# Patient Record
Sex: MALE | Race: ASIAN | NOT HISPANIC OR LATINO | URBAN - METROPOLITAN AREA
[De-identification: names, ages, dates, MRNs, and addresses within clinical notes are randomized per-mention and may not be internally consistent; named-entity substitution may affect disease eponyms.]

---

## 2022-10-08 ENCOUNTER — INPATIENT (INPATIENT)
Facility: HOSPITAL | Age: 87
LOS: 3 days | Discharge: HOME | End: 2022-10-12
Attending: INTERNAL MEDICINE | Admitting: INTERNAL MEDICINE

## 2022-10-08 VITALS
OXYGEN SATURATION: 97 % | DIASTOLIC BLOOD PRESSURE: 89 MMHG | HEART RATE: 118 BPM | SYSTOLIC BLOOD PRESSURE: 159 MMHG | TEMPERATURE: 99 F | RESPIRATION RATE: 18 BRPM

## 2022-10-08 LAB
ALBUMIN SERPL ELPH-MCNC: 4.2 G/DL — SIGNIFICANT CHANGE UP (ref 3.5–5.2)
ALP SERPL-CCNC: 98 U/L — SIGNIFICANT CHANGE UP (ref 30–115)
ALT FLD-CCNC: 19 U/L — SIGNIFICANT CHANGE UP (ref 0–41)
ANION GAP SERPL CALC-SCNC: 17 MMOL/L — HIGH (ref 7–14)
APPEARANCE UR: ABNORMAL
APTT BLD: 36 SEC — SIGNIFICANT CHANGE UP (ref 27–39.2)
AST SERPL-CCNC: 19 U/L — SIGNIFICANT CHANGE UP (ref 0–41)
BACTERIA # UR AUTO: ABNORMAL
BASOPHILS # BLD AUTO: 0.02 K/UL — SIGNIFICANT CHANGE UP (ref 0–0.2)
BASOPHILS NFR BLD AUTO: 0.1 % — SIGNIFICANT CHANGE UP (ref 0–1)
BILIRUB SERPL-MCNC: 1 MG/DL — SIGNIFICANT CHANGE UP (ref 0.2–1.2)
BILIRUB UR-MCNC: NEGATIVE — SIGNIFICANT CHANGE UP
BUN SERPL-MCNC: 15 MG/DL — SIGNIFICANT CHANGE UP (ref 10–20)
CALCIUM SERPL-MCNC: 8.7 MG/DL — SIGNIFICANT CHANGE UP (ref 8.4–10.4)
CHLORIDE SERPL-SCNC: 101 MMOL/L — SIGNIFICANT CHANGE UP (ref 98–110)
CO2 SERPL-SCNC: 21 MMOL/L — SIGNIFICANT CHANGE UP (ref 17–32)
COLOR SPEC: YELLOW — SIGNIFICANT CHANGE UP
CREAT SERPL-MCNC: 1 MG/DL — SIGNIFICANT CHANGE UP (ref 0.7–1.5)
DIFF PNL FLD: ABNORMAL
EGFR: 73 ML/MIN/1.73M2 — SIGNIFICANT CHANGE UP
EOSINOPHIL # BLD AUTO: 0 K/UL — SIGNIFICANT CHANGE UP (ref 0–0.7)
EOSINOPHIL NFR BLD AUTO: 0 % — SIGNIFICANT CHANGE UP (ref 0–8)
EPI CELLS # UR: 2 /HPF — SIGNIFICANT CHANGE UP (ref 0–5)
FLUAV AG NPH QL: SIGNIFICANT CHANGE UP
FLUBV AG NPH QL: SIGNIFICANT CHANGE UP
GLUCOSE SERPL-MCNC: 179 MG/DL — HIGH (ref 70–99)
GLUCOSE UR QL: NEGATIVE — SIGNIFICANT CHANGE UP
HCT VFR BLD CALC: 39.8 % — LOW (ref 42–52)
HGB BLD-MCNC: 13.6 G/DL — LOW (ref 14–18)
HYALINE CASTS # UR AUTO: 1 /LPF — SIGNIFICANT CHANGE UP (ref 0–7)
IMM GRANULOCYTES NFR BLD AUTO: 0.5 % — HIGH (ref 0.1–0.3)
INR BLD: 1.18 RATIO — SIGNIFICANT CHANGE UP (ref 0.65–1.3)
KETONES UR-MCNC: ABNORMAL
LACTATE SERPL-SCNC: 2.2 MMOL/L — HIGH (ref 0.7–2)
LEUKOCYTE ESTERASE UR-ACNC: ABNORMAL
LYMPHOCYTES # BLD AUTO: 0.58 K/UL — LOW (ref 1.2–3.4)
LYMPHOCYTES # BLD AUTO: 3.6 % — LOW (ref 20.5–51.1)
MCHC RBC-ENTMCNC: 31.9 PG — HIGH (ref 27–31)
MCHC RBC-ENTMCNC: 34.2 G/DL — SIGNIFICANT CHANGE UP (ref 32–37)
MCV RBC AUTO: 93.4 FL — SIGNIFICANT CHANGE UP (ref 80–94)
MONOCYTES # BLD AUTO: 0.72 K/UL — HIGH (ref 0.1–0.6)
MONOCYTES NFR BLD AUTO: 4.5 % — SIGNIFICANT CHANGE UP (ref 1.7–9.3)
NEUTROPHILS # BLD AUTO: 14.61 K/UL — HIGH (ref 1.4–6.5)
NEUTROPHILS NFR BLD AUTO: 91.3 % — HIGH (ref 42.2–75.2)
NITRITE UR-MCNC: POSITIVE
NRBC # BLD: 0 /100 WBCS — SIGNIFICANT CHANGE UP (ref 0–0)
PH UR: 6.5 — SIGNIFICANT CHANGE UP (ref 5–8)
PLATELET # BLD AUTO: 130 K/UL — SIGNIFICANT CHANGE UP (ref 130–400)
POTASSIUM SERPL-MCNC: 3.9 MMOL/L — SIGNIFICANT CHANGE UP (ref 3.5–5)
POTASSIUM SERPL-SCNC: 3.9 MMOL/L — SIGNIFICANT CHANGE UP (ref 3.5–5)
PROT SERPL-MCNC: 7.3 G/DL — SIGNIFICANT CHANGE UP (ref 6–8)
PROT UR-MCNC: ABNORMAL
PROTHROM AB SERPL-ACNC: 13.5 SEC — HIGH (ref 9.95–12.87)
RBC # BLD: 4.26 M/UL — LOW (ref 4.7–6.1)
RBC # FLD: 13.3 % — SIGNIFICANT CHANGE UP (ref 11.5–14.5)
RBC CASTS # UR COMP ASSIST: 2 /HPF — SIGNIFICANT CHANGE UP (ref 0–4)
RSV RNA NPH QL NAA+NON-PROBE: SIGNIFICANT CHANGE UP
SARS-COV-2 RNA SPEC QL NAA+PROBE: SIGNIFICANT CHANGE UP
SODIUM SERPL-SCNC: 139 MMOL/L — SIGNIFICANT CHANGE UP (ref 135–146)
SP GR SPEC: 1.02 — SIGNIFICANT CHANGE UP (ref 1.01–1.03)
TROPONIN T SERPL-MCNC: <0.01 NG/ML — SIGNIFICANT CHANGE UP
UROBILINOGEN FLD QL: ABNORMAL
WBC # BLD: 16.01 K/UL — HIGH (ref 4.8–10.8)
WBC # FLD AUTO: 16.01 K/UL — HIGH (ref 4.8–10.8)
WBC UR QL: 14 /HPF — HIGH (ref 0–5)

## 2022-10-08 PROCEDURE — 71045 X-RAY EXAM CHEST 1 VIEW: CPT | Mod: 26

## 2022-10-08 PROCEDURE — 93010 ELECTROCARDIOGRAM REPORT: CPT

## 2022-10-08 PROCEDURE — 99285 EMERGENCY DEPT VISIT HI MDM: CPT

## 2022-10-08 RX ORDER — CEFTRIAXONE 500 MG/1
2000 INJECTION, POWDER, FOR SOLUTION INTRAMUSCULAR; INTRAVENOUS ONCE
Refills: 0 | Status: COMPLETED | OUTPATIENT
Start: 2022-10-08 | End: 2022-10-08

## 2022-10-08 RX ORDER — SODIUM CHLORIDE 9 MG/ML
1000 INJECTION, SOLUTION INTRAVENOUS ONCE
Refills: 0 | Status: COMPLETED | OUTPATIENT
Start: 2022-10-08 | End: 2022-10-08

## 2022-10-08 RX ORDER — ACETAMINOPHEN 500 MG
975 TABLET ORAL ONCE
Refills: 0 | Status: COMPLETED | OUTPATIENT
Start: 2022-10-08 | End: 2022-10-08

## 2022-10-08 RX ADMIN — SODIUM CHLORIDE 1000 MILLILITER(S): 9 INJECTION, SOLUTION INTRAVENOUS at 20:55

## 2022-10-08 RX ADMIN — CEFTRIAXONE 100 MILLIGRAM(S): 500 INJECTION, POWDER, FOR SOLUTION INTRAMUSCULAR; INTRAVENOUS at 21:16

## 2022-10-08 RX ADMIN — Medication 975 MILLIGRAM(S): at 20:54

## 2022-10-08 NOTE — ED ADULT TRIAGE NOTE - CHIEF COMPLAINT QUOTE
pt daughter states that pt had difficulty moving his whole body since 0415pm today, pt has no focal deficits

## 2022-10-08 NOTE — ED PROVIDER NOTE - OBJECTIVE STATEMENT
86 yo male, pmh of htn, parkinsons, presents to ed for weakness, family noticed tonight, pt too weak to move in bed, upon my evaluation pt febrile rectally 102F, no pain or radiation. Denies  chills, cp, sob, neck pain, visual changes, nvd, dizziness, numbness, tingling.

## 2022-10-08 NOTE — ED PROVIDER NOTE - CLINICAL SUMMARY MEDICAL DECISION MAKING FREE TEXT BOX
86 YO M w/PMHx significant for HTN, BPH, Parkinson Dz, Gout, DLD, DM, Active Smoker, brought into ED because per family pt was becoming very weak in the last day, to the point where he could not get out of bed (he normally can do this). On arrival to the ED pt noted to have rectal temp of 102F, WBC ct 16K, grossly positive UA, Lactate 2.2, otherwise VSS, received 2L LR, 2g Ceftriaxone and admitted to medicine.  Admitted for urosepsis without septic shock.

## 2022-10-09 LAB
ALBUMIN SERPL ELPH-MCNC: 3.7 G/DL — SIGNIFICANT CHANGE UP (ref 3.5–5.2)
ALP SERPL-CCNC: 85 U/L — SIGNIFICANT CHANGE UP (ref 30–115)
ALT FLD-CCNC: 15 U/L — SIGNIFICANT CHANGE UP (ref 0–41)
ANION GAP SERPL CALC-SCNC: 13 MMOL/L — SIGNIFICANT CHANGE UP (ref 7–14)
AST SERPL-CCNC: 15 U/L — SIGNIFICANT CHANGE UP (ref 0–41)
BASOPHILS # BLD AUTO: 0.03 K/UL — SIGNIFICANT CHANGE UP (ref 0–0.2)
BASOPHILS NFR BLD AUTO: 0.2 % — SIGNIFICANT CHANGE UP (ref 0–1)
BILIRUB SERPL-MCNC: 1 MG/DL — SIGNIFICANT CHANGE UP (ref 0.2–1.2)
BUN SERPL-MCNC: 15 MG/DL — SIGNIFICANT CHANGE UP (ref 10–20)
CALCIUM SERPL-MCNC: 8.5 MG/DL — SIGNIFICANT CHANGE UP (ref 8.4–10.4)
CHLORIDE SERPL-SCNC: 103 MMOL/L — SIGNIFICANT CHANGE UP (ref 98–110)
CO2 SERPL-SCNC: 24 MMOL/L — SIGNIFICANT CHANGE UP (ref 17–32)
CREAT SERPL-MCNC: 0.9 MG/DL — SIGNIFICANT CHANGE UP (ref 0.7–1.5)
EGFR: 83 ML/MIN/1.73M2 — SIGNIFICANT CHANGE UP
EOSINOPHIL # BLD AUTO: 0.06 K/UL — SIGNIFICANT CHANGE UP (ref 0–0.7)
EOSINOPHIL NFR BLD AUTO: 0.4 % — SIGNIFICANT CHANGE UP (ref 0–8)
GLUCOSE SERPL-MCNC: 91 MG/DL — SIGNIFICANT CHANGE UP (ref 70–99)
HCT VFR BLD CALC: 36.2 % — LOW (ref 42–52)
HGB BLD-MCNC: 12.2 G/DL — LOW (ref 14–18)
IMM GRANULOCYTES NFR BLD AUTO: 0.4 % — HIGH (ref 0.1–0.3)
LACTATE SERPL-SCNC: 1.4 MMOL/L — SIGNIFICANT CHANGE UP (ref 0.7–2)
LYMPHOCYTES # BLD AUTO: 1.79 K/UL — SIGNIFICANT CHANGE UP (ref 1.2–3.4)
LYMPHOCYTES # BLD AUTO: 12.5 % — LOW (ref 20.5–51.1)
MAGNESIUM SERPL-MCNC: 1.8 MG/DL — SIGNIFICANT CHANGE UP (ref 1.8–2.4)
MCHC RBC-ENTMCNC: 31.6 PG — HIGH (ref 27–31)
MCHC RBC-ENTMCNC: 33.7 G/DL — SIGNIFICANT CHANGE UP (ref 32–37)
MCV RBC AUTO: 93.8 FL — SIGNIFICANT CHANGE UP (ref 80–94)
MONOCYTES # BLD AUTO: 0.62 K/UL — HIGH (ref 0.1–0.6)
MONOCYTES NFR BLD AUTO: 4.3 % — SIGNIFICANT CHANGE UP (ref 1.7–9.3)
NEUTROPHILS # BLD AUTO: 11.78 K/UL — HIGH (ref 1.4–6.5)
NEUTROPHILS NFR BLD AUTO: 82.2 % — HIGH (ref 42.2–75.2)
NRBC # BLD: 0 /100 WBCS — SIGNIFICANT CHANGE UP (ref 0–0)
PHOSPHATE SERPL-MCNC: 2.8 MG/DL — SIGNIFICANT CHANGE UP (ref 2.1–4.9)
PLATELET # BLD AUTO: 118 K/UL — LOW (ref 130–400)
POTASSIUM SERPL-MCNC: 3.5 MMOL/L — SIGNIFICANT CHANGE UP (ref 3.5–5)
POTASSIUM SERPL-SCNC: 3.5 MMOL/L — SIGNIFICANT CHANGE UP (ref 3.5–5)
PROT SERPL-MCNC: 6.3 G/DL — SIGNIFICANT CHANGE UP (ref 6–8)
RBC # BLD: 3.86 M/UL — LOW (ref 4.7–6.1)
RBC # FLD: 13.3 % — SIGNIFICANT CHANGE UP (ref 11.5–14.5)
SODIUM SERPL-SCNC: 140 MMOL/L — SIGNIFICANT CHANGE UP (ref 135–146)
WBC # BLD: 14.34 K/UL — HIGH (ref 4.8–10.8)
WBC # FLD AUTO: 14.34 K/UL — HIGH (ref 4.8–10.8)

## 2022-10-09 PROCEDURE — 99222 1ST HOSP IP/OBS MODERATE 55: CPT

## 2022-10-09 RX ORDER — TAMSULOSIN HYDROCHLORIDE 0.4 MG/1
0.4 CAPSULE ORAL AT BEDTIME
Refills: 0 | Status: DISCONTINUED | OUTPATIENT
Start: 2022-10-09 | End: 2022-10-12

## 2022-10-09 RX ORDER — ATORVASTATIN CALCIUM 80 MG/1
10 TABLET, FILM COATED ORAL AT BEDTIME
Refills: 0 | Status: DISCONTINUED | OUTPATIENT
Start: 2022-10-09 | End: 2022-10-12

## 2022-10-09 RX ORDER — ALLOPURINOL 300 MG
300 TABLET ORAL DAILY
Refills: 0 | Status: DISCONTINUED | OUTPATIENT
Start: 2022-10-09 | End: 2022-10-12

## 2022-10-09 RX ORDER — NICOTINE POLACRILEX 2 MG
1 GUM BUCCAL DAILY
Refills: 0 | Status: DISCONTINUED | OUTPATIENT
Start: 2022-10-09 | End: 2022-10-12

## 2022-10-09 RX ORDER — ENOXAPARIN SODIUM 100 MG/ML
40 INJECTION SUBCUTANEOUS EVERY 24 HOURS
Refills: 0 | Status: DISCONTINUED | OUTPATIENT
Start: 2022-10-09 | End: 2022-10-12

## 2022-10-09 RX ORDER — LOSARTAN POTASSIUM 100 MG/1
50 TABLET, FILM COATED ORAL DAILY
Refills: 0 | Status: DISCONTINUED | OUTPATIENT
Start: 2022-10-09 | End: 2022-10-12

## 2022-10-09 RX ORDER — AMLODIPINE BESYLATE 2.5 MG/1
5 TABLET ORAL DAILY
Refills: 0 | Status: DISCONTINUED | OUTPATIENT
Start: 2022-10-09 | End: 2022-10-12

## 2022-10-09 RX ORDER — CEFTRIAXONE 500 MG/1
1000 INJECTION, POWDER, FOR SOLUTION INTRAMUSCULAR; INTRAVENOUS EVERY 24 HOURS
Refills: 0 | Status: COMPLETED | OUTPATIENT
Start: 2022-10-09 | End: 2022-10-11

## 2022-10-09 RX ORDER — SODIUM CHLORIDE 9 MG/ML
1000 INJECTION, SOLUTION INTRAVENOUS ONCE
Refills: 0 | Status: COMPLETED | OUTPATIENT
Start: 2022-10-09 | End: 2022-10-09

## 2022-10-09 RX ADMIN — ATORVASTATIN CALCIUM 10 MILLIGRAM(S): 80 TABLET, FILM COATED ORAL at 23:44

## 2022-10-09 RX ADMIN — Medication 300 MILLIGRAM(S): at 11:04

## 2022-10-09 RX ADMIN — ENOXAPARIN SODIUM 40 MILLIGRAM(S): 100 INJECTION SUBCUTANEOUS at 06:15

## 2022-10-09 RX ADMIN — LOSARTAN POTASSIUM 50 MILLIGRAM(S): 100 TABLET, FILM COATED ORAL at 05:48

## 2022-10-09 RX ADMIN — TAMSULOSIN HYDROCHLORIDE 0.4 MILLIGRAM(S): 0.4 CAPSULE ORAL at 23:44

## 2022-10-09 RX ADMIN — Medication 975 MILLIGRAM(S): at 05:33

## 2022-10-09 RX ADMIN — AMLODIPINE BESYLATE 5 MILLIGRAM(S): 2.5 TABLET ORAL at 05:48

## 2022-10-09 RX ADMIN — CEFTRIAXONE 100 MILLIGRAM(S): 500 INJECTION, POWDER, FOR SOLUTION INTRAMUSCULAR; INTRAVENOUS at 15:50

## 2022-10-09 RX ADMIN — SODIUM CHLORIDE 1000 MILLILITER(S): 9 INJECTION, SOLUTION INTRAVENOUS at 03:23

## 2022-10-09 RX ADMIN — Medication 1 PATCH: at 11:05

## 2022-10-09 NOTE — H&P ADULT - ASSESSMENT
IMPRESSION  # Weakness/Malaise  # UTI  # HO BPH  # HTN  # HO Gout  # HO NIDDM-II  # Active Smoker   # DLD    PLAN  # Weakness/Malaise  # UTI  - Ceftriaxone q24hrs x 3 days  - LR @125cc/hr  - Antipyretics PRN    # HO BPH  - c/w Tamsulosin    # HTN  - Olmesartan 20mg therapeutic interchange    # HO Gout  - c/w Allopurinol QD    # HO NIDDM-II  - Check A1C  - SS PRN    # Active Smoker   - NRT PRN    # DLD  - c/w statin    DISPO: Acute  DVT PPX: Lovenox  CODE: Full

## 2022-10-09 NOTE — H&P ADULT - NSICDXPASTMEDICALHX_GEN_ALL_CORE_FT
PAST MEDICAL HISTORY:  BPH (benign prostatic hyperplasia)     Diabetes mellitus     Gout     HLD (hyperlipidemia)     HTN (hypertension)     Parkinson disease     Smoker within last 12 months

## 2022-10-09 NOTE — H&P ADULT - HISTORY OF PRESENT ILLNESS
86 YO M w/PMHx significant for HTN + Parkinoson Dz, brought into ED because per family pt was becoming very weak in the last day, to the point where he could not get out of bed (he normally can do this). On arrival to the ED pt noted to have rectal temp of 102F, WBC ct 16K, grossly positive UA, Lactate 2.2, otherwise VSS, received 2L LR, 2g Ceftriaxone and admitted to medicine.  HPI: 86 YO M w/PMHx significant for HTN, BPH, Parkinson Dz, Gout, DLD, DM, Active Smoker, brought into ED because per family pt was becoming very weak in the last day, to the point where he could not get out of bed (he normally can do this). On arrival to the ED pt noted to have rectal temp of 102F, WBC ct 16K, grossly positive UA, Lactate 2.2, otherwise VSS, received 2L LR, 2g Ceftriaxone and admitted to medicine.

## 2022-10-09 NOTE — H&P ADULT - ATTENDING COMMENTS
EKG reviewed normal sinus rhythm, RBBB, LVH, age indeterminate septal infarct  CXR reviewed no bilateral opacity or effusion    # Urinary tract infection  # Generalized weakness 2/2 UTI  - c/w IV antibiotic  - follow up urine and blood culture    # Parkinson's disease  -     # HTN, stable  - c/w home BP meds, amlodipine and ARB    # DM II   - monitor fingerstick glucose, start insulin sliding scale if fsg>180    # DLD   - c/w lipitor    # Gout  - on allopurinol    # BPH  - on flomax, monitor urine output    # DVT prophylaxis - on lovenox subcut  # Code status - Full Code EKG reviewed normal sinus rhythm, RBBB, LVH, age indeterminate septal infarct  CXR reviewed no bilateral opacity or effusion    PHYSICAL EXAM:  General Appearance: NAD, normal for age and gender. 	  Neck: Normal JVP, no bruit.   Eyes: Conjunctiva clear, Extra Ocular muscles intact. No scleral icterus.  ENMT: Moist oral mucosa. No oral lesion.  Cardiovascular: Regular rate and rhythm S1 S2, No JVD, No murmurs.  Respiratory: Lungs clear to auscultation. No wheezes, rales or rhonchi.  Psychiatry: Alert and oriented x 3, Mood & affect appropriate.  Gastrointestinal:  Soft, Non-tender, Non-distended.  Neurologic: Non-focal deficits.  Musculoskeletal/ extremities: Move all extremities. No clubbing, cyanosis or edema.  Vascular: Peripheral pulses palpable bilaterally.  Skin/Integumen: No rashes, No ecchymoses, No cyanosis.    # Urinary tract infection  # Generalized weakness 2/2 UTI  - c/w IV antibiotic  - follow up urine and blood culture    # Parkinson's disease  - not on meds as per home list    # HTN, stable  - c/w home BP meds, amlodipine and ARB    # DM II   - monitor fingerstick glucose, start insulin sliding scale if fsg>180    # DLD   - c/w lipitor    # Gout  - on allopurinol    # BPH  - on flomax, monitor urine output    # DVT prophylaxis - on lovenox subcut  # Code status - Full Code Universal Safety Interventions

## 2022-10-09 NOTE — H&P ADULT - NSHPLABSRESULTS_GEN_ALL_CORE
LABS:                        13.6   16.01 )-----------( 130      ( 08 Oct 2022 20:29 )             39.8     10-08    139  |  101  |  15  ----------------------------<  179<H>  3.9   |  21  |  1.0    Ca    8.7      08 Oct 2022 20:29    TPro  7.3  /  Alb  4.2  /  TBili  1.0  /  DBili  x   /  AST  19  /  ALT  19  /  AlkPhos  98  10-08    PT/INR - ( 08 Oct 2022 20:29 )   PT: 13.50 sec;   INR: 1.18 ratio         PTT - ( 08 Oct 2022 20:29 )  PTT:36.0 sec      RADIOLOGY & ADDITIONAL TESTS: LABS:                        13.6   16.01 )-----------( 130      ( 08 Oct 2022 20:29 )             39.8     10-08    139  |  101  |  15  ----------------------------<  179<H>  3.9   |  21  |  1.0    Ca    8.7      08 Oct 2022 20:29    TPro  7.3  /  Alb  4.2  /  TBili  1.0  /  DBili  x   /  AST  19  /  ALT  19  /  AlkPhos  98  10-08    PT/INR - ( 08 Oct 2022 20:29 )   PT: 13.50 sec;   INR: 1.18 ratio         PTT - ( 08 Oct 2022 20:29 )  PTT:36.0 sec      RADIOLOGY & ADDITIONAL TESTS:    < from: Xray Chest 1 View-PORTABLE IMMEDIATE (10.08.22 @ 20:52) >    IMPRESSION:    No radiographic evidence of acute cardiopulmonary disease.    < end of copied text >

## 2022-10-10 LAB
ALBUMIN SERPL ELPH-MCNC: 4.1 G/DL — SIGNIFICANT CHANGE UP (ref 3.5–5.2)
ALP SERPL-CCNC: 98 U/L — SIGNIFICANT CHANGE UP (ref 30–115)
ALT FLD-CCNC: 15 U/L — SIGNIFICANT CHANGE UP (ref 0–41)
ANION GAP SERPL CALC-SCNC: 13 MMOL/L — SIGNIFICANT CHANGE UP (ref 7–14)
AST SERPL-CCNC: 17 U/L — SIGNIFICANT CHANGE UP (ref 0–41)
BASOPHILS # BLD AUTO: 0.04 K/UL — SIGNIFICANT CHANGE UP (ref 0–0.2)
BASOPHILS NFR BLD AUTO: 0.3 % — SIGNIFICANT CHANGE UP (ref 0–1)
BILIRUB SERPL-MCNC: 0.8 MG/DL — SIGNIFICANT CHANGE UP (ref 0.2–1.2)
BUN SERPL-MCNC: 13 MG/DL — SIGNIFICANT CHANGE UP (ref 10–20)
CALCIUM SERPL-MCNC: 9.1 MG/DL — SIGNIFICANT CHANGE UP (ref 8.4–10.5)
CHLORIDE SERPL-SCNC: 105 MMOL/L — SIGNIFICANT CHANGE UP (ref 98–110)
CO2 SERPL-SCNC: 23 MMOL/L — SIGNIFICANT CHANGE UP (ref 17–32)
CREAT SERPL-MCNC: 0.9 MG/DL — SIGNIFICANT CHANGE UP (ref 0.7–1.5)
EGFR: 83 ML/MIN/1.73M2 — SIGNIFICANT CHANGE UP
EOSINOPHIL # BLD AUTO: 0.02 K/UL — SIGNIFICANT CHANGE UP (ref 0–0.7)
EOSINOPHIL NFR BLD AUTO: 0.2 % — SIGNIFICANT CHANGE UP (ref 0–8)
GLUCOSE SERPL-MCNC: 129 MG/DL — HIGH (ref 70–99)
HCT VFR BLD CALC: 37.1 % — LOW (ref 42–52)
HGB BLD-MCNC: 12.9 G/DL — LOW (ref 14–18)
IMM GRANULOCYTES NFR BLD AUTO: 0.5 % — HIGH (ref 0.1–0.3)
LYMPHOCYTES # BLD AUTO: 1.63 K/UL — SIGNIFICANT CHANGE UP (ref 1.2–3.4)
LYMPHOCYTES # BLD AUTO: 12.8 % — LOW (ref 20.5–51.1)
MAGNESIUM SERPL-MCNC: 2.1 MG/DL — SIGNIFICANT CHANGE UP (ref 1.8–2.4)
MCHC RBC-ENTMCNC: 31.9 PG — HIGH (ref 27–31)
MCHC RBC-ENTMCNC: 34.8 G/DL — SIGNIFICANT CHANGE UP (ref 32–37)
MCV RBC AUTO: 91.8 FL — SIGNIFICANT CHANGE UP (ref 80–94)
MONOCYTES # BLD AUTO: 0.63 K/UL — HIGH (ref 0.1–0.6)
MONOCYTES NFR BLD AUTO: 4.9 % — SIGNIFICANT CHANGE UP (ref 1.7–9.3)
NEUTROPHILS # BLD AUTO: 10.37 K/UL — HIGH (ref 1.4–6.5)
NEUTROPHILS NFR BLD AUTO: 81.3 % — HIGH (ref 42.2–75.2)
NRBC # BLD: 0 /100 WBCS — SIGNIFICANT CHANGE UP (ref 0–0)
PLATELET # BLD AUTO: 131 K/UL — SIGNIFICANT CHANGE UP (ref 130–400)
POTASSIUM SERPL-MCNC: 3.8 MMOL/L — SIGNIFICANT CHANGE UP (ref 3.5–5)
POTASSIUM SERPL-SCNC: 3.8 MMOL/L — SIGNIFICANT CHANGE UP (ref 3.5–5)
PROT SERPL-MCNC: 7.3 G/DL — SIGNIFICANT CHANGE UP (ref 6–8)
RBC # BLD: 4.04 M/UL — LOW (ref 4.7–6.1)
RBC # FLD: 13.2 % — SIGNIFICANT CHANGE UP (ref 11.5–14.5)
SODIUM SERPL-SCNC: 141 MMOL/L — SIGNIFICANT CHANGE UP (ref 135–146)
WBC # BLD: 12.75 K/UL — HIGH (ref 4.8–10.8)
WBC # FLD AUTO: 12.75 K/UL — HIGH (ref 4.8–10.8)

## 2022-10-10 PROCEDURE — 72110 X-RAY EXAM L-2 SPINE 4/>VWS: CPT | Mod: 26

## 2022-10-10 PROCEDURE — 99232 SBSQ HOSP IP/OBS MODERATE 35: CPT

## 2022-10-10 PROCEDURE — 72070 X-RAY EXAM THORAC SPINE 2VWS: CPT | Mod: 26

## 2022-10-10 RX ORDER — POLYETHYLENE GLYCOL 3350 17 G/17G
17 POWDER, FOR SOLUTION ORAL DAILY
Refills: 0 | Status: DISCONTINUED | OUTPATIENT
Start: 2022-10-10 | End: 2022-10-12

## 2022-10-10 RX ORDER — SENNA PLUS 8.6 MG/1
2 TABLET ORAL AT BEDTIME
Refills: 0 | Status: DISCONTINUED | OUTPATIENT
Start: 2022-10-10 | End: 2022-10-12

## 2022-10-10 RX ADMIN — AMLODIPINE BESYLATE 5 MILLIGRAM(S): 2.5 TABLET ORAL at 06:02

## 2022-10-10 RX ADMIN — CEFTRIAXONE 100 MILLIGRAM(S): 500 INJECTION, POWDER, FOR SOLUTION INTRAMUSCULAR; INTRAVENOUS at 17:59

## 2022-10-10 RX ADMIN — ATORVASTATIN CALCIUM 10 MILLIGRAM(S): 80 TABLET, FILM COATED ORAL at 22:01

## 2022-10-10 RX ADMIN — POLYETHYLENE GLYCOL 3350 17 GRAM(S): 17 POWDER, FOR SOLUTION ORAL at 12:47

## 2022-10-10 RX ADMIN — SENNA PLUS 2 TABLET(S): 8.6 TABLET ORAL at 22:01

## 2022-10-10 RX ADMIN — Medication 1 PATCH: at 12:46

## 2022-10-10 RX ADMIN — Medication 1 PATCH: at 08:14

## 2022-10-10 RX ADMIN — Medication 1 PATCH: at 20:44

## 2022-10-10 RX ADMIN — ENOXAPARIN SODIUM 40 MILLIGRAM(S): 100 INJECTION SUBCUTANEOUS at 06:01

## 2022-10-10 RX ADMIN — TAMSULOSIN HYDROCHLORIDE 0.4 MILLIGRAM(S): 0.4 CAPSULE ORAL at 22:01

## 2022-10-10 RX ADMIN — LOSARTAN POTASSIUM 50 MILLIGRAM(S): 100 TABLET, FILM COATED ORAL at 06:02

## 2022-10-10 RX ADMIN — Medication 300 MILLIGRAM(S): at 12:47

## 2022-10-10 NOTE — PHYSICAL THERAPY INITIAL EVALUATION ADULT - PERTINENT HX OF CURRENT PROBLEM, REHAB EVAL
88 YO M w/PMHx significant for HTN, BPH, Parkinson Dz, Gout, DLD, DM, Active Smoker, brought into ED because per family pt was becoming very weak in the last day, to the point where he could not get out of bed (he normally can do this). On arrival to the ED pt noted to have rectal temp of 102F, WBC ct 16K, grossly positive UA, Lactate 2.2, otherwise VSS, received 2L LR, 2g Ceftriaxone and admitted to medicine.     Pt. referred to PT for eval and tx.

## 2022-10-10 NOTE — PROGRESS NOTE ADULT - SUBJECTIVE AND OBJECTIVE BOX
NAME: KJ MARES  MRN: [id number]  LOCATION: [address]    Patient is a 87y old  Male who presents with a chief complaint of UTI (10 Oct 2022 17:40)      HPI:  HPI: 88 YO M w/PMHx significant for HTN, BPH, Parkinson Dz, Gout, DLD, DM, Active Smoker, brought into ED because per family pt was becoming very weak in the last day, to the point where he could not get out of bed (he normally can do this). On arrival to the ED pt noted to have rectal temp of 102F, WBC ct 16K, grossly positive UA, Lactate 2.2, otherwise VSS, received 2L LR, 2g Ceftriaxone and admitted to medicine.      (09 Oct 2022 00:51)      OVERNIGHT EVENTS: LAZARO overnight  INTERVAL HPI: Patient seen and examined at bedside. Patients daughter at bed side notes that patient was requiring one person assist in the ED patient states that his back is hurting and feels like he is getting weak d/t deconditioning.    T(F): 97.3 (10-10-22 @ 14:05), Max: 98.6 (10-09-22 @ 22:40)  HR: 81 (10-10-22 @ 14:05) (81 - 107)  BP: 123/74 (10-10-22 @ 14:05) (123/74 - 178/95)  RR: 18 (10-10-22 @ 14:05) (18 - 18)  SpO2: --  I&O's Summary      PHYSICAL EXAM:  GENERAL: NAD, well-groomed, well-developed, up right, having breakfast.  HEAD:  Atraumatic, Normocephalic  EYES: EOMI, PERRLA, conjunctiva and sclera clear  ENMT: Moist mucous membranes;  NECK: Supple, No JVD;  NERVOUS SYSTEM:  Alert & Oriented X3, Good concentration;  CHEST/LUNG: Clear to auscultation bilaterally in ant. lung fields; decreased breath sound B/L in Lower lung fields  HEART: Regular rate and rhythm; No murmurs, rubs, or gallops  ABDOMEN: Soft, Nontender, Nondistended; Bowel sounds present  EXTREMITIES:  2+ Peripheral Pulses, No clubbing, cyanosis, or edema    LABS:                        12.9   12.75 )-----------( 131      ( 10 Oct 2022 08:17 )             37.1     10-10    141  |  105  |  13  ----------------------------<  129<H>  3.8   |  23  |  0.9    Ca    9.1      10 Oct 2022 08:17  Phos  2.8     10-09  Mg     2.1     10-10    TPro  7.3  /  Alb  4.1  /  TBili  0.8  /  DBili  x   /  AST  17  /  ALT  15  /  AlkPhos  98  10-10    PT/INR - ( 08 Oct 2022 20:29 )   PT: 13.50 sec;   INR: 1.18 ratio         PTT - ( 08 Oct 2022 20:29 )  PTT:36.0 sec  Urinalysis Basic - ( 08 Oct 2022 20:42 )    Color: Yellow / Appearance: Slightly Turbid / S.019 / pH: x  Gluc: x / Ketone: Small  / Bili: Negative / Urobili: 3 mg/dL   Blood: x / Protein: 30 mg/dL / Nitrite: Positive   Leuk Esterase: Moderate / RBC: 2 /HPF / WBC 14 /HPF   Sq Epi: x / Non Sq Epi: 2 /HPF / Bacteria: Many      CAPILLARY BLOOD GLUCOSE          10-08 @ 20:42   >100,000 CFU/ml Klebsiella variicola  --  --  10-08 @ 20:27   No growth to date.  --  --          MEDICATIONS  (STANDING):  allopurinol 300 milliGRAM(s) Oral daily  amLODIPine   Tablet 5 milliGRAM(s) Oral daily  atorvastatin 10 milliGRAM(s) Oral at bedtime  cefTRIAXone   IVPB 1000 milliGRAM(s) IV Intermittent every 24 hours  enoxaparin Injectable 40 milliGRAM(s) SubCutaneous every 24 hours  losartan 50 milliGRAM(s) Oral daily  nicotine - 21 mG/24Hr(s) Patch 1 Patch Transdermal daily  polyethylene glycol 3350 17 Gram(s) Oral daily  senna 2 Tablet(s) Oral at bedtime  tamsulosin 0.4 milliGRAM(s) Oral at bedtime    MEDICATIONS  (PRN):       NAME: KJ MARES  MRN: [id number]  LOCATION: [address]    Patient is a 87y old  Male who presents with a chief complaint of UTI (10 Oct 2022 17:40)      HPI:  HPI: 86 YO M w/PMHx significant for HTN, BPH, Parkinson Dz, Gout, DLD, DM, Active Smoker, brought into ED because per family pt was becoming very weak in the last day, to the point where he could not get out of bed (he normally can do this). On arrival to the ED pt noted to have rectal temp of 102F, WBC ct 16K, grossly positive UA, Lactate 2.2, otherwise VSS, received 2L LR, 2g Ceftriaxone and admitted to medicine.      (09 Oct 2022 00:51)      OVERNIGHT EVENTS: LAZARO overnight  INTERVAL HPI: Patient seen and examined at bedside. Patients daughter at bed side notes that patient was requiring one person assist in the ED patient states that his back is hurting and feels like he is getting weak d/t deconditioning.    T(F): 97.3 (10-10-22 @ 14:05), Max: 98.6 (10-09-22 @ 22:40)  HR: 81 (10-10-22 @ 14:05) (81 - 107)  BP: 123/74 (10-10-22 @ 14:05) (123/74 - 178/95)  RR: 18 (10-10-22 @ 14:05) (18 - 18)  SpO2: --  I&O's Summary      PHYSICAL EXAM:  GENERAL: NAD, well-groomed, well-developed, up right, having breakfast.  HEAD:  Atraumatic, Normocephalic  EYES: EOMI, PERRLA, conjunctiva and sclera clear  ENMT: Moist mucous membranes;  NECK: Supple, No JVD;  NERVOUS SYSTEM:  Alert & Oriented X3, Good concentration;  CHEST/LUNG: Clear to auscultation bilaterally in ant. lung fields; decreased breath sound B/L in Lower lung fields  HEART: Regular rate and rhythm; No murmurs, rubs, or gallops  ABDOMEN: Soft, Nontender, Nondistended; Bowel sounds present  EXTREMITIES:  2+ Peripheral Pulses, No clubbing, cyanosis, or edema    LABS:                        12.9   12.75 )-----------( 131      ( 10 Oct 2022 08:17 )             37.1     10-10    141  |  105  |  13  ----------------------------<  129<H>  3.8   |  23  |  0.9    Ca    9.1      10 Oct 2022 08:17  Phos  2.8     10-09  Mg     2.1     10-10    TPro  7.3  /  Alb  4.1  /  TBili  0.8  /  DBili  x   /  AST  17  /  ALT  15  /  AlkPhos  98  10-10    PT/INR - ( 08 Oct 2022 20:29 )   PT: 13.50 sec;   INR: 1.18 ratio         PTT - ( 08 Oct 2022 20:29 )  PTT:36.0 sec  Urinalysis Basic - ( 08 Oct 2022 20:42 )    Color: Yellow / Appearance: Slightly Turbid / S.019 / pH: x  Gluc: x / Ketone: Small  / Bili: Negative / Urobili: 3 mg/dL   Blood: x / Protein: 30 mg/dL / Nitrite: Positive   Leuk Esterase: Moderate / RBC: 2 /HPF / WBC 14 /HPF   Sq Epi: x / Non Sq Epi: 2 /HPF / Bacteria: Many      CAPILLARY BLOOD GLUCOSE          10-08 @ 20:42   >100,000 CFU/ml Klebsiella variicola  --  --  10-08 @ 20:27   No growth to date.  --  --          MEDICATIONS  (STANDING):  allopurinol 300 milliGRAM(s) Oral daily  amLODIPine   Tablet 5 milliGRAM(s) Oral daily  atorvastatin 10 milliGRAM(s) Oral at bedtime  cefTRIAXone   IVPB 1000 milliGRAM(s) IV Intermittent every 24 hours  enoxaparin Injectable 40 milliGRAM(s) SubCutaneous every 24 hours  losartan 50 milliGRAM(s) Oral daily  nicotine - 21 mG/24Hr(s) Patch 1 Patch Transdermal daily  polyethylene glycol 3350 17 Gram(s) Oral daily  senna 2 Tablet(s) Oral at bedtime  tamsulosin 0.4 milliGRAM(s) Oral at bedtime    MEDICATIONS  (PRN):

## 2022-10-10 NOTE — PROGRESS NOTE ADULT - ASSESSMENT
Assessment and Plan:    86 YO M w/ PMHx significant for HTN, BPH, Parkinson Dz, Gout, DLD, DM, Active Smoker, Admitted for management of sepsis 2/2 UTI.      # Urinary tract infection  # Generalized weakness 2/2 UTI  - Improving  - On Ceftriaxone IVPB (10.8.22 start)  - Blood cx- NGTD and Ucx:+  Klebsiella variicola  - Lactate downtrending from 2.2    # Parkinson's disease  - not on meds as per home list    # HTN, stable  - c/w home BP meds, amlodipine and ARB    # DM II   - monitor fingerstick glucose, start insulin sliding scale if fsg>180    # DLD   - c/w lipitor    # Gout  - on allopurinol    # BPH  - on flomax, monitor urine output    # DVT prophylaxis - on lovenox subcut  # GI- Senna/Miralax  # Activity: IAT- Daily OOB and ambulation with assist    # Code status - Full Code.     F/U:     X-ray of the back- Back pain, wt loss and hx of smoking [ ]     Assessment and Plan:    86 YO M w/ PMHx significant for HTN, BPH, Parkinson Dz, Gout, DLD, DM, Active Smoker, Admitted for management of sepsis 2/2 UTI.      # Urinary tract infection  # Generalized weakness 2/2 UTI    - Improving  - On Ceftriaxone IVPB (10.8.22 start) will change abx pending sensitivities  - Blood cx- NGTD and Ucx:+  Klebsiella variicola  - Lactate downtrending from 2.2    #Abdominal Aneurysm 6.0 cm-Active smoker   #back pain a/w smoking hx. and 15-20lb weightloss over a three month period  -      # Parkinson's disease  - not on meds as per home list    # HTN, stable  - c/w home BP meds, amlodipine and ARB    # DM II   - monitor fingerstick glucose, start insulin sliding scale if fsg>180    # DLD   - c/w lipitor    # Gout  - on allopurinol    # BPH  - on flomax, monitor urine output    # DVT prophylaxis - on lovenox subcut  # GI- Senna/Miralax  # Activity: IAT- Daily OOB and ambulation with assist    # Code status - Full Code.     F/U:     X-ray of the back- Back pain, wt loss and hx of smoking [ ]     Assessment and Plan:    88 YO M w/ PMHx significant for HTN, BPH, Parkinson Dz, Gout, DLD, DM, Active Smoker, Admitted for management of sepsis 2/2 UTI.      # Urinary tract infection  # Generalized weakness 2/2 UTI    - Improving  - On Ceftriaxone IVPB (10.8.22 start) will change abx pending sensitivities  - Blood cx- NGTD and Ucx:+  Klebsiella variicola  - Lactate downtrending from 2.2    #Abdominal Aneurysm 6.0 cm-Active smoker   #back pain a/w smoking hx. and 15-20lb weightloss over a three month period  -Abdominal ultrasound     # Parkinson's disease  - not on meds as per home list    # HTN, stable  - c/w home BP meds, amlodipine and ARB    # DM II   - monitor fingerstick glucose, start insulin sliding scale if fsg>180    # DLD   - c/w lipitor    # Gout  - on allopurinol    # BPH  - on flomax, monitor urine output    # DVT prophylaxis - on lovenox subcut  # GI- Senna/Miralax  # Activity: IAT- Daily OOB and ambulation with assist    # Code status - Full Code.     F/U:     Abdominal Ultrasound [ ]

## 2022-10-10 NOTE — PHYSICAL THERAPY INITIAL EVALUATION ADULT - NSACTIVITYREC_GEN_A_PT
Pt. instructed to perform long arc quads, ankle pumps, heel slides, as tolerated. daily out of bed and ambulation with assist strongly encouraged.

## 2022-10-10 NOTE — PROGRESS NOTE ADULT - SUBJECTIVE AND OBJECTIVE BOX
VISHNU, KJ  87y, Male  Allergy: No Known Allergies    Hospital Day: 1d    Patient seen and examined earlier today. Feeling well, wife endorses that patient has back pain acute onset as of friday, and also 20lb weight loss in last few months.    PMH/PSH:  PAST MEDICAL & SURGICAL HISTORY:  HTN (hypertension)      Gout      BPH (benign prostatic hyperplasia)      Parkinson disease      HLD (hyperlipidemia)      Smoker within last 12 months      Diabetes mellitus      No significant past surgical history          LAST 24-Hr EVENTS:    VITALS:  T(F): 97.3 (10-10-22 @ 14:05), Max: 98.6 (10-09-22 @ 22:40)  HR: 81 (10-10-22 @ 14:05)  BP: 123/74 (10-10-22 @ 14:05) (123/74 - 178/95)  RR: 18 (10-10-22 @ 14:05)  SpO2: --        TESTS & MEASUREMENTS:  Weight (Kg): 61.2 (10-09-22 @ 22:40)  BMI (kg/m2): 23.1 (10-09)                          12.9   12.75 )-----------( 131      ( 10 Oct 2022 08:17 )             37.1     PT/INR - ( 08 Oct 2022 20:29 )   PT: 13.50 sec;   INR: 1.18 ratio         PTT - ( 08 Oct 2022 20:29 )  PTT:36.0 sec  10-10    141  |  105  |  13  ----------------------------<  129<H>  3.8   |  23  |  0.9    Ca    9.1      10 Oct 2022 08:17  Phos  2.8     10-  Mg     2.1     1010    TPro  7.3  /  Alb  4.1  /  TBili  0.8  /  DBili  x   /  AST  17  /  ALT  15  /  AlkPhos  98  10-10    LIVER FUNCTIONS - ( 10 Oct 2022 08:17 )  Alb: 4.1 g/dL / Pro: 7.3 g/dL / ALK PHOS: 98 U/L / ALT: 15 U/L / AST: 17 U/L / GGT: x           CARDIAC MARKERS ( 08 Oct 2022 20:29 )  x     / <0.01 ng/mL / x     / x     / x            Culture - Blood (collected 10-08-22 @ 20:27)  Source: .Blood Blood-Peripheral  Preliminary Report (10-10-22 @ 01:03):    No growth to date.    Culture - Blood (collected 10-08-22 @ 20:27)  Source: .Blood Blood-Peripheral  Preliminary Report (10-10-22 @ 01:03):    No growth to date.      Urinalysis Basic - ( 08 Oct 2022 20:42 )    Color: Yellow / Appearance: Slightly Turbid / S.019 / pH: x  Gluc: x / Ketone: Small  / Bili: Negative / Urobili: 3 mg/dL   Blood: x / Protein: 30 mg/dL / Nitrite: Positive   Leuk Esterase: Moderate / RBC: 2 /HPF / WBC 14 /HPF   Sq Epi: x / Non Sq Epi: 2 /HPF / Bacteria: Many                  RADIOLOGY, ECG, & ADDITIONAL TESTS:  12 Lead ECG:   Ventricular Rate 88 BPM    Atrial Rate 88 BPM    P-R Interval 178 ms    QRS Duration 132 ms    Q-T Interval 406 ms    QTC Calculation(Bazett) 491 ms    P Axis 36 degrees    R Axis -27 degrees    T Axis 2 degrees    Diagnosis Line Normal sinus rhythm  Right bundle branch block  Voltage criteria for left ventricular hypertrophy  Cannot rule out Septal infarct , age undetermined  Abnormal ECG    Confirmed by Pramod Medina ([id number]) on 10/9/2022 6:08:40 PM (10-08-22 @ 22:22)      RECENT DIAGNOSTIC ORDERS:  Xray Thoracic Spine 2 View: 15:16  Exam Completed (10-10-22 @ 16:24)  Xray Lumbosacral Spine: Routine   Indication: back pain  Transport: Stretcher-Crib  Exam Completed (10-10-22 @ 15:16)  Magnesium, Serum: AM Sched. Collection: 11-Oct-2022 04:30 (10-10-22 @ 13:51)  Comprehensive Metabolic Panel: AM Sched. Collection: 11-Oct-2022 04:30 (10-10-22 @ 13:51)  Complete Blood Count + Automated Diff: AM Sched. Collection: 11-Oct-2022 04:30 (10-10-22 @ 13:51)      MEDICATIONS:  MEDICATIONS  (STANDING):  allopurinol 300 milliGRAM(s) Oral daily  amLODIPine   Tablet 5 milliGRAM(s) Oral daily  atorvastatin 10 milliGRAM(s) Oral at bedtime  cefTRIAXone   IVPB 1000 milliGRAM(s) IV Intermittent every 24 hours  enoxaparin Injectable 40 milliGRAM(s) SubCutaneous every 24 hours  losartan 50 milliGRAM(s) Oral daily  nicotine - 21 mG/24Hr(s) Patch 1 Patch Transdermal daily  polyethylene glycol 3350 17 Gram(s) Oral daily  senna 2 Tablet(s) Oral at bedtime  tamsulosin 0.4 milliGRAM(s) Oral at bedtime    MEDICATIONS  (PRN):      HOME MEDICATIONS:  ALLOPURINOL  300 MG TABS (10-09)  AMLODIPINE BESYLATE  5 MG TABS (10-09)  ATORVASTATIN CALCIUM  10 MG TABS (10-09)  METFORMIN HYDROCHLORIDE ER  750 MG TB24 (10-09)  OLMESARTAN MEDOXOMIL  20 MG TABS (10-09)  TAMSULOSIN HYDROCHLORIDE  0.4 MG CAPS (10-09)      PHYSICAL EXAM:  GEN: NAD, Well Appearing, Well nourished  HEENT: NCAT, PERRL, EOMI  CV/CHEST: RRR, +S1/S2, no murmurs  PULM: CTAB, Good Air Entry Bilaterally  ABD: SNTTP, ND x 4 Q's  EXT: Warm, Well Perfused x 4. No Edema  SKIN: No Rash  NEURO: AxOx3, + Normal Affect

## 2022-10-10 NOTE — PHYSICAL THERAPY INITIAL EVALUATION ADULT - GENERAL OBSERVATIONS, REHAB EVAL
930-1000 am Chart reviewed. Pt. seen semirecline in bed in No apparent distress , + IV lock, c/o lower back pain, spouse at bedside ; pt is Cantonese speaking, spouse assisted with communication.

## 2022-10-11 LAB
-  AMIKACIN: SIGNIFICANT CHANGE UP
-  AMOXICILLIN/CLAVULANIC ACID: SIGNIFICANT CHANGE UP
-  AMPICILLIN/SULBACTAM: SIGNIFICANT CHANGE UP
-  AMPICILLIN: SIGNIFICANT CHANGE UP
-  AZTREONAM: SIGNIFICANT CHANGE UP
-  CEFAZOLIN: SIGNIFICANT CHANGE UP
-  CEFEPIME: SIGNIFICANT CHANGE UP
-  CEFOXITIN: SIGNIFICANT CHANGE UP
-  CEFTRIAXONE: SIGNIFICANT CHANGE UP
-  CIPROFLOXACIN: SIGNIFICANT CHANGE UP
-  ERTAPENEM: SIGNIFICANT CHANGE UP
-  GENTAMICIN: SIGNIFICANT CHANGE UP
-  IMIPENEM: SIGNIFICANT CHANGE UP
-  LEVOFLOXACIN: SIGNIFICANT CHANGE UP
-  MEROPENEM: SIGNIFICANT CHANGE UP
-  NITROFURANTOIN: SIGNIFICANT CHANGE UP
-  PIPERACILLIN/TAZOBACTAM: SIGNIFICANT CHANGE UP
-  TIGECYCLINE: SIGNIFICANT CHANGE UP
-  TOBRAMYCIN: SIGNIFICANT CHANGE UP
-  TRIMETHOPRIM/SULFAMETHOXAZOLE: SIGNIFICANT CHANGE UP
ALBUMIN SERPL ELPH-MCNC: 3.9 G/DL — SIGNIFICANT CHANGE UP (ref 3.5–5.2)
ALP SERPL-CCNC: 95 U/L — SIGNIFICANT CHANGE UP (ref 30–115)
ALT FLD-CCNC: 20 U/L — SIGNIFICANT CHANGE UP (ref 0–41)
ANION GAP SERPL CALC-SCNC: 16 MMOL/L — HIGH (ref 7–14)
AST SERPL-CCNC: 19 U/L — SIGNIFICANT CHANGE UP (ref 0–41)
BASOPHILS # BLD AUTO: 0.03 K/UL — SIGNIFICANT CHANGE UP (ref 0–0.2)
BASOPHILS NFR BLD AUTO: 0.3 % — SIGNIFICANT CHANGE UP (ref 0–1)
BILIRUB SERPL-MCNC: 0.8 MG/DL — SIGNIFICANT CHANGE UP (ref 0.2–1.2)
BUN SERPL-MCNC: 15 MG/DL — SIGNIFICANT CHANGE UP (ref 10–20)
CALCIUM SERPL-MCNC: 8.7 MG/DL — SIGNIFICANT CHANGE UP (ref 8.4–10.4)
CHLORIDE SERPL-SCNC: 103 MMOL/L — SIGNIFICANT CHANGE UP (ref 98–110)
CO2 SERPL-SCNC: 22 MMOL/L — SIGNIFICANT CHANGE UP (ref 17–32)
CREAT SERPL-MCNC: 0.9 MG/DL — SIGNIFICANT CHANGE UP (ref 0.7–1.5)
CULTURE RESULTS: SIGNIFICANT CHANGE UP
EGFR: 83 ML/MIN/1.73M2 — SIGNIFICANT CHANGE UP
EOSINOPHIL # BLD AUTO: 0.09 K/UL — SIGNIFICANT CHANGE UP (ref 0–0.7)
EOSINOPHIL NFR BLD AUTO: 0.9 % — SIGNIFICANT CHANGE UP (ref 0–8)
GLUCOSE SERPL-MCNC: 133 MG/DL — HIGH (ref 70–99)
HCT VFR BLD CALC: 37.8 % — LOW (ref 42–52)
HGB BLD-MCNC: 12.9 G/DL — LOW (ref 14–18)
IMM GRANULOCYTES NFR BLD AUTO: 0.3 % — SIGNIFICANT CHANGE UP (ref 0.1–0.3)
LYMPHOCYTES # BLD AUTO: 1.92 K/UL — SIGNIFICANT CHANGE UP (ref 1.2–3.4)
LYMPHOCYTES # BLD AUTO: 18.5 % — LOW (ref 20.5–51.1)
MAGNESIUM SERPL-MCNC: 2.1 MG/DL — SIGNIFICANT CHANGE UP (ref 1.8–2.4)
MCHC RBC-ENTMCNC: 32 PG — HIGH (ref 27–31)
MCHC RBC-ENTMCNC: 34.1 G/DL — SIGNIFICANT CHANGE UP (ref 32–37)
MCV RBC AUTO: 93.8 FL — SIGNIFICANT CHANGE UP (ref 80–94)
METHOD TYPE: SIGNIFICANT CHANGE UP
MONOCYTES # BLD AUTO: 0.57 K/UL — SIGNIFICANT CHANGE UP (ref 0.1–0.6)
MONOCYTES NFR BLD AUTO: 5.5 % — SIGNIFICANT CHANGE UP (ref 1.7–9.3)
NEUTROPHILS # BLD AUTO: 7.74 K/UL — HIGH (ref 1.4–6.5)
NEUTROPHILS NFR BLD AUTO: 74.5 % — SIGNIFICANT CHANGE UP (ref 42.2–75.2)
NRBC # BLD: 0 /100 WBCS — SIGNIFICANT CHANGE UP (ref 0–0)
ORGANISM # SPEC MICROSCOPIC CNT: SIGNIFICANT CHANGE UP
ORGANISM # SPEC MICROSCOPIC CNT: SIGNIFICANT CHANGE UP
PLATELET # BLD AUTO: 151 K/UL — SIGNIFICANT CHANGE UP (ref 130–400)
POTASSIUM SERPL-MCNC: 3.6 MMOL/L — SIGNIFICANT CHANGE UP (ref 3.5–5)
POTASSIUM SERPL-SCNC: 3.6 MMOL/L — SIGNIFICANT CHANGE UP (ref 3.5–5)
PROT SERPL-MCNC: 7.1 G/DL — SIGNIFICANT CHANGE UP (ref 6–8)
RBC # BLD: 4.03 M/UL — LOW (ref 4.7–6.1)
RBC # FLD: 13.1 % — SIGNIFICANT CHANGE UP (ref 11.5–14.5)
SODIUM SERPL-SCNC: 141 MMOL/L — SIGNIFICANT CHANGE UP (ref 135–146)
SPECIMEN SOURCE: SIGNIFICANT CHANGE UP
WBC # BLD: 10.38 K/UL — SIGNIFICANT CHANGE UP (ref 4.8–10.8)
WBC # FLD AUTO: 10.38 K/UL — SIGNIFICANT CHANGE UP (ref 4.8–10.8)

## 2022-10-11 PROCEDURE — 99233 SBSQ HOSP IP/OBS HIGH 50: CPT

## 2022-10-11 PROCEDURE — 93978 VASCULAR STUDY: CPT | Mod: 26

## 2022-10-11 RX ADMIN — TAMSULOSIN HYDROCHLORIDE 0.4 MILLIGRAM(S): 0.4 CAPSULE ORAL at 21:30

## 2022-10-11 RX ADMIN — CEFTRIAXONE 100 MILLIGRAM(S): 500 INJECTION, POWDER, FOR SOLUTION INTRAMUSCULAR; INTRAVENOUS at 17:33

## 2022-10-11 RX ADMIN — ATORVASTATIN CALCIUM 10 MILLIGRAM(S): 80 TABLET, FILM COATED ORAL at 21:30

## 2022-10-11 RX ADMIN — SENNA PLUS 2 TABLET(S): 8.6 TABLET ORAL at 21:30

## 2022-10-11 RX ADMIN — LOSARTAN POTASSIUM 50 MILLIGRAM(S): 100 TABLET, FILM COATED ORAL at 06:40

## 2022-10-11 RX ADMIN — AMLODIPINE BESYLATE 5 MILLIGRAM(S): 2.5 TABLET ORAL at 06:40

## 2022-10-11 RX ADMIN — POLYETHYLENE GLYCOL 3350 17 GRAM(S): 17 POWDER, FOR SOLUTION ORAL at 11:30

## 2022-10-11 RX ADMIN — Medication 1 PATCH: at 11:30

## 2022-10-11 RX ADMIN — ENOXAPARIN SODIUM 40 MILLIGRAM(S): 100 INJECTION SUBCUTANEOUS at 06:41

## 2022-10-11 RX ADMIN — Medication 1 PATCH: at 08:27

## 2022-10-11 RX ADMIN — Medication 300 MILLIGRAM(S): at 11:35

## 2022-10-11 RX ADMIN — Medication 1 PATCH: at 15:13

## 2022-10-11 NOTE — PROGRESS NOTE ADULT - SUBJECTIVE AND OBJECTIVE BOX
NAME: KJ MARES  MRN: [id number]  LOCATION: [place] [address] [address]    Patient is a 87y old  Male who presents with a chief complaint of UTI (10 Oct 2022 19:34)      HPI:  HPI: 86 YO M w/PMHx significant for HTN, BPH, Parkinson Dz, Gout, DLD, DM, Active Smoker, brought into ED because per family pt was becoming very weak in the last day, to the point where he could not get out of bed (he normally can do this). On arrival to the ED pt noted to have rectal temp of 102F, WBC ct 16K, grossly positive UA, Lactate 2.2, otherwise VSS, received 2L LR, 2g Ceftriaxone and admitted to medicine.      (09 Oct 2022 00:51)      OVERNIGHT EVENTS: LAZARO overnight    INTERVAL HPI: Patient seen and examined at bedside.  Patient has no complaints at this time. Denies fevers, chills, headache, lightheadedness, chest pain, dyspnea, abdominal pain, n/v/d/c.    T(F): 98.5 (10-11-22 @ 13:43), Max: 98.5 (10-11-22 @ 13:43)  HR: 80 (10-11-22 @ 13:43) (80 - 96)  BP: 120/68 (10-11-22 @ 13:43) (116/66 - 120/68)  RR: 18 (10-11-22 @ 13:43) (17 - 18)  SpO2: --  I&O's Summary    10 Oct 2022 07:01  -  11 Oct 2022 07:00  --------------------------------------------------------  IN: 477 mL / OUT: 200 mL / NET: 277 mL        PHYSICAL EXAM:    GENERAL: NAD, well-groomed, well-developed  HEAD:  Atraumatic, Normocephalic  EYES: EOMI, PERRLA, conjunctiva and sclera clear  ENMT: Moist mucous membranes  NECK: Supple, No JVD,   NERVOUS SYSTEM:  Alert & Oriented X3, Good concentration;   CHEST/LUNG: CTA bilaterally; No rales, rhonchi, wheezing, or rubs  HEART: Regular rate and rhythm; No murmurs, rubs, or gallops  ABDOMEN: Soft, Nontender, Nondistended; Bowel sounds present  EXTREMITIES:  2+ Peripheral Pulses, No clubbing, cyanosis, or edema      LABS:                        12.9   10.38 )-----------( 151      ( 11 Oct 2022 08:02 )             37.8     10-11    141  |  103  |  15  ----------------------------<  133<H>  3.6   |  22  |  0.9    Ca    8.7      11 Oct 2022 08:02  Mg     2.1     10-11    TPro  7.1  /  Alb  3.9  /  TBili  0.8  /  DBili  x   /  AST  19  /  ALT  20  /  AlkPhos  95  10-11        CAPILLARY BLOOD GLUCOSE          10-08 @ 20:42   >100,000 CFU/ml Klebsiella variicola  --  Klebsiella variicola  10-08 @ 20:27   No growth to date.  --  --          MEDICATIONS  (STANDING):  allopurinol 300 milliGRAM(s) Oral daily  amLODIPine   Tablet 5 milliGRAM(s) Oral daily  atorvastatin 10 milliGRAM(s) Oral at bedtime  cefTRIAXone   IVPB 1000 milliGRAM(s) IV Intermittent every 24 hours  enoxaparin Injectable 40 milliGRAM(s) SubCutaneous every 24 hours  losartan 50 milliGRAM(s) Oral daily  nicotine - 21 mG/24Hr(s) Patch 1 Patch Transdermal daily  polyethylene glycol 3350 17 Gram(s) Oral daily  senna 2 Tablet(s) Oral at bedtime  tamsulosin 0.4 milliGRAM(s) Oral at bedtime    MEDICATIONS  (PRN):

## 2022-10-11 NOTE — PROGRESS NOTE ADULT - ATTENDING COMMENTS
# Urinary tract infection  # Generalized weakness 2/2 UTI  - c/w IV Ceftriaxone  - blood cx negative, urine cx  with pansensitive Klebsiella   - complete course with PO Vantin, 5d total course     #Weight Loss/ New LBP   - compression fx of T6 noted on XR with osteopenia, outpt follow up with PCP for DEXA scan   - will need routine cancer screening as an outpatient     #Incidental Abdominal Aortic Aneurysm   - noted on XR, will order US to further evaluate, and then consult Vascular for intervention if it meets criteria     # Parkinson's disease  - not on meds as per home list    # HTN, stable  - c/w home BP meds, amlodipine and ARB    # DM II   - monitor fingerstick glucose, start insulin sliding scale if fsg>180    # DLD   - c/w lipitor    # Gout  - on allopurinol    # BPH  - on flomax, monitor urine output    # DVT prophylaxis - on lovenox subcut  # Code status - Full Code.     Total time spent to complete patient's bedside assessment, review medical chart, discuss medical plan of care with covering medical team was more than 35 minutes  with >50% of time spent face to face with patient, discussion with patient/family and/or coordination of care      Gladis Fong DO

## 2022-10-11 NOTE — PROGRESS NOTE ADULT - ASSESSMENT
Assessment and Plan:    86 YO M w/ PMHx significant for HTN, BPH, Parkinson Dz, Gout, DLD, DM, Active Smoker, Admitted for management of sepsis 2/2 UTI.      # Urinary tract infection  # Generalized weakness 2/2 UTI    - Improving  - On Ceftriaxone IVPB (10.8.22 start) will change abx pending sensitivities  - Blood cx- NGTD and Ucx:+  Klebsiella variicola  - Lactate downtrending from 2.2    #Abdominal Aneurysm 6.0 cm-Active smoker   #back pain a/w smoking hx. and 15-20lb weightloss over a three month period  -Abdominal ultrasound ordered will f/u with results    # Parkinson's disease  - not on meds as per home list    # HTN, stable  - c/w home BP meds, amlodipine and ARB    # DM II   - monitor fingerstick glucose, start insulin sliding scale if fsg>180    # DLD   - c/w lipitor    # Gout  - on allopurinol    # BPH  - on flomax, monitor urine output    # DVT prophylaxis - on lovenox subcut  # GI- Senna/Miralax  # Activity: IAT- Daily OOB and ambulation with assist    # Code status - Full Code.     F/U:     Abdominal Ultrasound [ ]

## 2022-10-12 ENCOUNTER — TRANSCRIPTION ENCOUNTER (OUTPATIENT)
Age: 87
End: 2022-10-12

## 2022-10-12 VITALS
DIASTOLIC BLOOD PRESSURE: 71 MMHG | RESPIRATION RATE: 18 BRPM | HEART RATE: 83 BPM | OXYGEN SATURATION: 97 % | TEMPERATURE: 98 F | SYSTOLIC BLOOD PRESSURE: 112 MMHG

## 2022-10-12 LAB
ALBUMIN SERPL ELPH-MCNC: 3.9 G/DL — SIGNIFICANT CHANGE UP (ref 3.5–5.2)
ALP SERPL-CCNC: 96 U/L — SIGNIFICANT CHANGE UP (ref 30–115)
ALT FLD-CCNC: 24 U/L — SIGNIFICANT CHANGE UP (ref 0–41)
ANION GAP SERPL CALC-SCNC: 13 MMOL/L — SIGNIFICANT CHANGE UP (ref 7–14)
AST SERPL-CCNC: 20 U/L — SIGNIFICANT CHANGE UP (ref 0–41)
BASOPHILS # BLD AUTO: 0.03 K/UL — SIGNIFICANT CHANGE UP (ref 0–0.2)
BASOPHILS NFR BLD AUTO: 0.3 % — SIGNIFICANT CHANGE UP (ref 0–1)
BILIRUB SERPL-MCNC: 0.7 MG/DL — SIGNIFICANT CHANGE UP (ref 0.2–1.2)
BUN SERPL-MCNC: 15 MG/DL — SIGNIFICANT CHANGE UP (ref 10–20)
CALCIUM SERPL-MCNC: 8.7 MG/DL — SIGNIFICANT CHANGE UP (ref 8.4–10.4)
CHLORIDE SERPL-SCNC: 105 MMOL/L — SIGNIFICANT CHANGE UP (ref 98–110)
CO2 SERPL-SCNC: 25 MMOL/L — SIGNIFICANT CHANGE UP (ref 17–32)
CREAT SERPL-MCNC: 1.1 MG/DL — SIGNIFICANT CHANGE UP (ref 0.7–1.5)
EGFR: 65 ML/MIN/1.73M2 — SIGNIFICANT CHANGE UP
EOSINOPHIL # BLD AUTO: 0.13 K/UL — SIGNIFICANT CHANGE UP (ref 0–0.7)
EOSINOPHIL NFR BLD AUTO: 1.5 % — SIGNIFICANT CHANGE UP (ref 0–8)
GLUCOSE SERPL-MCNC: 119 MG/DL — HIGH (ref 70–99)
HCT VFR BLD CALC: 39.2 % — LOW (ref 42–52)
HGB BLD-MCNC: 12.6 G/DL — LOW (ref 14–18)
IMM GRANULOCYTES NFR BLD AUTO: 0.3 % — SIGNIFICANT CHANGE UP (ref 0.1–0.3)
LYMPHOCYTES # BLD AUTO: 2.17 K/UL — SIGNIFICANT CHANGE UP (ref 1.2–3.4)
LYMPHOCYTES # BLD AUTO: 24.9 % — SIGNIFICANT CHANGE UP (ref 20.5–51.1)
MAGNESIUM SERPL-MCNC: 2.2 MG/DL — SIGNIFICANT CHANGE UP (ref 1.8–2.4)
MCHC RBC-ENTMCNC: 30.9 PG — SIGNIFICANT CHANGE UP (ref 27–31)
MCHC RBC-ENTMCNC: 32.1 G/DL — SIGNIFICANT CHANGE UP (ref 32–37)
MCV RBC AUTO: 96.1 FL — HIGH (ref 80–94)
MONOCYTES # BLD AUTO: 0.47 K/UL — SIGNIFICANT CHANGE UP (ref 0.1–0.6)
MONOCYTES NFR BLD AUTO: 5.4 % — SIGNIFICANT CHANGE UP (ref 1.7–9.3)
NEUTROPHILS # BLD AUTO: 5.87 K/UL — SIGNIFICANT CHANGE UP (ref 1.4–6.5)
NEUTROPHILS NFR BLD AUTO: 67.6 % — SIGNIFICANT CHANGE UP (ref 42.2–75.2)
NRBC # BLD: 0 /100 WBCS — SIGNIFICANT CHANGE UP (ref 0–0)
PLATELET # BLD AUTO: 169 K/UL — SIGNIFICANT CHANGE UP (ref 130–400)
POTASSIUM SERPL-MCNC: 3.8 MMOL/L — SIGNIFICANT CHANGE UP (ref 3.5–5)
POTASSIUM SERPL-SCNC: 3.8 MMOL/L — SIGNIFICANT CHANGE UP (ref 3.5–5)
PROT SERPL-MCNC: 7.2 G/DL — SIGNIFICANT CHANGE UP (ref 6–8)
RBC # BLD: 4.08 M/UL — LOW (ref 4.7–6.1)
RBC # FLD: 13.1 % — SIGNIFICANT CHANGE UP (ref 11.5–14.5)
SODIUM SERPL-SCNC: 143 MMOL/L — SIGNIFICANT CHANGE UP (ref 135–146)
WBC # BLD: 8.7 K/UL — SIGNIFICANT CHANGE UP (ref 4.8–10.8)
WBC # FLD AUTO: 8.7 K/UL — SIGNIFICANT CHANGE UP (ref 4.8–10.8)

## 2022-10-12 PROCEDURE — 99239 HOSP IP/OBS DSCHRG MGMT >30: CPT

## 2022-10-12 RX ORDER — ALLOPURINOL 300 MG
1 TABLET ORAL
Qty: 0 | Refills: 0 | DISCHARGE

## 2022-10-12 RX ORDER — AMLODIPINE BESYLATE 2.5 MG/1
0 TABLET ORAL
Qty: 0 | Refills: 0 | DISCHARGE

## 2022-10-12 RX ORDER — ATORVASTATIN CALCIUM 80 MG/1
0 TABLET, FILM COATED ORAL
Qty: 0 | Refills: 0 | DISCHARGE

## 2022-10-12 RX ORDER — OLMESARTAN MEDOXOMIL 5 MG/1
0 TABLET, FILM COATED ORAL
Qty: 0 | Refills: 0 | DISCHARGE

## 2022-10-12 RX ORDER — TAMSULOSIN HYDROCHLORIDE 0.4 MG/1
0 CAPSULE ORAL
Qty: 0 | Refills: 0 | DISCHARGE

## 2022-10-12 RX ORDER — ALLOPURINOL 300 MG
0 TABLET ORAL
Qty: 0 | Refills: 0 | DISCHARGE

## 2022-10-12 RX ORDER — METFORMIN HYDROCHLORIDE 850 MG/1
0 TABLET ORAL
Qty: 0 | Refills: 0 | DISCHARGE

## 2022-10-12 RX ORDER — POLYETHYLENE GLYCOL 3350 17 G/17G
17 POWDER, FOR SOLUTION ORAL
Qty: 0 | Refills: 0 | DISCHARGE
Start: 2022-10-12

## 2022-10-12 RX ORDER — METFORMIN HYDROCHLORIDE 850 MG/1
1 TABLET ORAL
Qty: 0 | Refills: 0 | DISCHARGE

## 2022-10-12 RX ORDER — CEFPODOXIME PROXETIL 100 MG
1 TABLET ORAL
Qty: 10 | Refills: 0
Start: 2022-10-12 | End: 2022-10-16

## 2022-10-12 RX ORDER — ATORVASTATIN CALCIUM 80 MG/1
1 TABLET, FILM COATED ORAL
Qty: 0 | Refills: 0 | DISCHARGE

## 2022-10-12 RX ADMIN — LOSARTAN POTASSIUM 50 MILLIGRAM(S): 100 TABLET, FILM COATED ORAL at 06:39

## 2022-10-12 RX ADMIN — POLYETHYLENE GLYCOL 3350 17 GRAM(S): 17 POWDER, FOR SOLUTION ORAL at 13:21

## 2022-10-12 RX ADMIN — Medication 1 PATCH: at 13:20

## 2022-10-12 RX ADMIN — ENOXAPARIN SODIUM 40 MILLIGRAM(S): 100 INJECTION SUBCUTANEOUS at 06:40

## 2022-10-12 RX ADMIN — Medication 1 PATCH: at 11:43

## 2022-10-12 RX ADMIN — AMLODIPINE BESYLATE 5 MILLIGRAM(S): 2.5 TABLET ORAL at 06:39

## 2022-10-12 RX ADMIN — Medication 300 MILLIGRAM(S): at 13:20

## 2022-10-12 NOTE — DISCHARGE NOTE PROVIDER - NSDCFUADDAPPT_GEN_ALL_CORE_FT
- Follow up with primary care physician for age appropriate cancer screening   - Follow up with vascular surgery as outpatient for abdominal aortic aneurism  - Follow up with primary care physician for age appropriate cancer screening given your recent weight loss  - Follow up with vascular surgery as outpatient for abdominal aortic aneurism

## 2022-10-12 NOTE — DISCHARGE NOTE PROVIDER - HOSPITAL COURSE
88 YO M w/PMHx significant for HTN, BPH, Parkinson Dz, Gout, DLD, DM, Active Smoker, brought into ED because per family pt was becoming very weak in the last day, to the point where he could not get out of bed (he normally can do this). On arrival to the ED pt noted to have rectal temp of 102F, WBC ct 16K, grossly positive UA, Lactate 2.2, otherwise VSS, received 2L LR, 2g Ceftriaxone and admitted to medicine.     # Urinary tract infection  # Generalized weakness 2/2 UTI  - c/w IV Ceftriaxone  - blood cx negative, urine cx  with pansensitive Klebsiella   - complete course with PO Vantin, 5d total course     #Weight Loss/ New LBP   - compression fx of T6 noted on XR with osteopenia, outpt follow up with PCP for DEXA scan   - will need routine cancer screening as an outpatient     #Incidental Abdominal Aortic Aneurysm   - noted on XR, will order US to further evaluate, and then consult Vascular for intervention if it meets criteria   - US Duplex Distal aorta measuring 5.1 x 5.2 x 4.8.  Maximum abdominal aorta measured 5.2 cm.  - F/u vascular as outpatient       # Parkinson's disease  - not on meds as per home list    # HTN, stable  - c/w home BP meds, amlodipine and ARB    # DM II   - monitor fingerstick glucose, start insulin sliding scale if fsg>180    # DLD   - c/w lipitor    # Gout  - on allopurinol    # BPH  - on flomax, monitor urine output   86 YO M w/PMHx significant for HTN, BPH, Parkinson Dz, Gout, DLD, DM, Active Smoker, brought into ED because per family pt was becoming very weak in the last day, to the point where he could not get out of bed (he normally can do this). On arrival to the ED pt noted to have rectal temp of 102F, WBC ct 16K, grossly positive UA, Lactate 2.2, otherwise VSS, received 2L LR, 2g Ceftriaxone and admitted to medicine.     # Urinary tract infection  # Generalized weakness 2/2 UTI  - s/p IV Ceftriaxone  - blood cx negative, urine cx  with pansensitive Klebsiella   - complete course with PO Vantin, 5d total course     #Weight Loss/ New LBP   - compression fx of T6 noted on XR with osteopenia, outpt follow up with PCP for DEXA scan   - will need routine cancer screening as an outpatient     #Incidental Abdominal Aortic Aneurysm   - noted on XR  - US Duplex Distal aorta measuring 5.1 x 5.2 x 4.8.  Maximum abdominal aorta measured 5.2 cm.  - F/u vascular as outpatient     # Parkinson's disease  - not on meds as per home list    # HTN, stable  - c/w home BP med    # DM II     # DLD   - c/w lipitor    # Gout  - on allopurinol    # BPH  - on flomax, monitor urine output

## 2022-10-12 NOTE — DISCHARGE NOTE NURSING/CASE MANAGEMENT/SOCIAL WORK - NSDCFUADDAPPT_GEN_ALL_CORE_FT
- Follow up with primary care physician for age appropriate cancer screening given your recent weight loss  - Follow up with vascular surgery as outpatient for abdominal aortic aneurism

## 2022-10-12 NOTE — DISCHARGE NOTE NURSING/CASE MANAGEMENT/SOCIAL WORK - NSDCPEFALRISK_GEN_ALL_CORE
For information on Fall & Injury Prevention, visit: [email or web address] OR  [email or web address] OR  https://www.cdc.gov/steadi/patient.html

## 2022-10-12 NOTE — DISCHARGE NOTE PROVIDER - NSDCCPCAREPLAN_GEN_ALL_CORE_FT
PRINCIPAL DISCHARGE DIAGNOSIS  Diagnosis: Sepsis secondary to UTI  Assessment and Plan of Treatment: You were noted either on arrival or during this hospitalization to have a Urinary Tract Infection. You may have already been treated and completed the antibiotics, please refer to the list of medications present on your discharge paperwork. If you notice that there are antibiotics listed, these may be to treat your infection, be sure to complete taking the full course, whether you have symptoms or not, as prescribed.  While taking antibiotics, you may benefit from taking a probiotic such as florastore to help to try and prevent an infectious type of diarrhea known as C Diff. If you notice that you begin having severe watery diarrhea, more than 4-5 episodes a day, please see your Primary Care Doctor or come to the ER to have your stool tested for this infection.   It is not necessary to repeat a urine test to see if the infection is gone, it is assumed that after treatment it should have resolved. However, if you continue to have symptoms, please see your Primary Care doctor or return to the ER.

## 2022-10-12 NOTE — DISCHARGE NOTE PROVIDER - NSDCMRMEDTOKEN_GEN_ALL_CORE_FT
ALLOPURINOL  300 MG TABS: 1 tab(s) orally once a day  AMLODIPINE BESYLATE  5 MG TABS: 1 tab(s) orally once a day  ATORVASTATIN CALCIUM  10 MG TABS: 1 tab(s) orally once a day  METFORMIN HYDROCHLORIDE ER  750 MG TB24: 1 tab(s) orally once a day  OLMESARTAN MEDOXOMIL  20 MG TABS: 1 tab(s) orally once a day  polyethylene glycol 3350 oral powder for reconstitution: 17 gram(s) orally once a day  TAMSULOSIN HYDROCHLORIDE  0.4 MG CAPS: 1 cap(s) orally once a day   ALLOPURINOL  300 MG TABS: 1 tab(s) orally once a day  AMLODIPINE BESYLATE  5 MG TABS: 1 tab(s) orally once a day  ATORVASTATIN CALCIUM  10 MG TABS: 1 tab(s) orally once a day  cefpodoxime 100 mg oral tablet: 1 tab(s) orally 2 times a day   METFORMIN HYDROCHLORIDE ER  750 MG TB24: 1 tab(s) orally once a day  OLMESARTAN MEDOXOMIL  20 MG TABS: 1 tab(s) orally once a day  polyethylene glycol 3350 oral powder for reconstitution: 17 gram(s) orally once a day  TAMSULOSIN HYDROCHLORIDE  0.4 MG CAPS: 1 cap(s) orally once a day

## 2022-10-12 NOTE — DISCHARGE NOTE PROVIDER - CARE PROVIDER_API CALL
LUPE ATKINSON  Internal Medicine  [address] [place] [address]  Phone: [phone]  Fax: [phone]  Established Patient  Follow Up Time: 2 weeks    Enid Lanier)  Surgery; Vascular Surgery  [address] [place] [address]  Phone: [phone]  Fax: [phone]  Established Patient  Follow Up Time: 1 week

## 2022-10-12 NOTE — DISCHARGE NOTE NURSING/CASE MANAGEMENT/SOCIAL WORK - PATIENT PORTAL LINK FT
You can access the FollowMyHealth Patient Portal offered by [facility] by registering at the following website: [email or web address]. By joining [employer]’s FollowMyHealth portal, you will also be able to view your health information using other applications (apps) compatible with our system.

## 2022-10-12 NOTE — DISCHARGE NOTE PROVIDER - NSDCHHCONTACT_GEN_ALL_CORE_FT
As certified below, I, or a nurse practitioner or physician assistant working with me, had a face-to-face encounter that meets the physician face-to-face encounter requirements. 2

## 2022-10-12 NOTE — DISCHARGE NOTE PROVIDER - PROVIDER TOKENS
PROVIDER:[TOKEN:[[id number]],FOLLOWUP:[2 weeks],ESTABLISHEDPATIENT:[T]],PROVIDER:[TOKEN:[[id number]],FOLLOWUP:[1 week],ESTABLISHEDPATIENT:[T]]

## 2022-10-12 NOTE — DISCHARGE NOTE PROVIDER - ATTENDING DISCHARGE PHYSICAL EXAMINATION:
GENERAL: NAD, well-groomed, well-developed  HEAD:  Atraumatic, Normocephalic  EYES: EOMI, PERRLA, conjunctiva and sclera clear  ENMT: Moist mucous membranes  NECK: Supple, No JVD,   NERVOUS SYSTEM:  Alert & Oriented X3, Good concentration;   CHEST/LUNG: CTA bilaterally; No rales, rhonchi, wheezing, or rubs  HEART: Regular rate and rhythm; No murmurs, rubs, or gallops  ABDOMEN: Soft, Nontender, Nondistended; Bowel sounds present  EXTREMITIES:  2+ Peripheral Pulses, No clubbing, cyanosis, or edema

## 2022-10-13 PROBLEM — G20 PARKINSON'S DISEASE: Chronic | Status: ACTIVE | Noted: 2022-10-09

## 2022-10-13 PROBLEM — M10.9 GOUT, UNSPECIFIED: Chronic | Status: ACTIVE | Noted: 2022-10-09

## 2022-10-13 PROBLEM — E78.5 HYPERLIPIDEMIA, UNSPECIFIED: Chronic | Status: ACTIVE | Noted: 2022-10-09

## 2022-10-13 PROBLEM — E11.9 TYPE 2 DIABETES MELLITUS WITHOUT COMPLICATIONS: Chronic | Status: ACTIVE | Noted: 2022-10-09

## 2022-10-13 PROBLEM — Z87.891 PERSONAL HISTORY OF NICOTINE DEPENDENCE: Chronic | Status: ACTIVE | Noted: 2022-10-09

## 2022-10-13 PROBLEM — I10 ESSENTIAL (PRIMARY) HYPERTENSION: Chronic | Status: ACTIVE | Noted: 2022-10-09

## 2022-10-13 PROBLEM — N40.0 BENIGN PROSTATIC HYPERPLASIA WITHOUT LOWER URINARY TRACT SYMPTOMS: Chronic | Status: ACTIVE | Noted: 2022-10-09

## 2022-10-13 PROBLEM — Z00.00 ENCOUNTER FOR PREVENTIVE HEALTH EXAMINATION: Status: ACTIVE | Noted: 2022-10-13

## 2022-10-14 LAB
CULTURE RESULTS: SIGNIFICANT CHANGE UP
CULTURE RESULTS: SIGNIFICANT CHANGE UP
SPECIMEN SOURCE: SIGNIFICANT CHANGE UP
SPECIMEN SOURCE: SIGNIFICANT CHANGE UP

## 2022-10-20 DIAGNOSIS — E11.9 TYPE 2 DIABETES MELLITUS WITHOUT COMPLICATIONS: ICD-10-CM

## 2022-10-20 DIAGNOSIS — F17.200 NICOTINE DEPENDENCE, UNSPECIFIED, UNCOMPLICATED: ICD-10-CM

## 2022-10-20 DIAGNOSIS — N40.0 BENIGN PROSTATIC HYPERPLASIA WITHOUT LOWER URINARY TRACT SYMPTOMS: ICD-10-CM

## 2022-10-20 DIAGNOSIS — M85.88 OTHER SPECIFIED DISORDERS OF BONE DENSITY AND STRUCTURE, OTHER SITE: ICD-10-CM

## 2022-10-20 DIAGNOSIS — M10.9 GOUT, UNSPECIFIED: ICD-10-CM

## 2022-10-20 DIAGNOSIS — M48.54XA COLLAPSED VERTEBRA, NOT ELSEWHERE CLASSIFIED, THORACIC REGION, INITIAL ENCOUNTER FOR FRACTURE: ICD-10-CM

## 2022-10-20 DIAGNOSIS — I45.10 UNSPECIFIED RIGHT BUNDLE-BRANCH BLOCK: ICD-10-CM

## 2022-10-20 DIAGNOSIS — I71.40 ABDOMINAL AORTIC ANEURYSM, WITHOUT RUPTURE, UNSPECIFIED: ICD-10-CM

## 2022-10-20 DIAGNOSIS — G20 PARKINSON'S DISEASE: ICD-10-CM

## 2022-10-20 DIAGNOSIS — A41.9 SEPSIS, UNSPECIFIED ORGANISM: ICD-10-CM

## 2022-10-20 DIAGNOSIS — E78.5 HYPERLIPIDEMIA, UNSPECIFIED: ICD-10-CM

## 2022-10-20 DIAGNOSIS — I10 ESSENTIAL (PRIMARY) HYPERTENSION: ICD-10-CM

## 2022-10-20 DIAGNOSIS — R63.4 ABNORMAL WEIGHT LOSS: ICD-10-CM

## 2022-10-20 DIAGNOSIS — N39.0 URINARY TRACT INFECTION, SITE NOT SPECIFIED: ICD-10-CM

## 2022-10-31 ENCOUNTER — APPOINTMENT (OUTPATIENT)
Dept: VASCULAR SURGERY | Facility: CLINIC | Age: 87
End: 2022-10-31

## 2022-10-31 VITALS
WEIGHT: 140 LBS | BODY MASS INDEX: 23.9 KG/M2 | HEIGHT: 64 IN | SYSTOLIC BLOOD PRESSURE: 131 MMHG | DIASTOLIC BLOOD PRESSURE: 79 MMHG

## 2022-10-31 DIAGNOSIS — F02.80 PARKINSON'S DISEASE: ICD-10-CM

## 2022-10-31 DIAGNOSIS — G20 PARKINSON'S DISEASE: ICD-10-CM

## 2022-10-31 DIAGNOSIS — M10.9 GOUT, UNSPECIFIED: ICD-10-CM

## 2022-10-31 DIAGNOSIS — F17.200 NICOTINE DEPENDENCE, UNSPECIFIED, UNCOMPLICATED: ICD-10-CM

## 2022-10-31 PROCEDURE — 99204 OFFICE O/P NEW MOD 45 MIN: CPT

## 2022-10-31 NOTE — ASSESSMENT
[FreeTextEntry1] : 86 y/o gentleman with 5.2 cm AAA.\par \par I would like to obtain a CTA of abdomen and pelvis for further evaluation.

## 2022-10-31 NOTE — HISTORY OF PRESENT ILLNESS
[FreeTextEntry1] : 86 y/o gentleman who is a heavy smoker, recently admitted for UTI, had an incidental finding of 5.2 cm AAA on duplex, denies any abdominal pain.

## 2022-11-01 LAB
BUN SERPL-MCNC: 22 MG/DL
CREAT SERPL-MCNC: 1.1 MG/DL
EGFR: 65 ML/MIN/1.73M2

## 2022-11-17 ENCOUNTER — OUTPATIENT (OUTPATIENT)
Dept: OUTPATIENT SERVICES | Facility: HOSPITAL | Age: 87
LOS: 1 days | Discharge: HOME | End: 2022-11-17

## 2022-11-17 ENCOUNTER — RESULT REVIEW (OUTPATIENT)
Age: 87
End: 2022-11-17

## 2022-11-17 DIAGNOSIS — I71.40 ABDOMINAL AORTIC ANEURYSM, WITHOUT RUPTURE, UNSPECIFIED: ICD-10-CM

## 2022-11-17 PROCEDURE — 71275 CT ANGIOGRAPHY CHEST: CPT | Mod: 26

## 2022-11-17 PROCEDURE — 74174 CTA ABD&PLVS W/CONTRAST: CPT | Mod: 26

## 2022-11-21 ENCOUNTER — APPOINTMENT (OUTPATIENT)
Dept: VASCULAR SURGERY | Facility: CLINIC | Age: 87
End: 2022-11-21

## 2022-11-21 ENCOUNTER — EMERGENCY (EMERGENCY)
Facility: HOSPITAL | Age: 87
LOS: 0 days | Discharge: HOME | End: 2022-11-22
Attending: EMERGENCY MEDICINE | Admitting: STUDENT IN AN ORGANIZED HEALTH CARE EDUCATION/TRAINING PROGRAM
Payer: MEDICARE

## 2022-11-21 ENCOUNTER — APPOINTMENT (OUTPATIENT)
Dept: CARDIOLOGY | Facility: CLINIC | Age: 87
End: 2022-11-21

## 2022-11-21 VITALS
SYSTOLIC BLOOD PRESSURE: 122 MMHG | DIASTOLIC BLOOD PRESSURE: 76 MMHG | BODY MASS INDEX: 23.9 KG/M2 | HEART RATE: 92 BPM | WEIGHT: 140 LBS | OXYGEN SATURATION: 97 % | HEIGHT: 64 IN

## 2022-11-21 VITALS
DIASTOLIC BLOOD PRESSURE: 79 MMHG | WEIGHT: 140 LBS | SYSTOLIC BLOOD PRESSURE: 123 MMHG | HEIGHT: 64 IN | BODY MASS INDEX: 23.9 KG/M2

## 2022-11-21 VITALS
DIASTOLIC BLOOD PRESSURE: 78 MMHG | SYSTOLIC BLOOD PRESSURE: 125 MMHG | OXYGEN SATURATION: 98 % | HEIGHT: 64 IN | TEMPERATURE: 98 F | HEART RATE: 89 BPM

## 2022-11-21 DIAGNOSIS — I71.40 ABDOMINAL AORTIC ANEURYSM, WITHOUT RUPTURE, UNSPECIFIED: ICD-10-CM

## 2022-11-21 DIAGNOSIS — R47.9 UNSPECIFIED SPEECH DISTURBANCES: ICD-10-CM

## 2022-11-21 DIAGNOSIS — N40.0 BENIGN PROSTATIC HYPERPLASIA WITHOUT LOWER URINARY TRACT SYMPTOMS: ICD-10-CM

## 2022-11-21 DIAGNOSIS — E78.00 PURE HYPERCHOLESTEROLEMIA, UNSPECIFIED: ICD-10-CM

## 2022-11-21 DIAGNOSIS — I10 ESSENTIAL (PRIMARY) HYPERTENSION: ICD-10-CM

## 2022-11-21 DIAGNOSIS — G20 PARKINSON'S DISEASE: ICD-10-CM

## 2022-11-21 DIAGNOSIS — E78.5 HYPERLIPIDEMIA, UNSPECIFIED: ICD-10-CM

## 2022-11-21 DIAGNOSIS — E11.9 TYPE 2 DIABETES MELLITUS WITHOUT COMPLICATIONS: ICD-10-CM

## 2022-11-21 DIAGNOSIS — Z79.82 LONG TERM (CURRENT) USE OF ASPIRIN: ICD-10-CM

## 2022-11-21 DIAGNOSIS — I45.10 UNSPECIFIED RIGHT BUNDLE-BRANCH BLOCK: ICD-10-CM

## 2022-11-21 DIAGNOSIS — Z87.891 PERSONAL HISTORY OF NICOTINE DEPENDENCE: ICD-10-CM

## 2022-11-21 DIAGNOSIS — R47.81 SLURRED SPEECH: ICD-10-CM

## 2022-11-21 DIAGNOSIS — F17.200 NICOTINE DEPENDENCE, UNSPECIFIED, UNCOMPLICATED: ICD-10-CM

## 2022-11-21 DIAGNOSIS — R29.898 OTHER SYMPTOMS AND SIGNS INVOLVING THE MUSCULOSKELETAL SYSTEM: ICD-10-CM

## 2022-11-21 DIAGNOSIS — E11.9 TYPE 2 DIABETES MELLITUS W/OUT COMPLICATIONS: ICD-10-CM

## 2022-11-21 LAB
ALBUMIN SERPL ELPH-MCNC: 4.2 G/DL — SIGNIFICANT CHANGE UP (ref 3.5–5.2)
ALP SERPL-CCNC: 94 U/L — SIGNIFICANT CHANGE UP (ref 30–115)
ALT FLD-CCNC: 15 U/L — SIGNIFICANT CHANGE UP (ref 0–41)
ANION GAP SERPL CALC-SCNC: 13 MMOL/L — SIGNIFICANT CHANGE UP (ref 7–14)
APPEARANCE UR: CLEAR — SIGNIFICANT CHANGE UP
APTT BLD: 38.3 SEC — SIGNIFICANT CHANGE UP (ref 27–39.2)
AST SERPL-CCNC: 17 U/L — SIGNIFICANT CHANGE UP (ref 0–41)
BASE EXCESS BLDV CALC-SCNC: -1 MMOL/L — SIGNIFICANT CHANGE UP (ref -2–3)
BASE EXCESS BLDV CALC-SCNC: -3.1 MMOL/L — LOW (ref -2–3)
BASOPHILS # BLD AUTO: 0.03 K/UL — SIGNIFICANT CHANGE UP (ref 0–0.2)
BASOPHILS NFR BLD AUTO: 0.4 % — SIGNIFICANT CHANGE UP (ref 0–1)
BILIRUB SERPL-MCNC: 0.5 MG/DL — SIGNIFICANT CHANGE UP (ref 0.2–1.2)
BILIRUB UR-MCNC: NEGATIVE — SIGNIFICANT CHANGE UP
BUN SERPL-MCNC: 19 MG/DL — SIGNIFICANT CHANGE UP (ref 10–20)
CA-I SERPL-SCNC: 1.13 MMOL/L — LOW (ref 1.15–1.33)
CA-I SERPL-SCNC: 1.17 MMOL/L — SIGNIFICANT CHANGE UP (ref 1.15–1.33)
CALCIUM SERPL-MCNC: 8.7 MG/DL — SIGNIFICANT CHANGE UP (ref 8.4–10.5)
CHLORIDE SERPL-SCNC: 105 MMOL/L — SIGNIFICANT CHANGE UP (ref 98–110)
CO2 SERPL-SCNC: 23 MMOL/L — SIGNIFICANT CHANGE UP (ref 17–32)
COLOR SPEC: SIGNIFICANT CHANGE UP
CREAT SERPL-MCNC: 1.3 MG/DL — SIGNIFICANT CHANGE UP (ref 0.7–1.5)
DIFF PNL FLD: NEGATIVE — SIGNIFICANT CHANGE UP
EGFR: 54 ML/MIN/1.73M2 — LOW
EOSINOPHIL # BLD AUTO: 0.04 K/UL — SIGNIFICANT CHANGE UP (ref 0–0.7)
EOSINOPHIL NFR BLD AUTO: 0.5 % — SIGNIFICANT CHANGE UP (ref 0–8)
GAS PNL BLDV: 134 MMOL/L — LOW (ref 136–145)
GAS PNL BLDV: 136 MMOL/L — SIGNIFICANT CHANGE UP (ref 136–145)
GAS PNL BLDV: SIGNIFICANT CHANGE UP
GAS PNL BLDV: SIGNIFICANT CHANGE UP
GLUCOSE SERPL-MCNC: 211 MG/DL — HIGH (ref 70–99)
GLUCOSE UR QL: SIGNIFICANT CHANGE UP
HCO3 BLDV-SCNC: 24 MMOL/L — SIGNIFICANT CHANGE UP (ref 22–29)
HCO3 BLDV-SCNC: 24 MMOL/L — SIGNIFICANT CHANGE UP (ref 22–29)
HCT VFR BLD CALC: 39.5 % — LOW (ref 42–52)
HCT VFR BLDA CALC: 37 % — LOW (ref 39–51)
HCT VFR BLDA CALC: 59 % — CRITICAL HIGH (ref 39–51)
HGB BLD CALC-MCNC: 12.4 G/DL — LOW (ref 12.6–17.4)
HGB BLD CALC-MCNC: 19.6 G/DL — CRITICAL HIGH (ref 12.6–17.4)
HGB BLD-MCNC: 12.7 G/DL — LOW (ref 14–18)
IMM GRANULOCYTES NFR BLD AUTO: 0.4 % — HIGH (ref 0.1–0.3)
INR BLD: 1.03 RATIO — SIGNIFICANT CHANGE UP (ref 0.65–1.3)
KETONES UR-MCNC: NEGATIVE — SIGNIFICANT CHANGE UP
LACTATE BLDV-MCNC: 1.8 MMOL/L — SIGNIFICANT CHANGE UP (ref 0.5–2)
LACTATE BLDV-MCNC: 4.1 MMOL/L — CRITICAL HIGH (ref 0.5–2)
LEUKOCYTE ESTERASE UR-ACNC: NEGATIVE — SIGNIFICANT CHANGE UP
LYMPHOCYTES # BLD AUTO: 1.32 K/UL — SIGNIFICANT CHANGE UP (ref 1.2–3.4)
LYMPHOCYTES # BLD AUTO: 17.4 % — LOW (ref 20.5–51.1)
MCHC RBC-ENTMCNC: 30.8 PG — SIGNIFICANT CHANGE UP (ref 27–31)
MCHC RBC-ENTMCNC: 32.2 G/DL — SIGNIFICANT CHANGE UP (ref 32–37)
MCV RBC AUTO: 95.9 FL — HIGH (ref 80–94)
MONOCYTES # BLD AUTO: 0.31 K/UL — SIGNIFICANT CHANGE UP (ref 0.1–0.6)
MONOCYTES NFR BLD AUTO: 4.1 % — SIGNIFICANT CHANGE UP (ref 1.7–9.3)
NEUTROPHILS # BLD AUTO: 5.85 K/UL — SIGNIFICANT CHANGE UP (ref 1.4–6.5)
NEUTROPHILS NFR BLD AUTO: 77.2 % — HIGH (ref 42.2–75.2)
NITRITE UR-MCNC: NEGATIVE — SIGNIFICANT CHANGE UP
NRBC # BLD: 0 /100 WBCS — SIGNIFICANT CHANGE UP (ref 0–0)
PCO2 BLDV: 42 MMHG — SIGNIFICANT CHANGE UP (ref 42–55)
PCO2 BLDV: 51 MMHG — SIGNIFICANT CHANGE UP (ref 42–55)
PH BLDV: 7.29 — LOW (ref 7.32–7.43)
PH BLDV: 7.37 — SIGNIFICANT CHANGE UP (ref 7.32–7.43)
PH UR: 6.5 — SIGNIFICANT CHANGE UP (ref 5–8)
PLATELET # BLD AUTO: 160 K/UL — SIGNIFICANT CHANGE UP (ref 130–400)
PO2 BLDV: 34 MMHG — SIGNIFICANT CHANGE UP
PO2 BLDV: 42 MMHG — SIGNIFICANT CHANGE UP
POTASSIUM BLDV-SCNC: 3.6 MMOL/L — SIGNIFICANT CHANGE UP (ref 3.5–5.1)
POTASSIUM BLDV-SCNC: 3.8 MMOL/L — SIGNIFICANT CHANGE UP (ref 3.5–5.1)
POTASSIUM SERPL-MCNC: 4.3 MMOL/L — SIGNIFICANT CHANGE UP (ref 3.5–5)
POTASSIUM SERPL-SCNC: 4.3 MMOL/L — SIGNIFICANT CHANGE UP (ref 3.5–5)
PROT SERPL-MCNC: 7.2 G/DL — SIGNIFICANT CHANGE UP (ref 6–8)
PROT UR-MCNC: SIGNIFICANT CHANGE UP
PROTHROM AB SERPL-ACNC: 11.8 SEC — SIGNIFICANT CHANGE UP (ref 9.95–12.87)
RBC # BLD: 4.12 M/UL — LOW (ref 4.7–6.1)
RBC # FLD: 13.4 % — SIGNIFICANT CHANGE UP (ref 11.5–14.5)
SAO2 % BLDV: 53 % — SIGNIFICANT CHANGE UP
SAO2 % BLDV: 71.4 % — SIGNIFICANT CHANGE UP
SARS-COV-2 RNA SPEC QL NAA+PROBE: SIGNIFICANT CHANGE UP
SODIUM SERPL-SCNC: 141 MMOL/L — SIGNIFICANT CHANGE UP (ref 135–146)
SP GR SPEC: >1.05 (ref 1.01–1.03)
TROPONIN T SERPL-MCNC: <0.01 NG/ML — SIGNIFICANT CHANGE UP
UROBILINOGEN FLD QL: SIGNIFICANT CHANGE UP
WBC # BLD: 7.58 K/UL — SIGNIFICANT CHANGE UP (ref 4.8–10.8)
WBC # FLD AUTO: 7.58 K/UL — SIGNIFICANT CHANGE UP (ref 4.8–10.8)

## 2022-11-21 PROCEDURE — 99218: CPT

## 2022-11-21 PROCEDURE — 70496 CT ANGIOGRAPHY HEAD: CPT | Mod: 26,MA

## 2022-11-21 PROCEDURE — 0042T: CPT | Mod: MA

## 2022-11-21 PROCEDURE — 93010 ELECTROCARDIOGRAM REPORT: CPT

## 2022-11-21 PROCEDURE — 99204 OFFICE O/P NEW MOD 45 MIN: CPT | Mod: 25

## 2022-11-21 PROCEDURE — 99213 OFFICE O/P EST LOW 20 MIN: CPT

## 2022-11-21 PROCEDURE — 99203 OFFICE O/P NEW LOW 30 MIN: CPT | Mod: GC

## 2022-11-21 PROCEDURE — 70551 MRI BRAIN STEM W/O DYE: CPT | Mod: 26,MA

## 2022-11-21 PROCEDURE — 93000 ELECTROCARDIOGRAM COMPLETE: CPT

## 2022-11-21 PROCEDURE — 70498 CT ANGIOGRAPHY NECK: CPT | Mod: 26,MA

## 2022-11-21 RX ORDER — AMLODIPINE AND OLMESARTAN MEDOXOMIL 10; 40 MG/1; MG/1
TABLET ORAL
Refills: 0 | Status: DISCONTINUED | COMMUNITY
End: 2022-11-21

## 2022-11-21 RX ORDER — CEFUROXIME AXETIL 250 MG/1
250 TABLET ORAL
Refills: 0 | Status: DISCONTINUED | COMMUNITY
End: 2022-11-21

## 2022-11-21 RX ORDER — OLMESARTAN MEDOXOMIL 20 MG/1
20 TABLET, FILM COATED ORAL DAILY
Refills: 0 | Status: ACTIVE | COMMUNITY

## 2022-11-21 RX ORDER — LOSARTAN POTASSIUM 100 MG/1
50 TABLET, FILM COATED ORAL AT BEDTIME
Refills: 0 | Status: DISCONTINUED | OUTPATIENT
Start: 2022-11-21 | End: 2022-11-22

## 2022-11-21 RX ORDER — POLYETHYLENE GLYCOL
8000 OINTMENT (GRAM) TOPICAL
Refills: 0 | Status: DISCONTINUED | COMMUNITY
End: 2022-11-21

## 2022-11-21 RX ORDER — ATORVASTATIN CALCIUM 10 MG/1
10 TABLET, FILM COATED ORAL
Refills: 0 | Status: ACTIVE | COMMUNITY

## 2022-11-21 RX ORDER — ALLOPURINOL 300 MG
300 TABLET ORAL AT BEDTIME
Refills: 0 | Status: DISCONTINUED | OUTPATIENT
Start: 2022-11-21 | End: 2022-11-22

## 2022-11-21 RX ORDER — NICOTINE POLACRILEX 2 MG
1 GUM BUCCAL ONCE
Refills: 0 | Status: COMPLETED | OUTPATIENT
Start: 2022-11-21 | End: 2022-11-21

## 2022-11-21 RX ORDER — AMLODIPINE BESYLATE 5 MG/1
5 TABLET ORAL DAILY
Refills: 0 | Status: ACTIVE | COMMUNITY

## 2022-11-21 RX ORDER — AMLODIPINE BESYLATE 2.5 MG/1
5 TABLET ORAL AT BEDTIME
Refills: 0 | Status: DISCONTINUED | OUTPATIENT
Start: 2022-11-21 | End: 2022-11-22

## 2022-11-21 RX ORDER — TAMSULOSIN HYDROCHLORIDE 0.4 MG/1
0.4 CAPSULE ORAL AT BEDTIME
Refills: 0 | Status: DISCONTINUED | OUTPATIENT
Start: 2022-11-21 | End: 2022-11-22

## 2022-11-21 RX ORDER — METFORMIN HYDROCHLORIDE 850 MG/1
500 TABLET ORAL AT BEDTIME
Refills: 0 | Status: DISCONTINUED | OUTPATIENT
Start: 2022-11-21 | End: 2022-11-22

## 2022-11-21 RX ADMIN — AMLODIPINE BESYLATE 5 MILLIGRAM(S): 2.5 TABLET ORAL at 21:04

## 2022-11-21 RX ADMIN — LOSARTAN POTASSIUM 50 MILLIGRAM(S): 100 TABLET, FILM COATED ORAL at 21:04

## 2022-11-21 RX ADMIN — Medication 300 MILLIGRAM(S): at 21:04

## 2022-11-21 RX ADMIN — TAMSULOSIN HYDROCHLORIDE 0.4 MILLIGRAM(S): 0.4 CAPSULE ORAL at 21:04

## 2022-11-21 RX ADMIN — METFORMIN HYDROCHLORIDE 500 MILLIGRAM(S): 850 TABLET ORAL at 21:04

## 2022-11-21 RX ADMIN — Medication 1 PATCH: at 21:03

## 2022-11-21 NOTE — ED ADULT NURSE NOTE - OBJECTIVE STATEMENT
Pt BIBEMS for weakness with son in law. In main ED son in law reported drop of left arm an hour ago with slower then normal speech. Son in law states episode lasted about 10 minutes then pt returned to baseline behavior. Code stroke called.

## 2022-11-21 NOTE — ED ADULT TRIAGE NOTE - CHIEF COMPLAINT QUOTE
pt was at lunch when he "didn't feel like himself" pt had generalized weakness and "eyes were glazed" denies chest pain. pt a/ox3 at baseline per daughter pt has neurosx appointment for aortic aneurysm stent placement. pt [id number] in triage. hx of dm pt was at lunch when he "didn't feel like himself" pt had generalized weakness and "eyes were glazed" denies chest pain. pt a/ox3 at baseline per daughter pt has neurosx appointment for aortic aneurysm stent placement. pt [id number] in triage. hx of dm. pt following commands in triage.

## 2022-11-21 NOTE — HISTORY OF PRESENT ILLNESS
[FreeTextEntry1] : 88 y/o gentleman who is a heavy smoker, recently admitted for UTI, had an incidental finding of 5.2 cm AAA on duplex, denies any abdominal pain. 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

## 2022-11-21 NOTE — ED ADULT NURSE NOTE - INTERVENTIONS DEFINITIONS
[place] to call system/Call bell, personal items and telephone within reach/Instruct patient to call for assistance/Physically safe environment: no spills, clutter or unnecessary equipment/Stretcher in lowest position, wheels locked, appropriate side rails in place/Monitor for mental status changes and reorient to person, place, and time/Reinforce activity limits and safety measures with patient and family

## 2022-11-21 NOTE — ED PROVIDER NOTE - NS ED ROS FT
Constitutional: (-) fever  Eyes/ENT: (-) visual changes   Cardiovascular: (-) chest pain, (-) syncope  Respiratory: (-) cough, (-) shortness of breath  Gastrointestinal: (-) vomiting, (-) diarrhea  Genitourinary: (-) dysuria, (-) hesitancy, (-) frequency   Musculoskeletal: (-) neck pain, (-) back pain, (-) joint pain  Integumentary: (-) rash, (-) edema  Neurological: (-) headache, (-) altered mental status, weakness   Allergic/Immunologic: (-) pruritus

## 2022-11-21 NOTE — CONSULT NOTE ADULT - ASSESSMENT
This is a 88 YO M w/PMHx significant for HTN, BPH, Parkinson Dz, Gout, DLD, DM, Active Smoker, brought into the ED by son in law after patient was noted to have L arm weakness causing him to drop his plate of food. On exam, no drift or dysmetria noted. Exam significant for B/L tremors. NIHSS 1 for Month    Recommendations  - CTH: No acute infarcts, Moderate chronic microvascular type changes as well as a chronic appearing left cerebellar infarct.  - CTP: Small region of hypoperfusion noted in the anterior right frontal parasagittal region without core infarct  - CTA: Moderate stenosis at the origin the right vertebral artery, Mixed calcified and noncalcified atherosclerotic plaque involving the bilateral carotid bulbs and proximal internal carotid arteries resulting in mild stenosis.  - Obtain MRI head w/o contrast  - Will follow       This is a 86 YO M w/PMHx significant for HTN, BPH, Parkinson Dz, Gout, DLD, DM, Active Smoker, brought into the ED by son in law after patient was noted to have L arm weakness causing him to drop his plate of food. On exam, no drift or dysmetria noted. Exam significant for B/L tremors. NIHSS 1 for Month.  Doubt acute infarct, presentation most likely due to parkinsonism.     Recommendations  - CTH: No acute infarcts, Moderate chronic microvascular type changes as well as a chronic appearing left cerebellar infarct.  - CTP: Small region of hypoperfusion noted in the anterior right frontal parasagittal region without core infarct  - CTA: Moderate stenosis at the origin the right vertebral artery, Mixed calcified and noncalcified atherosclerotic plaque involving the bilateral carotid bulbs and proximal internal carotid arteries resulting in mild stenosis.  - Obtain MRI head w/o contrast  - Will follow

## 2022-11-21 NOTE — ED PROVIDER NOTE - ATTENDING APP SHARED VISIT CONTRIBUTION OF CARE
87 year old male presenting today with slurred speech and weakness. Patient's symptoms had resolved on my evaluation. Patient's symptoms occurred 1 hour PTA per patient's son at bedside. Stroke alert was called and patient was taken to CT scan. Patient signed out pending further care with neuro recommendations. see mdm for attending note

## 2022-11-21 NOTE — ED PROVIDER NOTE - BIRTH SEX
Male Spoke with wound care RN, who stated they will talk to Wound care NP to place home care orders for [facility].

## 2022-11-21 NOTE — CONSULT NOTE ADULT - ATTENDING COMMENTS
Pt is a 86 yo M, primarily Cantonese-speaking, with PMhx of HTN, PD (family decided to forgoe treatment), HLD, tobacco use, DM, who presented with worsening BUE tremors with having dropped a plate today at lunch. No lateralizing weakness on exam. BUE tremors at rest. CT head without acute process. No ELVO on CTA. R/o toxic metabolic etiology, reasonable to obtain MRI to r/o stroke. ASA/statin for now.

## 2022-11-21 NOTE — HISTORY OF PRESENT ILLNESS
[FreeTextEntry1] : 87M with PMH of HTN, HLD, T2DM, tobacco use, AAA (5.2 x 4.9 cm), right renal artery aneurysm (1.5 cm) scheduled for endovascular repair 12/13/22 with Dr. Lozano. \par \par Speaks Cantonese, declines  and prefers to have his daughters and son-in-law translate. \par \par Ambulates with a cane. Able to complete activities of daily living. Cannot walk >4 blocks or climb 2 flights of stairs. \par \par No chest pain, shortness of breath, palpitations, lightheadedness/dizziness, pre-syncope/syncope, or lower extremity edema. \par \par Chronic back pain \par \par Parkinson's - not on medications\par \par Smoking 16 since, stopped 6 weeks ago\par \par Lives with daughter Antonia. \par \par

## 2022-11-21 NOTE — ED PROVIDER NOTE - CLINICAL SUMMARY MEDICAL DECISION MAKING FREE TEXT BOX
87 year old male presenting today with slurred speech and weakness. Patient's symptoms had resolved on my evaluation. Patient's symptoms occurred 1 hour PTA per patient's son at bedside. Stroke alert was called and patient was taken to CT scan. Patient placed into observation period for further neurological care. Patient did not receive TPA per neurology recommendations.

## 2022-11-21 NOTE — CONSULT NOTE ADULT - SUBJECTIVE AND OBJECTIVE BOX
Neurology Consult    Patient is a 87y old  Male who presents with a chief complaint of left arm weakness    HPI:  This is a 86 YO M w/PMHx significant for HTN, BPH, Parkinson Dz, Gout, DLD, DM, Active Smoker, brought into the ED by son in law after patient was noted to have L arm weakness causing him to drop his plate of food. Patient and Son in law were at lunch when all of a sudden patient's left arm gradually lowered causing him to drop the plate of food he was holding. This was unusual to patient's usual tremors. No other focal weakness were noted by son in law. In ED stroke code was called with last known well about 1 hr prior to presentation.   Neurology evaluated patient in ED, refer to stroke code note for details.   NIHSS 1 for Month    PAST MEDICAL & SURGICAL HISTORY:  HTN (hypertension)      Gout      BPH (benign prostatic hyperplasia)      Parkinson disease      HLD (hyperlipidemia)      Smoker within last 12 months      Diabetes mellitus      No significant past surgical history          FAMILY HISTORY:  No pertinent family history in first degree relatives        Social History: (-) x 3    Allergies    No Known Allergies    Intolerances        MEDICATIONS  (STANDING):    MEDICATIONS  (PRN):      Review of systems:    As noted in HPI    Vital Signs Last 24 Hrs  T(C): 36.9 (21 Nov 2022 14:48), Max: 36.9 (21 Nov 2022 14:48)  T(F): 98.5 (21 Nov 2022 14:48), Max: 98.5 (21 Nov 2022 14:48)  HR: 87 (21 Nov 2022 15:44) (87 - 89)  BP: 110/62 (21 Nov 2022 15:44) (110/62 - 150/62)  BP(mean): --  RR: 18 (21 Nov 2022 15:44) (18 - 18)  SpO2: 99% (21 Nov 2022 15:44) (98% - 99%)    Parameters below as of 21 Nov 2022 15:44  Patient On (Oxygen Delivery Method): room air        Examination:  General:  Appearance is consistent with chronologic age.  No abnormal facies.  Gross skin survey within normal limits.    Cognitive/Language:  The patient is oriented to person, place, not time  Recent and remote memory intact.  Fund of knowledge is intact and normal.  Language with normal repetition, comprehension and naming.  Nondysarthric.    Eyes: intact VA, VFF.  EOMI w/o nystagmus, skew or reported double vision.   No ptosis/weakness of eyelid closure.    Face:  Facial sensation normal V1 - 3, no facial asymmetry.    Ears/Nose/Throat:  Hearing grossly intact b/l.  Palate elevates midline.  Tongue and uvula midline.   Motor examination:   Normal tone, bulk and range of motion.  B/L tremors noted  Formal Muscle Strength Testing: (MRC grade R/L) 5/5 UE; 5/5 LE.  No observable drift.  Reflexes: Plantar response downgoing b/l. clonus absent.  Sensory examination:   Intact to light touch throughout  Cerebellum:   FTN wnl.  No dysmetria or mild dysdiadokinesia.  Gait deferred    Respiratory:  no audible wheezing or inspiratory stridor.  no use of accessory muscles.   Cardiac: pulse palpable, no audible bruits  Abdomen: supple, no guarding, no TTP    Labs:   CBC Full  -  ( 21 Nov 2022 14:54 )  WBC Count : 7.58 K/uL  RBC Count : 4.12 M/uL  Hemoglobin : 12.7 g/dL  Hematocrit : 39.5 %  Platelet Count - Automated : 160 K/uL  Mean Cell Volume : 95.9 fL  Mean Cell Hemoglobin : 30.8 pg  Mean Cell Hemoglobin Concentration : 32.2 g/dL  Auto Neutrophil # : 5.85 K/uL  Auto Lymphocyte # : 1.32 K/uL  Auto Monocyte # : 0.31 K/uL  Auto Eosinophil # : 0.04 K/uL  Auto Basophil # : 0.03 K/uL  Auto Neutrophil % : 77.2 %  Auto Lymphocyte % : 17.4 %  Auto Monocyte % : 4.1 %  Auto Eosinophil % : 0.5 %  Auto Basophil % : 0.4 %    11-21    141  |  105  |  19  ----------------------------<  211<H>  4.3   |  23  |  1.3    Ca    8.7      21 Nov 2022 14:54    TPro  7.2  /  Alb  4.2  /  TBili  0.5  /  DBili  x   /  AST  17  /  ALT  15  /  AlkPhos  94  11-21    LIVER FUNCTIONS - ( 21 Nov 2022 14:54 )  Alb: 4.2 g/dL / Pro: 7.2 g/dL / ALK PHOS: 94 U/L / ALT: 15 U/L / AST: 17 U/L / GGT: x           PT/INR - ( 21 Nov 2022 14:54 )   PT: 11.80 sec;   INR: 1.03 ratio         PTT - ( 21 Nov 2022 14:54 )  PTT:38.3 sec        Neuroimaging:  NCT:     11-21-22 @ 15:51

## 2022-11-21 NOTE — PHYSICAL EXAM
[Well Developed] : well developed [Well Nourished] : well nourished [No Acute Distress] : no acute distress [Normal Conjunctiva] : normal conjunctiva [No Carotid Bruit] : no carotid bruit [Normal S1, S2] : normal S1, S2 [No Murmur] : no murmur [No Rub] : no rub [No Gallop] : no gallop [Clear Lung Fields] : clear lung fields [Good Air Entry] : good air entry [No Respiratory Distress] : no respiratory distress  [Soft] : abdomen soft [Non Tender] : non-tender [No Masses/organomegaly] : no masses/organomegaly [Normal Bowel Sounds] : normal bowel sounds [Moves all extremities] : moves all extremities [No Focal Deficits] : no focal deficits [Normal Speech] : normal speech [No edema] : no edema [No rashes] : no rashes

## 2022-11-21 NOTE — ED PROVIDER NOTE - OBJECTIVE STATEMENT
88 yo male with a pmh of HTN, HLD, DM, aortic aneurysm, and parkinson's presents for weakness at 1pm. pt accompanied by son who states pt was walking to the table with his lunch plate and experienced LUE weakness that caused him to drop the plate. pt then had speech changes and drooling while eating for the next 10min following. pt symptoms resolved prior to arrival. pt denies any other symptoms including fevers, chill, headache, recent illness/travel, cough, abdominal pain, chest pain, or SOB.

## 2022-11-21 NOTE — ASSESSMENT
[FreeTextEntry1] : Assessment:\par #HTN - controlled\par #T2DM\par #DLD\par #AAA (5.2 x 4.9 cm)\par #R renal artery aneurysm (1.5 cm) \par #Former tobacco user\par #Pre-operative risk stratification prior to endovascular aortic repair 12/13/22\par - EKG Normal sinus rhythm, RBBB\par - Cannot achieve >4 METS\par - Recommend echo and stress test\par \par Plan:\par - Pharmacologic nuclear stress test \par - Check echo to assess LV function, rule out valvulopathy \par - Will discuss results over phone \par - Return to clinic in 3 months, discuss lipid panel and LE arterial US to rule out pop aneurysm\par

## 2022-11-21 NOTE — ED ADULT NURSE NOTE - CHIEF COMPLAINT QUOTE
pt was at lunch when he "didn't feel like himself" pt had generalized weakness and "eyes were glazed" denies chest pain. pt a/ox3 at baseline per daughter pt has neurosx appointment for aortic aneurysm stent placement. pt yv=[id number] in triage. hx of dm. pt following commands in triage.

## 2022-11-22 VITALS
TEMPERATURE: 97 F | DIASTOLIC BLOOD PRESSURE: 84 MMHG | RESPIRATION RATE: 18 BRPM | HEART RATE: 82 BPM | SYSTOLIC BLOOD PRESSURE: 163 MMHG | OXYGEN SATURATION: 96 %

## 2022-11-22 LAB
CULTURE RESULTS: NO GROWTH — SIGNIFICANT CHANGE UP
SPECIMEN SOURCE: SIGNIFICANT CHANGE UP

## 2022-11-22 PROCEDURE — 99217: CPT

## 2022-11-22 RX ORDER — ATORVASTATIN CALCIUM 80 MG/1
40 TABLET, FILM COATED ORAL ONCE
Refills: 0 | Status: COMPLETED | OUTPATIENT
Start: 2022-11-22 | End: 2022-11-22

## 2022-11-22 RX ORDER — ASPIRIN/CALCIUM CARB/MAGNESIUM 324 MG
81 TABLET ORAL ONCE
Refills: 0 | Status: COMPLETED | OUTPATIENT
Start: 2022-11-22 | End: 2022-11-22

## 2022-11-22 RX ADMIN — Medication 81 MILLIGRAM(S): at 10:30

## 2022-11-22 NOTE — ED CDU PROVIDER DISPOSITION NOTE - NSFOLLOWUPCLINICS_GEN_ALL_ED_FT
[facility]  Neurology  [address] [place] [address], [facility] [address]  [place] [address]  Phone: [phone]  Fax:   Follow Up Time: 1-3 Days

## 2022-11-22 NOTE — ED CDU PROVIDER DISPOSITION NOTE - CLINICAL COURSE
87-year-old male presented for evaluation of left upper extremity weakness that caused him to drop his plate.  Patient was evaluated for stroke and placed in observation unit under the TIA protocol.  The patient had screening labs that were grossly unremarkable, EKG normal sinus rhythm, CT head no acute findings CT perfusion showed a small region of hypoperfusion in the anterior right frontal parasagittal region.  CTA of the neck demonstrated mixed calcified and noncalcified atherosclerotic plaque involving the bilateral carotid bulbs and proximal internal carotid arteries resulting in mild stenosis.  The MRI demonstrated no acute findings.  The patient was symptom-free at time of discharge and was seen and cleared by neurology.  Symptoms were felt likely due to worsening Parkinson's the patient follow-up as an outpatient

## 2022-11-22 NOTE — ED CDU PROVIDER DISPOSITION NOTE - PATIENT PORTAL LINK FT
You can access the FollowMyHealth Patient Portal offered by [facility] by registering at the following website: [email or web address]. By joining [employer]’s FollowMyHealth portal, you will also be able to view your health information using other applications (apps) compatible with our system.

## 2022-11-22 NOTE — ED CDU PROVIDER SUBSEQUENT DAY NOTE - PROGRESS NOTE DETAILS
Pt resting comfortably, denies complaints at this time. Patient comfortable, no complaints at this time. Awaiting MRI result. MRI results noted, per neuro can dc home with outpatient f/u

## 2022-11-22 NOTE — CHART NOTE - NSCHARTNOTEFT_GEN_A_CORE
MRI brain negative for acute ischemia, note moderate .  Etiology likely worsening of parkinsons. F/u with general neurology outpatient to consider starting treatment pending discussion with family.

## 2022-11-30 ENCOUNTER — APPOINTMENT (OUTPATIENT)
Dept: CARDIOLOGY | Facility: CLINIC | Age: 87
End: 2022-11-30

## 2023-07-03 NOTE — DISCHARGE NOTE PROVIDER - NSDCQMPCI_CARD_ALL_CORE
Medication:   Requested Prescriptions     Pending Prescriptions Disp Refills    omeprazole (PRILOSEC) 40 MG delayed release capsule [Pharmacy Med Name: OMEPRAZOLE DR 40 MG CAPSULE] 90 capsule 2     Sig: TAKE ONE CAPSULE BY MOUTH DAILY        Last Filled:  09/26/2022 #90 2rf    Patient Phone Number: [phone] (home)     Last appt: 2/23/2023   Next appt: Visit date not found    Last OARRS: No flowsheet data found.
No

## 2023-08-04 ENCOUNTER — INPATIENT (INPATIENT)
Facility: HOSPITAL | Age: 88
LOS: 44 days | Discharge: NURSING, REHAB OR LONG-TERM CARE | DRG: 853 | End: 2023-09-18
Attending: STUDENT IN AN ORGANIZED HEALTH CARE EDUCATION/TRAINING PROGRAM | Admitting: STUDENT IN AN ORGANIZED HEALTH CARE EDUCATION/TRAINING PROGRAM
Payer: MEDICARE

## 2023-08-04 VITALS
DIASTOLIC BLOOD PRESSURE: 41 MMHG | RESPIRATION RATE: 18 BRPM | HEART RATE: 77 BPM | TEMPERATURE: 98 F | OXYGEN SATURATION: 95 % | SYSTOLIC BLOOD PRESSURE: 59 MMHG | WEIGHT: 149.91 LBS

## 2023-08-04 DIAGNOSIS — E87.5 HYPERKALEMIA: ICD-10-CM

## 2023-08-04 LAB
ALBUMIN SERPL ELPH-MCNC: 3.1 G/DL — LOW (ref 3.5–5.2)
ALP SERPL-CCNC: 107 U/L — SIGNIFICANT CHANGE UP (ref 30–115)
ALT FLD-CCNC: 13 U/L — SIGNIFICANT CHANGE UP (ref 0–41)
ANION GAP SERPL CALC-SCNC: 23 MMOL/L — HIGH (ref 7–14)
APPEARANCE UR: CLEAR — SIGNIFICANT CHANGE UP
APTT BLD: 24.9 SEC — LOW (ref 27–39.2)
AST SERPL-CCNC: 29 U/L — SIGNIFICANT CHANGE UP (ref 0–41)
B-OH-BUTYR SERPL-SCNC: 5.1 MMOL/L — HIGH
BASE EXCESS BLDV CALC-SCNC: -11.1 MMOL/L — LOW (ref -2–3)
BASOPHILS # BLD AUTO: 0.03 K/UL — SIGNIFICANT CHANGE UP (ref 0–0.2)
BASOPHILS NFR BLD AUTO: 0.2 % — SIGNIFICANT CHANGE UP (ref 0–1)
BILIRUB SERPL-MCNC: 0.6 MG/DL — SIGNIFICANT CHANGE UP (ref 0.2–1.2)
BILIRUB UR-MCNC: NEGATIVE — SIGNIFICANT CHANGE UP
BUN SERPL-MCNC: 59 MG/DL — HIGH (ref 10–20)
CA-I SERPL-SCNC: 1.16 MMOL/L — SIGNIFICANT CHANGE UP (ref 1.15–1.33)
CALCIUM SERPL-MCNC: 8.7 MG/DL — SIGNIFICANT CHANGE UP (ref 8.4–10.5)
CHLORIDE SERPL-SCNC: 86 MMOL/L — LOW (ref 98–110)
CO2 SERPL-SCNC: 14 MMOL/L — LOW (ref 17–32)
COLOR SPEC: YELLOW — SIGNIFICANT CHANGE UP
CREAT SERPL-MCNC: 2.4 MG/DL — HIGH (ref 0.7–1.5)
D DIMER BLD IA.RAPID-MCNC: 460 NG/ML DDU — HIGH
DIFF PNL FLD: NEGATIVE — SIGNIFICANT CHANGE UP
EGFR: 25 ML/MIN/1.73M2 — LOW
EOSINOPHIL # BLD AUTO: 0 K/UL — SIGNIFICANT CHANGE UP (ref 0–0.7)
EOSINOPHIL NFR BLD AUTO: 0 % — SIGNIFICANT CHANGE UP (ref 0–8)
GAS PNL BLDA: SIGNIFICANT CHANGE UP
GAS PNL BLDV: 132 MMOL/L — LOW (ref 136–145)
GAS PNL BLDV: SIGNIFICANT CHANGE UP
GLUCOSE BLDC GLUCOMTR-MCNC: 103 MG/DL — HIGH (ref 70–99)
GLUCOSE BLDC GLUCOMTR-MCNC: 157 MG/DL — HIGH (ref 70–99)
GLUCOSE BLDC GLUCOMTR-MCNC: 194 MG/DL — HIGH (ref 70–99)
GLUCOSE BLDC GLUCOMTR-MCNC: 244 MG/DL — HIGH (ref 70–99)
GLUCOSE BLDC GLUCOMTR-MCNC: 254 MG/DL — HIGH (ref 70–99)
GLUCOSE BLDC GLUCOMTR-MCNC: 299 MG/DL — HIGH (ref 70–99)
GLUCOSE BLDC GLUCOMTR-MCNC: 373 MG/DL — HIGH (ref 70–99)
GLUCOSE BLDC GLUCOMTR-MCNC: 481 MG/DL — CRITICAL HIGH (ref 70–99)
GLUCOSE SERPL-MCNC: 1251 MG/DL — CRITICAL HIGH (ref 70–99)
GLUCOSE UR QL: >=1000 MG/DL
HCO3 BLDV-SCNC: 15 MMOL/L — LOW (ref 22–29)
HCT VFR BLD CALC: 33.4 % — LOW (ref 42–52)
HCT VFR BLDA CALC: 39 % — SIGNIFICANT CHANGE UP (ref 39–51)
HGB BLD CALC-MCNC: 13 G/DL — SIGNIFICANT CHANGE UP (ref 12.6–17.4)
HGB BLD-MCNC: 11.3 G/DL — LOW (ref 14–18)
IMM GRANULOCYTES NFR BLD AUTO: 0.9 % — HIGH (ref 0.1–0.3)
INR BLD: 0.98 RATIO — SIGNIFICANT CHANGE UP (ref 0.65–1.3)
KETONES UR-MCNC: ABNORMAL MG/DL
LACTATE BLDV-MCNC: 7.4 MMOL/L — CRITICAL HIGH (ref 0.5–2)
LACTATE SERPL-SCNC: 2.9 MMOL/L — HIGH (ref 0.7–2)
LACTATE SERPL-SCNC: 3.7 MMOL/L — HIGH (ref 0.7–2)
LEUKOCYTE ESTERASE UR-ACNC: NEGATIVE — SIGNIFICANT CHANGE UP
LYMPHOCYTES # BLD AUTO: 0.91 K/UL — LOW (ref 1.2–3.4)
LYMPHOCYTES # BLD AUTO: 6.5 % — LOW (ref 20.5–51.1)
MCHC RBC-ENTMCNC: 31.6 PG — HIGH (ref 27–31)
MCHC RBC-ENTMCNC: 33.8 G/DL — SIGNIFICANT CHANGE UP (ref 32–37)
MCV RBC AUTO: 93.3 FL — SIGNIFICANT CHANGE UP (ref 80–94)
MONOCYTES # BLD AUTO: 0.31 K/UL — SIGNIFICANT CHANGE UP (ref 0.1–0.6)
MONOCYTES NFR BLD AUTO: 2.2 % — SIGNIFICANT CHANGE UP (ref 1.7–9.3)
NEUTROPHILS # BLD AUTO: 12.73 K/UL — HIGH (ref 1.4–6.5)
NEUTROPHILS NFR BLD AUTO: 90.2 % — HIGH (ref 42.2–75.2)
NITRITE UR-MCNC: NEGATIVE — SIGNIFICANT CHANGE UP
NRBC # BLD: 0 /100 WBCS — SIGNIFICANT CHANGE UP (ref 0–0)
PCO2 BLDV: 32 MMHG — LOW (ref 42–55)
PH BLDV: 7.27 — LOW (ref 7.32–7.43)
PH UR: 5 — SIGNIFICANT CHANGE UP (ref 5–8)
PLATELET # BLD AUTO: 149 K/UL — SIGNIFICANT CHANGE UP (ref 130–400)
PMV BLD: 13.6 FL — HIGH (ref 7.4–10.4)
PO2 BLDV: 43 MMHG — SIGNIFICANT CHANGE UP
POTASSIUM BLDV-SCNC: 4.3 MMOL/L — SIGNIFICANT CHANGE UP (ref 3.5–5.1)
POTASSIUM SERPL-MCNC: SIGNIFICANT CHANGE UP MMOL/L (ref 3.5–5)
POTASSIUM SERPL-SCNC: SIGNIFICANT CHANGE UP MMOL/L (ref 3.5–5)
PROT SERPL-MCNC: 6.7 G/DL — SIGNIFICANT CHANGE UP (ref 6–8)
PROT UR-MCNC: NEGATIVE MG/DL — SIGNIFICANT CHANGE UP
PROTHROM AB SERPL-ACNC: 11.2 SEC — SIGNIFICANT CHANGE UP (ref 9.95–12.87)
RBC # BLD: 3.58 M/UL — LOW (ref 4.7–6.1)
RBC # FLD: 13 % — SIGNIFICANT CHANGE UP (ref 11.5–14.5)
SAO2 % BLDV: 56.6 % — SIGNIFICANT CHANGE UP
SODIUM SERPL-SCNC: 123 MMOL/L — LOW (ref 135–146)
SP GR SPEC: 1.01 — SIGNIFICANT CHANGE UP (ref 1–1.03)
UROBILINOGEN FLD QL: 0.2 MG/DL — SIGNIFICANT CHANGE UP (ref 0.2–1)
WBC # BLD: 14.1 K/UL — HIGH (ref 4.8–10.8)
WBC # FLD AUTO: 14.1 K/UL — HIGH (ref 4.8–10.8)

## 2023-08-04 PROCEDURE — 86850 RBC ANTIBODY SCREEN: CPT

## 2023-08-04 PROCEDURE — 87449 NOS EACH ORGANISM AG IA: CPT

## 2023-08-04 PROCEDURE — 84484 ASSAY OF TROPONIN QUANT: CPT

## 2023-08-04 PROCEDURE — P9016: CPT

## 2023-08-04 PROCEDURE — 87640 STAPH A DNA AMP PROBE: CPT

## 2023-08-04 PROCEDURE — 94760 N-INVAS EAR/PLS OXIMETRY 1: CPT

## 2023-08-04 PROCEDURE — 36430 TRANSFUSION BLD/BLD COMPNT: CPT

## 2023-08-04 PROCEDURE — G1004: CPT

## 2023-08-04 PROCEDURE — 86900 BLOOD TYPING SEROLOGIC ABO: CPT

## 2023-08-04 PROCEDURE — 87086 URINE CULTURE/COLONY COUNT: CPT

## 2023-08-04 PROCEDURE — 71045 X-RAY EXAM CHEST 1 VIEW: CPT

## 2023-08-04 PROCEDURE — 84443 ASSAY THYROID STIM HORMONE: CPT

## 2023-08-04 PROCEDURE — 80048 BASIC METABOLIC PNL TOTAL CA: CPT

## 2023-08-04 PROCEDURE — 74176 CT ABD & PELVIS W/O CONTRAST: CPT | Mod: 26,MG

## 2023-08-04 PROCEDURE — 97167 OT EVAL HIGH COMPLEX 60 MIN: CPT | Mod: GO

## 2023-08-04 PROCEDURE — 81001 URINALYSIS AUTO W/SCOPE: CPT

## 2023-08-04 PROCEDURE — 87186 SC STD MICRODIL/AGAR DIL: CPT

## 2023-08-04 PROCEDURE — 84295 ASSAY OF SERUM SODIUM: CPT

## 2023-08-04 PROCEDURE — 85730 THROMBOPLASTIN TIME PARTIAL: CPT

## 2023-08-04 PROCEDURE — 88312 SPECIAL STAINS GROUP 1: CPT

## 2023-08-04 PROCEDURE — 85014 HEMATOCRIT: CPT

## 2023-08-04 PROCEDURE — 99291 CRITICAL CARE FIRST HOUR: CPT | Mod: 25

## 2023-08-04 PROCEDURE — 83690 ASSAY OF LIPASE: CPT

## 2023-08-04 PROCEDURE — 82728 ASSAY OF FERRITIN: CPT

## 2023-08-04 PROCEDURE — 36415 COLL VENOUS BLD VENIPUNCTURE: CPT

## 2023-08-04 PROCEDURE — 84100 ASSAY OF PHOSPHORUS: CPT

## 2023-08-04 PROCEDURE — 82962 GLUCOSE BLOOD TEST: CPT

## 2023-08-04 PROCEDURE — 74177 CT ABD & PELVIS W/CONTRAST: CPT

## 2023-08-04 PROCEDURE — 83615 LACTATE (LD) (LDH) ENZYME: CPT

## 2023-08-04 PROCEDURE — 83010 ASSAY OF HAPTOGLOBIN QUANT: CPT

## 2023-08-04 PROCEDURE — 71045 X-RAY EXAM CHEST 1 VIEW: CPT | Mod: 26

## 2023-08-04 PROCEDURE — 82803 BLOOD GASES ANY COMBINATION: CPT

## 2023-08-04 PROCEDURE — 82330 ASSAY OF CALCIUM: CPT

## 2023-08-04 PROCEDURE — 80202 ASSAY OF VANCOMYCIN: CPT

## 2023-08-04 PROCEDURE — 85379 FIBRIN DEGRADATION QUANT: CPT

## 2023-08-04 PROCEDURE — 95819 EEG AWAKE AND ASLEEP: CPT

## 2023-08-04 PROCEDURE — 88305 TISSUE EXAM BY PATHOLOGIST: CPT

## 2023-08-04 PROCEDURE — 84466 ASSAY OF TRANSFERRIN: CPT

## 2023-08-04 PROCEDURE — 97110 THERAPEUTIC EXERCISES: CPT | Mod: GP

## 2023-08-04 PROCEDURE — 85652 RBC SED RATE AUTOMATED: CPT

## 2023-08-04 PROCEDURE — 97163 PT EVAL HIGH COMPLEX 45 MIN: CPT | Mod: GP

## 2023-08-04 PROCEDURE — 71275 CT ANGIOGRAPHY CHEST: CPT

## 2023-08-04 PROCEDURE — 80053 COMPREHEN METABOLIC PANEL: CPT

## 2023-08-04 PROCEDURE — 93306 TTE W/DOPPLER COMPLETE: CPT

## 2023-08-04 PROCEDURE — 87070 CULTURE OTHR SPECIMN AEROBIC: CPT

## 2023-08-04 PROCEDURE — 92611 MOTION FLUOROSCOPY/SWALLOW: CPT | Mod: GN

## 2023-08-04 PROCEDURE — 93005 ELECTROCARDIOGRAM TRACING: CPT

## 2023-08-04 PROCEDURE — 93975 VASCULAR STUDY: CPT

## 2023-08-04 PROCEDURE — 93970 EXTREMITY STUDY: CPT

## 2023-08-04 PROCEDURE — 74230 X-RAY XM SWLNG FUNCJ C+: CPT

## 2023-08-04 PROCEDURE — 87641 MR-STAPH DNA AMP PROBE: CPT

## 2023-08-04 PROCEDURE — 36556 INSERT NON-TUNNEL CV CATH: CPT

## 2023-08-04 PROCEDURE — 83735 ASSAY OF MAGNESIUM: CPT

## 2023-08-04 PROCEDURE — 80076 HEPATIC FUNCTION PANEL: CPT

## 2023-08-04 PROCEDURE — 83550 IRON BINDING TEST: CPT

## 2023-08-04 PROCEDURE — 83036 HEMOGLOBIN GLYCOSYLATED A1C: CPT

## 2023-08-04 PROCEDURE — 87635 SARS-COV-2 COVID-19 AMP PRB: CPT

## 2023-08-04 PROCEDURE — 83605 ASSAY OF LACTIC ACID: CPT

## 2023-08-04 PROCEDURE — 74018 RADEX ABDOMEN 1 VIEW: CPT

## 2023-08-04 PROCEDURE — 85610 PROTHROMBIN TIME: CPT

## 2023-08-04 PROCEDURE — 86140 C-REACTIVE PROTEIN: CPT

## 2023-08-04 PROCEDURE — C9113: CPT

## 2023-08-04 PROCEDURE — 74176 CT ABD & PELVIS W/O CONTRAST: CPT

## 2023-08-04 PROCEDURE — 92610 EVALUATE SWALLOWING FUNCTION: CPT | Mod: GN

## 2023-08-04 PROCEDURE — 93970 EXTREMITY STUDY: CPT | Mod: 26

## 2023-08-04 PROCEDURE — 74174 CTA ABD&PLVS W/CONTRAST: CPT

## 2023-08-04 PROCEDURE — 85384 FIBRINOGEN ACTIVITY: CPT

## 2023-08-04 PROCEDURE — 92526 ORAL FUNCTION THERAPY: CPT | Mod: GN

## 2023-08-04 PROCEDURE — 87075 CULTR BACTERIA EXCEPT BLOOD: CPT

## 2023-08-04 PROCEDURE — 86901 BLOOD TYPING SEROLOGIC RH(D): CPT

## 2023-08-04 PROCEDURE — 87184 SC STD DISK METHOD PER PLATE: CPT

## 2023-08-04 PROCEDURE — 94640 AIRWAY INHALATION TREATMENT: CPT

## 2023-08-04 PROCEDURE — 70450 CT HEAD/BRAIN W/O DYE: CPT

## 2023-08-04 PROCEDURE — 85018 HEMOGLOBIN: CPT

## 2023-08-04 PROCEDURE — 85027 COMPLETE CBC AUTOMATED: CPT

## 2023-08-04 PROCEDURE — 84439 ASSAY OF FREE THYROXINE: CPT

## 2023-08-04 PROCEDURE — 84145 PROCALCITONIN (PCT): CPT

## 2023-08-04 PROCEDURE — 85025 COMPLETE CBC W/AUTO DIFF WBC: CPT

## 2023-08-04 PROCEDURE — 97530 THERAPEUTIC ACTIVITIES: CPT | Mod: GP

## 2023-08-04 PROCEDURE — 87040 BLOOD CULTURE FOR BACTERIA: CPT

## 2023-08-04 PROCEDURE — 85045 AUTOMATED RETICULOCYTE COUNT: CPT

## 2023-08-04 PROCEDURE — 82565 ASSAY OF CREATININE: CPT

## 2023-08-04 PROCEDURE — 87077 CULTURE AEROBIC IDENTIFY: CPT

## 2023-08-04 PROCEDURE — 92612 ENDOSCOPY SWALLOW (FEES) VID: CPT | Mod: GN

## 2023-08-04 PROCEDURE — 97116 GAIT TRAINING THERAPY: CPT | Mod: GP

## 2023-08-04 PROCEDURE — 97535 SELF CARE MNGMENT TRAINING: CPT | Mod: GO

## 2023-08-04 PROCEDURE — 84132 ASSAY OF SERUM POTASSIUM: CPT

## 2023-08-04 PROCEDURE — 86923 COMPATIBILITY TEST ELECTRIC: CPT

## 2023-08-04 PROCEDURE — 83540 ASSAY OF IRON: CPT

## 2023-08-04 PROCEDURE — 86022 PLATELET ANTIBODIES: CPT

## 2023-08-04 RX ORDER — HEPARIN SODIUM 5000 [USP'U]/ML
5000 INJECTION INTRAVENOUS; SUBCUTANEOUS EVERY 12 HOURS
Refills: 0 | Status: DISCONTINUED | OUTPATIENT
Start: 2023-08-04 | End: 2023-08-07

## 2023-08-04 RX ORDER — CALCIUM GLUCONATE 100 MG/ML
1 VIAL (ML) INTRAVENOUS ONCE
Refills: 0 | Status: COMPLETED | OUTPATIENT
Start: 2023-08-04 | End: 2023-08-04

## 2023-08-04 RX ORDER — INSULIN HUMAN 100 [IU]/ML
5 INJECTION, SOLUTION SUBCUTANEOUS
Qty: 100 | Refills: 0 | Status: DISCONTINUED | OUTPATIENT
Start: 2023-08-04 | End: 2023-08-05

## 2023-08-04 RX ORDER — SODIUM CHLORIDE 9 MG/ML
1000 INJECTION INTRAMUSCULAR; INTRAVENOUS; SUBCUTANEOUS ONCE
Refills: 0 | Status: COMPLETED | OUTPATIENT
Start: 2023-08-04 | End: 2023-08-04

## 2023-08-04 RX ORDER — SODIUM BICARBONATE 1 MEQ/ML
50 SYRINGE (ML) INTRAVENOUS ONCE
Refills: 0 | Status: COMPLETED | OUTPATIENT
Start: 2023-08-04 | End: 2023-08-04

## 2023-08-04 RX ORDER — SODIUM CHLORIDE 9 MG/ML
1000 INJECTION, SOLUTION INTRAVENOUS
Refills: 0 | Status: DISCONTINUED | OUTPATIENT
Start: 2023-08-04 | End: 2023-08-05

## 2023-08-04 RX ORDER — ALLOPURINOL 300 MG
300 TABLET ORAL DAILY
Refills: 0 | Status: DISCONTINUED | OUTPATIENT
Start: 2023-08-04 | End: 2023-08-13

## 2023-08-04 RX ORDER — DEXTROSE 50 % IN WATER 50 %
25 SYRINGE (ML) INTRAVENOUS ONCE
Refills: 0 | Status: COMPLETED | OUTPATIENT
Start: 2023-08-04 | End: 2023-08-04

## 2023-08-04 RX ORDER — ATORVASTATIN CALCIUM 80 MG/1
10 TABLET, FILM COATED ORAL AT BEDTIME
Refills: 0 | Status: DISCONTINUED | OUTPATIENT
Start: 2023-08-04 | End: 2023-08-13

## 2023-08-04 RX ORDER — PANTOPRAZOLE SODIUM 20 MG/1
40 TABLET, DELAYED RELEASE ORAL
Refills: 0 | Status: DISCONTINUED | OUTPATIENT
Start: 2023-08-04 | End: 2023-08-10

## 2023-08-04 RX ORDER — SODIUM CHLORIDE 9 MG/ML
259 INJECTION INTRAMUSCULAR; INTRAVENOUS; SUBCUTANEOUS ONCE
Refills: 0 | Status: COMPLETED | OUTPATIENT
Start: 2023-08-04 | End: 2023-08-05

## 2023-08-04 RX ORDER — SODIUM CHLORIDE 9 MG/ML
1000 INJECTION, SOLUTION INTRAVENOUS ONCE
Refills: 0 | Status: COMPLETED | OUTPATIENT
Start: 2023-08-04 | End: 2023-08-04

## 2023-08-04 RX ORDER — SODIUM CHLORIDE 9 MG/ML
2100 INJECTION, SOLUTION INTRAVENOUS ONCE
Refills: 0 | Status: COMPLETED | OUTPATIENT
Start: 2023-08-04 | End: 2023-08-04

## 2023-08-04 RX ORDER — FUROSEMIDE 40 MG
40 TABLET ORAL ONCE
Refills: 0 | Status: COMPLETED | OUTPATIENT
Start: 2023-08-04 | End: 2023-08-04

## 2023-08-04 RX ADMIN — SODIUM CHLORIDE 2100 MILLILITER(S): 9 INJECTION, SOLUTION INTRAVENOUS at 11:45

## 2023-08-04 RX ADMIN — INSULIN HUMAN 10 UNIT(S)/HR: 100 INJECTION, SOLUTION SUBCUTANEOUS at 20:07

## 2023-08-04 RX ADMIN — HEPARIN SODIUM 5000 UNIT(S): 5000 INJECTION INTRAVENOUS; SUBCUTANEOUS at 17:35

## 2023-08-04 RX ADMIN — SODIUM CHLORIDE 1000 MILLILITER(S): 9 INJECTION, SOLUTION INTRAVENOUS at 22:30

## 2023-08-04 RX ADMIN — Medication 25 MILLILITER(S): at 22:30

## 2023-08-04 RX ADMIN — SODIUM CHLORIDE 1000 MILLILITER(S): 9 INJECTION INTRAMUSCULAR; INTRAVENOUS; SUBCUTANEOUS at 11:42

## 2023-08-04 RX ADMIN — Medication 100 GRAM(S): at 11:15

## 2023-08-04 RX ADMIN — SODIUM CHLORIDE 1000 MILLILITER(S): 9 INJECTION INTRAMUSCULAR; INTRAVENOUS; SUBCUTANEOUS at 13:00

## 2023-08-04 RX ADMIN — SODIUM CHLORIDE 1000 MILLILITER(S): 9 INJECTION, SOLUTION INTRAVENOUS at 16:15

## 2023-08-04 RX ADMIN — SODIUM CHLORIDE 1000 MILLILITER(S): 9 INJECTION, SOLUTION INTRAVENOUS at 15:15

## 2023-08-04 RX ADMIN — Medication 40 MILLIGRAM(S): at 11:15

## 2023-08-04 RX ADMIN — Medication 50 MILLIEQUIVALENT(S): at 11:00

## 2023-08-04 RX ADMIN — Medication 300 MILLIGRAM(S): at 17:35

## 2023-08-04 RX ADMIN — SODIUM CHLORIDE 2100 MILLILITER(S): 9 INJECTION, SOLUTION INTRAVENOUS at 10:45

## 2023-08-04 RX ADMIN — INSULIN HUMAN 7 UNIT(S)/HR: 100 INJECTION, SOLUTION SUBCUTANEOUS at 11:35

## 2023-08-04 RX ADMIN — SODIUM CHLORIDE 150 MILLILITER(S): 9 INJECTION, SOLUTION INTRAVENOUS at 16:44

## 2023-08-04 RX ADMIN — Medication 1 GRAM(S): at 11:45

## 2023-08-04 NOTE — ED PROVIDER NOTE - OBJECTIVE STATEMENT
88 yoM w. PMH of DM, hypertension coming from home d/t 2 weeks of decreased appetite, increased urinary output and craving sweets x 2 weeks. Daughter is caretaker, states pt. behaviour has changed  within the last 2 weeks, he is less active, requesting more coca cola and sugar. Pt. noted to be hypotensive upon arrival and was brought to critical care area.

## 2023-08-04 NOTE — ED ADULT NURSE NOTE - NSFALLFACTORS_ED_ALL_ED
Frequent toileting needed/Altered elimination/Impaired gait/IV and/or equipment tethered to patient/Weakness

## 2023-08-04 NOTE — ED PROVIDER NOTE - PROGRESS NOTE DETAILS
KA.- BP improving after fluids. 143/70 Dr. Marti - Initial VBG noted with glucose over 750, pH 7.3, concerning for hyperosmolar state due to diabetic noncompliance, also found to be hyperkalemic.  Treatment for hyperkalemia instituted, started on insulin infusion, IV fluids boluses administered.  We will get screening abdominal CT for right lower quadrant tenderness elicited on initial exam, patient improving with medical management in the emergency department.  Plan discussed with family in detail with questions answered bedside. KA.- Pt. noted uncomfortable and unable to urinate. Bladder scan shows over 300 ml urine. Jones catheter placed, pt. noted comfortable.

## 2023-08-04 NOTE — ED PROVIDER NOTE - ATTENDING CONTRIBUTION TO CARE
I personally evaluated the patient. I reviewed the Resident´s or Physician Assistant´s note (as assigned above), and agree with the findings and plan except as documented in my note.    88-year-old male presents to the emergency department from home with family for assistance in failure to thrive, not eating, noncompliant with medications for several weeks.  Patient is known diabetic hypertensive, family member demonstrates bag of medicines to me including metformin, ARB's, allopurinol, BPH Rx and statins.  Patient is a limited historian, family member used to assist HPI and ROS.    GENERAL: male in no distress.   HEENT: EOMI orbit sunken.  Lips dry, tongue furrowed   NECK: No meningeal signs  CHEST: normal work of breathing noted. CTA bilateral.   CV: pulses intact S1S2 regular  ABD: soft, non rigid, non distended, right lower quadrant tenderness to palpation  EXTR: FROM   NEURO: Cognitively impaired, deconditioned, no gross focal deficits  SKIN: Poor turgor    Impression: Dehydration, deconditioning, right lower quadrant pain    Plan: IV, labs, imaging, supportive care & reevaluation

## 2023-08-04 NOTE — ED PROVIDER NOTE - FAMILY DETAILS FREE TEXT FOR MDM ADDL HISTORY OBTAINED FROM QUESTION
At the time of disposition, results of work-up discussed with patient [and family] with questions answered, expressed understanding of the medical decision making.

## 2023-08-04 NOTE — ED PROVIDER NOTE - CONSIDERATION OF ADMISSION OBSERVATION
Consideration of Admission/Observation Patient be admitted for hyperkalemic state, hyperosmolar state.  Needs ICU admission

## 2023-08-04 NOTE — ED ADULT NURSE NOTE - PAIN: ALLEVIATING FACTORS
repositioning/environment adjustment/immobilization/relaxation/rest/not eating/elevation/deep breathing

## 2023-08-04 NOTE — CONSULT NOTE ADULT - ASSESSMENT
IMPRESSION:  DKA/HHS  HO DM  HO HTN  Abdominal aortic aneurysm  Parkinson disease  ?dementia      PLAN:      CNS: avoid sedation    HEENT: Oral care    PULMONARY:  HOB @ 45 degrees.  Aspiration precautions,     CARDIOVASCULAR: MAP adequate,  cc/hr, CE, ECHO    GI: GI prophylaxis. NPO for now, lipase    RENAL:  Follow up lytes.  Correct as needed    INFECTIOUS DISEASE: Follow up cultures, procalcitonin, ua,     HEMATOLOGICAL:  DVT prophylaxis. DIMER    ENDOCRINE:  Follow up FS.  Insulin protocol, endocrine    MUSCULOSKELETAL: bedrest    MICU

## 2023-08-04 NOTE — H&P ADULT - NSHPPHYSICALEXAM_GEN_ALL_CORE
GENERAL: NAD, lying in bed comfortably  HEAD:  Atraumatic, normocephalic  EYES: EOMI, PERRLA, conjunctiva and sclera clear  ENT: Moist mucous membranes  NECK: Supple, no JVD  HEART: Regular rate and rhythm, no murmurs, rubs, or gallops  LUNGS: Unlabored respirations.  Clear to auscultation bilaterally, no crackles, wheezing, or rhonchi  ABDOMEN: Soft, nontender, nondistended, +BS  EXTREMITIES: 2+ peripheral pulses bilaterally. No clubbing, cyanosis, or edema  NERVOUS SYSTEM:  A&Ox2, no focal deficits   SKIN: No rashes or lesions

## 2023-08-04 NOTE — ED PROVIDER NOTE - DIFFERENTIAL DIAGNOSIS
Differential Diagnosis Hyperkalemia, uncontrolled diabetes, hyperosmolar state, dehydration, intra-abdominal catastrophe

## 2023-08-04 NOTE — ED PROVIDER NOTE - OTHER FINDINGS
Sinus tach at 106 with right bundle patrick block and left anterior hemiblock, LVH, nonspecific ST changes noted

## 2023-08-04 NOTE — ED ADULT NURSE NOTE - NSFALLHARMRISKINTERV_ED_ALL_ED
Assistance OOB with selected safe patient handling equipment if applicable/Assistance with ambulation/Communicate risk of Fall with Harm to all staff, patient, and family/Monitor gait and stability/Provide visual cue: red socks, yellow wristband, yellow gown, etc/Reinforce activity limits and safety measures with patient and family/Toileting schedule using arm’s reach rule for commode and bathroom/Bed in lowest position, wheels locked, appropriate side rails in place/Call bell, personal items and telephone in reach/Instruct patient to call for assistance before getting out of bed/chair/stretcher/Non-slip footwear applied when patient is off stretcher/[place] to call system/Physically safe environment - no spills, clutter or unnecessary equipment/Purposeful Proactive Rounding/Room/bathroom lighting operational, light cord in reach

## 2023-08-04 NOTE — ED ADULT TRIAGE NOTE - CHIEF COMPLAINT QUOTE
As per daughter, patient has not been eating for several weeks, +weight loss, +constipation, +abdominal pain x today

## 2023-08-04 NOTE — PATIENT PROFILE ADULT - LANGUAGE ASSISTANCE NEEDED
Patient lethargic and confused, history obtained from family./No-Patient/Caregiver offered and refused free interpretation services.

## 2023-08-04 NOTE — PATIENT PROFILE ADULT - FALL HARM RISK - HARM RISK INTERVENTIONS

## 2023-08-04 NOTE — CONSULT NOTE ADULT - SUBJECTIVE AND OBJECTIVE BOX
Patient is a 88y old  Male who presents with a chief complaint of     HPI:      PAST MEDICAL & SURGICAL HISTORY:  HTN (hypertension)      Gout      BPH (benign prostatic hyperplasia)      Parkinson disease      HLD (hyperlipidemia)      Smoker within last 12 months      Diabetes mellitus      No significant past surgical history          SOCIAL HX:   Smoking                             FAMILY HISTORY:  No pertinent family history in first degree relatives    :  No known cardiovacular family hisotry     Review Of Systems:     All ROS are negative except per HPI       Allergies    No Known Allergies    Intolerances          PHYSICAL EXAM    ICU Vital Signs Last 24 Hrs  T(C): 36.1 (04 Aug 2023 11:30), Max: 36.5 (04 Aug 2023 10:15)  T(F): 97 (04 Aug 2023 11:30), Max: 97.7 (04 Aug 2023 10:15)  HR: 114 (04 Aug 2023 12:30) (77 - 120)  BP: 108/67 (04 Aug 2023 12:30) (59/41 - 145/76)  BP(mean): 81 (04 Aug 2023 12:30) (58 - 99)  ABP: --  ABP(mean): --  RR: 22 (04 Aug 2023 12:30) (18 - 36)  SpO2: 99% (04 Aug 2023 12:30) (95% - 99%)    O2 Parameters below as of 04 Aug 2023 12:30  Patient On (Oxygen Delivery Method): room air            CONSTITUTIONAL:  Well nourished.   NAD    ENT:   Airway patent,   Mouth with normal mucosa.     CARDIAC:   Normal rate,   Regular rhythm.    No edema      RESPIRATORY:   No wheezing  Bilateral BS   Not tachypneic,  No use of accessory muscles    GASTROINTESTINAL:  Abdomen soft,   Non-tender,   No guarding,   + BS      NEUROLOGICAL:   Alert and oriented   No motor deficits.    SKIN:   Skin normal color for race,   No evidence of rash.                LABS:                                                08-04    123<L>  |  86<L>  |  59<H>  ----------------------------<  1251<HH>  tnp   |  14<L>  |  2.4<H>    Ca    8.7      04 Aug 2023 10:50    TPro  6.7  /  Alb  3.1<L>  /  TBili  0.6  /  DBili  x   /  AST  29  /  ALT  13  /  AlkPhos  107  08-04      PT/INR - ( 04 Aug 2023 10:50 )   PT: 11.20 sec;   INR: 0.98 ratio         PTT - ( 04 Aug 2023 10:50 )  PTT:24.9 sec                                       Urinalysis Basic - ( 04 Aug 2023 10:50 )    Color: x / Appearance: x / SG: x / pH: x  Gluc: 1251 mg/dL / Ketone: x  / Bili: x / Urobili: x   Blood: x / Protein: x / Nitrite: x   Leuk Esterase: x / RBC: x / WBC x   Sq Epi: x / Non Sq Epi: x / Bacteria: x                                                  LIVER FUNCTIONS - ( 04 Aug 2023 10:50 )  Alb: 3.1 g/dL / Pro: 6.7 g/dL / ALK PHOS: 107 U/L / ALT: 13 U/L / AST: 29 U/L / GGT: x                                                                                                                                   ABG - ( 04 Aug 2023 11:23 )  pH, Arterial: 7.30  pH, Blood: x     /  pCO2: 24    /  pO2: 71    / HCO3: 12    / Base Excess: -12.9 /  SaO2: 93.9                X-Rays                                                                            ECHO      MEDICATIONS  (STANDING):  insulin regular Infusion 7 Unit(s)/Hr (7 mL/Hr) IV Continuous <Continuous>    MEDICATIONS  (PRN):         Patient is a 88y old  Male who presents with a chief complaint of lethargy    HPI:  88 yoM w. PMH of DM, hypertension coming from home d/t 2 weeks of decreased appetite, increased urinary output and craving sweets x 2 weeks. Daughter is caretaker, states pt. behaviour has changed  within the last 2 weeks, he is less active, requesting more coca cola and sugar. Pt noted to be hypotensive upon arrival and was brought to critical care area.    PAST MEDICAL & SURGICAL HISTORY:    HTN (hypertension)    Gout    BPH (benign prostatic hyperplasia)    Parkinson disease    HLD (hyperlipidemia)    Smoker within last 12 months    Diabetes mellitus    No significant past surgical history          SOCIAL HX:   ex Smoking                             FAMILY HISTORY:  No pertinent family history in first degree relatives    :  No known cardiovacular family hisotry     Review Of Systems:     All ROS are negative except per HPI       Allergies    No Known Allergies    Intolerances          PHYSICAL EXAM    ICU Vital Signs Last 24 Hrs  T(C): 36.1 (04 Aug 2023 11:30), Max: 36.5 (04 Aug 2023 10:15)  T(F): 97 (04 Aug 2023 11:30), Max: 97.7 (04 Aug 2023 10:15)  HR: 114 (04 Aug 2023 12:30) (77 - 120)  BP: 108/67 (04 Aug 2023 12:30) (59/41 - 145/76)  BP(mean): 81 (04 Aug 2023 12:30) (58 - 99)  ABP: --  ABP(mean): --  RR: 22 (04 Aug 2023 12:30) (18 - 36)  SpO2: 99% (04 Aug 2023 12:30) (95% - 99%)    O2 Parameters below as of 04 Aug 2023 12:30  Patient On (Oxygen Delivery Method): room air            CONSTITUTIONAL:  confused    ENT:   Airway patent,   Mouth with normal mucosa.     CARDIAC:   Normal rate,   Regular rhythm.       RESPIRATORY:   No wheezing  Bilateral BS   Not tachypneic,  No use of accessory muscles    GASTROINTESTINAL:  Abdomen soft,   Non-tender,   No guarding,   + BS      NEUROLOGICAL:   withdraws to pain  confused    SKIN:   Skin normal color for race,   No evidence of rash.                LABS:                                                08-04    123<L>  |  86<L>  |  59<H>  ----------------------------<  1251<HH>  tnp   |  14<L>  |  2.4<H>    Ca    8.7      04 Aug 2023 10:50    TPro  6.7  /  Alb  3.1<L>  /  TBili  0.6  /  DBili  x   /  AST  29  /  ALT  13  /  AlkPhos  107  08-04      PT/INR - ( 04 Aug 2023 10:50 )   PT: 11.20 sec;   INR: 0.98 ratio         PTT - ( 04 Aug 2023 10:50 )  PTT:24.9 sec                                       Urinalysis Basic - ( 04 Aug 2023 10:50 )    Color: x / Appearance: x / SG: x / pH: x  Gluc: 1251 mg/dL / Ketone: x  / Bili: x / Urobili: x   Blood: x / Protein: x / Nitrite: x   Leuk Esterase: x / RBC: x / WBC x   Sq Epi: x / Non Sq Epi: x / Bacteria: x                                                  LIVER FUNCTIONS - ( 04 Aug 2023 10:50 )  Alb: 3.1 g/dL / Pro: 6.7 g/dL / ALK PHOS: 107 U/L / ALT: 13 U/L / AST: 29 U/L / GGT: x                                                                                                                                   ABG - ( 04 Aug 2023 11:23 )  pH, Arterial: 7.30  pH, Blood: x     /  pCO2: 24    /  pO2: 71    / HCO3: 12    / Base Excess: -12.9 /  SaO2: 93.9                X-Rays                                                                            ECHO      MEDICATIONS  (STANDING):  insulin regular Infusion 7 Unit(s)/Hr (7 mL/Hr) IV Continuous <Continuous>    MEDICATIONS  (PRN):

## 2023-08-04 NOTE — ED ADULT NURSE NOTE - PLAN OF CARE
Explanation of exam/test/Fall precautions/Bedside visitors/I and O/NPO/Position of comfort/Side rails

## 2023-08-04 NOTE — H&P ADULT - CLICK TO LAUNCH ORM
. 55 yo F with pmhx of HTN presents with 4 days of LT lower back/flank pain, worsened yesterday evening. Last took ibuprofen 8A with mild relief. Took flexeril yesterday evening without relief. Describes pain as sharp, unable to determine a comfortable position. Denies chest pain, shortness of breath, fever, chills, nausea, vomiting, diarrhea, dysuria.   pshx: ?intestinal resection

## 2023-08-04 NOTE — ED PROVIDER NOTE - CARE PLAN
1 Principal Discharge DX:	Hyperosmolar syndrome  Secondary Diagnosis:	Uncontrolled diabetes mellitus with hyperglycemia  Secondary Diagnosis:	Hyperkalemia

## 2023-08-04 NOTE — ED PROVIDER NOTE - CLINICAL SUMMARY MEDICAL DECISION MAKING FREE TEXT BOX
88-year-old male presents to the emergency department complaining of weakness and decreased oral intake, family states he has been requesting sugary foods mostly.  Patient is noncompliant with medicines.  Emergency department screening exam, labs and imaging, shock panel reveals hyperkalemia and concern for hyperosmolar state, had aggressive management with fluid resuscitation, treatment for hyperkalemia, insulin infusion parenterally, frequent reassessments and frequent repeat lab draws for critical acute care management, falls and frequent reevaluations and discussions with family.  Plan is for ICU admission.  No current evidence of infection as trigger.

## 2023-08-04 NOTE — ED ADULT NURSE NOTE - AS SC BRADEN FRICTION
(2) potential problem Azathioprine Pregnancy And Lactation Text: This medication is Pregnancy Category D and isn't considered safe during pregnancy. It is unknown if this medication is excreted in breast milk.

## 2023-08-04 NOTE — ED ADULT NURSE NOTE - ED COMFORT CARE
Patient informed/Family informed/Explanation of wait/Warm blanket/Mouth care/Incontinence care/Pillow/Darkened room

## 2023-08-04 NOTE — H&P ADULT - ASSESSMENT
IMPRESSION:  DKA/HHS  HO DM  HO HTN  Abdominal aortic aneurysm  Parkinson disease  ?dementia      PLAN:      CNS: avoid sedation    HEENT: Oral care    PULMONARY:  HOB @ 45 degrees.  Aspiration precautions,     CARDIOVASCULAR: MAP adequate,  cc/hr, CE, ECHO    GI: GI prophylaxis. NPO for now, lipase    RENAL:  Follow up lytes.  Correct as needed    INFECTIOUS DISEASE: Follow up cultures, procalcitonin, ua,     HEMATOLOGICAL:  DVT prophylaxis. DIMER    ENDOCRINE:  Follow up FS.  Insulin protocol, endocrine    MUSCULOSKELETAL: bedrest    MICU   IMPRESSION:  DKA/HHS  Hyperkalemia s/p calcium gluconate  HO DM  HO HTN  Abdominal aortic aneurysm  Parkinson disease  ?dementia      PLAN:    CNS: avoid sedation    HEENT: Oral care    PULMONARY:  HOB @ 45 degrees.  Aspiration precautions,     CARDIOVASCULAR: MAP adequate,  cc/hr, CE, ECHO    GI: GI prophylaxis. NPO for now, lipase    RENAL:  Follow up lytes.  Correct as needed    INFECTIOUS DISEASE: Follow up cultures, procalcitonin, ua    HEMATOLOGICAL:  DVT prophylaxis. DIMER    ENDOCRINE:  Follow up FS.  Insulin protocol, endocrine    MUSCULOSKELETAL: bedrest    MICU   IMPRESSION:  DKA/HHS  Hyperkalemia s/p calcium gluconate  HO DM  HO HTN  Abdominal aortic aneurysm  Parkinson disease  ?dementia      PLAN:    CNS: avoid sedation    HEENT: Oral care    PULMONARY:  HOB @ 45 degrees.  Aspiration precautions,     CARDIOVASCULAR: MAP adequate,  cc/hr, CE, ECHO    GI: GI prophylaxis. NPO for now, lipase    RENAL:  Follow up lytes.  Correct as needed    INFECTIOUS DISEASE: Follow up cultures, procalcitonin, check UA    HEMATOLOGICAL:  DVT prophylaxis. DIMER. LE duplex    ENDOCRINE:  Follow up FS.  Insulin protocol, endocrine    MUSCULOSKELETAL: bedrest    MICU

## 2023-08-04 NOTE — H&P ADULT - HISTORY OF PRESENT ILLNESS
88 yoM w. PMH of DM, hypertension coming from home d/t 2 weeks of decreased appetite, increased urinary output and craving sweets x 2 weeks. Daughter is caretaker, states pt. behaviour has changed  within the last 2 weeks, he is less active, requesting more coca cola and sugar. Pt noted to be hypotensive upon arrival and was brought to critical care area.    ED vitals:  BP 74/ 50, HR 87, Temp 97.2F, satting 95% on room air  Labs significant for WBC 14K, Na 123 (corrected 141), Cr 2.4, AG 23, BHB 5.1. VBG pH 7.19, Lactate 5.8, K 8.6, pCO2 39  EKG tachyardia, bifascicular block   88 yoM w. PMH of DM, hypertension coming from home d/t 2 weeks of decreased appetite, increased urinary output and craving sweets x 2 weeks. Daughter is caretaker, states pt. behaviour has changed  within the last 2 weeks, he is less active, requesting more coca cola and sugar. Pt noted to be hypotensive upon arrival and was brought to critical care area.    ED vitals:  BP 74/ 50, HR 87, Temp 97.2F, satting 95% on room air  Labs significant for WBC 14K, Na 123 (corrected 141), Cr 2.4, AG 23, BHB 5.1. VBG pH 7.19, Lactate 5.8, K 8.6, pCO2 39  EKG tachycardia, bifascicular block, RBBB  CXR unremarkable  CT A/P: Extensive coronary atherosclerosis. Dependent atelectasis at the right base. 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 Hepatic cysts. Questionable sludge within the gallbladder. Unchanged 1 cm cystic lesion in the pancreatic body      s/p calcium gluconate 1g, Lasix 40mg push x1, barcarb 50meq, started on insulin drip at 7 units/hr.   Admitted to MICU for DKA/HHS.     89 y/o M w/ PMH of DM, HTN coming from home with complaint of decreased appetite, increased urinary output and craving sweets x 2 weeks. Daughter is caretaker, states patient behavior has changed. Within the last 2 weeks, He is less active, requesting more coca cola and sugar. Pt noted to be hypotensive upon arrival.     ED vitals:  BP 74/ 50, HR 87, Temp 97.2F, satting 95% on room air  Labs significant for WBC 14K, Na 123 (corrected 141), Cr 2.4, AG 23, BHB 5.1. VBG pH 7.19, Lactate 5.8, K 8.6, pCO2 39  EKG tachycardia, bifascicular block, RBBB  CXR unremarkable  CT A/P: Extensive coronary atherosclerosis. Dependent atelectasis at the right base. 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 Hepatic cysts. Questionable sludge within the gallbladder. Unchanged 1 cm cystic lesion in the pancreatic body      s/p calcium gluconate 1g, Lasix 40mg push x1, barcarb 50meq, started on insulin drip at 7 units/hr.   Admitted to MICU for DKA/HHS.

## 2023-08-05 LAB
ALBUMIN SERPL ELPH-MCNC: 2.7 G/DL — LOW (ref 3.5–5.2)
ALBUMIN SERPL ELPH-MCNC: 2.9 G/DL — LOW (ref 3.5–5.2)
ALP SERPL-CCNC: 70 U/L — SIGNIFICANT CHANGE UP (ref 30–115)
ALP SERPL-CCNC: 85 U/L — SIGNIFICANT CHANGE UP (ref 30–115)
ALT FLD-CCNC: 11 U/L — SIGNIFICANT CHANGE UP (ref 0–41)
ALT FLD-CCNC: 11 U/L — SIGNIFICANT CHANGE UP (ref 0–41)
ANION GAP SERPL CALC-SCNC: 13 MMOL/L — SIGNIFICANT CHANGE UP (ref 7–14)
ANION GAP SERPL CALC-SCNC: 14 MMOL/L — SIGNIFICANT CHANGE UP (ref 7–14)
ANION GAP SERPL CALC-SCNC: 18 MMOL/L — HIGH (ref 7–14)
AST SERPL-CCNC: 19 U/L — SIGNIFICANT CHANGE UP (ref 0–41)
AST SERPL-CCNC: 25 U/L — SIGNIFICANT CHANGE UP (ref 0–41)
BASOPHILS # BLD AUTO: 0.01 K/UL — SIGNIFICANT CHANGE UP (ref 0–0.2)
BASOPHILS NFR BLD AUTO: 0.1 % — SIGNIFICANT CHANGE UP (ref 0–1)
BILIRUB SERPL-MCNC: 0.4 MG/DL — SIGNIFICANT CHANGE UP (ref 0.2–1.2)
BILIRUB SERPL-MCNC: 0.5 MG/DL — SIGNIFICANT CHANGE UP (ref 0.2–1.2)
BUN SERPL-MCNC: 41 MG/DL — HIGH (ref 10–20)
BUN SERPL-MCNC: 42 MG/DL — HIGH (ref 10–20)
BUN SERPL-MCNC: 48 MG/DL — HIGH (ref 10–20)
CALCIUM SERPL-MCNC: 8 MG/DL — LOW (ref 8.4–10.4)
CALCIUM SERPL-MCNC: 8.2 MG/DL — LOW (ref 8.4–10.5)
CALCIUM SERPL-MCNC: 8.4 MG/DL — SIGNIFICANT CHANGE UP (ref 8.4–10.5)
CHLORIDE SERPL-SCNC: 107 MMOL/L — SIGNIFICANT CHANGE UP (ref 98–110)
CHLORIDE SERPL-SCNC: 111 MMOL/L — HIGH (ref 98–110)
CHLORIDE SERPL-SCNC: 111 MMOL/L — HIGH (ref 98–110)
CO2 SERPL-SCNC: 17 MMOL/L — SIGNIFICANT CHANGE UP (ref 17–32)
CO2 SERPL-SCNC: 19 MMOL/L — SIGNIFICANT CHANGE UP (ref 17–32)
CO2 SERPL-SCNC: 24 MMOL/L — SIGNIFICANT CHANGE UP (ref 17–32)
CREAT SERPL-MCNC: 1.2 MG/DL — SIGNIFICANT CHANGE UP (ref 0.7–1.5)
CREAT SERPL-MCNC: 1.3 MG/DL — SIGNIFICANT CHANGE UP (ref 0.7–1.5)
CREAT SERPL-MCNC: 1.5 MG/DL — SIGNIFICANT CHANGE UP (ref 0.7–1.5)
CULTURE RESULTS: NO GROWTH — SIGNIFICANT CHANGE UP
EGFR: 45 ML/MIN/1.73M2 — LOW
EGFR: 53 ML/MIN/1.73M2 — LOW
EGFR: 59 ML/MIN/1.73M2 — LOW
EOSINOPHIL # BLD AUTO: 0.01 K/UL — SIGNIFICANT CHANGE UP (ref 0–0.7)
EOSINOPHIL NFR BLD AUTO: 0.1 % — SIGNIFICANT CHANGE UP (ref 0–8)
GLUCOSE BLDC GLUCOMTR-MCNC: 107 MG/DL — HIGH (ref 70–99)
GLUCOSE BLDC GLUCOMTR-MCNC: 113 MG/DL — HIGH (ref 70–99)
GLUCOSE BLDC GLUCOMTR-MCNC: 123 MG/DL — HIGH (ref 70–99)
GLUCOSE BLDC GLUCOMTR-MCNC: 143 MG/DL — HIGH (ref 70–99)
GLUCOSE BLDC GLUCOMTR-MCNC: 163 MG/DL — HIGH (ref 70–99)
GLUCOSE BLDC GLUCOMTR-MCNC: 166 MG/DL — HIGH (ref 70–99)
GLUCOSE BLDC GLUCOMTR-MCNC: 181 MG/DL — HIGH (ref 70–99)
GLUCOSE BLDC GLUCOMTR-MCNC: 236 MG/DL — HIGH (ref 70–99)
GLUCOSE BLDC GLUCOMTR-MCNC: 239 MG/DL — HIGH (ref 70–99)
GLUCOSE BLDC GLUCOMTR-MCNC: 411 MG/DL — HIGH (ref 70–99)
GLUCOSE BLDC GLUCOMTR-MCNC: 469 MG/DL — CRITICAL HIGH (ref 70–99)
GLUCOSE BLDC GLUCOMTR-MCNC: 489 MG/DL — CRITICAL HIGH (ref 70–99)
GLUCOSE BLDC GLUCOMTR-MCNC: 527 MG/DL — CRITICAL HIGH (ref 70–99)
GLUCOSE BLDC GLUCOMTR-MCNC: 77 MG/DL — SIGNIFICANT CHANGE UP (ref 70–99)
GLUCOSE BLDC GLUCOMTR-MCNC: 78 MG/DL — SIGNIFICANT CHANGE UP (ref 70–99)
GLUCOSE BLDC GLUCOMTR-MCNC: 78 MG/DL — SIGNIFICANT CHANGE UP (ref 70–99)
GLUCOSE BLDC GLUCOMTR-MCNC: 94 MG/DL — SIGNIFICANT CHANGE UP (ref 70–99)
GLUCOSE SERPL-MCNC: 210 MG/DL — HIGH (ref 70–99)
GLUCOSE SERPL-MCNC: 315 MG/DL — HIGH (ref 70–99)
GLUCOSE SERPL-MCNC: 89 MG/DL — SIGNIFICANT CHANGE UP (ref 70–99)
HCT VFR BLD CALC: 28 % — LOW (ref 42–52)
HGB BLD-MCNC: 9.5 G/DL — LOW (ref 14–18)
IMM GRANULOCYTES NFR BLD AUTO: 0.5 % — HIGH (ref 0.1–0.3)
LIDOCAIN IGE QN: 21 U/L — SIGNIFICANT CHANGE UP (ref 7–60)
LYMPHOCYTES # BLD AUTO: 0.86 K/UL — LOW (ref 1.2–3.4)
LYMPHOCYTES # BLD AUTO: 6.7 % — LOW (ref 20.5–51.1)
MAGNESIUM SERPL-MCNC: 1.9 MG/DL — SIGNIFICANT CHANGE UP (ref 1.8–2.4)
MCHC RBC-ENTMCNC: 30.8 PG — SIGNIFICANT CHANGE UP (ref 27–31)
MCHC RBC-ENTMCNC: 33.9 G/DL — SIGNIFICANT CHANGE UP (ref 32–37)
MCV RBC AUTO: 90.9 FL — SIGNIFICANT CHANGE UP (ref 80–94)
MONOCYTES # BLD AUTO: 0.66 K/UL — HIGH (ref 0.1–0.6)
MONOCYTES NFR BLD AUTO: 5.1 % — SIGNIFICANT CHANGE UP (ref 1.7–9.3)
NEUTROPHILS # BLD AUTO: 11.33 K/UL — HIGH (ref 1.4–6.5)
NEUTROPHILS NFR BLD AUTO: 87.5 % — HIGH (ref 42.2–75.2)
NRBC # BLD: 0 /100 WBCS — SIGNIFICANT CHANGE UP (ref 0–0)
PLATELET # BLD AUTO: 137 K/UL — SIGNIFICANT CHANGE UP (ref 130–400)
PMV BLD: 14.1 FL — HIGH (ref 7.4–10.4)
POTASSIUM SERPL-MCNC: 3.4 MMOL/L — LOW (ref 3.5–5)
POTASSIUM SERPL-MCNC: 4.9 MMOL/L — SIGNIFICANT CHANGE UP (ref 3.5–5)
POTASSIUM SERPL-MCNC: 4.9 MMOL/L — SIGNIFICANT CHANGE UP (ref 3.5–5)
POTASSIUM SERPL-SCNC: 3.4 MMOL/L — LOW (ref 3.5–5)
POTASSIUM SERPL-SCNC: 4.9 MMOL/L — SIGNIFICANT CHANGE UP (ref 3.5–5)
POTASSIUM SERPL-SCNC: 4.9 MMOL/L — SIGNIFICANT CHANGE UP (ref 3.5–5)
PROCALCITONIN SERPL-MCNC: 0.48 NG/ML — HIGH (ref 0.02–0.1)
PROT SERPL-MCNC: 5.1 G/DL — LOW (ref 6–8)
PROT SERPL-MCNC: 5.7 G/DL — LOW (ref 6–8)
RBC # BLD: 3.08 M/UL — LOW (ref 4.7–6.1)
RBC # FLD: 12.8 % — SIGNIFICANT CHANGE UP (ref 11.5–14.5)
SODIUM SERPL-SCNC: 143 MMOL/L — SIGNIFICANT CHANGE UP (ref 135–146)
SODIUM SERPL-SCNC: 145 MMOL/L — SIGNIFICANT CHANGE UP (ref 135–146)
SODIUM SERPL-SCNC: 146 MMOL/L — SIGNIFICANT CHANGE UP (ref 135–146)
SPECIMEN SOURCE: SIGNIFICANT CHANGE UP
TROPONIN T SERPL-MCNC: 0.02 NG/ML — HIGH
TROPONIN T SERPL-MCNC: <0.01 NG/ML — SIGNIFICANT CHANGE UP
WBC # BLD: 12.93 K/UL — HIGH (ref 4.8–10.8)
WBC # FLD AUTO: 12.93 K/UL — HIGH (ref 4.8–10.8)

## 2023-08-05 PROCEDURE — 71045 X-RAY EXAM CHEST 1 VIEW: CPT | Mod: 26

## 2023-08-05 PROCEDURE — 71045 X-RAY EXAM CHEST 1 VIEW: CPT | Mod: 26,77

## 2023-08-05 PROCEDURE — 99233 SBSQ HOSP IP/OBS HIGH 50: CPT

## 2023-08-05 RX ORDER — INSULIN HUMAN 100 [IU]/ML
10 INJECTION, SOLUTION SUBCUTANEOUS ONCE
Refills: 0 | Status: COMPLETED | OUTPATIENT
Start: 2023-08-05 | End: 2023-08-05

## 2023-08-05 RX ORDER — INSULIN HUMAN 100 [IU]/ML
5 INJECTION, SOLUTION SUBCUTANEOUS
Qty: 100 | Refills: 0 | Status: DISCONTINUED | OUTPATIENT
Start: 2023-08-05 | End: 2023-08-06

## 2023-08-05 RX ORDER — INSULIN HUMAN 100 [IU]/ML
6 INJECTION, SOLUTION SUBCUTANEOUS ONCE
Refills: 0 | Status: COMPLETED | OUTPATIENT
Start: 2023-08-05 | End: 2023-08-05

## 2023-08-05 RX ORDER — METRONIDAZOLE 500 MG
500 TABLET ORAL EVERY 8 HOURS
Refills: 0 | Status: DISCONTINUED | OUTPATIENT
Start: 2023-08-05 | End: 2023-08-07

## 2023-08-05 RX ORDER — CEFEPIME 1 G/1
2000 INJECTION, POWDER, FOR SOLUTION INTRAMUSCULAR; INTRAVENOUS EVERY 12 HOURS
Refills: 0 | Status: DISCONTINUED | OUTPATIENT
Start: 2023-08-05 | End: 2023-08-07

## 2023-08-05 RX ORDER — SODIUM CHLORIDE 9 MG/ML
1000 INJECTION, SOLUTION INTRAVENOUS
Refills: 0 | Status: DISCONTINUED | OUTPATIENT
Start: 2023-08-05 | End: 2023-08-06

## 2023-08-05 RX ORDER — DEXTROSE 50 % IN WATER 50 %
25 SYRINGE (ML) INTRAVENOUS ONCE
Refills: 0 | Status: COMPLETED | OUTPATIENT
Start: 2023-08-05 | End: 2023-08-05

## 2023-08-05 RX ORDER — ACETAMINOPHEN 500 MG
1000 TABLET ORAL ONCE
Refills: 0 | Status: COMPLETED | OUTPATIENT
Start: 2023-08-05 | End: 2023-08-05

## 2023-08-05 RX ORDER — DEXTROSE 50 % IN WATER 50 %
12.5 SYRINGE (ML) INTRAVENOUS ONCE
Refills: 0 | Status: COMPLETED | OUTPATIENT
Start: 2023-08-05 | End: 2023-08-05

## 2023-08-05 RX ORDER — METRONIDAZOLE 500 MG
500 TABLET ORAL ONCE
Refills: 0 | Status: COMPLETED | OUTPATIENT
Start: 2023-08-05 | End: 2023-08-05

## 2023-08-05 RX ORDER — POTASSIUM CHLORIDE 20 MEQ
20 PACKET (EA) ORAL
Refills: 0 | Status: COMPLETED | OUTPATIENT
Start: 2023-08-05 | End: 2023-08-05

## 2023-08-05 RX ORDER — SODIUM CHLORIDE 9 MG/ML
500 INJECTION, SOLUTION INTRAVENOUS ONCE
Refills: 0 | Status: DISCONTINUED | OUTPATIENT
Start: 2023-08-05 | End: 2023-08-07

## 2023-08-05 RX ORDER — CEFEPIME 1 G/1
INJECTION, POWDER, FOR SOLUTION INTRAMUSCULAR; INTRAVENOUS
Refills: 0 | Status: DISCONTINUED | OUTPATIENT
Start: 2023-08-05 | End: 2023-08-07

## 2023-08-05 RX ORDER — VANCOMYCIN HCL 1 G
1000 VIAL (EA) INTRAVENOUS EVERY 12 HOURS
Refills: 0 | Status: DISCONTINUED | OUTPATIENT
Start: 2023-08-05 | End: 2023-08-07

## 2023-08-05 RX ORDER — INSULIN HUMAN 100 [IU]/ML
10 INJECTION, SOLUTION SUBCUTANEOUS ONCE
Refills: 0 | Status: DISCONTINUED | OUTPATIENT
Start: 2023-08-05 | End: 2023-08-05

## 2023-08-05 RX ORDER — CEFEPIME 1 G/1
2000 INJECTION, POWDER, FOR SOLUTION INTRAMUSCULAR; INTRAVENOUS ONCE
Refills: 0 | Status: COMPLETED | OUTPATIENT
Start: 2023-08-05 | End: 2023-08-05

## 2023-08-05 RX ORDER — NOREPINEPHRINE BITARTRATE/D5W 8 MG/250ML
0.05 PLASTIC BAG, INJECTION (ML) INTRAVENOUS
Qty: 8 | Refills: 0 | Status: DISCONTINUED | OUTPATIENT
Start: 2023-08-05 | End: 2023-08-09

## 2023-08-05 RX ORDER — SODIUM CHLORIDE 9 MG/ML
1000 INJECTION, SOLUTION INTRAVENOUS
Refills: 0 | Status: COMPLETED | OUTPATIENT
Start: 2023-08-05 | End: 2023-08-05

## 2023-08-05 RX ORDER — VANCOMYCIN HCL 1 G
1250 VIAL (EA) INTRAVENOUS ONCE
Refills: 0 | Status: COMPLETED | OUTPATIENT
Start: 2023-08-05 | End: 2023-08-05

## 2023-08-05 RX ORDER — METRONIDAZOLE 500 MG
TABLET ORAL
Refills: 0 | Status: DISCONTINUED | OUTPATIENT
Start: 2023-08-05 | End: 2023-08-07

## 2023-08-05 RX ORDER — SODIUM CHLORIDE 9 MG/ML
1000 INJECTION, SOLUTION INTRAVENOUS ONCE
Refills: 0 | Status: COMPLETED | OUTPATIENT
Start: 2023-08-05 | End: 2023-08-05

## 2023-08-05 RX ADMIN — HEPARIN SODIUM 5000 UNIT(S): 5000 INJECTION INTRAVENOUS; SUBCUTANEOUS at 06:38

## 2023-08-05 RX ADMIN — SODIUM CHLORIDE 1.04 MILLILITER(S): 9 INJECTION INTRAMUSCULAR; INTRAVENOUS; SUBCUTANEOUS at 00:10

## 2023-08-05 RX ADMIN — Medication 0.5 MILLIGRAM(S): at 18:58

## 2023-08-05 RX ADMIN — INSULIN HUMAN 6 UNIT(S): 100 INJECTION, SOLUTION SUBCUTANEOUS at 14:15

## 2023-08-05 RX ADMIN — INSULIN HUMAN 10 UNIT(S): 100 INJECTION, SOLUTION SUBCUTANEOUS at 12:58

## 2023-08-05 RX ADMIN — Medication 50 MILLIEQUIVALENT(S): at 03:08

## 2023-08-05 RX ADMIN — INSULIN HUMAN 10 UNIT(S): 100 INJECTION, SOLUTION SUBCUTANEOUS at 16:54

## 2023-08-05 RX ADMIN — HEPARIN SODIUM 5000 UNIT(S): 5000 INJECTION INTRAVENOUS; SUBCUTANEOUS at 17:37

## 2023-08-05 RX ADMIN — CEFEPIME 100 MILLIGRAM(S): 1 INJECTION, POWDER, FOR SOLUTION INTRAMUSCULAR; INTRAVENOUS at 08:30

## 2023-08-05 RX ADMIN — Medication 166.67 MILLIGRAM(S): at 08:24

## 2023-08-05 RX ADMIN — ATORVASTATIN CALCIUM 10 MILLIGRAM(S): 80 TABLET, FILM COATED ORAL at 21:41

## 2023-08-05 RX ADMIN — Medication 400 MILLIGRAM(S): at 06:38

## 2023-08-05 RX ADMIN — SODIUM CHLORIDE 1000 MILLILITER(S): 9 INJECTION, SOLUTION INTRAVENOUS at 09:13

## 2023-08-05 RX ADMIN — CEFEPIME 100 MILLIGRAM(S): 1 INJECTION, POWDER, FOR SOLUTION INTRAMUSCULAR; INTRAVENOUS at 17:37

## 2023-08-05 RX ADMIN — INSULIN HUMAN 6 UNIT(S): 100 INJECTION, SOLUTION SUBCUTANEOUS at 15:57

## 2023-08-05 RX ADMIN — SODIUM CHLORIDE 1000 MILLILITER(S): 9 INJECTION, SOLUTION INTRAVENOUS at 01:30

## 2023-08-05 RX ADMIN — Medication 12.5 MILLILITER(S): at 03:28

## 2023-08-05 RX ADMIN — Medication 100 MILLIGRAM(S): at 21:41

## 2023-08-05 RX ADMIN — Medication 1000 MILLIGRAM(S): at 06:52

## 2023-08-05 RX ADMIN — Medication 50 MILLIEQUIVALENT(S): at 01:30

## 2023-08-05 RX ADMIN — Medication 50 MILLIEQUIVALENT(S): at 06:38

## 2023-08-05 NOTE — PROGRESS NOTE ADULT - SUBJECTIVE AND OBJECTIVE BOX
Patient is a 88y old  Male who presents with a chief complaint of DKA/HHS (04 Aug 2023 15:16)        Over Night Events:        ROS:     All ROS are negative except HPI         PHYSICAL EXAM    ICU Vital Signs Last 24 Hrs  T(C): 38.1 (05 Aug 2023 06:00), Max: 38.1 (05 Aug 2023 06:00)  T(F): 100.5 (05 Aug 2023 06:00), Max: 100.5 (05 Aug 2023 06:00)  HR: 107 (05 Aug 2023 07:00) (77 - 124)  BP: 70/46 (05 Aug 2023 07:00) (59/41 - 145/76)  BP(mean): 54 (05 Aug 2023 07:00) (43 - 100)  ABP: --  ABP(mean): --  RR: 20 (05 Aug 2023 07:00) (18 - 36)  SpO2: 96% (05 Aug 2023 07:00) (56% - 100%)    O2 Parameters below as of 05 Aug 2023 04:00  Patient On (Oxygen Delivery Method): room air            CONSTITUTIONAL:  Well nourished.  NAD    ENT:   Airway patent,   Mouth with normal mucosa.   No thrush    EYES:   Pupils equal,   Round and reactive to light.    CARDIAC:   Normal rate,   Regular rhythm.    No edema      Vascular:  Normal systolic impulse  No Carotid bruits    RESPIRATORY:   No wheezing  Bilateral BS  Normal chest expansion  Not tachypneic,  No use of accessory muscles    GASTROINTESTINAL:  Abdomen soft,   Non-tender,   No guarding,   + BS    MUSCULOSKELETAL:   Range of motion is not limited,  No clubbing, cyanosis    NEUROLOGICAL:   Alert and oriented   No motor  deficits.    SKIN:   Skin normal color for race,   Warm and dry and intact.   No evidence of rash.    PSYCHIATRIC:   Normal mood and affect.   No apparent risk to self or others.    HEMATOLOGICAL:  No cervical  lymphadenopathy.  no inguinal lymphadenopathy      08-04-23 @ 07:01  -  08-05-23 @ 07:00  --------------------------------------------------------  IN:    Insulin: 49 mL    Insulin: 10 mL    Insulin: 10 mL    Insulin: 10 mL    IV PiggyBack: 50 mL    Lactated Ringers Bolus: 3100 mL    Sodium Chloride 0.9% Bolus: 1000 mL  Total IN: 4229 mL    OUT:    Indwelling Catheter - Urethral (mL): 1145 mL  Total OUT: 1145 mL    Total NET: 3084 mL          LABS:                            11.3   14.10 )-----------( 149      ( 04 Aug 2023 14:23 )             33.4                                               08-05    143  |  111<H>  |  41<H>  ----------------------------<  210<H>  4.9   |  19  |  1.2    Ca    8.2<L>      05 Aug 2023 06:00  Mg     1.9     08-05    TPro  5.1<L>  /  Alb  2.7<L>  /  TBili  0.5  /  DBili  x   /  AST  25  /  ALT  11  /  AlkPhos  70  08-05      PT/INR - ( 04 Aug 2023 10:50 )   PT: 11.20 sec;   INR: 0.98 ratio         PTT - ( 04 Aug 2023 10:50 )  PTT:24.9 sec                                       Urinalysis Basic - ( 05 Aug 2023 06:00 )    Color: x / Appearance: x / SG: x / pH: x  Gluc: 210 mg/dL / Ketone: x  / Bili: x / Urobili: x   Blood: x / Protein: x / Nitrite: x   Leuk Esterase: x / RBC: x / WBC x   Sq Epi: x / Non Sq Epi: x / Bacteria: x        CARDIAC MARKERS ( 04 Aug 2023 23:05 )  x     / 0.02 ng/mL / x     / x     / x                                                LIVER FUNCTIONS - ( 05 Aug 2023 06:00 )  Alb: 2.7 g/dL / Pro: 5.1 g/dL / ALK PHOS: 70 U/L / ALT: 11 U/L / AST: 25 U/L / GGT: x                                                                                                                                   ABG - ( 04 Aug 2023 11:23 )  pH, Arterial: 7.30  pH, Blood: x     /  pCO2: 24    /  pO2: 71    / HCO3: 12    / Base Excess: -12.9 /  SaO2: 93.9                MEDICATIONS  (STANDING):  allopurinol 300 milliGRAM(s) Oral daily  atorvastatin 10 milliGRAM(s) Oral at bedtime  dextrose 5% + lactated ringers 1000 milliLiter(s) (150 mL/Hr) IV Continuous <Continuous>  heparin   Injectable 5000 Unit(s) SubCutaneous every 12 hours  insulin regular Infusion 5 Unit(s)/Hr (5 mL/Hr) IV Continuous <Continuous>  norepinephrine Infusion 0.05 MICROgram(s)/kG/Min (5.87 mL/Hr) IV Continuous <Continuous>  pantoprazole    Tablet 40 milliGRAM(s) Oral before breakfast    MEDICATIONS  (PRN):      New X-rays reviewed:                                                                                  ECHO    CXR interpreted by me:       Patient is a 88y old  Male who presents with a chief complaint of DKA/HHS (04 Aug 2023 15:16)        Over Night Events:  Febrile overnight.  On insulin gtt and Levo 0.06.        ROS:     All ROS are negative except HPI         PHYSICAL EXAM    ICU Vital Signs Last 24 Hrs  T(C): 38.1 (05 Aug 2023 06:00), Max: 38.1 (05 Aug 2023 06:00)  T(F): 100.5 (05 Aug 2023 06:00), Max: 100.5 (05 Aug 2023 06:00)  HR: 107 (05 Aug 2023 07:00) (77 - 124)  BP: 70/46 (05 Aug 2023 07:00) (59/41 - 145/76)  BP(mean): 54 (05 Aug 2023 07:00) (43 - 100)  ABP: --  ABP(mean): --  RR: 20 (05 Aug 2023 07:00) (18 - 36)  SpO2: 96% (05 Aug 2023 07:00) (56% - 100%)    O2 Parameters below as of 05 Aug 2023 04:00  Patient On (Oxygen Delivery Method): room air            CONSTITUTIONAL:  NAD    ENT:   Airway patent,       EYES:   Pupils equal,     CARDIAC:   Normal rate,   Regular rhythm.    No edema    RESPIRATORY:   No wheezing  Bilateral BS  Normal chest expansion  Not tachypneic,  No use of accessory muscles    GASTROINTESTINAL:  Abdomen soft,   Non-tender,   No guarding,   + BS    MUSCULOSKELETAL:   Range of motion is not limited,      NEUROLOGICAL:   No motor  deficits.    SKIN:   Skin normal color for race,   Warm and dry and intact.   No evidence of rash.        08-04-23 @ 07:01  -  08-05-23 @ 07:00  --------------------------------------------------------  IN:    Insulin: 49 mL    Insulin: 10 mL    Insulin: 10 mL    Insulin: 10 mL    IV PiggyBack: 50 mL    Lactated Ringers Bolus: 3100 mL    Sodium Chloride 0.9% Bolus: 1000 mL  Total IN: 4229 mL    OUT:    Indwelling Catheter - Urethral (mL): 1145 mL  Total OUT: 1145 mL    Total NET: 3084 mL          LABS:                            11.3   14.10 )-----------( 149      ( 04 Aug 2023 14:23 )             33.4                                               08-05    143  |  111<H>  |  41<H>  ----------------------------<  210<H>  4.9   |  19  |  1.2    Ca    8.2<L>      05 Aug 2023 06:00  Mg     1.9     08-05    TPro  5.1<L>  /  Alb  2.7<L>  /  TBili  0.5  /  DBili  x   /  AST  25  /  ALT  11  /  AlkPhos  70  08-05      PT/INR - ( 04 Aug 2023 10:50 )   PT: 11.20 sec;   INR: 0.98 ratio         PTT - ( 04 Aug 2023 10:50 )  PTT:24.9 sec                                       Urinalysis Basic - ( 05 Aug 2023 06:00 )    Color: x / Appearance: x / SG: x / pH: x  Gluc: 210 mg/dL / Ketone: x  / Bili: x / Urobili: x   Blood: x / Protein: x / Nitrite: x   Leuk Esterase: x / RBC: x / WBC x   Sq Epi: x / Non Sq Epi: x / Bacteria: x        CARDIAC MARKERS ( 04 Aug 2023 23:05 )  x     / 0.02 ng/mL / x     / x     / x                                                LIVER FUNCTIONS - ( 05 Aug 2023 06:00 )  Alb: 2.7 g/dL / Pro: 5.1 g/dL / ALK PHOS: 70 U/L / ALT: 11 U/L / AST: 25 U/L / GGT: x                                                                                                                                   ABG - ( 04 Aug 2023 11:23 )  pH, Arterial: 7.30  pH, Blood: x     /  pCO2: 24    /  pO2: 71    / HCO3: 12    / Base Excess: -12.9 /  SaO2: 93.9                MEDICATIONS  (STANDING):  allopurinol 300 milliGRAM(s) Oral daily  atorvastatin 10 milliGRAM(s) Oral at bedtime  dextrose 5% + lactated ringers 1000 milliLiter(s) (150 mL/Hr) IV Continuous <Continuous>  heparin   Injectable 5000 Unit(s) SubCutaneous every 12 hours  insulin regular Infusion 5 Unit(s)/Hr (5 mL/Hr) IV Continuous <Continuous>  norepinephrine Infusion 0.05 MICROgram(s)/kG/Min (5.87 mL/Hr) IV Continuous <Continuous>  pantoprazole    Tablet 40 milliGRAM(s) Oral before breakfast    MEDICATIONS  (PRN):      New X-rays reviewed:                                                                                  ECHO    CXR interpreted by me:

## 2023-08-05 NOTE — PROGRESS NOTE ADULT - SUBJECTIVE AND OBJECTIVE BOX
Patient is a 88y old  Male who presents with a chief complaint of DKA/HHS (05 Aug 2023 07:25)      INTERVAL HPI/OVERNIGHT EVENTS:   Febrile overnight w/ temp 100.5. Insulin was stopped due to hypoglycemia (resume insulin once glucose levels rises).    ROS:  All negative aside what is stated in HPI.    ICU Vital Signs Last 24 Hrs  T(C): 37.2 (05 Aug 2023 08:00), Max: 38.1 (05 Aug 2023 06:00)  T(F): 99 (05 Aug 2023 08:00), Max: 100.5 (05 Aug 2023 06:00)  HR: 106 (05 Aug 2023 09:00) (87 - 129)  BP: 107/59 (05 Aug 2023 09:00) (60/35 - 145/76)  BP(mean): 79 (05 Aug 2023 09:00) (43 - 100)  ABP: --  ABP(mean): --  RR: 24 (05 Aug 2023 09:00) (19 - 36)  SpO2: 98% (05 Aug 2023 09:00) (56% - 100%)    O2 Parameters below as of 05 Aug 2023 08:00  Patient On (Oxygen Delivery Method): nasal cannula  O2 Flow (L/min): 2        I&O's Summary    04 Aug 2023 07:01  -  05 Aug 2023 07:00  --------------------------------------------------------  IN: 4229 mL / OUT: 1145 mL / NET: 3084 mL    05 Aug 2023 07:01  -  05 Aug 2023 10:20  --------------------------------------------------------  IN: 1564.2 mL / OUT: 115 mL / NET: 1449.2 mL          LABS:                        9.5    12.93 )-----------( 137      ( 05 Aug 2023 07:20 )             28.0     08-05    143  |  111<H>  |  41<H>  ----------------------------<  210<H>  4.9   |  19  |  1.2    Ca    8.2<L>      05 Aug 2023 06:00  Mg     1.9     08-05    TPro  5.1<L>  /  Alb  2.7<L>  /  TBili  0.5  /  DBili  x   /  AST  25  /  ALT  11  /  AlkPhos  70  08-05    PT/INR - ( 04 Aug 2023 10:50 )   PT: 11.20 sec;   INR: 0.98 ratio         PTT - ( 04 Aug 2023 10:50 )  PTT:24.9 sec  Urinalysis Basic - ( 05 Aug 2023 06:00 )    Color: x / Appearance: x / SG: x / pH: x  Gluc: 210 mg/dL / Ketone: x  / Bili: x / Urobili: x   Blood: x / Protein: x / Nitrite: x   Leuk Esterase: x / RBC: x / WBC x   Sq Epi: x / Non Sq Epi: x / Bacteria: x      CAPILLARY BLOOD GLUCOSE      POCT Blood Glucose.: 123 mg/dL (05 Aug 2023 05:04)  POCT Blood Glucose.: 113 mg/dL (05 Aug 2023 04:25)  POCT Blood Glucose.: 94 mg/dL (05 Aug 2023 03:57)  POCT Blood Glucose.: 77 mg/dL (05 Aug 2023 03:19)  POCT Blood Glucose.: 166 mg/dL (05 Aug 2023 02:04)  POCT Blood Glucose.: 236 mg/dL (05 Aug 2023 01:10)  POCT Blood Glucose.: 107 mg/dL (05 Aug 2023 00:09)  POCT Blood Glucose.: 157 mg/dL (04 Aug 2023 23:19)  POCT Blood Glucose.: 103 mg/dL (04 Aug 2023 22:22)  POCT Blood Glucose.: 194 mg/dL (04 Aug 2023 21:04)  POCT Blood Glucose.: 254 mg/dL (04 Aug 2023 19:34)  POCT Blood Glucose.: 244 mg/dL (04 Aug 2023 18:30)  POCT Blood Glucose.: 299 mg/dL (04 Aug 2023 18:04)  POCT Blood Glucose.: 373 mg/dL (04 Aug 2023 16:58)  POCT Blood Glucose.: 481 mg/dL (04 Aug 2023 16:03)  POCT Blood Glucose.: 576 mg/dL (04 Aug 2023 15:01)  POCT Blood Glucose.: >600 mg/dL (04 Aug 2023 14:08)  POCT Blood Glucose.: >600 mg/dL (04 Aug 2023 13:00)  POCT Blood Glucose.: >600 mg/dL (04 Aug 2023 12:09)  POCT Blood Glucose.: >600 mg/dL (04 Aug 2023 10:26)    ABG - ( 04 Aug 2023 11:23 )  pH, Arterial: 7.30  pH, Blood: x     /  pCO2: 24    /  pO2: 71    / HCO3: 12    / Base Excess: -12.9 /  SaO2: 93.9      RADIOLOGY & ADDITIONAL TESTS:    Consultant(s) Notes Reviewed:  [x ] YES  [ ] NO    MEDICATIONS  (STANDING):  allopurinol 300 milliGRAM(s) Oral daily  atorvastatin 10 milliGRAM(s) Oral at bedtime  cefepime   IVPB 2000 milliGRAM(s) IV Intermittent once  cefepime   IVPB 2000 milliGRAM(s) IV Intermittent every 12 hours  cefepime   IVPB      dextrose 5% + lactated ringers 1000 milliLiter(s) (150 mL/Hr) IV Continuous <Continuous>  heparin   Injectable 5000 Unit(s) SubCutaneous every 12 hours  insulin regular Infusion 5 Unit(s)/Hr (5 mL/Hr) IV Continuous <Continuous>  norepinephrine Infusion 0.05 MICROgram(s)/kG/Min (5.87 mL/Hr) IV Continuous <Continuous>  pantoprazole    Tablet 40 milliGRAM(s) Oral before breakfast    MEDICATIONS  (PRN):    PHYSICAL EXAM:  GENERAL:   HEAD:  Atraumatic, Normocephalic  EYES: EOMI, PERRLA, conjunctiva and sclera clear  NECK: Supple, No JVD, Normal thyroid, no enlarged nodes  NERVOUS SYSTEM:  Alert & Awake.   CHEST/LUNG: Few episodes of increase work when breathing w/ use abdominal muscles B/L good air entry; No rales, rhonchi, or wheezing  HEART: S1S2 normal, no S3, Regular rate and rhythm; No murmurs  ABDOMEN: Soft, Nontender, Nondistended; Bowel sounds present  EXTREMITIES:  2+ Peripheral Pulses, No clubbing, cyanosis, or edema  LYMPH: No lymphadenopathy noted  SKIN: No rashes or lesions    Care Discussed with Consultants/Other Providers [ x] YES  [ ] NO

## 2023-08-05 NOTE — PROGRESS NOTE ADULT - ASSESSMENT
Assessment	  IMPRESSION:  DKA/HHS, resolved  Febrile, unknown source  HO DM  HO HTN  Abdominal aortic aneurysm  Parkinson disease  ?dementia      CARDIOVASCULAR  # FU ECHO.  1LR bolus.  [place].    PULMONARY  #  HOB @ 45 degrees.  Aspiration precautions.       GASTROINTESTINAL  #  RENAL  #  Follow up lytes.  Correct as needed    Neurology  # avoid sedation    INFECTIOUS DISEASE  #Follow up cultures, Procalcitonin, UA.  Monitor off Abx.  Empiric coverage if spikes another fever.    ENDOCRINE  # Follow up FS.  Insulin protocol, transition to basal/bolus once feeding.  Endocrine eval.     HEME  # DVT prophylaxis. FU Duplex.    MUSCULOSKELETAL  # OOBC.  PT eval.    FLUIDS/ELECTROLYTES/NUTRITION  -IVF  -Monitor, Replete to K>4 and Mg>2    DISPO -  DGTF once off pressors and feeding.

## 2023-08-05 NOTE — GOALS OF CARE CONVERSATION - ADVANCED CARE PLANNING - CONVERSATION DETAILS
The patient's family (his wife and 3 daughters) were at the bedside.  I explained to them the situation and the plan.   We discussed goals of care and after they understood the advantages and disadvantages of resuscitation and intubation; they all agreed that "they don't want him to suffer, and they prefer to set the code as DNR/DNI".   They want full medical management (including NG The patient's family (his wife and 3 daughters) were at the bedside.  I explained to them the situation and the plan.   We discussed goals of care and after they understood the advantages and disadvantages of resuscitation and intubation; they all agreed that "they don't want him to suffer, and they prefer to set the code as DNR/DNI".   They want full medical management (including NG and central line)

## 2023-08-05 NOTE — PROGRESS NOTE ADULT - ASSESSMENT
IMPRESSION:  DKA/HHS  HO DM  HO HTN  Abdominal aortic aneurysm  Parkinson disease  ?dementia      PLAN:      CNS: avoid sedation    HEENT: Oral care    PULMONARY:  HOB @ 45 degrees.  Aspiration precautions,     CARDIOVASCULAR: MAP adequate,  cc/hr, CE, ECHO    GI: GI prophylaxis. NPO for now, lipase    RENAL:  Follow up lytes.  Correct as needed    INFECTIOUS DISEASE: Follow up cultures, procalcitonin, ua,     HEMATOLOGICAL:  DVT prophylaxis. DIMER    ENDOCRINE:  Follow up FS.  Insulin protocol, endocrine    MUSCULOSKELETAL: bedrest    MICU             IMPRESSION:  DKA/HHS, resolved  Febrile, unknown source  HO DM  HO HTN  Abdominal aortic aneurysm  Parkinson disease  ?dementia      PLAN:      CNS: avoid sedation    HEENT: Oral care    PULMONARY:  HOB @ 45 degrees.  Aspiration precautions.       CARDIOVASCULAR: Wean pressors.  FU ECHO.  1LR bolus.  [place]et[place].    GI: GI prophylaxis. NPO for sp and swallow.  NGT placement. Ct abd and Lipase noted neg.    RENAL:  Follow up lytes.  Correct as needed    INFECTIOUS DISEASE: Follow up cultures, Procalcitonin, UA.  Monitor off Abx.  Empiric coverage if spikes another fever.    HEMATOLOGICAL:  DVT prophylaxis. FU Duplex.    ENDOCRINE:  Follow up FS.  Insulin protocol, transition to basal/bolus once feeding.  Endocrine eval.     MUSCULOSKELETAL: OOBC.  PT eval.    DGTF once off pressors and feeding.

## 2023-08-06 LAB
-  BLOOD PCR PANEL: SIGNIFICANT CHANGE UP
ALBUMIN SERPL ELPH-MCNC: 2.1 G/DL — LOW (ref 3.5–5.2)
ALP SERPL-CCNC: 54 U/L — SIGNIFICANT CHANGE UP (ref 30–115)
ALT FLD-CCNC: 13 U/L — SIGNIFICANT CHANGE UP (ref 0–41)
ANION GAP SERPL CALC-SCNC: 10 MMOL/L — SIGNIFICANT CHANGE UP (ref 7–14)
ANION GAP SERPL CALC-SCNC: 8 MMOL/L — SIGNIFICANT CHANGE UP (ref 7–14)
AST SERPL-CCNC: 28 U/L — SIGNIFICANT CHANGE UP (ref 0–41)
BASOPHILS # BLD AUTO: 0.01 K/UL — SIGNIFICANT CHANGE UP (ref 0–0.2)
BASOPHILS # BLD AUTO: 0.01 K/UL — SIGNIFICANT CHANGE UP (ref 0–0.2)
BASOPHILS NFR BLD AUTO: 0.1 % — SIGNIFICANT CHANGE UP (ref 0–1)
BASOPHILS NFR BLD AUTO: 0.1 % — SIGNIFICANT CHANGE UP (ref 0–1)
BILIRUB SERPL-MCNC: 0.3 MG/DL — SIGNIFICANT CHANGE UP (ref 0.2–1.2)
BUN SERPL-MCNC: 35 MG/DL — HIGH (ref 10–20)
BUN SERPL-MCNC: 37 MG/DL — HIGH (ref 10–20)
CALCIUM SERPL-MCNC: 7.7 MG/DL — LOW (ref 8.4–10.5)
CALCIUM SERPL-MCNC: 8 MG/DL — LOW (ref 8.4–10.4)
CHLORIDE SERPL-SCNC: 115 MMOL/L — HIGH (ref 98–110)
CHLORIDE SERPL-SCNC: 117 MMOL/L — HIGH (ref 98–110)
CO2 SERPL-SCNC: 24 MMOL/L — SIGNIFICANT CHANGE UP (ref 17–32)
CO2 SERPL-SCNC: 25 MMOL/L — SIGNIFICANT CHANGE UP (ref 17–32)
CREAT SERPL-MCNC: 1.3 MG/DL — SIGNIFICANT CHANGE UP (ref 0.7–1.5)
CREAT SERPL-MCNC: 1.3 MG/DL — SIGNIFICANT CHANGE UP (ref 0.7–1.5)
EGFR: 53 ML/MIN/1.73M2 — LOW
EGFR: 53 ML/MIN/1.73M2 — LOW
EOSINOPHIL # BLD AUTO: 0 K/UL — SIGNIFICANT CHANGE UP (ref 0–0.7)
EOSINOPHIL # BLD AUTO: 0.01 K/UL — SIGNIFICANT CHANGE UP (ref 0–0.7)
EOSINOPHIL NFR BLD AUTO: 0 % — SIGNIFICANT CHANGE UP (ref 0–8)
EOSINOPHIL NFR BLD AUTO: 0.1 % — SIGNIFICANT CHANGE UP (ref 0–8)
GLUCOSE BLDC GLUCOMTR-MCNC: 106 MG/DL — HIGH (ref 70–99)
GLUCOSE BLDC GLUCOMTR-MCNC: 108 MG/DL — HIGH (ref 70–99)
GLUCOSE BLDC GLUCOMTR-MCNC: 112 MG/DL — HIGH (ref 70–99)
GLUCOSE BLDC GLUCOMTR-MCNC: 125 MG/DL — HIGH (ref 70–99)
GLUCOSE BLDC GLUCOMTR-MCNC: 138 MG/DL — HIGH (ref 70–99)
GLUCOSE BLDC GLUCOMTR-MCNC: 185 MG/DL — HIGH (ref 70–99)
GLUCOSE BLDC GLUCOMTR-MCNC: 196 MG/DL — HIGH (ref 70–99)
GLUCOSE BLDC GLUCOMTR-MCNC: 216 MG/DL — HIGH (ref 70–99)
GLUCOSE BLDC GLUCOMTR-MCNC: 291 MG/DL — HIGH (ref 70–99)
GLUCOSE BLDC GLUCOMTR-MCNC: 55 MG/DL — LOW (ref 70–99)
GLUCOSE BLDC GLUCOMTR-MCNC: 66 MG/DL — LOW (ref 70–99)
GLUCOSE BLDC GLUCOMTR-MCNC: 68 MG/DL — LOW (ref 70–99)
GLUCOSE BLDC GLUCOMTR-MCNC: 79 MG/DL — SIGNIFICANT CHANGE UP (ref 70–99)
GLUCOSE BLDC GLUCOMTR-MCNC: 82 MG/DL — SIGNIFICANT CHANGE UP (ref 70–99)
GLUCOSE BLDC GLUCOMTR-MCNC: 87 MG/DL — SIGNIFICANT CHANGE UP (ref 70–99)
GLUCOSE BLDC GLUCOMTR-MCNC: 90 MG/DL — SIGNIFICANT CHANGE UP (ref 70–99)
GLUCOSE BLDC GLUCOMTR-MCNC: 92 MG/DL — SIGNIFICANT CHANGE UP (ref 70–99)
GLUCOSE SERPL-MCNC: 84 MG/DL — SIGNIFICANT CHANGE UP (ref 70–99)
GLUCOSE SERPL-MCNC: 84 MG/DL — SIGNIFICANT CHANGE UP (ref 70–99)
GRAM STN FLD: SIGNIFICANT CHANGE UP
HCT VFR BLD CALC: 21.3 % — LOW (ref 42–52)
HCT VFR BLD CALC: 23.1 % — LOW (ref 42–52)
HGB BLD-MCNC: 7 G/DL — LOW (ref 14–18)
HGB BLD-MCNC: 7.9 G/DL — LOW (ref 14–18)
IMM GRANULOCYTES NFR BLD AUTO: 0.6 % — HIGH (ref 0.1–0.3)
IMM GRANULOCYTES NFR BLD AUTO: 0.7 % — HIGH (ref 0.1–0.3)
IRON SATN MFR SERPL: 15 UG/DL — LOW (ref 35–150)
IRON SATN MFR SERPL: 16 % — SIGNIFICANT CHANGE UP (ref 15–50)
LACTATE SERPL-SCNC: 3.4 MMOL/L — HIGH (ref 0.7–2)
LYMPHOCYTES # BLD AUTO: 1.61 K/UL — SIGNIFICANT CHANGE UP (ref 1.2–3.4)
LYMPHOCYTES # BLD AUTO: 1.82 K/UL — SIGNIFICANT CHANGE UP (ref 1.2–3.4)
LYMPHOCYTES # BLD AUTO: 15 % — LOW (ref 20.5–51.1)
LYMPHOCYTES # BLD AUTO: 16.1 % — LOW (ref 20.5–51.1)
MAGNESIUM SERPL-MCNC: 1.8 MG/DL — SIGNIFICANT CHANGE UP (ref 1.8–2.4)
MCHC RBC-ENTMCNC: 30.6 PG — SIGNIFICANT CHANGE UP (ref 27–31)
MCHC RBC-ENTMCNC: 31.7 PG — HIGH (ref 27–31)
MCHC RBC-ENTMCNC: 32.9 G/DL — SIGNIFICANT CHANGE UP (ref 32–37)
MCHC RBC-ENTMCNC: 34.2 G/DL — SIGNIFICANT CHANGE UP (ref 32–37)
MCV RBC AUTO: 92.8 FL — SIGNIFICANT CHANGE UP (ref 80–94)
MCV RBC AUTO: 93 FL — SIGNIFICANT CHANGE UP (ref 80–94)
METHOD TYPE: SIGNIFICANT CHANGE UP
MONOCYTES # BLD AUTO: 0.37 K/UL — SIGNIFICANT CHANGE UP (ref 0.1–0.6)
MONOCYTES # BLD AUTO: 0.59 K/UL — SIGNIFICANT CHANGE UP (ref 0.1–0.6)
MONOCYTES NFR BLD AUTO: 3.5 % — SIGNIFICANT CHANGE UP (ref 1.7–9.3)
MONOCYTES NFR BLD AUTO: 5.2 % — SIGNIFICANT CHANGE UP (ref 1.7–9.3)
NEUTROPHILS # BLD AUTO: 8.62 K/UL — HIGH (ref 1.4–6.5)
NEUTROPHILS # BLD AUTO: 8.8 K/UL — HIGH (ref 1.4–6.5)
NEUTROPHILS NFR BLD AUTO: 78 % — HIGH (ref 42.2–75.2)
NEUTROPHILS NFR BLD AUTO: 80.6 % — HIGH (ref 42.2–75.2)
NRBC # BLD: 1 /100 WBCS — HIGH (ref 0–0)
NRBC # BLD: 2 /100 WBCS — HIGH (ref 0–0)
PHOSPHATE SERPL-MCNC: 0.8 MG/DL — LOW (ref 2.1–4.9)
PLATELET # BLD AUTO: 124 K/UL — LOW (ref 130–400)
PLATELET # BLD AUTO: 130 K/UL — SIGNIFICANT CHANGE UP (ref 130–400)
PMV BLD: 13.1 FL — HIGH (ref 7.4–10.4)
PMV BLD: 13.8 FL — HIGH (ref 7.4–10.4)
POTASSIUM SERPL-MCNC: 4.1 MMOL/L — SIGNIFICANT CHANGE UP (ref 3.5–5)
POTASSIUM SERPL-MCNC: 4.3 MMOL/L — SIGNIFICANT CHANGE UP (ref 3.5–5)
POTASSIUM SERPL-SCNC: 4.1 MMOL/L — SIGNIFICANT CHANGE UP (ref 3.5–5)
POTASSIUM SERPL-SCNC: 4.3 MMOL/L — SIGNIFICANT CHANGE UP (ref 3.5–5)
PROT SERPL-MCNC: 4.4 G/DL — LOW (ref 6–8)
RBC # BLD: 2.25 M/UL — LOW (ref 4.7–6.1)
RBC # BLD: 2.29 M/UL — LOW (ref 4.7–6.1)
RBC # BLD: 2.49 M/UL — LOW (ref 4.7–6.1)
RBC # FLD: 13.2 % — SIGNIFICANT CHANGE UP (ref 11.5–14.5)
RBC # FLD: 13.2 % — SIGNIFICANT CHANGE UP (ref 11.5–14.5)
RETICS #: 60.8 K/UL — SIGNIFICANT CHANGE UP (ref 25–125)
RETICS/RBC NFR: 2.7 % — HIGH (ref 0.5–1.5)
SODIUM SERPL-SCNC: 149 MMOL/L — HIGH (ref 135–146)
SODIUM SERPL-SCNC: 150 MMOL/L — HIGH (ref 135–146)
TIBC SERPL-MCNC: 96 UG/DL — LOW (ref 220–430)
TRANSFERRIN SERPL-MCNC: 75 MG/DL — LOW (ref 200–360)
UIBC SERPL-MCNC: 81 UG/DL — LOW (ref 110–370)
WBC # BLD: 10.7 K/UL — SIGNIFICANT CHANGE UP (ref 4.8–10.8)
WBC # BLD: 11.29 K/UL — HIGH (ref 4.8–10.8)
WBC # FLD AUTO: 10.7 K/UL — SIGNIFICANT CHANGE UP (ref 4.8–10.8)
WBC # FLD AUTO: 11.29 K/UL — HIGH (ref 4.8–10.8)

## 2023-08-06 PROCEDURE — 93306 TTE W/DOPPLER COMPLETE: CPT | Mod: 26

## 2023-08-06 PROCEDURE — 71045 X-RAY EXAM CHEST 1 VIEW: CPT | Mod: 26

## 2023-08-06 RX ORDER — DEXTROSE 50 % IN WATER 50 %
25 SYRINGE (ML) INTRAVENOUS ONCE
Refills: 0 | Status: COMPLETED | OUTPATIENT
Start: 2023-08-06 | End: 2023-08-06

## 2023-08-06 RX ORDER — DEXTROSE 50 % IN WATER 50 %
25 SYRINGE (ML) INTRAVENOUS ONCE
Refills: 0 | Status: DISCONTINUED | OUTPATIENT
Start: 2023-08-06 | End: 2023-08-10

## 2023-08-06 RX ORDER — SODIUM CHLORIDE 9 MG/ML
1000 INJECTION, SOLUTION INTRAVENOUS
Refills: 0 | Status: DISCONTINUED | OUTPATIENT
Start: 2023-08-06 | End: 2023-08-10

## 2023-08-06 RX ORDER — SODIUM CHLORIDE 9 MG/ML
250 INJECTION, SOLUTION INTRAVENOUS ONCE
Refills: 0 | Status: COMPLETED | OUTPATIENT
Start: 2023-08-06 | End: 2023-08-06

## 2023-08-06 RX ORDER — DEXTROSE 50 % IN WATER 50 %
15 SYRINGE (ML) INTRAVENOUS ONCE
Refills: 0 | Status: DISCONTINUED | OUTPATIENT
Start: 2023-08-06 | End: 2023-08-10

## 2023-08-06 RX ORDER — GLUCAGON INJECTION, SOLUTION 0.5 MG/.1ML
1 INJECTION, SOLUTION SUBCUTANEOUS ONCE
Refills: 0 | Status: DISCONTINUED | OUTPATIENT
Start: 2023-08-06 | End: 2023-08-10

## 2023-08-06 RX ORDER — INSULIN GLARGINE 100 [IU]/ML
15 INJECTION, SOLUTION SUBCUTANEOUS AT BEDTIME
Refills: 0 | Status: DISCONTINUED | OUTPATIENT
Start: 2023-08-06 | End: 2023-08-08

## 2023-08-06 RX ORDER — ACETAMINOPHEN 500 MG
650 TABLET ORAL EVERY 6 HOURS
Refills: 0 | Status: DISCONTINUED | OUTPATIENT
Start: 2023-08-06 | End: 2023-08-10

## 2023-08-06 RX ORDER — INSULIN LISPRO 100/ML
5 VIAL (ML) SUBCUTANEOUS
Refills: 0 | Status: DISCONTINUED | OUTPATIENT
Start: 2023-08-06 | End: 2023-08-08

## 2023-08-06 RX ORDER — INSULIN LISPRO 100/ML
VIAL (ML) SUBCUTANEOUS
Refills: 0 | Status: DISCONTINUED | OUTPATIENT
Start: 2023-08-06 | End: 2023-08-08

## 2023-08-06 RX ORDER — SODIUM CHLORIDE 9 MG/ML
1000 INJECTION, SOLUTION INTRAVENOUS
Refills: 0 | Status: DISCONTINUED | OUTPATIENT
Start: 2023-08-06 | End: 2023-08-06

## 2023-08-06 RX ORDER — POTASSIUM PHOSPHATE, MONOBASIC POTASSIUM PHOSPHATE, DIBASIC 236; 224 MG/ML; MG/ML
15 INJECTION, SOLUTION INTRAVENOUS ONCE
Refills: 0 | Status: COMPLETED | OUTPATIENT
Start: 2023-08-06 | End: 2023-08-06

## 2023-08-06 RX ADMIN — POTASSIUM PHOSPHATE, MONOBASIC POTASSIUM PHOSPHATE, DIBASIC 63.75 MILLIMOLE(S): 236; 224 INJECTION, SOLUTION INTRAVENOUS at 12:21

## 2023-08-06 RX ADMIN — PANTOPRAZOLE SODIUM 40 MILLIGRAM(S): 20 TABLET, DELAYED RELEASE ORAL at 05:09

## 2023-08-06 RX ADMIN — Medication 650 MILLIGRAM(S): at 21:34

## 2023-08-06 RX ADMIN — CEFEPIME 100 MILLIGRAM(S): 1 INJECTION, POWDER, FOR SOLUTION INTRAMUSCULAR; INTRAVENOUS at 05:10

## 2023-08-06 RX ADMIN — Medication 4: at 21:25

## 2023-08-06 RX ADMIN — Medication 300 MILLIGRAM(S): at 12:21

## 2023-08-06 RX ADMIN — Medication 5.87 MICROGRAM(S)/KG/MIN: at 17:52

## 2023-08-06 RX ADMIN — SODIUM CHLORIDE 150 MILLILITER(S): 9 INJECTION, SOLUTION INTRAVENOUS at 17:52

## 2023-08-06 RX ADMIN — CEFEPIME 100 MILLIGRAM(S): 1 INJECTION, POWDER, FOR SOLUTION INTRAMUSCULAR; INTRAVENOUS at 17:52

## 2023-08-06 RX ADMIN — Medication 650 MILLIGRAM(S): at 22:07

## 2023-08-06 RX ADMIN — Medication 25 GRAM(S): at 18:12

## 2023-08-06 RX ADMIN — INSULIN GLARGINE 15 UNIT(S): 100 INJECTION, SOLUTION SUBCUTANEOUS at 21:34

## 2023-08-06 RX ADMIN — Medication 100 MILLIGRAM(S): at 05:09

## 2023-08-06 RX ADMIN — Medication 100 MILLIGRAM(S): at 21:34

## 2023-08-06 RX ADMIN — Medication 650 MILLIGRAM(S): at 12:32

## 2023-08-06 RX ADMIN — SODIUM CHLORIDE 1500 MILLILITER(S): 9 INJECTION, SOLUTION INTRAVENOUS at 13:00

## 2023-08-06 RX ADMIN — HEPARIN SODIUM 5000 UNIT(S): 5000 INJECTION INTRAVENOUS; SUBCUTANEOUS at 17:58

## 2023-08-06 RX ADMIN — Medication 250 MILLIGRAM(S): at 05:10

## 2023-08-06 RX ADMIN — Medication 650 MILLIGRAM(S): at 12:00

## 2023-08-06 RX ADMIN — Medication 250 MILLIGRAM(S): at 17:52

## 2023-08-06 RX ADMIN — ATORVASTATIN CALCIUM 10 MILLIGRAM(S): 80 TABLET, FILM COATED ORAL at 21:56

## 2023-08-06 RX ADMIN — Medication 100 MILLIGRAM(S): at 15:07

## 2023-08-06 RX ADMIN — HEPARIN SODIUM 5000 UNIT(S): 5000 INJECTION INTRAVENOUS; SUBCUTANEOUS at 05:09

## 2023-08-06 RX ADMIN — Medication 5 UNIT(S): at 21:26

## 2023-08-06 NOTE — DIETITIAN INITIAL EVALUATION ADULT - NSFNSGIIOFT_GEN_A_CORE
I&O's Detail    05 Aug 2023 07:01  -  06 Aug 2023 07:00  --------------------------------------------------------  IN:    dextrose 5% + lactated ringers w/ Additives: 3600 mL    Insulin: 7 mL    Insulin: 10 mL    Insulin: 10 mL    Insulin: 63 mL    Insulin: 77 mL    IV PiggyBack: 750 mL    Lactated Ringers Bolus: 1500 mL    Norepinephrine: 946.6 mL  Total IN: 6963.6 mL    OUT:    Indwelling Catheter - Urethral (mL): 1990 mL    Insulin: 0 mL  Total OUT: 1990 mL    Total NET: 4973.6 mL

## 2023-08-06 NOTE — PROGRESS NOTE ADULT - ASSESSMENT
IMPRESSION:  DKA/HHS, resolved  Gram (-) farhat bacteremia   Septic shock on levo   LLL PNA?  Acute normocytic anemia   HO DM  HO HTN  Abdominal aortic aneurysm  Parkinson disease  ?dementia      PLAN:      CNS: avoid sedation    HEENT: Oral care    PULMONARY:  HOB @ 45 degrees.  Aspiration precautions.     CARDIOVASCULAR: Wean pressors.  FU ECHO.  [place]et[place]. c/w fluid, monitor na    GI: GI prophylaxis. sp and swallow.  NGT placement. Ct abd and Lipase noted neg. tube feedings     RENAL:  Follow up lytes.  Correct as needed, repelte phos, fluid for hypernatremia, repeat lactate     INFECTIOUS DISEASE: Bcx cultures, Procalcitonin, UA.  c/w empiric coverrage, consult ID,     HEMATOLOGICAL:  DVT prophylaxis. FU Duplex. T&S active, repeat CBC in afternoon, iron studies w/ retic     ENDOCRINE:  Follow up FS.  Insulin protocol, transition to basal/bolus once feeding.  Endocrine eval.     MUSCULOSKELETAL: OOBC.  PT eval.    MICU  DNR/DNI  Poor prognosis    IMPRESSION:  DKA/HHS, resolved  Gram (-) farhat bacteremia   Septic shock on levo   LLL PNA?  Acute normocytic anemia   HO DM  HO HTN  Abdominal aortic aneurysm  Parkinson disease  ?dementia      PLAN:      CNS: avoid sedation    HEENT: Oral care    PULMONARY:  HOB @ 45 degrees.  Aspiration precautions.     CARDIOVASCULAR: Wean pressors.  FU ECHO.  [place]. c/w fluid, monitor na    GI: GI prophylaxis. sp and swallow.  NGT placement. Ct abd and Lipase noted neg. tube feedings     RENAL:  Follow up lytes.  Correct as needed, replete phos,   free  water for hypernatremia,   repeat lactate     INFECTIOUS DISEASE: Bcx cultures, Procalcitonin, UA.  c/w empiric coverrage, consult ID,     HEMATOLOGICAL:  DVT prophylaxis. FU Duplex. T&S active, repeat CBC in afternoon, iron studies w/ retic     ENDOCRINE:  Follow up FS.  Insulin protocol, transition to basal/bolus once feeding.  Endocrine eval.     MUSCULOSKELETAL: OOBC.  PT eval.    MICU  DNR/DNI  Poor prognosis

## 2023-08-06 NOTE — DIETITIAN INITIAL EVALUATION ADULT - PERTINENT MEDS FT
MEDICATIONS  (STANDING):  allopurinol 300 milliGRAM(s) Oral daily  atorvastatin 10 milliGRAM(s) Oral at bedtime  cefepime   IVPB 2000 milliGRAM(s) IV Intermittent every 12 hours  dextrose 5% + lactated ringers 1000 milliLiter(s) (150 mL/Hr) IV Continuous <Continuous>  dextrose 50% Injectable 25 Gram(s) IV Push once  heparin   Injectable 5000 Unit(s) SubCutaneous every 12 hours  insulin regular Infusion 5 Unit(s)/Hr (5 mL/Hr) IV Continuous <Continuous>  lactated ringers Bolus 500 milliLiter(s) IV Bolus once  lactated ringers Bolus 250 milliLiter(s) IV Bolus once  metroNIDAZOLE  IVPB 500 milliGRAM(s) IV Intermittent every 8 hours  norepinephrine Infusion 0.05 MICROgram(s)/kG/Min (5.87 mL/Hr) IV Continuous <Continuous>  pantoprazole    Tablet 40 milliGRAM(s) Oral before breakfast  vancomycin  IVPB 1000 milliGRAM(s) IV Intermittent every 12 hours    MEDICATIONS  (PRN):  acetaminophen     Tablet .. 650 milliGRAM(s) Oral every 6 hours PRN Temp greater or equal to 38C (100.4F), Moderate Pain (4 - 6)

## 2023-08-06 NOTE — DIETITIAN INITIAL EVALUATION ADULT - OTHER INFO
Pt presented decreased appetite, increased urinary output and craving sweets x 2 weeks, found to have DKA/HHS (now resolved), Gram (-) farhat bacteremia, Septic shock on levo, LLL PNA? and Acute normocytic anemia.  S+S 8/5 with recs for NPO w/ non-oral means of nutrition/hydration.

## 2023-08-06 NOTE — DIETITIAN INITIAL EVALUATION ADULT - NSFNSGIASSESSMENTFT_GEN_A_CORE
abdomen rounded; no bowel movement documented since admission - no GI issues noted, will cont to monitor

## 2023-08-06 NOTE — DIETITIAN INITIAL EVALUATION ADULT - ENTERAL
Cont with Glucerna 1.2 formula. Switch to bolus feeds 480ml @7AM, 11AM, 5PM (or appropriate times to match insulin administration). Free water flushes 50ml before and after each bolus. -- will provide 1728kcal, 85g protein, 1166+300ml free water.

## 2023-08-06 NOTE — PROGRESS NOTE ADULT - SUBJECTIVE AND OBJECTIVE BOX
24H events:    Patient is a 88y old Male who presents with a chief complaint of DKA/HHS (06 Aug 2023 10:33)  Primary diagnosis of Hyperosmolar syndrome  Today is hospital day 2d. This morning patient was seen and examined at bedside, resting comfortably in bed.    No acute or major events overnight.    Code Status:    Family communication:  Contact date:  Name of person contacted:  Relationship to patient:  Communication details:  What matters most:    PAST MEDICAL & SURGICAL HISTORY  HTN (hypertension)    Gout    BPH (benign prostatic hyperplasia)    Parkinson disease    HLD (hyperlipidemia)    Smoker within last 12 months    Diabetes mellitus    No significant past surgical history      SOCIAL HISTORY:  Social History:      ALLERGIES:  No Known Allergies    MEDICATIONS:  STANDING MEDICATIONS  allopurinol 300 milliGRAM(s) Oral daily  atorvastatin 10 milliGRAM(s) Oral at bedtime  cefepime   IVPB 2000 milliGRAM(s) IV Intermittent every 12 hours  cefepime   IVPB      dextrose 5% + lactated ringers 1000 milliLiter(s) IV Continuous <Continuous>  dextrose 50% Injectable 25 Gram(s) IV Push once  heparin   Injectable 5000 Unit(s) SubCutaneous every 12 hours  insulin regular Infusion 5 Unit(s)/Hr IV Continuous <Continuous>  lactated ringers Bolus 500 milliLiter(s) IV Bolus once  lactated ringers Bolus 250 milliLiter(s) IV Bolus once  metroNIDAZOLE  IVPB      metroNIDAZOLE  IVPB 500 milliGRAM(s) IV Intermittent every 8 hours  norepinephrine Infusion 0.05 MICROgram(s)/kG/Min IV Continuous <Continuous>  pantoprazole    Tablet 40 milliGRAM(s) Oral before breakfast  vancomycin  IVPB 1000 milliGRAM(s) IV Intermittent every 12 hours    PRN MEDICATIONS  acetaminophen     Tablet .. 650 milliGRAM(s) Oral every 6 hours PRN    VITALS:   T(F): 101.9  HR: 111  BP: 101/60  RR: 22  SpO2: 99%    PHYSICAL EXAM:    HEAD:  Atraumatic, Normocephalic  EYES:  conjunctiva and sclera clear  NECK: Supple, No JVD, Normal thyroid, no enlarged nodes  NERVOUS SYSTEM:  Alert and oriented x0   CHEST/LUNG: Few episodes of increase work when breathing w/ use abdominal muscles B/L good air entry; No rales, rhonchi, or wheezing  HEART: S1S2 normal, no S3, Regular rate and rhythm; No murmurs  ABDOMEN: Soft, Nontender, Nondistended; Bowel sounds present  EXTREMITIES: no edema          AMPAC score:    (  ) Indwelling Jones Catheter:   Date insterted:    Reason (  ) Critical illness     (  ) urinary retention    (  ) Accurate Ins/Outs Monitoring     (  ) CMO patient    (  ) Central Line:   Date inserted:  Location: (  ) Right IJ     (  ) Left IJ     (  ) Right Fem     (  ) Left Fem    (  ) SPC        (  ) pigtail       (  ) PEG tube       (  ) colostomy       (  ) jejunostomy  (  ) U-Dall    LABS:                        7.0    10.70 )-----------( 124      ( 06 Aug 2023 11:46 )             21.3     08-06    150<H>  |  117<H>  |  35<H>  ----------------------------<  84  4.1   |  25  |  1.3    Ca    7.7<L>      06 Aug 2023 05:05  Phos  0.8     08-06  Mg     1.8     08-06    TPro  4.4<L>  /  Alb  2.1<L>  /  TBili  0.3  /  DBili  x   /  AST  28  /  ALT  13  /  AlkPhos  54  08-06      Urinalysis Basic - ( 06 Aug 2023 05:05 )    Color: x / Appearance: x / SG: x / pH: x  Gluc: 84 mg/dL / Ketone: x  / Bili: x / Urobili: x   Blood: x / Protein: x / Nitrite: x   Leuk Esterase: x / RBC: x / WBC x   Sq Epi: x / Non Sq Epi: x / Bacteria: x        Lactate, Blood: 3.4 mmol/L *H* (08-06-23 @ 11:17)      Culture - Urine (collected 04 Aug 2023 14:42)  Source: Clean Catch Clean Catch (Midstream)  Final Report (05 Aug 2023 19:52):    No growth    Culture - Blood (collected 04 Aug 2023 11:20)  Source: .Blood Blood-Peripheral  Gram Stain (06 Aug 2023 02:58):    Growth in anaerobic bottle: Gram Negative Rods  Preliminary Report (06 Aug 2023 02:58):    Growth in anaerobic bottle: Gram Negative Rods    Direct identification is available within approximately 3-5    hours either by Blood Panel Multiplexed PCR or Direct    MALDI-TOF. Details: [email or web address]  Organism: Blood Culture PCR (06 Aug 2023 04:32)  Organism: Blood Culture PCR (06 Aug 2023 04:32)    Culture - Blood (collected 04 Aug 2023 11:20)  Source: .Blood Blood-Peripheral  Preliminary Report (05 Aug 2023 23:01):    No growth at 24 hours      CARDIAC MARKERS ( 05 Aug 2023 07:20 )  x     / <0.01 ng/mL / x     / x     / x      CARDIAC MARKERS ( 04 Aug 2023 23:05 )  x     / 0.02 ng/mL / x     / x     / x          RADIOLOGY:

## 2023-08-06 NOTE — PROGRESS NOTE ADULT - SUBJECTIVE AND OBJECTIVE BOX
Patient is a 88y old  Male who presents with a chief complaint of DKA/HHS (05 Aug 2023 10:20)        Over Night Events: restarted on insulin       ROS:     All ROS are negative except HPI       PHYSICAL EXAM    ICU Vital Signs Last 24 Hrs  T(C): 37.2 (06 Aug 2023 08:00), Max: 37.5 (05 Aug 2023 12:00)  T(F): 99 (06 Aug 2023 08:00), Max: 99.5 (05 Aug 2023 12:00)  HR: 117 (06 Aug 2023 09:15) (100 - 139)  BP: 120/56 (06 Aug 2023 09:15) (65/44 - 162/102)  BP(mean): 81 (06 Aug 2023 09:15) (50 - 123)  ABP: --  ABP(mean): --  RR: 27 (06 Aug 2023 09:15) (16 - 37)  SpO2: 94% (06 Aug 2023 09:15) (79% - 100%)    O2 Parameters below as of 06 Aug 2023 09:00  Patient On (Oxygen Delivery Method): room air    CONSTITUTIONAL:  decreased mentation     CARDIAC:   Normal rate,   Regular rhythm.    No edema    RESPIRATORY:   No wheezing  decreased BS BL     GASTROINTESTINAL:  Abdomen soft,   Non-tender,   No guarding,     MUSCULOSKELETAL:   Range of motion is not limited,  No clubbing, cyanosis    NEUROLOGICAL:   Alert and oriented x0  No motor  deficits.    SKIN:   Skin normal color for race,   Warm and dry and intact.   No evidence of rash.          08-05-23 @ 07:01  -  08-06-23 @ 07:00  --------------------------------------------------------  IN:    dextrose 5% + lactated ringers w/ Additives: 3600 mL    Insulin: 7 mL    Insulin: 10 mL    Insulin: 10 mL    Insulin: 63 mL    Insulin: 77 mL    IV PiggyBack: 750 mL    Lactated Ringers Bolus: 1500 mL    Norepinephrine: 946.6 mL  Total IN: 6963.6 mL    OUT:    Indwelling Catheter - Urethral (mL): 1990 mL    Insulin: 0 mL  Total OUT: 1990 mL    Total NET: 4973.6 mL      08-06-23 @ 07:01  -  08-06-23 @ 10:34  --------------------------------------------------------  IN:    dextrose 5% + lactated ringers w/ Additives: 300 mL    Insulin: 3 mL    Norepinephrine: 93.8 mL  Total IN: 396.8 mL    OUT:    Indwelling Catheter - Urethral (mL): 325 mL  Total OUT: 325 mL    Total NET: 71.8 mL          LABS:                            7.9    11.29 )-----------( 130      ( 06 Aug 2023 05:05 )             23.1                                               08-06    150<H>  |  117<H>  |  35<H>  ----------------------------<  84  4.1   |  25  |  1.3    Ca    7.7<L>      06 Aug 2023 05:05  Phos  0.8     08-06  Mg     1.8     08-06    TPro  4.4<L>  /  Alb  2.1<L>  /  TBili  0.3  /  DBili  x   /  AST  28  /  ALT  13  /  AlkPhos  54  08-06      PT/INR - ( 04 Aug 2023 10:50 )   PT: 11.20 sec;   INR: 0.98 ratio         PTT - ( 04 Aug 2023 10:50 )  PTT:24.9 sec                                       Urinalysis Basic - ( 06 Aug 2023 05:05 )    Color: x / Appearance: x / SG: x / pH: x  Gluc: 84 mg/dL / Ketone: x  / Bili: x / Urobili: x   Blood: x / Protein: x / Nitrite: x   Leuk Esterase: x / RBC: x / WBC x   Sq Epi: x / Non Sq Epi: x / Bacteria: x        CARDIAC MARKERS ( 05 Aug 2023 07:20 )  x     / <0.01 ng/mL / x     / x     / x      CARDIAC MARKERS ( 04 Aug 2023 23:05 )  x     / 0.02 ng/mL / x     / x     / x                                                LIVER FUNCTIONS - ( 06 Aug 2023 05:05 )  Alb: 2.1 g/dL / Pro: 4.4 g/dL / ALK PHOS: 54 U/L / ALT: 13 U/L / AST: 28 U/L / GGT: x                                                  Culture - Urine (collected 04 Aug 2023 14:42)  Source: Clean Catch Clean Catch (Midstream)  Final Report (05 Aug 2023 19:52):    No growth    Culture - Blood (collected 04 Aug 2023 11:20)  Source: .Blood Blood-Peripheral  Gram Stain (06 Aug 2023 02:58):    Growth in anaerobic bottle: Gram Negative Rods  Preliminary Report (06 Aug 2023 02:58):    Growth in anaerobic bottle: Gram Negative Rods    Direct identification is available within approximately 3-5    hours either by Blood Panel Multiplexed PCR or Direct    MALDI-TOF. Details: [email or web address]  Organism: Blood Culture PCR (06 Aug 2023 04:32)  Organism: Blood Culture PCR (06 Aug 2023 04:32)    Culture - Blood (collected 04 Aug 2023 11:20)  Source: .Blood Blood-Peripheral  Preliminary Report (05 Aug 2023 23:01):    No growth at 24 hours                                                                                       ABG - ( 04 Aug 2023 11:23 )  pH, Arterial: 7.30  pH, Blood: x     /  pCO2: 24    /  pO2: 71    / HCO3: 12    / Base Excess: -12.9 /  SaO2: 93.9                MEDICATIONS  (STANDING):  allopurinol 300 milliGRAM(s) Oral daily  atorvastatin 10 milliGRAM(s) Oral at bedtime  cefepime   IVPB 2000 milliGRAM(s) IV Intermittent every 12 hours  cefepime   IVPB      dextrose 5% + lactated ringers 1000 milliLiter(s) (150 mL/Hr) IV Continuous <Continuous>  heparin   Injectable 5000 Unit(s) SubCutaneous every 12 hours  insulin regular Infusion 5 Unit(s)/Hr (5 mL/Hr) IV Continuous <Continuous>  lactated ringers Bolus 500 milliLiter(s) IV Bolus once  metroNIDAZOLE  IVPB      metroNIDAZOLE  IVPB 500 milliGRAM(s) IV Intermittent every 8 hours  norepinephrine Infusion 0.05 MICROgram(s)/kG/Min (5.87 mL/Hr) IV Continuous <Continuous>  pantoprazole    Tablet 40 milliGRAM(s) Oral before breakfast  vancomycin  IVPB 1000 milliGRAM(s) IV Intermittent every 12 hours    MEDICATIONS  (PRN):      New X-rays reviewed:                                                                                  ECHO    CXR interpreted by me:

## 2023-08-06 NOTE — DIETITIAN INITIAL EVALUATION ADULT - NS FNS DIET ORDER
Diet, NPO with Tube Feed:   Tube Feeding Modality: Nasogastric  Glucerna 1.2 Víctor  Total Volume for 24 Hours (mL): 1320  Continuous  Starting Tube Feed Rate {mL per Hour}: 20  Increase Tube Feed Rate by (mL): 20     Every 4 hours  Until Goal Tube Feed Rate (mL per Hour): 55  Tube Feed Duration (in Hours): 24  Tube Feed Start Time: 12:48  Free Water Flush  Free Water Flush Instructions:  200 mL free water flushes every 4 hours (50 mL/hr) (08-06-23 @ 13:02)

## 2023-08-06 NOTE — PROGRESS NOTE ADULT - ASSESSMENT
Assessment	  IMPRESSION:  DKA/HHS, resolved  Febrile, blood culture growing gram negative rods   HO DM  HO HTN  Abdominal aortic aneurysm  Parkinson disease  ?dementia      #CARDIOVASCULAR  # FU ECHO.  1LR bolus.  Cheetah.    #PULMONARY  #  HOB @ 45 degrees.  Aspiration precautions.       #RENAL  #  Follow up lytes.  Correct as needed    #Neurology  # avoid sedation    INFECTIOUS DISEASE  #FU blood culture sensitivity reports, Procalcitonin, UA.  on cefepime, vanco and flagyl . ID consult, DIC panel, repeat lactate.     #ENDOCRINE  # Follow up FS.  Insulin protocol, transition to basal/bolus once feeding.  Endocrine eval.     #HEME  # DVT prophylaxis. FU Duplex. send DIC panel, iron panel, retic count     #MUSCULOSKELETAL  # OOBC.  PT eval.    #FLUIDS/ELECTROLYTES/NUTRITION  -IVF  -Monitor, Replete to K>4 and Mg>2    DISPO -  DGTF once off pressors and feeding.   Assessment	  IMPRESSION:    DKA/HHS, resolved  Gram (-) farhat bacteremia   Septic shock on levo   LLL PNA?  Acute normocytic anemia   HO DM  HO HTN  Abdominal aortic aneurysm  Parkinson disease  ?dementia      #CARDIOVASCULAR  # FU ECHO.  1LR bolus.  Cheetah.    #PULMONARY  #  HOB @ 45 degrees.  Aspiration precautions.       #RENAL  #  Follow up lytes.  Correct as needed    #Neurology  # avoid sedation    INFECTIOUS DISEASE  #FU blood culture sensitivity reports, Procalcitonin, UA.  on cefepime, vanco and flagyl . ID consult, DIC panel, repeat lactate.     #ENDOCRINE  # Follow up FS.  Insulin protocol, transition to basal/bolus once feeding.  Endocrine eval.     #HEME  # DVT prophylaxis. FU Duplex. send DIC panel, iron panel, retic count     #MUSCULOSKELETAL  # OOBC.  PT eval.    #FLUIDS/ELECTROLYTES/NUTRITION  -IVF  -Monitor, Replete to K>4 and Mg>2    DISPO -  DGTF once off pressors and feeding.   2018

## 2023-08-06 NOTE — DIETITIAN INITIAL EVALUATION ADULT - PERTINENT LABORATORY DATA
08-06    150<H>  |  117<H>  |  35<H>  ----------------------------<  84  4.1   |  25  |  1.3    Ca    7.7<L>      06 Aug 2023 05:05  Phos  0.8     08-06  Mg     1.8     08-06    TPro  4.4<L>  /  Alb  2.1<L>  /  TBili  0.3  /  DBili  x   /  AST  28  /  ALT  13  /  AlkPhos  54  08-06    CAPILLARY BLOOD GLUCOSE  POCT Blood Glucose.: 112 mg/dL (06 Aug 2023 13:03)  POCT Blood Glucose.: 138 mg/dL (06 Aug 2023 11:36)  POCT Blood Glucose.: 92 mg/dL (06 Aug 2023 10:40)  POCT Blood Glucose.: 196 mg/dL (06 Aug 2023 09:31)  POCT Blood Glucose.: 55 mg/dL (06 Aug 2023 09:10)  POCT Blood Glucose.: 106 mg/dL (06 Aug 2023 07:46)  POCT Blood Glucose.: 291 mg/dL (06 Aug 2023 06:36)  POCT Blood Glucose.: 185 mg/dL (06 Aug 2023 05:06)  POCT Blood Glucose.: 87 mg/dL (06 Aug 2023 02:46)  POCT Blood Glucose.: 90 mg/dL (06 Aug 2023 01:30)  POCT Blood Glucose.: 78 mg/dL (05 Aug 2023 23:41)  POCT Blood Glucose.: 78 mg/dL (05 Aug 2023 22:53)  POCT Blood Glucose.: 143 mg/dL (05 Aug 2023 21:11)  POCT Blood Glucose.: 163 mg/dL (05 Aug 2023 20:09)  POCT Blood Glucose.: 181 mg/dL (05 Aug 2023 18:45)  POCT Blood Glucose.: 239 mg/dL (05 Aug 2023 17:41)  POCT Blood Glucose.: 411 mg/dL (05 Aug 2023 16:19)  POCT Blood Glucose.: 489 mg/dL (05 Aug 2023 15:10)  POCT Blood Glucose.: 469 mg/dL (05 Aug 2023 14:01)

## 2023-08-07 DIAGNOSIS — E11.10 TYPE 2 DIABETES MELLITUS WITH KETOACIDOSIS WITHOUT COMA: ICD-10-CM

## 2023-08-07 DIAGNOSIS — D64.9 ANEMIA, UNSPECIFIED: ICD-10-CM

## 2023-08-07 DIAGNOSIS — A41.9 SEPSIS, UNSPECIFIED ORGANISM: ICD-10-CM

## 2023-08-07 DIAGNOSIS — G93.41 METABOLIC ENCEPHALOPATHY: ICD-10-CM

## 2023-08-07 DIAGNOSIS — N17.9 ACUTE KIDNEY FAILURE, UNSPECIFIED: ICD-10-CM

## 2023-08-07 DIAGNOSIS — Z51.5 ENCOUNTER FOR PALLIATIVE CARE: ICD-10-CM

## 2023-08-07 LAB
ALBUMIN SERPL ELPH-MCNC: 2.2 G/DL — LOW (ref 3.5–5.2)
ALP SERPL-CCNC: 66 U/L — SIGNIFICANT CHANGE UP (ref 30–115)
ALT FLD-CCNC: 16 U/L — SIGNIFICANT CHANGE UP (ref 0–41)
ANION GAP SERPL CALC-SCNC: 7 MMOL/L — SIGNIFICANT CHANGE UP (ref 7–14)
ANION GAP SERPL CALC-SCNC: 8 MMOL/L — SIGNIFICANT CHANGE UP (ref 7–14)
APTT BLD: 39.8 SEC — HIGH (ref 27–39.2)
AST SERPL-CCNC: 41 U/L — SIGNIFICANT CHANGE UP (ref 0–41)
BASOPHILS # BLD AUTO: 0.03 K/UL — SIGNIFICANT CHANGE UP (ref 0–0.2)
BASOPHILS NFR BLD AUTO: 0.2 % — SIGNIFICANT CHANGE UP (ref 0–1)
BILIRUB SERPL-MCNC: 0.3 MG/DL — SIGNIFICANT CHANGE UP (ref 0.2–1.2)
BUN SERPL-MCNC: 29 MG/DL — HIGH (ref 10–20)
BUN SERPL-MCNC: 30 MG/DL — HIGH (ref 10–20)
CALCIUM SERPL-MCNC: 7.1 MG/DL — LOW (ref 8.4–10.5)
CALCIUM SERPL-MCNC: 7.3 MG/DL — LOW (ref 8.4–10.5)
CHLORIDE SERPL-SCNC: 116 MMOL/L — HIGH (ref 98–110)
CHLORIDE SERPL-SCNC: 117 MMOL/L — HIGH (ref 98–110)
CO2 SERPL-SCNC: 22 MMOL/L — SIGNIFICANT CHANGE UP (ref 17–32)
CO2 SERPL-SCNC: 23 MMOL/L — SIGNIFICANT CHANGE UP (ref 17–32)
CREAT SERPL-MCNC: 1.2 MG/DL — SIGNIFICANT CHANGE UP (ref 0.7–1.5)
CREAT SERPL-MCNC: 1.2 MG/DL — SIGNIFICANT CHANGE UP (ref 0.7–1.5)
CULTURE RESULTS: SIGNIFICANT CHANGE UP
EGFR: 59 ML/MIN/1.73M2 — LOW
EGFR: 59 ML/MIN/1.73M2 — LOW
EOSINOPHIL # BLD AUTO: 0.1 K/UL — SIGNIFICANT CHANGE UP (ref 0–0.7)
EOSINOPHIL NFR BLD AUTO: 0.8 % — SIGNIFICANT CHANGE UP (ref 0–8)
FERRITIN SERPL-MCNC: 779 NG/ML — HIGH (ref 30–400)
GLUCOSE BLDC GLUCOMTR-MCNC: 109 MG/DL — HIGH (ref 70–99)
GLUCOSE BLDC GLUCOMTR-MCNC: 118 MG/DL — HIGH (ref 70–99)
GLUCOSE BLDC GLUCOMTR-MCNC: 184 MG/DL — HIGH (ref 70–99)
GLUCOSE BLDC GLUCOMTR-MCNC: 249 MG/DL — HIGH (ref 70–99)
GLUCOSE BLDC GLUCOMTR-MCNC: 51 MG/DL — CRITICAL LOW (ref 70–99)
GLUCOSE BLDC GLUCOMTR-MCNC: 57 MG/DL — LOW (ref 70–99)
GLUCOSE BLDC GLUCOMTR-MCNC: 57 MG/DL — LOW (ref 70–99)
GLUCOSE BLDC GLUCOMTR-MCNC: >600 MG/DL — CRITICAL HIGH (ref 70–99)
GLUCOSE SERPL-MCNC: 101 MG/DL — HIGH (ref 70–99)
GLUCOSE SERPL-MCNC: 46 MG/DL — CRITICAL LOW (ref 70–99)
HCT VFR BLD CALC: 19.8 % — LOW (ref 42–52)
HCT VFR BLD CALC: 19.9 % — LOW (ref 42–52)
HGB BLD-MCNC: 6.6 G/DL — CRITICAL LOW (ref 14–18)
HGB BLD-MCNC: 6.6 G/DL — CRITICAL LOW (ref 14–18)
IMM GRANULOCYTES NFR BLD AUTO: 0.6 % — HIGH (ref 0.1–0.3)
INR BLD: 1.49 RATIO — HIGH (ref 0.65–1.3)
LYMPHOCYTES # BLD AUTO: 1.52 K/UL — SIGNIFICANT CHANGE UP (ref 1.2–3.4)
LYMPHOCYTES # BLD AUTO: 12.3 % — LOW (ref 20.5–51.1)
MAGNESIUM SERPL-MCNC: 1.5 MG/DL — LOW (ref 1.8–2.4)
MCHC RBC-ENTMCNC: 31.4 PG — HIGH (ref 27–31)
MCHC RBC-ENTMCNC: 31.4 PG — HIGH (ref 27–31)
MCHC RBC-ENTMCNC: 33.2 G/DL — SIGNIFICANT CHANGE UP (ref 32–37)
MCHC RBC-ENTMCNC: 33.3 G/DL — SIGNIFICANT CHANGE UP (ref 32–37)
MCV RBC AUTO: 94.3 FL — HIGH (ref 80–94)
MCV RBC AUTO: 94.8 FL — HIGH (ref 80–94)
MONOCYTES # BLD AUTO: 0.39 K/UL — SIGNIFICANT CHANGE UP (ref 0.1–0.6)
MONOCYTES NFR BLD AUTO: 3.2 % — SIGNIFICANT CHANGE UP (ref 1.7–9.3)
MRSA PCR RESULT.: NEGATIVE — SIGNIFICANT CHANGE UP
NEUTROPHILS # BLD AUTO: 10.23 K/UL — HIGH (ref 1.4–6.5)
NEUTROPHILS NFR BLD AUTO: 82.9 % — HIGH (ref 42.2–75.2)
NRBC # BLD: 4 /100 WBCS — HIGH (ref 0–0)
NRBC # BLD: 4 /100 WBCS — HIGH (ref 0–0)
ORGANISM # SPEC MICROSCOPIC CNT: SIGNIFICANT CHANGE UP
ORGANISM # SPEC MICROSCOPIC CNT: SIGNIFICANT CHANGE UP
PHOSPHATE SERPL-MCNC: 2.2 MG/DL — SIGNIFICANT CHANGE UP (ref 2.1–4.9)
PLATELET # BLD AUTO: 104 K/UL — LOW (ref 130–400)
PLATELET # BLD AUTO: 122 K/UL — LOW (ref 130–400)
PMV BLD: 13.7 FL — HIGH (ref 7.4–10.4)
PMV BLD: 13.9 FL — HIGH (ref 7.4–10.4)
POTASSIUM SERPL-MCNC: 3.6 MMOL/L — SIGNIFICANT CHANGE UP (ref 3.5–5)
POTASSIUM SERPL-MCNC: 3.9 MMOL/L — SIGNIFICANT CHANGE UP (ref 3.5–5)
POTASSIUM SERPL-SCNC: 3.6 MMOL/L — SIGNIFICANT CHANGE UP (ref 3.5–5)
POTASSIUM SERPL-SCNC: 3.9 MMOL/L — SIGNIFICANT CHANGE UP (ref 3.5–5)
PROCALCITONIN SERPL-MCNC: 0.67 NG/ML — HIGH (ref 0.02–0.1)
PROT SERPL-MCNC: 4.2 G/DL — LOW (ref 6–8)
PROTHROM AB SERPL-ACNC: 17.2 SEC — HIGH (ref 9.95–12.87)
RBC # BLD: 2.1 M/UL — LOW (ref 4.7–6.1)
RBC # BLD: 2.1 M/UL — LOW (ref 4.7–6.1)
RBC # FLD: 13.2 % — SIGNIFICANT CHANGE UP (ref 11.5–14.5)
RBC # FLD: 13.8 % — SIGNIFICANT CHANGE UP (ref 11.5–14.5)
SODIUM SERPL-SCNC: 146 MMOL/L — SIGNIFICANT CHANGE UP (ref 135–146)
SODIUM SERPL-SCNC: 147 MMOL/L — HIGH (ref 135–146)
SPECIMEN SOURCE: SIGNIFICANT CHANGE UP
WBC # BLD: 11.12 K/UL — HIGH (ref 4.8–10.8)
WBC # BLD: 12.34 K/UL — HIGH (ref 4.8–10.8)
WBC # FLD AUTO: 11.12 K/UL — HIGH (ref 4.8–10.8)
WBC # FLD AUTO: 12.34 K/UL — HIGH (ref 4.8–10.8)

## 2023-08-07 PROCEDURE — 70450 CT HEAD/BRAIN W/O DYE: CPT | Mod: 26

## 2023-08-07 PROCEDURE — 99291 CRITICAL CARE FIRST HOUR: CPT

## 2023-08-07 PROCEDURE — 95816 EEG AWAKE AND DROWSY: CPT | Mod: 26

## 2023-08-07 PROCEDURE — 99223 1ST HOSP IP/OBS HIGH 75: CPT

## 2023-08-07 RX ORDER — SODIUM CHLORIDE 9 MG/ML
500 INJECTION, SOLUTION INTRAVENOUS ONCE
Refills: 0 | Status: COMPLETED | OUTPATIENT
Start: 2023-08-07 | End: 2023-08-07

## 2023-08-07 RX ORDER — CHLORHEXIDINE GLUCONATE 213 G/1000ML
1 SOLUTION TOPICAL DAILY
Refills: 0 | Status: DISCONTINUED | OUTPATIENT
Start: 2023-08-07 | End: 2023-09-18

## 2023-08-07 RX ORDER — MEROPENEM 1 G/30ML
1000 INJECTION INTRAVENOUS EVERY 12 HOURS
Refills: 0 | Status: COMPLETED | OUTPATIENT
Start: 2023-08-07 | End: 2023-08-15

## 2023-08-07 RX ORDER — VANCOMYCIN HCL 1 G
1000 VIAL (EA) INTRAVENOUS EVERY 12 HOURS
Refills: 0 | Status: DISCONTINUED | OUTPATIENT
Start: 2023-08-07 | End: 2023-08-07

## 2023-08-07 RX ORDER — FONDAPARINUX SODIUM 2.5 MG/.5ML
7.5 INJECTION, SOLUTION SUBCUTANEOUS DAILY
Refills: 0 | Status: DISCONTINUED | OUTPATIENT
Start: 2023-08-07 | End: 2023-08-08

## 2023-08-07 RX ORDER — MAGNESIUM SULFATE 500 MG/ML
2 VIAL (ML) INJECTION ONCE
Refills: 0 | Status: COMPLETED | OUTPATIENT
Start: 2023-08-07 | End: 2023-08-07

## 2023-08-07 RX ORDER — MEROPENEM 1 G/30ML
1000 INJECTION INTRAVENOUS EVERY 8 HOURS
Refills: 0 | Status: DISCONTINUED | OUTPATIENT
Start: 2023-08-07 | End: 2023-08-07

## 2023-08-07 RX ORDER — VANCOMYCIN HCL 1 G
1000 VIAL (EA) INTRAVENOUS EVERY 24 HOURS
Refills: 0 | Status: DISCONTINUED | OUTPATIENT
Start: 2023-08-07 | End: 2023-08-07

## 2023-08-07 RX ORDER — POLYETHYLENE GLYCOL 3350 17 G/17G
17 POWDER, FOR SOLUTION ORAL DAILY
Refills: 0 | Status: DISCONTINUED | OUTPATIENT
Start: 2023-08-07 | End: 2023-08-11

## 2023-08-07 RX ADMIN — Medication 250 MILLIGRAM(S): at 11:47

## 2023-08-07 RX ADMIN — Medication 300 MILLIGRAM(S): at 11:48

## 2023-08-07 RX ADMIN — Medication 25 GRAM(S): at 11:47

## 2023-08-07 RX ADMIN — Medication 5 UNIT(S): at 17:49

## 2023-08-07 RX ADMIN — SODIUM CHLORIDE 1500 MILLILITER(S): 9 INJECTION, SOLUTION INTRAVENOUS at 11:23

## 2023-08-07 RX ADMIN — FONDAPARINUX SODIUM 7.5 MILLIGRAM(S): 2.5 INJECTION, SOLUTION SUBCUTANEOUS at 11:48

## 2023-08-07 RX ADMIN — INSULIN GLARGINE 15 UNIT(S): 100 INJECTION, SOLUTION SUBCUTANEOUS at 21:21

## 2023-08-07 RX ADMIN — POLYETHYLENE GLYCOL 3350 17 GRAM(S): 17 POWDER, FOR SOLUTION ORAL at 11:48

## 2023-08-07 RX ADMIN — Medication 4: at 11:48

## 2023-08-07 RX ADMIN — HEPARIN SODIUM 5000 UNIT(S): 5000 INJECTION INTRAVENOUS; SUBCUTANEOUS at 05:14

## 2023-08-07 RX ADMIN — CHLORHEXIDINE GLUCONATE 1 APPLICATION(S): 213 SOLUTION TOPICAL at 11:54

## 2023-08-07 RX ADMIN — CEFEPIME 100 MILLIGRAM(S): 1 INJECTION, POWDER, FOR SOLUTION INTRAMUSCULAR; INTRAVENOUS at 05:15

## 2023-08-07 RX ADMIN — SODIUM CHLORIDE 500 MILLILITER(S): 9 INJECTION, SOLUTION INTRAVENOUS at 13:13

## 2023-08-07 RX ADMIN — Medication 5.87 MICROGRAM(S)/KG/MIN: at 11:23

## 2023-08-07 RX ADMIN — PANTOPRAZOLE SODIUM 40 MILLIGRAM(S): 20 TABLET, DELAYED RELEASE ORAL at 05:14

## 2023-08-07 RX ADMIN — MEROPENEM 100 MILLIGRAM(S): 1 INJECTION INTRAVENOUS at 17:47

## 2023-08-07 RX ADMIN — Medication 2: at 17:49

## 2023-08-07 RX ADMIN — Medication 5 UNIT(S): at 11:49

## 2023-08-07 RX ADMIN — Medication 100 MILLIGRAM(S): at 05:14

## 2023-08-07 RX ADMIN — ATORVASTATIN CALCIUM 10 MILLIGRAM(S): 80 TABLET, FILM COATED ORAL at 21:21

## 2023-08-07 RX ADMIN — Medication 250 MILLIGRAM(S): at 05:15

## 2023-08-07 NOTE — CONSULT NOTE ADULT - ASSESSMENT
ASSESSMENT  87 y/o M w/ PMH of DM, HTN coming from home with complaint of decreased appetite, increased urinary output and craving sweets x 2 weeks. Admitted for management of DKA.     IMPRESSION  #Sepsis on Admission  #Gram Negative Bacteremia    RECOMMENDATIONS  - f/u pending cultures  - UA/UC negative  - BCx + for Hungatella x 1, f/u negative x 2  - lactate trending up to 3.4  - no documented fevers  - obtain MRSA nares  - ***vanc  - ***kenyatta    This is a pended note. All final recommendations to follow pending discussion with ID Attending  ASSESSMENT  89 y/o M w/ PMH of DM, HTN coming from home with complaint of decreased appetite, increased urinary output and craving sweets x 2 weeks. Admitted for management of DKA.     IMPRESSION  #Sepsis on Admission  #Gram Negative Bacteremia  #Septic Shock    RECOMMENDATIONS  - f/u pending cultures  - UA/UC negative  - BCx + for Hungatella x 1, f/u negative x 2  - requiring larger doses of levo  - lactate trending up to 3.4  - no documented fevers  - obtain MRSA nares  - ***vanc  - ***kenyatta    This is a pended note. All final recommendations to follow pending discussion with ID Attending  ASSESSMENT  87 y/o M w/ PMH of DM, HTN coming from home with complaint of decreased appetite, increased urinary output and craving sweets x 2 weeks. Admitted for management of DKA.     IMPRESSION  #Sepsis on Admission  #Gram Negative Bacteremia  #Septic Shock    RECOMMENDATIONS  - f/u pending cultures  - UA/UC negative  - BCx + for Hungatella x 1, f/u negative x 2  - requiring larger doses of levo  - lactate trending up to 3.4  - no documented fevers  - obtain MRSA nares  - ***vanc  - ***kenyatta    This is a pended note. All final recommendations to follow pending discussion with ID Attending  ASSESSMENT  87 y/o M w/ PMH of DM, HTN coming from home with complaint of decreased appetite, increased urinary output and craving sweets x 2 weeks. Admitted for management of DKA.     IMPRESSION  #Sepsis on Admission  #Gram Negative Bacteremia  #Septic Shock    RECOMMENDATIONS  - f/u pending cultures  - UA/UC negative  - BCx + for Hungatella x 1, f/u negative x 2  - requiring larger doses of levo  - lactate trending up to 3.4  - no documented fevers  - dc vancomycin  - c/w meropenem 1g Q12    This is a pended note. All final recommendations to follow pending discussion with ID Attending  ASSESSMENT  87 y/o M w/ PMH of DM, HTN coming from home with complaint of decreased appetite, increased urinary output and craving sweets x 2 weeks. Admitted for management of DKA.     IMPRESSION  #Sepsis on Admission  #Gram Negative Bacteremia  #Septic Shock    RECOMMENDATIONS  - f/u pending cultures  - UA/UC negative  - BCx + for Hungatella x 1, f/u negative x 2  - requiring larger doses of levo  - lactate trending up to 3.4  - no documented fevers  - dc vancomycin  - c/w meropenem 1g Q12

## 2023-08-07 NOTE — PROGRESS NOTE ADULT - ASSESSMENT
89 y/o M w/ PMH of DM, HTN coming from home with complaint of decreased appetite, increased urinary output and craving sweets x 2 weeks. Admitted for management of DKA.     IMPRESSION  #Septic shock   - Gram Negative Bacteremia -> BCx + for Hungatella x 1 -> f up suceptibility  - Blood cx repeated -. f up   - f/u pending cultures  - UA/UC negative  - Stables requirements of levo (but high - 0.4).   - IVF based on cheetah (1L LR given today)  - lactate trending up to 3.4  - no documented fevers  - on vancomycin (f up trough in the morning) - started on meropenem 1g Q12 today  - ID on board     #DKA/HHS,   - resolved    #AMS toxic metabolic   - CT head -> f up   - EEG -> f up     # drop in Hgb  - F up DIC and HIT panel -> started on fondaparinux  - 1 unit pRBC    # Feeding   - NG tube feeding

## 2023-08-07 NOTE — CONSULT NOTE ADULT - SUBJECTIVE AND OBJECTIVE BOX
MILEY MARES  88y, Male  Allergy: No Known Allergies      CHIEF COMPLAINT: DKA/HHS (07 Aug 2023 10:08)      HPI:  87 y/o M w/ PMH of DM, HTN coming from home with complaint of decreased appetite, increased urinary output and craving sweets x 2 weeks. Daughter is caretaker, states patient behavior has changed. Within the last 2 weeks, He is less active, requesting more coca cola and sugar. Pt noted to be hypotensive upon arrival.     ED vitals:  BP 74/ 50, HR 87, Temp 97.2F, satting 95% on room air  Labs significant for WBC 14K, Na 123 (corrected 141), Cr 2.4, AG 23, BHB 5.1. VBG pH 7.19, Lactate 5.8, K 8.6, pCO2 39  EKG tachycardia, bifascicular block, RBBB  CXR unremarkable  CT A/P: Extensive coronary atherosclerosis. Dependent atelectasis at the right base. 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 Hepatic cysts. Questionable sludge within the gallbladder. Unchanged 1 cm cystic lesion in the pancreatic body      s/p calcium gluconate 1g, Lasix 40mg push x1, barcarb 50meq, started on insulin drip at 7 units/hr.   Admitted to MICU for DKA/HHS.     (04 Aug 2023 15:16)    ICU:    - pt initially treated with insulin gtt c/b hypoglycemia, got pushes of normal insulin and D5; started lanatus 8/6pm;   - pt stay c/b fevers, hypotension, +amparo BCx 8/4; currently on levo; followed by ID for IV ABx rec's  - GOC 8/6 by primary team w/ pt's wife and daughters - DNR/DNI w/ full medical management; TLC and NGT  - pt stay c/b acute hematuria 8/7AM requiring 1uPRBC;    PAST MEDICAL & SURGICAL HISTORY:  HTN (hypertension); Gout; BPH (benign prostatic hyperplasia); Parkinson disease; HLD (hyperlipidemia); Smoker within last 12 months; Diabetes mellitus; No significant past surgical history    FAMILY HISTORY  No pertinent family history in first degree relatives    SOCIAL HISTORY    Dementia at [facility], per family pt managed ADLs prior to this admission;    ROS  * cantonese speaking pt; unable to participate in ROS 2/2 dementia+ acute illness     TESTS & MEASUREMENTS:                        6.6    12.34 )-----------( 122      ( 07 Aug 2023 05:10 )             19.8     08-07    147<H>  |  117<H>  |  29<H>  ----------------------------<  46<LL>  3.6   |  23  |  1.2    Ca    7.3<L>      07 Aug 2023 05:10  Phos  2.2     08-07  Mg     1.5     08-07    TPro  4.2<L>  /  Alb  2.2<L>  /  TBili  0.3  /  DBili  x   /  AST  41  /  ALT  16  /  AlkPhos  66  08-07      LIVER FUNCTIONS - ( 07 Aug 2023 05:10 )  Alb: 2.2 g/dL / Pro: 4.2 g/dL / ALK PHOS: 66 U/L / ALT: 16 U/L / AST: 41 U/L / GGT: x           Urinalysis Basic - ( 07 Aug 2023 05:10 )    Color: x / Appearance: x / SG: x / pH: x  Gluc: 46 mg/dL / Ketone: x  / Bili: x / Urobili: x   Blood: x / Protein: x / Nitrite: x   Leuk Esterase: x / RBC: x / WBC x   Sq Epi: x / Non Sq Epi: x / Bacteria: x        Culture - Blood (collected 08-05-23 @ 07:10)  Source: .Blood Blood-Peripheral  Preliminary Report (08-06-23 @ 19:02):    No growth at 24 hours    Culture - Urine (collected 08-04-23 @ 14:42)  Source: Clean Catch Clean Catch (Midstream)  Final Report (08-05-23 @ 19:52):    No growth    Culture - Blood (collected 08-04-23 @ 11:20)  Source: .Blood Blood-Peripheral  Gram Stain (08-06-23 @ 02:58):    Growth in anaerobic bottle: Gram Negative Rods  Preliminary Report (08-06-23 @ 15:49):    Growth in anaerobic bottle: Gram Negative Rods Most closely resembling    Hungatella species    Direct identification is available within approximately 3-5    hours either by Blood Panel Multiplexed PCR or Direct    MALDI-TOF. Details: [email or web address]  Organism: Blood Culture PCR (08-06-23 @ 04:32)  Organism: Blood Culture PCR (08-06-23 @ 04:32)      Method Type: PCR      -  Blood PCR Panel: NEG    Culture - Blood (collected 08-04-23 @ 11:20)  Source: .Blood Blood-Peripheral  Preliminary Report (08-06-23 @ 23:02):    No growth at 48 Hours        Lactate, Blood: 3.4 mmol/L (08-06-23 @ 11:17)  Lactate, Blood: 3.7 mmol/L (08-04-23 @ 23:05)  Lactate, Blood: 2.9 mmol/L (08-04-23 @ 14:44)  Blood Gas Venous - Lactate: 7.40 mmol/L (08-04-23 @ 13:34)  Blood Gas Venous - Lactate: 5.80 mmol/L (08-04-23 @ 10:47)      RADIOLOGY & ADDITIONAL TESTS:  CXR  Xray Chest 1 View AP/PA:   ACC: [id number] EXAM:  XR CHEST 1 VIEW   ORDERED BY: LAZ REYNA     PROCEDURE DATE:  08/05/2023          INTERPRETATION:  CLINICAL HISTORY / REASON FOR EXAM: Line placement.    COMPARISON: Chest radiograph from August 4, 2023.    TECHNIQUE/POSITIONING: Satisfactory. Single image, AP portable chest   radiograph.    FINDINGS:    SUPPORT DEVICES: Interval placement of enteric tube, with distal tip   overlying the stomach.    CARDIAC/MEDIASTINUM/HILUM: Enlarged aortic knob, stable.    LUNG PARENCHYMA/PLEURA: No focal consolidation or pleural effusion. No   pneumothorax.    SKELETON/SOFT TISSUES: Unchanged.      IMPRESSION:    Interval placement of enteric catheter in satisfactory position.    --- End of Report ---            LUCIO HOOVER MD;Attending Radiologist  This document has been electronically signed. Aug  5 2023  8:25PM (08-05-23 @ 15:50)        CARDIOLOGY TESTING  12 Lead ECG:   Ventricular Rate 106 BPM    Atrial Rate 106 BPM    P-R Interval 152 ms    QRS Duration 126 ms    Q-T Interval 400 ms    QTC Calculation(Bazett) 531 ms    P Axis 34 degrees    R Axis -50 degrees    T Axis -18 degrees    Diagnosis Line Sinus tachycardia  Right bundle branch block  Left anterior fascicular block  *** Bifascicular block ***  Moderate voltage criteria for LVH, may be normal variant      Abnormal ECG    Confirmed by RUDY FLOR MD ([id number]) on 8/4/2023 11:24:55 AM (08-04-23 @ 10:57)      MEDICATIONS  allopurinol 300  atorvastatin 10  chlorhexidine 2% Cloths 1  dextrose 5%. 1000  dextrose 50% Injectable 25  fondaparinux Injectable 7.5  glucagon  Injectable 1  insulin glargine Injectable (LANTUS) 15  insulin lispro (ADMELOG) corrective regimen sliding scale   insulin lispro Injectable (ADMELOG) 5  norepinephrine Infusion 0.05  pantoprazole    Tablet 40  polyethylene glycol 3350 17  vancomycin  IVPB 1000      ANTIBIOTICS:  vancomycin  IVPB 1000 milliGRAM(s) IV Intermittent every 24 hours      ALLERGIES:  No Known Allergies      VITALS:  T(F): 98.2, Max: 99.3 (08-06-23 @ 16:00)  HR: 108  BP: 98/55  RR: 24Vital Signs Last 24 Hrs  T(C): 36.8 (07 Aug 2023 12:00), Max: 37.4 (06 Aug 2023 16:00)  T(F): 98.2 (07 Aug 2023 12:00), Max: 99.3 (06 Aug 2023 16:00)  HR: 108 (07 Aug 2023 13:00) (91 - 126)  BP: 98/55 (07 Aug 2023 13:00) (87/51 - 145/73)  BP(mean): 72 (07 Aug 2023 13:00) (64 - 101)  RR: 24 (07 Aug 2023 13:00) (17 - 35)  SpO2: 95% (07 Aug 2023 13:00) (94% - 100%)    Parameters below as of 07 Aug 2023 13:00  Patient On (Oxygen Delivery Method): room air        PHYSICAL EXAM:  Gen: uncomfortable, ill appearing   HEENT: significant for poor dentition  Neck: supple, no lymphadenopathy  CV: tachy, regular, no gross murmur appreciated  Lungs: decreased BS at bases, no fremitus, no wheezing  Abdomen: mildly distended w/ diffuse tenderness  Ext: no peripheral edema appreciated  Neuro: awake, moves all extremities in plane of bed  Skin: no rash, no erythema     HPI:  89 y/o M w/ PMH of DM, HTN coming from home with complaint of decreased appetite, increased urinary output and craving sweets x 2 weeks. Daughter is caretaker, states patient behavior has changed. Within the last 2 weeks, He is less active, requesting more coca cola and sugar. Pt noted to be hypotensive upon arrival.     ED vitals:  BP 74/ 50, HR 87, Temp 97.2F, satting 95% on room air  Labs significant for WBC 14K, Na 123 (corrected 141), Cr 2.4, AG 23, BHB 5.1. VBG pH 7.19, Lactate 5.8, K 8.6, pCO2 39  EKG tachycardia, bifascicular block, RBBB  CXR unremarkable  CT A/P: Extensive coronary atherosclerosis. Dependent atelectasis at the right base. 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 Hepatic cysts. Questionable sludge within the gallbladder. Unchanged 1 cm cystic lesion in the pancreatic body      s/p calcium gluconate 1g, Lasix 40mg push x1, barcarb 50meq, started on insulin drip at 7 units/hr.   Admitted to MICU for DKA/HHS.     (04 Aug 2023 15:16)    PERTINENT PM/SXH:   HTN (hypertension)    Gout    BPH (benign prostatic hyperplasia)    Parkinson disease    HLD (hyperlipidemia)    Smoker within last 12 months    Diabetes mellitus      No significant past surgical history      FAMILY HISTORY:  cannot obtain from patients    SOCIAL HISTORY    Dementia at BL, per family pt managed ADLs prior to this admission;      ITEMS NOT CHECKED ARE NOT PRESENT    SOCIAL HISTORY:   Significant other/partner[ ]  Children[X ]  Restoration/Spirituality:  Substance hx:  [ ]   Tobacco hx:  [ ]   Alcohol hx: [ ]   Living Situation: [X ]Home  [ ]Long term care  [ ]Rehab [ ]Other  Home Services: [ ] HHA [ ] Visting RN [ ] Hospice  Occupation:  Home Opioid hx:  [ ] Y [ ] N [ ] I-Stop Reference No:    ADVANCE DIRECTIVES:    [ ] Full Code [X ] DNR  MOLST  [ ]  Living Will  [ ]   DECISION MAKER(s):  [ ] Health Care Proxy(s)  [ ] Surrogate(s)  [ ] Guardian           Name(s): Phone Number(s):  daughter Miracle [phone]  daughter Angie [phone]    BASELINE (I)ADL(s) (prior to admission):  [place]: [ ]Total  [ ] Moderate [ ]Dependent  Palliative Performance Status Version 2:         %    [email or web address]/[email or web address]_performance_scale_[email or web address].pdf    Allergies    No Known Allergies    Intolerances    MEDICATIONS  (STANDING):  allopurinol 300 milliGRAM(s) Oral daily  atorvastatin 10 milliGRAM(s) Oral at bedtime  chlorhexidine 2% Cloths 1 Application(s) Topical daily  dextrose 5%. 1000 milliLiter(s) (50 mL/Hr) IV Continuous <Continuous>  dextrose 50% Injectable 25 Gram(s) IV Push once  fondaparinux Injectable 7.5 milliGRAM(s) SubCutaneous daily  glucagon  Injectable 1 milliGRAM(s) IntraMuscular once  insulin glargine Injectable (LANTUS) 15 Unit(s) SubCutaneous at bedtime  insulin lispro (ADMELOG) corrective regimen sliding scale   SubCutaneous three times a day before meals  insulin lispro Injectable (ADMELOG) 5 Unit(s) SubCutaneous three times a day before meals  meropenem  IVPB 1000 milliGRAM(s) IV Intermittent every 12 hours  norepinephrine Infusion 0.05 MICROgram(s)/kG/Min (5.87 mL/Hr) IV Continuous <Continuous>  pantoprazole    Tablet 40 milliGRAM(s) Oral before breakfast  polyethylene glycol 3350 17 Gram(s) Oral daily  vancomycin  IVPB 1000 milliGRAM(s) IV Intermittent every 24 hours    MEDICATIONS  (PRN):  acetaminophen     Tablet .. 650 milliGRAM(s) Oral every 6 hours PRN Temp greater or equal to 38C (100.4F), Moderate Pain (4 - 6)  dextrose Oral Gel 15 Gram(s) Oral once PRN Blood Glucose LESS THAN 70 milliGRAM(s)/deciliter      PRESENT SYMPTOMS: [X ]Unable to obtain due to poor mentation   Source if other than patient:  [ ]Family   [ ]Team     Pain: [ ]yes [ ]no  QOL impact -   Location -                    Aggravating factors -  Quality -  Radiation -  Timing-  Severity (0-10 scale):  Minimal acceptable level (0-10 scale):     CPOT:    https://www.[facility].org/getattachment/gqi80s09-9h0p-2f4h-7b8t-7735j3136q8m/Critical-Care-Pain-Observation-Tool-(CPOT)    PAIN AD Score: 0  [email or web address]/painad.pdf (press ctrl +  left click to view)    Dyspnea:                           [ ]None[ ]Mild [ ]Moderate [ ]Severe     Respiratory Distress Observation Scale (RDOS): 2  A score of 0 to 2 signifies little or no respiratory distress, 3 signifies mild distress, scores 4 to 6 indicate moderate distress, and scores greater than 7 signify severe distress  [email or web address]-vgymlarawxz-lxjreixg-zvhruqonbpy-bwpku.pdf    Anxiety:                             [ ]None[ ]Mild [ ]Moderate [ ]Severe   Fatigue:                             [ ]None[ ]Mild [ ]Moderate [ ]Severe   Nausea:                             [ ]None[ ]Mild [ ]Moderate [ ]Severe   Loss of appetite:              [ ]None[ ]Mild [ ]Moderate [ ]Severe   Constipation:                    [ ]None[ ]Mild [ ]Moderate [ ]Severe    Other Symptoms:  [ ]All other review of systems negative     Palliative Performance Status Version 2:         %    http://npcrc.org/files/news/palliative_performance_scale_ppsv2.pdf  PHYSICAL EXAM:  Vital Signs Last 24 Hrs  T(C): 36.8 (07 Aug 2023 12:00), Max: 36.9 (06 Aug 2023 20:00)  T(F): 98.2 (07 Aug 2023 12:00), Max: 98.5 (06 Aug 2023 20:00)  HR: 102 (07 Aug 2023 15:30) (91 - 126)  BP: 116/68 (07 Aug 2023 15:30) (80/47 - 145/73)  BP(mean): 86 (07 Aug 2023 15:30) (59 - 101)  RR: 24 (07 Aug 2023 15:30) (17 - 35)  SpO2: 97% (07 Aug 2023 15:30) (94% - 100%)    Parameters below as of 07 Aug 2023 14:45  Patient On (Oxygen Delivery Method): room air     I&O's Summary    06 Aug 2023 07:01  -  07 Aug 2023 07:00  --------------------------------------------------------  IN: 5199 mL / OUT: 2365 mL / NET: 2834 mL    07 Aug 2023 07:01  -  07 Aug 2023 16:18  --------------------------------------------------------  IN: 2156 mL / OUT: 900 mL / NET: 1256 mL        GENERAL:  [X ] No acute distress [ ]Lethargic  [ ]Unarousable  [ ]Verbal  [ ]Non-Verbal [ ]Cachexia    BEHAVIORAL/PSYCH:  [ ]Alert and Oriented x  [ ] Anxiety [ ] Delirium [ ] Agitation [X ] Calm   EYES: [ ] No scleral icterus [ ] Scleral icterus [X ] Closed  ENMT:  [ ]Dry mouth  [ ]No external oral lesions [X ] No external ear or nose lesions  CARDIOVASCULAR:  [ ]Regular [ ]Irregular [ ]Tachy [ ]Not Tachy  [ ]Meet [ ] Edema [ ] No edema  PULMONARY:  [ ]Tachypnea  [ ]Audible excessive secretions [X ] No labored breathing [ ] labored breathing  GASTROINTESTINAL: [ ]Soft  [ ]Distended  [ X]Not distended [ ]Non tender [ ]Tender  MUSCULOSKELETAL: [ ]No clubbing [ ] clubbing  [X ] No cyanosis [ ] cyanosis  NEUROLOGIC: [ ]No focal deficits  [ ]Follows commands  [ ]Does not follow commands  [X ]Cognitive impairment  [ ]Dysphagia  [ ]Dysarthria  [ ]Paresis   SKIN: [ ] Jaundiced [X ] Non-jaundiced [ ]Rash [ ]No Rash [ ] Warm [ ] Dry  MISC/LINES: [ ] ET tube [ ] Trach [ ]NGT/OGT [ ]PEG [ ]Jones    CRITICAL CARE:  [ ] Shock Present  [ ]Septic [ ]Cardiogenic [ ]Neurologic [ ]Hypovolemic  [ ]  Vasopressors [ ]  Inotropes   [ ]Respiratory failure present [ ]Mechanical ventilation [ ]Non-invasive ventilatory support [ ]High flow  [ ]Acute  [ ]Chronic [ ]Hypoxic  [ ]Hypercarbic [ ]Other  [ ]Other organ failure     LABS: reviewed by me                        6.6    12.34 )-----------( 122      ( 07 Aug 2023 05:10 )             19.8   08-07    147<H>  |  117<H>  |  29<H>  ----------------------------<  46<LL>  3.6   |  23  |  1.2    Ca    7.3<L>      07 Aug 2023 05:10  Phos  2.2     08-07  Mg     1.5     08-07    TPro  4.2<L>  /  Alb  2.2<L>  /  TBili  0.3  /  DBili  x   /  AST  41  /  ALT  16  /  AlkPhos  66  08-07      Urinalysis Basic - ( 07 Aug 2023 05:10 )    Color: x / Appearance: x / SG: x / pH: x  Gluc: 46 mg/dL / Ketone: x  / Bili: x / Urobili: x   Blood: x / Protein: x / Nitrite: x   Leuk Esterase: x / RBC: x / WBC x   Sq Epi: x / Non Sq Epi: x / Bacteria: x      RADIOLOGY & ADDITIONAL STUDIES: reviewed by me  CXR  Xray Chest 1 View AP/PA:   ACC: [id number] EXAM:  XR CHEST 1 VIEW   ORDERED BY: LAZ REYNA     PROCEDURE DATE:  08/05/2023          INTERPRETATION:  CLINICAL HISTORY / REASON FOR EXAM: Line placement.    COMPARISON: Chest radiograph from August 4, 2023.    TECHNIQUE/POSITIONING: Satisfactory. Single image, AP portable chest   radiograph.    FINDINGS:    SUPPORT DEVICES: Interval placement of enteric tube, with distal tip   overlying the stomach.    CARDIAC/MEDIASTINUM/HILUM: Enlarged aortic knob, stable.    LUNG PARENCHYMA/PLEURA: No focal consolidation or pleural effusion. No   pneumothorax.    SKELETON/SOFT TISSUES: Unchanged.      IMPRESSION:    Interval placement of enteric catheter in satisfactory position.    --- End of Report ---      PROTEIN CALORIE MALNUTRITION PRESENT: [ ]mild [ ]moderate [ ]severe [ ]underweight [ ]morbid obesity  https://www.andeal.org/vault/2440/web/files/ONC/Table_Clinical%20Characteristics%20to%20Document%20Malnutrition-White%20JV%20et%20al%202012.pdf    Height (cm): 167.6 (08-06-23 @ 13:32), 162.6 (11-21-22 @ 13:32), 162.6 (10-09-22 @ 22:40)  Weight (kg): 62.6 (08-04-23 @ 20:37), 61.2 (10-09-22 @ 22:40)  BMI (kg/m2): 22.3 (08-06-23 @ 13:32), 23.7 (08-04-23 @ 20:37), 23.1 (11-21-22 @ 13:32)    [ ]PPSV2 < or = to 30% [ ]significant weight loss  [ ]poor nutritional intake  [ ]anasarca      [ ]Artificial Nutrition      Palliative Care Spiritual/Emotional Screening Tool Question  Severity (0-4):                    OR                    [X ] Unable to determine/NA  Score of 2 or greater indicates recommendation of Chaplaincy referral  Chaplaincy Referral: [ ] Yes [ ] Refused [ ] Following     Caregiver [place]:  [ ] Yes [ ] No [ X] Deferred  Social Work Referral [ ]  Patient and Family Centered Care Referral [ ]    Anticipatory Grief Present: [ ] Yes [ ] No [ X] Deferred  Social Work Referral [ ]  Patient and Family Centered Care Referral [ ]    REFERRALS:   [ ]Chaplaincy  [ ]Hospice  [ ]Child Life  [ ]Social Work  [ ]Case management [ ]Holistic Therapy     Palliative care education provided to patient and/or family    Goals of Care Document:     ______________  Juan David Lara MD  Palliative Medicine  [facility]   of Geriatric and Palliative Medicine  [phone]

## 2023-08-07 NOTE — CONSULT NOTE ADULT - PROBLEM SELECTOR RECOMMENDATION 2
- s/p 1u pRBCs  - primary team working up anemia - s/p 1u pRBCs  - primary team working up anemia  - monitor H/H

## 2023-08-07 NOTE — PROGRESS NOTE ADULT - SUBJECTIVE AND OBJECTIVE BOX
Patient is a 88y old  Male who presents with a chief complaint of DKA/HHS (07 Aug 2023 13:18)      INTERVAL HPI/OVERNIGHT EVENTS:   No overnight events   Afebrile, hemodynamically stable     ICU Vital Signs Last 24 Hrs  T(C): 36.8 (07 Aug 2023 12:00), Max: 36.9 (06 Aug 2023 20:00)  T(F): 98.2 (07 Aug 2023 12:00), Max: 98.5 (06 Aug 2023 20:00)  HR: 102 (07 Aug 2023 15:30) (91 - 126)  BP: 116/68 (07 Aug 2023 15:30) (80/47 - 145/73)  BP(mean): 86 (07 Aug 2023 15:30) (59 - 101)  ABP: --  ABP(mean): --  RR: 24 (07 Aug 2023 15:30) (17 - 35)  SpO2: 97% (07 Aug 2023 15:30) (94% - 100%)    O2 Parameters below as of 07 Aug 2023 14:45  Patient On (Oxygen Delivery Method): room air          I&O's Summary    06 Aug 2023 07:01  -  07 Aug 2023 07:00  --------------------------------------------------------  IN: 5199 mL / OUT: 2365 mL / NET: 2834 mL    07 Aug 2023 07:01  -  07 Aug 2023 16:46  --------------------------------------------------------  IN: 2156 mL / OUT: 900 mL / NET: 1256 mL          LABS:                        6.6    12.34 )-----------( 122      ( 07 Aug 2023 05:10 )             19.8     08-07    147<H>  |  117<H>  |  29<H>  ----------------------------<  46<LL>  3.6   |  23  |  1.2    Ca    7.3<L>      07 Aug 2023 05:10  Phos  2.2     08-07  Mg     1.5     08-07    TPro  4.2<L>  /  Alb  2.2<L>  /  TBili  0.3  /  DBili  x   /  AST  41  /  ALT  16  /  AlkPhos  66  08-07      Urinalysis Basic - ( 07 Aug 2023 05:10 )    Color: x / Appearance: x / SG: x / pH: x  Gluc: 46 mg/dL / Ketone: x  / Bili: x / Urobili: x   Blood: x / Protein: x / Nitrite: x   Leuk Esterase: x / RBC: x / WBC x   Sq Epi: x / Non Sq Epi: x / Bacteria: x      CAPILLARY BLOOD GLUCOSE      POCT Blood Glucose.: 249 mg/dL (07 Aug 2023 11:41)  POCT Blood Glucose.: 118 mg/dL (07 Aug 2023 06:25)  POCT Blood Glucose.: 57 mg/dL (07 Aug 2023 05:40)  POCT Blood Glucose.: 57 mg/dL (07 Aug 2023 05:32)  POCT Blood Glucose.: 51 mg/dL (07 Aug 2023 05:07)  POCT Blood Glucose.: >600 mg/dL (07 Aug 2023 05:05)  POCT Blood Glucose.: 125 mg/dL (06 Aug 2023 23:26)  POCT Blood Glucose.: 216 mg/dL (06 Aug 2023 21:10)  POCT Blood Glucose.: 66 mg/dL (06 Aug 2023 20:26)  POCT Blood Glucose.: 108 mg/dL (06 Aug 2023 17:54)        RADIOLOGY & ADDITIONAL TESTS:    Consultant(s) Notes Reviewed:  [x ] YES  [ ] NO    MEDICATIONS  (STANDING):  allopurinol 300 milliGRAM(s) Oral daily  atorvastatin 10 milliGRAM(s) Oral at bedtime  chlorhexidine 2% Cloths 1 Application(s) Topical daily  dextrose 5%. 1000 milliLiter(s) (50 mL/Hr) IV Continuous <Continuous>  dextrose 50% Injectable 25 Gram(s) IV Push once  fondaparinux Injectable 7.5 milliGRAM(s) SubCutaneous daily  glucagon  Injectable 1 milliGRAM(s) IntraMuscular once  insulin glargine Injectable (LANTUS) 15 Unit(s) SubCutaneous at bedtime  insulin lispro (ADMELOG) corrective regimen sliding scale   SubCutaneous three times a day before meals  insulin lispro Injectable (ADMELOG) 5 Unit(s) SubCutaneous three times a day before meals  meropenem  IVPB 1000 milliGRAM(s) IV Intermittent every 12 hours  norepinephrine Infusion 0.05 MICROgram(s)/kG/Min (5.87 mL/Hr) IV Continuous <Continuous>  pantoprazole    Tablet 40 milliGRAM(s) Oral before breakfast  polyethylene glycol 3350 17 Gram(s) Oral daily  vancomycin  IVPB 1000 milliGRAM(s) IV Intermittent every 24 hours    MEDICATIONS  (PRN):  acetaminophen     Tablet .. 650 milliGRAM(s) Oral every 6 hours PRN Temp greater or equal to 38C (100.4F), Moderate Pain (4 - 6)  dextrose Oral Gel 15 Gram(s) Oral once PRN Blood Glucose LESS THAN 70 milliGRAM(s)/deciliter      PHYSICAL EXAM:  CONSTITUTIONAL:  Ill appearing in NAD    ENT:   Airway patent,     EYES:   Pupils equal,   Round and reactive to light.    CARDIAC:   Normal rate,   Regular rhythm.      RESPIRATORY:   No wheezing  Bilateral BS  Normal chest expansion  Not tachypneic,  No use of accessory muscles    GASTROINTESTINAL:  Abdomen soft,   Non-tender,   No guarding,   + BS    MUSCULOSKELETAL:   Range of motion is not limited,  No clubbing, cyanosis    NEUROLOGICAL:   Alert       SKIN:   Skin normal color for race,   No evidence of rash.      Care Discussed with Consultants/Other Providers [ x] YES  [ ] NO

## 2023-08-07 NOTE — CONSULT NOTE ADULT - PROBLEM SELECTOR RECOMMENDATION 9
- pt w/ TLC requiring levo  - +amparo blood cx's on IV ABx  - ID following  - GOC w/ family, pt is DNR/DNI at this time  - will follow w/ family as condition progresses - pt w/ TLC requiring levo  - +amparo blood cx's on IV ABx, high risk, monitor hemodynamics and counts  - ID following  - GOC w/ family, pt is DNR/DNI at this time  - will follow w/ family as condition progresses

## 2023-08-07 NOTE — CONSULT NOTE ADULT - ASSESSMENT
87 y/o Cantonese speaking man w/ PMHx of DM, HTN coming from home with complaint of decreased appetite, increased urinary output and craving sweets x 2 weeks w/ family reporting acute AMS; pt admitted for DKA, found to be in septic shock requiring pressors in the ICU.

## 2023-08-07 NOTE — PROGRESS NOTE ADULT - ASSESSMENT
IMPRESSION:  DKA/HHS, resolved  Gram (-) farhat bacteremia   Septic shock on levo   LLL PNA?  Acute normocytic anemia   HO DM  HO HTN  Abdominal aortic aneurysm  Parkinson disease  ?dementia      PLAN:      CNS: avoid sedation    HEENT: Oral care    PULMONARY:  HOB @ 45 degrees.  Aspiration precautions.     CARDIOVASCULAR: Wean pressors.  FU ECHO.  Cheetah. c/w fluid, monitor na    GI: GI prophylaxis. sp and swallow.  NGT placement. Ct abd and Lipase noted neg. tube feedings     RENAL:  Follow up lytes.  Correct as needed, replete phos,   free  water for hypernatremia,   repeat lactate     INFECTIOUS DISEASE: Bcx cultures, Procalcitonin, UA.  c/w empiric coverrage, consult ID,     HEMATOLOGICAL:  DVT prophylaxis. FU Duplex. T&S active, repeat CBC in afternoon, iron studies w/ retic     ENDOCRINE:  Follow up FS.  Insulin protocol, transition to basal/bolus once feeding.  Endocrine eval.     MUSCULOSKELETAL: OOBC.  PT eval.    MICU  DNR/DNI  Poor prognosis    IMPRESSION:    DKA/HHS, resolved  Hungatella species bacteremia   Septic shock on levophed   AMS toxic metabolic   HO DM  HO HTN  Abdominal aortic aneurysm  Parkinson disease    PLAN:    CNS: avoid sedation.  CTH and EEG.      HEENT: Oral care.      PULMONARY:  HOB @ 45 degrees.  Aspiration precautions. on RA     CARDIOVASCULAR: Wean pressors.  FU ECHO.  LR bolus.  Cheetah HD monitoring.      GI: GI prophylaxis. NG feedign and Bowel regimen     RENAL:  Follow up lytes.  Correct as needed.  Free h2o.      INFECTIOUS DISEASE: Meropenem and Vanc.  Nasal MRSA.  D Eval. repeat BC     HEMATOLOGICAL:  DVT prophylaxis.  Duplex negative. One Unit PRBC.  HIT and DIC panel.  Hold Hep     ENDOCRINE:  Follow up FS.  Insulin protocol if needed     MUSCULOSKELETAL: Bed chair position.  PT eval.    MICU  DNR/DNI  Poor prognosis   palliative care

## 2023-08-07 NOTE — CONSULT NOTE ADULT - CRITICAL CARE SERVICES
NOTIFICATION RETURN TO WORK / SCHOOL 
 
1/26/2018 11:32 AM 
 
Mr. Baltazar Arthur [address] [facility] [address] To Whom It May Concern: 
 
Baltazar Arthur is currently under the care of Jameel Garcia 9 RD. Please excuse his absence on 1/26/18. He will return to work/school on: 1/29/18. If there are questions or concerns please have the patient contact our office. Sincerely, Aneta Byers, DO 
 
                                
 

31
70

## 2023-08-07 NOTE — PROGRESS NOTE ADULT - SUBJECTIVE AND OBJECTIVE BOX
Patient is a 88y old  Male who presents with a chief complaint of DKA/HHS (07 Aug 2023 09:33)        Over Night Events:  Remains critically ill on Levophed.  On RA.          ROS:     All ROS are negative except HPI         PHYSICAL EXAM    ICU Vital Signs Last 24 Hrs  T(C): 36.6 (07 Aug 2023 08:00), Max: 39.1 (06 Aug 2023 11:00)  T(F): 97.9 (07 Aug 2023 08:00), Max: 102.4 (06 Aug 2023 11:00)  HR: 114 (07 Aug 2023 09:15) (91 - 130)  BP: 93/52 (07 Aug 2023 09:15) (81/52 - 145/73)  BP(mean): 66 (07 Aug 2023 09:15) (61 - 101)  ABP: --  ABP(mean): --  RR: 30 (07 Aug 2023 09:15) (17 - 35)  SpO2: 95% (07 Aug 2023 09:15) (85% - 100%)    O2 Parameters below as of 07 Aug 2023 09:15  Patient On (Oxygen Delivery Method): room air            CONSTITUTIONAL:  Ill appearing in NAD    ENT:   Airway patent,     EYES:   Pupils equal,   Round and reactive to light.    CARDIAC:   Normal rate,   Regular rhythm.      RESPIRATORY:   No wheezing  Bilateral BS  Normal chest expansion  Not tachypneic,  No use of accessory muscles    GASTROINTESTINAL:  Abdomen soft,   Non-tender,   No guarding,   + BS    MUSCULOSKELETAL:   Range of motion is not limited,  No clubbing, cyanosis    NEUROLOGICAL:   Alert       SKIN:   Skin normal color for race,   No evidence of rash.        08-06-23 @ 07:01  -  08-07-23 @ 07:00  --------------------------------------------------------  IN:    dextrose 5% + lactated ringers w/ Additives: 2100 mL    Enteral Tube Flush: 150 mL    Glucerna: 760 mL    Insulin: 11 mL    IV PiggyBack: 1050 mL    Norepinephrine: 1128 mL  Total IN: 5199 mL    OUT:    Indwelling Catheter - Urethral (mL): 2365 mL  Total OUT: 2365 mL    Total NET: 2834 mL      08-07-23 @ 07:01  -  08-07-23 @ 09:41  --------------------------------------------------------  IN:    Norepinephrine: 94 mL  Total IN: 94 mL    OUT:    Indwelling Catheter - Urethral (mL): 45 mL  Total OUT: 45 mL    Total NET: 49 mL          LABS:                            6.6    12.34 )-----------( 122      ( 07 Aug 2023 05:10 )             19.8                                               08-07    147<H>  |  117<H>  |  29<H>  ----------------------------<  46<LL>  3.6   |  23  |  1.2    Ca    7.3<L>      07 Aug 2023 05:10  Phos  2.2     08-07  Mg     1.5     08-07    TPro  4.2<L>  /  Alb  2.2<L>  /  TBili  0.3  /  DBili  x   /  AST  41  /  ALT  16  /  AlkPhos  66  08-07                                             Urinalysis Basic - ( 07 Aug 2023 05:10 )    Color: x / Appearance: x / SG: x / pH: x  Gluc: 46 mg/dL / Ketone: x  / Bili: x / Urobili: x   Blood: x / Protein: x / Nitrite: x   Leuk Esterase: x / RBC: x / WBC x   Sq Epi: x / Non Sq Epi: x / Bacteria: x                                                  LIVER FUNCTIONS - ( 07 Aug 2023 05:10 )  Alb: 2.2 g/dL / Pro: 4.2 g/dL / ALK PHOS: 66 U/L / ALT: 16 U/L / AST: 41 U/L / GGT: x                                                  Culture - Blood (collected 05 Aug 2023 07:10)  Source: .Blood Blood-Peripheral  Preliminary Report (06 Aug 2023 19:02):    No growth at 24 hours    Culture - Urine (collected 04 Aug 2023 14:42)  Source: Clean Catch Clean Catch (Midstream)  Final Report (05 Aug 2023 19:52):    No growth    Culture - Blood (collected 04 Aug 2023 11:20)  Source: .Blood Blood-Peripheral  Gram Stain (06 Aug 2023 02:58):    Growth in anaerobic bottle: Gram Negative Rods  Preliminary Report (06 Aug 2023 15:49):    Growth in anaerobic bottle: Gram Negative Rods Most closely resembling    Hungatella species    Direct identification is available within approximately 3-5    hours either by Blood Panel Multiplexed PCR or Direct    MALDI-TOF. Details: [email or web address]  Organism: Blood Culture PCR (06 Aug 2023 04:32)  Organism: Blood Culture PCR (06 Aug 2023 04:32)    Culture - Blood (collected 04 Aug 2023 11:20)  Source: .Blood Blood-Peripheral  Preliminary Report (06 Aug 2023 23:02):    No growth at 48 Hours                                                                                           MEDICATIONS  (STANDING):  allopurinol 300 milliGRAM(s) Oral daily  atorvastatin 10 milliGRAM(s) Oral at bedtime  cefepime   IVPB 2000 milliGRAM(s) IV Intermittent every 12 hours  cefepime   IVPB      chlorhexidine 2% Cloths 1 Application(s) Topical daily  dextrose 5%. 1000 milliLiter(s) (50 mL/Hr) IV Continuous <Continuous>  dextrose 50% Injectable 25 Gram(s) IV Push once  glucagon  Injectable 1 milliGRAM(s) IntraMuscular once  heparin   Injectable 5000 Unit(s) SubCutaneous every 12 hours  insulin glargine Injectable (LANTUS) 15 Unit(s) SubCutaneous at bedtime  insulin lispro (ADMELOG) corrective regimen sliding scale   SubCutaneous three times a day before meals  insulin lispro Injectable (ADMELOG) 5 Unit(s) SubCutaneous three times a day before meals  lactated ringers Bolus 500 milliLiter(s) IV Bolus once  magnesium sulfate  IVPB 2 Gram(s) IV Intermittent once  metroNIDAZOLE  IVPB 500 milliGRAM(s) IV Intermittent every 8 hours  metroNIDAZOLE  IVPB      norepinephrine Infusion 0.05 MICROgram(s)/kG/Min (5.87 mL/Hr) IV Continuous <Continuous>  pantoprazole    Tablet 40 milliGRAM(s) Oral before breakfast  vancomycin  IVPB 1000 milliGRAM(s) IV Intermittent every 12 hours    MEDICATIONS  (PRN):  acetaminophen     Tablet .. 650 milliGRAM(s) Oral every 6 hours PRN Temp greater or equal to 38C (100.4F), Moderate Pain (4 - 6)  dextrose Oral Gel 15 Gram(s) Oral once PRN Blood Glucose LESS THAN 70 milliGRAM(s)/deciliter

## 2023-08-08 DIAGNOSIS — Z51.5 ENCOUNTER FOR PALLIATIVE CARE: ICD-10-CM

## 2023-08-08 LAB
A1C WITH ESTIMATED AVERAGE GLUCOSE RESULT: 15.5 % — HIGH (ref 4–5.6)
ALBUMIN SERPL ELPH-MCNC: 1.8 G/DL — LOW (ref 3.5–5.2)
ALP SERPL-CCNC: 71 U/L — SIGNIFICANT CHANGE UP (ref 30–115)
ALT FLD-CCNC: 15 U/L — SIGNIFICANT CHANGE UP (ref 0–41)
ANION GAP SERPL CALC-SCNC: 8 MMOL/L — SIGNIFICANT CHANGE UP (ref 7–14)
APTT BLD: 37 SEC — SIGNIFICANT CHANGE UP (ref 27–39.2)
AST SERPL-CCNC: 35 U/L — SIGNIFICANT CHANGE UP (ref 0–41)
BASOPHILS # BLD AUTO: 0.01 K/UL — SIGNIFICANT CHANGE UP (ref 0–0.2)
BASOPHILS NFR BLD AUTO: 0.1 % — SIGNIFICANT CHANGE UP (ref 0–1)
BILIRUB SERPL-MCNC: 0.6 MG/DL — SIGNIFICANT CHANGE UP (ref 0.2–1.2)
BUN SERPL-MCNC: 23 MG/DL — HIGH (ref 10–20)
CALCIUM SERPL-MCNC: 7.1 MG/DL — LOW (ref 8.4–10.5)
CHLORIDE SERPL-SCNC: 114 MMOL/L — HIGH (ref 98–110)
CO2 SERPL-SCNC: 24 MMOL/L — SIGNIFICANT CHANGE UP (ref 17–32)
CREAT SERPL-MCNC: 1.1 MG/DL — SIGNIFICANT CHANGE UP (ref 0.7–1.5)
D DIMER BLD IA.RAPID-MCNC: 381 NG/ML DDU — HIGH
EGFR: 65 ML/MIN/1.73M2 — SIGNIFICANT CHANGE UP
EOSINOPHIL # BLD AUTO: 0.21 K/UL — SIGNIFICANT CHANGE UP (ref 0–0.7)
EOSINOPHIL NFR BLD AUTO: 1.8 % — SIGNIFICANT CHANGE UP (ref 0–8)
ESTIMATED AVERAGE GLUCOSE: 398 MG/DL — HIGH (ref 68–114)
FIBRINOGEN PPP-MCNC: >700 MG/DL — HIGH (ref 204.4–570.6)
FIBRINOGEN PPP-MCNC: >700 MG/DL — HIGH (ref 204.4–570.6)
GLUCOSE BLDC GLUCOMTR-MCNC: 102 MG/DL — HIGH (ref 70–99)
GLUCOSE BLDC GLUCOMTR-MCNC: 114 MG/DL — HIGH (ref 70–99)
GLUCOSE BLDC GLUCOMTR-MCNC: 124 MG/DL — HIGH (ref 70–99)
GLUCOSE BLDC GLUCOMTR-MCNC: 133 MG/DL — HIGH (ref 70–99)
GLUCOSE BLDC GLUCOMTR-MCNC: 154 MG/DL — HIGH (ref 70–99)
GLUCOSE BLDC GLUCOMTR-MCNC: 181 MG/DL — HIGH (ref 70–99)
GLUCOSE BLDC GLUCOMTR-MCNC: 243 MG/DL — HIGH (ref 70–99)
GLUCOSE BLDC GLUCOMTR-MCNC: 74 MG/DL — SIGNIFICANT CHANGE UP (ref 70–99)
GLUCOSE SERPL-MCNC: 60 MG/DL — LOW (ref 70–99)
HCT VFR BLD CALC: 20.7 % — LOW (ref 42–52)
HCT VFR BLD CALC: 22.3 % — LOW (ref 42–52)
HEPARIN-PF4 AB RESULT: <0.6 U/ML — SIGNIFICANT CHANGE UP (ref 0–0.9)
HGB BLD-MCNC: 7 G/DL — LOW (ref 14–18)
HGB BLD-MCNC: 7.7 G/DL — LOW (ref 14–18)
IMM GRANULOCYTES NFR BLD AUTO: 0.8 % — HIGH (ref 0.1–0.3)
INR BLD: 1.35 RATIO — HIGH (ref 0.65–1.3)
LACTATE SERPL-SCNC: 1.5 MMOL/L — SIGNIFICANT CHANGE UP (ref 0.7–2)
LDH SERPL L TO P-CCNC: 284 U/L — HIGH (ref 50–242)
LYMPHOCYTES # BLD AUTO: 1.51 K/UL — SIGNIFICANT CHANGE UP (ref 1.2–3.4)
LYMPHOCYTES # BLD AUTO: 12.7 % — LOW (ref 20.5–51.1)
MAGNESIUM SERPL-MCNC: 1.8 MG/DL — SIGNIFICANT CHANGE UP (ref 1.8–2.4)
MCHC RBC-ENTMCNC: 31.8 PG — HIGH (ref 27–31)
MCHC RBC-ENTMCNC: 31.8 PG — HIGH (ref 27–31)
MCHC RBC-ENTMCNC: 33.8 G/DL — SIGNIFICANT CHANGE UP (ref 32–37)
MCHC RBC-ENTMCNC: 34.5 G/DL — SIGNIFICANT CHANGE UP (ref 32–37)
MCV RBC AUTO: 92.1 FL — SIGNIFICANT CHANGE UP (ref 80–94)
MCV RBC AUTO: 94.1 FL — HIGH (ref 80–94)
MONOCYTES # BLD AUTO: 0.37 K/UL — SIGNIFICANT CHANGE UP (ref 0.1–0.6)
MONOCYTES NFR BLD AUTO: 3.1 % — SIGNIFICANT CHANGE UP (ref 1.7–9.3)
NEUTROPHILS # BLD AUTO: 9.65 K/UL — HIGH (ref 1.4–6.5)
NEUTROPHILS NFR BLD AUTO: 81.5 % — HIGH (ref 42.2–75.2)
NRBC # BLD: 4 /100 WBCS — HIGH (ref 0–0)
NRBC # BLD: 4 /100 WBCS — HIGH (ref 0–0)
PF4 HEPARIN CMPLX AB SER-ACNC: NEGATIVE — SIGNIFICANT CHANGE UP
PHOSPHATE SERPL-MCNC: 2 MG/DL — LOW (ref 2.1–4.9)
PLATELET # BLD AUTO: 119 K/UL — LOW (ref 130–400)
PLATELET # BLD AUTO: 147 K/UL — SIGNIFICANT CHANGE UP (ref 130–400)
PMV BLD: 13.2 FL — HIGH (ref 7.4–10.4)
PMV BLD: 13.7 FL — HIGH (ref 7.4–10.4)
POTASSIUM SERPL-MCNC: 3.6 MMOL/L — SIGNIFICANT CHANGE UP (ref 3.5–5)
POTASSIUM SERPL-SCNC: 3.6 MMOL/L — SIGNIFICANT CHANGE UP (ref 3.5–5)
PROT SERPL-MCNC: 4.1 G/DL — LOW (ref 6–8)
PROTHROM AB SERPL-ACNC: 15.5 SEC — HIGH (ref 9.95–12.87)
RBC # BLD: 2.2 M/UL — LOW (ref 4.7–6.1)
RBC # BLD: 2.2 M/UL — LOW (ref 4.7–6.1)
RBC # BLD: 2.42 M/UL — LOW (ref 4.7–6.1)
RBC # FLD: 13.2 % — SIGNIFICANT CHANGE UP (ref 11.5–14.5)
RBC # FLD: 13.5 % — SIGNIFICANT CHANGE UP (ref 11.5–14.5)
RETICS #: 64.7 K/UL — SIGNIFICANT CHANGE UP (ref 25–125)
RETICS/RBC NFR: 2.9 % — HIGH (ref 0.5–1.5)
SODIUM SERPL-SCNC: 146 MMOL/L — SIGNIFICANT CHANGE UP (ref 135–146)
WBC # BLD: 10.83 K/UL — HIGH (ref 4.8–10.8)
WBC # BLD: 11.85 K/UL — HIGH (ref 4.8–10.8)
WBC # FLD AUTO: 10.83 K/UL — HIGH (ref 4.8–10.8)
WBC # FLD AUTO: 11.85 K/UL — HIGH (ref 4.8–10.8)

## 2023-08-08 PROCEDURE — 71045 X-RAY EXAM CHEST 1 VIEW: CPT | Mod: 26

## 2023-08-08 PROCEDURE — 99233 SBSQ HOSP IP/OBS HIGH 50: CPT

## 2023-08-08 PROCEDURE — 74176 CT ABD & PELVIS W/O CONTRAST: CPT | Mod: 26

## 2023-08-08 PROCEDURE — 74018 RADEX ABDOMEN 1 VIEW: CPT | Mod: 26

## 2023-08-08 RX ORDER — FONDAPARINUX SODIUM 2.5 MG/.5ML
2.5 INJECTION, SOLUTION SUBCUTANEOUS DAILY
Refills: 0 | Status: DISCONTINUED | OUTPATIENT
Start: 2023-08-08 | End: 2023-08-09

## 2023-08-08 RX ORDER — BACITRACIN ZINC 500 UNIT/G
1 OINTMENT IN PACKET (EA) TOPICAL
Refills: 0 | Status: DISCONTINUED | OUTPATIENT
Start: 2023-08-08 | End: 2023-09-18

## 2023-08-08 RX ORDER — SODIUM CHLORIDE 9 MG/ML
1000 INJECTION, SOLUTION INTRAVENOUS
Refills: 0 | Status: DISCONTINUED | OUTPATIENT
Start: 2023-08-08 | End: 2023-08-10

## 2023-08-08 RX ORDER — MAGNESIUM HYDROXIDE 400 MG/1
30 TABLET, CHEWABLE ORAL EVERY 12 HOURS
Refills: 0 | Status: DISCONTINUED | OUTPATIENT
Start: 2023-08-08 | End: 2023-08-11

## 2023-08-08 RX ORDER — INSULIN HUMAN 100 [IU]/ML
2 INJECTION, SOLUTION SUBCUTANEOUS
Qty: 100 | Refills: 0 | Status: DISCONTINUED | OUTPATIENT
Start: 2023-08-08 | End: 2023-08-13

## 2023-08-08 RX ORDER — DIATRIZOATE MEGLUMINE 180 MG/ML
30 INJECTION, SOLUTION INTRAVESICAL ONCE
Refills: 0 | Status: COMPLETED | OUTPATIENT
Start: 2023-08-08 | End: 2023-08-08

## 2023-08-08 RX ORDER — SENNA PLUS 8.6 MG/1
2 TABLET ORAL AT BEDTIME
Refills: 0 | Status: DISCONTINUED | OUTPATIENT
Start: 2023-08-08 | End: 2023-08-11

## 2023-08-08 RX ADMIN — MEROPENEM 100 MILLIGRAM(S): 1 INJECTION INTRAVENOUS at 07:39

## 2023-08-08 RX ADMIN — Medication 650 MILLIGRAM(S): at 09:10

## 2023-08-08 RX ADMIN — Medication 300 MILLIGRAM(S): at 12:27

## 2023-08-08 RX ADMIN — MEROPENEM 100 MILLIGRAM(S): 1 INJECTION INTRAVENOUS at 18:37

## 2023-08-08 RX ADMIN — ATORVASTATIN CALCIUM 10 MILLIGRAM(S): 80 TABLET, FILM COATED ORAL at 21:49

## 2023-08-08 RX ADMIN — POLYETHYLENE GLYCOL 3350 17 GRAM(S): 17 POWDER, FOR SOLUTION ORAL at 12:09

## 2023-08-08 RX ADMIN — Medication 1 APPLICATION(S): at 17:35

## 2023-08-08 RX ADMIN — PANTOPRAZOLE SODIUM 40 MILLIGRAM(S): 20 TABLET, DELAYED RELEASE ORAL at 06:42

## 2023-08-08 RX ADMIN — Medication 10 MILLIGRAM(S): at 12:27

## 2023-08-08 RX ADMIN — INSULIN HUMAN 2 UNIT(S)/HR: 100 INJECTION, SOLUTION SUBCUTANEOUS at 17:52

## 2023-08-08 RX ADMIN — FONDAPARINUX SODIUM 2.5 MILLIGRAM(S): 2.5 INJECTION, SOLUTION SUBCUTANEOUS at 12:09

## 2023-08-08 RX ADMIN — DIATRIZOATE MEGLUMINE 30 MILLILITER(S): 180 INJECTION, SOLUTION INTRAVESICAL at 12:28

## 2023-08-08 RX ADMIN — Medication 650 MILLIGRAM(S): at 08:38

## 2023-08-08 RX ADMIN — Medication 4: at 12:08

## 2023-08-08 RX ADMIN — MAGNESIUM HYDROXIDE 30 MILLILITER(S): 400 TABLET, CHEWABLE ORAL at 12:27

## 2023-08-08 RX ADMIN — Medication 5.87 MICROGRAM(S)/KG/MIN: at 12:13

## 2023-08-08 RX ADMIN — SENNA PLUS 2 TABLET(S): 8.6 TABLET ORAL at 21:49

## 2023-08-08 RX ADMIN — SODIUM CHLORIDE 50 MILLILITER(S): 9 INJECTION, SOLUTION INTRAVENOUS at 12:12

## 2023-08-08 NOTE — PROGRESS NOTE ADULT - ASSESSMENT
87 y/o man w/ PMH of DM, HTN coming from home with complaint of decreased appetite, increased urinary output and craving sweets x 2 weeks. Admitted MICU for septic shock

## 2023-08-08 NOTE — PROGRESS NOTE ADULT - SUBJECTIVE AND OBJECTIVE BOX
MILEY MARES  88y, Male  Allergy: No Known Allergies      LOS  4d    CHIEF COMPLAINT: DKA/HHS (07 Aug 2023 16:46)      INTERVAL EVENTS/HPI  - No acute events overnight, on low dose levophed  - T(F): , Max: 98.5 (08-08-23 @ 08:00)  - Tolerating medication  - WBC Count: 11.85 (08-08-23 @ 05:55)  WBC Count: 11.12 (08-07-23 @ 18:00)     - Creatinine: 1.1 (08-08-23 @ 05:55)  Creatinine: 1.2 (08-07-23 @ 05:10)       ROS  ***    VITALS:  T(F): 98.5, Max: 98.5 (08-08-23 @ 08:00)  HR: 101  BP: 100/55  RR: 23Vital Signs Last 24 Hrs  T(C): 36.9 (08 Aug 2023 08:00), Max: 36.9 (08 Aug 2023 08:00)  T(F): 98.5 (08 Aug 2023 08:00), Max: 98.5 (08 Aug 2023 08:00)  HR: 101 (08 Aug 2023 08:15) (80 - 126)  BP: 100/55 (08 Aug 2023 08:15) (80/47 - 140/71)  BP(mean): 75 (08 Aug 2023 08:15) (59 - 99)  RR: 23 (08 Aug 2023 08:15) (13 - 32)  SpO2: 93% (08 Aug 2023 08:15) (90% - 99%)    Parameters below as of 08 Aug 2023 08:00  Patient On (Oxygen Delivery Method): nasal cannula  O2 Flow (L/min): 2      PHYSICAL EXAM:  ***    FH: Non-contributory  Social Hx: Non-contributory    TESTS & MEASUREMENTS:                        7.7    11.85 )-----------( 119      ( 08 Aug 2023 05:55 )             22.3     08-08    146  |  114<H>  |  23<H>  ----------------------------<  60<L>  3.6   |  24  |  1.1    Ca    7.1<L>      08 Aug 2023 05:55  Phos  2.0     08-08  Mg     1.8     08-08    TPro  4.1<L>  /  Alb  1.8<L>  /  TBili  0.6  /  DBili  x   /  AST  35  /  ALT  15  /  AlkPhos  71  08-08      LIVER FUNCTIONS - ( 08 Aug 2023 05:55 )  Alb: 1.8 g/dL / Pro: 4.1 g/dL / ALK PHOS: 71 U/L / ALT: 15 U/L / AST: 35 U/L / GGT: x           Urinalysis Basic - ( 08 Aug 2023 05:55 )    Color: x / Appearance: x / SG: x / pH: x  Gluc: 60 mg/dL / Ketone: x  / Bili: x / Urobili: x   Blood: x / Protein: x / Nitrite: x   Leuk Esterase: x / RBC: x / WBC x   Sq Epi: x / Non Sq Epi: x / Bacteria: x        Culture - Blood (collected 08-05-23 @ 07:10)  Source: .Blood Blood-Peripheral  Preliminary Report (08-07-23 @ 19:02):    No growth at 48 Hours    Culture - Urine (collected 08-04-23 @ 14:42)  Source: Clean Catch Clean Catch (Midstream)  Final Report (08-05-23 @ 19:52):    No growth    Culture - Blood (collected 08-04-23 @ 11:20)  Source: .Blood Blood-Peripheral  Gram Stain (08-06-23 @ 02:58):    Growth in anaerobic bottle: Gram Negative Rods  Final Report (08-07-23 @ 19:28):    Growth in anaerobic bottle: Most closely resembling Hungatella species    "Susceptibilities not performed"    Please Note:************************************************    Hungatella species    may appear as gram negative rods in direct smears of clinical specimens.    Direct identification is available within approximately 3-5    hours either by Blood Panel Multiplexed PCR or Direct    MALDI-TOF. Details: [email or web address]  Organism: Blood Culture PCR (08-07-23 @ 19:28)  Organism: Blood Culture PCR (08-07-23 @ 19:28)      Method Type: PCR      -  Blood PCR Panel: NEG    Culture - Blood (collected 08-04-23 @ 11:20)  Source: .Blood Blood-Peripheral  Preliminary Report (08-07-23 @ 23:01):    No growth at 72 Hours        Lactate, Blood: 3.4 mmol/L (08-06-23 @ 11:17)  Lactate, Blood: 3.7 mmol/L (08-04-23 @ 23:05)  Lactate, Blood: 2.9 mmol/L (08-04-23 @ 14:44)  Blood Gas Venous - Lactate: 7.40 mmol/L (08-04-23 @ 13:34)  Blood Gas Venous - Lactate: 5.80 mmol/L (08-04-23 @ 10:47)      INFECTIOUS DISEASES TESTING  MRSA PCR Result.: Negative (08-07-23 @ 11:30)  Procalcitonin, Serum: 0.67 (08-06-23 @ 05:05)  Procalcitonin, Serum: 0.48 (08-04-23 @ 23:05)  COVID-19 PCR: NotDetec (11-21-22 @ 18:43)  strept    INFLAMMATORY MARKERS      RADIOLOGY & ADDITIONAL TESTS:  I have personally reviewed the last available Chest xray  CXR  Xray Chest 1 View AP/PA:   ACC: [id number] EXAM:  XR CHEST 1 VIEW   ORDERED BY: LAZ REYNA     PROCEDURE DATE:  08/05/2023          INTERPRETATION:  CLINICAL HISTORY / REASON FOR EXAM: Line placement.    COMPARISON: Chest radiograph from August 4, 2023.    TECHNIQUE/POSITIONING: Satisfactory. Single image, AP portable chest   radiograph.    FINDINGS:    SUPPORT DEVICES: Interval placement of enteric tube, with distal tip   overlying the stomach.    CARDIAC/MEDIASTINUM/HILUM: Enlarged aortic knob, stable.    LUNG PARENCHYMA/PLEURA: No focal consolidation or pleural effusion. No   pneumothorax.    SKELETON/SOFT TISSUES: Unchanged.      IMPRESSION:    Interval placement of enteric catheter in satisfactory position.    --- End of Report ---            LUCIO HOOVER MD;Attending Radiologist  This document has been electronically signed. Aug  5 2023  8:25PM (08-05-23 @ 15:50)      CT      CARDIOLOGY TESTING  12 Lead ECG:   Ventricular Rate 106 BPM    Atrial Rate 106 BPM    P-R Interval 152 ms    QRS Duration 126 ms    Q-T Interval 400 ms    QTC Calculation(Bazett) 531 ms    P Axis 34 degrees    R Axis -50 degrees    T Axis -18 degrees    Diagnosis Line Sinus tachycardia  Right bundle branch block  Left anterior fascicular block  *** Bifascicular block ***  Moderate voltage criteria for LVH, may be normal variant      Abnormal ECG    Confirmed by RUDY FLOR MD ([id number]) on 8/4/2023 11:24:55 AM (08-04-23 @ 10:57)      MEDICATIONS  allopurinol 300 Oral daily  atorvastatin 10 Oral at bedtime  chlorhexidine 2% Cloths 1 Topical daily  dextrose 5%. 1000 IV Continuous <Continuous>  dextrose 50% Injectable 25 IV Push once  fondaparinux Injectable 2.5 SubCutaneous daily  glucagon  Injectable 1 IntraMuscular once  insulin glargine Injectable (LANTUS) 15 SubCutaneous at bedtime  insulin lispro (ADMELOG) corrective regimen sliding scale  SubCutaneous three times a day before meals  insulin lispro Injectable (ADMELOG) 5 SubCutaneous three times a day before meals  meropenem  IVPB 1000 IV Intermittent every 12 hours  norepinephrine Infusion 0.05 IV Continuous <Continuous>  pantoprazole    Tablet 40 Oral before breakfast  polyethylene glycol 3350 17 Oral daily      WEIGHT  Weight (kg): 62.6 (08-04-23 @ 20:37)  Creatinine: 1.1 mg/dL (08-08-23 @ 05:55)      ANTIBIOTICS:  meropenem  IVPB 1000 milliGRAM(s) IV Intermittent every 12 hours      All available historical records have been reviewed       MILEY MARES  88y, Male  Allergy: No Known Allergies      LOS  4d    CHIEF COMPLAINT: DKA/HHS (07 Aug 2023 16:46)      INTERVAL EVENTS/HPI  - No acute events overnight, on low dose levophed  - T(F): , Max: 98.5 (08-08-23 @ 08:00)  - Tolerating medication  - WBC Count: 11.85 (08-08-23 @ 05:55)  WBC Count: 11.12 (08-07-23 @ 18:00)     - Creatinine: 1.1 (08-08-23 @ 05:55)  Creatinine: 1.2 (08-07-23 @ 05:10)       ROS  unable to obtain history secondary to patient's mental status and/or sedation     VITALS:  T(F): 98.5, Max: 98.5 (08-08-23 @ 08:00)  HR: 101  BP: 100/55  RR: 23Vital Signs Last 24 Hrs  T(C): 36.9 (08 Aug 2023 08:00), Max: 36.9 (08 Aug 2023 08:00)  T(F): 98.5 (08 Aug 2023 08:00), Max: 98.5 (08 Aug 2023 08:00)  HR: 101 (08 Aug 2023 08:15) (80 - 126)  BP: 100/55 (08 Aug 2023 08:15) (80/47 - 140/71)  BP(mean): 75 (08 Aug 2023 08:15) (59 - 99)  RR: 23 (08 Aug 2023 08:15) (13 - 32)  SpO2: 93% (08 Aug 2023 08:15) (90% - 99%)    Parameters below as of 08 Aug 2023 08:00  Patient On (Oxygen Delivery Method): nasal cannula  O2 Flow (L/min): 2      PHYSICAL EXAM:  Gen: chronically ill appearing   HEENT: Normocephalic, atraumatic  Neck: supple, no lymphadenopathy  CV: Regular rate & regular rhythm  Lungs: decreased BS at bases, no fremitus  Abdomen: Soft, BS present, tender, grimaces  Ext: Warm, well perfused  Neuro: non focal  Skin: no rash, no erythema  Lines: no phlebitis     FH: Non-contributory  Social Hx: Non-contributory    TESTS & MEASUREMENTS:                        7.7    11.85 )-----------( 119      ( 08 Aug 2023 05:55 )             22.3     08-08    146  |  114<H>  |  23<H>  ----------------------------<  60<L>  3.6   |  24  |  1.1    Ca    7.1<L>      08 Aug 2023 05:55  Phos  2.0     08-08  Mg     1.8     08-08    TPro  4.1<L>  /  Alb  1.8<L>  /  TBili  0.6  /  DBili  x   /  AST  35  /  ALT  15  /  AlkPhos  71  08-08      LIVER FUNCTIONS - ( 08 Aug 2023 05:55 )  Alb: 1.8 g/dL / Pro: 4.1 g/dL / ALK PHOS: 71 U/L / ALT: 15 U/L / AST: 35 U/L / GGT: x           Urinalysis Basic - ( 08 Aug 2023 05:55 )    Color: x / Appearance: x / SG: x / pH: x  Gluc: 60 mg/dL / Ketone: x  / Bili: x / Urobili: x   Blood: x / Protein: x / Nitrite: x   Leuk Esterase: x / RBC: x / WBC x   Sq Epi: x / Non Sq Epi: x / Bacteria: x        Culture - Blood (collected 08-05-23 @ 07:10)  Source: .Blood Blood-Peripheral  Preliminary Report (08-07-23 @ 19:02):    No growth at 48 Hours    Culture - Urine (collected 08-04-23 @ 14:42)  Source: Clean Catch Clean Catch (Midstream)  Final Report (08-05-23 @ 19:52):    No growth    Culture - Blood (collected 08-04-23 @ 11:20)  Source: .Blood Blood-Peripheral  Gram Stain (08-06-23 @ 02:58):    Growth in anaerobic bottle: Gram Negative Rods  Final Report (08-07-23 @ 19:28):    Growth in anaerobic bottle: Most closely resembling Hungatella species    "Susceptibilities not performed"    Please Note:************************************************    Hungatella species    may appear as gram negative rods in direct smears of clinical specimens.    Direct identification is available within approximately 3-5    hours either by Blood Panel Multiplexed PCR or Direct    MALDI-TOF. Details: [email or web address]  Organism: Blood Culture PCR (08-07-23 @ 19:28)  Organism: Blood Culture PCR (08-07-23 @ 19:28)      Method Type: PCR      -  Blood PCR Panel: NEG    Culture - Blood (collected 08-04-23 @ 11:20)  Source: .Blood Blood-Peripheral  Preliminary Report (08-07-23 @ 23:01):    No growth at 72 Hours        Lactate, Blood: 3.4 mmol/L (08-06-23 @ 11:17)  Lactate, Blood: 3.7 mmol/L (08-04-23 @ 23:05)  Lactate, Blood: 2.9 mmol/L (08-04-23 @ 14:44)  Blood Gas Venous - Lactate: 7.40 mmol/L (08-04-23 @ 13:34)  Blood Gas Venous - Lactate: 5.80 mmol/L (08-04-23 @ 10:47)      INFECTIOUS DISEASES TESTING  MRSA PCR Result.: Negative (08-07-23 @ 11:30)  Procalcitonin, Serum: 0.67 (08-06-23 @ 05:05)  Procalcitonin, Serum: 0.48 (08-04-23 @ 23:05)  COVID-19 PCR: NotDetec (11-21-22 @ 18:43)  strept    INFLAMMATORY MARKERS      RADIOLOGY & ADDITIONAL TESTS:  I have personally reviewed the last available Chest xray  CXR  Xray Chest 1 View AP/PA:   ACC: [id number] EXAM:  XR CHEST 1 VIEW   ORDERED BY: LAZ REYNA     PROCEDURE DATE:  08/05/2023          INTERPRETATION:  CLINICAL HISTORY / REASON FOR EXAM: Line placement.    COMPARISON: Chest radiograph from August 4, 2023.    TECHNIQUE/POSITIONING: Satisfactory. Single image, AP portable chest   radiograph.    FINDINGS:    SUPPORT DEVICES: Interval placement of enteric tube, with distal tip   overlying the stomach.    CARDIAC/MEDIASTINUM/HILUM: Enlarged aortic knob, stable.    LUNG PARENCHYMA/PLEURA: No focal consolidation or pleural effusion. No   pneumothorax.    SKELETON/SOFT TISSUES: Unchanged.      IMPRESSION:    Interval placement of enteric catheter in satisfactory position.    --- End of Report ---            LUCIO HOOVER MD;Attending Radiologist  This document has been electronically signed. Aug  5 2023  8:25PM (08-05-23 @ 15:50)      CT      CARDIOLOGY TESTING  12 Lead ECG:   Ventricular Rate 106 BPM    Atrial Rate 106 BPM    P-R Interval 152 ms    QRS Duration 126 ms    Q-T Interval 400 ms    QTC Calculation(Bazett) 531 ms    P Axis 34 degrees    R Axis -50 degrees    T Axis -18 degrees    Diagnosis Line Sinus tachycardia  Right bundle branch block  Left anterior fascicular block  *** Bifascicular block ***  Moderate voltage criteria for LVH, may be normal variant      Abnormal ECG    Confirmed by RUDY FLOR MD ([id number]) on 8/4/2023 11:24:55 AM (08-04-23 @ 10:57)      MEDICATIONS  allopurinol 300 Oral daily  atorvastatin 10 Oral at bedtime  chlorhexidine 2% Cloths 1 Topical daily  dextrose 5%. 1000 IV Continuous <Continuous>  dextrose 50% Injectable 25 IV Push once  fondaparinux Injectable 2.5 SubCutaneous daily  glucagon  Injectable 1 IntraMuscular once  insulin glargine Injectable (LANTUS) 15 SubCutaneous at bedtime  insulin lispro (ADMELOG) corrective regimen sliding scale  SubCutaneous three times a day before meals  insulin lispro Injectable (ADMELOG) 5 SubCutaneous three times a day before meals  meropenem  IVPB 1000 IV Intermittent every 12 hours  norepinephrine Infusion 0.05 IV Continuous <Continuous>  pantoprazole    Tablet 40 Oral before breakfast  polyethylene glycol 3350 17 Oral daily      WEIGHT  Weight (kg): 62.6 (08-04-23 @ 20:37)  Creatinine: 1.1 mg/dL (08-08-23 @ 05:55)      ANTIBIOTICS:  meropenem  IVPB 1000 milliGRAM(s) IV Intermittent every 12 hours      All available historical records have been reviewed

## 2023-08-08 NOTE — PROGRESS NOTE ADULT - ASSESSMENT
IMPRESSION:  DKA/HHS  HO DM  HO HTN  Abdominal aortic aneurysm  Parkinson disease  ?dementia      PLAN:      CNS: avoid sedation    HEENT: Oral care    PULMONARY:  HOB @ 45 degrees.  Aspiration precautions, wean oxygen    CARDIOVASCULAR: CE negative , ECHO 55, G1DD, avoid volume overload, wean levophed, IV fluid LR while NPO    GI: GI prophylaxis, lipase 21, Keep NPO, CT AP oral contrast    RENAL:  Follow up lytes.  Correct as needed    INFECTIOUS DISEASE: repeat Bcx pending, procalcitonin 0.67, ua negative, meropenem, MRSA negative    HEMATOLOGICAL:  DVT prophylaxis. DIMER noted, Duplex negative, DIC panel, continue fondaparinaux    ENDOCRINE:  Follow up FS.  Insulin protocol, endocrine    MUSCULOSKELETAL: bedrest    MICU             IMPRESSION:    DKA/HHS, resolved  Hungatella species bacteremia   Septic shock on levophed   AMS toxic metabolic   HO DM  HO HTN  Abdominal aortic aneurysm  Parkinson disease    PLAN:    CNS: avoid sedation    HEENT: Oral care    PULMONARY:  HOB @ 45 degrees.  Aspiration precautions, wean oxygen as tolerated.     CARDIOVASCULAR: CE negative.  ECHO noted.  Avoid volume overload.  Wean levophed, IV fluid LR while NPO    GI: GI prophylaxis, lipase 21, Keep NPO, CT AP oral contrast    RENAL:  Follow up lytes.  Correct as needed    INFECTIOUS DISEASE: repeat Bcx pending, procalcitonin 0.67, ua negative, meropenem, MRSA negative    HEMATOLOGICAL:  DVT prophylaxis. DIMER noted, Duplex negative, DIC panel, continue Fondaparinux    ENDOCRINE:  Follow up FS.  Insulin protocol, endocrine    MUSCULOSKELETAL: bedrest    MICU

## 2023-08-08 NOTE — PROGRESS NOTE ADULT - PROBLEM SELECTOR PLAN 1
- on IV kenyatta;  - f/u cx's; ID recs  - pressure improving and levo requirements going down - on IV kenyatta; high risk, monitor counts (d#2)  - f/u cx's; ID recs  - pressure improving and levo requirements going down

## 2023-08-08 NOTE — PROGRESS NOTE ADULT - ASSESSMENT
89 y/o M w/ PMH of DM, HTN coming from home with complaint of decreased appetite, increased urinary output and craving sweets x 2 weeks. Admitted for management of DKA.     IMPRESSION  #Septic shock   - Gram Negative Bacteremia -> BCx on 8/4: + for Hungatella x 1 -> f up suceptibility  - Blood cx repeated 8/5 negative so far   - f/u pending cultures  - UA/UC negative  - going down on the levo (0.15 today).   - IVF based on cheetah (not fluid responsive today)  - lactate trending down from 3.4 to 1.5  - no documented fevers  - off vancomycin - MRSA screen negative  - on meropenem 1g Q12 day 2  - ID on board       # ABdominal distention:  - ABdomen is more distended today  - Still constipated- dulcolax and milk of magnesia given.   - CT abdomen with oral contrast ordered -> f up  - NG tube feeding withheld until CT is done      #DKA/HHS,   - resolved  - endo consulted for f up    #AMS toxic metabolic   - CT head -> negative  - EEG -> There were no findings of active epilepsy.    # drop in Hgb on 8/7:  - DIC and HIT panels negative  - will keep him on fondaparinux  - 1 unit pRBC given on 8/7    # Feeding   - NG tube feeding

## 2023-08-08 NOTE — EEG REPORT - NS EEG TEXT BOX
[facility] Department of Neurology  Inpatient Routine-Electroencephalography Report    Patient Name:	MILEY MARES    :	1935  MRN:	-    Study Date/Time:  2023, 12:57:41 PM  Referred by:  select    Brief Clinical History:  MILEY MARES is a 88 year old Male; study performed to investigate for seizures or markers of epilepsy.  Diagnosis Code: R56.9 convulsions/seizure  CPT:  54187 (awake/drowsy)    Pertinent Medications    Acquisition Details:  Electroencephalography was acquired using a minimum of 21 channels on an 5o9 Neurology system v 9.3.1 with electrode placement according to the standard International 10-20 system following ACNS (American Clinical Neurophysiology Society) guidelines.  Anterior temporal T1 and T2 electrodes were utilized whenever possible.   The XLTEK automated spike & seizure detections were all reviewed in detail, in addition to the entire raw EEG.    Findings:  Background:  continuous, with predominantly alpha, mixed with diffuse low amplitude beta.  Voltage:  Normal (20+ uV)  Organization: Appropriate anterior-posterior gradient.  Posterior Dominant Rhythm:  select symmetric, well-organized, and well-modulated.  Variability: Yes. 		Reactivity: Yes.  N2 sleep: Absent.  Focal abnormalities:    1)	No persistent asymmetries of voltage or frequency.  Spontaneous Activity:    No epileptiform discharges.  Periodic/rhythmic activity:  None  Events:  No electrographic seizures or significant clinical events.  Provocations:  1)	Hyperventilation:  was not performed.  2)	Photic stimulation: was not performed.    Summary:  Normal  inpatient routine EEG study, awake and drowsy.  1)	The EEG remained normal throughout the study    Clinical Correlation:  There were no findings of active epilepsy, however this alone does not rule out the diagnosis.     Hans Valdez MD  Attending Neurologist, Division of Epilepsy

## 2023-08-08 NOTE — PROGRESS NOTE ADULT - ASSESSMENT
ASSESSMENT  87 y/o M w/ PMH of DM, HTN coming from home with complaint of decreased appetite, increased urinary output and craving sweets x 2 weeks. Admitted for management of DKA.     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

## 2023-08-08 NOTE — PROGRESS NOTE ADULT - SUBJECTIVE AND OBJECTIVE BOX
MILEY MARES  88y, Male  Allergy: No Known Allergies      CHIEF COMPLAINT: DKA/HHS (08 Aug 2023 15:11)      HPI:  87 y/o man w/ PMH of DM, HTN coming from home with complaint of decreased appetite, increased urinary output and craving sweets x 2 weeks. Daughter is caretaker, states patient behavior has changed. Within the last 2 weeks, He is less active, requesting more coca cola and sugar. Pt noted to be hypotensive upon arrival.     ED vitals:  BP 74/ 50, HR 87, Temp 97.2F, satting 95% on room air  Labs significant for WBC 14K, Na 123 (corrected 141), Cr 2.4, AG 23, BHB 5.1. VBG pH 7.19, Lactate 5.8, K 8.6, pCO2 39  EKG tachycardia, bifascicular block, RBBB  CXR unremarkable  CT A/P: Extensive coronary atherosclerosis. Dependent atelectasis at the right base. 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 Hepatic cysts. Questionable sludge within the gallbladder. Unchanged 1 cm cystic lesion in the pancreatic body      s/p calcium gluconate 1g, Lasix 40mg push x1, barcarb 50meq, started on insulin drip at 7 units/hr.   Admitted to MICU for DKA/HHS.     (04 Aug 2023 15:16)    Hospital Course:    In the MICU pt was on levo 0.4 --> 0.2 today; now pressure is >65; received 1uPRBC for Hg 6.6 8/7; being treat w/ IV kenyatta for +amparo BCx; EEG negative; imaging not revealing at this point; hyperglycemia now under control, on b/b/iss.    Overnight events:    - pt daughter at bedside last night; spoke w/ daughter Antonia on phone today  - Pt examined at bedside; incrementally better than yesterday; still diffusely tender/mildly distended ABD  - Hg 7.7 s/p 1uPRBC    PAST MEDICAL & SURGICAL HISTORY:  HTN (hypertension); Gout; BPH (benign prostatic hyperplasia); Parkinson disease; HLD (hyperlipidemia); Smoker within last 12 months; Diabetes mellitus; No significant past surgical history    FAMILY HISTORY  No pertinent family history in first degree relatives    SOCIAL HISTORY    Per family pt is fully functional at BL; lives with wife  Daughters share decision making w/ wife; daughters should be updated first and  they will update wife as per family wishes      ROS  * unable to obtain 2/2 severe illness/delerium     TESTS & MEASUREMENTS:                        7.7    11.85 )-----------( 119      ( 08 Aug 2023 05:55 )             22.3     08-08    146  |  114<H>  |  23<H>  ----------------------------<  60<L>  3.6   |  24  |  1.1    Ca    7.1<L>      08 Aug 2023 05:55  Phos  2.0     08-08  Mg     1.8     08-08    TPro  4.1<L>  /  Alb  1.8<L>  /  TBili  0.6  /  DBili  x   /  AST  35  /  ALT  15  /  AlkPhos  71  08-08      LIVER FUNCTIONS - ( 08 Aug 2023 05:55 )  Alb: 1.8 g/dL / Pro: 4.1 g/dL / ALK PHOS: 71 U/L / ALT: 15 U/L / AST: 35 U/L / GGT: x           Urinalysis Basic - ( 08 Aug 2023 05:55 )    Color: x / Appearance: x / SG: x / pH: x  Gluc: 60 mg/dL / Ketone: x  / Bili: x / Urobili: x   Blood: x / Protein: x / Nitrite: x   Leuk Esterase: x / RBC: x / WBC x   Sq Epi: x / Non Sq Epi: x / Bacteria: x        Culture - Blood (collected 08-05-23 @ 07:10)  Source: .Blood Blood-Peripheral  Preliminary Report (08-07-23 @ 19:02):    No growth at 48 Hours    Culture - Urine (collected 08-04-23 @ 14:42)  Source: Clean Catch Clean Catch (Midstream)  Final Report (08-05-23 @ 19:52):    No growth    Culture - Blood (collected 08-04-23 @ 11:20)  Source: .Blood Blood-Peripheral  Gram Stain (08-06-23 @ 02:58):    Growth in anaerobic bottle: Gram Negative Rods  Final Report (08-07-23 @ 19:28):    Growth in anaerobic bottle: Most closely resembling Hungatella species    "Susceptibilities not performed"    Please Note:************************************************    Hungatella species    may appear as gram negative rods in direct smears of clinical specimens.    Direct identification is available within approximately 3-5    hours either by Blood Panel Multiplexed PCR or Direct    MALDI-TOF. Details: [email or web address]  Organism: Blood Culture PCR (08-07-23 @ 19:28)  Organism: Blood Culture PCR (08-07-23 @ 19:28)      Method Type: PCR      -  Blood PCR Panel: NEG    Culture - Blood (collected 08-04-23 @ 11:20)  Source: .Blood Blood-Peripheral  Preliminary Report (08-07-23 @ 23:01):    No growth at 72 Hours        Lactate, Blood: 1.5 mmol/L (08-08-23 @ 12:40)  Lactate, Blood: 3.4 mmol/L (08-06-23 @ 11:17)  Lactate, Blood: 3.7 mmol/L (08-04-23 @ 23:05)  Lactate, Blood: 2.9 mmol/L (08-04-23 @ 14:44)  Blood Gas Venous - Lactate: 7.40 mmol/L (08-04-23 @ 13:34)  Blood Gas Venous - Lactate: 5.80 mmol/L (08-04-23 @ 10:47)      INFECTIOUS DISEASES TESTING  MRSA PCR Result.: Negative (08-07-23 @ 11:30)      RADIOLOGY & ADDITIONAL TESTS:  I have personally reviewed the last Chest xray  CXR    < from: Xray Chest 1 View- PORTABLE-Routine (Xray Chest 1 View- PORTABLE-Routine in AM.) (08.08.23 @ 05:40) >    Findings:    Right-sided central and the tip in the region superior vena cava.    Nasogastric tube whose tip is not visualized but extends below the left   hemidiaphragm. EKG leads overlie the thorax.    The heart size is within normal limits.    Dilatation of the thoracic aorta.    Left basilar opacity.    Impression:    Left basilar opacity.    --- End of Report ---        < end of copied text >  < from: Xray Kidney Ureter Bladder (08.08.23 @ 10:38) >    Findings/  impression:    Nonobstructive bowel gas pattern    Degenerative change.    Possible tip of the nasogastric tube is seen overlying the left upper   quadrant. Telemetry leads are present.    --- End of Report ---        < end of copied text >      CARDIOLOGY TESTING  12 Lead ECG:   Ventricular Rate 106 BPM    Atrial Rate 106 BPM    P-R Interval 152 ms    QRS Duration 126 ms    Q-T Interval 400 ms    QTC Calculation(Bazett) 531 ms    P Axis 34 degrees    R Axis -50 degrees    T Axis -18 degrees    Diagnosis Line Sinus tachycardia  Right bundle branch block  Left anterior fascicular block  *** Bifascicular block ***  Moderate voltage criteria for LVH, may be normal variant      Abnormal ECG    Confirmed by RUDY FLOR MD ([id number]) on 8/4/2023 11:24:55 AM (08-04-23 @ 10:57)      MEDICATIONS  allopurinol 300  atorvastatin 10  bacitracin   Ointment 1  chlorhexidine 2% Cloths 1  dextrose 5%. 1000  dextrose 50% Injectable 25  fondaparinux Injectable 2.5  glucagon  Injectable 1  insulin regular Infusion 2  lactated ringers. 1000  meropenem  IVPB 1000  norepinephrine Infusion 0.05  pantoprazole    Tablet 40  polyethylene glycol 3350 17  senna 2      ANTIBIOTICS:  meropenem  IVPB 1000 milliGRAM(s) IV Intermittent every 12 hours      ALLERGIES:  No Known Allergies      VITALS:  T(F): 98.9, Max: 98.9 (08-08-23 @ 12:00)  HR: 85  BP: 126/67  RR: 22Vital Signs Last 24 Hrs  T(C): 37.2 (08 Aug 2023 12:00), Max: 37.2 (08 Aug 2023 12:00)  T(F): 98.9 (08 Aug 2023 12:00), Max: 98.9 (08 Aug 2023 12:00)  HR: 85 (08 Aug 2023 14:30) (78 - 105)  BP: 126/67 (08 Aug 2023 14:30) (86/50 - 147/76)  BP(mean): 90 (08 Aug 2023 14:30) (63 - 105)  RR: 22 (08 Aug 2023 14:30) (13 - 32)  SpO2: 99% (08 Aug 2023 14:30) (90% - 100%)    Parameters below as of 08 Aug 2023 12:00  Patient On (Oxygen Delivery Method): nasal cannula  O2 Flow (L/min): 2      PHYSICAL EXAM:  Gen: NAD, resting in bed, uncomfortable  HEENT: Normocephalic, atraumatic  Neck: supple, no lymphadenopathy; tlc in place  CV: Regular rate & regular rhythm  Lungs: decreased BS at bases, no fremitus, no crackles appreciated  Abdomen: mildly distended, diffusely tender  Ext: Warm, well perfused  Neuro: non focal, awake  Skin: no rash, no erythema       HPI:  87 y/o M w/ PMH of DM, HTN coming from home with complaint of decreased appetite, increased urinary output and craving sweets x 2 weeks. Daughter is caretaker, states patient behavior has changed. Within the last 2 weeks, He is less active, requesting more coca cola and sugar. Pt noted to be hypotensive upon arrival.     ED vitals:  BP 74/ 50, HR 87, Temp 97.2F, satting 95% on room air  Labs significant for WBC 14K, Na 123 (corrected 141), Cr 2.4, AG 23, BHB 5.1. VBG pH 7.19, Lactate 5.8, K 8.6, pCO2 39  EKG tachycardia, bifascicular block, RBBB  CXR unremarkable  CT A/P: Extensive coronary atherosclerosis. Dependent atelectasis at the right base. 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 Hepatic cysts. Questionable sludge within the gallbladder. Unchanged 1 cm cystic lesion in the pancreatic body      s/p calcium gluconate 1g, Lasix 40mg push x1, barcarb 50meq, started on insulin drip at 7 units/hr.   Admitted to MICU for DKA/HHS.     (04 Aug 2023 15:16)    Hospital Course:    In the MICU pt was on levo 0.4 --> 0.2 today; now pressure is >65; received 1uPRBC for Hg 6.6 8/7; being treat w/ IV kenyatta for +amparo BCx; EEG negative; imaging not revealing at this point; hyperglycemia now under control, on b/b/iss.    Overnight events:    - pt daughter at bedside last night; spoke w/ daughter Antonia on phone today  - Pt examined at bedside; incrementally better than yesterday; still diffusely tender/mildly distended ABD  - Hg 7.7 s/p 1uPRBC      ADVANCE DIRECTIVES:    [ ] Full Code [X ] DNR  MOLST  [ ]  Living Will  [ ]   DECISION MAKER(s):  [ ] Health Care Proxy(s)  [ ] Surrogate(s)  [ ] Guardian           Name(s): Phone Number(s):  mrak Rausch [phone]  daughter Angie [phone]    BASELINE (I)ADL(s) (prior to admission):  [place]: [ ]Total  [ ] Moderate [ ]Dependent  Palliative Performance Status Version 2:         %    [email or web address]/[email or web address]_performance_scale_[email or web address].pdf    Allergies    No Known Allergies    Intolerances    MEDICATIONS  (STANDING):  allopurinol 300 milliGRAM(s) Oral daily  atorvastatin 10 milliGRAM(s) Oral at bedtime  bacitracin   Ointment 1 Application(s) Topical two times a day  chlorhexidine 2% Cloths 1 Application(s) Topical daily  dextrose 5%. 1000 milliLiter(s) (50 mL/Hr) IV Continuous <Continuous>  dextrose 50% Injectable 25 Gram(s) IV Push once  fondaparinux Injectable 2.5 milliGRAM(s) SubCutaneous daily  glucagon  Injectable 1 milliGRAM(s) IntraMuscular once  insulin regular Infusion 2 Unit(s)/Hr (2 mL/Hr) IV Continuous <Continuous>  lactated ringers. 1000 milliLiter(s) (50 mL/Hr) IV Continuous <Continuous>  meropenem  IVPB 1000 milliGRAM(s) IV Intermittent every 12 hours  norepinephrine Infusion 0.05 MICROgram(s)/kG/Min (5.87 mL/Hr) IV Continuous <Continuous>  pantoprazole    Tablet 40 milliGRAM(s) Oral before breakfast  polyethylene glycol 3350 17 Gram(s) Oral daily  senna 2 Tablet(s) Oral at bedtime    MEDICATIONS  (PRN):  acetaminophen     Tablet .. 650 milliGRAM(s) Oral every 6 hours PRN Temp greater or equal to 38C (100.4F), Moderate Pain (4 - 6)  dextrose Oral Gel 15 Gram(s) Oral once PRN Blood Glucose LESS THAN 70 milliGRAM(s)/deciliter  magnesium hydroxide Suspension 30 milliLiter(s) Oral every 12 hours PRN Constipation      PRESENT SYMPTOMS: [X ]Unable to obtain due to poor mentation   Source if other than patient:  [ ]Family   [ ]Team     Pain: [ ]yes [ ]no  QOL impact -   Location -                    Aggravating factors -  Quality -  Radiation -  Timing-  Severity (0-10 scale):  Minimal acceptable level (0-10 scale):     CPOT:    https://www.[facility].org/getattachment/flx57b22-7o8z-3p9d-7e6g-4484p9939d6o/Critical-Care-Pain-Observation-Tool-(CPOT)    PAIN AD Score: 0  http://geriatrictoolkit.[email or web address]/cog/painad.pdf (press ctrl +  left click to view)    Dyspnea:                           [ ]None[ ]Mild [ ]Moderate [ ]Severe     Respiratory Distress Observation Scale (RDOS): 2  A score of 0 to 2 signifies little or no respiratory distress, 3 signifies mild distress, scores 4 to 6 indicate moderate distress, and scores greater than 7 signify severe distress  [email or web address]://[email or web address]/sites/default/files/PDFS/085715-gxfhpwscdot-tuoqlwqi-zwblxiyeunf-fpluy.pdf    Anxiety:                             [ ]None[ ]Mild [ ]Moderate [ ]Severe   Fatigue:                             [ ]None[ ]Mild [ ]Moderate [ ]Severe   Nausea:                             [ ]None[ ]Mild [ ]Moderate [ ]Severe   Loss of appetite:              [ ]None[ ]Mild [ ]Moderate [ ]Severe   Constipation:                    [ ]None[ ]Mild [ ]Moderate [ ]Severe    Other Symptoms:  [ ]All other review of systems negative     Palliative Performance Status Version 2:         %    http://[email or web address]/files/news/palliative_performance_scale_ppsv2.pdf  PHYSICAL EXAM:  Vital Signs Last 24 Hrs  T(C): 36.6 (08 Aug 2023 16:15), Max: 37.2 (08 Aug 2023 12:00)  T(F): 97.8 (08 Aug 2023 16:15), Max: 98.9 (08 Aug 2023 12:00)  HR: 92 (08 Aug 2023 17:00) (78 - 105)  BP: 99/58 (08 Aug 2023 17:00) (86/50 - 147/76)  BP(mean): 78 (08 Aug 2023 17:00) (63 - 105)  RR: 23 (08 Aug 2023 17:00) (13 - 38)  SpO2: 97% (08 Aug 2023 17:00) (90% - 100%)    Parameters below as of 08 Aug 2023 16:15  Patient On (Oxygen Delivery Method): nasal cannula  O2 Flow (L/min): 2          GENERAL:  [X ] No acute distress [ ]Lethargic  [ ]Unarousable  [ ]Verbal  [ ]Non-Verbal [ ]Cachexia    BEHAVIORAL/PSYCH:  [ ]Alert and Oriented x  [ ] Anxiety [ ] Delirium [ ] Agitation [X ] Calm   EYES: [ ] No scleral icterus [ ] Scleral icterus [X ] Closed  ENMT:  [ ]Dry mouth  [ ]No external oral lesions [X ] No external ear or nose lesions  CARDIOVASCULAR:  [ ]Regular [ ]Irregular [ ]Tachy [ ]Not Tachy  [ ]Meet [ ] Edema [ ] No edema  PULMONARY:  [ ]Tachypnea  [ ]Audible excessive secretions [X ] No labored breathing [ ] labored breathing  GASTROINTESTINAL: [ ]Soft  [ ]Distended  [ X]Not distended [ ]Non tender [ ]Tender  MUSCULOSKELETAL: [ ]No clubbing [ ] clubbing  [X ] No cyanosis [ ] cyanosis  NEUROLOGIC: [ ]No focal deficits  [ ]Follows commands  [ ]Does not follow commands  [X ]Cognitive impairment  [ ]Dysphagia  [ ]Dysarthria  [ ]Paresis   SKIN: [ ] Jaundiced [X ] Non-jaundiced [ ]Rash [ ]No Rash [ ] Warm [ ] Dry  MISC/LINES: [ ] ET tube [ ] Trach [ ]NGT/OGT [ ]PEG [ ]Jnoes    CRITICAL CARE:  [ ] Shock Present  [ ]Septic [ ]Cardiogenic [ ]Neurologic [ ]Hypovolemic  [ ]  Vasopressors [ ]  Inotropes   [ ]Respiratory failure present [ ]Mechanical ventilation [ ]Non-invasive ventilatory support [ ]High flow  [ ]Acute  [ ]Chronic [ ]Hypoxic  [ ]Hypercarbic [ ]Other  [ ]Other organ failure     LABS: reviewed by me                                   7.7    11.85 )-----------( 119      ( 08 Aug 2023 05:55 )             22.3     08-08    146  |  114<H>  |  23<H>  ----------------------------<  60<L>  3.6   |  24  |  1.1    Ca    7.1<L>      08 Aug 2023 05:55  Phos  2.0     08-08  Mg     1.8     08-08          RADIOLOGY & ADDITIONAL STUDIES: reviewed by me  CXR  Xray Chest 1 View AP/PA:   ACC: [id number] EXAM:  XR CHEST 1 VIEW   ORDERED BY: LAZ REYNA     PROCEDURE DATE:  08/05/2023          INTERPRETATION:  CLINICAL HISTORY / REASON FOR EXAM: Line placement.    COMPARISON: Chest radiograph from August 4, 2023.    TECHNIQUE/POSITIONING: Satisfactory. Single image, AP portable chest   radiograph.    FINDINGS:    SUPPORT DEVICES: Interval placement of enteric tube, with distal tip   overlying the stomach.    CARDIAC/MEDIASTINUM/HILUM: Enlarged aortic knob, stable.    LUNG PARENCHYMA/PLEURA: No focal consolidation or pleural effusion. No   pneumothorax.    SKELETON/SOFT TISSUES: Unchanged.      IMPRESSION:    Interval placement of enteric catheter in satisfactory position.    --- End of Report ---      PROTEIN CALORIE MALNUTRITION PRESENT: [ ]mild [ ]moderate [ ]severe [ ]underweight [ ]morbid obesity  https://www.andeal.org/vault/2440/web/files/ONC/Table_Clinical%20Characteristics%20to%20Document%20Malnutrition-White%20JV%20et%20al%782777.pdf    Height (cm): 167.6 (08-06-23 @ 13:32), 162.6 (11-21-22 @ 13:32), 162.6 (10-09-22 @ 22:40)  Weight (kg): 62.6 (08-04-23 @ 20:37), 61.2 (10-09-22 @ 22:40)  BMI (kg/m2): 22.3 (08-06-23 @ 13:32), 23.7 (08-04-23 @ 20:37), 23.1 (11-21-22 @ 13:32)    [ ]PPSV2 < or = to 30% [ ]significant weight loss  [ ]poor nutritional intake  [ ]anasarca      [ ]Artificial Nutrition      Palliative Care Spiritual/Emotional Screening Tool Question  Severity (0-4):                    OR                    [X ] Unable to determine/NA  Score of 2 or greater indicates recommendation of Chaplaincy referral  Chaplaincy Referral: [ ] Yes [ ] Refused [ ] Following     Caregiver [place]:  [ ] Yes [ ] No [ X] Deferred  Social Work Referral [ ]  Patient and Family Centered Care Referral [ ]    Anticipatory Grief Present: [ ] Yes [ ] No [ X] Deferred  Social Work Referral [ ]  Patient and Family Centered Care Referral [ ]    REFERRALS:   [ ]Chaplaincy  [ ]Hospice  [ ]Child Life  [ ]Social Work  [ ]Case management [ ]Holistic Therapy     Palliative care education provided to patient and/or family    Goals of Care Document:     ______________  Juan David Lara MD  Palliative Medicine  [facility]   of Geriatric and Palliative Medicine  [phone]

## 2023-08-08 NOTE — PROGRESS NOTE ADULT - SUBJECTIVE AND OBJECTIVE BOX
Patient is a 88y old  Male who presents with a chief complaint of DKA/HHS (08 Aug 2023 09:29)      overnight events: remains critically ill, no overnight events    Drips: levophed 0.2            ROS: as in HPI; All other systems reviewed are negative        PHYSICAL EXAM  Vital Signs Last 24 Hrs  T(C): 36.9 (08 Aug 2023 08:00), Max: 36.9 (08 Aug 2023 08:00)  T(F): 98.5 (08 Aug 2023 08:00), Max: 98.5 (08 Aug 2023 08:00)  HR: 101 (08 Aug 2023 08:15) (80 - 126)  BP: 100/55 (08 Aug 2023 08:15) (80/47 - 140/71)  BP(mean): 75 (08 Aug 2023 08:15) (59 - 99)  RR: 23 (08 Aug 2023 08:15) (13 - 32)  SpO2: 93% (08 Aug 2023 08:15) (90% - 99%)    Parameters below as of 08 Aug 2023 08:00  Patient On (Oxygen Delivery Method): nasal cannula  O2 Flow (L/min): 2        CONSTITUTIONAL:  in NAD, conffused    ENT:   Airway patent,   NG tube    EYES:   Clear bilaterally,   pupils equal,   round and reactive to light.    CARDIAC:   Normal rate,   regular rhythm.      RESPIRATORY:   No wheezing   Normal chest expansion  Not tachypneic,  bilateral rhonchi    GASTROINTESTINAL:  diffuse abdominal tenderness  No guarding,     MUSCULOSKELETAL:   Range of motion is not limited,    NEUROLOGICAL:   withdraws to pain,   awake,  confused    SKIN:   no decub                I&O's Detail    07 Aug 2023 07:01  -  08 Aug 2023 07:00  --------------------------------------------------------  IN:    Enteral Tube Flush: 550 mL    Glucerna: 1540 mL    IV PiggyBack: 50 mL    Lactated Ringers Bolus: 1000 mL    Norepinephrine: 779 mL    PRBCs (Packed Red Blood Cells): 1 mL  Total IN: 3920 mL    OUT:    Indwelling Catheter - Urethral (mL): 2935 mL  Total OUT: 2935 mL    Total NET: 985 mL      08 Aug 2023 07:01  -  08 Aug 2023 10:24  --------------------------------------------------------  IN:    Enteral Tube Flush: 200 mL    Norepinephrine: 47.2 mL  Total IN: 247.2 mL    OUT:    Indwelling Catheter - Urethral (mL): 40 mL  Total OUT: 40 mL    Total NET: 207.2 mL            LABS:                        7.7    11.85 )-----------( 119      ( 08 Aug 2023 05:55 )             22.3     08 Aug 2023 05:55    146    |  114    |  23     ----------------------------<  60     3.6     |  24     |  1.1      Ca    7.1        08 Aug 2023 05:55  Phos  2.0       08 Aug 2023 05:55  Mg     1.8       08 Aug 2023 05:55    TPro  4.1    /  Alb  1.8    /  TBili  0.6    /  DBili  x      /  AST  35     /  ALT  15     /  AlkPhos  71     08 Aug 2023 05:55  Amylase x     lipase x              CAPILLARY BLOOD GLUCOSE      POCT Blood Glucose.: 74 mg/dL (08 Aug 2023 06:31)    PT/INR - ( 07 Aug 2023 18:00 )   PT: 17.20 sec;   INR: 1.49 ratio         PTT - ( 07 Aug 2023 18:00 )  PTT:39.8 sec  Urinalysis Basic - ( 08 Aug 2023 05:55 )    Color: x / Appearance: x / SG: x / pH: x  Gluc: 60 mg/dL / Ketone: x  / Bili: x / Urobili: x   Blood: x / Protein: x / Nitrite: x   Leuk Esterase: x / RBC: x / WBC x   Sq Epi: x / Non Sq Epi: x / Bacteria: x      Culture    Lactate, Blood: 3.4 mmol/L (08-06-23 @ 11:17)      MEDICATIONS  (STANDING):  allopurinol 300 milliGRAM(s) Oral daily  atorvastatin 10 milliGRAM(s) Oral at bedtime  chlorhexidine 2% Cloths 1 Application(s) Topical daily  dextrose 5%. 1000 milliLiter(s) (50 mL/Hr) IV Continuous <Continuous>  dextrose 50% Injectable 25 Gram(s) IV Push once  fondaparinux Injectable 2.5 milliGRAM(s) SubCutaneous daily  glucagon  Injectable 1 milliGRAM(s) IntraMuscular once  insulin glargine Injectable (LANTUS) 15 Unit(s) SubCutaneous at bedtime  insulin lispro (ADMELOG) corrective regimen sliding scale   SubCutaneous three times a day before meals  insulin lispro Injectable (ADMELOG) 5 Unit(s) SubCutaneous three times a day before meals  meropenem  IVPB 1000 milliGRAM(s) IV Intermittent every 12 hours  norepinephrine Infusion 0.05 MICROgram(s)/kG/Min (5.87 mL/Hr) IV Continuous <Continuous>  pantoprazole    Tablet 40 milliGRAM(s) Oral before breakfast  polyethylene glycol 3350 17 Gram(s) Oral daily  senna 2 Tablet(s) Oral at bedtime    MEDICATIONS  (PRN):  acetaminophen     Tablet .. 650 milliGRAM(s) Oral every 6 hours PRN Temp greater or equal to 38C (100.4F), Moderate Pain (4 - 6)  dextrose Oral Gel 15 Gram(s) Oral once PRN Blood Glucose LESS THAN 70 milliGRAM(s)/deciliter

## 2023-08-08 NOTE — CHART NOTE - NSCHARTNOTEFT_GEN_A_CORE
Provider:                                              Met with: [ x  ] Patient  [   ] Family  [   ] Other:         Primary Language: [   ] English [   ] Other*:                      *Interpretation provided by:         SUPPORT DIAGNOSES            (Check all that apply)         [   ] EOL issues    [ x  ] Advanced Illness    [   ] Cultural / spiritual concerns    [   ] Pain / suffering    [   ] Dementia / AMS    [   ] Other:    [   ] AD issues    [   ] Grief / loss / sadness    [   ] Discharge issues    [   ] Distress / coping         PSYCHOSOCIAL ASSESSMENT OF PATIENT         (Check all that apply)         [ X  ] Initial Assessment            [   ] Reassessment          [   ] Not Applicable this visit         Pain/suffering acuity:    [  x ] None to mild (0-3)           [   ] Moderate (4-6)        [   ] High (7-10)         Mental Status:    [   ] Alert/oriented (x3)          [  x ] Confused/Altered(x2/x1)         [   ] Non-resp         Functional status:    [   ] Independent w ADLs      [ x  ] Needs Assistance             [   ] Bedbound/Full Care         Coping:    [   ] Coping well                     [  ] Coping w/difficulty            [   ] Poor coping         Support system:    [ x  ] Strong                              [   ] Adequate                        [   ] Inadequate              Past history and medications for:         [ ] Anxiety       [ ] Depression    [ ] Sleep disorders              SERVICE PROVIDED    [   ]Discharge support / facilitation    [   ]AD / goals of care counseling                                  [   ]EOL / death / bereavement counseling    [ X  ]Counseling / support                                                [   ] Family meeting    [   ]Prayer / sacrament / ritual                                      [   ] Referral    [   ]Other                                                                           NOTE and Plan of Care (PoC):  88M with PMH of DM and HTN here with decreased appetite, increased urinary output, found here to be in DKA/HHS, with course c/b Hungatella species bacteremia and septic shock on levophed. Also with toxic metabolic encephalopathy. Is on broad-spectrum abx with IV meropenem and IV vanco. Overall poor prognosis. Is DNR/DNI. Palliative Care consulted for GOC.   LMSW met with Pt at bedside. Pt presented A&Ox2; with periods of confusion. No family presented. LMSW requested primary team to provide medical update. Will follow up with Pt's daughter for on-going support and GOC discussions. [phone]

## 2023-08-08 NOTE — PROGRESS NOTE ADULT - SUBJECTIVE AND OBJECTIVE BOX
Patient is a 88y old  Male who presents with a chief complaint of DKA/HHS (08 Aug 2023 10:24)      INTERVAL HPI/OVERNIGHT EVENTS:   No overnight events   Afebrile, hemodynamically stable     ICU Vital Signs Last 24 Hrs  T(C): 37.2 (08 Aug 2023 12:00), Max: 37.2 (08 Aug 2023 12:00)  T(F): 98.9 (08 Aug 2023 12:00), Max: 98.9 (08 Aug 2023 12:00)  HR: 85 (08 Aug 2023 14:30) (78 - 105)  BP: 126/67 (08 Aug 2023 14:30) (86/50 - 147/76)  BP(mean): 90 (08 Aug 2023 14:30) (63 - 105)  ABP: --  ABP(mean): --  RR: 22 (08 Aug 2023 14:30) (13 - 32)  SpO2: 99% (08 Aug 2023 14:30) (90% - 100%)    O2 Parameters below as of 08 Aug 2023 12:00  Patient On (Oxygen Delivery Method): nasal cannula  O2 Flow (L/min): 2        I&O's Summary    07 Aug 2023 07:01  -  08 Aug 2023 07:00  --------------------------------------------------------  IN: 3920 mL / OUT: 2935 mL / NET: 985 mL    08 Aug 2023 07:01  -  08 Aug 2023 15:11  --------------------------------------------------------  IN: 777.9 mL / OUT: 520 mL / NET: 257.9 mL          LABS:                        7.7    11.85 )-----------( 119      ( 08 Aug 2023 05:55 )             22.3     08-08    146  |  114<H>  |  23<H>  ----------------------------<  60<L>  3.6   |  24  |  1.1    Ca    7.1<L>      08 Aug 2023 05:55  Phos  2.0     08-08  Mg     1.8     08-08    TPro  4.1<L>  /  Alb  1.8<L>  /  TBili  0.6  /  DBili  x   /  AST  35  /  ALT  15  /  AlkPhos  71  08-08    PT/INR - ( 07 Aug 2023 18:00 )   PT: 17.20 sec;   INR: 1.49 ratio         PTT - ( 07 Aug 2023 18:00 )  PTT:39.8 sec  Urinalysis Basic - ( 08 Aug 2023 05:55 )    Color: x / Appearance: x / SG: x / pH: x  Gluc: 60 mg/dL / Ketone: x  / Bili: x / Urobili: x   Blood: x / Protein: x / Nitrite: x   Leuk Esterase: x / RBC: x / WBC x   Sq Epi: x / Non Sq Epi: x / Bacteria: x      CAPILLARY BLOOD GLUCOSE      POCT Blood Glucose.: 243 mg/dL (08 Aug 2023 11:38)  POCT Blood Glucose.: 74 mg/dL (08 Aug 2023 06:31)  POCT Blood Glucose.: 109 mg/dL (07 Aug 2023 21:20)  POCT Blood Glucose.: 184 mg/dL (07 Aug 2023 17:38)        RADIOLOGY & ADDITIONAL TESTS:    Consultant(s) Notes Reviewed:  [x ] YES  [ ] NO    MEDICATIONS  (STANDING):  allopurinol 300 milliGRAM(s) Oral daily  atorvastatin 10 milliGRAM(s) Oral at bedtime  chlorhexidine 2% Cloths 1 Application(s) Topical daily  dextrose 5%. 1000 milliLiter(s) (50 mL/Hr) IV Continuous <Continuous>  dextrose 50% Injectable 25 Gram(s) IV Push once  fondaparinux Injectable 2.5 milliGRAM(s) SubCutaneous daily  glucagon  Injectable 1 milliGRAM(s) IntraMuscular once  insulin glargine Injectable (LANTUS) 15 Unit(s) SubCutaneous at bedtime  lactated ringers. 1000 milliLiter(s) (50 mL/Hr) IV Continuous <Continuous>  meropenem  IVPB 1000 milliGRAM(s) IV Intermittent every 12 hours  norepinephrine Infusion 0.05 MICROgram(s)/kG/Min (5.87 mL/Hr) IV Continuous <Continuous>  pantoprazole    Tablet 40 milliGRAM(s) Oral before breakfast  polyethylene glycol 3350 17 Gram(s) Oral daily  senna 2 Tablet(s) Oral at bedtime    MEDICATIONS  (PRN):  acetaminophen     Tablet .. 650 milliGRAM(s) Oral every 6 hours PRN Temp greater or equal to 38C (100.4F), Moderate Pain (4 - 6)  dextrose Oral Gel 15 Gram(s) Oral once PRN Blood Glucose LESS THAN 70 milliGRAM(s)/deciliter  magnesium hydroxide Suspension 30 milliLiter(s) Oral every 12 hours PRN Constipation      PHYSICAL EXAM:  PHYSICAL EXAM:  CONSTITUTIONAL:  Ill appearing in NAD- more alert today    ENT:   Airway patent,     EYES:   Pupils equal,   Round and reactive to light.    CARDIAC:   Normal rate,   Regular rhythm.      RESPIRATORY:   No wheezing  Bilateral BS  Normal chest expansion  Not tachypneic,  No use of accessory muscles    GASTROINTESTINAL:  Abdomen soft,   Non-tender,   No guarding,   + BS    MUSCULOSKELETAL:   Range of motion is not limited,  No clubbing, cyanosis      SKIN:   Skin normal color for race,   No evidence of rash.  Right arm blisters   Penile excoriation    Care Discussed with Consultants/Other Providers [ x] YES  [ ] NO

## 2023-08-09 LAB
ALBUMIN SERPL ELPH-MCNC: 1.8 G/DL — LOW (ref 3.5–5.2)
ALBUMIN SERPL ELPH-MCNC: 1.8 G/DL — LOW (ref 3.5–5.2)
ALP SERPL-CCNC: 68 U/L — SIGNIFICANT CHANGE UP (ref 30–115)
ALP SERPL-CCNC: 76 U/L — SIGNIFICANT CHANGE UP (ref 30–115)
ALT FLD-CCNC: 15 U/L — SIGNIFICANT CHANGE UP (ref 0–41)
ALT FLD-CCNC: 18 U/L — SIGNIFICANT CHANGE UP (ref 0–41)
ANION GAP SERPL CALC-SCNC: 13 MMOL/L — SIGNIFICANT CHANGE UP (ref 7–14)
ANION GAP SERPL CALC-SCNC: 7 MMOL/L — SIGNIFICANT CHANGE UP (ref 7–14)
AST SERPL-CCNC: 37 U/L — SIGNIFICANT CHANGE UP (ref 0–41)
AST SERPL-CCNC: 56 U/L — HIGH (ref 0–41)
BILIRUB SERPL-MCNC: 0.5 MG/DL — SIGNIFICANT CHANGE UP (ref 0.2–1.2)
BILIRUB SERPL-MCNC: 0.6 MG/DL — SIGNIFICANT CHANGE UP (ref 0.2–1.2)
BUN SERPL-MCNC: 25 MG/DL — HIGH (ref 10–20)
BUN SERPL-MCNC: 34 MG/DL — HIGH (ref 10–20)
CALCIUM SERPL-MCNC: 7 MG/DL — LOW (ref 8.4–10.4)
CALCIUM SERPL-MCNC: 7 MG/DL — LOW (ref 8.4–10.5)
CHLORIDE SERPL-SCNC: 112 MMOL/L — HIGH (ref 98–110)
CHLORIDE SERPL-SCNC: 114 MMOL/L — HIGH (ref 98–110)
CO2 SERPL-SCNC: 17 MMOL/L — SIGNIFICANT CHANGE UP (ref 17–32)
CO2 SERPL-SCNC: 24 MMOL/L — SIGNIFICANT CHANGE UP (ref 17–32)
CREAT SERPL-MCNC: 1.1 MG/DL — SIGNIFICANT CHANGE UP (ref 0.7–1.5)
CREAT SERPL-MCNC: 1.3 MG/DL — SIGNIFICANT CHANGE UP (ref 0.7–1.5)
CULTURE RESULTS: SIGNIFICANT CHANGE UP
EGFR: 53 ML/MIN/1.73M2 — LOW
EGFR: 65 ML/MIN/1.73M2 — SIGNIFICANT CHANGE UP
GLUCOSE BLDC GLUCOMTR-MCNC: 114 MG/DL — HIGH (ref 70–99)
GLUCOSE BLDC GLUCOMTR-MCNC: 118 MG/DL — HIGH (ref 70–99)
GLUCOSE BLDC GLUCOMTR-MCNC: 121 MG/DL — HIGH (ref 70–99)
GLUCOSE BLDC GLUCOMTR-MCNC: 126 MG/DL — HIGH (ref 70–99)
GLUCOSE BLDC GLUCOMTR-MCNC: 134 MG/DL — HIGH (ref 70–99)
GLUCOSE BLDC GLUCOMTR-MCNC: 137 MG/DL — HIGH (ref 70–99)
GLUCOSE BLDC GLUCOMTR-MCNC: 138 MG/DL — HIGH (ref 70–99)
GLUCOSE BLDC GLUCOMTR-MCNC: 141 MG/DL — HIGH (ref 70–99)
GLUCOSE BLDC GLUCOMTR-MCNC: 168 MG/DL — HIGH (ref 70–99)
GLUCOSE BLDC GLUCOMTR-MCNC: 170 MG/DL — HIGH (ref 70–99)
GLUCOSE BLDC GLUCOMTR-MCNC: 203 MG/DL — HIGH (ref 70–99)
GLUCOSE BLDC GLUCOMTR-MCNC: 213 MG/DL — HIGH (ref 70–99)
GLUCOSE BLDC GLUCOMTR-MCNC: 230 MG/DL — HIGH (ref 70–99)
GLUCOSE SERPL-MCNC: 150 MG/DL — HIGH (ref 70–99)
GLUCOSE SERPL-MCNC: 242 MG/DL — HIGH (ref 70–99)
HAPTOGLOB SERPL-MCNC: 204 MG/DL — HIGH (ref 34–200)
HCT VFR BLD CALC: 18.5 % — LOW (ref 42–52)
HCT VFR BLD CALC: 21.8 % — LOW (ref 42–52)
HCT VFR BLD CALC: 22.6 % — LOW (ref 42–52)
HGB BLD-MCNC: 6.2 G/DL — CRITICAL LOW (ref 14–18)
HGB BLD-MCNC: 7.4 G/DL — LOW (ref 14–18)
HGB BLD-MCNC: 7.6 G/DL — LOW (ref 14–18)
LACTATE SERPL-SCNC: 7.1 MMOL/L — CRITICAL HIGH (ref 0.7–2)
MCHC RBC-ENTMCNC: 31 PG — SIGNIFICANT CHANGE UP (ref 27–31)
MCHC RBC-ENTMCNC: 31.6 PG — HIGH (ref 27–31)
MCHC RBC-ENTMCNC: 31.7 PG — HIGH (ref 27–31)
MCHC RBC-ENTMCNC: 33.5 G/DL — SIGNIFICANT CHANGE UP (ref 32–37)
MCHC RBC-ENTMCNC: 33.6 G/DL — SIGNIFICANT CHANGE UP (ref 32–37)
MCHC RBC-ENTMCNC: 33.9 G/DL — SIGNIFICANT CHANGE UP (ref 32–37)
MCV RBC AUTO: 91.2 FL — SIGNIFICANT CHANGE UP (ref 80–94)
MCV RBC AUTO: 94.2 FL — HIGH (ref 80–94)
MCV RBC AUTO: 94.4 FL — HIGH (ref 80–94)
NRBC # BLD: 4 /100 WBCS — HIGH (ref 0–0)
NRBC # BLD: 5 /100 WBCS — HIGH (ref 0–0)
NRBC # BLD: 6 /100 WBCS — HIGH (ref 0–0)
PLATELET # BLD AUTO: 134 K/UL — SIGNIFICANT CHANGE UP (ref 130–400)
PLATELET # BLD AUTO: 136 K/UL — SIGNIFICANT CHANGE UP (ref 130–400)
PLATELET # BLD AUTO: 162 K/UL — SIGNIFICANT CHANGE UP (ref 130–400)
PMV BLD: 12.8 FL — HIGH (ref 7.4–10.4)
PMV BLD: 13.5 FL — HIGH (ref 7.4–10.4)
PMV BLD: 13.6 FL — HIGH (ref 7.4–10.4)
POTASSIUM SERPL-MCNC: 4 MMOL/L — SIGNIFICANT CHANGE UP (ref 3.5–5)
POTASSIUM SERPL-MCNC: 4.7 MMOL/L — SIGNIFICANT CHANGE UP (ref 3.5–5)
POTASSIUM SERPL-SCNC: 4 MMOL/L — SIGNIFICANT CHANGE UP (ref 3.5–5)
POTASSIUM SERPL-SCNC: 4.7 MMOL/L — SIGNIFICANT CHANGE UP (ref 3.5–5)
PROT SERPL-MCNC: 4.1 G/DL — LOW (ref 6–8)
PROT SERPL-MCNC: 4.3 G/DL — LOW (ref 6–8)
RBC # BLD: 1.96 M/UL — LOW (ref 4.7–6.1)
RBC # BLD: 2.39 M/UL — LOW (ref 4.7–6.1)
RBC # BLD: 2.4 M/UL — LOW (ref 4.7–6.1)
RBC # FLD: 13.4 % — SIGNIFICANT CHANGE UP (ref 11.5–14.5)
RBC # FLD: 14.9 % — HIGH (ref 11.5–14.5)
RBC # FLD: 15.5 % — HIGH (ref 11.5–14.5)
SODIUM SERPL-SCNC: 142 MMOL/L — SIGNIFICANT CHANGE UP (ref 135–146)
SODIUM SERPL-SCNC: 145 MMOL/L — SIGNIFICANT CHANGE UP (ref 135–146)
SPECIMEN SOURCE: SIGNIFICANT CHANGE UP
WBC # BLD: 10.51 K/UL — SIGNIFICANT CHANGE UP (ref 4.8–10.8)
WBC # BLD: 10.8 K/UL — SIGNIFICANT CHANGE UP (ref 4.8–10.8)
WBC # BLD: 14.16 K/UL — HIGH (ref 4.8–10.8)
WBC # FLD AUTO: 10.51 K/UL — SIGNIFICANT CHANGE UP (ref 4.8–10.8)
WBC # FLD AUTO: 10.8 K/UL — SIGNIFICANT CHANGE UP (ref 4.8–10.8)
WBC # FLD AUTO: 14.16 K/UL — HIGH (ref 4.8–10.8)

## 2023-08-09 PROCEDURE — 99233 SBSQ HOSP IP/OBS HIGH 50: CPT

## 2023-08-09 PROCEDURE — 99222 1ST HOSP IP/OBS MODERATE 55: CPT

## 2023-08-09 PROCEDURE — 71045 X-RAY EXAM CHEST 1 VIEW: CPT | Mod: 26

## 2023-08-09 PROCEDURE — 74018 RADEX ABDOMEN 1 VIEW: CPT | Mod: 26

## 2023-08-09 PROCEDURE — 99291 CRITICAL CARE FIRST HOUR: CPT

## 2023-08-09 RX ORDER — MIDODRINE HYDROCHLORIDE 2.5 MG/1
10 TABLET ORAL EVERY 8 HOURS
Refills: 0 | Status: DISCONTINUED | OUTPATIENT
Start: 2023-08-09 | End: 2023-08-12

## 2023-08-09 RX ORDER — HYDROMORPHONE HYDROCHLORIDE 2 MG/ML
0.2 INJECTION INTRAMUSCULAR; INTRAVENOUS; SUBCUTANEOUS ONCE
Refills: 0 | Status: DISCONTINUED | OUTPATIENT
Start: 2023-08-09 | End: 2023-08-09

## 2023-08-09 RX ORDER — FONDAPARINUX SODIUM 2.5 MG/.5ML
2.5 INJECTION, SOLUTION SUBCUTANEOUS DAILY
Refills: 0 | Status: DISCONTINUED | OUTPATIENT
Start: 2023-08-09 | End: 2023-08-10

## 2023-08-09 RX ORDER — VANCOMYCIN HCL 1 G
1000 VIAL (EA) INTRAVENOUS ONCE
Refills: 0 | Status: COMPLETED | OUTPATIENT
Start: 2023-08-09 | End: 2023-08-09

## 2023-08-09 RX ORDER — SODIUM CHLORIDE 9 MG/ML
500 INJECTION, SOLUTION INTRAVENOUS ONCE
Refills: 0 | Status: COMPLETED | OUTPATIENT
Start: 2023-08-09 | End: 2023-08-09

## 2023-08-09 RX ORDER — NOREPINEPHRINE BITARTRATE/D5W 8 MG/250ML
0.05 PLASTIC BAG, INJECTION (ML) INTRAVENOUS
Qty: 8 | Refills: 0 | Status: DISCONTINUED | OUTPATIENT
Start: 2023-08-09 | End: 2023-08-13

## 2023-08-09 RX ORDER — SODIUM CHLORIDE 9 MG/ML
250 INJECTION, SOLUTION INTRAVENOUS ONCE
Refills: 0 | Status: COMPLETED | OUTPATIENT
Start: 2023-08-09 | End: 2023-08-09

## 2023-08-09 RX ORDER — PHENYLEPHRINE HYDROCHLORIDE 10 MG/ML
0.5 INJECTION INTRAVENOUS
Qty: 160 | Refills: 0 | Status: DISCONTINUED | OUTPATIENT
Start: 2023-08-09 | End: 2023-08-11

## 2023-08-09 RX ADMIN — MAGNESIUM HYDROXIDE 30 MILLILITER(S): 400 TABLET, CHEWABLE ORAL at 03:08

## 2023-08-09 RX ADMIN — PANTOPRAZOLE SODIUM 40 MILLIGRAM(S): 20 TABLET, DELAYED RELEASE ORAL at 06:15

## 2023-08-09 RX ADMIN — MEROPENEM 100 MILLIGRAM(S): 1 INJECTION INTRAVENOUS at 17:03

## 2023-08-09 RX ADMIN — HYDROMORPHONE HYDROCHLORIDE 0.2 MILLIGRAM(S): 2 INJECTION INTRAMUSCULAR; INTRAVENOUS; SUBCUTANEOUS at 19:34

## 2023-08-09 RX ADMIN — ATORVASTATIN CALCIUM 10 MILLIGRAM(S): 80 TABLET, FILM COATED ORAL at 21:13

## 2023-08-09 RX ADMIN — Medication 5.87 MICROGRAM(S)/KG/MIN: at 23:19

## 2023-08-09 RX ADMIN — Medication 1 APPLICATION(S): at 05:37

## 2023-08-09 RX ADMIN — Medication 300 MILLIGRAM(S): at 12:24

## 2023-08-09 RX ADMIN — Medication 250 MILLIGRAM(S): at 21:08

## 2023-08-09 RX ADMIN — SODIUM CHLORIDE 75 MILLILITER(S): 9 INJECTION, SOLUTION INTRAVENOUS at 21:08

## 2023-08-09 RX ADMIN — SODIUM CHLORIDE 75 MILLILITER(S): 9 INJECTION, SOLUTION INTRAVENOUS at 16:32

## 2023-08-09 RX ADMIN — SENNA PLUS 2 TABLET(S): 8.6 TABLET ORAL at 21:13

## 2023-08-09 RX ADMIN — CHLORHEXIDINE GLUCONATE 1 APPLICATION(S): 213 SOLUTION TOPICAL at 05:38

## 2023-08-09 RX ADMIN — Medication 5.87 MICROGRAM(S)/KG/MIN: at 16:33

## 2023-08-09 RX ADMIN — MEROPENEM 100 MILLIGRAM(S): 1 INJECTION INTRAVENOUS at 05:36

## 2023-08-09 RX ADMIN — FONDAPARINUX SODIUM 2.5 MILLIGRAM(S): 2.5 INJECTION, SOLUTION SUBCUTANEOUS at 12:24

## 2023-08-09 RX ADMIN — POLYETHYLENE GLYCOL 3350 17 GRAM(S): 17 POWDER, FOR SOLUTION ORAL at 12:24

## 2023-08-09 RX ADMIN — SODIUM CHLORIDE 500 MILLILITER(S): 9 INJECTION, SOLUTION INTRAVENOUS at 21:13

## 2023-08-09 RX ADMIN — SODIUM CHLORIDE 75 MILLILITER(S): 9 INJECTION, SOLUTION INTRAVENOUS at 21:13

## 2023-08-09 RX ADMIN — INSULIN HUMAN 2 UNIT(S)/HR: 100 INJECTION, SOLUTION SUBCUTANEOUS at 16:33

## 2023-08-09 RX ADMIN — HYDROMORPHONE HYDROCHLORIDE 0.2 MILLIGRAM(S): 2 INJECTION INTRAMUSCULAR; INTRAVENOUS; SUBCUTANEOUS at 18:50

## 2023-08-09 RX ADMIN — MIDODRINE HYDROCHLORIDE 10 MILLIGRAM(S): 2.5 TABLET ORAL at 14:56

## 2023-08-09 RX ADMIN — Medication 1 APPLICATION(S): at 17:13

## 2023-08-09 RX ADMIN — MIDODRINE HYDROCHLORIDE 10 MILLIGRAM(S): 2.5 TABLET ORAL at 21:14

## 2023-08-09 RX ADMIN — PHENYLEPHRINE HYDROCHLORIDE 5.87 MICROGRAM(S)/KG/MIN: 10 INJECTION INTRAVENOUS at 23:19

## 2023-08-09 RX ADMIN — SODIUM CHLORIDE 500 MILLILITER(S): 9 INJECTION, SOLUTION INTRAVENOUS at 11:50

## 2023-08-09 NOTE — CONSULT NOTE ADULT - SUBJECTIVE AND OBJECTIVE BOX
GENERAL SURGERY CONSULT NOTE    Patient: MILEY MARES , 88y ([birth year])Male   MRN: [id number]  Location: [address]  Visit: 08-04-23 Inpatient  Date: 08-09-23 @ 16:41    HPI:  88M with PMH of DM, HTN coming from home with complaint of decreased appetite, increased urinary output and craving sweets x 2 weeks. Daughter is caretaker, states patient behavior has changed. Within the last 2 weeks, He is less active, requesting more coca cola and sugar. Pt noted to be hypotensive upon arrival.     Patient admitted to MICU found to be in DKA with AMS. Patient was septic, with blood cultures growing hungatella. Patient on IV abx with resolving hypotension and decreasing pressor requirements. General surgery consulted for abnormal CT finding.     PAST MEDICAL & SURGICAL HISTORY:  HTN (hypertension)  Gout  BPH (benign prostatic hyperplasia)  Parkinson disease  HLD (hyperlipidemia)  Smoker within last 12 months  Diabetes mellitus  No significant past surgical history    Home Medications:  ALLOPURINOL  300 MG TABS: 1 tab(s) orally once a day (12 Oct 2022 10:31)  AMLODIPINE BESYLATE  5 MG TABS: 1 tab(s) orally once a day (12 Oct 2022 10:31)  ATORVASTATIN CALCIUM  10 MG TABS: 1 tab(s) orally once a day (12 Oct 2022 10:31)  METFORMIN HYDROCHLORIDE ER  750 MG TB24: 1 tab(s) orally once a day (12 Oct 2022 10:31)  OLMESARTAN MEDOXOMIL  20 MG TABS: 1 tab(s) orally once a day (12 Oct 2022 10:31)  TAMSULOSIN HYDROCHLORIDE  0.4 MG CAPS: 1 cap(s) orally once a day (12 Oct 2022 10:31)    VITALS:  T(F): 98.4 (08-09-23 @ 16:00), Max: 99.6 (08-09-23 @ 08:00)  HR: 93 (08-09-23 @ 16:15) (85 - 116)  BP: 96/52 (08-09-23 @ 16:15) (70/45 - 155/77)  RR: 29 (08-09-23 @ 16:15) (16 - 43)  SpO2: 97% (08-09-23 @ 16:15) (92% - 100%)    PHYSICAL EXAM:  General: NAD, AAOx3, calm and cooperative  HEENT: NCAT, DELFINO, EOMI, Trachea ML, Neck supple  Cardiac: RRR S1, S2, no Murmurs, rubs or gallops  Respiratory: CTAB, normal respiratory effort, breath sounds equal BL, no wheeze, rhonchi or crackles  Abdomen: Soft, mildly distended, mild mid lower abdominal pain, no rebound, no guarding.  Neuro: Sensation grossly intact and equal throughout, no focal deficits  Vascular: Pulses 2+ throughout, extremities well perfused  Skin: Warm/dry, normal color, no jaundice    MEDICATIONS  (STANDING):  allopurinol 300 milliGRAM(s) Oral daily  atorvastatin 10 milliGRAM(s) Oral at bedtime  bacitracin   Ointment 1 Application(s) Topical two times a day  chlorhexidine 2% Cloths 1 Application(s) Topical daily  dextrose 5%. 1000 milliLiter(s) (50 mL/Hr) IV Continuous <Continuous>  dextrose 50% Injectable 25 Gram(s) IV Push once  fondaparinux Injectable 2.5 milliGRAM(s) SubCutaneous daily  glucagon  Injectable 1 milliGRAM(s) IntraMuscular once  insulin regular Infusion 2 Unit(s)/Hr (2 mL/Hr) IV Continuous <Continuous>  lactated ringers. 1000 milliLiter(s) (75 mL/Hr) IV Continuous <Continuous>  meropenem  IVPB 1000 milliGRAM(s) IV Intermittent every 12 hours  midodrine 10 milliGRAM(s) Oral every 8 hours  norepinephrine Infusion 0.05 MICROgram(s)/kG/Min (5.87 mL/Hr) IV Continuous <Continuous>  pantoprazole    Tablet 40 milliGRAM(s) Oral before breakfast  polyethylene glycol 3350 17 Gram(s) Oral daily  senna 2 Tablet(s) Oral at bedtime    MEDICATIONS  (PRN):  acetaminophen     Tablet .. 650 milliGRAM(s) Oral every 6 hours PRN Temp greater or equal to 38C (100.4F), Moderate Pain (4 - 6)  dextrose Oral Gel 15 Gram(s) Oral once PRN Blood Glucose LESS THAN 70 milliGRAM(s)/deciliter  magnesium hydroxide Suspension 30 milliLiter(s) Oral every 12 hours PRN Constipation    LAB/STUDIES:                        6.2    10.51 )-----------( 136      ( 09 Aug 2023 04:55 )             18.5     08-09    145  |  114<H>  |  25<H>  ----------------------------<  150<H>  4.0   |  24  |  1.1    Ca    7.0<L>      09 Aug 2023 04:55  Phos  2.0     08-08  Mg     1.8     08-08    TPro  4.1<L>  /  Alb  1.8<L>  /  TBili  0.5  /  DBili  x   /  AST  37  /  ALT  15  /  AlkPhos  68  08-09    PT/INR - ( 08 Aug 2023 20:33 )   PT: 15.50 sec;   INR: 1.35 ratio         PTT - ( 08 Aug 2023 20:33 )  PTT:37.0 sec  LIVER FUNCTIONS - ( 09 Aug 2023 04:55 )  Alb: 1.8 g/dL / Pro: 4.1 g/dL / ALK PHOS: 68 U/L / ALT: 15 U/L / AST: 37 U/L / GGT: x           Urinalysis Basic - ( 09 Aug 2023 04:55 )    Color: x / Appearance: x / SG: x / pH: x  Gluc: 150 mg/dL / Ketone: x  / Bili: x / Urobili: x   Blood: x / Protein: x / Nitrite: x   Leuk Esterase: x / RBC: x / WBC x   Sq Epi: x / Non Sq Epi: x / Bacteria: x    IMAGING:  < from: CT Abdomen and Pelvis w/ Oral Cont (08.08.23 @ 16:19) >  IMPRESSION:    New mild dilation of the appendix with small volume free fluid in the   bilateral lower quadrants. Findings are concerning for appendicitis.    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

## 2023-08-09 NOTE — PROGRESS NOTE ADULT - PROBLEM SELECTOR PLAN 1
- on IV kenyatta; high risk, monitor counts (d#3)  - f/u cx's; ID recs  - pressure improving, off levo, on midodrine  - CT abd concerning for appendicitis, pending surgery cs

## 2023-08-09 NOTE — PROGRESS NOTE ADULT - SUBJECTIVE AND OBJECTIVE BOX
MILEY MARES  88y, Male  Allergy: No Known Allergies      LOS  5d    CHIEF COMPLAINT: DKA/HHS (09 Aug 2023 11:20)      INTERVAL EVENTS/HPI  - No acute events overnight  - T(F): , Max: 99.6 (08-09-23 @ 08:00)  - Tolerating medication  - WBC Count: 10.51 (08-09-23 @ 04:55)  WBC Count: 10.83 (08-08-23 @ 20:33)     - Creatinine: 1.1 (08-09-23 @ 04:55)  Creatinine: 1.1 (08-08-23 @ 05:55)       ROS  unable to obtain history secondary to patient's mental status and/or sedation     VITALS:  T(F): 98.7, Max: 99.6 (08-09-23 @ 08:00)  HR: 91  BP: 111/66  RR: 30Vital Signs Last 24 Hrs  T(C): 37.1 (09 Aug 2023 12:00), Max: 37.6 (09 Aug 2023 08:00)  T(F): 98.7 (09 Aug 2023 12:00), Max: 99.6 (09 Aug 2023 08:00)  HR: 91 (09 Aug 2023 13:45) (81 - 116)  BP: 111/66 (09 Aug 2023 13:30) (70/45 - 155/77)  BP(mean): 84 (09 Aug 2023 13:30) (54 - 109)  RR: 30 (09 Aug 2023 13:45) (16 - 42)  SpO2: 94% (09 Aug 2023 13:45) (92% - 100%)    Parameters below as of 09 Aug 2023 12:00  Patient On (Oxygen Delivery Method): room air        PHYSICAL EXAM:  Gen: chronically ill appearing   HEENT: Normocephalic, atraumatic  Neck: supple, no lymphadenopathy  CV: Regular rate & regular rhythm  Lungs: decreased BS at bases, no fremitus  Abdomen: Soft, BS present tender  Ext: Warm, well perfused  Neuro: non focal, not following commands  Skin: no rash, no erythema  Lines: no phlebitis     FH: Non-contributory  Social Hx: Non-contributory    TESTS & MEASUREMENTS:                        6.2    10.51 )-----------( 136      ( 09 Aug 2023 04:55 )             18.5     08-09    145  |  114<H>  |  25<H>  ----------------------------<  150<H>  4.0   |  24  |  1.1    Ca    7.0<L>      09 Aug 2023 04:55  Phos  2.0     08-08  Mg     1.8     08-08    TPro  4.1<L>  /  Alb  1.8<L>  /  TBili  0.5  /  DBili  x   /  AST  37  /  ALT  15  /  AlkPhos  68  08-09      LIVER FUNCTIONS - ( 09 Aug 2023 04:55 )  Alb: 1.8 g/dL / Pro: 4.1 g/dL / ALK PHOS: 68 U/L / ALT: 15 U/L / AST: 37 U/L / GGT: x           Urinalysis Basic - ( 09 Aug 2023 04:55 )    Color: x / Appearance: x / SG: x / pH: x  Gluc: 150 mg/dL / Ketone: x  / Bili: x / Urobili: x   Blood: x / Protein: x / Nitrite: x   Leuk Esterase: x / RBC: x / WBC x   Sq Epi: x / Non Sq Epi: x / Bacteria: x        Culture - Blood (collected 08-05-23 @ 07:10)  Source: .Blood Blood-Peripheral  Preliminary Report (08-08-23 @ 19:01):    No growth at 72 Hours    Culture - Urine (collected 08-04-23 @ 14:42)  Source: Clean Catch Clean Catch (Midstream)  Final Report (08-05-23 @ 19:52):    No growth    Culture - Blood (collected 08-04-23 @ 11:20)  Source: .Blood Blood-Peripheral  Gram Stain (08-06-23 @ 02:58):    Growth in anaerobic bottle: Gram Negative Rods  Final Report (08-07-23 @ 19:28):    Growth in anaerobic bottle: Most closely resembling Hungatella species    "Susceptibilities not performed"    Please Note:************************************************    Hungatella species    may appear as gram negative rods in direct smears of clinical specimens.    Direct identification is available within approximately 3-5    hours either by Blood Panel Multiplexed PCR or Direct    MALDI-TOF. Details: [email or web address]  Organism: Blood Culture PCR (08-07-23 @ 19:28)  Organism: Blood Culture PCR (08-07-23 @ 19:28)      Method Type: PCR      -  Blood PCR Panel: NEG    Culture - Blood (collected 08-04-23 @ 11:20)  Source: .Blood Blood-Peripheral  Preliminary Report (08-08-23 @ 23:01):    No growth at 4 days        Lactate, Blood: 1.5 mmol/L (08-08-23 @ 12:40)  Lactate, Blood: 3.4 mmol/L (08-06-23 @ 11:17)  Lactate, Blood: 3.7 mmol/L (08-04-23 @ 23:05)  Lactate, Blood: 2.9 mmol/L (08-04-23 @ 14:44)      INFECTIOUS DISEASES TESTING  MRSA PCR Result.: Negative (08-07-23 @ 11:30)  Procalcitonin, Serum: 0.67 (08-06-23 @ 05:05)  Procalcitonin, Serum: 0.48 (08-04-23 @ 23:05)  COVID-19 PCR: NotDetec (11-21-22 @ 18:43)  strept    INFLAMMATORY MARKERS      RADIOLOGY & ADDITIONAL TESTS:  I have personally reviewed the last available Chest xray  CXR      CT      CARDIOLOGY TESTING  12 Lead ECG:   Ventricular Rate 106 BPM    Atrial Rate 106 BPM    P-R Interval 152 ms    QRS Duration 126 ms    Q-T Interval 400 ms    QTC Calculation(Bazett) 531 ms    P Axis 34 degrees    R Axis -50 degrees    T Axis -18 degrees    Diagnosis Line Sinus tachycardia  Right bundle branch block  Left anterior fascicular block  *** Bifascicular block ***  Moderate voltage criteria for LVH, may be normal variant      Abnormal ECG    Confirmed by RUDY FLOR MD ([id number]) on 8/4/2023 11:24:55 AM (08-04-23 @ 10:57)      MEDICATIONS  allopurinol 300 Oral daily  atorvastatin 10 Oral at bedtime  bacitracin   Ointment 1 Topical two times a day  chlorhexidine 2% Cloths 1 Topical daily  dextrose 5%. 1000 IV Continuous <Continuous>  dextrose 50% Injectable 25 IV Push once  fondaparinux Injectable 2.5 SubCutaneous daily  glucagon  Injectable 1 IntraMuscular once  insulin regular Infusion 2 IV Continuous <Continuous>  lactated ringers. 1000 IV Continuous <Continuous>  meropenem  IVPB 1000 IV Intermittent every 12 hours  midodrine 10 Oral every 8 hours  norepinephrine Infusion 0.05 IV Continuous <Continuous>  pantoprazole    Tablet 40 Oral before breakfast  polyethylene glycol 3350 17 Oral daily  senna 2 Oral at bedtime      WEIGHT  Weight (kg): 62.6 (08-04-23 @ 20:37)  Creatinine: 1.1 mg/dL (08-09-23 @ 04:55)      ANTIBIOTICS:  meropenem  IVPB 1000 milliGRAM(s) IV Intermittent every 12 hours      All available historical records have been reviewed

## 2023-08-09 NOTE — CONSULT NOTE ADULT - ASSESSMENT
88M with PMH of DM, HTN coming from home with complaint of decreased appetite, increased urinary output and craving sweets x 2 weeks. Daughter is caretaker, states patient behavior has changed. Within the last 2 weeks, He is less active, requesting more coca cola and sugar. Pt noted to be hypotensive upon arrival.     Patient admitted to MICU found to be in DKA with AMS. Patient was septic, with blood cultures growing hungatella. Patient on IV abx with resolving hypotension and decreasing pressor requirements. General surgery consulted for abnormal CT finding.     PLAN:   - No acute surgical intervention   - Low likelihood of appendicitis, there is mild dilatation but there is minimal evidence of inflammation in the area   - Continue IV abx as per ID   - Please recall as needed    Discussed plan with attending surgeon on call, Dr. Cantu  SPECTRA [id number] no distress/well-groomed

## 2023-08-09 NOTE — PROGRESS NOTE ADULT - ASSESSMENT
89 y/o man w/ PMH of DM, HTN coming from home with complaint of decreased appetite, increased urinary output and craving sweets x 2 weeks. Admitted MICU for septic shock

## 2023-08-09 NOTE — PROGRESS NOTE ADULT - SUBJECTIVE AND OBJECTIVE BOX
Patient is a 88y old  Male who presents with a chief complaint of DKA/HHS (09 Aug 2023 08:29)      INTERVAL HPI/OVERNIGHT EVENTS:   No overnight events.  Afebrile, hemodynamically stable     ICU Vital Signs Last 24 Hrs  T(C): 37.6 (09 Aug 2023 08:00), Max: 37.6 (09 Aug 2023 08:00)  T(F): 99.6 (09 Aug 2023 08:00), Max: 99.6 (09 Aug 2023 08:00)  HR: 92 (09 Aug 2023 10:30) (78 - 116)  BP: 134/73 (09 Aug 2023 10:30) (70/45 - 152/86)  BP(mean): 97 (09 Aug 2023 10:30) (54 - 109)  ABP: --  ABP(mean): --  RR: 44 (09 Aug 2023 10:30) (16 - 44)  SpO2: 98% (09 Aug 2023 10:30) (95% - 100%)    O2 Parameters below as of 09 Aug 2023 10:00  Patient On (Oxygen Delivery Method): nasal cannula  O2 Flow (L/min): 2        I&O's Summary    08 Aug 2023 07:01  -  09 Aug 2023 07:00  --------------------------------------------------------  IN: 1810.9 mL / OUT: 1580 mL / NET: 230.9 mL    09 Aug 2023 07:01  -  09 Aug 2023 10:56  --------------------------------------------------------  IN: 462.1 mL / OUT: 75 mL / NET: 387.1 mL          LABS:                        6.2    10.51 )-----------( 136      ( 09 Aug 2023 04:55 )             18.5     08-09    145  |  114<H>  |  25<H>  ----------------------------<  150<H>  4.0   |  24  |  1.1    Ca    7.0<L>      09 Aug 2023 04:55  Phos  2.0     08-08  Mg     1.8     08-08    TPro  4.1<L>  /  Alb  1.8<L>  /  TBili  0.5  /  DBili  x   /  AST  37  /  ALT  15  /  AlkPhos  68  08-09    PT/INR - ( 08 Aug 2023 20:33 )   PT: 15.50 sec;   INR: 1.35 ratio         PTT - ( 08 Aug 2023 20:33 )  PTT:37.0 sec  Urinalysis Basic - ( 09 Aug 2023 04:55 )    Color: x / Appearance: x / SG: x / pH: x  Gluc: 150 mg/dL / Ketone: x  / Bili: x / Urobili: x   Blood: x / Protein: x / Nitrite: x   Leuk Esterase: x / RBC: x / WBC x   Sq Epi: x / Non Sq Epi: x / Bacteria: x      CAPILLARY BLOOD GLUCOSE      POCT Blood Glucose.: 121 mg/dL (09 Aug 2023 07:57)  POCT Blood Glucose.: 118 mg/dL (09 Aug 2023 07:02)  POCT Blood Glucose.: 141 mg/dL (09 Aug 2023 05:43)  POCT Blood Glucose.: 170 mg/dL (09 Aug 2023 04:13)  POCT Blood Glucose.: 137 mg/dL (09 Aug 2023 02:49)  POCT Blood Glucose.: 138 mg/dL (09 Aug 2023 01:39)  POCT Blood Glucose.: 134 mg/dL (09 Aug 2023 00:39)  POCT Blood Glucose.: 154 mg/dL (08 Aug 2023 23:15)  POCT Blood Glucose.: 124 mg/dL (08 Aug 2023 22:06)  POCT Blood Glucose.: 114 mg/dL (08 Aug 2023 21:21)  POCT Blood Glucose.: 102 mg/dL (08 Aug 2023 20:06)  POCT Blood Glucose.: 133 mg/dL (08 Aug 2023 18:57)  POCT Blood Glucose.: 181 mg/dL (08 Aug 2023 17:49)  POCT Blood Glucose.: 243 mg/dL (08 Aug 2023 11:38)        RADIOLOGY & ADDITIONAL TESTS:    Consultant(s) Notes Reviewed:  [x ] YES  [ ] NO    MEDICATIONS  (STANDING):  allopurinol 300 milliGRAM(s) Oral daily  atorvastatin 10 milliGRAM(s) Oral at bedtime  bacitracin   Ointment 1 Application(s) Topical two times a day  chlorhexidine 2% Cloths 1 Application(s) Topical daily  dextrose 5%. 1000 milliLiter(s) (50 mL/Hr) IV Continuous <Continuous>  dextrose 50% Injectable 25 Gram(s) IV Push once  fondaparinux Injectable 2.5 milliGRAM(s) SubCutaneous daily  glucagon  Injectable 1 milliGRAM(s) IntraMuscular once  insulin regular Infusion 2 Unit(s)/Hr (2 mL/Hr) IV Continuous <Continuous>  lactated ringers. 1000 milliLiter(s) (50 mL/Hr) IV Continuous <Continuous>  meropenem  IVPB 1000 milliGRAM(s) IV Intermittent every 12 hours  midodrine 10 milliGRAM(s) Oral every 8 hours  norepinephrine Infusion 0.05 MICROgram(s)/kG/Min (5.87 mL/Hr) IV Continuous <Continuous>  pantoprazole    Tablet 40 milliGRAM(s) Oral before breakfast  polyethylene glycol 3350 17 Gram(s) Oral daily  senna 2 Tablet(s) Oral at bedtime    MEDICATIONS  (PRN):  acetaminophen     Tablet .. 650 milliGRAM(s) Oral every 6 hours PRN Temp greater or equal to 38C (100.4F), Moderate Pain (4 - 6)  dextrose Oral Gel 15 Gram(s) Oral once PRN Blood Glucose LESS THAN 70 milliGRAM(s)/deciliter  magnesium hydroxide Suspension 30 milliLiter(s) Oral every 12 hours PRN Constipation      PHYSICAL EXAM:  CONSTITUTIONAL:  Ill appearing in NAD    ENT:   Airway patent,   Mouth with normal mucosa.   Poor dentition    EYES:   Pupils equal,   Round and reactive to light.    CARDIAC:   Normal rate,   Regular rhythm.        RESPIRATORY:   No wheezing  Bilateral BS  Normal chest expansion  Not tachypneic,  No use of accessory muscles  on NC 2L    GASTROINTESTINAL:  Abdomen soft, Distended (less than yesterday),   Diffuse tenderness  No guarding,   + BS    MUSCULOSKELETAL:   Range of motion is not limited,  No clubbing, cyanosis    NEUROLOGICAL:   Alert     SKIN:   Skin normal color for race,   No evidence of rash.    Care Discussed with Consultants/Other Providers [ x] YES  [ ] NO

## 2023-08-09 NOTE — CHART NOTE - NSCHARTNOTEFT_GEN_A_CORE
Pt seen at bedside. Resting comfortably; no non-verbal signs of pain or discomfort. LMSW spoke with Pt's daughter Antonia P# [phone] and introduced Palliative Care LMSW role. Pt's daughter understood. Pt's daughter noted she did receive a medical update and is waiting to speak with surgery team to regarding possible appendicitis. Code status confirmed. GOC clear: DNR/DNI with on-going medical management. No Palliative Care Social Work needs identified. Palliative Care contact provided to daughter; Antonia. [phone]

## 2023-08-09 NOTE — SWALLOW BEDSIDE ASSESSMENT ADULT - SWALLOW EVAL: DIAGNOSIS
Mild oral dysphagia for all consistencies +min overt s/s of penetration/aspiration w/ thin via straw +toleration of small cup sips of thin and mildly thick liquids and puree w/o overt s/s of penetration/aspiration

## 2023-08-09 NOTE — CHART NOTE - NSCHARTNOTEFT_GEN_A_CORE
Seen and examined now with increased pressors to levo 0.35 and abdominal distension and generalized tenderness. Not peritonitic. Please obtain Ct angio abdomen/pelvis to r/o ischemia and ABG for lactate level.    Plan    ABG STAT  CT angio Abdomen/Pelvis    [id number] Seen and examined now with increased pressors to levo 0.35 and abdominal distension and generalized tenderness. Not peritonitic. Please obtain Ct angio abdomen/pelvis to r/o ischemia and ABG for lactate level.     Ileus vs ischemia, unlikely perforated appendicitis    Plan    ABG STAT/lactate blood draw  CT angio Abdomen/Pelvis  NPO  If nausea/vomiting insert NGT to low continuous suction    [id number]

## 2023-08-09 NOTE — PROGRESS NOTE ADULT - ASSESSMENT
87 y/o M w/ PMH of DM, HTN coming from home with complaint of decreased appetite, increased urinary output and craving sweets x 2 weeks. Admitted for management of DKA.     IMPRESSION  #Septic shock   - Gram Negative Bacteremia -> BCx on 8/4: + for Hungatella x 1 -> f up suceptibility  - Blood cx repeated 8/5 negative so far   - f/u pending cultures  - UA/UC negative  - going down on the levo. -- will start midodrine  - IVF based on cheetah (not fluid responsive today)  - lactate trending down from 3.4 to 1.5  - no documented fevers  - off vancomycin - MRSA screen negative  - on meropenem 1g Q12 day 3  - ID on board     # ABdominal distention:  - ABdomen is more distended today  - Had BM yesterday - on dulcolax and milk of magnesia given.   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 Findings are concerning for appendicitis.  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  - Surgery team consulted -> f up.  - Keep NPO for now- NG tube feeding withheld until surgery team's input.     #DKA/HHS,   - resolved  - endo consulted for f up    #AMS toxic metabolic   - CT head -> negative  - EEG -> There were no findings of active epilepsy.    # drop in Hgb on 8/7 and today (6.2):  - DIC and HIT panels negative  - NADEGE negative for blood  - No hematomas  - Will keep off renetta for now awaiting surgery's input --> in case a surgical intervention is warranted  - 1 unit pRBC given on 8/7 and another one today

## 2023-08-09 NOTE — PROGRESS NOTE ADULT - ASSESSMENT
IMPRESSION:    DKA/HHS, resolved  Hungatella species bacteremia   Septic shock on levophed   AMS toxic metabolic improving   HO DM  HO HTN  Abdominal aortic aneurysm  Parkinson disease    PLAN:    CNS: Avoid sedation    HEENT: Oral care.  NG care     PULMONARY:  HOB @ 45 degrees.  Aspiration precautions, wean oxygen as tolerated.     CARDIOVASCULAR:   Avoid volume overload.  Wean levophed, IV fluid LR while NPO.  Midodrine 10 mg Q8     GI: GI prophylaxis, FU CTA and Start feeding     RENAL:  Follow up lytes.  Correct as needed    INFECTIOUS DISEASE: FU repeat BC.  Continue Meropenem.  ID Eval appreciated     HEMATOLOGICAL:  DVT prophylaxis. Continue Fondaparinux.  One Unit PRBC.  trend CBC.  NG Lavage.     ENDOCRINE:  Follow up FS.  Insulin protocol, endocrine    MUSCULOSKELETAL: bedrest    MITZI DUNNEU

## 2023-08-09 NOTE — PROGRESS NOTE ADULT - SUBJECTIVE AND OBJECTIVE BOX
Patient is a 88y old  Male who presents with a chief complaint of DKA/HHS (08 Aug 2023 15:58)        Over Night Events:  Remains critically ill on Levophed. On 2 liters O2.  More awake.         ROS:     All ROS are negative except HPI         PHYSICAL EXAM    ICU Vital Signs Last 24 Hrs  T(C): 36.7 (09 Aug 2023 04:00), Max: 37.2 (08 Aug 2023 12:00)  T(F): 98 (09 Aug 2023 04:00), Max: 98.9 (08 Aug 2023 12:00)  HR: 93 (09 Aug 2023 07:00) (78 - 116)  BP: 84/53 (09 Aug 2023 07:00) (70/45 - 147/76)  BP(mean): 64 (09 Aug 2023 07:00) (54 - 105)  ABP: --  ABP(mean): --  RR: 23 (09 Aug 2023 07:00) (16 - 42)  SpO2: 99% (09 Aug 2023 07:52) (94% - 100%)    O2 Parameters below as of 09 Aug 2023 07:52  Patient On (Oxygen Delivery Method): nasal cannula  O2 Flow (L/min): 2          CONSTITUTIONAL:  Ill appearing in NAD    ENT:   Airway patent,   Mouth with normal mucosa.       EYES:   Pupils equal,   Round and reactive to light.    CARDIAC:   Normal rate,   Regular rhythm.        RESPIRATORY:   No wheezing  Bilateral BS  Normal chest expansion  Not tachypneic,  No use of accessory muscles    GASTROINTESTINAL:  Abdomen soft,   Non-tender,   No guarding,   + BS    MUSCULOSKELETAL:   Range of motion is not limited,  No clubbing, cyanosis    NEUROLOGICAL:   Alert     SKIN:   Skin normal color for race,   No evidence of rash.        08-08-23 @ 07:01  -  08-09-23 @ 07:00  --------------------------------------------------------  IN:    Enteral Tube Flush: 400 mL    Insulin: 11 mL    IV PiggyBack: 50 mL    Lactated Ringers: 1050 mL    Norepinephrine: 299.9 mL  Total IN: 1810.9 mL    OUT:    Indwelling Catheter - Urethral (mL): 1580 mL    Nasogastric/Oral tube (mL): 0 mL  Total OUT: 1580 mL    Total NET: 230.9 mL          LABS:                            6.2    10.51 )-----------( 136      ( 09 Aug 2023 04:55 )             18.5                                               08-09    145  |  114<H>  |  25<H>  ----------------------------<  150<H>  4.0   |  24  |  1.1    Ca    7.0<L>      09 Aug 2023 04:55  Phos  2.0     08-08  Mg     1.8     08-08    TPro  4.1<L>  /  Alb  1.8<L>  /  TBili  0.5  /  DBili  x   /  AST  37  /  ALT  15  /  AlkPhos  68  08-09      PT/INR - ( 08 Aug 2023 20:33 )   PT: 15.50 sec;   INR: 1.35 ratio         PTT - ( 08 Aug 2023 20:33 )  PTT:37.0 sec                                       Urinalysis Basic - ( 09 Aug 2023 04:55 )    Color: x / Appearance: x / SG: x / pH: x  Gluc: 150 mg/dL / Ketone: x  / Bili: x / Urobili: x   Blood: x / Protein: x / Nitrite: x   Leuk Esterase: x / RBC: x / WBC x   Sq Epi: x / Non Sq Epi: x / Bacteria: x                                                  LIVER FUNCTIONS - ( 09 Aug 2023 04:55 )  Alb: 1.8 g/dL / Pro: 4.1 g/dL / ALK PHOS: 68 U/L / ALT: 15 U/L / AST: 37 U/L / GGT: x                                                                                                                                       MEDICATIONS  (STANDING):  allopurinol 300 milliGRAM(s) Oral daily  atorvastatin 10 milliGRAM(s) Oral at bedtime  bacitracin   Ointment 1 Application(s) Topical two times a day  chlorhexidine 2% Cloths 1 Application(s) Topical daily  dextrose 5%. 1000 milliLiter(s) (50 mL/Hr) IV Continuous <Continuous>  dextrose 50% Injectable 25 Gram(s) IV Push once  glucagon  Injectable 1 milliGRAM(s) IntraMuscular once  insulin regular Infusion 2 Unit(s)/Hr (2 mL/Hr) IV Continuous <Continuous>  lactated ringers. 1000 milliLiter(s) (50 mL/Hr) IV Continuous <Continuous>  meropenem  IVPB 1000 milliGRAM(s) IV Intermittent every 12 hours  norepinephrine Infusion 0.05 MICROgram(s)/kG/Min (5.87 mL/Hr) IV Continuous <Continuous>  pantoprazole    Tablet 40 milliGRAM(s) Oral before breakfast  polyethylene glycol 3350 17 Gram(s) Oral daily  senna 2 Tablet(s) Oral at bedtime    MEDICATIONS  (PRN):  acetaminophen     Tablet .. 650 milliGRAM(s) Oral every 6 hours PRN Temp greater or equal to 38C (100.4F), Moderate Pain (4 - 6)  dextrose Oral Gel 15 Gram(s) Oral once PRN Blood Glucose LESS THAN 70 milliGRAM(s)/deciliter  magnesium hydroxide Suspension 30 milliLiter(s) Oral every 12 hours PRN Constipation      New X-rays reviewed:                                                                                  ECHO

## 2023-08-09 NOTE — PROGRESS NOTE ADULT - SUBJECTIVE AND OBJECTIVE BOX
HPI:  87 y/o M w/ PMH of DM, HTN coming from home with complaint of decreased appetite, increased urinary output and craving sweets x 2 weeks. Daughter is caretaker, states patient behavior has changed. Within the last 2 weeks, He is less active, requesting more coca cola and sugar. Pt noted to be hypotensive upon arrival.     ED vitals:  BP 74/ 50, HR 87, Temp 97.2F, satting 95% on room air  Labs significant for WBC 14K, Na 123 (corrected 141), Cr 2.4, AG 23, BHB 5.1. VBG pH 7.19, Lactate 5.8, K 8.6, pCO2 39  EKG tachycardia, bifascicular block, RBBB  CXR unremarkable  CT A/P: Extensive coronary atherosclerosis. Dependent atelectasis at the right base. 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 Hepatic cysts. Questionable sludge within the gallbladder. Unchanged 1 cm cystic lesion in the pancreatic body      s/p calcium gluconate 1g, Lasix 40mg push x1, barcarb 50meq, started on insulin drip at 7 units/hr.   Admitted to MICU for DKA/HHS.     (04 Aug 2023 15:16)    Hospital Course:    - In the MICU pt was on levo 0.4 --> 0.2 8/8; received 1uPRBC for Hg 6.6 8/7; being treat w/ IV kenyatta for +amparo BCx; EEG negative; imaging not revealing at this point; hyperglycemia now under control, on b/b/iss.  - 8/9 Hg 6.2 getting 1u pRBC; pt off levo on midodrine holding a good pressure, CT suggestive of appendicitis waiting surgery cs    Overnight events:    - pt daughter at bedside last night; spoke w/ daughter Antonia on phone again today  - Pt examined at bedside; more alert and comfortable today, attempted to use  but unsuccessful   - Pt's daughter endorsed pt is [place], will try headset      ADVANCE DIRECTIVES:    [ ] Full Code [X ] DNR  MOLST  [ ]  Living Will  [ ]   DECISION MAKER(s):  [ ] Health Care Proxy(s)  [ ] Surrogate(s)  [ ] Guardian           Name(s): Phone Number(s):  daughter Miracle [phone]  daughter Angie [phone]    BASELINE (I)ADL(s) (prior to admission):  [place]: [ ]Total  [ ] Moderate [ ]Dependent  Palliative Performance Status Version 2:         %    http://npcrc.org/files/news/palliative_performance_scale_ppsv2.pdf    Allergies    No Known Allergies      TESTS & MEASUREMENTS:                        6.2    10.51 )-----------( 136      ( 09 Aug 2023 04:55 )             18.5     08-09    145  |  114<H>  |  25<H>  ----------------------------<  150<H>  4.0   |  24  |  1.1    Ca    7.0<L>      09 Aug 2023 04:55  Phos  2.0     08-08  Mg     1.8     08-08    TPro  4.1<L>  /  Alb  1.8<L>  /  TBili  0.5  /  DBili  x   /  AST  37  /  ALT  15  /  AlkPhos  68  08-09      LIVER FUNCTIONS - ( 09 Aug 2023 04:55 )  Alb: 1.8 g/dL / Pro: 4.1 g/dL / ALK PHOS: 68 U/L / ALT: 15 U/L / AST: 37 U/L / GGT: x           Urinalysis Basic - ( 09 Aug 2023 04:55 )    Color: x / Appearance: x / SG: x / pH: x  Gluc: 150 mg/dL / Ketone: x  / Bili: x / Urobili: x   Blood: x / Protein: x / Nitrite: x   Leuk Esterase: x / RBC: x / WBC x   Sq Epi: x / Non Sq Epi: x / Bacteria: x        Culture - Blood (collected 08-05-23 @ 07:10)  Source: .Blood Blood-Peripheral  Preliminary Report (08-08-23 @ 19:01):    No growth at 72 Hours    Culture - Urine (collected 08-04-23 @ 14:42)  Source: Clean Catch Clean Catch (Midstream)  Final Report (08-05-23 @ 19:52):    No growth    Culture - Blood (collected 08-04-23 @ 11:20)  Source: .Blood Blood-Peripheral  Gram Stain (08-06-23 @ 02:58):    Growth in anaerobic bottle: Gram Negative Rods  Final Report (08-07-23 @ 19:28):    Growth in anaerobic bottle: Most closely resembling Hungatella species    "Susceptibilities not performed"    Please Note:************************************************    Hungatella species    may appear as gram negative rods in direct smears of clinical specimens.    Direct identification is available within approximately 3-5    hours either by Blood Panel Multiplexed PCR or Direct    MALDI-TOF. Details: [email or web address]  Organism: Blood Culture PCR (08-07-23 @ 19:28)  Organism: Blood Culture PCR (08-07-23 @ 19:28)      Method Type: PCR      -  Blood PCR Panel: NEG    Culture - Blood (collected 08-04-23 @ 11:20)  Source: .Blood Blood-Peripheral  Preliminary Report (08-08-23 @ 23:01):    No growth at 4 days        Lactate, Blood: 1.5 mmol/L (08-08-23 @ 12:40)  Lactate, Blood: 3.4 mmol/L (08-06-23 @ 11:17)  Lactate, Blood: 3.7 mmol/L (08-04-23 @ 23:05)  Lactate, Blood: 2.9 mmol/L (08-04-23 @ 14:44)  Blood Gas Venous - Lactate: 7.40 mmol/L (08-04-23 @ 13:34)      INFECTIOUS DISEASES TESTING  MRSA PCR Result.: Negative (08-07-23 @ 11:30)      RADIOLOGY & ADDITIONAL TESTS:    CT    < from: CT Abdomen and Pelvis w/ Oral Cont (08.08.23 @ 16:19) >    IMPRESSION:    New mild dilation of the appendix with small volume free fluid in the   bilateral lower quadrants. Findings are concerning for appendicitis.    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    Additional findings detailed in the body the report.    --- End of Report ---    < end of copied text >      CARDIOLOGY TESTING  12 Lead ECG:   Ventricular Rate 106 BPM    Atrial Rate 106 BPM    P-R Interval 152 ms    QRS Duration 126 ms    Q-T Interval 400 ms    QTC Calculation(Bazett) 531 ms    P Axis 34 degrees    R Axis -50 degrees    T Axis -18 degrees    Diagnosis Line Sinus tachycardia  Right bundle branch block  Left anterior fascicular block  *** Bifascicular block ***  Moderate voltage criteria for LVH, may be normal variant      Abnormal ECG    Confirmed by RUDY FLOR MD ([id number]) on 8/4/2023 11:24:55 AM (08-04-23 @ 10:57)      MEDICATIONS  allopurinol 300  atorvastatin 10  bacitracin   Ointment 1  chlorhexidine 2% Cloths 1  dextrose 5%. 1000  dextrose 50% Injectable 25  fondaparinux Injectable 2.5  glucagon  Injectable 1  insulin regular Infusion 2  lactated ringers Bolus 250  lactated ringers. 1000  meropenem  IVPB 1000  midodrine 10  norepinephrine Infusion 0.05  pantoprazole    Tablet 40  polyethylene glycol 3350 17  senna 2      meropenem  IVPB 1000 milliGRAM(s) IV Intermittent every 12 hours      ALLERGIES:  No Known Allergies      VITALS:  T(F): 99.6, Max: 99.6 (08-09-23 @ 08:00)  HR: 88  BP: 123/63  RR: 24Vital Signs Last 24 Hrs  T(C): 37.6 (09 Aug 2023 08:00), Max: 37.6 (09 Aug 2023 08:00)  T(F): 99.6 (09 Aug 2023 08:00), Max: 99.6 (09 Aug 2023 08:00)  HR: 88 (09 Aug 2023 11:00) (78 - 116)  BP: 123/63 (09 Aug 2023 11:00) (70/45 - 152/86)  BP(mean): 86 (09 Aug 2023 11:00) (54 - 109)  RR: 24 (09 Aug 2023 11:00) (16 - 42)  SpO2: 96% (09 Aug 2023 11:00) (95% - 100%)    Parameters below as of 09 Aug 2023 10:00  Patient On (Oxygen Delivery Method): nasal cannula  O2 Flow (L/min): 2      PHYSICAL EXAM:  Gen: awake and alert, comfortable  HEENT: Normocephalic, atraumatic;   Neck: supple, no lymphadenopathy; RIJ in place  CV: Regular rate & regular rhythm  Lungs: decreased BS at bases, no fremitus, no crackles appreciated   Abdomen: distended and diffusely tender, improved from yesterday  Ext: Warm, well perfused, no edema  Neuro: moving all extremities in plane of bed, no droop, awake  Skin: no rash, no erythema    PRESENT SYMPTOMS: [X ]Unable to obtain due to poor mentation   Source if other than patient:  [ ]Family   [ ]Team     Pain: [ ]yes [ ]no  QOL impact -   Location -                    Aggravating factors -  Quality -  Radiation -  Timing-  Severity (0-10 scale):  Minimal acceptable level (0-10 scale):     CPOT:    https://www.[facility].org/getattachment/[email or web address]/Critical-Care-Pain-Observation-Tool-(CPOT)    PAIN AD Score: 0  http://geriatrictoolkit.[place].[facility]/cog/painad.pdf (press ctrl +  left click to view)    Dyspnea:                           [ ]None[ ]Mild [ ]Moderate [ ]Severe     Respiratory Distress Observation Scale (RDOS): 2  A score of 0 to 2 signifies little or no respiratory distress, 3 signifies mild distress, scores 4 to 6 indicate moderate distress, and scores greater than 7 signify severe distress  https://www.[facility].ca/sites/default/files/PDFS/[email or web address]-fqrdictfqlr-jexajueu-obblajpdyze-ajxwg.pdf    Anxiety:                             [ ]None[ ]Mild [ ]Moderate [ ]Severe   Fatigue:                             [ ]None[ ]Mild [ ]Moderate [ ]Severe   Nausea:                             [ ]None[ ]Mild [ ]Moderate [ ]Severe   Loss of appetite:              [ ]None[ ]Mild [ ]Moderate [ ]Severe   Constipation:                    [ ]None[ ]Mild [ ]Moderate [ ]Severe    Other Symptoms:  [ ]All other review of systems negative     Palliative Performance Status Version 2:         %    http://npcrc.org/files/news/palliative_performance_scale_ppsv2.pdf      Palliative Care Spiritual/Emotional Screening Tool Question  Severity (0-4):                    OR                    [X ] Unable to determine/NA  Score of 2 or greater indicates recommendation of Chaplaincy referral  Chaplaincy Referral: [ ] Yes [ ] Refused [ ] Following     Caregiver [place]:  [ ] Yes [ ] No [ X] Deferred  Social Work Referral [ ]  Patient and Family Centered Care Referral [ ]    Anticipatory Grief Present: [ ] Yes [ ] No [ X] Deferred  Social Work Referral [ ]  Patient and Family Centered Care Referral [ ]    REFERRALS:   [ ]Chaplaincy  [ ]Hospice  [ ]Child Life  [ ]Social Work  [ ]Case management [ ]Holistic Therapy     Palliative care education provided to patient and/or family     HPI:  89 y/o M w/ PMH of DM, HTN coming from home with complaint of decreased appetite, increased urinary output and craving sweets x 2 weeks. Daughter is caretaker, states patient behavior has changed. Within the last 2 weeks, He is less active, requesting more coca cola and sugar. Pt noted to be hypotensive upon arrival.     ED vitals:  BP 74/ 50, HR 87, Temp 97.2F, satting 95% on room air  Labs significant for WBC 14K, Na 123 (corrected 141), Cr 2.4, AG 23, BHB 5.1. VBG pH 7.19, Lactate 5.8, K 8.6, pCO2 39  EKG tachycardia, bifascicular block, RBBB  CXR unremarkable  CT A/P: Extensive coronary atherosclerosis. Dependent atelectasis at the right base. 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 Hepatic cysts. Questionable sludge within the gallbladder. Unchanged 1 cm cystic lesion in the pancreatic body      s/p calcium gluconate 1g, Lasix 40mg push x1, barcarb 50meq, started on insulin drip at 7 units/hr.   Admitted to MICU for DKA/HHS.     (04 Aug 2023 15:16)    Hospital Course:    - In the MICU pt was on levo 0.4 --> 0.2 8/8; received 1uPRBC for Hg 6.6 8/7; being treat w/ IV kenyatta for +amparo BCx; EEG negative; imaging not revealing at this point; hyperglycemia now under control, on b/b/iss.  - 8/9 Hg 6.2 getting 1u pRBC; pt off levo on midodrine holding a good pressure, CT suggestive of appendicitis waiting surgery cs    Overnight events:    - pt daughter at bedside last night; spoke w/ daughter Antonia on phone again today  - Pt examined at bedside; more alert and comfortable today, attempted to use  but unsuccessful   - Pt's daughter endorsed pt is [place], will try headset      ADVANCE DIRECTIVES:    [ ] Full Code [X ] DNR  MOLST  [ ]  Living Will  [ ]   DECISION MAKER(s):  [ ] Health Care Proxy(s)  [ ] Surrogate(s)  [ ] Guardian           Name(s): Phone Number(s):  daughter Miracle [phone]  daughter Angie [phone]    BASELINE (I)ADL(s) (prior to admission):  [place]: [ ]Total  [ ] Moderate [ ]Dependent  Palliative Performance Status Version 2:         %    http://npcrc.org/files/news/palliative_performance_scale_ppsv2.pdf    Allergies    No Known Allergies      TESTS & MEASUREMENTS:                        6.2    10.51 )-----------( 136      ( 09 Aug 2023 04:55 )             18.5     08-09    145  |  114<H>  |  25<H>  ----------------------------<  150<H>  4.0   |  24  |  1.1    Ca    7.0<L>      09 Aug 2023 04:55  Phos  2.0     08-08  Mg     1.8     08-08    TPro  4.1<L>  /  Alb  1.8<L>  /  TBili  0.5  /  DBili  x   /  AST  37  /  ALT  15  /  AlkPhos  68  08-09      LIVER FUNCTIONS - ( 09 Aug 2023 04:55 )  Alb: 1.8 g/dL / Pro: 4.1 g/dL / ALK PHOS: 68 U/L / ALT: 15 U/L / AST: 37 U/L / GGT: x           Urinalysis Basic - ( 09 Aug 2023 04:55 )    Color: x / Appearance: x / SG: x / pH: x  Gluc: 150 mg/dL / Ketone: x  / Bili: x / Urobili: x   Blood: x / Protein: x / Nitrite: x   Leuk Esterase: x / RBC: x / WBC x   Sq Epi: x / Non Sq Epi: x / Bacteria: x        Culture - Blood (collected 08-05-23 @ 07:10)  Source: .Blood Blood-Peripheral  Preliminary Report (08-08-23 @ 19:01):    No growth at 72 Hours    Culture - Urine (collected 08-04-23 @ 14:42)  Source: Clean Catch Clean Catch (Midstream)  Final Report (08-05-23 @ 19:52):    No growth    Culture - Blood (collected 08-04-23 @ 11:20)  Source: .Blood Blood-Peripheral  Gram Stain (08-06-23 @ 02:58):    Growth in anaerobic bottle: Gram Negative Rods  Final Report (08-07-23 @ 19:28):    Growth in anaerobic bottle: Most closely resembling Hungatella species    "Susceptibilities not performed"    Please Note:************************************************    Hungatella species    may appear as gram negative rods in direct smears of clinical specimens.    Direct identification is available within approximately 3-5    hours either by Blood Panel Multiplexed PCR or Direct    MALDI-TOF. Details: [email or web address]  Organism: Blood Culture PCR (08-07-23 @ 19:28)  Organism: Blood Culture PCR (08-07-23 @ 19:28)      Method Type: PCR      -  Blood PCR Panel: NEG    Culture - Blood (collected 08-04-23 @ 11:20)  Source: .Blood Blood-Peripheral  Preliminary Report (08-08-23 @ 23:01):    No growth at 4 days        Lactate, Blood: 1.5 mmol/L (08-08-23 @ 12:40)  Lactate, Blood: 3.4 mmol/L (08-06-23 @ 11:17)  Lactate, Blood: 3.7 mmol/L (08-04-23 @ 23:05)  Lactate, Blood: 2.9 mmol/L (08-04-23 @ 14:44)  Blood Gas Venous - Lactate: 7.40 mmol/L (08-04-23 @ 13:34)      INFECTIOUS DISEASES TESTING  MRSA PCR Result.: Negative (08-07-23 @ 11:30)      RADIOLOGY & ADDITIONAL TESTS:    CT    < from: CT Abdomen and Pelvis w/ Oral Cont (08.08.23 @ 16:19) >    IMPRESSION:    New mild dilation of the appendix with small volume free fluid in the   bilateral lower quadrants. Findings are concerning for appendicitis.    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    Additional findings detailed in the body the report.    --- End of Report ---    < end of copied text >      CARDIOLOGY TESTING  12 Lead ECG:   Ventricular Rate 106 BPM    Atrial Rate 106 BPM    P-R Interval 152 ms    QRS Duration 126 ms    Q-T Interval 400 ms    QTC Calculation(Bazett) 531 ms    P Axis 34 degrees    R Axis -50 degrees    T Axis -18 degrees    Diagnosis Line Sinus tachycardia  Right bundle branch block  Left anterior fascicular block  *** Bifascicular block ***  Moderate voltage criteria for LVH, may be normal variant      Abnormal ECG    Confirmed by RUDY FLOR MD ([id number]) on 8/4/2023 11:24:55 AM (08-04-23 @ 10:57)      MEDICATIONS  allopurinol 300  atorvastatin 10  bacitracin   Ointment 1  chlorhexidine 2% Cloths 1  dextrose 5%. 1000  dextrose 50% Injectable 25  fondaparinux Injectable 2.5  glucagon  Injectable 1  insulin regular Infusion 2  lactated ringers Bolus 250  lactated ringers. 1000  meropenem  IVPB 1000  midodrine 10  norepinephrine Infusion 0.05  pantoprazole    Tablet 40  polyethylene glycol 3350 17  senna 2      meropenem  IVPB 1000 milliGRAM(s) IV Intermittent every 12 hours      ALLERGIES:  No Known Allergies      VITALS:  T(F): 99.6, Max: 99.6 (08-09-23 @ 08:00)  HR: 88  BP: 123/63  RR: 24Vital Signs Last 24 Hrs  T(C): 37.6 (09 Aug 2023 08:00), Max: 37.6 (09 Aug 2023 08:00)  T(F): 99.6 (09 Aug 2023 08:00), Max: 99.6 (09 Aug 2023 08:00)  HR: 88 (09 Aug 2023 11:00) (78 - 116)  BP: 123/63 (09 Aug 2023 11:00) (70/45 - 152/86)  BP(mean): 86 (09 Aug 2023 11:00) (54 - 109)  RR: 24 (09 Aug 2023 11:00) (16 - 42)  SpO2: 96% (09 Aug 2023 11:00) (95% - 100%)    Parameters below as of 09 Aug 2023 10:00  Patient On (Oxygen Delivery Method): nasal cannula  O2 Flow (L/min): 2      PHYSICAL EXAM:  Gen: awake and alert, comfortable  HEENT: Normocephalic, atraumatic;   Neck: supple, no lymphadenopathy; RIJ in place  CV: Regular rate & regular rhythm  Lungs: decreased BS at bases, no fremitus, no crackles appreciated   Abdomen: distended and diffusely tender, improved from yesterday  Ext: Warm, well perfused, no edema  Neuro: moving all extremities in plane of bed, no droop, awake  Skin: no rash, no erythema    PRESENT SYMPTOMS: [X ]Unable to obtain due to poor mentation   Source if other than patient:  [ ]Family   [ ]Team     Pain: [ ]yes [ ]no  QOL impact -   Location -                    Aggravating factors -  Quality -  Radiation -  Timing-  Severity (0-10 scale):  Minimal acceptable level (0-10 scale):     CPOT:    https://www.[facility].org/getattachment/[email or web address]/Critical-Care-Pain-Observation-Tool-(CPOT)    PAIN AD Score: 0  http://geriatrictoolkit.[place].[facility]/cog/painad.pdf (press ctrl +  left click to view)    Dyspnea:                           [ ]None[ ]Mild [ ]Moderate [ ]Severe     Respiratory Distress Observation Scale (RDOS): 1  A score of 0 to 2 signifies little or no respiratory distress, 3 signifies mild distress, scores 4 to 6 indicate moderate distress, and scores greater than 7 signify severe distress  https://www.[facility].ca/sites/default/files/PDFS/179627-bffwgtycvhx-aregazqs-epdkdvrlqwa-pphvi.pdf    Anxiety:                             [ ]None[ ]Mild [ ]Moderate [ ]Severe   Fatigue:                             [ ]None[ ]Mild [ ]Moderate [ ]Severe   Nausea:                             [ ]None[ ]Mild [ ]Moderate [ ]Severe   Loss of appetite:              [ ]None[ ]Mild [ ]Moderate [ ]Severe   Constipation:                    [ ]None[ ]Mild [ ]Moderate [ ]Severe    Other Symptoms:  [ ]All other review of systems negative     Palliative Performance Status Version 2:         %    http://npcrc.org/files/news/palliative_performance_scale_ppsv2.pdf      Palliative Care Spiritual/Emotional Screening Tool Question  Severity (0-4):                    OR                    [X ] Unable to determine/NA  Score of 2 or greater indicates recommendation of Chaplaincy referral  Chaplaincy Referral: [ ] Yes [ ] Refused [ ] Following     Caregiver [place]:  [ ] Yes [ ] No [ X] Deferred  Social Work Referral [ ]  Patient and Family Centered Care Referral [ ]    Anticipatory Grief Present: [ ] Yes [ ] No [ X] Deferred  Social Work Referral [ ]  Patient and Family Centered Care Referral [ ]    REFERRALS:   [ ]Chaplaincy  [ ]Hospice  [ ]Child Life  [ ]Social Work  [ ]Case management [ ]Holistic Therapy     Palliative care education provided to patient and/or family

## 2023-08-09 NOTE — PROGRESS NOTE ADULT - ASSESSMENT
ASSESSMENT  89 y/o M w/ PMH of DM, HTN coming from home with complaint of decreased appetite, increased urinary output and craving sweets x 2 weeks. Admitted for management of DKA.     IMPRESSION  #Septic shock requiring pressors   #Hungatella bacteremia likely GI translocation in the setting of appendicitis  8/5 BCX NGTD   8/4 UCX NGTD  8/4 BCX + 1/2 bottles  CTAP aneurysm (no contrast)  < from: CT Abdomen and Pelvis w/ Oral Cont (08.08.23 @ 16:19) >  New mild dilation of the appendix with small volume free fluid in the   bilateral lower quadrants. Findings are concerning for appendicitis.  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  Additional findings detailed in the body the report  #DKA/HHS  #Atelectasis  Creatinine: 1.1 mg/dL (08.08.23 @ 05:55)  Weight (kg): 62.6 (08-04-23 @ 20:37)  crcl 41      RECOMMENDATIONS  - Rashi 1g q12h IV 8/7-  - Will ask micro if they can perform sensitivities   - Surgery  - Poor prognosis, GOC    If any questions, please call or send a message on [employer]  Please continue to update ID with any pertinent new laboratory or radiographic findings

## 2023-08-10 LAB
ANION GAP SERPL CALC-SCNC: 9 MMOL/L — SIGNIFICANT CHANGE UP (ref 7–14)
ANISOCYTOSIS BLD QL: SLIGHT — SIGNIFICANT CHANGE UP
APPEARANCE UR: CLEAR — SIGNIFICANT CHANGE UP
BACTERIA # UR AUTO: NEGATIVE /HPF — SIGNIFICANT CHANGE UP
BASE EXCESS BLDV CALC-SCNC: -2.8 MMOL/L — LOW (ref -2–3)
BASOPHILS # BLD AUTO: 0 K/UL — SIGNIFICANT CHANGE UP (ref 0–0.2)
BASOPHILS # BLD AUTO: 0.06 K/UL — SIGNIFICANT CHANGE UP (ref 0–0.2)
BASOPHILS NFR BLD AUTO: 0 % — SIGNIFICANT CHANGE UP (ref 0–1)
BASOPHILS NFR BLD AUTO: 0.3 % — SIGNIFICANT CHANGE UP (ref 0–1)
BILIRUB UR-MCNC: NEGATIVE — SIGNIFICANT CHANGE UP
BUN SERPL-MCNC: 32 MG/DL — HIGH (ref 10–20)
BURR CELLS BLD QL SMEAR: PRESENT — SIGNIFICANT CHANGE UP
CA-I SERPL-SCNC: 1.06 MMOL/L — LOW (ref 1.15–1.33)
CALCIUM SERPL-MCNC: 6.7 MG/DL — LOW (ref 8.4–10.4)
CAST: 3 /LPF — SIGNIFICANT CHANGE UP (ref 0–4)
CHLORIDE SERPL-SCNC: 112 MMOL/L — HIGH (ref 98–110)
CO2 SERPL-SCNC: 20 MMOL/L — SIGNIFICANT CHANGE UP (ref 17–32)
COLOR SPEC: YELLOW — SIGNIFICANT CHANGE UP
CREAT SERPL-MCNC: 1.3 MG/DL — SIGNIFICANT CHANGE UP (ref 0.7–1.5)
CULTURE RESULTS: SIGNIFICANT CHANGE UP
DIFF PNL FLD: NEGATIVE — SIGNIFICANT CHANGE UP
EGFR: 53 ML/MIN/1.73M2 — LOW
EOSINOPHIL # BLD AUTO: 0 K/UL — SIGNIFICANT CHANGE UP (ref 0–0.7)
EOSINOPHIL # BLD AUTO: 0.02 K/UL — SIGNIFICANT CHANGE UP (ref 0–0.7)
EOSINOPHIL NFR BLD AUTO: 0 % — SIGNIFICANT CHANGE UP (ref 0–8)
EOSINOPHIL NFR BLD AUTO: 0.1 % — SIGNIFICANT CHANGE UP (ref 0–8)
GAS PNL BLDV: 141 MMOL/L — SIGNIFICANT CHANGE UP (ref 136–145)
GAS PNL BLDV: SIGNIFICANT CHANGE UP
GIANT PLATELETS BLD QL SMEAR: PRESENT — SIGNIFICANT CHANGE UP
GLUCOSE BLDC GLUCOMTR-MCNC: 101 MG/DL — HIGH (ref 70–99)
GLUCOSE BLDC GLUCOMTR-MCNC: 105 MG/DL — HIGH (ref 70–99)
GLUCOSE BLDC GLUCOMTR-MCNC: 111 MG/DL — HIGH (ref 70–99)
GLUCOSE BLDC GLUCOMTR-MCNC: 117 MG/DL — HIGH (ref 70–99)
GLUCOSE BLDC GLUCOMTR-MCNC: 121 MG/DL — HIGH (ref 70–99)
GLUCOSE BLDC GLUCOMTR-MCNC: 122 MG/DL — HIGH (ref 70–99)
GLUCOSE BLDC GLUCOMTR-MCNC: 133 MG/DL — HIGH (ref 70–99)
GLUCOSE BLDC GLUCOMTR-MCNC: 140 MG/DL — HIGH (ref 70–99)
GLUCOSE BLDC GLUCOMTR-MCNC: 143 MG/DL — HIGH (ref 70–99)
GLUCOSE BLDC GLUCOMTR-MCNC: 145 MG/DL — HIGH (ref 70–99)
GLUCOSE BLDC GLUCOMTR-MCNC: 180 MG/DL — HIGH (ref 70–99)
GLUCOSE BLDC GLUCOMTR-MCNC: 82 MG/DL — SIGNIFICANT CHANGE UP (ref 70–99)
GLUCOSE BLDC GLUCOMTR-MCNC: 90 MG/DL — SIGNIFICANT CHANGE UP (ref 70–99)
GLUCOSE BLDC GLUCOMTR-MCNC: 96 MG/DL — SIGNIFICANT CHANGE UP (ref 70–99)
GLUCOSE SERPL-MCNC: 89 MG/DL — SIGNIFICANT CHANGE UP (ref 70–99)
GLUCOSE UR QL: NEGATIVE MG/DL — SIGNIFICANT CHANGE UP
HCO3 BLDV-SCNC: 22 MMOL/L — SIGNIFICANT CHANGE UP (ref 22–29)
HCT VFR BLD CALC: 17.3 % — LOW (ref 42–52)
HCT VFR BLD CALC: 20.7 % — LOW (ref 42–52)
HCT VFR BLD CALC: 24.9 % — LOW (ref 42–52)
HCT VFR BLDA CALC: 23 % — LOW (ref 39–51)
HGB BLD CALC-MCNC: 7.5 G/DL — LOW (ref 12.6–17.4)
HGB BLD-MCNC: 6 G/DL — CRITICAL LOW (ref 14–18)
HGB BLD-MCNC: 7.2 G/DL — LOW (ref 14–18)
HGB BLD-MCNC: 8.6 G/DL — LOW (ref 14–18)
HYALINE CASTS # UR AUTO: 3 /LPF — SIGNIFICANT CHANGE UP (ref 0–4)
IMM GRANULOCYTES NFR BLD AUTO: 1.1 % — HIGH (ref 0.1–0.3)
KETONES UR-MCNC: 15 MG/DL
LACTATE BLDV-MCNC: 2 MMOL/L — SIGNIFICANT CHANGE UP (ref 0.5–2)
LACTATE SERPL-SCNC: 2.5 MMOL/L — HIGH (ref 0.7–2)
LEUKOCYTE ESTERASE UR-ACNC: NEGATIVE — SIGNIFICANT CHANGE UP
LYMPHOCYTES # BLD AUTO: 1.13 K/UL — LOW (ref 1.2–3.4)
LYMPHOCYTES # BLD AUTO: 1.32 K/UL — SIGNIFICANT CHANGE UP (ref 1.2–3.4)
LYMPHOCYTES # BLD AUTO: 6.1 % — LOW (ref 20.5–51.1)
LYMPHOCYTES # BLD AUTO: 7.2 % — LOW (ref 20.5–51.1)
MAGNESIUM SERPL-MCNC: 2.6 MG/DL — HIGH (ref 1.8–2.4)
MANUAL SMEAR VERIFICATION: SIGNIFICANT CHANGE UP
MCHC RBC-ENTMCNC: 30.6 PG — SIGNIFICANT CHANGE UP (ref 27–31)
MCHC RBC-ENTMCNC: 32 PG — HIGH (ref 27–31)
MCHC RBC-ENTMCNC: 32.3 PG — HIGH (ref 27–31)
MCHC RBC-ENTMCNC: 34.5 G/DL — SIGNIFICANT CHANGE UP (ref 32–37)
MCHC RBC-ENTMCNC: 34.7 G/DL — SIGNIFICANT CHANGE UP (ref 32–37)
MCHC RBC-ENTMCNC: 34.8 G/DL — SIGNIFICANT CHANGE UP (ref 32–37)
MCV RBC AUTO: 88.6 FL — SIGNIFICANT CHANGE UP (ref 80–94)
MCV RBC AUTO: 92 FL — SIGNIFICANT CHANGE UP (ref 80–94)
MCV RBC AUTO: 93 FL — SIGNIFICANT CHANGE UP (ref 80–94)
MONOCYTES # BLD AUTO: 0.48 K/UL — SIGNIFICANT CHANGE UP (ref 0.1–0.6)
MONOCYTES # BLD AUTO: 0.61 K/UL — HIGH (ref 0.1–0.6)
MONOCYTES NFR BLD AUTO: 2.6 % — SIGNIFICANT CHANGE UP (ref 1.7–9.3)
MONOCYTES NFR BLD AUTO: 3.3 % — SIGNIFICANT CHANGE UP (ref 1.7–9.3)
MRSA PCR RESULT.: NEGATIVE — SIGNIFICANT CHANGE UP
NEUTROPHILS # BLD AUTO: 16.14 K/UL — HIGH (ref 1.4–6.5)
NEUTROPHILS # BLD AUTO: 16.81 K/UL — HIGH (ref 1.4–6.5)
NEUTROPHILS NFR BLD AUTO: 88 % — HIGH (ref 42.2–75.2)
NEUTROPHILS NFR BLD AUTO: 89.5 % — HIGH (ref 42.2–75.2)
NEUTS BAND # BLD: 0.9 % — SIGNIFICANT CHANGE UP (ref 0–6)
NITRITE UR-MCNC: NEGATIVE — SIGNIFICANT CHANGE UP
NRBC # BLD: 10 /100 WBCS — HIGH (ref 0–0)
NRBC # BLD: 14 /100 WBCS — HIGH (ref 0–0)
NRBC # BLD: 5 /100 — HIGH (ref 0–0)
NRBC # BLD: SIGNIFICANT CHANGE UP /100 WBCS (ref 0–0)
OVALOCYTES BLD QL SMEAR: SLIGHT — SIGNIFICANT CHANGE UP
PCO2 BLDV: 35 MMHG — LOW (ref 42–55)
PH BLDV: 7.4 — SIGNIFICANT CHANGE UP (ref 7.32–7.43)
PH UR: 6 — SIGNIFICANT CHANGE UP (ref 5–8)
PLAT MORPH BLD: NORMAL — SIGNIFICANT CHANGE UP
PLATELET # BLD AUTO: 165 K/UL — SIGNIFICANT CHANGE UP (ref 130–400)
PLATELET # BLD AUTO: 171 K/UL — SIGNIFICANT CHANGE UP (ref 130–400)
PLATELET # BLD AUTO: 186 K/UL — SIGNIFICANT CHANGE UP (ref 130–400)
PMV BLD: 12.8 FL — HIGH (ref 7.4–10.4)
PO2 BLDV: 31 MMHG — SIGNIFICANT CHANGE UP
POIKILOCYTOSIS BLD QL AUTO: SLIGHT — SIGNIFICANT CHANGE UP
POLYCHROMASIA BLD QL SMEAR: SLIGHT — SIGNIFICANT CHANGE UP
POTASSIUM BLDV-SCNC: 4.1 MMOL/L — SIGNIFICANT CHANGE UP (ref 3.5–5.1)
POTASSIUM SERPL-MCNC: 4.5 MMOL/L — SIGNIFICANT CHANGE UP (ref 3.5–5)
POTASSIUM SERPL-SCNC: 4.5 MMOL/L — SIGNIFICANT CHANGE UP (ref 3.5–5)
PROT UR-MCNC: 30 MG/DL
RBC # BLD: 1.86 M/UL — LOW (ref 4.7–6.1)
RBC # BLD: 2.25 M/UL — LOW (ref 4.7–6.1)
RBC # BLD: 2.81 M/UL — LOW (ref 4.7–6.1)
RBC # FLD: 15.1 % — HIGH (ref 11.5–14.5)
RBC # FLD: 15.2 % — HIGH (ref 11.5–14.5)
RBC # FLD: 15.6 % — HIGH (ref 11.5–14.5)
RBC BLD AUTO: ABNORMAL
RBC CASTS # UR COMP ASSIST: 2 /HPF — SIGNIFICANT CHANGE UP (ref 0–4)
SAO2 % BLDV: 65.5 % — SIGNIFICANT CHANGE UP
SODIUM SERPL-SCNC: 141 MMOL/L — SIGNIFICANT CHANGE UP (ref 135–146)
SP GR SPEC: >1.03 — HIGH (ref 1–1.03)
SPECIMEN SOURCE: SIGNIFICANT CHANGE UP
SQUAMOUS # UR AUTO: 1 /HPF — SIGNIFICANT CHANGE UP (ref 0–5)
UROBILINOGEN FLD QL: 1 MG/DL — SIGNIFICANT CHANGE UP (ref 0.2–1)
VARIANT LYMPHS # BLD: 0.9 % — SIGNIFICANT CHANGE UP (ref 0–5)
WBC # BLD: 18.35 K/UL — HIGH (ref 4.8–10.8)
WBC # BLD: 18.6 K/UL — HIGH (ref 4.8–10.8)
WBC # BLD: 19.5 K/UL — HIGH (ref 4.8–10.8)
WBC # FLD AUTO: 18.35 K/UL — HIGH (ref 4.8–10.8)
WBC # FLD AUTO: 18.6 K/UL — HIGH (ref 4.8–10.8)
WBC # FLD AUTO: 19.5 K/UL — HIGH (ref 4.8–10.8)
WBC UR QL: 1 /HPF — SIGNIFICANT CHANGE UP (ref 0–5)

## 2023-08-10 PROCEDURE — 99232 SBSQ HOSP IP/OBS MODERATE 35: CPT | Mod: GC

## 2023-08-10 PROCEDURE — 99233 SBSQ HOSP IP/OBS HIGH 50: CPT

## 2023-08-10 PROCEDURE — 74174 CTA ABD&PLVS W/CONTRAST: CPT | Mod: 26

## 2023-08-10 PROCEDURE — 99291 CRITICAL CARE FIRST HOUR: CPT

## 2023-08-10 PROCEDURE — 71045 X-RAY EXAM CHEST 1 VIEW: CPT | Mod: 26

## 2023-08-10 RX ORDER — CASPOFUNGIN ACETATE 7 MG/ML
50 INJECTION, POWDER, LYOPHILIZED, FOR SOLUTION INTRAVENOUS EVERY 24 HOURS
Refills: 0 | Status: DISCONTINUED | OUTPATIENT
Start: 2023-08-10 | End: 2023-08-10

## 2023-08-10 RX ORDER — SODIUM CHLORIDE 9 MG/ML
500 INJECTION, SOLUTION INTRAVENOUS ONCE
Refills: 0 | Status: COMPLETED | OUTPATIENT
Start: 2023-08-10 | End: 2023-08-10

## 2023-08-10 RX ORDER — CASPOFUNGIN ACETATE 7 MG/ML
50 INJECTION, POWDER, LYOPHILIZED, FOR SOLUTION INTRAVENOUS EVERY 24 HOURS
Refills: 0 | Status: DISCONTINUED | OUTPATIENT
Start: 2023-08-11 | End: 2023-08-16

## 2023-08-10 RX ORDER — HYDROCORTISONE 20 MG
50 TABLET ORAL EVERY 8 HOURS
Refills: 0 | Status: DISCONTINUED | OUTPATIENT
Start: 2023-08-10 | End: 2023-08-10

## 2023-08-10 RX ORDER — SODIUM CHLORIDE 9 MG/ML
250 INJECTION, SOLUTION INTRAVENOUS ONCE
Refills: 0 | Status: COMPLETED | OUTPATIENT
Start: 2023-08-10 | End: 2023-08-10

## 2023-08-10 RX ORDER — SODIUM CHLORIDE 9 MG/ML
250 INJECTION, SOLUTION INTRAVENOUS ONCE
Refills: 0 | Status: DISCONTINUED | OUTPATIENT
Start: 2023-08-10 | End: 2023-08-10

## 2023-08-10 RX ORDER — CASPOFUNGIN ACETATE 7 MG/ML
70 INJECTION, POWDER, LYOPHILIZED, FOR SOLUTION INTRAVENOUS ONCE
Refills: 0 | Status: COMPLETED | OUTPATIENT
Start: 2023-08-10 | End: 2023-08-10

## 2023-08-10 RX ORDER — PANTOPRAZOLE SODIUM 20 MG/1
40 TABLET, DELAYED RELEASE ORAL
Refills: 0 | Status: DISCONTINUED | OUTPATIENT
Start: 2023-08-10 | End: 2023-08-13

## 2023-08-10 RX ORDER — VANCOMYCIN HCL 1 G
1000 VIAL (EA) INTRAVENOUS EVERY 12 HOURS
Refills: 0 | Status: DISCONTINUED | OUTPATIENT
Start: 2023-08-10 | End: 2023-08-10

## 2023-08-10 RX ORDER — ACETAMINOPHEN 500 MG
1000 TABLET ORAL ONCE
Refills: 0 | Status: COMPLETED | OUTPATIENT
Start: 2023-08-10 | End: 2023-08-10

## 2023-08-10 RX ORDER — CASPOFUNGIN ACETATE 7 MG/ML
INJECTION, POWDER, LYOPHILIZED, FOR SOLUTION INTRAVENOUS
Refills: 0 | Status: DISCONTINUED | OUTPATIENT
Start: 2023-08-10 | End: 2023-08-16

## 2023-08-10 RX ORDER — HYDROCORTISONE 20 MG
50 TABLET ORAL EVERY 6 HOURS
Refills: 0 | Status: DISCONTINUED | OUTPATIENT
Start: 2023-08-10 | End: 2023-08-11

## 2023-08-10 RX ADMIN — Medication 400 MILLIGRAM(S): at 06:00

## 2023-08-10 RX ADMIN — MIDODRINE HYDROCHLORIDE 10 MILLIGRAM(S): 2.5 TABLET ORAL at 13:20

## 2023-08-10 RX ADMIN — SODIUM CHLORIDE 500 MILLILITER(S): 9 INJECTION, SOLUTION INTRAVENOUS at 13:00

## 2023-08-10 RX ADMIN — MEROPENEM 100 MILLIGRAM(S): 1 INJECTION INTRAVENOUS at 17:30

## 2023-08-10 RX ADMIN — SODIUM CHLORIDE 1000 MILLILITER(S): 9 INJECTION, SOLUTION INTRAVENOUS at 10:00

## 2023-08-10 RX ADMIN — Medication 50 MILLIGRAM(S): at 17:30

## 2023-08-10 RX ADMIN — CASPOFUNGIN ACETATE 70 MILLIGRAM(S): 7 INJECTION, POWDER, LYOPHILIZED, FOR SOLUTION INTRAVENOUS at 13:20

## 2023-08-10 RX ADMIN — SODIUM CHLORIDE 500 MILLILITER(S): 9 INJECTION, SOLUTION INTRAVENOUS at 09:30

## 2023-08-10 RX ADMIN — MIDODRINE HYDROCHLORIDE 10 MILLIGRAM(S): 2.5 TABLET ORAL at 05:12

## 2023-08-10 RX ADMIN — Medication 50 MILLIGRAM(S): at 11:52

## 2023-08-10 RX ADMIN — SODIUM CHLORIDE 75 MILLILITER(S): 9 INJECTION, SOLUTION INTRAVENOUS at 11:53

## 2023-08-10 RX ADMIN — MIDODRINE HYDROCHLORIDE 10 MILLIGRAM(S): 2.5 TABLET ORAL at 21:33

## 2023-08-10 RX ADMIN — SODIUM CHLORIDE 500 MILLILITER(S): 9 INJECTION, SOLUTION INTRAVENOUS at 10:00

## 2023-08-10 RX ADMIN — PANTOPRAZOLE SODIUM 40 MILLIGRAM(S): 20 TABLET, DELAYED RELEASE ORAL at 17:30

## 2023-08-10 RX ADMIN — Medication 1 APPLICATION(S): at 17:31

## 2023-08-10 RX ADMIN — Medication 5.87 MICROGRAM(S)/KG/MIN: at 11:53

## 2023-08-10 RX ADMIN — CHLORHEXIDINE GLUCONATE 1 APPLICATION(S): 213 SOLUTION TOPICAL at 05:12

## 2023-08-10 RX ADMIN — INSULIN HUMAN 2 UNIT(S)/HR: 100 INJECTION, SOLUTION SUBCUTANEOUS at 11:53

## 2023-08-10 RX ADMIN — ATORVASTATIN CALCIUM 10 MILLIGRAM(S): 80 TABLET, FILM COATED ORAL at 21:33

## 2023-08-10 RX ADMIN — Medication 1000 MILLIGRAM(S): at 06:15

## 2023-08-10 RX ADMIN — PANTOPRAZOLE SODIUM 40 MILLIGRAM(S): 20 TABLET, DELAYED RELEASE ORAL at 11:52

## 2023-08-10 RX ADMIN — Medication 300 MILLIGRAM(S): at 11:52

## 2023-08-10 RX ADMIN — PANTOPRAZOLE SODIUM 40 MILLIGRAM(S): 20 TABLET, DELAYED RELEASE ORAL at 05:12

## 2023-08-10 RX ADMIN — Medication 1 APPLICATION(S): at 05:12

## 2023-08-10 RX ADMIN — MEROPENEM 100 MILLIGRAM(S): 1 INJECTION INTRAVENOUS at 05:16

## 2023-08-10 RX ADMIN — PHENYLEPHRINE HYDROCHLORIDE 5.87 MICROGRAM(S)/KG/MIN: 10 INJECTION INTRAVENOUS at 11:53

## 2023-08-10 NOTE — PROGRESS NOTE ADULT - ASSESSMENT
89 y/o M w/ PMH of DM, HTN coming from home with complaint of decreased appetite, increased urinary output and craving sweets x 2 weeks. Admitted for management of DKA.     IMPRESSION  #Septic shock   - Gram Negative Bacteremia -> BCx on 8/4: + for Hungatella x 1 -> f up suceptibility  - Blood cx repeated 8/5 negative.  - Patient improved initially but on 8/9 -> At 6.30 pm patient started having diffuse severe abdominal pain with increased pressors (levo and jasen). -- Abdomen Not peritonitic.   -- Ct angio abdomen/pelvis to r/o ischemia ->No evidence of bowel ischemia. Unchanged findings suggesting acute appendicitis.  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 Collateral flow presumed to be present again there is no evidence of bowel ischemia.   - lactate level was 7 on 8/9 -> after hydration and pressors it went down to 2.5   - Surgery team on board -> acute appendicitis but patient unstable and not cleared for any surgical intervention.   - on norepinephrine and phenylephrine   - hydro stress dose started - day 1  - Patient is NPO  - Will repeat blood cultures today.    - Repeat UA negative  - IVF based on [place] -> today 1.5 lilters given  - standing IV fluids stopped to avoid overload -> patient is receiving 2 u of pRBC in addition to the 1.5 Liters LR.   - lactate trending down from 3.4 to 1.5  - no documented fevers  - on meropenem 1g Q12 day 4  - Caspofungin started   - Vancomycin started yesterday night -> f up level at 4 pm and re-dose accordingly.   - F up fungitell   - F up MRSA   - ID on board     # Melena  - Patient started having melena today (NADEGE on 8/9 was negative) -> patient is having drop in Hgb  - Patient has been dropping hgb -> CT abdomen with PO contrast and repeated with IV contrast -> negative for bleed  - Today drop in hgb from 5 am till 11 am (from 7.2 to 6) after having 2 episodes of melena -> 2 units of pRBC are given  - CBC and DIC repeat at 11.30 pm  - NPO  - PPI BID IV  - GI consult if patient still dropping Hgb   - EGD is not possible now (patient on dual pressors).   - DIC and HIT panels negative    #DKA/HHS,   - resolved  - endo on board   - insulin IV drip to target glucose level of 140 to 180     #AMS toxic metabolic   - CT head -> negative  - EEG -> There were no findings of active epilepsy.

## 2023-08-10 NOTE — PROGRESS NOTE ADULT - ASSESSMENT
ASSESSMENT  89 y/o M w/ PMH of DM, HTN coming from home with complaint of decreased appetite, increased urinary output and craving sweets x 2 weeks. Admitted for management of DKA.     IMPRESSION  #Septic shock requiring pressors   #Hungatella bacteremia likely GI translocation in the setting of appendicitis  8/5 BCX NGTD   8/4 UCX NGTD  8/4 BCX + 1/2 bottles  < from: CT Angio Abdomen and Pelvis w/ IV Cont (08.10.23 @ 01:18) >  No evidence of bowel ischemia. 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 Collateral flow presumed   to be present again there is no evidence of bowel ischemia.  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 Findings are concerning for appendicitis.  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  Additional findings detailed in the body the report  #DKA/HHS  #Atelectasis  Creatinine: 1.1 mg/dL (08.08.23 @ 05:55)  Weight (kg): 62.6 (08-04-23 @ 20:37)  crcl 41      RECOMMENDATIONS  - Rashi 1g q12h IV 8/7-  - ICU team requesting Caspo- however no evidence of perforation/ischemia on CT. OK for now, D/C if BCX fungitell NEG   - SEND repeat BCX, none pending   - ICU team started Vanc, dosing per ID pharmacy   - Surgery  - Poor prognosis without surgical intervention, GOC    If any questions, please call or send a message on [employer]  Please continue to update ID with any pertinent new laboratory or radiographic findings

## 2023-08-10 NOTE — PROGRESS NOTE ADULT - SUBJECTIVE AND OBJECTIVE BOX
MILEY MARES  88y, Male  Allergy: No Known Allergies      LOS  6d    CHIEF COMPLAINT: DKA/HHS (10 Aug 2023 11:21)      INTERVAL EVENTS/HPI  - T(F): , Max: 98.9 (08-10-23 @ 08:00), worsening pressors, lactate  - WBC Count: 18.60 (08-10-23 @ 10:55)  WBC Count: 19.50 (08-10-23 @ 04:20)     - Creatinine: 1.3 (08-10-23 @ 04:20)  Creatinine: 1.3 (08-09-23 @ 20:53)     -   -   -     ROS  cannot obtain secondary to patient's sedation and/or mental status    VITALS:  T(F): 98.9, Max: 98.9 (08-10-23 @ 08:00)  HR: 86  BP: 117/67  RR: 23Vital Signs Last 24 Hrs  T(C): 37.2 (10 Aug 2023 08:00), Max: 37.2 (10 Aug 2023 08:00)  T(F): 98.9 (10 Aug 2023 08:00), Max: 98.9 (10 Aug 2023 08:00)  HR: 86 (10 Aug 2023 11:15) (85 - 125)  BP: 117/67 (10 Aug 2023 11:15) (61/33 - 147/66)  BP(mean): 88 (10 Aug 2023 11:15) (43 - 95)  RR: 23 (10 Aug 2023 11:15) (16 - 43)  SpO2: 98% (10 Aug 2023 11:15) (75% - 100%)    Parameters below as of 10 Aug 2023 10:00  Patient On (Oxygen Delivery Method): nasal cannula  O2 Flow (L/min): 2      PHYSICAL EXAM:  Gen: trach/ vent  CV: RRR  Lungs: Decreased BS at bases  Abd: tender   Neuro: does not follow commands  Skin: no rash   Lines clean, no phlebitis   FH: Non-contributory  Social Hx: Non-contributory    TESTS & MEASUREMENTS:                        6.0    18.60 )-----------( 171      ( 10 Aug 2023 10:55 )             17.3     08-10    141  |  112<H>  |  32<H>  ----------------------------<  89  4.5   |  20  |  1.3    Ca    6.7<L>      10 Aug 2023 04:20  Mg     2.6     08-10    TPro  4.3<L>  /  Alb  1.8<L>  /  TBili  0.6  /  DBili  x   /  AST  56<H>  /  ALT  18  /  AlkPhos  76  08-09      LIVER FUNCTIONS - ( 09 Aug 2023 20:53 )  Alb: 1.8 g/dL / Pro: 4.3 g/dL / ALK PHOS: 76 U/L / ALT: 18 U/L / AST: 56 U/L / GGT: x           Urinalysis Basic - ( 10 Aug 2023 10:55 )    Color: Yellow / Appearance: Clear / SG: >1.030 / pH: x  Gluc: x / Ketone: 15 mg/dL  / Bili: Negative / Urobili: 1.0 mg/dL   Blood: x / Protein: 30 mg/dL / Nitrite: Negative   Leuk Esterase: Negative / RBC: 2 /HPF / WBC 1 /HPF   Sq Epi: x / Non Sq Epi: 1 /HPF / Bacteria: Negative /HPF        Culture - Blood (collected 08-08-23 @ 11:58)  Source: .Blood None  Preliminary Report (08-09-23 @ 23:01):    No growth at 24 hours    Culture - Blood (collected 08-05-23 @ 07:10)  Source: .Blood Blood-Peripheral  Preliminary Report (08-09-23 @ 19:00):    No growth at 4 days    Culture - Urine (collected 08-04-23 @ 14:42)  Source: Clean Catch Clean Catch (Midstream)  Final Report (08-05-23 @ 19:52):    No growth    Culture - Blood (collected 08-04-23 @ 11:20)  Source: .Blood Blood-Peripheral  Gram Stain (08-06-23 @ 02:58):    Growth in anaerobic bottle: Gram Negative Rods  Final Report (08-07-23 @ 19:28):    Growth in anaerobic bottle: Most closely resembling Hungatella species    "Susceptibilities not performed"    Please Note:************************************************    Hungatella species    may appear as gram negative rods in direct smears of clinical specimens.    Direct identification is available within approximately 3-5    hours either by Blood Panel Multiplexed PCR or Direct    MALDI-TOF. Details: [email or web address]  Organism: Blood Culture PCR (08-07-23 @ 19:28)  Organism: Blood Culture PCR (08-07-23 @ 19:28)      Method Type: PCR      -  Blood PCR Panel: NEG    Culture - Blood (collected 08-04-23 @ 11:20)  Source: .Blood Blood-Peripheral  Final Report (08-09-23 @ 23:00):    No growth at 5 days        Blood Gas Venous - Lactate: 2.00 mmol/L (08-10-23 @ 10:10)  Lactate, Blood: 2.5 mmol/L (08-10-23 @ 04:20)  Lactate, Blood: 7.1 mmol/L (08-09-23 @ 20:53)  Lactate, Blood: 1.5 mmol/L (08-08-23 @ 12:40)  Lactate, Blood: 3.4 mmol/L (08-06-23 @ 11:17)      INFECTIOUS DISEASES TESTING  MRSA PCR Result.: Negative (08-10-23 @ 10:55)  MRSA PCR Result.: Negative (08-07-23 @ 11:30)  Procalcitonin, Serum: 0.67 (08-06-23 @ 05:05)  Procalcitonin, Serum: 0.48 (08-04-23 @ 23:05)  COVID-19 PCR: NotDetec (11-21-22 @ 18:43)      INFLAMMATORY MARKERS      RADIOLOGY & ADDITIONAL TESTS:  I have personally reviewed the last available Chest xray  CXR      CT      CARDIOLOGY TESTING  12 Lead ECG:   Ventricular Rate 106 BPM    Atrial Rate 106 BPM    P-R Interval 152 ms    QRS Duration 126 ms    Q-T Interval 400 ms    QTC Calculation(Bazett) 531 ms    P Axis 34 degrees    R Axis -50 degrees    T Axis -18 degrees    Diagnosis Line Sinus tachycardia  Right bundle branch block  Left anterior fascicular block  *** Bifascicular block ***  Moderate voltage criteria for LVH, may be normal variant      Abnormal ECG    Confirmed by RUDY FLOR MD ([id number]) on 8/4/2023 11:24:55 AM (08-04-23 @ 10:57)      MEDICATIONS  allopurinol 300  atorvastatin 10  bacitracin   Ointment 1  caspofungin IVPB   caspofungin IVPB 70  chlorhexidine 2% Cloths 1  hydrocortisone sodium succinate Injectable 50  insulin regular Infusion 2  lactated ringers Bolus 250  lactated ringers Bolus 250  lactated ringers Bolus 500  lactated ringers Bolus 500  lactated ringers. 1000  meropenem  IVPB 1000  midodrine 10  norepinephrine Infusion 0.05  pantoprazole  Injectable 40  phenylephrine    Infusion 0.5  polyethylene glycol 3350 17  senna 2  vancomycin  IVPB 1000      WEIGHT  Weight (kg): 62.6 (08-04-23 @ 20:37)  Creatinine: 1.3 mg/dL (08-10-23 @ 04:20)  Creatinine: 1.3 mg/dL (08-09-23 @ 20:53)      ANTIBIOTICS:  caspofungin IVPB      caspofungin IVPB 70 milliGRAM(s) IV Intermittent once  meropenem  IVPB 1000 milliGRAM(s) IV Intermittent every 12 hours  vancomycin  IVPB 1000 milliGRAM(s) IV Intermittent every 12 hours      All available historical records have been reviewed

## 2023-08-10 NOTE — CONSULT NOTE ADULT - ATTENDING COMMENTS
Insulin drip therapy should be maintained in this critically ill patient.  Aim for BS ~ 110-170.
Mr. Pagan was seen and examined at his stretcher in the ED hallway with his family member present.  The patient has been having worsening mental status and declining appetite over the past several days, and per report may also not have been taking his medication.  The patient does have a pre-existing diagnosis of diabetes, however the patient's family member denies that he has never been on insulin before.  Recommend aggressive fluid resuscitation for HHNK with initiation of insulin drip.  Patient will require admission to the ICU for close monitoring of his blood glucose.  I agree with the fellow note, with the exceptions listed in my attestation above.  The remainder of impression and plan per fellow note.      MICU
as above. 87 yo male admitted for HHS/DKA with severe sepsis and gram negative bacteremia with hungatella; was on pressors, currently decreasing. Abdomen is soft, L>R sided tenderness without guarding of rigidity. DNR/DNI but still planning for medical management. drop in H/H.    87 yo male with possible appendicitis though there's no inflammation around the appendix on the CT scan, no evidence of appendicolith    Would treat this possible appendicitis with antibiotics, high risk surgical candidate
Patient with melena. Rec BT , resuscitate and consider EGD tomorrow.
I have personally seen and examined this patient.  I have fully participated in the care of this patient.  I have reviewed all pertinent clinical information, including history, physical exam, plan and note.  I have reviewed all pertinent clinical information and reviewed all relevant imaging and diagnostic studies personally.  My addendum includes the final recommendations and supersedes the resident's note.     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
88M with PMH of DM and HTN here with decreased appetite, increased urinary output, found here to be in DKA/HHS, with course c/b Hungatella species bacteremia and septic shock on levophed. Also with toxic metabolic encephalopathy. Is on broad-spectrum abx with IV meropenem and IV vanco. Overall poor prognosis. Is DNR/DNI. Will discuss GOC with family.   ______________  Juan David Lara MD  Palliative Medicine  [facility]   of Geriatric and Palliative Medicine  [phone]

## 2023-08-10 NOTE — PROGRESS NOTE ADULT - SUBJECTIVE AND OBJECTIVE BOX
HPI:  89 y/o M w/ PMH of DM, HTN coming from home with complaint of decreased appetite, increased urinary output and craving sweets x 2 weeks. Daughter is caretaker, states patient behavior has changed. Within the last 2 weeks, He is less active, requesting more coca cola and sugar. Pt noted to be hypotensive upon arrival.     ED vitals:  BP 74/ 50, HR 87, Temp 97.2F, satting 95% on room air  Labs significant for WBC 14K, Na 123 (corrected 141), Cr 2.4, AG 23, BHB 5.1. VBG pH 7.19, Lactate 5.8, K 8.6, pCO2 39  EKG tachycardia, bifascicular block, RBBB  CXR unremarkable  CT A/P: Extensive coronary atherosclerosis. Dependent atelectasis at the right base. 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 Hepatic cysts. Questionable sludge within the gallbladder. Unchanged 1 cm cystic lesion in the pancreatic body      s/p calcium gluconate 1g, Lasix 40mg push x1, barcarb 50meq, started on insulin drip at 7 units/hr.   Admitted to MICU for DKA/HHS.     (04 Aug 2023 15:16)    Hospital Course:    - In the MICU pt was on levo 0.4 --> 0.2 8/8; received 1uPRBC for Hg 6.6 8/7; being treat w/ IV kenyatta for +amparo BCx; EEG negative; imaging not revealing at this point; hyperglycemia now under control, on b/b/iss.  - 8/9 Hg 6.2 getting 1u pRBC; pt off levo on midodrine holding a good pressure, CT suggestive of appendicitis, seen by surgery, doubt apendicitis, NGT to suction;     Overnight events:    - Pt deteriorated rapidly in the PM, now on 2 pressors, mentation worsening   - spoke at length with daughter, they are ok with continuing medical management for now, becoming concerned about pt not eating; from discussions if pt continues deteriorating will likely move towards CMO.     ADVANCE DIRECTIVES:    [ ] Full Code [X ] DNR  MOLST  [ ]  Living Will  [ ]   DECISION MAKER(s):  [ ] Health Care Proxy(s)  [ ] Surrogate(s)  [ ] Guardian           Name(s): Phone Number(s):  mark Rausch [phone]  daughter Angie [phone]    BASELINE (I)ADL(s) (prior to admission):  [place]: [ ]Total  [ ] Moderate [ ]Dependent  Palliative Performance Status Version 2:         %    [email or web address]    Allergies    No Known Allergies    TESTS & MEASUREMENTS:                        6.0    18.60 )-----------( 171      ( 10 Aug 2023 10:55 )             17.3     08-10    141  |  112<H>  |  32<H>  ----------------------------<  89  4.5   |  20  |  1.3    Ca    6.7<L>      10 Aug 2023 04:20  Mg     2.6     08-10    TPro  4.3<L>  /  Alb  1.8<L>  /  TBili  0.6  /  DBili  x   /  AST  56<H>  /  ALT  18  /  AlkPhos  76  08-09      LIVER FUNCTIONS - ( 09 Aug 2023 20:53 )  Alb: 1.8 g/dL / Pro: 4.3 g/dL / ALK PHOS: 76 U/L / ALT: 18 U/L / AST: 56 U/L / GGT: x           Urinalysis Basic - ( 10 Aug 2023 10:55 )    Color: Yellow / Appearance: Clear / SG: >1.030 / pH: x  Gluc: x / Ketone: 15 mg/dL  / Bili: Negative / Urobili: 1.0 mg/dL   Blood: x / Protein: 30 mg/dL / Nitrite: Negative   Leuk Esterase: Negative / RBC: 2 /HPF / WBC 1 /HPF   Sq Epi: x / Non Sq Epi: 1 /HPF / Bacteria: Negative /HPF        Culture - Blood (collected 08-08-23 @ 11:58)  Source: .Blood None  Preliminary Report (08-09-23 @ 23:01):    No growth at 24 hours    Culture - Blood (collected 08-05-23 @ 07:10)  Source: .Blood Blood-Peripheral  Preliminary Report (08-09-23 @ 19:00):    No growth at 4 days    Culture - Urine (collected 08-04-23 @ 14:42)  Source: Clean Catch Clean Catch (Midstream)  Final Report (08-05-23 @ 19:52):    No growth    Culture - Blood (collected 08-04-23 @ 11:20)  Source: .Blood Blood-Peripheral  Gram Stain (08-06-23 @ 02:58):    Growth in anaerobic bottle: Gram Negative Rods  Final Report (08-07-23 @ 19:28):    Growth in anaerobic bottle: Most closely resembling Hungatella species    "Susceptibilities not performed"    Please Note:************************************************    Hungatella species    may appear as gram negative rods in direct smears of clinical specimens.    Direct identification is available within approximately 3-5    hours either by Blood Panel Multiplexed PCR or Direct    MALDI-TOF. Details: [email or web address]  Organism: Blood Culture PCR (08-07-23 @ 19:28)  Organism: Blood Culture PCR (08-07-23 @ 19:28)      Method Type: PCR      -  Blood PCR Panel: NEG    Culture - Blood (collected 08-04-23 @ 11:20)  Source: .Blood Blood-Peripheral  Final Report (08-09-23 @ 23:00):    No growth at 5 days        Blood Gas Venous - Lactate: 2.00 mmol/L (08-10-23 @ 10:10)  Lactate, Blood: 2.5 mmol/L (08-10-23 @ 04:20)  Lactate, Blood: 7.1 mmol/L (08-09-23 @ 20:53)  Lactate, Blood: 1.5 mmol/L (08-08-23 @ 12:40)  Lactate, Blood: 3.4 mmol/L (08-06-23 @ 11:17)    MRSA PCR Result.: Negative (08-07-23 @ 11:30)      RADIOLOGY & ADDITIONAL TESTS:  I have personally reviewed the last Chest xray  CXR    < from: Xray Chest 1 View-PORTABLE IMMEDIATE (Xray Chest 1 View-PORTABLE IMMEDIATE .) (08.10.23 @ 10:39) >    Impression:    Interstitial edema, unchanged. Lines and tubes as described.        --- End of Report ---      < end of copied text >    KUB    < from: Xray Kidney Ureter Bladder (08.09.23 @ 19:42) >    FINDINGS/  IMPRESSION:    No definite free air under the diaphragm. Tip of nasogastric tube   overlying the left upper quadrant. Nonspecific bowel gas pattern. Mild to   moderate stool burden. No acute osseous abnormalities.    --- End of Report ---        < end of copied text >      CT    < from: CT Angio Abdomen and Pelvis w/ IV Cont (08.10.23 @ 01:18) >    IMPRESSION:    No evidence of bowel ischemia. Unchanged findings suggesting acute   appendicitis.    Unopacified inferior mesenteric artery is new compared to 11/17/2022 as   there is increased thrombus within the unchanged size of abdominal aortic   aneurysm with decreased opacified aortic lumen. Collateral flow presumed   to be present again there is no evidence of bowel ischemia.    Otherwise unchanged findings on the short interval follow-up.    --- End of Report ---      < end of copied text >      CARDIOLOGY TESTING  12 Lead ECG:   Ventricular Rate 106 BPM    Atrial Rate 106 BPM    P-R Interval 152 ms    QRS Duration 126 ms    Q-T Interval 400 ms    QTC Calculation(Bazett) 531 ms    P Axis 34 degrees    R Axis -50 degrees    T Axis -18 degrees    Diagnosis Line Sinus tachycardia  Right bundle branch block  Left anterior fascicular block  *** Bifascicular block ***  Moderate voltage criteria for LVH, may be normal variant      Abnormal ECG    Confirmed by RUDY FLOR MD ([id number]) on 8/4/2023 11:24:55 AM (08-04-23 @ 10:57)      MEDICATIONS  allopurinol 300  atorvastatin 10  bacitracin   Ointment 1  caspofungin IVPB   chlorhexidine 2% Cloths 1  hydrocortisone sodium succinate Injectable 50  insulin regular Infusion 2  lactated ringers Bolus 250  lactated ringers Bolus 250  lactated ringers Bolus 500  lactated ringers Bolus 500  lactated ringers. 1000  meropenem  IVPB 1000  midodrine 10  norepinephrine Infusion 0.05  pantoprazole  Injectable 40  phenylephrine    Infusion 0.5  polyethylene glycol 3350 17  senna 2  vancomycin  IVPB 1000      ANTIBIOTICS:  caspofungin IVPB      meropenem  IVPB 1000 milliGRAM(s) IV Intermittent every 12 hours  vancomycin  IVPB 1000 milliGRAM(s) IV Intermittent every 12 hours      ALLERGIES:  No Known Allergies      VITALS:  T(F): 98.9, Max: 98.9 (08-10-23 @ 08:00)  HR: 86  BP: 117/67  RR: 23Vital Signs Last 24 Hrs  T(C): 37.2 (10 Aug 2023 08:00), Max: 37.2 (10 Aug 2023 08:00)  T(F): 98.9 (10 Aug 2023 08:00), Max: 98.9 (10 Aug 2023 08:00)  HR: 86 (10 Aug 2023 11:15) (85 - 125)  BP: 117/67 (10 Aug 2023 11:15) (61/33 - 155/77)  BP(mean): 88 (10 Aug 2023 11:15) (43 - 106)  RR: 23 (10 Aug 2023 11:15) (16 - 43)  SpO2: 98% (10 Aug 2023 11:15) (75% - 100%)    Parameters below as of 10 Aug 2023 10:00  Patient On (Oxygen Delivery Method): nasal cannula  O2 Flow (L/min): 2    PHYSICAL EXAM:  Gen: lethargic, comfortable, ill apearing  HEENT: Normocephalic, atraumatic; poor dentition  Neck: supple, no lymphadenopathy; RIJ in place  CV: Regular rate & regular rhythm  Lungs: decreased BS at bases, no fremitus, no crackles appreciated   Abdomen: soft, not distended, diffusely tender  Ext: Warm, well perfused, no edema  Neuro: moving all extremities in plane of bed, no droop, awake  Skin: no rash, no erythema    PRESENT SYMPTOMS: [X ]Unable to obtain due to poor mentation   Source if other than patient:  [ ]Family   [ ]Team     Pain: [ ]yes [ ]no  QOL impact -   Location -                    Aggravating factors -  Quality -  Radiation -  Timing-  Severity (0-10 scale):  Minimal acceptable level (0-10 scale):     CPOT:    https://www.[facility].org/getattachment/usb82m46-9l3q-7s7g-7x6g-8883e4088y7a/Critical-Care-Pain-Observation-Tool-(CPOT)    PAIN AD Score: 0  [email or web address] (press ctrl +  left click to view)    Dyspnea:                           [ ]None[ ]Mild [ ]Moderate [ ]Severe     Respiratory Distress Observation Scale (RDOS): 1  A score of 0 to 2 signifies little or no respiratory distress, 3 signifies mild distress, scores 4 to 6 indicate moderate distress, and scores greater than 7 signify severe distress  [email or web address]    Anxiety:                             [ ]None[ ]Mild [ ]Moderate [ ]Severe   Fatigue:                             [ ]None[ ]Mild [ ]Moderate [ ]Severe   Nausea:                             [ ]None[ ]Mild [ ]Moderate [ ]Severe   Loss of appetite:              [ ]None[ ]Mild [ ]Moderate [ ]Severe   Constipation:                    [ ]None[ ]Mild [ ]Moderate [ ]Severe    Other Symptoms:  [ ]All other review of systems negative     Palliative Performance Status Version 2:         %    [email or web address]://[email or web address]/news/palliative_performance_scale_[email or web address].pdf      Palliative Care Spiritual/Emotional Screening Tool Question  Severity (0-4):                    OR                    [X ] Unable to determine/NA  Score of 2 or greater indicates recommendation of Chaplaincy referral  Chaplaincy Referral: [ ] Yes [ ] Refused [ ] Following     Caregiver [place]:  [ ] Yes [ ] No [ X] Deferred  Social Work Referral [ ]  Patient and Family Centered Care Referral [ ]    Anticipatory Grief Present: [ ] Yes [ ] No [ X] Deferred  Social Work Referral [ ]  Patient and Family Centered Care Referral [ ]    REFERRALS:   [ ]Chaplaincy  [ ]Hospice  [ ]Child Life  [ ]Social Work  [ ]Case management [ ]Holistic Therapy     Palliative care education provided to patient and/or family     HPI: 87 y/o M w/ PMH of DM, HTN coming from home with complaint of decreased appetite, increased urinary output and craving sweets x 2 weeks. Daughter is caretaker, states patient behavior has changed. Within the last 2 weeks, He is less active, requesting more coca cola and sugar. Pt noted to be hypotensive upon arrival.     ED vitals:  BP 74/ 50, HR 87, Temp 97.2F, satting 95% on room air  Labs significant for WBC 14K, Na 123 (corrected 141), Cr 2.4, AG 23, BHB 5.1. VBG pH 7.19, Lactate 5.8, K 8.6, pCO2 39  EKG tachycardia, bifascicular block, RBBB  CXR unremarkable  CT A/P: Extensive coronary atherosclerosis. Dependent atelectasis at the right base. 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 Hepatic cysts. Questionable sludge within the gallbladder. Unchanged 1 cm cystic lesion in the pancreatic body      s/p calcium gluconate 1g, Lasix 40mg push x1, barcarb 50meq, started on insulin drip at 7 units/hr.   Admitted to MICU for DKA/HHS.     (04 Aug 2023 15:16)    Hospital Course:    - In the MICU pt was on levo 0.4 --> 0.2 8/8; received 1uPRBC for Hg 6.6 8/7; being treat w/ IV kenyatta for +amparo BCx; EEG negative; imaging not revealing at this point; hyperglycemia now under control, on b/b/iss.  - 8/9 Hg 6.2 getting 1u pRBC; pt off levo on midodrine holding a good pressure, CT suggestive of appendicitis, seen by surgery, doubt appendicitis; NGT to suction;     Overnight events:    - Pt deteriorated rapidly in the PM, now on 2 pressors, mentation worsening   - spoke at length with daughter, they are ok with continuing medical management for now, becoming concerned about pt not eating; from discussions if pt continues deteriorating will likely move towards CMO.     ADVANCE DIRECTIVES:    [ ] Full Code [X ] DNR  MOLST  [ ]  Living Will  [ ]   DECISION MAKER(s):  [ ] Health Care Proxy(s)  [ ] Surrogate(s)  [ ] Guardian           Name(s): Phone Number(s):  mark Rausch [phone]  daughter Angie [phone]    BASELINE (I)ADL(s) (prior to admission):  [place]: [ ]Total  [ ] Moderate [ ]Dependent  Palliative Performance Status Version 2:         %    [email or web address]    Allergies    No Known Allergies    TESTS & MEASUREMENTS:                        6.0    18.60 )-----------( 171      ( 10 Aug 2023 10:55 )             17.3     08-10    141  |  112<H>  |  32<H>  ----------------------------<  89  4.5   |  20  |  1.3    Ca    6.7<L>      10 Aug 2023 04:20  Mg     2.6     08-10    TPro  4.3<L>  /  Alb  1.8<L>  /  TBili  0.6  /  DBili  x   /  AST  56<H>  /  ALT  18  /  AlkPhos  76  08-09      LIVER FUNCTIONS - ( 09 Aug 2023 20:53 )  Alb: 1.8 g/dL / Pro: 4.3 g/dL / ALK PHOS: 76 U/L / ALT: 18 U/L / AST: 56 U/L / GGT: x           Urinalysis Basic - ( 10 Aug 2023 10:55 )    Color: Yellow / Appearance: Clear / SG: >1.030 / pH: x  Gluc: x / Ketone: 15 mg/dL  / Bili: Negative / Urobili: 1.0 mg/dL   Blood: x / Protein: 30 mg/dL / Nitrite: Negative   Leuk Esterase: Negative / RBC: 2 /HPF / WBC 1 /HPF   Sq Epi: x / Non Sq Epi: 1 /HPF / Bacteria: Negative /HPF        Culture - Blood (collected 08-08-23 @ 11:58)  Source: .Blood None  Preliminary Report (08-09-23 @ 23:01):    No growth at 24 hours    Culture - Blood (collected 08-05-23 @ 07:10)  Source: .Blood Blood-Peripheral  Preliminary Report (08-09-23 @ 19:00):    No growth at 4 days    Culture - Urine (collected 08-04-23 @ 14:42)  Source: Clean Catch Clean Catch (Midstream)  Final Report (08-05-23 @ 19:52):    No growth    Culture - Blood (collected 08-04-23 @ 11:20)  Source: .Blood Blood-Peripheral  Gram Stain (08-06-23 @ 02:58):    Growth in anaerobic bottle: Gram Negative Rods  Final Report (08-07-23 @ 19:28):    Growth in anaerobic bottle: Most closely resembling Hungatella species    "Susceptibilities not performed"    Please Note:************************************************    Hungatella species    may appear as gram negative rods in direct smears of clinical specimens.    Direct identification is available within approximately 3-5    hours either by Blood Panel Multiplexed PCR or Direct    MALDI-TOF. Details: [email or web address]  Organism: Blood Culture PCR (08-07-23 @ 19:28)  Organism: Blood Culture PCR (08-07-23 @ 19:28)      Method Type: PCR      -  Blood PCR Panel: NEG    Culture - Blood (collected 08-04-23 @ 11:20)  Source: .Blood Blood-Peripheral  Final Report (08-09-23 @ 23:00):    No growth at 5 days        Blood Gas Venous - Lactate: 2.00 mmol/L (08-10-23 @ 10:10)  Lactate, Blood: 2.5 mmol/L (08-10-23 @ 04:20)  Lactate, Blood: 7.1 mmol/L (08-09-23 @ 20:53)  Lactate, Blood: 1.5 mmol/L (08-08-23 @ 12:40)  Lactate, Blood: 3.4 mmol/L (08-06-23 @ 11:17)    MRSA PCR Result.: Negative (08-07-23 @ 11:30)      RADIOLOGY & ADDITIONAL TESTS:  I have personally reviewed the last Chest xray  CXR    < from: Xray Chest 1 View-PORTABLE IMMEDIATE (Xray Chest 1 View-PORTABLE IMMEDIATE .) (08.10.23 @ 10:39) >    Impression:    Interstitial edema, unchanged. Lines and tubes as described.        --- End of Report ---      < end of copied text >    KUB    < from: Xray Kidney Ureter Bladder (08.09.23 @ 19:42) >    FINDINGS/  IMPRESSION:    No definite free air under the diaphragm. Tip of nasogastric tube   overlying the left upper quadrant. Nonspecific bowel gas pattern. Mild to   moderate stool burden. No acute osseous abnormalities.    --- End of Report ---        < end of copied text >      CT    < from: CT Angio Abdomen and Pelvis w/ IV Cont (08.10.23 @ 01:18) >    IMPRESSION:    No evidence of bowel ischemia. Unchanged findings suggesting acute   appendicitis.    Unopacified inferior mesenteric artery is new compared to 11/17/2022 as   there is increased thrombus within the unchanged size of abdominal aortic   aneurysm with decreased opacified aortic lumen. Collateral flow presumed   to be present again there is no evidence of bowel ischemia.    Otherwise unchanged findings on the short interval follow-up.    --- End of Report ---      < end of copied text >      CARDIOLOGY TESTING  12 Lead ECG:   Ventricular Rate 106 BPM    Atrial Rate 106 BPM    P-R Interval 152 ms    QRS Duration 126 ms    Q-T Interval 400 ms    QTC Calculation(Bazett) 531 ms    P Axis 34 degrees    R Axis -50 degrees    T Axis -18 degrees    Diagnosis Line Sinus tachycardia  Right bundle branch block  Left anterior fascicular block  *** Bifascicular block ***  Moderate voltage criteria for LVH, may be normal variant      Abnormal ECG    Confirmed by RUDY FLOR MD ([id number]) on 8/4/2023 11:24:55 AM (08-04-23 @ 10:57)      MEDICATIONS  allopurinol 300  atorvastatin 10  bacitracin   Ointment 1  caspofungin IVPB   chlorhexidine 2% Cloths 1  hydrocortisone sodium succinate Injectable 50  insulin regular Infusion 2  lactated ringers Bolus 250  lactated ringers Bolus 250  lactated ringers Bolus 500  lactated ringers Bolus 500  lactated ringers. 1000  meropenem  IVPB 1000  midodrine 10  norepinephrine Infusion 0.05  pantoprazole  Injectable 40  phenylephrine    Infusion 0.5  polyethylene glycol 3350 17  senna 2  vancomycin  IVPB 1000      ANTIBIOTICS:  caspofungin IVPB      meropenem  IVPB 1000 milliGRAM(s) IV Intermittent every 12 hours  vancomycin  IVPB 1000 milliGRAM(s) IV Intermittent every 12 hours      ALLERGIES:  No Known Allergies      VITALS:  Vital Signs Last 24 Hrs  T(C): 36.8 (10 Aug 2023 12:00), Max: 37.2 (10 Aug 2023 08:00)  T(F): 98.2 (10 Aug 2023 12:00), Max: 98.9 (10 Aug 2023 08:00)  HR: 104 (10 Aug 2023 15:30) (81 - 125)  BP: 135/57 (10 Aug 2023 15:30) (61/33 - 149/72)  BP(mean): 82 (10 Aug 2023 15:30) (43 - 95)  RR: 30 (10 Aug 2023 15:30) (14 - 43)  SpO2: 99% (10 Aug 2023 15:30) (75% - 100%)    Parameters below as of 10 Aug 2023 14:00  Patient On (Oxygen Delivery Method): nasal cannula  O2 Flow (L/min): 2    Parameters below as of 10 Aug 2023 10:00  Patient On (Oxygen Delivery Method): nasal cannula  O2 Flow (L/min): 2    PHYSICAL EXAM:  Gen: lethargic, comfortable, ill apearing  HEENT: Normocephalic, atraumatic; poor dentition  Neck: supple, no lymphadenopathy; RIJ in place  CV: Regular rate & regular rhythm  Lungs: decreased BS at bases, no fremitus, no crackles appreciated   Abdomen: soft, not distended, diffusely tender  Ext: Warm, well perfused, no edema  Neuro: moving all extremities in plane of bed, no droop, awake  Skin: no rash, no erythema    PRESENT SYMPTOMS: [X ]Unable to obtain due to poor mentation   Source if other than patient:  [ ]Family   [ ]Team     Pain: [ ]yes [ ]no  QOL impact -   Location -                    Aggravating factors -  Quality -  Radiation -  Timing-  Severity (0-10 scale):  Minimal acceptable level (0-10 scale):     CPOT:    https://www.[facility].org/getattachment/kwh33j44-9y1x-8f9q-8p9b-2339c7157k9e/Critical-Care-Pain-Observation-Tool-(CPOT)    PAIN AD Score: 0  [email or web address] (press ctrl +  left click to view)    Dyspnea:                           [ ]None[ ]Mild [ ]Moderate [ ]Severe     Respiratory Distress Observation Scale (RDOS): 2  A score of 0 to 2 signifies little or no respiratory distress, 3 signifies mild distress, scores 4 to 6 indicate moderate distress, and scores greater than 7 signify severe distress  [email or web address]    Anxiety:                             [ ]None[ ]Mild [ ]Moderate [ ]Severe   Fatigue:                             [ ]None[ ]Mild [ ]Moderate [ ]Severe   Nausea:                             [ ]None[ ]Mild [ ]Moderate [ ]Severe   Loss of appetite:              [ ]None[ ]Mild [ ]Moderate [ ]Severe   Constipation:                    [ ]None[ ]Mild [ ]Moderate [ ]Severe    Other Symptoms:  [ ]All other review of systems negative     Palliative Performance Status Version 2:         %    http://[facility]pcrc.org/files/news/palliative_performance_scale_ppsv2.pdf      Palliative Care Spiritual/Emotional Screening Tool Question  Severity (0-4):                    OR                    [X ] Unable to determine/NA  Score of 2 or greater indicates recommendation of Chaplaincy referral  Chaplaincy Referral: [ ] Yes [ ] Refused [ ] Following     Caregiver [place]:  [ ] Yes [ ] No [ X] Deferred  Social Work Referral [ ]  Patient and Family Centered Care Referral [ ]    Anticipatory Grief Present: [ ] Yes [ ] No [ X] Deferred  Social Work Referral [ ]  Patient and Family Centered Care Referral [ ]    REFERRALS:   [ ]Chaplaincy  [ ]Hospice  [ ]Child Life  [ ]Social Work  [ ]Case management [ ]Holistic Therapy     Palliative care education provided to patient and/or family

## 2023-08-10 NOTE — PROGRESS NOTE ADULT - SUBJECTIVE AND OBJECTIVE BOX
Patient is a 88y old  Male who presents with a chief complaint of DKA/HHS secondary to Hungatella bacteremia - source is most probably acute appendicitis.     INTERVAL HPI/OVERNIGHT EVENTS:   At 6.30 pm patient started having diffuse severe abdominal pain with increased pressors to levo and jasen.   Abdomen Not peritonitic.   Ct angio abdomen/pelvis to r/o ischemia ->No evidence of bowel ischemia. Unchanged findings suggesting acute appendicitis.  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 Collateral flow presumed to be present again there is no evidence of bowel ischemia.     lactate level; 7   - Surgery team on board  - NPO    ICU Vital Signs Last 24 Hrs  T(C): 36.8 (10 Aug 2023 12:00), Max: 37.2 (10 Aug 2023 08:00)  T(F): 98.2 (10 Aug 2023 12:00), Max: 98.9 (10 Aug 2023 08:00)  HR: 104 (10 Aug 2023 15:30) (81 - 125)  BP: 135/57 (10 Aug 2023 15:30) (61/33 - 149/72)  BP(mean): 82 (10 Aug 2023 15:30) (43 - 95)  ABP: --  ABP(mean): --  RR: 30 (10 Aug 2023 15:30) (14 - 43)  SpO2: 99% (10 Aug 2023 15:30) (75% - 100%)    O2 Parameters below as of 10 Aug 2023 14:00  Patient On (Oxygen Delivery Method): nasal cannula  O2 Flow (L/min): 2        I&O's Summary    09 Aug 2023 07:01  -  10 Aug 2023 07:00  --------------------------------------------------------  IN: 2974 mL / OUT: 1300 mL / NET: 1674 mL    10 Aug 2023 07:01  -  10 Aug 2023 15:48  --------------------------------------------------------  IN: 3027.6 mL / OUT: 475 mL / NET: 2552.6 mL      LABS:                        6.0    18.60 )-----------( 171      ( 10 Aug 2023 10:55 )             17.3     08-10    141  |  112<H>  |  32<H>  ----------------------------<  89  4.5   |  20  |  1.3    Ca    6.7<L>      10 Aug 2023 04:20  Mg     2.6     08-10    TPro  4.3<L>  /  Alb  1.8<L>  /  TBili  0.6  /  DBili  x   /  AST  56<H>  /  ALT  18  /  AlkPhos  76  08-09    PT/INR - ( 08 Aug 2023 20:33 )   PT: 15.50 sec;   INR: 1.35 ratio         PTT - ( 08 Aug 2023 20:33 )  PTT:37.0 sec  Urinalysis Basic - ( 10 Aug 2023 10:55 )    Color: Yellow / Appearance: Clear / SG: >1.030 / pH: x  Gluc: x / Ketone: 15 mg/dL  / Bili: Negative / Urobili: 1.0 mg/dL   Blood: x / Protein: 30 mg/dL / Nitrite: Negative   Leuk Esterase: Negative / RBC: 2 /HPF / WBC 1 /HPF   Sq Epi: x / Non Sq Epi: 1 /HPF / Bacteria: Negative /HPF      CAPILLARY BLOOD GLUCOSE      POCT Blood Glucose.: 111 mg/dL (10 Aug 2023 14:20)  POCT Blood Glucose.: 101 mg/dL (10 Aug 2023 13:07)  POCT Blood Glucose.: 117 mg/dL (10 Aug 2023 11:12)  POCT Blood Glucose.: 140 mg/dL (10 Aug 2023 09:04)  POCT Blood Glucose.: 133 mg/dL (10 Aug 2023 06:15)  POCT Blood Glucose.: 96 mg/dL (10 Aug 2023 04:07)  POCT Blood Glucose.: 82 mg/dL (10 Aug 2023 03:02)  POCT Blood Glucose.: 90 mg/dL (10 Aug 2023 01:35)  POCT Blood Glucose.: 180 mg/dL (10 Aug 2023 00:10)  POCT Blood Glucose.: 203 mg/dL (09 Aug 2023 23:02)  POCT Blood Glucose.: 213 mg/dL (09 Aug 2023 20:54)  POCT Blood Glucose.: 230 mg/dL (09 Aug 2023 19:24)  POCT Blood Glucose.: 114 mg/dL (09 Aug 2023 17:01)        RADIOLOGY & ADDITIONAL TESTS:    Consultant(s) Notes Reviewed:  [x ] YES  [ ] NO    MEDICATIONS  (STANDING):  allopurinol 300 milliGRAM(s) Oral daily  atorvastatin 10 milliGRAM(s) Oral at bedtime  bacitracin   Ointment 1 Application(s) Topical two times a day  caspofungin IVPB      chlorhexidine 2% Cloths 1 Application(s) Topical daily  hydrocortisone sodium succinate Injectable 50 milliGRAM(s) IV Push every 6 hours  insulin regular Infusion 2 Unit(s)/Hr (2 mL/Hr) IV Continuous <Continuous>  meropenem  IVPB 1000 milliGRAM(s) IV Intermittent every 12 hours  midodrine 10 milliGRAM(s) Oral every 8 hours  norepinephrine Infusion 0.05 MICROgram(s)/kG/Min (5.87 mL/Hr) IV Continuous <Continuous>  pantoprazole  Injectable 40 milliGRAM(s) IV Push two times a day  phenylephrine    Infusion 0.5 MICROgram(s)/kG/Min (5.87 mL/Hr) IV Continuous <Continuous>  polyethylene glycol 3350 17 Gram(s) Oral daily  senna 2 Tablet(s) Oral at bedtime    MEDICATIONS  (PRN):  magnesium hydroxide Suspension 30 milliLiter(s) Oral every 12 hours PRN Constipation      PHYSICAL EXAM:  GENERAL:   HEAD:  Atraumatic, Normocephalic  EYES: EOMI, PERRLA, conjunctiva and sclera clear  NECK: Supple, No JVD, Normal thyroid, no enlarged nodes  NERVOUS SYSTEM:  Alert & Awake.   CHEST/LUNG: B/L good air entry; No rales, rhonchi, or wheezing  HEART: S1S2 normal, no S3, Regular rate and rhythm; No murmurs  ABDOMEN: Soft, Nontender, Nondistended; Bowel sounds present  EXTREMITIES:  2+ Peripheral Pulses, No clubbing, cyanosis, or edema  LYMPH: No lymphadenopathy noted  SKIN: No rashes or lesions    Care Discussed with Consultants/Other Providers [ x] YES  [ ] NO Patient is a 88y old  Male who presents with a chief complaint of DKA/HHS secondary to Hungatella bacteremia - source is most probably acute appendicitis.     INTERVAL HPI/OVERNIGHT EVENTS:   At 6.30 pm patient started having diffuse severe abdominal pain with increased pressors to levo and jasen.   Abdomen Not peritonitic.   Ct angio abdomen/pelvis to r/o ischemia ->No evidence of bowel ischemia. Unchanged findings suggesting acute appendicitis.  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 Collateral flow presumed to be present again there is no evidence of bowel ischemia.     lactate level; 7   - Surgery team on board  - NPO    ICU Vital Signs Last 24 Hrs  T(C): 36.8 (10 Aug 2023 12:00), Max: 37.2 (10 Aug 2023 08:00)  T(F): 98.2 (10 Aug 2023 12:00), Max: 98.9 (10 Aug 2023 08:00)  HR: 104 (10 Aug 2023 15:30) (81 - 125)  BP: 135/57 (10 Aug 2023 15:30) (61/33 - 149/72)  BP(mean): 82 (10 Aug 2023 15:30) (43 - 95)  ABP: --  ABP(mean): --  RR: 30 (10 Aug 2023 15:30) (14 - 43)  SpO2: 99% (10 Aug 2023 15:30) (75% - 100%)    O2 Parameters below as of 10 Aug 2023 14:00  Patient On (Oxygen Delivery Method): nasal cannula  O2 Flow (L/min): 2        I&O's Summary    09 Aug 2023 07:01  -  10 Aug 2023 07:00  --------------------------------------------------------  IN: 2974 mL / OUT: 1300 mL / NET: 1674 mL    10 Aug 2023 07:01  -  10 Aug 2023 15:48  --------------------------------------------------------  IN: 3027.6 mL / OUT: 475 mL / NET: 2552.6 mL      LABS:                        6.0    18.60 )-----------( 171      ( 10 Aug 2023 10:55 )             17.3     08-10    141  |  112<H>  |  32<H>  ----------------------------<  89  4.5   |  20  |  1.3    Ca    6.7<L>      10 Aug 2023 04:20  Mg     2.6     08-10    TPro  4.3<L>  /  Alb  1.8<L>  /  TBili  0.6  /  DBili  x   /  AST  56<H>  /  ALT  18  /  AlkPhos  76  08-09    PT/INR - ( 08 Aug 2023 20:33 )   PT: 15.50 sec;   INR: 1.35 ratio         PTT - ( 08 Aug 2023 20:33 )  PTT:37.0 sec  Urinalysis Basic - ( 10 Aug 2023 10:55 )    Color: Yellow / Appearance: Clear / SG: >1.030 / pH: x  Gluc: x / Ketone: 15 mg/dL  / Bili: Negative / Urobili: 1.0 mg/dL   Blood: x / Protein: 30 mg/dL / Nitrite: Negative   Leuk Esterase: Negative / RBC: 2 /HPF / WBC 1 /HPF   Sq Epi: x / Non Sq Epi: 1 /HPF / Bacteria: Negative /HPF      CAPILLARY BLOOD GLUCOSE      POCT Blood Glucose.: 111 mg/dL (10 Aug 2023 14:20)  POCT Blood Glucose.: 101 mg/dL (10 Aug 2023 13:07)  POCT Blood Glucose.: 117 mg/dL (10 Aug 2023 11:12)  POCT Blood Glucose.: 140 mg/dL (10 Aug 2023 09:04)  POCT Blood Glucose.: 133 mg/dL (10 Aug 2023 06:15)  POCT Blood Glucose.: 96 mg/dL (10 Aug 2023 04:07)  POCT Blood Glucose.: 82 mg/dL (10 Aug 2023 03:02)  POCT Blood Glucose.: 90 mg/dL (10 Aug 2023 01:35)  POCT Blood Glucose.: 180 mg/dL (10 Aug 2023 00:10)  POCT Blood Glucose.: 203 mg/dL (09 Aug 2023 23:02)  POCT Blood Glucose.: 213 mg/dL (09 Aug 2023 20:54)  POCT Blood Glucose.: 230 mg/dL (09 Aug 2023 19:24)  POCT Blood Glucose.: 114 mg/dL (09 Aug 2023 17:01)        RADIOLOGY & ADDITIONAL TESTS:    Consultant(s) Notes Reviewed:  [x ] YES  [ ] NO    MEDICATIONS  (STANDING):  allopurinol 300 milliGRAM(s) Oral daily  atorvastatin 10 milliGRAM(s) Oral at bedtime  bacitracin   Ointment 1 Application(s) Topical two times a day  caspofungin IVPB      chlorhexidine 2% Cloths 1 Application(s) Topical daily  hydrocortisone sodium succinate Injectable 50 milliGRAM(s) IV Push every 6 hours  insulin regular Infusion 2 Unit(s)/Hr (2 mL/Hr) IV Continuous <Continuous>  meropenem  IVPB 1000 milliGRAM(s) IV Intermittent every 12 hours  midodrine 10 milliGRAM(s) Oral every 8 hours  norepinephrine Infusion 0.05 MICROgram(s)/kG/Min (5.87 mL/Hr) IV Continuous <Continuous>  pantoprazole  Injectable 40 milliGRAM(s) IV Push two times a day  phenylephrine    Infusion 0.5 MICROgram(s)/kG/Min (5.87 mL/Hr) IV Continuous <Continuous>  polyethylene glycol 3350 17 Gram(s) Oral daily  senna 2 Tablet(s) Oral at bedtime    MEDICATIONS  (PRN):  magnesium hydroxide Suspension 30 milliLiter(s) Oral every 12 hours PRN Constipation      PHYSICAL EXAM:  CONSTITUTIONAL:  Ill appearing in NAD    ENT:   Airway patent,   Mouth with normal mucosa.   Poor dentition    EYES:   Pupils equal,   Round and reactive to light.    CARDIAC:   Normal rate,   Tachycardic      RESPIRATORY:   No wheezing  Bilateral BS  Normal chest expansion  Not tachypneic,  No use of accessory muscles  on NC 2L    GASTROINTESTINAL:  Abdomen Distended, Diffuse tenderness less than yesterday night but worse than yesterday morning.   No guarding,   + BS    MUSCULOSKELETAL:   Range of motion is not limited,  No clubbing, cyanosis    NEUROLOGICAL:   Alert     SKIN:   Skin normal color for race,   Right forearm blistering lesion      Care Discussed with Consultants/Other Providers [ x] YES  [ ] NO

## 2023-08-10 NOTE — PROGRESS NOTE ADULT - SUBJECTIVE AND OBJECTIVE BOX
GENERAL SURGERY PROGRESS NOTE     MILEY MARES  88y  Male  Hospital day :6d    OVERNIGHT EVENTS: This evening, patient began having severe abdominal pain associated w/ increased pressor usage --> 0.35 jasen and 34 levo. Abdomen had no sign of peritonitis. Patient received a CT A/P to rule out ischemia and revealed no bowel ischemia - findings suggestive of acute appendicitis. Patient is not a surgical candidate at this time. Non operative management of acute appendicitis to be managed w/ antibiotics.           T(F): 98.2 (08-10-23 @ 12:00), Max: 98.9 (08-10-23 @ 08:00)  HR: 104 (08-10-23 @ 15:30) (81 - 125)  BP: 135/57 (08-10-23 @ 15:30) (61/33 - 149/72)  ABP: --  ABP(mean): --  RR: 30 (08-10-23 @ 15:30) (14 - 43)  SpO2: 99% (08-10-23 @ 15:30) (75% - 100%)    DIET/FLUIDS:   N23 @ 07:01  -  08-10-23 @ 07:00  --------------------------------------------------------  OUT: 100 mL    URINE:   23 @ 07:01  -  08-10-23 @ 07:00  --------------------------------------------------------  OUT: 1200 mL       GI proph:  pantoprazole  Injectable 40 milliGRAM(s) IV Push two times a day    AC/ proph:   ABx: caspofungin IVPB      meropenem  IVPB 1000 milliGRAM(s) IV Intermittent every 12 hours      PHYSICAL EXAM:  GENERAL: Appears ill  ABDOMEN: Distended and diffusely more tender than interval x 1 day, no guarding or rebound tenderness. Positive for bowel sounds  EXTREMITIES:  No clubbing, cyanosis, or edema      LABS  Labs:  CAPILLARY BLOOD GLUCOSE      POCT Blood Glucose.: 143 mg/dL (10 Aug 2023 16:31)  POCT Blood Glucose.: 111 mg/dL (10 Aug 2023 14:20)  POCT Blood Glucose.: 101 mg/dL (10 Aug 2023 13:07)  POCT Blood Glucose.: 117 mg/dL (10 Aug 2023 11:12)  POCT Blood Glucose.: 140 mg/dL (10 Aug 2023 09:04)  POCT Blood Glucose.: 133 mg/dL (10 Aug 2023 06:15)  POCT Blood Glucose.: 96 mg/dL (10 Aug 2023 04:07)  POCT Blood Glucose.: 82 mg/dL (10 Aug 2023 03:02)  POCT Blood Glucose.: 90 mg/dL (10 Aug 2023 01:35)  POCT Blood Glucose.: 180 mg/dL (10 Aug 2023 00:10)  POCT Blood Glucose.: 203 mg/dL (09 Aug 2023 23:02)  POCT Blood Glucose.: 213 mg/dL (09 Aug 2023 20:54)  POCT Blood Glucose.: 230 mg/dL (09 Aug 2023 19:24)                          6.0    18.60 )-----------( 171      ( 10 Aug 2023 10:55 )             17.3       Auto Neutrophil %: 89.5 % (08-10-23 @ 10:55)  Band Neutrophils %: 0.9 % (08-10-23 @ 10:55)    08-10    141  |  112<H>  |  32<H>  ----------------------------<  89  4.5   |  20  |  1.3      Calcium: 6.7 mg/dL (08-10-23 @ 04:20)      LFTs:             4.3  | 0.6  | 56       ------------------[76      ( 09 Aug 2023 20:53 )  1.8  | x    | 18          Lipase:x      Amylase:x         Blood Gas Venous - Lactate: 2.00 mmol/L (08-10-23 @ 10:10)  Lactate, Blood: 2.5 mmol/L (08-10-23 @ 04:20)  Lactate, Blood: 7.1 mmol/L (08-09-23 @ 20:53)  Lactate, Blood: 1.5 mmol/L (23 @ 12:40)    ABG - ( 04 Aug 2023 11:23 )  pH: 7.30  /  pCO2: 24    /  pO2: 71    / HCO3: 12    / Base Excess: -12.9 /  SaO2: 93.9              Coags:     15.50  ----< 1.35    ( 08 Aug 2023 20:33 )     37.0                Urinalysis Basic - ( 10 Aug 2023 10:55 )    Color: Yellow / Appearance: Clear / SG: >1.030 / pH: x  Gluc: x / Ketone: 15 mg/dL  / Bili: Negative / Urobili: 1.0 mg/dL   Blood: x / Protein: 30 mg/dL / Nitrite: Negative   Leuk Esterase: Negative / RBC: 2 /HPF / WBC 1 /HPF   Sq Epi: x / Non Sq Epi: 1 /HPF / Bacteria: Negative /HPF        Culture - Blood (collected 08 Aug 2023 11:58)  Source: .Blood None  Preliminary Report (09 Aug 2023 23:01):    No growth at 24 hours          RADIOLOGY & ADDITIONAL TESTS:      A/P    88M with PMH of DM, HTN coming from home with complaint of decreased appetite, increased urinary output and craving sweets x 2 weeks. Daughter is caretaker, states patient behavior has changed. Within the last 2 weeks, He is less active, requesting more coca cola and sugar. Pt noted to be hypotensive upon arrival.     Patient admitted to MICU found to be in DKA with AMS. Patient was septic, with blood cultures growing hungatella. Patient on IV abx with resolving hypotension and decreasing pressor requirements. General surgery consulted for abnormal CT finding.     PLAN:   - No surgery indicated --> not a surgical candidate at this time   - C/w antibiotics --> non operative management for acute appendicitis   - Continue IV abx as per ID

## 2023-08-10 NOTE — PROGRESS NOTE ADULT - ASSESSMENT
IMPRESSION:    DKA/HHS, resolved  Hungatella species bacteremia   Septic shock  BART   AMS toxic metabolic  HO DM  HO HTN  Abdominal aortic aneurysm  Parkinson disease    PLAN:    CNS: Avoid sedation    HEENT: Oral care.  NG care     PULMONARY:  HOB @ 45 degrees.  Aspiration precautions, wean oxygen as tolerated. CXR     CARDIOVASCULAR:   Avoid volume overload.  Wean levophed, IV fluid LR while NPO.  Midodrine 10 mg Q8,  [place] HD monitoring.  GDE.  Wean Pressors.      GI: GI prophylaxis, CTA noted.  Hold feeding today.       RENAL:  Follow up lytes.  Correct as needed    INFECTIOUS DISEASE: FU repeat BC.  Continue Meropenem.  ID Eval appreciated.  [place].  Start Caspo.  Repeat BC.      HEMATOLOGICAL:  DVT prophylaxis. Hold Fondaparinux.  Trend CBC.  NG Lavage neg.     ENDOCRINE:  Follow up FS.  Insulin protocol, endocrine.   mg Q8 or equivalent     MUSCULOSKELETAL: bedrest    MICU    Prognosis extremely poor

## 2023-08-10 NOTE — CONSULT NOTE ADULT - SUBJECTIVE AND OBJECTIVE BOX
Gastroenterology Consultation:    Patient is a 88y old  Male who presents with a chief complaint of DKA/HHS       Admitted on: 08-04-23      HPI:  89 y/o M w/ PMH of DM, HTN, Parkinson disease, Gout, HLD, former smoker coming from home with complaint of decreased appetite, increased urinary output and craving sweets x 2 weeks. Patient admitted on 8/4/23 for DKA and hypotension , was started on levophed lose dose. HIs course complicated with appendicitis , Hungatella bacteremia, acute anemia requiring 3u. GI consulted today for 3 episodes of melena and drop in Hb to 6.      Prior EGD: no documentation    Prior Colonoscopy: no documentation      PAST MEDICAL & SURGICAL HISTORY:  HTN (hypertension)      Gout      BPH (benign prostatic hyperplasia)      Parkinson disease      HLD (hyperlipidemia)      Smoker within last 12 months      Diabetes mellitus      No significant past surgical history      FAMILY HISTORY:  unknown        Social History: former smoker      Home Medications:  ALLOPURINOL  300 MG TABS: 1 tab(s) orally once a day (12 Oct 2022 10:31)  AMLODIPINE BESYLATE  5 MG TABS: 1 tab(s) orally once a day (12 Oct 2022 10:31)  ATORVASTATIN CALCIUM  10 MG TABS: 1 tab(s) orally once a day (12 Oct 2022 10:31)  METFORMIN HYDROCHLORIDE ER  750 MG TB24: 1 tab(s) orally once a day (12 Oct 2022 10:31)  OLMESARTAN MEDOXOMIL  20 MG TABS: 1 tab(s) orally once a day (12 Oct 2022 10:31)  TAMSULOSIN HYDROCHLORIDE  0.4 MG CAPS: 1 cap(s) orally once a day (12 Oct 2022 10:31)        MEDICATIONS  (STANDING):  allopurinol 300 milliGRAM(s) Oral daily  atorvastatin 10 milliGRAM(s) Oral at bedtime  bacitracin   Ointment 1 Application(s) Topical two times a day  caspofungin IVPB      chlorhexidine 2% Cloths 1 Application(s) Topical daily  hydrocortisone sodium succinate Injectable 50 milliGRAM(s) IV Push every 6 hours  insulin regular Infusion 2 Unit(s)/Hr (2 mL/Hr) IV Continuous <Continuous>  meropenem  IVPB 1000 milliGRAM(s) IV Intermittent every 12 hours  midodrine 10 milliGRAM(s) Oral every 8 hours  norepinephrine Infusion 0.05 MICROgram(s)/kG/Min (5.87 mL/Hr) IV Continuous <Continuous>  pantoprazole  Injectable 40 milliGRAM(s) IV Push two times a day  phenylephrine    Infusion 0.5 MICROgram(s)/kG/Min (5.87 mL/Hr) IV Continuous <Continuous>  polyethylene glycol 3350 17 Gram(s) Oral daily  senna 2 Tablet(s) Oral at bedtime    MEDICATIONS  (PRN):  magnesium hydroxide Suspension 30 milliLiter(s) Oral every 12 hours PRN Constipation      Allergies  No Known Allergies      Review of Systems:   Constitutional:  No Fever, No Chills  ENT/Mouth:  No Hearing Changes,  No Difficulty Swallowing  Eyes:  No Eye Pain, No Vision Changes  Cardiovascular:  No Chest Pain, No Palpitations  Respiratory:  No Cough, No Dyspnea  Gastrointestinal:  As described in HPI  Musculoskeletal:  No Joint Swelling, No Back Pain  Skin:  No Skin Lesions, No Jaundice  Neuro:  No Syncope, No Dizziness  Heme/Lymph:  No Bruising, No Bleeding.          Physical Examination:  T(C): 36.8 (08-10-23 @ 12:00), Max: 37.2 (08-10-23 @ 08:00)  HR: 104 (08-10-23 @ 15:30) (81 - 125)  BP: 135/57 (08-10-23 @ 15:30) (61/33 - 149/72)  RR: 30 (08-10-23 @ 15:30) (14 - 43)  SpO2: 99% (08-10-23 @ 15:30) (75% - 100%)      08-08-23 @ 07:01  -  08-09-23 @ 07:00  --------------------------------------------------------  IN: 1810.9 mL / OUT: 1580 mL / NET: 230.9 mL    08-09-23 @ 07:01  -  08-10-23 @ 07:00  --------------------------------------------------------  IN: 2974 mL / OUT: 1300 mL / NET: 1674 mL    08-10-23 @ 07:01  -  08-10-23 @ 16:50  --------------------------------------------------------  IN: 3027.6 mL / OUT: 475 mL / NET: 2552.6 mL          GENERAL:  Appears stated age, well-groomed, well-nourished, no distress  HEENT:  NC/AT,  conjunctivae clear and pink, no thyromegaly, nodules, adenopathy, sclera -anicteric  CHEST:  Full & symmetric excursion, no increased effort, breath sounds clear  HEART:  Regular rhythm, S1, S2, no murmur/rub/S3/S4, no abdominal bruit, no edema  ABDOMEN:  Soft, non-tender, non-distended, normoactive bowel sounds,  no masses ,no hepato-splenomegaly, no signs of chronic liver disease  EXTEREMITIES:  no cyanosis,clubbing or edema  SKIN:  No rash/erythema/ecchymoses/petechiae/wounds/abscess/warm/dry  NEURO:  Alert, oriented, no asterixis, no tremor, no encephalopathy          Data:                        6.0    18.60 )-----------( 171      ( 10 Aug 2023 10:55 )             17.3     Hgb Trend:  6.0  08-10-23 @ 10:55  7.2  08-10-23 @ 04:20  7.6  08-09-23 @ 20:53  7.4  08-09-23 @ 17:52  6.2  08-09-23 @ 04:55  7.0  08-08-23 @ 20:33  7.7  08-08-23 @ 05:55  6.6  08-07-23 @ 18:00      08-07-23 @ 07:01  -  08-08-23 @ 07:00  --------------------------------------------------------  IN: 1 mL    08-09-23 @ 07:01  -  08-10-23 @ 07:00  --------------------------------------------------------  IN: 372 mL    08-10-23 @ 07:01  -  08-10-23 @ 16:50  --------------------------------------------------------  IN: 280 mL      08-10    141  |  112<H>  |  32<H>  ----------------------------<  89  4.5   |  20  |  1.3    Ca    6.7<L>      10 Aug 2023 04:20  Mg     2.6     08-10    TPro  4.3<L>  /  Alb  1.8<L>  /  TBili  0.6  /  DBili  x   /  AST  56<H>  /  ALT  18  /  AlkPhos  76  08-09    Liver panel trend:  TBili 0.6   /   AST 56   /   ALT 18   /   AlkP 76   /   Tptn 4.3   /   Alb 1.8    /   DBili --      08-09  TBili 0.5   /   AST 37   /   ALT 15   /   AlkP 68   /   Tptn 4.1   /   Alb 1.8    /   DBili --      08-09  TBili 0.6   /   AST 35   /   ALT 15   /   AlkP 71   /   Tptn 4.1   /   Alb 1.8    /   DBili --      08-08  TBili 0.3   /   AST 41   /   ALT 16   /   AlkP 66   /   Tptn 4.2   /   Alb 2.2    /   DBili --      08-07  TBili 0.3   /   AST 28   /   ALT 13   /   AlkP 54   /   Tptn 4.4   /   Alb 2.1    /   DBili --      08-06  TBili 0.5   /   AST 25   /   ALT 11   /   AlkP 70   /   Tptn 5.1   /   Alb 2.7    /   DBili --      08-05  TBili 0.4   /   AST 19   /   ALT 11   /   AlkP 85   /   Tptn 5.7   /   Alb 2.9    /   DBili --      08-04  TBili 0.6   /   AST 29   /   ALT 13   /   AlkP 107   /   Tptn 6.7   /   Alb 3.1    /   DBili --      08-04      PT/INR - ( 08 Aug 2023 20:33 )   PT: 15.50 sec;   INR: 1.35 ratio         PTT - ( 08 Aug 2023 20:33 )  PTT:37.0 sec    Culture - Blood (collected 08 Aug 2023 11:58)  Source: .Blood None  Preliminary Report (09 Aug 2023 23:01):    No growth at 24 hours      Radiology:  CT Angio Abdomen and Pelvis w/ IV Cont:   ACC: [id number] EXAM:  CT ANGIO ABD PELV (W)AW IC   ORDERED BY: AILEEN AVILA     PROCEDURE DATE:  08/10/2023          INTERPRETATION:  CLINICAL HISTORY / REASON FOR EXAM: Sepsis. Abdominal   distention.    TECHNIQUE: CT angiography of the abdomen and pelvis performed after the   administration 100 cc of Omnipaque 350 intravenous contrast. Coronal,   sagittal and 3-D maximal intensity projection reformatted images were   obtained.    COMPARISON: CT abdomen and pelvis 8/8/2023. CTA abdomen pelvis 11/17/2022    FINDINGS:  Vasculature: No evidence of abdominal aortic dissection. Unchanged 5.2 cm   abdominal aortic aneurysm. Unchanged right renal ostial aneurysm. Ostial   calcifications at the origins of the celiac axis and SMA with narrowing,   though preserved flow. Inferior mesenteric artery does not opacify   compared to 11/17/2022 as there is increased thrombus within the   aneurysmal sac with decreased opacified aortic lumen. Collateral flow is   likely present as there is no evidence of bowel ischemia. Extensive   atherosclerosis.    Bowel/mesentery/peritoneum: Again seen is a dilated slightly hyperemic   appendix with surrounding free fluid suggestive of appendicitis. No   significant change in rectosigmoid colonic wall thickening. Persistent   enteric contrast within the bowel. No evidence of pneumatosis to suggest   bowel ischemia. Unchanged small free fluid in the right lower quadrant   and pelvis. Enteric tube tip in the stomach.    Remainder of the findings are unchanged on the short interval follow-up.    IMPRESSION:    No evidence of bowel ischemia. Unchanged findings suggesting acute   appendicitis.    Unopacified inferior mesenteric artery is new compared to 11/17/2022 as   there is increased thrombus within the unchanged size of abdominal aortic   aneurysm with decreased opacified aortic lumen. Collateral flow presumed   to be present again there is no evidence of bowel ischemia.    Otherwise unchanged findings on the short interval follow-up.    --- End of Report ---    POLINA ROSA MD; Attending Radiologist  This document has been electronically signed. Aug 10 2023  2:18AM (08-10-23 @ 01:18)       Gastroenterology Consultation:    Patient is a 88y old  Male who presents with a chief complaint of DKA/HHS       Admitted on: 08-04-23      HPI:  87 y/o M w/ PMH of DM, HTN, Parkinson disease, Gout, HLD, former smoker coming from home with complaint of decreased appetite, increased urinary output and craving sweets x 2 weeks. Patient admitted on 8/4/23 for DKA and hypotension , was started on levophed lose dose. HIs course complicated with appendicitis , Hungatella bacteremia, acute anemia requiring 3u. GI consulted today for 3 episodes of melena and drop in Hb to 6.      Prior EGD: no documentation    Prior Colonoscopy: no documentation      PAST MEDICAL & SURGICAL HISTORY:  HTN (hypertension)      Gout      BPH (benign prostatic hyperplasia)      Parkinson disease      HLD (hyperlipidemia)      Smoker within last 12 months      Diabetes mellitus      No significant past surgical history      FAMILY HISTORY:  unknown        Social History: former smoker      Home Medications:  ALLOPURINOL  300 MG TABS: 1 tab(s) orally once a day (12 Oct 2022 10:31)  AMLODIPINE BESYLATE  5 MG TABS: 1 tab(s) orally once a day (12 Oct 2022 10:31)  ATORVASTATIN CALCIUM  10 MG TABS: 1 tab(s) orally once a day (12 Oct 2022 10:31)  METFORMIN HYDROCHLORIDE ER  750 MG TB24: 1 tab(s) orally once a day (12 Oct 2022 10:31)  OLMESARTAN MEDOXOMIL  20 MG TABS: 1 tab(s) orally once a day (12 Oct 2022 10:31)  TAMSULOSIN HYDROCHLORIDE  0.4 MG CAPS: 1 cap(s) orally once a day (12 Oct 2022 10:31)        MEDICATIONS  (STANDING):  allopurinol 300 milliGRAM(s) Oral daily  atorvastatin 10 milliGRAM(s) Oral at bedtime  bacitracin   Ointment 1 Application(s) Topical two times a day  caspofungin IVPB      chlorhexidine 2% Cloths 1 Application(s) Topical daily  hydrocortisone sodium succinate Injectable 50 milliGRAM(s) IV Push every 6 hours  insulin regular Infusion 2 Unit(s)/Hr (2 mL/Hr) IV Continuous <Continuous>  meropenem  IVPB 1000 milliGRAM(s) IV Intermittent every 12 hours  midodrine 10 milliGRAM(s) Oral every 8 hours  norepinephrine Infusion 0.05 MICROgram(s)/kG/Min (5.87 mL/Hr) IV Continuous <Continuous>  pantoprazole  Injectable 40 milliGRAM(s) IV Push two times a day  phenylephrine    Infusion 0.5 MICROgram(s)/kG/Min (5.87 mL/Hr) IV Continuous <Continuous>  polyethylene glycol 3350 17 Gram(s) Oral daily  senna 2 Tablet(s) Oral at bedtime    MEDICATIONS  (PRN):  magnesium hydroxide Suspension 30 milliLiter(s) Oral every 12 hours PRN Constipation      Allergies  No Known Allergies      Review of Systems:   Constitutional:  No Fever, No Chills  ENT/Mouth:  No Hearing Changes,  No Difficulty Swallowing  Eyes:  No Eye Pain, No Vision Changes  Cardiovascular:  No Chest Pain, No Palpitations  Respiratory:  No Cough, No Dyspnea  Gastrointestinal:  As described in HPI  Musculoskeletal:  No Joint Swelling, No Back Pain  Skin:  No Skin Lesions, No Jaundice  Neuro:  No Syncope, No Dizziness  Heme/Lymph:  No Bruising, No Bleeding.          Physical Examination:  T(C): 36.8 (08-10-23 @ 12:00), Max: 37.2 (08-10-23 @ 08:00)  HR: 104 (08-10-23 @ 15:30) (81 - 125)  BP: 135/57 (08-10-23 @ 15:30) (61/33 - 149/72)  RR: 30 (08-10-23 @ 15:30) (14 - 43)  SpO2: 99% (08-10-23 @ 15:30) (75% - 100%)      08-08-23 @ 07:01  -  08-09-23 @ 07:00  --------------------------------------------------------  IN: 1810.9 mL / OUT: 1580 mL / NET: 230.9 mL    08-09-23 @ 07:01  -  08-10-23 @ 07:00  --------------------------------------------------------  IN: 2974 mL / OUT: 1300 mL / NET: 1674 mL    08-10-23 @ 07:01  -  08-10-23 @ 16:50  --------------------------------------------------------  IN: 3027.6 mL / OUT: 475 mL / NET: 2552.6 mL          GENERAL:  Appears stated age, well-groomed, well-nourished, no distress  HEENT:  NC/AT,  conjunctivae clear and pink, no thyromegaly, nodules, adenopathy, sclera -anicteric  CHEST:  Full & symmetric excursion, no increased effort, breath sounds clear  HEART:  Regular rhythm, S1, S2, no murmur/rub/S3/S4, no abdominal bruit, no edema  ABDOMEN: tender throughout but more ion lower abdomen,  Soft, non-distended, normoactive bowel sounds,  no masses ,no hepato-splenomegaly, EXTEREMITIES:  no cyanosis,clubbing or edema  SKIN:  No rash/erythema/ecchymoses/petechiae/wounds/abscess/warm/dry  NEURO:  Alert, oriented, no asterixis, no tremor, no encephalopathy          Data:                        6.0    18.60 )-----------( 171      ( 10 Aug 2023 10:55 )             17.3     Hgb Trend:  6.0  08-10-23 @ 10:55  7.2  08-10-23 @ 04:20  7.6  08-09-23 @ 20:53  7.4  08-09-23 @ 17:52  6.2  08-09-23 @ 04:55  7.0  08-08-23 @ 20:33  7.7  08-08-23 @ 05:55  6.6  08-07-23 @ 18:00      08-07-23 @ 07:01  -  08-08-23 @ 07:00  --------------------------------------------------------  IN: 1 mL    08-09-23 @ 07:01  -  08-10-23 @ 07:00  --------------------------------------------------------  IN: 372 mL    08-10-23 @ 07:01  -  08-10-23 @ 16:50  --------------------------------------------------------  IN: 280 mL      08-10    141  |  112<H>  |  32<H>  ----------------------------<  89  4.5   |  20  |  1.3    Ca    6.7<L>      10 Aug 2023 04:20  Mg     2.6     08-10    TPro  4.3<L>  /  Alb  1.8<L>  /  TBili  0.6  /  DBili  x   /  AST  56<H>  /  ALT  18  /  AlkPhos  76  08-09    Liver panel trend:  TBili 0.6   /   AST 56   /   ALT 18   /   AlkP 76   /   Tptn 4.3   /   Alb 1.8    /   DBili --      08-09  TBili 0.5   /   AST 37   /   ALT 15   /   AlkP 68   /   Tptn 4.1   /   Alb 1.8    /   DBili --      08-09  TBili 0.6   /   AST 35   /   ALT 15   /   AlkP 71   /   Tptn 4.1   /   Alb 1.8    /   DBili --      08-08  TBili 0.3   /   AST 41   /   ALT 16   /   AlkP 66   /   Tptn 4.2   /   Alb 2.2    /   DBili --      08-07  TBili 0.3   /   AST 28   /   ALT 13   /   AlkP 54   /   Tptn 4.4   /   Alb 2.1    /   DBili --      08-06  TBili 0.5   /   AST 25   /   ALT 11   /   AlkP 70   /   Tptn 5.1   /   Alb 2.7    /   DBili --      08-05  TBili 0.4   /   AST 19   /   ALT 11   /   AlkP 85   /   Tptn 5.7   /   Alb 2.9    /   DBili --      08-04  TBili 0.6   /   AST 29   /   ALT 13   /   AlkP 107   /   Tptn 6.7   /   Alb 3.1    /   DBili --      08-04      PT/INR - ( 08 Aug 2023 20:33 )   PT: 15.50 sec;   INR: 1.35 ratio         PTT - ( 08 Aug 2023 20:33 )  PTT:37.0 sec    Culture - Blood (collected 08 Aug 2023 11:58)  Source: .Blood None  Preliminary Report (09 Aug 2023 23:01):    No growth at 24 hours      Radiology:  CT Angio Abdomen and Pelvis w/ IV Cont:   ACC: [id number] EXAM:  CT ANGIO ABD PELV (W)AW IC   ORDERED BY: AILEEN AVILA     PROCEDURE DATE:  08/10/2023          INTERPRETATION:  CLINICAL HISTORY / REASON FOR EXAM: Sepsis. Abdominal   distention.    TECHNIQUE: CT angiography of the abdomen and pelvis performed after the   administration 100 cc of Omnipaque 350 intravenous contrast. Coronal,   sagittal and 3-D maximal intensity projection reformatted images were   obtained.    COMPARISON: CT abdomen and pelvis 8/8/2023. CTA abdomen pelvis 11/17/2022    FINDINGS:  Vasculature: No evidence of abdominal aortic dissection. Unchanged 5.2 cm   abdominal aortic aneurysm. Unchanged right renal ostial aneurysm. Ostial   calcifications at the origins of the celiac axis and SMA with narrowing,   though preserved flow. Inferior mesenteric artery does not opacify   compared to 11/17/2022 as there is increased thrombus within the   aneurysmal sac with decreased opacified aortic lumen. Collateral flow is   likely present as there is no evidence of bowel ischemia. Extensive   atherosclerosis.    Bowel/mesentery/peritoneum: Again seen is a dilated slightly hyperemic   appendix with surrounding free fluid suggestive of appendicitis. No   significant change in rectosigmoid colonic wall thickening. Persistent   enteric contrast within the bowel. No evidence of pneumatosis to suggest   bowel ischemia. Unchanged small free fluid in the right lower quadrant   and pelvis. Enteric tube tip in the stomach.    Remainder of the findings are unchanged on the short interval follow-up.    IMPRESSION:    No evidence of bowel ischemia. Unchanged findings suggesting acute   appendicitis.    Unopacified inferior mesenteric artery is new compared to 11/17/2022 as   there is increased thrombus within the unchanged size of abdominal aortic   aneurysm with decreased opacified aortic lumen. Collateral flow presumed   to be present again there is no evidence of bowel ischemia.    Otherwise unchanged findings on the short interval follow-up.    --- End of Report ---    POLINA ROSA MD; Attending Radiologist  This document has been electronically signed. Aug 10 2023  2:18AM (08-10-23 @ 01:18)

## 2023-08-10 NOTE — PROGRESS NOTE ADULT - PROBLEM SELECTOR PLAN 1
- on IV kenyatta; high risk, monitor counts (d#4)  - f/u cx's; ID recs  - on 2 pressors - on IV kenyatta; high risk, monitor counts (d#4), IV caspo  - f/u cx's; ID recs  - on 2 pressors

## 2023-08-10 NOTE — CONSULT NOTE ADULT - ASSESSMENT
89 y/o M w/ PMH of DM, HTN, Parkinson disease, Gout, HLD, former smoker coming from home with complaint of decreased appetite, increased urinary output and craving sweets x 2 weeks. Patient admitted on 8/4/23 for DKA and hypotension , was started on levophed lose dose. HIs course complicated with appendicitis , Hungatella bacteremia, acute anemia requiring 3u. GI consulted today for 3 episodes of melena and drop in Hb to 6. Last night patient was complaining of severe lower abdominal pain and distesion s/p CTAP IC , lactate went upto 7 from 1.5    #Acute Normocytic anemia   #Melena likely d/t PUD vs AVM vs Esophageal ulcer vs Erosive Hemorrhagic Gastritis vs Dieulafoy lesion Vs Malignancy   - patient on two pressors, was on levophed, started on neosynephrine overnight  - Hemodynamically stable & no active bleeding  - Baseline hemoglobin - 12  - Hemoglobin on admission - 11.2 (8/4)>>6.6>>3u>   - Coags - INR:  0.98 (7/4)>>1.35 (8/8)  - not on any Antithrombotic -     #Rec  - Keep NPO   - start protonix drip    - Maintain active Type and screen  - Trend H&H BID  - Please place x2 18G IVs  - Please start IV fluids (SBP >90)   - Please start pantoprazole 40 IV BID  - Please give IV erythromycin 250mg 30 minutes before procedure  - will plan for EGD on  - Please correct electrolytes (Target Na 135-145, Mg 1.7-2.2, K 3.5-5)  - Please correct INR to <1.5  - Please target Hb  >7 or >9   - Please avoid any NSAIDs  - NPO after midnight for procedure  - If any unstable bleed, please call GI stat       87 y/o M w/ PMH of DM, HTN, Parkinson disease, Gout, HLD, former smoker coming from home with complaint of decreased appetite, increased urinary output and craving sweets x 2 weeks. Patient admitted on 8/4/23 for DKA and hypotension , was started on levophed lose dose. HIs course complicated with appendicitis , Hungatella bacteremia, acute anemia requiring 3u. GI consulted today for 3 episodes of melena and drop in Hb to 6. Last night patient was complaining of severe lower abdominal pain and distesion s/p CTAP IC with thrombus in decending AA, SMA and celiac axis narrowing but no bowel ischemia , lactate went upto 7 from 1.5    #Acute Normocytic anemia   #Melena likely d/t PUD vs AVM vs Esophageal ulcer vs Erosive Hemorrhagic Gastritis vs Dieulafoy lesion Vs Malignancy   - patient on two pressors, was on levophed, started on neosynephrine overnight  - Hemodynamically stable & no active bleeding  - Baseline hemoglobin - 12  - Hemoglobin on admission - 11.2 (8/4)>>6.6>>3u>   - Coags - INR:  0.98 (7/4)>>1.35 (8/8)  - not on any Antithrombotic   - Lactate: 7.5< 1.5  - CTAP IV COn: 8/10: No evidence of bowel ischemia. Unchanged findings suggesting acute   appendicitis. Unopacified inferior mesenteric artery is new compared to 11/17/2022 as   there is increased thrombus within the unchanged size of abdominal aortic   aneurysm with decreased opacified aortic lumen. Collateral flow presumed   to be present again there is no evidence of bowel ischemia.  - CTAP oral and     #Rec  - Keep NPO   - start protonix drip  - repeat CBC post transfusion  - Trend H&H BID  - Please place x2 18G IVs  - Please start IV fluids (SBP >90)   - Maintain active Type and screen  - Please correct electrolytes (Target Na 135-145, Mg 1.7-2.2, K 3.5-5)  - Please correct INR to <1.5, repeat COAGs  - Please target Hb  >8  - Please avoid any NSAIDs  - will follow    #Pancreatic cyst         89 y/o M w/ PMH of DM, HTN, Parkinson disease, Gout, HLD, former smoker coming from home with complaint of decreased appetite, increased urinary output and craving sweets x 2 weeks. Patient admitted on 8/4/23 for DKA and hypotension , was started on levophed lose dose. HIs course complicated with appendicitis , Hungatella bacteremia, acute anemia requiring 3u. GI consulted today for 3 episodes of melena and drop in Hb to 6. Last night patient was complaining of severe lower abdominal pain and distesion s/p CTAP IC with thrombus in decending AA, SMA and celiac axis narrowing but no bowel ischemia , lactate went upto 7 from 1.5    #Acute Normocytic anemia   #Melena likely d/t PUD vs AVM vs Esophageal ulcer vs Erosive Hemorrhagic Gastritis vs Dieulafoy lesion Vs Malignancy   - patient on two pressors, was on levophed, started on neosynephrine overnight  - Hemodynamically stable & no active bleeding  - Baseline hemoglobin - 12  - Hemoglobin on admission - 11.2 (8/4)>>6.6>>3u>   - Coags - INR:  0.98 (7/4)>>1.35 (8/8)  - not on any Antithrombotic   - Lactate: 7.5< 1.5  - CTAP IV COn: 8/10: No evidence of bowel ischemia. Unchanged findings suggesting acute   appendicitis. Unopacified inferior mesenteric artery is new compared to 11/17/2022 as   there is increased thrombus within the unchanged size of abdominal aortic   aneurysm with decreased opacified aortic lumen. Collateral flow presumed   to be present again there is no evidence of bowel ischemia.  - CTAP oral and IV con: 8/8: New mild dilation of the appendix with small volume free fluid in the   bilateral lower quadrants. Findings are concerning for appendicitis.  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   cm, as described    #Rec  - Keep NPO   - start protonix drip  - repeat CBC post transfusion, patient needs to be resuscitated before endoscopic intervention. Will consider EGD tentatively on 8/11/23 if adequately resuscitated.  - Trend H&H BID  - Maintain active Type and screen  - Please correct electrolytes (Target Na 135-145, Mg 1.7-2.2, K 3.5-5)  - Please correct INR to <1.5, repeat COAGs  - Please target Hb  >8  - Please avoid any NSAIDs  - will follow    #Pancreatic body cyst  - 0.7x0.4cm cyst in pancreatic body, No PD dilation    Rec  MRI pancreas protocol as outpatient    Communicated with primary team member

## 2023-08-10 NOTE — CONSULT NOTE ADULT - SUBJECTIVE AND OBJECTIVE BOX
Request for consultation:  Requested by:    Patient is a 88y old  Male who presents with a chief complaint of DKA/HHS (10 Aug 2023 13:08)    HPI:  89 y/o M w/ PMH of DM, HTN coming from home with complaint of decreased appetite, increased urinary output and craving sweets x 2 weeks. Daughter is caretaker, states patient behavior has changed. Within the last 2 weeks, He is less active, requesting more coca cola and sugar. Pt noted to be hypotensive upon arrival.     ED vitals:  BP 74/ 50, HR 87, Temp 97.2F, satting 95% on room air  Labs significant for WBC 14K, Na 123 (corrected 141), Cr 2.4, AG 23, BHB 5.1. VBG pH 7.19, Lactate 5.8, K 8.6, pCO2 39  EKG tachycardia, bifascicular block, RBBB  CXR unremarkable  CT A/P: Extensive coronary atherosclerosis. Dependent atelectasis at the right base. 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 Hepatic cysts. Questionable sludge within the gallbladder. Unchanged 1 cm cystic lesion in the pancreatic body      s/p calcium gluconate 1g, Lasix 40mg push x1, barcarb 50meq, started on insulin drip at 7 units/hr.   Admitted to MICU for DKA/HHS.     (04 Aug 2023 15:16)    Pt admitted to MICU for DKA with AMS. Also found to have     FAMILY HISTORY:  No pertinent family history in first degree relatives      PAST MEDICAL & SURGICAL HISTORY:  HTN (hypertension)      Gout      BPH (benign prostatic hyperplasia)      Parkinson disease      HLD (hyperlipidemia)      Smoker within last 12 months      Diabetes mellitus      No significant past surgical history        Birth History:  Developmental History:    Review of Systems:  All review of systems negative, except for those marked:  General:		[] Abnormal:  Pulmonary:		[] Abnormal:  Cardiac:		[] Abnormal:  Gastrointestinal:	[] Abnormal:  ENT:			[] Abnormal:  Renal/Urologic:		[] Abnormal:  Musculoskeletal:	[] Abnormal:  Endocrine:		[] Abnormal:  Hematologic:		[] Abnormal:  Neurologic:		[] Abnormal:  Skin:			[] Abnormal:  Allergy/Immune:	[] Abnormal:  Psychiatric:		[] Abnormal:    Allergies    No Known Allergies    Intolerances      MEDICATIONS  (STANDING):  allopurinol 300 milliGRAM(s) Oral daily  atorvastatin 10 milliGRAM(s) Oral at bedtime  bacitracin   Ointment 1 Application(s) Topical two times a day  caspofungin IVPB      chlorhexidine 2% Cloths 1 Application(s) Topical daily  hydrocortisone sodium succinate Injectable 50 milliGRAM(s) IV Push every 6 hours  insulin regular Infusion 2 Unit(s)/Hr (2 mL/Hr) IV Continuous <Continuous>  lactated ringers Bolus 500 milliLiter(s) IV Bolus once  lactated ringers Bolus 250 milliLiter(s) IV Bolus once  lactated ringers Bolus 500 milliLiter(s) IV Bolus once  lactated ringers. 1000 milliLiter(s) (75 mL/Hr) IV Continuous <Continuous>  meropenem  IVPB 1000 milliGRAM(s) IV Intermittent every 12 hours  midodrine 10 milliGRAM(s) Oral every 8 hours  norepinephrine Infusion 0.05 MICROgram(s)/kG/Min (5.87 mL/Hr) IV Continuous <Continuous>  pantoprazole  Injectable 40 milliGRAM(s) IV Push two times a day  phenylephrine    Infusion 0.5 MICROgram(s)/kG/Min (5.87 mL/Hr) IV Continuous <Continuous>  polyethylene glycol 3350 17 Gram(s) Oral daily  senna 2 Tablet(s) Oral at bedtime  vancomycin  IVPB 1000 milliGRAM(s) IV Intermittent every 12 hours    MEDICATIONS  (PRN):  magnesium hydroxide Suspension 30 milliLiter(s) Oral every 12 hours PRN Constipation      Vital Signs Last 24 Hrs  T(C): 36.8 (10 Aug 2023 12:00), Max: 37.2 (10 Aug 2023 08:00)  T(F): 98.2 (10 Aug 2023 12:00), Max: 98.9 (10 Aug 2023 08:00)  HR: 83 (10 Aug 2023 13:30) (82 - 125)  BP: 112/58 (10 Aug 2023 13:30) (61/33 - 149/72)  BP(mean): 82 (10 Aug 2023 13:30) (43 - 95)  RR: 14 (10 Aug 2023 13:30) (14 - 43)  SpO2: 100% (10 Aug 2023 13:30) (75% - 100%)    Parameters below as of 10 Aug 2023 10:00  Patient On (Oxygen Delivery Method): nasal cannula  O2 Flow (L/min): 2        PHYSICAL EXAM  All physical exam findings normal, except those marked:  General:	Alert, active, cooperative, NAD, well hydrated  Neck		Normal: supple, no cervical adenopathy, no palpable thyroid  Cardiovascular	Normal: regular rate, normal S1, S2, no murmurs  Respiratory	Normal: no chest wall deformity, normal respiratory pattern, CTA B/L  Abdominal	Normal: soft, ND, NT, bowel sounds present, no masses, no organomegaly  		Normal normal genitalia, testes descended, circumcised/uncircumcised  .		Jorge stage:			Breast jorge:  .		Menstrual history:  Extremities	Normal: FROM x4  Skin		Normal: intact and not indurated, no rash, no acanthosis nigricans  Neurologic	Normal: grossly intact    LABS  VBG - ( 10 Aug 2023 10:10 )  pH: 7.40  /  pCO2: 35    /  pO2: 31    / HCO3: 22    / Base Excess: -2.8  /  SvO2: 65.5  / Lactate: 2.00                           6.0    18.60 )-----------( 171      ( 10 Aug 2023 10:55 )             17.3     08-10    141  |  112<H>  |  32<H>  ----------------------------<  89  4.5   |  20  |  1.3    Ca    6.7<L>      10 Aug 2023 04:20  Mg     2.6     08-10    TPro  4.3<L>  /  Alb  1.8<L>  /  TBili  0.6  /  DBili  x   /  AST  56<H>  /  ALT  18  /  AlkPhos  76  08-09      Ketone - Urine: 15 mg/dL (08-10 @ 10:55)    CAPILLARY BLOOD GLUCOSE      POCT Blood Glucose.: 111 mg/dL (10 Aug 2023 14:20)  POCT Blood Glucose.: 101 mg/dL (10 Aug 2023 13:07)  POCT Blood Glucose.: 117 mg/dL (10 Aug 2023 11:12)  POCT Blood Glucose.: 140 mg/dL (10 Aug 2023 09:04)  POCT Blood Glucose.: 133 mg/dL (10 Aug 2023 06:15)  POCT Blood Glucose.: 96 mg/dL (10 Aug 2023 04:07)  POCT Blood Glucose.: 82 mg/dL (10 Aug 2023 03:02)  POCT Blood Glucose.: 90 mg/dL (10 Aug 2023 01:35)  POCT Blood Glucose.: 180 mg/dL (10 Aug 2023 00:10)  POCT Blood Glucose.: 203 mg/dL (09 Aug 2023 23:02)  POCT Blood Glucose.: 213 mg/dL (09 Aug 2023 20:54)  POCT Blood Glucose.: 230 mg/dL (09 Aug 2023 19:24)  POCT Blood Glucose.: 114 mg/dL (09 Aug 2023 17:01)  POCT Blood Glucose.: 126 mg/dL (09 Aug 2023 15:12)   Request for consultation:  Requested by:    Patient is a 88y old  Male who presents with a chief complaint of DKA/HHS (10 Aug 2023 13:08)    HPI:  89 y/o M w/ PMH of DM, HTN coming from home with complaint of decreased appetite, increased urinary output and craving sweets x 2 weeks. Daughter is caretaker, states patient behavior has changed. Within the last 2 weeks, He is less active, requesting more coca cola and sugar. Pt noted to be hypotensive upon arrival.     ED vitals:  BP 74/ 50, HR 87, Temp 97.2F, satting 95% on room air  Labs significant for WBC 14K, Na 123 (corrected 141), Cr 2.4, AG 23, BHB 5.1. VBG pH 7.19, Lactate 5.8, K 8.6, pCO2 39  EKG tachycardia, bifascicular block, RBBB  CXR unremarkable  CT A/P: Extensive coronary atherosclerosis. Dependent atelectasis at the right base. 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 Hepatic cysts. Questionable sludge within the gallbladder. Unchanged 1 cm cystic lesion in the pancreatic body      s/p calcium gluconate 1g, Lasix 40mg push x1, barcarb 50meq, started on insulin drip at 7 units/hr.   Admitted to MICU for DKA/HHS.     (04 Aug 2023 15:16)    Pt admitted to MICU for DKA with AMS. Also found to have Hungatella bacteremia in the setting of appendicitis and GI bleed. Endocrine was consulted for insulin management for DKA with A1c of 15.5%. Patient had completed his insulin ggt with improvement in AG from 23 -> 9. Has had good glycemic control since then, but insulin drip intermittently off leading to hyperglycemia. Pt being evaluated by surgery and GI for GI bleed. Will continue with insulin drip in the setting of infection until pt able to tolerate PO and can titrate insulin to subq then.    FAMILY HISTORY:  No pertinent family history in first degree relatives      PAST MEDICAL & SURGICAL HISTORY:  HTN (hypertension)      Gout      BPH (benign prostatic hyperplasia)      Parkinson disease      HLD (hyperlipidemia)      Smoker within last 12 months      Diabetes mellitus      No significant past surgical history        Birth History:  Developmental History:    Review of Systems:  All review of systems negative, except for those marked:  General:		[] Abnormal:  Pulmonary:		[] Abnormal:  Cardiac:		[] Abnormal:  Gastrointestinal:	[] Abnormal:  ENT:			[] Abnormal:  Renal/Urologic:		[] Abnormal:  Musculoskeletal:	[] Abnormal:  Endocrine:		[] Abnormal:  Hematologic:		[] Abnormal:  Neurologic:		[] Abnormal:  Skin:			[] Abnormal:  Allergy/Immune:	[] Abnormal:  Psychiatric:		[] Abnormal:    Allergies    No Known Allergies    Intolerances      MEDICATIONS  (STANDING):  allopurinol 300 milliGRAM(s) Oral daily  atorvastatin 10 milliGRAM(s) Oral at bedtime  bacitracin   Ointment 1 Application(s) Topical two times a day  caspofungin IVPB      chlorhexidine 2% Cloths 1 Application(s) Topical daily  hydrocortisone sodium succinate Injectable 50 milliGRAM(s) IV Push every 6 hours  insulin regular Infusion 2 Unit(s)/Hr (2 mL/Hr) IV Continuous <Continuous>  lactated ringers Bolus 500 milliLiter(s) IV Bolus once  lactated ringers Bolus 250 milliLiter(s) IV Bolus once  lactated ringers Bolus 500 milliLiter(s) IV Bolus once  lactated ringers. 1000 milliLiter(s) (75 mL/Hr) IV Continuous <Continuous>  meropenem  IVPB 1000 milliGRAM(s) IV Intermittent every 12 hours  midodrine 10 milliGRAM(s) Oral every 8 hours  norepinephrine Infusion 0.05 MICROgram(s)/kG/Min (5.87 mL/Hr) IV Continuous <Continuous>  pantoprazole  Injectable 40 milliGRAM(s) IV Push two times a day  phenylephrine    Infusion 0.5 MICROgram(s)/kG/Min (5.87 mL/Hr) IV Continuous <Continuous>  polyethylene glycol 3350 17 Gram(s) Oral daily  senna 2 Tablet(s) Oral at bedtime  vancomycin  IVPB 1000 milliGRAM(s) IV Intermittent every 12 hours    MEDICATIONS  (PRN):  magnesium hydroxide Suspension 30 milliLiter(s) Oral every 12 hours PRN Constipation      Vital Signs Last 24 Hrs  T(C): 36.8 (10 Aug 2023 12:00), Max: 37.2 (10 Aug 2023 08:00)  T(F): 98.2 (10 Aug 2023 12:00), Max: 98.9 (10 Aug 2023 08:00)  HR: 83 (10 Aug 2023 13:30) (82 - 125)  BP: 112/58 (10 Aug 2023 13:30) (61/33 - 149/72)  BP(mean): 82 (10 Aug 2023 13:30) (43 - 95)  RR: 14 (10 Aug 2023 13:30) (14 - 43)  SpO2: 100% (10 Aug 2023 13:30) (75% - 100%)    Parameters below as of 10 Aug 2023 10:00  Patient On (Oxygen Delivery Method): nasal cannula  O2 Flow (L/min): 2        PHYSICAL EXAM  All physical exam findings normal, except those marked:  General:	Lethargic. NG tube  Neck		Normal: supple, no cervical adenopathy, no palpable thyroid  Cardiovascular	Normal: regular rate, normal S1, S2, no murmurs  Respiratory	Normal: no chest wall deformity, normal respiratory pattern, CTA B/L  Abdominal	Normal: soft, ND, NT, bowel sounds present, no masses, no organomegaly  		Normal normal genitalia, testes descended, circumcised/uncircumcised  .		Jorge stage:			Breast jorge:  .		Menstrual history:  Extremities	Normal: FROM x4  Skin		Normal: intact and not indurated, no rash, no acanthosis nigricans  Neurologic	Lethargic    LABS  VBG - ( 10 Aug 2023 10:10 )  pH: 7.40  /  pCO2: 35    /  pO2: 31    / HCO3: 22    / Base Excess: -2.8  /  SvO2: 65.5  / Lactate: 2.00                           6.0    18.60 )-----------( 171      ( 10 Aug 2023 10:55 )             17.3     08-10    141  |  112<H>  |  32<H>  ----------------------------<  89  4.5   |  20  |  1.3    Ca    6.7<L>      10 Aug 2023 04:20  Mg     2.6     08-10    TPro  4.3<L>  /  Alb  1.8<L>  /  TBili  0.6  /  DBili  x   /  AST  56<H>  /  ALT  18  /  AlkPhos  76  08-09      Ketone - Urine: 15 mg/dL (08-10 @ 10:55)    CAPILLARY BLOOD GLUCOSE      POCT Blood Glucose.: 111 mg/dL (10 Aug 2023 14:20)  POCT Blood Glucose.: 101 mg/dL (10 Aug 2023 13:07)  POCT Blood Glucose.: 117 mg/dL (10 Aug 2023 11:12)  POCT Blood Glucose.: 140 mg/dL (10 Aug 2023 09:04)  POCT Blood Glucose.: 133 mg/dL (10 Aug 2023 06:15)  POCT Blood Glucose.: 96 mg/dL (10 Aug 2023 04:07)  POCT Blood Glucose.: 82 mg/dL (10 Aug 2023 03:02)  POCT Blood Glucose.: 90 mg/dL (10 Aug 2023 01:35)  POCT Blood Glucose.: 180 mg/dL (10 Aug 2023 00:10)  POCT Blood Glucose.: 203 mg/dL (09 Aug 2023 23:02)  POCT Blood Glucose.: 213 mg/dL (09 Aug 2023 20:54)  POCT Blood Glucose.: 230 mg/dL (09 Aug 2023 19:24)  POCT Blood Glucose.: 114 mg/dL (09 Aug 2023 17:01)  POCT Blood Glucose.: 126 mg/dL (09 Aug 2023 15:12)

## 2023-08-10 NOTE — PROGRESS NOTE ADULT - SUBJECTIVE AND OBJECTIVE BOX
Patient is a 88y old  Male who presents with a chief complaint of DKA/HHS (09 Aug 2023 16:40)        Over Night Events:        ROS:     All ROS are negative except HPI         PHYSICAL EXAM    ICU Vital Signs Last 24 Hrs  T(C): 37.2 (10 Aug 2023 08:00), Max: 37.2 (10 Aug 2023 08:00)  T(F): 98.9 (10 Aug 2023 08:00), Max: 98.9 (10 Aug 2023 08:00)  HR: 92 (10 Aug 2023 09:15) (85 - 125)  BP: 102/57 (10 Aug 2023 09:15) (61/33 - 155/77)  BP(mean): 75 (10 Aug 2023 09:15) (43 - 106)  ABP: --  ABP(mean): --  RR: 22 (10 Aug 2023 09:15) (18 - 43)  SpO2: 96% (10 Aug 2023 09:15) (75% - 100%)    O2 Parameters below as of 10 Aug 2023 08:00  Patient On (Oxygen Delivery Method): nasal cannula  O2 Flow (L/min): 2          CONSTITUTIONAL:  Well nourished.  NAD    ENT:   Airway patent,   Mouth with normal mucosa.   No thrush    EYES:   Pupils equal,   Round and reactive to light.    CARDIAC:   Normal rate,   Regular rhythm.    No edema      Vascular:  Normal systolic impulse  No Carotid bruits    RESPIRATORY:   No wheezing  Bilateral BS  Normal chest expansion  Not tachypneic,  No use of accessory muscles    GASTROINTESTINAL:  Abdomen soft,   Non-tender,   No guarding,   + BS    MUSCULOSKELETAL:   Range of motion is not limited,  No clubbing, cyanosis    NEUROLOGICAL:   Alert and oriented   No motor  deficits.    SKIN:   Skin normal color for race,   Warm and dry and intact.   No evidence of rash.    PSYCHIATRIC:   Normal mood and affect.   No apparent risk to self or others.    HEMATOLOGICAL:  No cervical  lymphadenopathy.  no inguinal lymphadenopathy      08-09-23 @ 07:01  -  08-10-23 @ 07:00  --------------------------------------------------------  IN:    Insulin: 16 mL    IV PiggyBack: 50 mL    Lactated Ringers: 1475 mL    Lactated Ringers Bolus: 250 mL    Norepinephrine: 40.1 mL    Norepinephrine: 509 mL    Phenylephrine: 261.9 mL    PRBCs (Packed Red Blood Cells): 372 mL  Total IN: 2974 mL    OUT:    Incontinent per Collection Bag (mL): 1200 mL    Nasogastric/Oral tube (mL): 100 mL  Total OUT: 1300 mL    Total NET: 1674 mL      08-10-23 @ 07:01  -  08-10-23 @ 10:32  --------------------------------------------------------  IN:    Insulin: 4 mL    Lactated Ringers: 150 mL    Norepinephrine: 73.2 mL    Phenylephrine: 59.1 mL  Total IN: 286.3 mL    OUT:  Total OUT: 0 mL    Total NET: 286.3 mL          LABS:                            7.2    19.50 )-----------( 186      ( 10 Aug 2023 04:20 )             20.7                                               08-10    141  |  112<H>  |  32<H>  ----------------------------<  89  4.5   |  20  |  1.3    Ca    6.7<L>      10 Aug 2023 04:20  Mg     2.6     08-10    TPro  4.3<L>  /  Alb  1.8<L>  /  TBili  0.6  /  DBili  x   /  AST  56<H>  /  ALT  18  /  AlkPhos  76  08-09      PT/INR - ( 08 Aug 2023 20:33 )   PT: 15.50 sec;   INR: 1.35 ratio         PTT - ( 08 Aug 2023 20:33 )  PTT:37.0 sec                                       Urinalysis Basic - ( 10 Aug 2023 04:20 )    Color: x / Appearance: x / SG: x / pH: x  Gluc: 89 mg/dL / Ketone: x  / Bili: x / Urobili: x   Blood: x / Protein: x / Nitrite: x   Leuk Esterase: x / RBC: x / WBC x   Sq Epi: x / Non Sq Epi: x / Bacteria: x                                                  LIVER FUNCTIONS - ( 09 Aug 2023 20:53 )  Alb: 1.8 g/dL / Pro: 4.3 g/dL / ALK PHOS: 76 U/L / ALT: 18 U/L / AST: 56 U/L / GGT: x                                                  Culture - Blood (collected 08 Aug 2023 11:58)  Source: .Blood None  Preliminary Report (09 Aug 2023 23:01):    No growth at 24 hours                                                                                           MEDICATIONS  (STANDING):  allopurinol 300 milliGRAM(s) Oral daily  atorvastatin 10 milliGRAM(s) Oral at bedtime  bacitracin   Ointment 1 Application(s) Topical two times a day  caspofungin IVPB 50 milliGRAM(s) IV Intermittent every 24 hours  chlorhexidine 2% Cloths 1 Application(s) Topical daily  hydrocortisone sodium succinate Injectable 50 milliGRAM(s) IV Push every 6 hours  insulin regular Infusion 2 Unit(s)/Hr (2 mL/Hr) IV Continuous <Continuous>  lactated ringers Bolus 250 milliLiter(s) IV Bolus once  lactated ringers Bolus 500 milliLiter(s) IV Bolus once  lactated ringers Bolus 250 milliLiter(s) IV Bolus once  lactated ringers. 1000 milliLiter(s) (75 mL/Hr) IV Continuous <Continuous>  meropenem  IVPB 1000 milliGRAM(s) IV Intermittent every 12 hours  midodrine 10 milliGRAM(s) Oral every 8 hours  norepinephrine Infusion 0.05 MICROgram(s)/kG/Min (5.87 mL/Hr) IV Continuous <Continuous>  pantoprazole  Injectable 40 milliGRAM(s) IV Push two times a day  phenylephrine    Infusion 0.5 MICROgram(s)/kG/Min (5.87 mL/Hr) IV Continuous <Continuous>  polyethylene glycol 3350 17 Gram(s) Oral daily  senna 2 Tablet(s) Oral at bedtime    MEDICATIONS  (PRN):  magnesium hydroxide Suspension 30 milliLiter(s) Oral every 12 hours PRN Constipation      New X-rays reviewed:                                                                                  ECHO    CXR interpreted by me:

## 2023-08-11 LAB
ALBUMIN SERPL ELPH-MCNC: 1.5 G/DL — LOW (ref 3.5–5.2)
ALP SERPL-CCNC: 69 U/L — SIGNIFICANT CHANGE UP (ref 30–115)
ALT FLD-CCNC: 17 U/L — SIGNIFICANT CHANGE UP (ref 0–41)
ANION GAP SERPL CALC-SCNC: 5 MMOL/L — LOW (ref 7–14)
APTT BLD: 34.4 SEC — SIGNIFICANT CHANGE UP (ref 27–39.2)
APTT BLD: 36.1 SEC — SIGNIFICANT CHANGE UP (ref 27–39.2)
AST SERPL-CCNC: 47 U/L — HIGH (ref 0–41)
BASOPHILS # BLD AUTO: 0.03 K/UL — SIGNIFICANT CHANGE UP (ref 0–0.2)
BASOPHILS # BLD AUTO: 0.06 K/UL — SIGNIFICANT CHANGE UP (ref 0–0.2)
BASOPHILS # BLD AUTO: 0.06 K/UL — SIGNIFICANT CHANGE UP (ref 0–0.2)
BASOPHILS NFR BLD AUTO: 0.2 % — SIGNIFICANT CHANGE UP (ref 0–1)
BASOPHILS NFR BLD AUTO: 0.3 % — SIGNIFICANT CHANGE UP (ref 0–1)
BASOPHILS NFR BLD AUTO: 0.3 % — SIGNIFICANT CHANGE UP (ref 0–1)
BILIRUB SERPL-MCNC: 0.4 MG/DL — SIGNIFICANT CHANGE UP (ref 0.2–1.2)
BUN SERPL-MCNC: 32 MG/DL — HIGH (ref 10–20)
CALCIUM SERPL-MCNC: 6.5 MG/DL — LOW (ref 8.4–10.4)
CHLORIDE SERPL-SCNC: 113 MMOL/L — HIGH (ref 98–110)
CO2 SERPL-SCNC: 23 MMOL/L — SIGNIFICANT CHANGE UP (ref 17–32)
CREAT SERPL-MCNC: 1.1 MG/DL — SIGNIFICANT CHANGE UP (ref 0.7–1.5)
EGFR: 65 ML/MIN/1.73M2 — SIGNIFICANT CHANGE UP
EOSINOPHIL # BLD AUTO: 0 K/UL — SIGNIFICANT CHANGE UP (ref 0–0.7)
EOSINOPHIL # BLD AUTO: 0.01 K/UL — SIGNIFICANT CHANGE UP (ref 0–0.7)
EOSINOPHIL # BLD AUTO: 0.03 K/UL — SIGNIFICANT CHANGE UP (ref 0–0.7)
EOSINOPHIL NFR BLD AUTO: 0 % — SIGNIFICANT CHANGE UP (ref 0–8)
EOSINOPHIL NFR BLD AUTO: 0.1 % — SIGNIFICANT CHANGE UP (ref 0–8)
EOSINOPHIL NFR BLD AUTO: 0.1 % — SIGNIFICANT CHANGE UP (ref 0–8)
FIBRINOGEN PPP-MCNC: 590 MG/DL — HIGH (ref 204.4–570.6)
GLUCOSE BLDC GLUCOMTR-MCNC: 129 MG/DL — HIGH (ref 70–99)
GLUCOSE BLDC GLUCOMTR-MCNC: 133 MG/DL — HIGH (ref 70–99)
GLUCOSE BLDC GLUCOMTR-MCNC: 138 MG/DL — HIGH (ref 70–99)
GLUCOSE BLDC GLUCOMTR-MCNC: 150 MG/DL — HIGH (ref 70–99)
GLUCOSE BLDC GLUCOMTR-MCNC: 171 MG/DL — HIGH (ref 70–99)
GLUCOSE BLDC GLUCOMTR-MCNC: 187 MG/DL — HIGH (ref 70–99)
GLUCOSE BLDC GLUCOMTR-MCNC: 229 MG/DL — HIGH (ref 70–99)
GLUCOSE BLDC GLUCOMTR-MCNC: 240 MG/DL — HIGH (ref 70–99)
GLUCOSE BLDC GLUCOMTR-MCNC: 250 MG/DL — HIGH (ref 70–99)
GLUCOSE BLDC GLUCOMTR-MCNC: 256 MG/DL — HIGH (ref 70–99)
GLUCOSE BLDC GLUCOMTR-MCNC: 55 MG/DL — LOW (ref 70–99)
GLUCOSE BLDC GLUCOMTR-MCNC: 64 MG/DL — LOW (ref 70–99)
GLUCOSE BLDC GLUCOMTR-MCNC: 87 MG/DL — SIGNIFICANT CHANGE UP (ref 70–99)
GLUCOSE SERPL-MCNC: 146 MG/DL — HIGH (ref 70–99)
HCT VFR BLD CALC: 22.2 % — LOW (ref 42–52)
HCT VFR BLD CALC: 23 % — LOW (ref 42–52)
HCT VFR BLD CALC: 23.1 % — LOW (ref 42–52)
HCT VFR BLD CALC: 23.3 % — LOW (ref 42–52)
HGB BLD-MCNC: 7.6 G/DL — LOW (ref 14–18)
HGB BLD-MCNC: 7.8 G/DL — LOW (ref 14–18)
HGB BLD-MCNC: 7.9 G/DL — LOW (ref 14–18)
HGB BLD-MCNC: 7.9 G/DL — LOW (ref 14–18)
IMM GRANULOCYTES NFR BLD AUTO: 1.1 % — HIGH (ref 0.1–0.3)
IMM GRANULOCYTES NFR BLD AUTO: 1.3 % — HIGH (ref 0.1–0.3)
IMM GRANULOCYTES NFR BLD AUTO: 1.3 % — HIGH (ref 0.1–0.3)
INR BLD: 1.14 RATIO — SIGNIFICANT CHANGE UP (ref 0.65–1.3)
INR BLD: 1.15 RATIO — SIGNIFICANT CHANGE UP (ref 0.65–1.3)
LACTATE SERPL-SCNC: 1.4 MMOL/L — SIGNIFICANT CHANGE UP (ref 0.7–2)
LYMPHOCYTES # BLD AUTO: 1.25 K/UL — SIGNIFICANT CHANGE UP (ref 1.2–3.4)
LYMPHOCYTES # BLD AUTO: 1.28 K/UL — SIGNIFICANT CHANGE UP (ref 1.2–3.4)
LYMPHOCYTES # BLD AUTO: 1.56 K/UL — SIGNIFICANT CHANGE UP (ref 1.2–3.4)
LYMPHOCYTES # BLD AUTO: 5.6 % — LOW (ref 20.5–51.1)
LYMPHOCYTES # BLD AUTO: 6.6 % — LOW (ref 20.5–51.1)
LYMPHOCYTES # BLD AUTO: 6.8 % — LOW (ref 20.5–51.1)
MAGNESIUM SERPL-MCNC: 2.6 MG/DL — HIGH (ref 1.8–2.4)
MCHC RBC-ENTMCNC: 30.5 PG — SIGNIFICANT CHANGE UP (ref 27–31)
MCHC RBC-ENTMCNC: 30.9 PG — SIGNIFICANT CHANGE UP (ref 27–31)
MCHC RBC-ENTMCNC: 33.8 G/DL — SIGNIFICANT CHANGE UP (ref 32–37)
MCHC RBC-ENTMCNC: 33.9 G/DL — SIGNIFICANT CHANGE UP (ref 32–37)
MCHC RBC-ENTMCNC: 34.2 G/DL — SIGNIFICANT CHANGE UP (ref 32–37)
MCHC RBC-ENTMCNC: 34.3 G/DL — SIGNIFICANT CHANGE UP (ref 32–37)
MCV RBC AUTO: 88.8 FL — SIGNIFICANT CHANGE UP (ref 80–94)
MCV RBC AUTO: 90 FL — SIGNIFICANT CHANGE UP (ref 80–94)
MCV RBC AUTO: 90.2 FL — SIGNIFICANT CHANGE UP (ref 80–94)
MCV RBC AUTO: 90.2 FL — SIGNIFICANT CHANGE UP (ref 80–94)
MONOCYTES # BLD AUTO: 0.7 K/UL — HIGH (ref 0.1–0.6)
MONOCYTES # BLD AUTO: 0.8 K/UL — HIGH (ref 0.1–0.6)
MONOCYTES # BLD AUTO: 0.84 K/UL — HIGH (ref 0.1–0.6)
MONOCYTES NFR BLD AUTO: 3.1 % — SIGNIFICANT CHANGE UP (ref 1.7–9.3)
MONOCYTES NFR BLD AUTO: 3.7 % — SIGNIFICANT CHANGE UP (ref 1.7–9.3)
MONOCYTES NFR BLD AUTO: 4.2 % — SIGNIFICANT CHANGE UP (ref 1.7–9.3)
NEUTROPHILS # BLD AUTO: 16.71 K/UL — HIGH (ref 1.4–6.5)
NEUTROPHILS # BLD AUTO: 20.13 K/UL — HIGH (ref 1.4–6.5)
NEUTROPHILS # BLD AUTO: 20.33 K/UL — HIGH (ref 1.4–6.5)
NEUTROPHILS NFR BLD AUTO: 87.6 % — HIGH (ref 42.2–75.2)
NEUTROPHILS NFR BLD AUTO: 88.1 % — HIGH (ref 42.2–75.2)
NEUTROPHILS NFR BLD AUTO: 89.6 % — HIGH (ref 42.2–75.2)
NRBC # BLD: 10 /100 WBCS — HIGH (ref 0–0)
NRBC # BLD: 6 /100 WBCS — HIGH (ref 0–0)
NRBC # BLD: 7 /100 WBCS — HIGH (ref 0–0)
NRBC # BLD: 8 /100 WBCS — HIGH (ref 0–0)
PHOSPHATE SERPL-MCNC: 2.8 MG/DL — SIGNIFICANT CHANGE UP (ref 2.1–4.9)
PLATELET # BLD AUTO: 158 K/UL — SIGNIFICANT CHANGE UP (ref 130–400)
PLATELET # BLD AUTO: 164 K/UL — SIGNIFICANT CHANGE UP (ref 130–400)
PLATELET # BLD AUTO: 165 K/UL — SIGNIFICANT CHANGE UP (ref 130–400)
PLATELET # BLD AUTO: 181 K/UL — SIGNIFICANT CHANGE UP (ref 130–400)
PMV BLD: 12.5 FL — HIGH (ref 7.4–10.4)
PMV BLD: 12.7 FL — HIGH (ref 7.4–10.4)
PMV BLD: 12.7 FL — HIGH (ref 7.4–10.4)
PMV BLD: 12.8 FL — HIGH (ref 7.4–10.4)
POTASSIUM SERPL-MCNC: 4.1 MMOL/L — SIGNIFICANT CHANGE UP (ref 3.5–5)
POTASSIUM SERPL-SCNC: 4.1 MMOL/L — SIGNIFICANT CHANGE UP (ref 3.5–5)
PROT SERPL-MCNC: 3.8 G/DL — LOW (ref 6–8)
PROTHROM AB SERPL-ACNC: 13 SEC — HIGH (ref 9.95–12.87)
PROTHROM AB SERPL-ACNC: 13.2 SEC — HIGH (ref 9.95–12.87)
RBC # BLD: 2.46 M/UL — LOW (ref 4.7–6.1)
RBC # BLD: 2.56 M/UL — LOW (ref 4.7–6.1)
RBC # BLD: 2.59 M/UL — LOW (ref 4.7–6.1)
RBC # BLD: 2.59 M/UL — LOW (ref 4.7–6.1)
RBC # FLD: 15.8 % — HIGH (ref 11.5–14.5)
RBC # FLD: 16.2 % — HIGH (ref 11.5–14.5)
RBC # FLD: 16.3 % — HIGH (ref 11.5–14.5)
RBC # FLD: 16.4 % — HIGH (ref 11.5–14.5)
SODIUM SERPL-SCNC: 141 MMOL/L — SIGNIFICANT CHANGE UP (ref 135–146)
VANCOMYCIN FLD-MCNC: 8.4 UG/ML — SIGNIFICANT CHANGE UP (ref 5–10)
WBC # BLD: 19.05 K/UL — HIGH (ref 4.8–10.8)
WBC # BLD: 22.69 K/UL — HIGH (ref 4.8–10.8)
WBC # BLD: 22.84 K/UL — HIGH (ref 4.8–10.8)
WBC # BLD: 23.75 K/UL — HIGH (ref 4.8–10.8)
WBC # FLD AUTO: 19.05 K/UL — HIGH (ref 4.8–10.8)
WBC # FLD AUTO: 22.69 K/UL — HIGH (ref 4.8–10.8)
WBC # FLD AUTO: 22.84 K/UL — HIGH (ref 4.8–10.8)
WBC # FLD AUTO: 23.75 K/UL — HIGH (ref 4.8–10.8)

## 2023-08-11 PROCEDURE — 93306 TTE W/DOPPLER COMPLETE: CPT | Mod: 26

## 2023-08-11 PROCEDURE — 71045 X-RAY EXAM CHEST 1 VIEW: CPT | Mod: 26

## 2023-08-11 PROCEDURE — 99223 1ST HOSP IP/OBS HIGH 75: CPT

## 2023-08-11 PROCEDURE — 99233 SBSQ HOSP IP/OBS HIGH 50: CPT

## 2023-08-11 PROCEDURE — 99291 CRITICAL CARE FIRST HOUR: CPT

## 2023-08-11 PROCEDURE — 74018 RADEX ABDOMEN 1 VIEW: CPT | Mod: 26

## 2023-08-11 RX ORDER — ACETAMINOPHEN 500 MG
1000 TABLET ORAL ONCE
Refills: 0 | Status: DISCONTINUED | OUTPATIENT
Start: 2023-08-11 | End: 2023-08-13

## 2023-08-11 RX ORDER — HYDROCORTISONE 20 MG
100 TABLET ORAL EVERY 8 HOURS
Refills: 0 | Status: DISCONTINUED | OUTPATIENT
Start: 2023-08-11 | End: 2023-08-12

## 2023-08-11 RX ORDER — VANCOMYCIN HCL 1 G
1000 VIAL (EA) INTRAVENOUS EVERY 12 HOURS
Refills: 0 | Status: DISCONTINUED | OUTPATIENT
Start: 2023-08-11 | End: 2023-08-11

## 2023-08-11 RX ORDER — SODIUM CHLORIDE 9 MG/ML
1000 INJECTION, SOLUTION INTRAVENOUS
Refills: 0 | Status: DISCONTINUED | OUTPATIENT
Start: 2023-08-11 | End: 2023-08-13

## 2023-08-11 RX ORDER — HYDROCORTISONE 20 MG
100 TABLET ORAL EVERY 8 HOURS
Refills: 0 | Status: DISCONTINUED | OUTPATIENT
Start: 2023-08-11 | End: 2023-08-11

## 2023-08-11 RX ORDER — SODIUM CHLORIDE 9 MG/ML
250 INJECTION, SOLUTION INTRAVENOUS ONCE
Refills: 0 | Status: COMPLETED | OUTPATIENT
Start: 2023-08-11 | End: 2023-08-11

## 2023-08-11 RX ORDER — HYDROCORTISONE 20 MG
200 TABLET ORAL EVERY 8 HOURS
Refills: 0 | Status: DISCONTINUED | OUTPATIENT
Start: 2023-08-11 | End: 2023-08-11

## 2023-08-11 RX ADMIN — Medication 100 MILLIGRAM(S): at 21:32

## 2023-08-11 RX ADMIN — MEROPENEM 100 MILLIGRAM(S): 1 INJECTION INTRAVENOUS at 05:26

## 2023-08-11 RX ADMIN — Medication 100 MILLIGRAM(S): at 13:04

## 2023-08-11 RX ADMIN — SODIUM CHLORIDE 500 MILLILITER(S): 9 INJECTION, SOLUTION INTRAVENOUS at 11:11

## 2023-08-11 RX ADMIN — PANTOPRAZOLE SODIUM 40 MILLIGRAM(S): 20 TABLET, DELAYED RELEASE ORAL at 17:01

## 2023-08-11 RX ADMIN — Medication 50 MILLIGRAM(S): at 05:26

## 2023-08-11 RX ADMIN — MEROPENEM 100 MILLIGRAM(S): 1 INJECTION INTRAVENOUS at 17:03

## 2023-08-11 RX ADMIN — MIDODRINE HYDROCHLORIDE 10 MILLIGRAM(S): 2.5 TABLET ORAL at 21:32

## 2023-08-11 RX ADMIN — ATORVASTATIN CALCIUM 10 MILLIGRAM(S): 80 TABLET, FILM COATED ORAL at 21:32

## 2023-08-11 RX ADMIN — MIDODRINE HYDROCHLORIDE 10 MILLIGRAM(S): 2.5 TABLET ORAL at 13:04

## 2023-08-11 RX ADMIN — SODIUM CHLORIDE 50 MILLILITER(S): 9 INJECTION, SOLUTION INTRAVENOUS at 11:10

## 2023-08-11 RX ADMIN — Medication 1 APPLICATION(S): at 17:01

## 2023-08-11 RX ADMIN — INSULIN HUMAN 2 UNIT(S)/HR: 100 INJECTION, SOLUTION SUBCUTANEOUS at 11:12

## 2023-08-11 RX ADMIN — Medication 50 MILLIGRAM(S): at 00:27

## 2023-08-11 RX ADMIN — Medication 1 APPLICATION(S): at 05:27

## 2023-08-11 RX ADMIN — MIDODRINE HYDROCHLORIDE 10 MILLIGRAM(S): 2.5 TABLET ORAL at 05:25

## 2023-08-11 RX ADMIN — Medication 250 MILLIGRAM(S): at 05:26

## 2023-08-11 RX ADMIN — CASPOFUNGIN ACETATE 260 MILLIGRAM(S): 7 INJECTION, POWDER, LYOPHILIZED, FOR SOLUTION INTRAVENOUS at 11:10

## 2023-08-11 RX ADMIN — Medication 300 MILLIGRAM(S): at 11:10

## 2023-08-11 RX ADMIN — PANTOPRAZOLE SODIUM 40 MILLIGRAM(S): 20 TABLET, DELAYED RELEASE ORAL at 05:26

## 2023-08-11 RX ADMIN — CHLORHEXIDINE GLUCONATE 1 APPLICATION(S): 213 SOLUTION TOPICAL at 05:27

## 2023-08-11 NOTE — PROGRESS NOTE ADULT - ASSESSMENT
ASSESSMENT:  88M with PMH of DM, HTN coming from home with complaint of decreased appetite, increased urinary output and craving sweets x 2 weeks. Daughter is caretaker, states patient behavior has changed. Within the last 2 weeks, He is less active, requesting more coca cola and sugar. Pt noted to be hypotensive upon arrival.     Patient admitted to MICU found to be in DKA with AMS. Patient was septic, with blood cultures growing hungatella. Patient on IV abx with resolving hypotension and decreasing pressor requirements. Patient still complaining of lower abdominal pain with tenderness in LLQ. Patient is not a surgical candidate at this time.      PLAN:  - No acute surgical intervention at this time  - Continue abx per ID reccomendations  - Appreciate GI recs for possible EGFD in the setting of UGI bleed  - recall general surgery prn

## 2023-08-11 NOTE — PROGRESS NOTE ADULT - ASSESSMENT
IMPRESSION:    DKA / HHS, resolved  Hungatella species bacteremia   Septic shock   BART   AMS toxic metabolic  HO DM  HO HTN  Abdominal aortic aneurysm  Parkinson disease    PLAN:    CNS: Avoid sedation    HEENT: Oral care.  NG care     PULMONARY:  HOB @ 45 degrees.  Aspiration precautions, wean oxygen as tolerated. CXR Noted.      CARDIOVASCULAR:   Avoid volume overload.  Wean levophed, IV fluid LR while NPO.  Midodrine 10 mg Q8,  [place] HD monitoring.  GDE.  Wean Pressors.      GI: GI prophylaxis, Hold feeding today.   PPI BID>  Not stable for elective endoscopy.      RENAL:  Follow up lytes.  Correct as needed.  Bladder scans     INFECTIOUS DISEASE: FU repeat BC.  Continue Meropenem.   FU Fungitell.  Start Caspo.  Repeat BC.  DC Vanc     HEMATOLOGICAL:  DVT prophylaxis. Hold Fondaparinux.  Trend CBC.  NG Lavage neg.     ENDOCRINE:  Follow up FS.  Insulin protocol, endocrine.   mg Q8 or equivalent     MUSCULOSKELETAL: bedrest    MICU    Prognosis extremely poor

## 2023-08-11 NOTE — PROGRESS NOTE ADULT - SUBJECTIVE AND OBJECTIVE BOX
GENERAL SURGERY PROGRESS NOTE    Patient: MILEY MARES , 88y ([birth year])Male   MRN: [id number]  Location: [address]  Visit: 08-04-23 Inpatient  Date: 08-11-23 @ 08:27    Hospital Day # 7      Events of past 24 hours: Patient seen and examined at bedside on rounds. Patient resting comfortably in hospital bed. Patient continues to endorse abdominal pain at this time and is still requiring the use of pressors for BP support. Patient continues to be non-surgical candidate at this time.     PAST MEDICAL & SURGICAL HISTORY:  HTN (hypertension)  Gout  BPH (benign prostatic hyperplasia)  Parkinson disease  HLD (hyperlipidemia)  Smoker within last 12 months  Diabetes mellitus  No significant past surgical history          Vitals:   T(F): 97.1 (08-11-23 @ 04:00), Max: 98.2 (08-10-23 @ 12:00)  HR: 69 (08-11-23 @ 07:00)  BP: 90/54 (08-11-23 @ 07:00)  RR: 17 (08-11-23 @ 07:00)  SpO2: 98% (08-11-23 @ 07:00)      Diet, NPO:   Except Medications      Fluids:     I & O's:    08-10-23 @ 07:01  -  08-11-23 @ 07:00  --------------------------------------------------------  IN:    Enteral Tube Flush: 20 mL    Insulin: 20 mL    IV PiggyBack: 600 mL    Lactated Ringers: 525 mL    Lactated Ringers Bolus: 1500 mL    Norepinephrine: 621.2 mL    Phenylephrine: 144.1 mL    PRBCs (Packed Red Blood Cells): 597 mL  Total IN: 4027.3 mL    OUT:    Incontinent per Collection Bag (mL): 1225 mL    Nasogastric/Oral tube (mL): 0 mL    Stool (mL): 4 mL  Total OUT: 1229 mL    Total NET: 2798.3 mL    PHYSICAL EXAM:  General: NAD, calm and cooperative  HEENT: NCAT  Cardiac: Extremities well perfused  Respiratory: Normal respiratory effort   Abdomen: Soft, mildly distended, tender to palpation in LLQ  Musculoskeletal: Compartments soft  Neuro: Sensation grossly intact and equal throughout  Skin: Warm/dry, normal color, no jaundice      MEDICATIONS  (STANDING):  acetaminophen   IVPB .. 1000 milliGRAM(s) IV Intermittent once  allopurinol 300 milliGRAM(s) Oral daily  atorvastatin 10 milliGRAM(s) Oral at bedtime  bacitracin   Ointment 1 Application(s) Topical two times a day  caspofungin IVPB      caspofungin IVPB 50 milliGRAM(s) IV Intermittent every 24 hours  chlorhexidine 2% Cloths 1 Application(s) Topical daily  hydrocortisone sodium succinate Injectable 50 milliGRAM(s) IV Push every 6 hours  insulin regular Infusion 2 Unit(s)/Hr (2 mL/Hr) IV Continuous <Continuous>  meropenem  IVPB 1000 milliGRAM(s) IV Intermittent every 12 hours  midodrine 10 milliGRAM(s) Oral every 8 hours  norepinephrine Infusion 0.05 MICROgram(s)/kG/Min (5.87 mL/Hr) IV Continuous <Continuous>  pantoprazole  Injectable 40 milliGRAM(s) IV Push two times a day  phenylephrine    Infusion 0.5 MICROgram(s)/kG/Min (5.87 mL/Hr) IV Continuous <Continuous>  polyethylene glycol 3350 17 Gram(s) Oral daily  senna 2 Tablet(s) Oral at bedtime  vancomycin  IVPB 1000 milliGRAM(s) IV Intermittent every 12 hours    MEDICATIONS  (PRN):  magnesium hydroxide Suspension 30 milliLiter(s) Oral every 12 hours PRN Constipation      DVT PROPHYLAXIS:   GI PROPHYLAXIS: pantoprazole  Injectable 40 milliGRAM(s) IV Push two times a day    ANTICOAGULATION:   ANTIBIOTICS:  caspofungin IVPB    caspofungin IVPB 50 milliGRAM(s)  meropenem  IVPB 1000 milliGRAM(s)  vancomycin  IVPB 1000 milliGRAM(s)        Isolation Precautions:     Isolation Type: CONTACT;  Indication for Isolation: Clostridium difficile    Additional Instructions:  Contact Isolation (08-11-23 @ 07:33)      LAB/STUDIES:  Labs:  CAPILLARY BLOOD GLUCOSE      POCT Blood Glucose.: 229 mg/dL (11 Aug 2023 08:14)  POCT Blood Glucose.: 256 mg/dL (11 Aug 2023 08:10)  POCT Blood Glucose.: 129 mg/dL (11 Aug 2023 03:29)  POCT Blood Glucose.: 150 mg/dL (11 Aug 2023 00:08)  POCT Blood Glucose.: 121 mg/dL (10 Aug 2023 22:18)  POCT Blood Glucose.: 105 mg/dL (10 Aug 2023 21:04)  POCT Blood Glucose.: 122 mg/dL (10 Aug 2023 20:03)  POCT Blood Glucose.: 145 mg/dL (10 Aug 2023 17:58)  POCT Blood Glucose.: 143 mg/dL (10 Aug 2023 16:31)  POCT Blood Glucose.: 111 mg/dL (10 Aug 2023 14:20)  POCT Blood Glucose.: 101 mg/dL (10 Aug 2023 13:07)  POCT Blood Glucose.: 117 mg/dL (10 Aug 2023 11:12)  POCT Blood Glucose.: 140 mg/dL (10 Aug 2023 09:04)                          7.9    19.05 )-----------( 158      ( 11 Aug 2023 05:00 )             23.0       Auto Neutrophil %: 87.6 % (08-11-23 @ 05:00)  Auto Immature Granulocyte %: 1.3 % (08-11-23 @ 05:00)  Auto Neutrophil %: 88.0 % (08-10-23 @ 23:10)  Auto Immature Granulocyte %: 1.1 % (08-10-23 @ 23:10)  Auto Neutrophil %: 89.5 % (08-10-23 @ 10:55)  Band Neutrophils %: 0.9 % (08-10-23 @ 10:55)    08-11    141  |  113<H>  |  32<H>  ----------------------------<  146<H>  4.1   |  23  |  1.1      Calcium: 6.5 mg/dL (08-11-23 @ 05:00)      LFTs:             3.8  | 0.4  | 47       ------------------[69      ( 11 Aug 2023 05:00 )  1.5  | x    | 17          Lipase:x      Amylase:x         Lactate, Blood: 1.4 mmol/L (08-11-23 @ 05:00)  Blood Gas Venous - Lactate: 2.00 mmol/L (08-10-23 @ 10:10)  Lactate, Blood: 2.5 mmol/L (08-10-23 @ 04:20)  Lactate, Blood: 7.1 mmol/L (08-09-23 @ 20:53)  Lactate, Blood: 1.5 mmol/L (08-08-23 @ 12:40)    ABG - ( 04 Aug 2023 11:23 )  pH: 7.30  /  pCO2: 24    /  pO2: 71    / HCO3: 12    / Base Excess: -12.9 /  SaO2: 93.9              Coags:     13.00  ----< 1.14    ( 11 Aug 2023 05:00 )     34.4                Urinalysis Basic - ( 11 Aug 2023 05:00 )    Color: x / Appearance: x / SG: x / pH: x  Gluc: 146 mg/dL / Ketone: x  / Bili: x / Urobili: x   Blood: x / Protein: x / Nitrite: x   Leuk Esterase: x / RBC: x / WBC x   Sq Epi: x / Non Sq Epi: x / Bacteria: x        Culture - Blood (collected 08 Aug 2023 11:58)  Source: .Blood None  Preliminary Report (10 Aug 2023 23:01):    No growth at 48 Hours      IMAGING:  Xray Chest 1 View-PORTABLE IMMEDIATE (Xray Chest 1 View-PORTABLE IMMEDIATE .) (08.10.23 @ 15:56)  Interstitial edema, unchanged. Lines and tubes as described.

## 2023-08-11 NOTE — PROGRESS NOTE ADULT - ASSESSMENT
87 y/o M w/ PMH of DM, HTN coming from home with complaint of decreased appetite, increased urinary output and craving sweets x 2 weeks. Admitted for management of DKA.     IMPRESSION  #Septic shock requiring pressors   #Hungatella bacteremia likely GI translocation in the setting of appendicitis  8/8 BCX NGTD   8/5 BCX NGTD   8/4 UCX NGTD  8/4 BCX + 1/2 bottles    - F up BCX of 8/11    - Patient improved initially but on 8/9 -> At 6.30 pm patient started having diffuse severe abdominal pain with increased pressors (levo and jasen).   - Abdomen Not peritonitic.   -- Ct angio abdomen/pelvis to r/o ischemia ->No evidence of bowel ischemia. Unchanged findings suggesting acute appendicitis.  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 Collateral flow presumed to be present again there is no evidence of bowel ischemia.   - lactate level was 7 on 8/9 -> 2.5 on 8/10 -> 1.4 on 8/11.   - Surgery team on board ->                        - No surgery indicated --> not a surgical candidate at this time                       - C/w antibiotics --> non operative management for acute appendicitis    - yesterday was on norepinephrine and phenylephrine -> today on norepi low dose.   - hydro stress dose started - day 2  - Patient is NPO  - Repeat UA, and MRSA negative on 8/10  - IVF based on cheetah  - Repeat TTE  - standing IV fluids stopped to avoid overload -> patient is receiving 2 u of pRBC in addition to the 1.5 Liters LR.   - lactate trending down from 3.4 to 1.5  - no documented fevers  - on meropenem 1g Q12 day 5  - Caspofungin day 2 - F up fungitell   - Vancomycin stopped today -> repeated MRSA negative.   - ID on board     #Melena likely d/t PUD vs AVM vs Esophageal ulcer vs Erosive Hemorrhagic Gastritis vs Dieulafoy lesion Vs Malignancy    - Patient started having melena on Wednesday night 8/9 (NADEGE on 8/9 -in the morning- was negative)   -> patient is having drop in Hgb  - CT abdomen with PO contrast and repeated with IV contrast -> negative for bleed  - on 8/10 drop in hgb from 5 am till 11 am (from 7.2 to 6) after having 2 episodes of melena -> 2 units of pRBC were given  - NPO  - PPI BID IV  - GI on board  - EGD not possible now- patient is unstable  - HIT panel negative  - DIC panel repeated twice negative.   - 6 episodes of diarrhea in the past 24 hours- most probably from blood and laxatives.    #DKA/HHS,   - resolved  - endo on board   - insulin IV drip to target glucose level of 140 to 180     #AMS toxic metabolic   - CT head -> negative  - EEG -> There were no findings of active epilepsy.

## 2023-08-11 NOTE — PROGRESS NOTE ADULT - SUBJECTIVE AND OBJECTIVE BOX
Patient is a 88y old  Male who presents with a chief complaint of DKA/HHS (11 Aug 2023 08:26)        Over Night Events:  remains critically ill on MV.  On Levophed.  On NC O2.  SP 2 units PRBCs         ROS:     All ROS are negative except HPI         PHYSICAL EXAM    ICU Vital Signs Last 24 Hrs  T(C): 36.2 (11 Aug 2023 04:00), Max: 36.8 (10 Aug 2023 12:00)  T(F): 97.1 (11 Aug 2023 04:00), Max: 98.2 (10 Aug 2023 12:00)  HR: 69 (11 Aug 2023 07:00) (63 - 112)  BP: 90/54 (11 Aug 2023 07:00) (71/38 - 150/88)  BP(mean): 67 (11 Aug 2023 07:00) (49 - 113)  ABP: --  ABP(mean): --  RR: 17 (11 Aug 2023 07:00) (14 - 30)  SpO2: 98% (11 Aug 2023 07:00) (84% - 100%)    O2 Parameters below as of 11 Aug 2023 04:00  Patient On (Oxygen Delivery Method): nasal cannula  O2 Flow (L/min): 2          CONSTITUTIONAL:  Ill appearing in  NAD    ENT:   Airway patent,   Mouth with normal mucosa.       EYES:   Pupils equal,   Round and reactive to light.    CARDIAC:   Normal rate,   Regular rhythm.    edema      RESPIRATORY:   No wheezing  Bilateral BS  Normal chest expansion  Not tachypneic,  No use of accessory muscles    GASTROINTESTINAL:  Abdomen soft,   Non-tender,   No guarding,       MUSCULOSKELETAL:   Range of motion is not limited,  No clubbing, cyanosis    NEUROLOGICAL:   Lethargic     SKIN:   Skin normal color for race,   No evidence of rash.    PSYCHIATRIC:   No apparent risk to self or others.        08-10-23 @ 07:01  -  08-11-23 @ 07:00  --------------------------------------------------------  IN:    Enteral Tube Flush: 20 mL    Insulin: 20 mL    IV PiggyBack: 600 mL    Lactated Ringers: 525 mL    Lactated Ringers Bolus: 1500 mL    Norepinephrine: 621.2 mL    Phenylephrine: 144.1 mL    PRBCs (Packed Red Blood Cells): 597 mL  Total IN: 4027.3 mL    OUT:    Incontinent per Collection Bag (mL): 1225 mL    Nasogastric/Oral tube (mL): 0 mL    Stool (mL): 4 mL  Total OUT: 1229 mL    Total NET: 2798.3 mL          LABS:                            7.9    19.05 )-----------( 158      ( 11 Aug 2023 05:00 )             23.0                    7.9    19.05 )-----------( 158      ( 08-11 @ 05:00 )             23.0                8.6    18.35 )-----------( 165      ( 08-10 @ 23:10 )             24.9                6.0    18.60 )-----------( 171      ( 08-10 @ 10:55 )             17.3                7.2    19.50 )-----------( 186      ( 08-10 @ 04:20 )             20.7                7.6    14.16 )-----------( 162      ( 08-09 @ 20:53 )             22.6                7.4    10.80 )-----------( 134      ( 08-09 @ 17:52 )             21.8                6.2    10.51 )-----------( 136      ( 08-09 @ 04:55 )             18.5                7.0    10.83 )-----------( 147      ( 08-08 @ 20:33 )             20.7                                               08-11    141  |  113<H>  |  32<H>  ----------------------------<  146<H>  4.1   |  23  |  1.1    Creatinine Trend  BUN 32, Cr 1.1, (08-11-23 @ 05:00)  Creatinine Trend  BUN 32, Cr 1.3, (08-10-23 @ 04:20)  Creatinine Trend  BUN 34, Cr 1.3, (08-09-23 @ 20:53)  Creatinine Trend  BUN 25, Cr 1.1, (08-09-23 @ 04:55)  Creatinine Trend  BUN 23, Cr 1.1, (08-08-23 @ 05:55)  Creatinine Trend  BUN 29, Cr 1.2, (08-07-23 @ 05:10)  Creatinine Trend  BUN 30, Cr 1.2, (08-07-23 @ 00:50)      Ca    6.5<L>      11 Aug 2023 05:00  Phos  2.8     08-11  Mg     2.6     08-11    TPro  3.8<L>  /  Alb  1.5<L>  /  TBili  0.4  /  DBili  x   /  AST  47<H>  /  ALT  17  /  AlkPhos  69  08-11      PT/INR - ( 11 Aug 2023 05:00 )   PT: 13.00 sec;   INR: 1.14 ratio         PTT - ( 11 Aug 2023 05:00 )  PTT:34.4 sec                                       Urinalysis Basic - ( 11 Aug 2023 05:00 )    Color: x / Appearance: x / SG: x / pH: x  Gluc: 146 mg/dL / Ketone: x  / Bili: x / Urobili: x   Blood: x / Protein: x / Nitrite: x   Leuk Esterase: x / RBC: x / WBC x   Sq Epi: x / Non Sq Epi: x / Bacteria: x                                                  LIVER FUNCTIONS - ( 11 Aug 2023 05:00 )  Alb: 1.5 g/dL / Pro: 3.8 g/dL / ALK PHOS: 69 U/L / ALT: 17 U/L / AST: 47 U/L / GGT: x                                                  Culture - Blood (collected 08 Aug 2023 11:58)  Source: .Blood None  Preliminary Report (10 Aug 2023 23:01):    No growth at 48 Hours                                                                                           MEDICATIONS  (STANDING):  acetaminophen   IVPB .. 1000 milliGRAM(s) IV Intermittent once  allopurinol 300 milliGRAM(s) Oral daily  atorvastatin 10 milliGRAM(s) Oral at bedtime  bacitracin   Ointment 1 Application(s) Topical two times a day  caspofungin IVPB 50 milliGRAM(s) IV Intermittent every 24 hours  caspofungin IVPB      chlorhexidine 2% Cloths 1 Application(s) Topical daily  hydrocortisone sodium succinate Injectable 50 milliGRAM(s) IV Push every 6 hours  insulin regular Infusion 2 Unit(s)/Hr (2 mL/Hr) IV Continuous <Continuous>  meropenem  IVPB 1000 milliGRAM(s) IV Intermittent every 12 hours  midodrine 10 milliGRAM(s) Oral every 8 hours  norepinephrine Infusion 0.05 MICROgram(s)/kG/Min (5.87 mL/Hr) IV Continuous <Continuous>  pantoprazole  Injectable 40 milliGRAM(s) IV Push two times a day  phenylephrine    Infusion 0.5 MICROgram(s)/kG/Min (5.87 mL/Hr) IV Continuous <Continuous>  polyethylene glycol 3350 17 Gram(s) Oral daily  senna 2 Tablet(s) Oral at bedtime  vancomycin  IVPB 1000 milliGRAM(s) IV Intermittent every 12 hours    MEDICATIONS  (PRN):  magnesium hydroxide Suspension 30 milliLiter(s) Oral every 12 hours PRN Constipation

## 2023-08-11 NOTE — PROGRESS NOTE ADULT - ASSESSMENT
ASSESSMENT  89 y/o M w/ PMH of DM, HTN coming from home with complaint of decreased appetite, increased urinary output and craving sweets x 2 weeks. Admitted for management of DKA.     IMPRESSION  #GIB  #Septic shock requiring pressors   #Hungatella bacteremia likely GI translocation in the setting of appendicitis  8/8 BCX NGTD   8/5 BCX NGTD   8/4 UCX NGTD  8/4 BCX + 1/2 bottles  < from: CT Angio Abdomen and Pelvis w/ IV Cont (08.10.23 @ 01:18) >  No evidence of bowel ischemia. 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 Collateral flow presumed   to be present again there is no evidence of bowel ischemia.  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 Findings are concerning for appendicitis.  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  Additional findings detailed in the body the report  #DKA/HHS  #Atelectasis  Creatinine: 1.1 mg/dL (08.08.23 @ 05:55)  Weight (kg): 62.6 (08-04-23 @ 20:37)  crcl 41      RECOMMENDATIONS  - Send repeat BCX , none pending  - Rashi 1g q12h IV 8/7-  - ICU team requesting Caspo- however no evidence of perforation/ischemia on CT. OK for now, D/C if BCX fungitell NEG   - Surgery following  - Grave prognosis without surgical intervention, GOC    If any questions, please call or send a message on [employer] Teams  Please continue to update ID with any pertinent new laboratory or radiographic findings

## 2023-08-11 NOTE — PROGRESS NOTE ADULT - SUBJECTIVE AND OBJECTIVE BOX
Gastroenterology progress note:     Patient is a 88y old  Male who presents with a chief complaint of DKA/HHS (11 Aug 2023 10:40)       Admitted on: 08-04-23    We are following the patient for:       Interval History:    No acute events overnight.   - Diet -   - last BM -   - Abdominal pain -       PAST MEDICAL & SURGICAL HISTORY:  HTN (hypertension)      Gout      BPH (benign prostatic hyperplasia)      Parkinson disease      HLD (hyperlipidemia)      Smoker within last 12 months      Diabetes mellitus      No significant past surgical history          MEDICATIONS  (STANDING):  acetaminophen   IVPB .. 1000 milliGRAM(s) IV Intermittent once  allopurinol 300 milliGRAM(s) Oral daily  atorvastatin 10 milliGRAM(s) Oral at bedtime  bacitracin   Ointment 1 Application(s) Topical two times a day  caspofungin IVPB      caspofungin IVPB 50 milliGRAM(s) IV Intermittent every 24 hours  chlorhexidine 2% Cloths 1 Application(s) Topical daily  hydrocortisone sodium succinate Injectable 100 milliGRAM(s) IV Push every 8 hours  insulin regular Infusion 2 Unit(s)/Hr (2 mL/Hr) IV Continuous <Continuous>  lactated ringers. 1000 milliLiter(s) (50 mL/Hr) IV Continuous <Continuous>  meropenem  IVPB 1000 milliGRAM(s) IV Intermittent every 12 hours  midodrine 10 milliGRAM(s) Oral every 8 hours  norepinephrine Infusion 0.05 MICROgram(s)/kG/Min (5.87 mL/Hr) IV Continuous <Continuous>  pantoprazole  Injectable 40 milliGRAM(s) IV Push two times a day    MEDICATIONS  (PRN):      Allergies  No Known Allergies      Review of Systems:   Cardiovascular:  No Chest Pain, No Palpitations  Respiratory:  No Cough, No Dyspnea  Gastrointestinal:  As described in HPI  Skin:  No Skin Lesions, No Jaundice  Neuro:  No Syncope, No Dizziness    Physical Examination:  T(C): 36.2 (08-11-23 @ 04:00), Max: 36.8 (08-10-23 @ 12:00)  HR: 69 (08-11-23 @ 07:00) (63 - 112)  BP: 90/54 (08-11-23 @ 07:00) (71/38 - 150/88)  RR: 17 (08-11-23 @ 07:00) (14 - 30)  SpO2: 98% (08-11-23 @ 07:00) (84% - 100%)      08-10-23 @ 07:01  -  08-11-23 @ 07:00  --------------------------------------------------------  IN: 4027.3 mL / OUT: 1229 mL / NET: 2798.3 mL        GENERAL: AAOx3, no acute distress.  HEAD:  Atraumatic, Normocephalic  EYES: conjunctiva and sclera clear  NECK: Supple, no JVD or thyromegaly  CHEST/LUNG: Clear to auscultation bilaterally; No wheeze, rhonchi, or rales  HEART: Regular rate and rhythm; normal S1, S2, No murmurs.  ABDOMEN: Soft, nontender, nondistended; Bowel sounds present  NEUROLOGY: No asterixis or tremor.   SKIN: Intact, no jaundice     Data:                        7.9    19.05 )-----------( 158      ( 11 Aug 2023 05:00 )             23.0     Hgb trend:  7.9  08-11-23 @ 05:00  8.6  08-10-23 @ 23:10  6.0  08-10-23 @ 10:55  7.2  08-10-23 @ 04:20  7.6  08-09-23 @ 20:53  7.4  08-09-23 @ 17:52  6.2  08-09-23 @ 04:55  7.0  08-08-23 @ 20:33      08-09-23 @ 07:01  -  08-10-23 @ 07:00  --------------------------------------------------------  IN: 372 mL    08-10-23 @ 07:01  -  08-11-23 @ 07:00  --------------------------------------------------------  IN: 597 mL      08-11    141  |  113<H>  |  32<H>  ----------------------------<  146<H>  4.1   |  23  |  1.1    Ca    6.5<L>      11 Aug 2023 05:00  Phos  2.8     08-11  Mg     2.6     08-11    TPro  3.8<L>  /  Alb  1.5<L>  /  TBili  0.4  /  DBili  x   /  AST  47<H>  /  ALT  17  /  AlkPhos  69  08-11    Liver panel trend:  TBili 0.4   /   AST 47   /   ALT 17   /   AlkP 69   /   Tptn 3.8   /   Alb 1.5    /   DBili --      08-11  TBili 0.6   /   AST 56   /   ALT 18   /   AlkP 76   /   Tptn 4.3   /   Alb 1.8    /   DBili --      08-09  TBili 0.5   /   AST 37   /   ALT 15   /   AlkP 68   /   Tptn 4.1   /   Alb 1.8    /   DBili --      08-09  TBili 0.6   /   AST 35   /   ALT 15   /   AlkP 71   /   Tptn 4.1   /   Alb 1.8    /   DBili --      08-08  TBili 0.3   /   AST 41   /   ALT 16   /   AlkP 66   /   Tptn 4.2   /   Alb 2.2    /   DBili --      08-07  TBili 0.3   /   AST 28   /   ALT 13   /   AlkP 54   /   Tptn 4.4   /   Alb 2.1    /   DBili --      08-06  TBili 0.5   /   AST 25   /   ALT 11   /   AlkP 70   /   Tptn 5.1   /   Alb 2.7    /   DBili --      08-05  TBili 0.4   /   AST 19   /   ALT 11   /   AlkP 85   /   Tptn 5.7   /   Alb 2.9    /   DBili --      08-04  TBili 0.6   /   AST 29   /   ALT 13   /   AlkP 107   /   Tptn 6.7   /   Alb 3.1    /   DBili --      08-04      PT/INR - ( 11 Aug 2023 05:00 )   PT: 13.00 sec;   INR: 1.14 ratio         PTT - ( 11 Aug 2023 05:00 )  PTT:34.4 sec    Culture - Blood (collected 08 Aug 2023 11:58)  Source: .Blood None  Preliminary Report (10 Aug 2023 23:01):    No growth at 48 Hours         Radiology:       Gastroenterology progress note:     Patient is a 88y old  Male who presents with a chief complaint of DKA/HHS (11 Aug 2023 10:40)       Admitted on: 08-04-23    We are following the patient for: melena and anemia       Interval History: patient 3 melena episodes since last night    No acute events overnight.   - Diet - NPO  - Abdominal pain - yes       PAST MEDICAL & SURGICAL HISTORY:  HTN (hypertension)      Gout      BPH (benign prostatic hyperplasia)      Parkinson disease      HLD (hyperlipidemia)      Smoker within last 12 months      Diabetes mellitus      No significant past surgical history          MEDICATIONS  (STANDING):  acetaminophen   IVPB .. 1000 milliGRAM(s) IV Intermittent once  allopurinol 300 milliGRAM(s) Oral daily  atorvastatin 10 milliGRAM(s) Oral at bedtime  bacitracin   Ointment 1 Application(s) Topical two times a day  caspofungin IVPB      caspofungin IVPB 50 milliGRAM(s) IV Intermittent every 24 hours  chlorhexidine 2% Cloths 1 Application(s) Topical daily  hydrocortisone sodium succinate Injectable 100 milliGRAM(s) IV Push every 8 hours  insulin regular Infusion 2 Unit(s)/Hr (2 mL/Hr) IV Continuous <Continuous>  lactated ringers. 1000 milliLiter(s) (50 mL/Hr) IV Continuous <Continuous>  meropenem  IVPB 1000 milliGRAM(s) IV Intermittent every 12 hours  midodrine 10 milliGRAM(s) Oral every 8 hours  norepinephrine Infusion 0.05 MICROgram(s)/kG/Min (5.87 mL/Hr) IV Continuous <Continuous>  pantoprazole  Injectable 40 milliGRAM(s) IV Push two times a day    MEDICATIONS  (PRN):      Allergies  No Known Allergies      Review of Systems:   Cardiovascular:  No Chest Pain, No Palpitations  Respiratory:  No Cough, No Dyspnea  Gastrointestinal:  As described in HPI  Skin:  No Skin Lesions, No Jaundice  Neuro:  No Syncope, No Dizziness    Physical Examination:  T(C): 36.2 (08-11-23 @ 04:00), Max: 36.8 (08-10-23 @ 12:00)  HR: 69 (08-11-23 @ 07:00) (63 - 112)  BP: 90/54 (08-11-23 @ 07:00) (71/38 - 150/88)  RR: 17 (08-11-23 @ 07:00) (14 - 30)  SpO2: 98% (08-11-23 @ 07:00) (84% - 100%)      08-10-23 @ 07:01  -  08-11-23 @ 07:00  --------------------------------------------------------  IN: 4027.3 mL / OUT: 1229 mL / NET: 2798.3 mL        GENERAL: AAOx0, no acute distress.  HEAD:  Atraumatic, Normocephalic  EYES: conjunctiva and sclera clear  NECK: Supple, no JVD or thyromegaly  CHEST/LUNG: Clear to auscultation bilaterally; No wheeze, rhonchi, or rales  HEART: Regular rate and rhythm; normal S1, S2, No murmurs.  ABDOMEN:mildly distended, tender in lower abdominal area,  Soft,  Bowel sounds present  NEUROLOGY: No asterixis or tremor.   SKIN: Intact, no jaundice     Data:                        7.9    19.05 )-----------( 158      ( 11 Aug 2023 05:00 )             23.0     Hgb trend:  7.9  08-11-23 @ 05:00  8.6  08-10-23 @ 23:10  6.0  08-10-23 @ 10:55  7.2  08-10-23 @ 04:20  7.6  08-09-23 @ 20:53  7.4  08-09-23 @ 17:52  6.2  08-09-23 @ 04:55  7.0  08-08-23 @ 20:33      08-09-23 @ 07:01  -  08-10-23 @ 07:00  --------------------------------------------------------  IN: 372 mL    08-10-23 @ 07:01  -  08-11-23 @ 07:00  --------------------------------------------------------  IN: 597 mL      08-11    141  |  113<H>  |  32<H>  ----------------------------<  146<H>  4.1   |  23  |  1.1    Ca    6.5<L>      11 Aug 2023 05:00  Phos  2.8     08-11  Mg     2.6     08-11    TPro  3.8<L>  /  Alb  1.5<L>  /  TBili  0.4  /  DBili  x   /  AST  47<H>  /  ALT  17  /  AlkPhos  69  08-11    Liver panel trend:  TBili 0.4   /   AST 47   /   ALT 17   /   AlkP 69   /   Tptn 3.8   /   Alb 1.5    /   DBili --      08-11  TBili 0.6   /   AST 56   /   ALT 18   /   AlkP 76   /   Tptn 4.3   /   Alb 1.8    /   DBili --      08-09  TBili 0.5   /   AST 37   /   ALT 15   /   AlkP 68   /   Tptn 4.1   /   Alb 1.8    /   DBili --      08-09  TBili 0.6   /   AST 35   /   ALT 15   /   AlkP 71   /   Tptn 4.1   /   Alb 1.8    /   DBili --      08-08  TBili 0.3   /   AST 41   /   ALT 16   /   AlkP 66   /   Tptn 4.2   /   Alb 2.2    /   DBili --      08-07  TBili 0.3   /   AST 28   /   ALT 13   /   AlkP 54   /   Tptn 4.4   /   Alb 2.1    /   DBili --      08-06  TBili 0.5   /   AST 25   /   ALT 11   /   AlkP 70   /   Tptn 5.1   /   Alb 2.7    /   DBili --      08-05  TBili 0.4   /   AST 19   /   ALT 11   /   AlkP 85   /   Tptn 5.7   /   Alb 2.9    /   DBili --      08-04  TBili 0.6   /   AST 29   /   ALT 13   /   AlkP 107   /   Tptn 6.7   /   Alb 3.1    /   DBili --      08-04      PT/INR - ( 11 Aug 2023 05:00 )   PT: 13.00 sec;   INR: 1.14 ratio         PTT - ( 11 Aug 2023 05:00 )  PTT:34.4 sec    Culture - Blood (collected 08 Aug 2023 11:58)  Source: .Blood None  Preliminary Report (10 Aug 2023 23:01):    No growth at 48 Hours         Radiology:

## 2023-08-11 NOTE — PROGRESS NOTE ADULT - SUBJECTIVE AND OBJECTIVE BOX
MILEY MARES  88y, Male  Allergy: No Known Allergies      LOS  7d    CHIEF COMPLAINT: DKA/HHS (11 Aug 2023 08:51)      INTERVAL EVENTS/HPI  - on pressors   - T(F): , Max: 98.2 (08-10-23 @ 12:00)  - Tolerating medication  - WBC Count: 19.05 (08-11-23 @ 05:00)  WBC Count: 18.35 (08-10-23 @ 23:10)     - Creatinine: 1.1 (08-11-23 @ 05:00)  Creatinine: 1.3 (08-10-23 @ 04:20)       ROS  unable to obtain history secondary to patient's mental status and/or sedation     VITALS:  T(F): 97.1, Max: 98.2 (08-10-23 @ 12:00)  HR: 69  BP: 90/54  RR: 17Vital Signs Last 24 Hrs  T(C): 36.2 (11 Aug 2023 04:00), Max: 36.8 (10 Aug 2023 12:00)  T(F): 97.1 (11 Aug 2023 04:00), Max: 98.2 (10 Aug 2023 12:00)  HR: 69 (11 Aug 2023 07:00) (63 - 112)  BP: 90/54 (11 Aug 2023 07:00) (71/38 - 150/88)  BP(mean): 67 (11 Aug 2023 07:00) (49 - 113)  RR: 17 (11 Aug 2023 07:00) (14 - 30)  SpO2: 98% (11 Aug 2023 07:00) (84% - 100%)    Parameters below as of 11 Aug 2023 04:00  Patient On (Oxygen Delivery Method): nasal cannula  O2 Flow (L/min): 2      PHYSICAL EXAM:  ***    FH: Non-contributory  Social Hx: Non-contributory    TESTS & MEASUREMENTS:                        7.9    19.05 )-----------( 158      ( 11 Aug 2023 05:00 )             23.0     08-11    141  |  113<H>  |  32<H>  ----------------------------<  146<H>  4.1   |  23  |  1.1    Ca    6.5<L>      11 Aug 2023 05:00  Phos  2.8     08-11  Mg     2.6     08-11    TPro  3.8<L>  /  Alb  1.5<L>  /  TBili  0.4  /  DBili  x   /  AST  47<H>  /  ALT  17  /  AlkPhos  69  08-11      LIVER FUNCTIONS - ( 11 Aug 2023 05:00 )  Alb: 1.5 g/dL / Pro: 3.8 g/dL / ALK PHOS: 69 U/L / ALT: 17 U/L / AST: 47 U/L / GGT: x           Urinalysis Basic - ( 11 Aug 2023 05:00 )    Color: x / Appearance: x / SG: x / pH: x  Gluc: 146 mg/dL / Ketone: x  / Bili: x / Urobili: x   Blood: x / Protein: x / Nitrite: x   Leuk Esterase: x / RBC: x / WBC x   Sq Epi: x / Non Sq Epi: x / Bacteria: x        Culture - Blood (collected 08-08-23 @ 11:58)  Source: .Blood None  Preliminary Report (08-10-23 @ 23:01):    No growth at 48 Hours    Culture - Blood (collected 08-05-23 @ 07:10)  Source: .Blood Blood-Peripheral  Final Report (08-10-23 @ 19:01):    No growth at 5 days    Culture - Urine (collected 08-04-23 @ 14:42)  Source: Clean Catch Clean Catch (Midstream)  Final Report (08-05-23 @ 19:52):    No growth    Culture - Blood (collected 08-04-23 @ 11:20)  Source: .Blood Blood-Peripheral  Final Report (08-09-23 @ 23:00):    No growth at 5 days    Culture - Blood (collected 08-04-23 @ 11:20)  Source: .Blood Blood-Peripheral  Gram Stain (08-06-23 @ 02:58):    Growth in anaerobic bottle: Gram Negative Rods  Final Report (08-07-23 @ 19:28):    Growth in anaerobic bottle: Most closely resembling Hungatella species    "Susceptibilities not performed"    Please Note:************************************************    Hungatella species    may appear as gram negative rods in direct smears of clinical specimens.    Direct identification is available within approximately 3-5    hours either by Blood Panel Multiplexed PCR or Direct    MALDI-TOF. Details: [email or web address]  Organism: Blood Culture PCR (08-07-23 @ 19:28)  Organism: Blood Culture PCR (08-07-23 @ 19:28)      Method Type: PCR      -  Blood PCR Panel: NEG        Lactate, Blood: 1.4 mmol/L (08-11-23 @ 05:00)  Blood Gas Venous - Lactate: 2.00 mmol/L (08-10-23 @ 10:10)  Lactate, Blood: 2.5 mmol/L (08-10-23 @ 04:20)  Lactate, Blood: 7.1 mmol/L (08-09-23 @ 20:53)  Lactate, Blood: 1.5 mmol/L (08-08-23 @ 12:40)  Lactate, Blood: 3.4 mmol/L (08-06-23 @ 11:17)      INFECTIOUS DISEASES TESTING  Vancomycin Level, Random: 8.4 (08-10-23 @ 23:10)  MRSA PCR Result.: Negative (08-10-23 @ 10:55)  MRSA PCR Result.: Negative (08-07-23 @ 11:30)  Procalcitonin, Serum: 0.67 (08-06-23 @ 05:05)  Procalcitonin, Serum: 0.48 (08-04-23 @ 23:05)  COVID-19 PCR: NotDetec (11-21-22 @ 18:43)  strept    INFLAMMATORY MARKERS      RADIOLOGY & ADDITIONAL TESTS:  I have personally reviewed the last available Chest xray  CXR      CT      CARDIOLOGY TESTING  12 Lead ECG:   Ventricular Rate 106 BPM    Atrial Rate 106 BPM    P-R Interval 152 ms    QRS Duration 126 ms    Q-T Interval 400 ms    QTC Calculation(Bazett) 531 ms    P Axis 34 degrees    R Axis -50 degrees    T Axis -18 degrees    Diagnosis Line Sinus tachycardia  Right bundle branch block  Left anterior fascicular block  *** Bifascicular block ***  Moderate voltage criteria for LVH, may be normal variant      Abnormal ECG    Confirmed by RUDY FLOR MD ([id number]) on 8/4/2023 11:24:55 AM (08-04-23 @ 10:57)      MEDICATIONS  acetaminophen   IVPB .. 1000 IV Intermittent once  allopurinol 300 Oral daily  atorvastatin 10 Oral at bedtime  bacitracin   Ointment 1 Topical two times a day  caspofungin IVPB     caspofungin IVPB 50 IV Intermittent every 24 hours  chlorhexidine 2% Cloths 1 Topical daily  hydrocortisone sodium succinate Injectable 100 IV Push every 8 hours  insulin regular Infusion 2 IV Continuous <Continuous>  lactated ringers. 1000 IV Continuous <Continuous>  meropenem  IVPB 1000 IV Intermittent every 12 hours  midodrine 10 Oral every 8 hours  norepinephrine Infusion 0.05 IV Continuous <Continuous>  pantoprazole  Injectable 40 IV Push two times a day  phenylephrine    Infusion 0.5 IV Continuous <Continuous>      WEIGHT  Weight (kg): 62.6 (08-04-23 @ 20:37)  Creatinine: 1.1 mg/dL (08-11-23 @ 05:00)      ANTIBIOTICS:  caspofungin IVPB 50 milliGRAM(s) IV Intermittent every 24 hours  caspofungin IVPB      meropenem  IVPB 1000 milliGRAM(s) IV Intermittent every 12 hours      All available historical records have been reviewed       MILEY MARES  88y, Male  Allergy: No Known Allergies      LOS  7d    CHIEF COMPLAINT: DKA/HHS (11 Aug 2023 08:51)      INTERVAL EVENTS/HPI  - on pressors   - T(F): , Max: 98.2 (08-10-23 @ 12:00)  - Tolerating medication  - WBC Count: 19.05 (08-11-23 @ 05:00)  WBC Count: 18.35 (08-10-23 @ 23:10)     - Creatinine: 1.1 (08-11-23 @ 05:00)  Creatinine: 1.3 (08-10-23 @ 04:20)       ROS  unable to obtain history secondary to patient's mental status and/or sedation     VITALS:  T(F): 97.1, Max: 98.2 (08-10-23 @ 12:00)  HR: 69  BP: 90/54  RR: 17Vital Signs Last 24 Hrs  T(C): 36.2 (11 Aug 2023 04:00), Max: 36.8 (10 Aug 2023 12:00)  T(F): 97.1 (11 Aug 2023 04:00), Max: 98.2 (10 Aug 2023 12:00)  HR: 69 (11 Aug 2023 07:00) (63 - 112)  BP: 90/54 (11 Aug 2023 07:00) (71/38 - 150/88)  BP(mean): 67 (11 Aug 2023 07:00) (49 - 113)  RR: 17 (11 Aug 2023 07:00) (14 - 30)  SpO2: 98% (11 Aug 2023 07:00) (84% - 100%)    Parameters below as of 11 Aug 2023 04:00  Patient On (Oxygen Delivery Method): nasal cannula  O2 Flow (L/min): 2      PHYSICAL EXAM:  Gen: chronically ill appearing   HEENT: Normocephalic, atraumatic  Neck: supple, no lymphadenopathy  CV: Regular rate & regular rhythm  Lungs: decreased BS at bases, no fremitus  Abdomen: Soft, BS present  Ext: Warm, well perfused  Neuro: non focal, not following commands  Skin: no rash, no erythema  Lines: no phlebitis     FH: Non-contributory  Social Hx: Non-contributory    TESTS & MEASUREMENTS:                        7.9    19.05 )-----------( 158      ( 11 Aug 2023 05:00 )             23.0     08-11    141  |  113<H>  |  32<H>  ----------------------------<  146<H>  4.1   |  23  |  1.1    Ca    6.5<L>      11 Aug 2023 05:00  Phos  2.8     08-11  Mg     2.6     08-11    TPro  3.8<L>  /  Alb  1.5<L>  /  TBili  0.4  /  DBili  x   /  AST  47<H>  /  ALT  17  /  AlkPhos  69  08-11      LIVER FUNCTIONS - ( 11 Aug 2023 05:00 )  Alb: 1.5 g/dL / Pro: 3.8 g/dL / ALK PHOS: 69 U/L / ALT: 17 U/L / AST: 47 U/L / GGT: x           Urinalysis Basic - ( 11 Aug 2023 05:00 )    Color: x / Appearance: x / SG: x / pH: x  Gluc: 146 mg/dL / Ketone: x  / Bili: x / Urobili: x   Blood: x / Protein: x / Nitrite: x   Leuk Esterase: x / RBC: x / WBC x   Sq Epi: x / Non Sq Epi: x / Bacteria: x        Culture - Blood (collected 08-08-23 @ 11:58)  Source: .Blood None  Preliminary Report (08-10-23 @ 23:01):    No growth at 48 Hours    Culture - Blood (collected 08-05-23 @ 07:10)  Source: .Blood Blood-Peripheral  Final Report (08-10-23 @ 19:01):    No growth at 5 days    Culture - Urine (collected 08-04-23 @ 14:42)  Source: Clean Catch Clean Catch (Midstream)  Final Report (08-05-23 @ 19:52):    No growth    Culture - Blood (collected 08-04-23 @ 11:20)  Source: .Blood Blood-Peripheral  Final Report (08-09-23 @ 23:00):    No growth at 5 days    Culture - Blood (collected 08-04-23 @ 11:20)  Source: .Blood Blood-Peripheral  Gram Stain (08-06-23 @ 02:58):    Growth in anaerobic bottle: Gram Negative Rods  Final Report (08-07-23 @ 19:28):    Growth in anaerobic bottle: Most closely resembling Hungatella species    "Susceptibilities not performed"    Please Note:************************************************    Hungatella species    may appear as gram negative rods in direct smears of clinical specimens.    Direct identification is available within approximately 3-5    hours either by Blood Panel Multiplexed PCR or Direct    MALDI-TOF. Details: [email or web address]  Organism: Blood Culture PCR (08-07-23 @ 19:28)  Organism: Blood Culture PCR (08-07-23 @ 19:28)      Method Type: PCR      -  Blood PCR Panel: NEG        Lactate, Blood: 1.4 mmol/L (08-11-23 @ 05:00)  Blood Gas Venous - Lactate: 2.00 mmol/L (08-10-23 @ 10:10)  Lactate, Blood: 2.5 mmol/L (08-10-23 @ 04:20)  Lactate, Blood: 7.1 mmol/L (08-09-23 @ 20:53)  Lactate, Blood: 1.5 mmol/L (08-08-23 @ 12:40)  Lactate, Blood: 3.4 mmol/L (08-06-23 @ 11:17)      INFECTIOUS DISEASES TESTING  Vancomycin Level, Random: 8.4 (08-10-23 @ 23:10)  MRSA PCR Result.: Negative (08-10-23 @ 10:55)  MRSA PCR Result.: Negative (08-07-23 @ 11:30)  Procalcitonin, Serum: 0.67 (08-06-23 @ 05:05)  Procalcitonin, Serum: 0.48 (08-04-23 @ 23:05)  COVID-19 PCR: NotDetec (11-21-22 @ 18:43)  strept    INFLAMMATORY MARKERS      RADIOLOGY & ADDITIONAL TESTS:  I have personally reviewed the last available Chest xray  CXR      CT      CARDIOLOGY TESTING  12 Lead ECG:   Ventricular Rate 106 BPM    Atrial Rate 106 BPM    P-R Interval 152 ms    QRS Duration 126 ms    Q-T Interval 400 ms    QTC Calculation(Bazett) 531 ms    P Axis 34 degrees    R Axis -50 degrees    T Axis -18 degrees    Diagnosis Line Sinus tachycardia  Right bundle branch block  Left anterior fascicular block  *** Bifascicular block ***  Moderate voltage criteria for LVH, may be normal variant      Abnormal ECG    Confirmed by RUDY FLOR MD ([id number]) on 8/4/2023 11:24:55 AM (08-04-23 @ 10:57)      MEDICATIONS  acetaminophen   IVPB .. 1000 IV Intermittent once  allopurinol 300 Oral daily  atorvastatin 10 Oral at bedtime  bacitracin   Ointment 1 Topical two times a day  caspofungin IVPB     caspofungin IVPB 50 IV Intermittent every 24 hours  chlorhexidine 2% Cloths 1 Topical daily  hydrocortisone sodium succinate Injectable 100 IV Push every 8 hours  insulin regular Infusion 2 IV Continuous <Continuous>  lactated ringers. 1000 IV Continuous <Continuous>  meropenem  IVPB 1000 IV Intermittent every 12 hours  midodrine 10 Oral every 8 hours  norepinephrine Infusion 0.05 IV Continuous <Continuous>  pantoprazole  Injectable 40 IV Push two times a day  phenylephrine    Infusion 0.5 IV Continuous <Continuous>      WEIGHT  Weight (kg): 62.6 (08-04-23 @ 20:37)  Creatinine: 1.1 mg/dL (08-11-23 @ 05:00)      ANTIBIOTICS:  caspofungin IVPB 50 milliGRAM(s) IV Intermittent every 24 hours  caspofungin IVPB      meropenem  IVPB 1000 milliGRAM(s) IV Intermittent every 12 hours      All available historical records have been reviewed

## 2023-08-11 NOTE — PROGRESS NOTE ADULT - ASSESSMENT
87 y/o M w/ PMH of DM, HTN, Parkinson disease, Gout, HLD, former smoker coming from home with complaint of decreased appetite, increased urinary output and craving sweets x 2 weeks. Patient admitted on 8/4/23 for DKA and hypotension , was started on levophed lose dose. HIs course complicated with appendicitis , Hungatella bacteremia, acute anemia requiring 3u. GI consulted today for 3 episodes of melena and drop in Hb to 6. Last night patient was complaining of severe lower abdominal pain and distesion s/p CTAP IC with thrombus in decending AA, SMA and celiac axis narrowing but no bowel ischemia , lactate went upto 7 from 1.5    #Acute Normocytic anemia   #Melena likely d/t PUD vs AVM vs Esophageal ulcer vs Erosive Hemorrhagic Gastritis vs Dieulafoy lesion Vs Malignancy   - on 2 pressors, being titrated down  - Baseline hemoglobin - 12  - Hemoglobin on admission - 11.2 (8/4)>>6.6>>3u> 8.6>7.9   - Coags - INR:  0.98 (7/4)>>1.35 (8/8)  - Lactate: 1.4<2.5< 7.5< 1.5  - CTAP IV Con: 8/10: No evidence of bowel ischemia. Unchanged findings suggesting acute   appendicitis. Unopacified inferior mesenteric artery is new compared to 11/17/2022 as   there is increased thrombus within the unchanged size of abdominal aortic   aneurysm with decreased opacified aortic lumen. Collateral flow presumed   to be present again there is no evidence of bowel ischemia.  - CTAP oral and IV con: 8/8: New mild dilation of the appendix with small volume free fluid in the   bilateral lower quadrants. Findings are concerning for appendicitis.  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   cm, as described  - spoke to ICU attending regarding endoscopic intervention , given high risk the team would like to manage conservatively  - INR: 1.37>1.14  - was on fondaparinux    #Rec  - conservative management for now (spoke to ICU attending ) given high risk   - spoke to daughter [phone] over phone and explained the high risk given his complicated hosiptal course  - Trend H&H BID  - Please target Hb  >8  - Please avoid any NSAIDs  - recall us PRN    #Pancreatic body cyst  - 0.7x0.4cm cyst in pancreatic body, No PD dilation    Rec  MRI pancreas protocol as outpatient  - Follow up with our [facility] located at [address] [place] [address]. Phone Number: [phone]      Communicated with primary team member

## 2023-08-11 NOTE — PROGRESS NOTE ADULT - PROBLEM SELECTOR PLAN 1
- on IV kenyatta; high risk, monitor counts (d#4), IV caspo  - f/u cx's; ID recs  - on 2 pressors - on IV kenyatta; high risk, monitor counts (d#5), IV caspo  - f/u cx's; ID recs  - on pressors

## 2023-08-11 NOTE — PROGRESS NOTE ADULT - SUBJECTIVE AND OBJECTIVE BOX
HPI: 89 y/o M w/ PMH of DM, HTN coming from home with complaint of decreased appetite, increased urinary output and craving sweets x 2 weeks. Daughter is caretaker, states patient behavior has changed. Within the last 2 weeks, He is less active, requesting more coca cola and sugar. Pt noted to be hypotensive upon arrival.     ED vitals:  BP 74/ 50, HR 87, Temp 97.2F, satting 95% on room air  Labs significant for WBC 14K, Na 123 (corrected 141), Cr 2.4, AG 23, BHB 5.1. VBG pH 7.19, Lactate 5.8, K 8.6, pCO2 39  EKG tachycardia, bifascicular block, RBBB  CXR unremarkable  CT A/P: Extensive coronary atherosclerosis. Dependent atelectasis at the right base. 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 Hepatic cysts. Questionable sludge within the gallbladder. Unchanged 1 cm cystic lesion in the pancreatic body      s/p calcium gluconate 1g, Lasix 40mg push x1, barcarb 50meq, started on insulin drip at 7 units/hr.   Admitted to MICU for DKA/HHS.     (04 Aug 2023 15:16)    Hospital Course:    - In the MICU pt was on levo 0.4 --> 0.2 8/8; received 1uPRBC for Hg 6.6 8/7; being treat w/ IV kenyatta for +amparo BCx; EEG negative; imaging not revealing at this point; hyperglycemia now under control, on b/b/iss.  - 8/9 Hg 6.2 getting 1u pRBC; pt off levo on midodrine holding a good pressure, CT suggestive of appendicitis, seen by surgery, not a surgical candidate; NGT to suction; medical management; in PM pressure dropped, pressor requirements increased  - 8/10 eval'd by GI, ?uGIB, possible EGD once stable   - 8/11 pt on 0.04 levo    Overnight events:    - spoke at length with daughter, they are ok with continuing medical management for now, becoming concerned about pt not eating; from discussions if pt continues deteriorating will likely move towards CMO.     ADVANCE DIRECTIVES:    [ ] Full Code [X ] DNR  MOLST  [ ]  Living Will  [ ]   DECISION MAKER(s):  [ ] Health Care Proxy(s)  [ ] Surrogate(s)  [ ] Guardian           Name(s): Phone Number(s):  daughter Miracle [phone]  daughter Angie [phone]    BASELINE (I)ADL(s) (prior to admission):  [place]: [ ]Total  [ ] Moderate [ ]Dependent  Palliative Performance Status Version 2:         %    [email or web address]_[email or web address]    Allergies    No Known Allergies    MEDICATIONS:  STANDING MEDICATIONS  acetaminophen   IVPB .. 1000 milliGRAM(s) IV Intermittent once  allopurinol 300 milliGRAM(s) Oral daily  atorvastatin 10 milliGRAM(s) Oral at bedtime  bacitracin   Ointment 1 Application(s) Topical two times a day  caspofungin IVPB 50 milliGRAM(s) IV Intermittent every 24 hours  caspofungin IVPB      chlorhexidine 2% Cloths 1 Application(s) Topical daily  hydrocortisone sodium succinate Injectable 100 milliGRAM(s) IV Push every 8 hours  insulin regular Infusion 2 Unit(s)/Hr IV Continuous <Continuous>  lactated ringers Bolus 250 milliLiter(s) IV Bolus once  lactated ringers. 1000 milliLiter(s) IV Continuous <Continuous>  meropenem  IVPB 1000 milliGRAM(s) IV Intermittent every 12 hours  midodrine 10 milliGRAM(s) Oral every 8 hours  norepinephrine Infusion 0.05 MICROgram(s)/kG/Min IV Continuous <Continuous>  pantoprazole  Injectable 40 milliGRAM(s) IV Push two times a day    PRN MEDICATIONS      LABS:                        7.9    19.05 )-----------( 158      ( 11 Aug 2023 05:00 )             23.0     08-11    141  |  113<H>  |  32<H>  ----------------------------<  146<H>  4.1   |  23  |  1.1    Ca    6.5<L>      11 Aug 2023 05:00  Phos  2.8     08-11  Mg     2.6     08-11    TPro  3.8<L>  /  Alb  1.5<L>  /  TBili  0.4  /  DBili  x   /  AST  47<H>  /  ALT  17  /  AlkPhos  69  08-11    PT/INR - ( 11 Aug 2023 05:00 )   PT: 13.00 sec;   INR: 1.14 ratio         PTT - ( 11 Aug 2023 05:00 )  PTT:34.4 sec  Urinalysis Basic - ( 11 Aug 2023 05:00 )    Color: x / Appearance: x / SG: x / pH: x  Gluc: 146 mg/dL / Ketone: x  / Bili: x / Urobili: x   Blood: x / Protein: x / Nitrite: x   Leuk Esterase: x / RBC: x / WBC x   Sq Epi: x / Non Sq Epi: x / Bacteria: x        Lactate, Blood: 1.4 mmol/L (08-11-23 @ 05:00)      Culture - Blood (collected 08 Aug 2023 11:58)  Source: .Blood None  Preliminary Report (10 Aug 2023 23:01):    No growth at 48 Hours          RADIOLOGY:    < from: Xray Chest 1 View- PORTABLE-Routine (Xray Chest 1 View- PORTABLE-Routine in AM.) (08.11.23 @ 05:59) >    Impression:    New bilateral opacities and effusions.        --- End of Report ---      < end of copied text >      VITALS:   T(F): 97.1  HR: 69  BP: 90/54  RR: 17  SpO2: 98%    PHYSICAL EXAM:  Gen: lethargic, uncomfortable, ill apearing  HEENT: Normocephalic, atraumatic; poor dentition  Neck: supple, no lymphadenopathy; RIJ in place  CV: Regular rate & regular rhythm  Lungs: decreased BS at bases, no fremitus, no crackles appreciated   Abdomen: slightly more distended than yesterday, diffusely tender  Ext: Warm, well perfused, trace edema to ankles  Neuro: moving all extremities in plane of bed, no droop, awake  Skin: no rash, no erythema    PRESENT SYMPTOMS: [X ]Unable to obtain due to poor mentation   Source if other than patient:  [ ]Family   [ ]Team     Pain: [ ]yes [ ]no  QOL impact -   Location -                    Aggravating factors -  Quality -  Radiation -  Timing-  Severity (0-10 scale):  Minimal acceptable level (0-10 scale):     CPOT:    https://www.[facility].org/getattachment/[email or web address]/Critical-Care-Pain-Observation-Tool-(CPOT)    PAIN AD Score: 0  [email or web address]/[email or web address].pdf (press ctrl +  left click to view)    Dyspnea:                           [ ]None[ ]Mild [ ]Moderate [ ]Severe     Respiratory Distress Observation Scale (RDOS): 2  A score of 0 to 2 signifies little or no respiratory distress, 3 signifies mild distress, scores 4 to 6 indicate moderate distress, and scores greater than 7 signify severe distress  [email or web address]    Anxiety:                             [ ]None[ ]Mild [ ]Moderate [ ]Severe   Fatigue:                             [ ]None[ ]Mild [ ]Moderate [ ]Severe   Nausea:                             [ ]None[ ]Mild [ ]Moderate [ ]Severe   Loss of appetite:              [ ]None[ ]Mild [ ]Moderate [ ]Severe   Constipation:                    [ ]None[ ]Mild [ ]Moderate [ ]Severe    Other Symptoms:  [ ]All other review of systems negative     Palliative Performance Status Version 2:         %    http://npcrc.org/files/news/palliative_performance_scale_[email or web address].pdf      Palliative Care Spiritual/Emotional Screening Tool Question  Severity (0-4):                    OR                    [X ] Unable to determine/NA  Score of 2 or greater indicates recommendation of Chaplaincy referral  Chaplaincy Referral: [ ] Yes [ ] Refused [ ] Following     Caregiver [place]:  [ ] Yes [ ] No [ X] Deferred  Social Work Referral [ ]  Patient and Family Centered Care Referral [ ]    Anticipatory Grief Present: [ ] Yes [ ] No [ X] Deferred  Social Work Referral [ ]  Patient and Family Centered Care Referral [ ]    REFERRALS:   [ ]Chaplaincy  [ ]Hospice  [ ]Child Life  [ ]Social Work  [ ]Case management [ ]Holistic Therapy     Palliative care education provided to patient and/or family     HPI: 87 y/o M w/ PMH of DM, HTN coming from home with complaint of decreased appetite, increased urinary output and craving sweets x 2 weeks. Daughter is caretaker, states patient behavior has changed. Within the last 2 weeks, He is less active, requesting more coca cola and sugar. Pt noted to be hypotensive upon arrival.     ED vitals:  BP 74/ 50, HR 87, Temp 97.2F, satting 95% on room air  Labs significant for WBC 14K, Na 123 (corrected 141), Cr 2.4, AG 23, BHB 5.1. VBG pH 7.19, Lactate 5.8, K 8.6, pCO2 39  EKG tachycardia, bifascicular block, RBBB  CXR unremarkable  CT A/P: Extensive coronary atherosclerosis. Dependent atelectasis at the right base. 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 Hepatic cysts. Questionable sludge within the gallbladder. Unchanged 1 cm cystic lesion in the pancreatic body      s/p calcium gluconate 1g, Lasix 40mg push x1, barcarb 50meq, started on insulin drip at 7 units/hr.   Admitted to MICU for DKA/HHS.     (04 Aug 2023 15:16)    Hospital Course:    - In the MICU pt was on levo 0.4 --> 0.2 8/8; received 1uPRBC for Hg 6.6 8/7; being treat w/ IV kenyatta for +amparo BCx; EEG negative; imaging not revealing at this point; hyperglycemia now under control, on b/b/iss.  - 8/9 Hg 6.2 getting 1u pRBC; pt off levo on midodrine holding a good pressure, CT suggestive of appendicitis, seen by surgery, not a surgical candidate; NGT to suction; medical management; in PM pressure dropped, pressor requirements increased  - 8/10 eval'd by GI, ?uGIB, possible EGD once stable   - 8/11 pt on 0.04 levo    Overnight events:    - spoke at length with daughter, they are ok with continuing medical management for now, becoming concerned about pt not eating; from discussions if pt continues deteriorating will likely move towards CMO.     ADVANCE DIRECTIVES:    [ ] Full Code [X ] DNR  MOLST  [ ]  Living Will  [ ]   DECISION MAKER(s):  [ ] Health Care Proxy(s)  [ ] Surrogate(s)  [ ] Guardian           Name(s): Phone Number(s):  daughter Miracle [phone]  daughter Angie [phone]    BASELINE (I)ADL(s) (prior to admission):  [place]: [ ]Total  [ ] Moderate [ ]Dependent  Palliative Performance Status Version 2:         %    [email or web address]    Allergies    No Known Allergies    MEDICATIONS:  MEDICATIONS  (STANDING):  acetaminophen   IVPB .. 1000 milliGRAM(s) IV Intermittent once  allopurinol 300 milliGRAM(s) Oral daily  atorvastatin 10 milliGRAM(s) Oral at bedtime  bacitracin   Ointment 1 Application(s) Topical two times a day  caspofungin IVPB 50 milliGRAM(s) IV Intermittent every 24 hours  caspofungin IVPB      chlorhexidine 2% Cloths 1 Application(s) Topical daily  hydrocortisone sodium succinate Injectable 100 milliGRAM(s) IV Push every 8 hours  insulin regular Infusion 2 Unit(s)/Hr (2 mL/Hr) IV Continuous <Continuous>  lactated ringers. 1000 milliLiter(s) (50 mL/Hr) IV Continuous <Continuous>  meropenem  IVPB 1000 milliGRAM(s) IV Intermittent every 12 hours  midodrine 10 milliGRAM(s) Oral every 8 hours  norepinephrine Infusion 0.05 MICROgram(s)/kG/Min (5.87 mL/Hr) IV Continuous <Continuous>  pantoprazole  Injectable 40 milliGRAM(s) IV Push two times a day    MEDICATIONS  (PRN):        LABS:                        7.9    19.05 )-----------( 158      ( 11 Aug 2023 05:00 )             23.0     08-11    141  |  113<H>  |  32<H>  ----------------------------<  146<H>  4.1   |  23  |  1.1    Ca    6.5<L>      11 Aug 2023 05:00  Phos  2.8     08-11  Mg     2.6     08-11    TPro  3.8<L>  /  Alb  1.5<L>  /  TBili  0.4  /  DBili  x   /  AST  47<H>  /  ALT  17  /  AlkPhos  69  08-11    PT/INR - ( 11 Aug 2023 05:00 )   PT: 13.00 sec;   INR: 1.14 ratio         PTT - ( 11 Aug 2023 05:00 )  PTT:34.4 sec  Urinalysis Basic - ( 11 Aug 2023 05:00 )    Color: x / Appearance: x / SG: x / pH: x  Gluc: 146 mg/dL / Ketone: x  / Bili: x / Urobili: x   Blood: x / Protein: x / Nitrite: x   Leuk Esterase: x / RBC: x / WBC x   Sq Epi: x / Non Sq Epi: x / Bacteria: x        Lactate, Blood: 1.4 mmol/L (08-11-23 @ 05:00)      Culture - Blood (collected 08 Aug 2023 11:58)  Source: .Blood None  Preliminary Report (10 Aug 2023 23:01):    No growth at 48 Hours          RADIOLOGY:    < from: Xray Chest 1 View- PORTABLE-Routine (Xray Chest 1 View- PORTABLE-Routine in AM.) (08.11.23 @ 05:59) >    Impression:    New bilateral opacities and effusions.        --- End of Report ---      < end of copied text >      VITALS:   T(F): 97.1  HR: 69  BP: 90/54  RR: 17  SpO2: 98%    PHYSICAL EXAM:  Gen: lethargic, uncomfortable, ill apearing  HEENT: Normocephalic, atraumatic; poor dentition  Neck: supple, no lymphadenopathy; RIJ in place  CV: Regular rate & regular rhythm  Lungs: decreased BS at bases, no fremitus, no crackles appreciated   Abdomen: slightly more distended than yesterday, diffusely tender  Ext: Warm, well perfused, trace edema to ankles  Neuro: moving all extremities in plane of bed, no droop, awake  Skin: no rash, no erythema    PRESENT SYMPTOMS: [X ]Unable to obtain due to poor mentation   Source if other than patient:  [ ]Family   [ ]Team     Pain: [ ]yes [ ]no  QOL impact -   Location -                    Aggravating factors -  Quality -  Radiation -  Timing-  Severity (0-10 scale):  Minimal acceptable level (0-10 scale):     CPOT:    https://[email or web address]/getattachment/[email or web address]/Critical-Care-Pain-Observation-Tool-(CPOT)    PAIN AD Score: 0  [email or web address].[place].[email or web address]/cog/painad.pdf (press ctrl +  left click to view)    Dyspnea:                           [ ]None[ ]Mild [ ]Moderate [ ]Severe     Respiratory Distress Observation Scale (RDOS): 2  A score of 0 to 2 signifies little or no respiratory distress, 3 signifies mild distress, scores 4 to 6 indicate moderate distress, and scores greater than 7 signify severe distress  [email or web address]/[email or web address]/[email or web address]-emoptbfztxu-jpftlhws-[email or web address].pdf    Anxiety:                             [ ]None[ ]Mild [ ]Moderate [ ]Severe   Fatigue:                             [ ]None[ ]Mild [ ]Moderate [ ]Severe   Nausea:                             [ ]None[ ]Mild [ ]Moderate [ ]Severe   Loss of appetite:              [ ]None[ ]Mild [ ]Moderate [ ]Severe   Constipation:                    [ ]None[ ]Mild [ ]Moderate [ ]Severe    Other Symptoms:  [ ]All other review of systems negative     Palliative Performance Status Version 2:         %    http://npcrc.org/files/news/palliative_performance_scale_ppsv2.pdf      Palliative Care Spiritual/Emotional Screening Tool Question  Severity (0-4):                    OR                    [X ] Unable to determine/NA  Score of 2 or greater indicates recommendation of Chaplaincy referral  Chaplaincy Referral: [ ] Yes [ ] Refused [ ] Following     Caregiver [place]:  [ ] Yes [ ] No [ X] Deferred  Social Work Referral [ ]  Patient and Family Centered Care Referral [ ]    Anticipatory Grief Present: [ ] Yes [ ] No [ X] Deferred  Social Work Referral [ ]  Patient and Family Centered Care Referral [ ]    REFERRALS:   [ ]Chaplaincy  [ ]Hospice  [ ]Child Life  [ ]Social Work  [ ]Case management [ ]Holistic Therapy     Palliative care education provided to patient and/or family

## 2023-08-11 NOTE — PROGRESS NOTE ADULT - SUBJECTIVE AND OBJECTIVE BOX
Patient is a 88y old  Male who presents with a chief complaint of DKA/HHS (11 Aug 2023 09:18)      INTERVAL HPI/OVERNIGHT EVENTS:   - PAtient is having melena (around 6 episodes in the past 24 hours).   - Received total 1.5 L of fluids yesterday and 2 u of pRBC.    ICU Vital Signs Last 24 Hrs  T(C): 36.2 (11 Aug 2023 04:00), Max: 36.8 (10 Aug 2023 12:00)  T(F): 97.1 (11 Aug 2023 04:00), Max: 98.2 (10 Aug 2023 12:00)  HR: 69 (11 Aug 2023 07:00) (63 - 112)  BP: 90/54 (11 Aug 2023 07:00) (71/38 - 150/88)  BP(mean): 67 (11 Aug 2023 07:00) (49 - 113)  ABP: --  ABP(mean): --  RR: 17 (11 Aug 2023 07:00) (14 - 30)  SpO2: 98% (11 Aug 2023 07:00) (84% - 100%)    O2 Parameters below as of 11 Aug 2023 04:00  Patient On (Oxygen Delivery Method): nasal cannula  O2 Flow (L/min): 2        I&O's Summary    10 Aug 2023 07:01  -  11 Aug 2023 07:00  --------------------------------------------------------  IN: 4027.3 mL / OUT: 1229 mL / NET: 2798.3 mL          LABS:                        7.9    19.05 )-----------( 158      ( 11 Aug 2023 05:00 )             23.0     08-11    141  |  113<H>  |  32<H>  ----------------------------<  146<H>  4.1   |  23  |  1.1    Ca    6.5<L>      11 Aug 2023 05:00  Phos  2.8     08-11  Mg     2.6     08-11    TPro  3.8<L>  /  Alb  1.5<L>  /  TBili  0.4  /  DBili  x   /  AST  47<H>  /  ALT  17  /  AlkPhos  69  08-11    PT/INR - ( 11 Aug 2023 05:00 )   PT: 13.00 sec;   INR: 1.14 ratio         PTT - ( 11 Aug 2023 05:00 )  PTT:34.4 sec  Urinalysis Basic - ( 11 Aug 2023 05:00 )    Color: x / Appearance: x / SG: x / pH: x  Gluc: 146 mg/dL / Ketone: x  / Bili: x / Urobili: x   Blood: x / Protein: x / Nitrite: x   Leuk Esterase: x / RBC: x / WBC x   Sq Epi: x / Non Sq Epi: x / Bacteria: x      CAPILLARY BLOOD GLUCOSE      POCT Blood Glucose.: 229 mg/dL (11 Aug 2023 08:14)  POCT Blood Glucose.: 256 mg/dL (11 Aug 2023 08:10)  POCT Blood Glucose.: 129 mg/dL (11 Aug 2023 03:29)  POCT Blood Glucose.: 150 mg/dL (11 Aug 2023 00:08)  POCT Blood Glucose.: 121 mg/dL (10 Aug 2023 22:18)  POCT Blood Glucose.: 105 mg/dL (10 Aug 2023 21:04)  POCT Blood Glucose.: 122 mg/dL (10 Aug 2023 20:03)  POCT Blood Glucose.: 145 mg/dL (10 Aug 2023 17:58)  POCT Blood Glucose.: 143 mg/dL (10 Aug 2023 16:31)  POCT Blood Glucose.: 111 mg/dL (10 Aug 2023 14:20)  POCT Blood Glucose.: 101 mg/dL (10 Aug 2023 13:07)  POCT Blood Glucose.: 117 mg/dL (10 Aug 2023 11:12)        RADIOLOGY & ADDITIONAL TESTS:    Consultant(s) Notes Reviewed:  [x ] YES  [ ] NO    MEDICATIONS  (STANDING):  acetaminophen   IVPB .. 1000 milliGRAM(s) IV Intermittent once  allopurinol 300 milliGRAM(s) Oral daily  atorvastatin 10 milliGRAM(s) Oral at bedtime  bacitracin   Ointment 1 Application(s) Topical two times a day  caspofungin IVPB 50 milliGRAM(s) IV Intermittent every 24 hours  caspofungin IVPB      chlorhexidine 2% Cloths 1 Application(s) Topical daily  hydrocortisone sodium succinate Injectable 100 milliGRAM(s) IV Push every 8 hours  insulin regular Infusion 2 Unit(s)/Hr (2 mL/Hr) IV Continuous <Continuous>  lactated ringers. 1000 milliLiter(s) (50 mL/Hr) IV Continuous <Continuous>  meropenem  IVPB 1000 milliGRAM(s) IV Intermittent every 12 hours  midodrine 10 milliGRAM(s) Oral every 8 hours  norepinephrine Infusion 0.05 MICROgram(s)/kG/Min (5.87 mL/Hr) IV Continuous <Continuous>  pantoprazole  Injectable 40 milliGRAM(s) IV Push two times a day    MEDICATIONS  (PRN):  magnesium hydroxide Suspension 30 milliLiter(s) Oral every 12 hours PRN Constipation    PHYSICAL EXAM:  CONSTITUTIONAL:  Ill appearing in NAD    ENT:   Airway patent,   Mouth with normal mucosa.   Poor dentition    EYES:   Pupils equal,   Round and reactive to light.    CARDIAC:   Normal rate,   Tachycardic      RESPIRATORY: on 2 L NC  No wheezing// rhonchi + bilateral  Bilateral BS  Normal chest expansion  Not tachypneic,  No use of accessory muscles    GASTROINTESTINAL:  Abdomen Distended, Diffuse tenderness less than yesterday  No guarding,   + BS    MUSCULOSKELETAL:   Range of motion is not limited,  No clubbing, cyanosis    NEUROLOGICAL:   Alert     SKIN:   Skin normal color for race,   Right forearm blistering lesion      Care Discussed with Consultants/Other Providers [ x] YES  [ ] NO

## 2023-08-12 LAB
ALBUMIN SERPL ELPH-MCNC: 1.6 G/DL — LOW (ref 3.5–5.2)
ALBUMIN SERPL ELPH-MCNC: 1.7 G/DL — LOW (ref 3.5–5.2)
ALP SERPL-CCNC: 121 U/L — HIGH (ref 30–115)
ALP SERPL-CCNC: 84 U/L — SIGNIFICANT CHANGE UP (ref 30–115)
ALT FLD-CCNC: 24 U/L — SIGNIFICANT CHANGE UP (ref 0–41)
ALT FLD-CCNC: 26 U/L — SIGNIFICANT CHANGE UP (ref 0–41)
ANION GAP SERPL CALC-SCNC: 18 MMOL/L — HIGH (ref 7–14)
ANION GAP SERPL CALC-SCNC: 18 MMOL/L — HIGH (ref 7–14)
ANION GAP SERPL CALC-SCNC: 9 MMOL/L — SIGNIFICANT CHANGE UP (ref 7–14)
ANISOCYTOSIS BLD QL: SLIGHT — SIGNIFICANT CHANGE UP
AST SERPL-CCNC: 56 U/L — HIGH (ref 0–41)
AST SERPL-CCNC: 75 U/L — HIGH (ref 0–41)
BASOPHILS # BLD AUTO: 0 K/UL — SIGNIFICANT CHANGE UP (ref 0–0.2)
BASOPHILS # BLD AUTO: 0.04 K/UL — SIGNIFICANT CHANGE UP (ref 0–0.2)
BASOPHILS # BLD AUTO: 0.06 K/UL — SIGNIFICANT CHANGE UP (ref 0–0.2)
BASOPHILS NFR BLD AUTO: 0 % — SIGNIFICANT CHANGE UP (ref 0–1)
BASOPHILS NFR BLD AUTO: 0.2 % — SIGNIFICANT CHANGE UP (ref 0–1)
BASOPHILS NFR BLD AUTO: 0.2 % — SIGNIFICANT CHANGE UP (ref 0–1)
BILIRUB SERPL-MCNC: 0.3 MG/DL — SIGNIFICANT CHANGE UP (ref 0.2–1.2)
BILIRUB SERPL-MCNC: 0.3 MG/DL — SIGNIFICANT CHANGE UP (ref 0.2–1.2)
BUN SERPL-MCNC: 31 MG/DL — HIGH (ref 10–20)
BUN SERPL-MCNC: 36 MG/DL — HIGH (ref 10–20)
BUN SERPL-MCNC: 36 MG/DL — HIGH (ref 10–20)
CALCIUM SERPL-MCNC: 6.4 MG/DL — LOW (ref 8.4–10.5)
CALCIUM SERPL-MCNC: 6.5 MG/DL — LOW (ref 8.4–10.5)
CALCIUM SERPL-MCNC: 6.7 MG/DL — LOW (ref 8.4–10.5)
CHLORIDE SERPL-SCNC: 113 MMOL/L — HIGH (ref 98–110)
CHLORIDE SERPL-SCNC: 114 MMOL/L — HIGH (ref 98–110)
CHLORIDE SERPL-SCNC: 118 MMOL/L — HIGH (ref 98–110)
CO2 SERPL-SCNC: 16 MMOL/L — LOW (ref 17–32)
CO2 SERPL-SCNC: 17 MMOL/L — SIGNIFICANT CHANGE UP (ref 17–32)
CO2 SERPL-SCNC: 21 MMOL/L — SIGNIFICANT CHANGE UP (ref 17–32)
CREAT SERPL-MCNC: 1 MG/DL — SIGNIFICANT CHANGE UP (ref 0.7–1.5)
CREAT SERPL-MCNC: 1.2 MG/DL — SIGNIFICANT CHANGE UP (ref 0.7–1.5)
CREAT SERPL-MCNC: 1.3 MG/DL — SIGNIFICANT CHANGE UP (ref 0.7–1.5)
EGFR: 53 ML/MIN/1.73M2 — LOW
EGFR: 59 ML/MIN/1.73M2 — LOW
EGFR: 73 ML/MIN/1.73M2 — SIGNIFICANT CHANGE UP
EOSINOPHIL # BLD AUTO: 0 K/UL — SIGNIFICANT CHANGE UP (ref 0–0.7)
EOSINOPHIL # BLD AUTO: 0 K/UL — SIGNIFICANT CHANGE UP (ref 0–0.7)
EOSINOPHIL # BLD AUTO: 0.01 K/UL — SIGNIFICANT CHANGE UP (ref 0–0.7)
EOSINOPHIL NFR BLD AUTO: 0 % — SIGNIFICANT CHANGE UP (ref 0–8)
GLUCOSE BLDC GLUCOMTR-MCNC: 103 MG/DL — HIGH (ref 70–99)
GLUCOSE BLDC GLUCOMTR-MCNC: 116 MG/DL — HIGH (ref 70–99)
GLUCOSE BLDC GLUCOMTR-MCNC: 117 MG/DL — HIGH (ref 70–99)
GLUCOSE BLDC GLUCOMTR-MCNC: 146 MG/DL — HIGH (ref 70–99)
GLUCOSE BLDC GLUCOMTR-MCNC: 148 MG/DL — HIGH (ref 70–99)
GLUCOSE BLDC GLUCOMTR-MCNC: 150 MG/DL — HIGH (ref 70–99)
GLUCOSE BLDC GLUCOMTR-MCNC: 189 MG/DL — HIGH (ref 70–99)
GLUCOSE BLDC GLUCOMTR-MCNC: 293 MG/DL — HIGH (ref 70–99)
GLUCOSE BLDC GLUCOMTR-MCNC: 358 MG/DL — HIGH (ref 70–99)
GLUCOSE BLDC GLUCOMTR-MCNC: 99 MG/DL — SIGNIFICANT CHANGE UP (ref 70–99)
GLUCOSE SERPL-MCNC: 100 MG/DL — HIGH (ref 70–99)
GLUCOSE SERPL-MCNC: 264 MG/DL — HIGH (ref 70–99)
GLUCOSE SERPL-MCNC: 286 MG/DL — HIGH (ref 70–99)
HCT VFR BLD CALC: 22.2 % — LOW (ref 42–52)
HCT VFR BLD CALC: 23.5 % — LOW (ref 42–52)
HCT VFR BLD CALC: 23.7 % — LOW (ref 42–52)
HGB BLD-MCNC: 7.5 G/DL — LOW (ref 14–18)
HGB BLD-MCNC: 7.8 G/DL — LOW (ref 14–18)
HGB BLD-MCNC: 7.8 G/DL — LOW (ref 14–18)
IMM GRANULOCYTES NFR BLD AUTO: 1.3 % — HIGH (ref 0.1–0.3)
IMM GRANULOCYTES NFR BLD AUTO: 1.4 % — HIGH (ref 0.1–0.3)
LYMPHOCYTES # BLD AUTO: 0.69 K/UL — LOW (ref 1.2–3.4)
LYMPHOCYTES # BLD AUTO: 0.96 K/UL — LOW (ref 1.2–3.4)
LYMPHOCYTES # BLD AUTO: 1.33 K/UL — SIGNIFICANT CHANGE UP (ref 1.2–3.4)
LYMPHOCYTES # BLD AUTO: 2.6 % — LOW (ref 20.5–51.1)
LYMPHOCYTES # BLD AUTO: 3.6 % — LOW (ref 20.5–51.1)
LYMPHOCYTES # BLD AUTO: 5.9 % — LOW (ref 20.5–51.1)
MAGNESIUM SERPL-MCNC: 2.6 MG/DL — HIGH (ref 1.8–2.4)
MANUAL SMEAR VERIFICATION: SIGNIFICANT CHANGE UP
MCHC RBC-ENTMCNC: 30.5 PG — SIGNIFICANT CHANGE UP (ref 27–31)
MCHC RBC-ENTMCNC: 30.7 PG — SIGNIFICANT CHANGE UP (ref 27–31)
MCHC RBC-ENTMCNC: 31 PG — SIGNIFICANT CHANGE UP (ref 27–31)
MCHC RBC-ENTMCNC: 32.9 G/DL — SIGNIFICANT CHANGE UP (ref 32–37)
MCHC RBC-ENTMCNC: 33.2 G/DL — SIGNIFICANT CHANGE UP (ref 32–37)
MCHC RBC-ENTMCNC: 33.8 G/DL — SIGNIFICANT CHANGE UP (ref 32–37)
MCV RBC AUTO: 91 FL — SIGNIFICANT CHANGE UP (ref 80–94)
MCV RBC AUTO: 92.6 FL — SIGNIFICANT CHANGE UP (ref 80–94)
MCV RBC AUTO: 93.3 FL — SIGNIFICANT CHANGE UP (ref 80–94)
MONOCYTES # BLD AUTO: 0.24 K/UL — SIGNIFICANT CHANGE UP (ref 0.1–0.6)
MONOCYTES # BLD AUTO: 0.58 K/UL — SIGNIFICANT CHANGE UP (ref 0.1–0.6)
MONOCYTES # BLD AUTO: 0.91 K/UL — HIGH (ref 0.1–0.6)
MONOCYTES NFR BLD AUTO: 0.9 % — LOW (ref 1.7–9.3)
MONOCYTES NFR BLD AUTO: 2.2 % — SIGNIFICANT CHANGE UP (ref 1.7–9.3)
MONOCYTES NFR BLD AUTO: 4.1 % — SIGNIFICANT CHANGE UP (ref 1.7–9.3)
NEUTROPHILS # BLD AUTO: 19.82 K/UL — HIGH (ref 1.4–6.5)
NEUTROPHILS # BLD AUTO: 24.81 K/UL — HIGH (ref 1.4–6.5)
NEUTROPHILS # BLD AUTO: 25.27 K/UL — HIGH (ref 1.4–6.5)
NEUTROPHILS NFR BLD AUTO: 88.4 % — HIGH (ref 42.2–75.2)
NEUTROPHILS NFR BLD AUTO: 92.7 % — HIGH (ref 42.2–75.2)
NEUTROPHILS NFR BLD AUTO: 95.6 % — HIGH (ref 42.2–75.2)
NRBC # BLD: 3 /100 WBCS — HIGH (ref 0–0)
NRBC # BLD: 3 /100 — HIGH (ref 0–0)
NRBC # BLD: 7 /100 WBCS — HIGH (ref 0–0)
NRBC # BLD: SIGNIFICANT CHANGE UP /100 WBCS (ref 0–0)
PHOSPHATE SERPL-MCNC: 2.7 MG/DL — SIGNIFICANT CHANGE UP (ref 2.1–4.9)
PLAT MORPH BLD: NORMAL — SIGNIFICANT CHANGE UP
PLATELET # BLD AUTO: 156 K/UL — SIGNIFICANT CHANGE UP (ref 130–400)
PLATELET # BLD AUTO: 202 K/UL — SIGNIFICANT CHANGE UP (ref 130–400)
PLATELET # BLD AUTO: 204 K/UL — SIGNIFICANT CHANGE UP (ref 130–400)
PMV BLD: 12.5 FL — HIGH (ref 7.4–10.4)
PMV BLD: 12.7 FL — HIGH (ref 7.4–10.4)
PMV BLD: 13 FL — HIGH (ref 7.4–10.4)
POIKILOCYTOSIS BLD QL AUTO: SIGNIFICANT CHANGE UP
POLYCHROMASIA BLD QL SMEAR: SLIGHT — SIGNIFICANT CHANGE UP
POTASSIUM SERPL-MCNC: 3.7 MMOL/L — SIGNIFICANT CHANGE UP (ref 3.5–5)
POTASSIUM SERPL-MCNC: 3.8 MMOL/L — SIGNIFICANT CHANGE UP (ref 3.5–5)
POTASSIUM SERPL-MCNC: 3.8 MMOL/L — SIGNIFICANT CHANGE UP (ref 3.5–5)
POTASSIUM SERPL-SCNC: 3.7 MMOL/L — SIGNIFICANT CHANGE UP (ref 3.5–5)
POTASSIUM SERPL-SCNC: 3.8 MMOL/L — SIGNIFICANT CHANGE UP (ref 3.5–5)
POTASSIUM SERPL-SCNC: 3.8 MMOL/L — SIGNIFICANT CHANGE UP (ref 3.5–5)
PROT SERPL-MCNC: 3.9 G/DL — LOW (ref 6–8)
PROT SERPL-MCNC: 3.9 G/DL — LOW (ref 6–8)
RBC # BLD: 2.44 M/UL — LOW (ref 4.7–6.1)
RBC # BLD: 2.52 M/UL — LOW (ref 4.7–6.1)
RBC # BLD: 2.56 M/UL — LOW (ref 4.7–6.1)
RBC # FLD: 16.2 % — HIGH (ref 11.5–14.5)
RBC # FLD: 17.4 % — HIGH (ref 11.5–14.5)
RBC # FLD: 17.7 % — HIGH (ref 11.5–14.5)
RBC BLD AUTO: ABNORMAL
SODIUM SERPL-SCNC: 147 MMOL/L — HIGH (ref 135–146)
SODIUM SERPL-SCNC: 148 MMOL/L — HIGH (ref 135–146)
SODIUM SERPL-SCNC: 149 MMOL/L — HIGH (ref 135–146)
VARIANT LYMPHS # BLD: 0.9 % — SIGNIFICANT CHANGE UP (ref 0–5)
WBC # BLD: 22.43 K/UL — HIGH (ref 4.8–10.8)
WBC # BLD: 26.43 K/UL — HIGH (ref 4.8–10.8)
WBC # BLD: 26.77 K/UL — HIGH (ref 4.8–10.8)
WBC # FLD AUTO: 22.43 K/UL — HIGH (ref 4.8–10.8)
WBC # FLD AUTO: 26.43 K/UL — HIGH (ref 4.8–10.8)
WBC # FLD AUTO: 26.77 K/UL — HIGH (ref 4.8–10.8)

## 2023-08-12 PROCEDURE — 71045 X-RAY EXAM CHEST 1 VIEW: CPT | Mod: 26

## 2023-08-12 PROCEDURE — 99291 CRITICAL CARE FIRST HOUR: CPT

## 2023-08-12 PROCEDURE — 71045 X-RAY EXAM CHEST 1 VIEW: CPT | Mod: 26,77

## 2023-08-12 RX ORDER — MIDODRINE HYDROCHLORIDE 2.5 MG/1
15 TABLET ORAL EVERY 8 HOURS
Refills: 0 | Status: DISCONTINUED | OUTPATIENT
Start: 2023-08-12 | End: 2023-08-13

## 2023-08-12 RX ORDER — HYDROCORTISONE 20 MG
50 TABLET ORAL EVERY 8 HOURS
Refills: 0 | Status: DISCONTINUED | OUTPATIENT
Start: 2023-08-12 | End: 2023-08-13

## 2023-08-12 RX ORDER — SODIUM CHLORIDE 9 MG/ML
250 INJECTION, SOLUTION INTRAVENOUS ONCE
Refills: 0 | Status: COMPLETED | OUTPATIENT
Start: 2023-08-12 | End: 2023-08-12

## 2023-08-12 RX ORDER — INSULIN LISPRO 100/ML
VIAL (ML) SUBCUTANEOUS
Refills: 0 | Status: DISCONTINUED | OUTPATIENT
Start: 2023-08-12 | End: 2023-08-13

## 2023-08-12 RX ADMIN — CASPOFUNGIN ACETATE 260 MILLIGRAM(S): 7 INJECTION, POWDER, LYOPHILIZED, FOR SOLUTION INTRAVENOUS at 12:14

## 2023-08-12 RX ADMIN — Medication 1 APPLICATION(S): at 17:04

## 2023-08-12 RX ADMIN — Medication 10: at 16:57

## 2023-08-12 RX ADMIN — MIDODRINE HYDROCHLORIDE 15 MILLIGRAM(S): 2.5 TABLET ORAL at 21:28

## 2023-08-12 RX ADMIN — ATORVASTATIN CALCIUM 10 MILLIGRAM(S): 80 TABLET, FILM COATED ORAL at 21:28

## 2023-08-12 RX ADMIN — MEROPENEM 100 MILLIGRAM(S): 1 INJECTION INTRAVENOUS at 17:03

## 2023-08-12 RX ADMIN — Medication 50 MILLIGRAM(S): at 22:41

## 2023-08-12 RX ADMIN — Medication 300 MILLIGRAM(S): at 11:29

## 2023-08-12 RX ADMIN — MEROPENEM 100 MILLIGRAM(S): 1 INJECTION INTRAVENOUS at 06:00

## 2023-08-12 RX ADMIN — Medication 1 APPLICATION(S): at 05:39

## 2023-08-12 RX ADMIN — PANTOPRAZOLE SODIUM 40 MILLIGRAM(S): 20 TABLET, DELAYED RELEASE ORAL at 06:00

## 2023-08-12 RX ADMIN — MIDODRINE HYDROCHLORIDE 15 MILLIGRAM(S): 2.5 TABLET ORAL at 13:48

## 2023-08-12 RX ADMIN — MIDODRINE HYDROCHLORIDE 10 MILLIGRAM(S): 2.5 TABLET ORAL at 05:39

## 2023-08-12 RX ADMIN — SODIUM CHLORIDE 250 MILLILITER(S): 9 INJECTION, SOLUTION INTRAVENOUS at 12:14

## 2023-08-12 RX ADMIN — CHLORHEXIDINE GLUCONATE 1 APPLICATION(S): 213 SOLUTION TOPICAL at 06:00

## 2023-08-12 RX ADMIN — Medication 50 MILLIGRAM(S): at 13:45

## 2023-08-12 RX ADMIN — Medication 100 MILLIGRAM(S): at 05:39

## 2023-08-12 RX ADMIN — PANTOPRAZOLE SODIUM 40 MILLIGRAM(S): 20 TABLET, DELAYED RELEASE ORAL at 17:02

## 2023-08-12 NOTE — PROGRESS NOTE ADULT - SUBJECTIVE AND OBJECTIVE BOX
Patient is a 88y old  Male who presents with a chief complaint of DKA/HHS (12 Aug 2023 06:59)        Over Night Events:    Afebrile   On Levophed 0.05   NGT in place         ROS:  See HPI    PHYSICAL EXAM    ICU Vital Signs Last 24 Hrs  T(C): 36.6 (12 Aug 2023 08:00), Max: 37.7 (11 Aug 2023 12:00)  T(F): 97.8 (12 Aug 2023 08:00), Max: 99.8 (11 Aug 2023 12:00)  HR: 89 (12 Aug 2023 08:00) (58 - 92)  BP: 86/50 (12 Aug 2023 08:00) (82/53 - 128/60)  BP(mean): 61 (12 Aug 2023 08:00) (61 - 87)  ABP: --  ABP(mean): --  RR: 33 (12 Aug 2023 08:00) (13 - 33)  SpO2: 96% (12 Aug 2023 08:00) (93% - 100%)    O2 Parameters below as of 12 Aug 2023 08:00  Patient On (Oxygen Delivery Method): nasal cannula  O2 Flow (L/min): 2          General: NGT; NAD   HEENT: DELFINO             Lymphatic system: No cervical LN   Lungs: Bilateral BS; coarse   Cardiovascular: Regular   Gastrointestinal: Soft, Positive BS, NT   Extremities: No clubbing.  Moves extremities.  Full Range of motion   Skin: Warm, intact  Neurological: No motor or sensory deficit; AAO       08-11-23 @ 07:01  -  08-12-23 @ 07:00  --------------------------------------------------------  IN:    Insulin: 34 mL    Lactated Ringers: 1100 mL    Lactated Ringers Bolus: 250 mL    Norepinephrine: 81.2 mL  Total IN: 1465.2 mL    OUT:    Stool (mL): 5 mL    Voided (mL): 800 mL  Total OUT: 805 mL    Total NET: 660.2 mL          LABS:                            7.5    22.43 )-----------( 156      ( 12 Aug 2023 04:29 )             22.2                                               08-12    148<H>  |  118<H>  |  31<H>  ----------------------------<  100<H>  3.8   |  21  |  1.0    Ca    6.7<L>      12 Aug 2023 04:29  Phos  2.7     08-12  Mg     2.6     08-12    TPro  3.9<L>  /  Alb  1.7<L>  /  TBili  0.3  /  DBili  x   /  AST  75<H>  /  ALT  24  /  AlkPhos  84  08-12      PT/INR - ( 11 Aug 2023 05:00 )   PT: 13.00 sec;   INR: 1.14 ratio         PTT - ( 11 Aug 2023 05:00 )  PTT:34.4 sec                                       Urinalysis Basic - ( 12 Aug 2023 04:29 )    Color: x / Appearance: x / SG: x / pH: x  Gluc: 100 mg/dL / Ketone: x  / Bili: x / Urobili: x   Blood: x / Protein: x / Nitrite: x   Leuk Esterase: x / RBC: x / WBC x   Sq Epi: x / Non Sq Epi: x / Bacteria: x                                                  LIVER FUNCTIONS - ( 12 Aug 2023 04:29 )  Alb: 1.7 g/dL / Pro: 3.9 g/dL / ALK PHOS: 84 U/L / ALT: 24 U/L / AST: 75 U/L / GGT: x                                                                                                                                       MEDICATIONS  (STANDING):  acetaminophen   IVPB .. 1000 milliGRAM(s) IV Intermittent once  allopurinol 300 milliGRAM(s) Oral daily  atorvastatin 10 milliGRAM(s) Oral at bedtime  bacitracin   Ointment 1 Application(s) Topical two times a day  caspofungin IVPB      caspofungin IVPB 50 milliGRAM(s) IV Intermittent every 24 hours  chlorhexidine 2% Cloths 1 Application(s) Topical daily  hydrocortisone sodium succinate Injectable 100 milliGRAM(s) IV Push every 8 hours  insulin regular Infusion 2 Unit(s)/Hr (2 mL/Hr) IV Continuous <Continuous>  lactated ringers. 1000 milliLiter(s) (50 mL/Hr) IV Continuous <Continuous>  meropenem  IVPB 1000 milliGRAM(s) IV Intermittent every 12 hours  midodrine 10 milliGRAM(s) Oral every 8 hours  norepinephrine Infusion 0.05 MICROgram(s)/kG/Min (5.87 mL/Hr) IV Continuous <Continuous>  pantoprazole  Injectable 40 milliGRAM(s) IV Push two times a day    MEDICATIONS  (PRN):      Xrays: No new infiltrates.

## 2023-08-12 NOTE — PROGRESS NOTE ADULT - ASSESSMENT
ASSESSMENT  87 y/o M w/ PMH of DM, HTN coming from home with complaint of decreased appetite, increased urinary output and craving sweets x 2 weeks. Admitted for management of DKA.     IMPRESSION  #GIB  #Septic shock requiring pressors   #Hungatella bacteremia likely GI translocation in the setting of appendicitis  8/8 BCX NGTD   8/5 BCX NGTD   8/4 UCX NGTD  8/4 BCX + 1/2 bottles  < from: CT Angio Abdomen and Pelvis w/ IV Cont (08.10.23 @ 01:18) >  No evidence of bowel ischemia. 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 Collateral flow presumed   to be present again there is no evidence of bowel ischemia.  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 Findings are concerning for appendicitis.  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  Additional findings detailed in the body the report  #DKA/HHS  #Atelectasis  Creatinine: 1.1 mg/dL (08.08.23 @ 05:55)  Weight (kg): 62.6 (08-04-23 @ 20:37)  crcl 41      RECOMMENDATIONS  - f/u bcx   - Rashi 1g q12h IV 8/7-  - On Caspo-  no evidence of perforation/ischemia on CT. OK for now, D/C if BCX fungitell NEG   - Surgery following  - Grave prognosis without surgical intervention, GOC    If any questions, please call or send a message on "[employer] Teams  Please continue to update ID with any pertinent new laboratory or radiographic findings

## 2023-08-12 NOTE — PROGRESS NOTE ADULT - ASSESSMENT
87 y/o M w/ PMH of DM, HTN coming from home with complaint of decreased appetite, increased urinary output and craving sweets x 2 weeks. Admitted for management of DKA.     IMPRESSION  #Septic shock requiring pressors   #Hungatella bacteremia likely GI translocation in the setting of appendicitis  8/8 BCX NGTD   8/5 BCX NGTD   8/4 UCX NGTD  8/4 BCX + 1/2 bottles    - F up BCX of 8/11    - Patient improved initially but on 8/9 -> At 6.30 pm patient started having diffuse severe abdominal pain with increased pressors (levo and jasen).   - Abdomen Not peritonitic.   -- Ct angio abdomen/pelvis to r/o ischemia ->No evidence of bowel ischemia. Unchanged findings suggesting acute appendicitis.  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 Collateral flow presumed to be present again there is no evidence of bowel ischemia.   - lactate level was 7 on 8/9 -> 2.5 on 8/10 -> 1.4 on 8/11.   - Surgery team on board ->                        - No surgery indicated --> not a surgical candidate at this time                       - C/w antibiotics --> non operative management for acute appendicitis    - yesterday was on norepinephrine and phenylephrine -> today on norepi low dose.   - hydro stress dose > reduced to 50q8 - day 3  - Repeat UA, and MRSA negative on 8/10  - IVF based on cheetah  - Repeat TTE  - standing IV fluids stopped to avoid overload -> patient is receiving 2 u of pRBC in addition to the 1.5 Liters LR.   - lactate trending down from 3.4 to 1.5  - no documented fevers  - on meropenem 1g Q12 day 5  - Caspofungin day 2 - F up fungitell   - Vancomycin stopped today -> repeated MRSA negative.   - ID on board  - NPO > started NG feeds as per surgery there is no plan for surgical intervention at this time and per GI there is no plan for EGD as patient is too unstable for it at this time  - midodrine increased to 15mg BID    #Melena likely d/t PUD vs AVM vs Esophageal ulcer vs Erosive Hemorrhagic Gastritis vs Dieulafoy lesion Vs Malignancy    - Patient started having melena on Wednesday night 8/9 (NADEGE on 8/9 -in the morning- was negative)   -> patient is having drop in Hgb  - CT abdomen with PO contrast and repeated with IV contrast -> negative for bleed  - on 8/10 drop in hgb from 5 am till 11 am (from 7.2 to 6) after having 2 episodes of melena -> 2 units of pRBC were given  - NPO  - PPI BID IV  - GI on board  - EGD not possible now- patient is unstable  - HIT panel negative  - DIC panel repeated twice negative.   - 6 episodes of diarrhea in the past 24 hours- most probably from blood and laxatives.    #DKA/HHS,   - resolved  - endo on board   - insulin IV drip to target glucose level of 140 to 180     #AMS toxic metabolic   - CT head -> negative  - EEG -> There were no findings of active epilepsy.      #hypernatremia 148  - repeat CMP  -following cmp   89 y/o M w/ PMH of DM, HTN coming from home with complaint of decreased appetite, increased urinary output and craving sweets x 2 weeks. Admitted for management of DKA.     IMPRESSION  #Septic shock requiring pressors   #Hungatella bacteremia likely GI translocation in the setting of appendicitis  8/8 BCX NGTD   8/5 BCX NGTD   8/4 UCX NGTD  8/4 BCX + 1/2 bottles    - F up BCX of 8/11    - Patient improved initially but on 8/9 -> At 6.30 pm patient started having diffuse severe abdominal pain with increased pressors (levo and jasen).   - Abdomen Not peritonitic.   -- Ct angio abdomen/pelvis to r/o ischemia ->No evidence of bowel ischemia. Unchanged findings suggesting acute appendicitis.  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 Collateral flow presumed to be present again there is no evidence of bowel ischemia.   - lactate level was 7 on 8/9 -> 2.5 on 8/10 -> 1.4 on 8/11.   - Surgery team on board ->                        - No surgery indicated --> not a surgical candidate at this time                       - C/w antibiotics --> non operative management for acute appendicitis    - yesterday was on norepinephrine and phenylephrine -> today on norepi low dose.   - hydro stress dose > reduced to 50q8 - day 3  - Repeat UA, and MRSA negative on 8/10  - IVF based on cheetah  - Repeat TTE  - standing IV fluids stopped to avoid overload -> patient is receiving 2 u of pRBC in addition to the 1.5 Liters LR.   - lactate trending down from 3.4 to 1.5  - no documented fevers  - on meropenem 1g Q12 day 5  - Caspofungin day 2 - F up fungitell   - Vancomycin stopped today -> repeated MRSA negative.   - ID on board  - NPO >per surgery there is no plan for surgical intervention at this time and per GI there is no plan for EGD as patient is too unstable for it at this time so could feed him but holding due to multiple episodes of melena today  - midodrine increased to 15mg BID    #Melena likely d/t PUD vs AVM vs Esophageal ulcer vs Erosive Hemorrhagic Gastritis vs Dieulafoy lesion Vs Malignancy    - Patient started having melena on Wednesday night 8/9 (NADEGE on 8/9 -in the morning- was negative)   -> patient is having drop in Hgb  - CT abdomen with PO contrast and repeated with IV contrast -> negative for bleed  - on 8/10 drop in hgb from 5 am till 11 am (from 7.2 to 6) after having 2 episodes of melena -> 2 units of pRBC were given  - NPO  - PPI BID IV  - GI on board  - EGD not possible now- patient is unstable  - HIT panel negative  - DIC panel repeated twice negative.   - 6 episodes of diarrhea in the past 24 hours- most probably from blood and laxatives.    #DKA/HHS,   - resolved  - endo on board   - insulin IV drip to target glucose level of 140 to 180     #AMS toxic metabolic   - CT head -> negative  - EEG -> There were no findings of active epilepsy.      #hypernatremia 148  - repeat CMP  -following cmp

## 2023-08-12 NOTE — PROGRESS NOTE ADULT - ASSESSMENT
IMPRESSION:    DKA / HHS, resolved  Hungatella species bacteremia   Septic shock   BART - resolved   AMS toxic metabolic  HO DM  HO HTN  Abdominal aortic aneurysm  Parkinson disease  Appendicitis   Hypernatremia   Now GI bleed     PLAN:    CNS: Avoid sedation    HEENT: Oral care.  NGT care     PULMONARY:  HOB @ 45 degrees.  Aspiration precautions, wean oxygen as tolerated. CXR Noted.      CARDIOVASCULAR:   Avoid volume overload.  Wean levophed, IV fluid LR while NPO.  Midodrine 15 mg Q8.  Cheetah HD monitoring.     GI: Protonix 40mg IV BID. Keep NPO for now. Melena this morning. Gi following.     RENAL:  Follow up lytes.  Correct as needed. Voiding. No adler.     INFECTIOUS DISEASE: Repeat blood cultures.  Continue Meropenem and Caspo for now. ID follow up. Fungitell.     HEMATOLOGICAL:  DVT prophylaxis: SCDs. HIT panel negative. CBC in the afternoon. Keep hgb more than 7.     ENDOCRINE:  Follow up FS.  Insulin protocol.     MUSCULOSKELETAL: bedrest    MICU monitoring    Prognosis very poor

## 2023-08-12 NOTE — PROGRESS NOTE ADULT - SUBJECTIVE AND OBJECTIVE BOX
MILEY MARES  88y, Male  Allergy: No Known Allergies      LOS  8d    CHIEF COMPLAINT: DKA/HHS (12 Aug 2023 08:55)      INTERVAL EVENTS/HPI  - T(F): , Max: 98.8 (08-11-23 @ 16:00)  - WBC Count: 22.43 (08-12-23 @ 04:29)  WBC Count: 23.75 (08-11-23 @ 20:55)     - Creatinine: 1.0 (08-12-23 @ 04:29)  Creatinine: 1.1 (08-11-23 @ 05:00)     -   -   -     ROS  cannot obtain secondary to patient's sedation and/or mental status    VITALS:  T(F): 97.8, Max: 98.8 (08-11-23 @ 16:00)  HR: 81  BP: 105/55  RR: 22Vital Signs Last 24 Hrs  T(C): 36.6 (12 Aug 2023 08:00), Max: 37.1 (11 Aug 2023 16:00)  T(F): 97.8 (12 Aug 2023 08:00), Max: 98.8 (11 Aug 2023 16:00)  HR: 81 (12 Aug 2023 11:00) (58 - 92)  BP: 105/55 (12 Aug 2023 11:00) (86/50 - 128/60)  BP(mean): 76 (12 Aug 2023 11:00) (61 - 87)  RR: 22 (12 Aug 2023 11:00) (13 - 33)  SpO2: 98% (12 Aug 2023 11:00) (94% - 99%)    Parameters below as of 12 Aug 2023 08:56  Patient On (Oxygen Delivery Method): nasal cannula  O2 Flow (L/min): 2      PHYSICAL EXAM:  Gen: chronically ill appearing   HEENT: Normocephalic, atraumatic  Neck: supple, no lymphadenopathy  CV: Regular rate & regular rhythm  Lungs: decreased BS at bases, no fremitus  Abdomen: Soft, BS present  Ext: Warm, well perfused  Neuro: non focal,  Skin: no rash, no erythema  Lines: no phlebitis     FH: Non-contributory  Social Hx: Non-contributory    TESTS & MEASUREMENTS:                        7.5    22.43 )-----------( 156      ( 12 Aug 2023 04:29 )             22.2     08-12    148<H>  |  118<H>  |  31<H>  ----------------------------<  100<H>  3.8   |  21  |  1.0    Ca    6.7<L>      12 Aug 2023 04:29  Phos  2.7     08-12  Mg     2.6     08-12    TPro  3.9<L>  /  Alb  1.7<L>  /  TBili  0.3  /  DBili  x   /  AST  75<H>  /  ALT  24  /  AlkPhos  84  08-12      LIVER FUNCTIONS - ( 12 Aug 2023 04:29 )  Alb: 1.7 g/dL / Pro: 3.9 g/dL / ALK PHOS: 84 U/L / ALT: 24 U/L / AST: 75 U/L / GGT: x           Urinalysis Basic - ( 12 Aug 2023 04:29 )    Color: x / Appearance: x / SG: x / pH: x  Gluc: 100 mg/dL / Ketone: x  / Bili: x / Urobili: x   Blood: x / Protein: x / Nitrite: x   Leuk Esterase: x / RBC: x / WBC x   Sq Epi: x / Non Sq Epi: x / Bacteria: x        Culture - Blood (collected 08-08-23 @ 11:58)  Source: .Blood None  Preliminary Report (08-11-23 @ 23:01):    No growth at 72 Hours    Culture - Blood (collected 08-05-23 @ 07:10)  Source: .Blood Blood-Peripheral  Final Report (08-10-23 @ 19:01):    No growth at 5 days    Culture - Urine (collected 08-04-23 @ 14:42)  Source: Clean Catch Clean Catch (Midstream)  Final Report (08-05-23 @ 19:52):    No growth    Culture - Blood (collected 08-04-23 @ 11:20)  Source: .Blood Blood-Peripheral  Gram Stain (08-06-23 @ 02:58):    Growth in anaerobic bottle: Gram Negative Rods  Final Report (08-07-23 @ 19:28):    Growth in anaerobic bottle: Most closely resembling Hungatella species    "Susceptibilities not performed"    Please Note:************************************************    Hungatella species    may appear as gram negative rods in direct smears of clinical specimens.    Direct identification is available within approximately 3-5    hours either by Blood Panel Multiplexed PCR or Direct    MALDI-TOF. Details: [email or web address]  Organism: Blood Culture PCR (08-07-23 @ 19:28)  Organism: Blood Culture PCR (08-07-23 @ 19:28)      Method Type: PCR      -  Blood PCR Panel: NEG    Culture - Blood (collected 08-04-23 @ 11:20)  Source: .Blood Blood-Peripheral  Final Report (08-09-23 @ 23:00):    No growth at 5 days        Lactate, Blood: 1.4 mmol/L (08-11-23 @ 05:00)  Blood Gas Venous - Lactate: 2.00 mmol/L (08-10-23 @ 10:10)  Lactate, Blood: 2.5 mmol/L (08-10-23 @ 04:20)  Lactate, Blood: 7.1 mmol/L (08-09-23 @ 20:53)  Lactate, Blood: 1.5 mmol/L (08-08-23 @ 12:40)      INFECTIOUS DISEASES TESTING  MRSA PCR Result.: Negative (08-10-23 @ 10:55)  MRSA PCR Result.: Negative (08-07-23 @ 11:30)  Procalcitonin, Serum: 0.67 (08-06-23 @ 05:05)  Procalcitonin, Serum: 0.48 (08-04-23 @ 23:05)  COVID-19 PCR: NotDetec (11-21-22 @ 18:43)      INFLAMMATORY MARKERS      RADIOLOGY & ADDITIONAL TESTS:  I have personally reviewed the last available Chest xray  CXR  Xray Chest 1 View- PORTABLE-Urgent:   ACC: [id number] EXAM:  XR CHEST PORTABLE URGENT 1V   ORDERED BY: MÓNICA MINER     PROCEDURE DATE:  08/12/2023          INTERPRETATION:  CLINICAL HISTORY / REASON FOR EXAM: Shortness of breath.    COMPARISON: Chest radiograph from August 12, 2023.    TECHNIQUE/POSITIONING: Satisfactory. Single image, AP portable chest   radiograph.    FINDINGS:    SUPPORT DEVICES: Interval placement of right sided central venous   catheter with distal tip overlying the SVC. Enteric tube courses below   the left hemidiaphragm, with distal tip overlying the gastric body.    CARDIAC/MEDIASTINUM/HILUM: Unchanged cardiac silhouette.    LUNG PARENCHYMA/PLEURA: No focal consolidation or pleural effusion. No   pneumothorax.    SKELETON/SOFT TISSUES: Unchanged.      IMPRESSION:    Interval placement of enteric tube in satisfactory position.    --- End of Report ---            LUCIO HOOVER MD; Attending Radiologist  This document has been electronically signed. Aug 12 2023 12:27PM (08-12-23 @ 11:55)      CT      CARDIOLOGY TESTING  12 Lead ECG:   Ventricular Rate 106 BPM    Atrial Rate 106 BPM    P-R Interval 152 ms    QRS Duration 126 ms    Q-T Interval 400 ms    QTC Calculation(Bazett) 531 ms    P Axis 34 degrees    R Axis -50 degrees    T Axis -18 degrees    Diagnosis Line Sinus tachycardia  Right bundle branch block  Left anterior fascicular block  *** Bifascicular block ***  Moderate voltage criteria for LVH, may be normal variant      Abnormal ECG    Confirmed by RUDY FLOR MD ([id number]) on 8/4/2023 11:24:55 AM (08-04-23 @ 10:57)      MEDICATIONS  acetaminophen   IVPB .. 1000  allopurinol 300  atorvastatin 10  bacitracin   Ointment 1  caspofungin IVPB 50  caspofungin IVPB   chlorhexidine 2% Cloths 1  hydrocortisone sodium succinate Injectable 50  insulin lispro (ADMELOG) corrective regimen sliding scale   insulin regular Infusion 2  lactated ringers. 1000  meropenem  IVPB 1000  midodrine 15  norepinephrine Infusion 0.05  pantoprazole  Injectable 40      WEIGHT  Weight (kg): 62.6 (08-04-23 @ 20:37)  Creatinine: 1.0 mg/dL (08-12-23 @ 04:29)      ANTIBIOTICS:  caspofungin IVPB 50 milliGRAM(s) IV Intermittent every 24 hours  caspofungin IVPB      meropenem  IVPB 1000 milliGRAM(s) IV Intermittent every 12 hours      All available historical records have been reviewed

## 2023-08-12 NOTE — PROGRESS NOTE ADULT - SUBJECTIVE AND OBJECTIVE BOX
Patient is a 88y old  Male who presents with a chief complaint of DKA/HHS (12 Aug 2023 15:00)      INTERVAL HPI/OVERNIGHT EVENTS:   No overnight events   Afebrile, hemodynamically stable   1 episode melena overnight, 4 episodes over the course of the day    ICU Vital Signs Last 24 Hrs  T(C): 37 (12 Aug 2023 12:00), Max: 37.1 (12 Aug 2023 00:00)  T(F): 98.6 (12 Aug 2023 12:00), Max: 98.7 (12 Aug 2023 00:00)  HR: 81 (12 Aug 2023 16:00) (58 - 96)  BP: 101/60 (12 Aug 2023 16:00) (86/50 - 133/82)  BP(mean): 68 (12 Aug 2023 16:00) (61 - 102)  ABP: --  ABP(mean): --  RR: 22 (12 Aug 2023 16:00) (13 - 33)  SpO2: 95% (12 Aug 2023 16:00) (94% - 99%)    O2 Parameters below as of 12 Aug 2023 12:00  Patient On (Oxygen Delivery Method): nasal cannula  O2 Flow (L/min): 2        I&O's Summary    11 Aug 2023 07:01  -  12 Aug 2023 07:00  --------------------------------------------------------  IN: 1465.2 mL / OUT: 805 mL / NET: 660.2 mL    12 Aug 2023 07:01  -  12 Aug 2023 16:57  --------------------------------------------------------  IN: 120 mL / OUT: 3 mL / NET: 117 mL          LABS:                        7.5    22.43 )-----------( 156      ( 12 Aug 2023 04:29 )             22.2     08-12    148<H>  |  118<H>  |  31<H>  ----------------------------<  100<H>  3.8   |  21  |  1.0    Ca    6.7<L>      12 Aug 2023 04:29  Phos  2.7     08-12  Mg     2.6     08-12    TPro  3.9<L>  /  Alb  1.7<L>  /  TBili  0.3  /  DBili  x   /  AST  75<H>  /  ALT  24  /  AlkPhos  84  08-12    PT/INR - ( 11 Aug 2023 05:00 )   PT: 13.00 sec;   INR: 1.14 ratio         PTT - ( 11 Aug 2023 05:00 )  PTT:34.4 sec  Urinalysis Basic - ( 12 Aug 2023 04:29 )    Color: x / Appearance: x / SG: x / pH: x  Gluc: 100 mg/dL / Ketone: x  / Bili: x / Urobili: x   Blood: x / Protein: x / Nitrite: x   Leuk Esterase: x / RBC: x / WBC x   Sq Epi: x / Non Sq Epi: x / Bacteria: x      CAPILLARY BLOOD GLUCOSE      POCT Blood Glucose.: 358 mg/dL (12 Aug 2023 16:55)  POCT Blood Glucose.: 293 mg/dL (12 Aug 2023 13:56)  POCT Blood Glucose.: 148 mg/dL (12 Aug 2023 07:06)  POCT Blood Glucose.: 146 mg/dL (12 Aug 2023 06:02)  POCT Blood Glucose.: 117 mg/dL (12 Aug 2023 05:10)  POCT Blood Glucose.: 103 mg/dL (12 Aug 2023 04:11)  POCT Blood Glucose.: 99 mg/dL (12 Aug 2023 03:10)  POCT Blood Glucose.: 116 mg/dL (12 Aug 2023 02:23)  POCT Blood Glucose.: 150 mg/dL (12 Aug 2023 01:27)  POCT Blood Glucose.: 189 mg/dL (12 Aug 2023 00:03)  POCT Blood Glucose.: 187 mg/dL (11 Aug 2023 23:06)  POCT Blood Glucose.: 171 mg/dL (11 Aug 2023 21:55)  POCT Blood Glucose.: 133 mg/dL (11 Aug 2023 20:34)  POCT Blood Glucose.: 87 mg/dL (11 Aug 2023 19:34)  POCT Blood Glucose.: 64 mg/dL (11 Aug 2023 19:03)  POCT Blood Glucose.: 55 mg/dL (11 Aug 2023 18:29)        RADIOLOGY & ADDITIONAL TESTS:    Consultant(s) Notes Reviewed:  [x ] YES  [ ] NO    MEDICATIONS  (STANDING):  acetaminophen   IVPB .. 1000 milliGRAM(s) IV Intermittent once  allopurinol 300 milliGRAM(s) Oral daily  atorvastatin 10 milliGRAM(s) Oral at bedtime  bacitracin   Ointment 1 Application(s) Topical two times a day  caspofungin IVPB      caspofungin IVPB 50 milliGRAM(s) IV Intermittent every 24 hours  chlorhexidine 2% Cloths 1 Application(s) Topical daily  hydrocortisone sodium succinate Injectable 50 milliGRAM(s) IV Push every 8 hours  insulin lispro (ADMELOG) corrective regimen sliding scale   SubCutaneous three times a day before meals  insulin regular Infusion 2 Unit(s)/Hr (2 mL/Hr) IV Continuous <Continuous>  lactated ringers. 1000 milliLiter(s) (50 mL/Hr) IV Continuous <Continuous>  meropenem  IVPB 1000 milliGRAM(s) IV Intermittent every 12 hours  midodrine 15 milliGRAM(s) Oral every 8 hours  norepinephrine Infusion 0.05 MICROgram(s)/kG/Min (5.87 mL/Hr) IV Continuous <Continuous>  pantoprazole  Injectable 40 milliGRAM(s) IV Push two times a day    MEDICATIONS  (PRN):      PHYSICAL EXAM:  GENERAL:   HEAD:  Atraumatic, Normocephalic  EYES: EOMI, PERRLA, conjunctiva and sclera clear  NECK: Supple, No JVD, Normal thyroid, no enlarged nodes  NERVOUS SYSTEM:  Alert & Awake.   CHEST/LUNG: B/L good air entry; No rales, rhonchi, or wheezing  HEART: S1S2 normal, no S3, Regular rate and rhythm; No murmurs  ABDOMEN: Soft, Nontender, Nondistended; Bowel sounds present  EXTREMITIES:  2+ Peripheral Pulses, No clubbing, cyanosis, or edema  LYMPH: No lymphadenopathy noted  SKIN: No rashes or lesions    Care Discussed with Consultants/Other Providers [ x] YES  [ ] NO

## 2023-08-13 LAB
-  AMOXICILLIN/CLAVULANIC ACID: SIGNIFICANT CHANGE UP
-  CLINDAMYCIN: SIGNIFICANT CHANGE UP
-  IMIPENEM: SIGNIFICANT CHANGE UP
-  METRONIDAZOLE: SIGNIFICANT CHANGE UP
ALBUMIN SERPL ELPH-MCNC: 1.7 G/DL — LOW (ref 3.5–5.2)
ALP SERPL-CCNC: 113 U/L — SIGNIFICANT CHANGE UP (ref 30–115)
ALT FLD-CCNC: 24 U/L — SIGNIFICANT CHANGE UP (ref 0–41)
ANION GAP SERPL CALC-SCNC: 10 MMOL/L — SIGNIFICANT CHANGE UP (ref 7–14)
APTT BLD: 33.2 SEC — SIGNIFICANT CHANGE UP (ref 27–39.2)
AST SERPL-CCNC: 48 U/L — HIGH (ref 0–41)
BASOPHILS # BLD AUTO: 0.04 K/UL — SIGNIFICANT CHANGE UP (ref 0–0.2)
BASOPHILS NFR BLD AUTO: 0.2 % — SIGNIFICANT CHANGE UP (ref 0–1)
BILIRUB SERPL-MCNC: 0.3 MG/DL — SIGNIFICANT CHANGE UP (ref 0.2–1.2)
BUN SERPL-MCNC: 33 MG/DL — HIGH (ref 10–20)
CALCIUM SERPL-MCNC: 6.6 MG/DL — LOW (ref 8.4–10.4)
CHLORIDE SERPL-SCNC: 114 MMOL/L — HIGH (ref 98–110)
CO2 SERPL-SCNC: 22 MMOL/L — SIGNIFICANT CHANGE UP (ref 17–32)
CREAT SERPL-MCNC: 1.3 MG/DL — SIGNIFICANT CHANGE UP (ref 0.7–1.5)
CULTURE RESULTS: SIGNIFICANT CHANGE UP
CULTURE RESULTS: SIGNIFICANT CHANGE UP
EGFR: 53 ML/MIN/1.73M2 — LOW
EOSINOPHIL # BLD AUTO: 0 K/UL — SIGNIFICANT CHANGE UP (ref 0–0.7)
EOSINOPHIL NFR BLD AUTO: 0 % — SIGNIFICANT CHANGE UP (ref 0–8)
GLUCOSE BLDC GLUCOMTR-MCNC: 193 MG/DL — HIGH (ref 70–99)
GLUCOSE BLDC GLUCOMTR-MCNC: 235 MG/DL — HIGH (ref 70–99)
GLUCOSE BLDC GLUCOMTR-MCNC: 256 MG/DL — HIGH (ref 70–99)
GLUCOSE BLDC GLUCOMTR-MCNC: 289 MG/DL — HIGH (ref 70–99)
GLUCOSE BLDC GLUCOMTR-MCNC: 290 MG/DL — HIGH (ref 70–99)
GLUCOSE SERPL-MCNC: 254 MG/DL — HIGH (ref 70–99)
HCT VFR BLD CALC: 23.2 % — LOW (ref 42–52)
HCT VFR BLD CALC: 24.5 % — LOW (ref 42–52)
HGB BLD-MCNC: 7.8 G/DL — LOW (ref 14–18)
HGB BLD-MCNC: 8 G/DL — LOW (ref 14–18)
IMM GRANULOCYTES NFR BLD AUTO: 1.2 % — HIGH (ref 0.1–0.3)
INR BLD: 1.04 RATIO — SIGNIFICANT CHANGE UP (ref 0.65–1.3)
LACTATE SERPL-SCNC: 1.5 MMOL/L — SIGNIFICANT CHANGE UP (ref 0.7–2)
LYMPHOCYTES # BLD AUTO: 0.65 K/UL — LOW (ref 1.2–3.4)
LYMPHOCYTES # BLD AUTO: 2.7 % — LOW (ref 20.5–51.1)
MAGNESIUM SERPL-MCNC: 2.5 MG/DL — HIGH (ref 1.8–2.4)
MCHC RBC-ENTMCNC: 30.5 PG — SIGNIFICANT CHANGE UP (ref 27–31)
MCHC RBC-ENTMCNC: 31.1 PG — HIGH (ref 27–31)
MCHC RBC-ENTMCNC: 32.7 G/DL — SIGNIFICANT CHANGE UP (ref 32–37)
MCHC RBC-ENTMCNC: 33.6 G/DL — SIGNIFICANT CHANGE UP (ref 32–37)
MCV RBC AUTO: 92.4 FL — SIGNIFICANT CHANGE UP (ref 80–94)
MCV RBC AUTO: 93.5 FL — SIGNIFICANT CHANGE UP (ref 80–94)
METHOD TYPE: SIGNIFICANT CHANGE UP
MONOCYTES # BLD AUTO: 0.52 K/UL — SIGNIFICANT CHANGE UP (ref 0.1–0.6)
MONOCYTES NFR BLD AUTO: 2.2 % — SIGNIFICANT CHANGE UP (ref 1.7–9.3)
NEUTROPHILS # BLD AUTO: 22.6 K/UL — HIGH (ref 1.4–6.5)
NEUTROPHILS NFR BLD AUTO: 93.7 % — HIGH (ref 42.2–75.2)
NRBC # BLD: 2 /100 WBCS — HIGH (ref 0–0)
NRBC # BLD: 3 /100 WBCS — HIGH (ref 0–0)
ORGANISM # SPEC MICROSCOPIC CNT: SIGNIFICANT CHANGE UP
PHOSPHATE SERPL-MCNC: 3.3 MG/DL — SIGNIFICANT CHANGE UP (ref 2.1–4.9)
PLATELET # BLD AUTO: 199 K/UL — SIGNIFICANT CHANGE UP (ref 130–400)
PLATELET # BLD AUTO: 201 K/UL — SIGNIFICANT CHANGE UP (ref 130–400)
PMV BLD: 13 FL — HIGH (ref 7.4–10.4)
PMV BLD: 13.2 FL — HIGH (ref 7.4–10.4)
POTASSIUM SERPL-MCNC: 3.7 MMOL/L — SIGNIFICANT CHANGE UP (ref 3.5–5)
POTASSIUM SERPL-SCNC: 3.7 MMOL/L — SIGNIFICANT CHANGE UP (ref 3.5–5)
PROT SERPL-MCNC: 4 G/DL — LOW (ref 6–8)
PROTHROM AB SERPL-ACNC: 11.9 SEC — SIGNIFICANT CHANGE UP (ref 9.95–12.87)
RBC # BLD: 2.51 M/UL — LOW (ref 4.7–6.1)
RBC # BLD: 2.62 M/UL — LOW (ref 4.7–6.1)
RBC # FLD: 17.6 % — HIGH (ref 11.5–14.5)
RBC # FLD: 18.7 % — HIGH (ref 11.5–14.5)
SODIUM SERPL-SCNC: 146 MMOL/L — SIGNIFICANT CHANGE UP (ref 135–146)
SPECIMEN SOURCE: SIGNIFICANT CHANGE UP
WBC # BLD: 20.67 K/UL — HIGH (ref 4.8–10.8)
WBC # BLD: 24.1 K/UL — HIGH (ref 4.8–10.8)
WBC # FLD AUTO: 20.67 K/UL — HIGH (ref 4.8–10.8)
WBC # FLD AUTO: 24.1 K/UL — HIGH (ref 4.8–10.8)

## 2023-08-13 PROCEDURE — 99291 CRITICAL CARE FIRST HOUR: CPT

## 2023-08-13 PROCEDURE — 71045 X-RAY EXAM CHEST 1 VIEW: CPT | Mod: 26

## 2023-08-13 PROCEDURE — 99233 SBSQ HOSP IP/OBS HIGH 50: CPT

## 2023-08-13 RX ORDER — DEXTROSE 50 % IN WATER 50 %
25 SYRINGE (ML) INTRAVENOUS ONCE
Refills: 0 | Status: DISCONTINUED | OUTPATIENT
Start: 2023-08-13 | End: 2023-08-15

## 2023-08-13 RX ORDER — SODIUM CHLORIDE 9 MG/ML
1000 INJECTION, SOLUTION INTRAVENOUS
Refills: 0 | Status: DISCONTINUED | OUTPATIENT
Start: 2023-08-13 | End: 2023-08-15

## 2023-08-13 RX ORDER — DEXTROSE 50 % IN WATER 50 %
15 SYRINGE (ML) INTRAVENOUS ONCE
Refills: 0 | Status: DISCONTINUED | OUTPATIENT
Start: 2023-08-13 | End: 2023-08-15

## 2023-08-13 RX ORDER — SODIUM CHLORIDE 9 MG/ML
1000 INJECTION, SOLUTION INTRAVENOUS
Refills: 0 | Status: DISCONTINUED | OUTPATIENT
Start: 2023-08-13 | End: 2023-08-13

## 2023-08-13 RX ORDER — PANTOPRAZOLE SODIUM 20 MG/1
8 TABLET, DELAYED RELEASE ORAL
Qty: 80 | Refills: 0 | Status: DISCONTINUED | OUTPATIENT
Start: 2023-08-13 | End: 2023-08-14

## 2023-08-13 RX ORDER — ALLOPURINOL 300 MG
300 TABLET ORAL DAILY
Refills: 0 | Status: DISCONTINUED | OUTPATIENT
Start: 2023-08-13 | End: 2023-09-18

## 2023-08-13 RX ORDER — MIDODRINE HYDROCHLORIDE 2.5 MG/1
15 TABLET ORAL EVERY 8 HOURS
Refills: 0 | Status: DISCONTINUED | OUTPATIENT
Start: 2023-08-13 | End: 2023-08-15

## 2023-08-13 RX ORDER — INSULIN LISPRO 100/ML
VIAL (ML) SUBCUTANEOUS
Refills: 0 | Status: DISCONTINUED | OUTPATIENT
Start: 2023-08-13 | End: 2023-08-14

## 2023-08-13 RX ORDER — HYDROCORTISONE 20 MG
50 TABLET ORAL EVERY 12 HOURS
Refills: 0 | Status: DISCONTINUED | OUTPATIENT
Start: 2023-08-13 | End: 2023-08-17

## 2023-08-13 RX ADMIN — MEROPENEM 100 MILLIGRAM(S): 1 INJECTION INTRAVENOUS at 05:12

## 2023-08-13 RX ADMIN — Medication 50 MILLIGRAM(S): at 05:11

## 2023-08-13 RX ADMIN — MIDODRINE HYDROCHLORIDE 15 MILLIGRAM(S): 2.5 TABLET ORAL at 13:40

## 2023-08-13 RX ADMIN — PANTOPRAZOLE SODIUM 40 MILLIGRAM(S): 20 TABLET, DELAYED RELEASE ORAL at 05:20

## 2023-08-13 RX ADMIN — MIDODRINE HYDROCHLORIDE 15 MILLIGRAM(S): 2.5 TABLET ORAL at 21:23

## 2023-08-13 RX ADMIN — MEROPENEM 100 MILLIGRAM(S): 1 INJECTION INTRAVENOUS at 17:20

## 2023-08-13 RX ADMIN — Medication 50 MILLIGRAM(S): at 17:18

## 2023-08-13 RX ADMIN — CASPOFUNGIN ACETATE 260 MILLIGRAM(S): 7 INJECTION, POWDER, LYOPHILIZED, FOR SOLUTION INTRAVENOUS at 16:00

## 2023-08-13 RX ADMIN — PANTOPRAZOLE SODIUM 10 MG/HR: 20 TABLET, DELAYED RELEASE ORAL at 23:03

## 2023-08-13 RX ADMIN — Medication 3: at 17:42

## 2023-08-13 RX ADMIN — Medication 1 APPLICATION(S): at 17:19

## 2023-08-13 RX ADMIN — Medication 1 APPLICATION(S): at 05:12

## 2023-08-13 RX ADMIN — SODIUM CHLORIDE 100 MILLILITER(S): 9 INJECTION, SOLUTION INTRAVENOUS at 01:45

## 2023-08-13 RX ADMIN — CHLORHEXIDINE GLUCONATE 1 APPLICATION(S): 213 SOLUTION TOPICAL at 05:13

## 2023-08-13 RX ADMIN — PANTOPRAZOLE SODIUM 10 MG/HR: 20 TABLET, DELAYED RELEASE ORAL at 11:41

## 2023-08-13 RX ADMIN — Medication 300 MILLIGRAM(S): at 11:44

## 2023-08-13 RX ADMIN — MIDODRINE HYDROCHLORIDE 15 MILLIGRAM(S): 2.5 TABLET ORAL at 05:12

## 2023-08-13 RX ADMIN — SODIUM CHLORIDE 50 MILLILITER(S): 9 INJECTION, SOLUTION INTRAVENOUS at 23:03

## 2023-08-13 NOTE — PROGRESS NOTE ADULT - SUBJECTIVE AND OBJECTIVE BOX
Gastroenterology progress note:     Patient is a 88y old  Male who presents with a chief complaint of DKA/HHS (13 Aug 2023 08:39)       Admitted on: 08-04-23    We are following the patient for: melena        Interval History:    No acute events overnight.   melena over night       PAST MEDICAL & SURGICAL HISTORY:  HTN (hypertension)      Gout      BPH (benign prostatic hyperplasia)      Parkinson disease      HLD (hyperlipidemia)      Smoker within last 12 months      Diabetes mellitus      No significant past surgical history          MEDICATIONS  (STANDING):  allopurinol 300 milliGRAM(s) Oral daily  bacitracin   Ointment 1 Application(s) Topical two times a day  caspofungin IVPB      caspofungin IVPB 50 milliGRAM(s) IV Intermittent every 24 hours  chlorhexidine 2% Cloths 1 Application(s) Topical daily  hydrocortisone sodium succinate Injectable 50 milliGRAM(s) IV Push every 12 hours  lactated ringers. 1000 milliLiter(s) (50 mL/Hr) IV Continuous <Continuous>  meropenem  IVPB 1000 milliGRAM(s) IV Intermittent every 12 hours  midodrine 15 milliGRAM(s) Oral every 8 hours  pantoprazole Infusion 8 mG/Hr (10 mL/Hr) IV Continuous <Continuous>    MEDICATIONS  (PRN):      Allergies  No Known Allergies      Review of Systems:   Cardiovascular:  No Chest Pain, No Palpitations  Respiratory:  No Cough, No Dyspnea  Gastrointestinal:  As described in HPI  Skin:  No Skin Lesions, No Jaundice  Neuro:  No Syncope, No Dizziness    Physical Examination:  T(C): 35.8 (08-13-23 @ 08:00), Max: 36.6 (08-13-23 @ 04:00)  HR: 67 (08-13-23 @ 12:00) (60 - 96)  BP: 125/72 (08-13-23 @ 12:00) (95/53 - 138/63)  RR: 23 (08-13-23 @ 12:00) (14 - 28)  SpO2: 95% (08-13-23 @ 12:00) (95% - 100%)      08-12-23 @ 07:01  -  08-13-23 @ 07:00  --------------------------------------------------------  IN: 1201.6 mL / OUT: 979 mL / NET: 222.6 mL    08-13-23 @ 07:01  -  08-13-23 @ 13:22  --------------------------------------------------------  IN: 100 mL / OUT: 41 mL / NET: 59 mL        GENERAL: AAOx1, mild distress NG in place   HEAD:  Atraumatic, Normocephalic  EYES: conjunctiva and sclera clear  NECK: Supple, no JVD or thyromegaly  CHEST/LUNG: Bilateral BS; coarse   HEART: Regular rate and rhythm; normal S1, S2   ABDOMEN:mildly distended, tender in lower abdominal area,  Soft,  Bowel sounds present  NEUROLOGY: No asterixis or tremor.   SKIN: Intact, no jaundice        Data:                        7.8    24.10 )-----------( 201      ( 13 Aug 2023 05:25 )             23.2     Hgb trend:  7.8  08-13-23 @ 05:25  7.8  08-12-23 @ 22:50  7.8  08-12-23 @ 19:53  7.5  08-12-23 @ 04:29  7.9  08-11-23 @ 20:55  7.6  08-11-23 @ 15:52  7.8  08-11-23 @ 13:30  7.9  08-11-23 @ 05:00  8.6  08-10-23 @ 23:10      08-10-23 @ 07:01  -  08-11-23 @ 07:00  --------------------------------------------------------  IN: 597 mL      08-13    146  |  114<H>  |  33<H>  ----------------------------<  254<H>  3.7   |  22  |  1.3    Ca    6.6<L>      13 Aug 2023 05:25  Phos  3.3     08-13  Mg     2.5     08-13    TPro  4.0<L>  /  Alb  1.7<L>  /  TBili  0.3  /  DBili  x   /  AST  48<H>  /  ALT  24  /  AlkPhos  113  08-13    Liver panel trend:  TBili 0.3   /   AST 48   /   ALT 24   /   AlkP 113   /   Tptn 4.0   /   Alb 1.7    /   DBili --      08-13  TBili 0.3   /   AST 56   /   ALT 26   /   AlkP 121   /   Tptn 3.9   /   Alb 1.6    /   DBili --      08-12  TBili 0.3   /   AST 75   /   ALT 24   /   AlkP 84   /   Tptn 3.9   /   Alb 1.7    /   DBili --      08-12  TBili 0.4   /   AST 47   /   ALT 17   /   AlkP 69   /   Tptn 3.8   /   Alb 1.5    /   DBili --      08-11  TBili 0.6   /   AST 56   /   ALT 18   /   AlkP 76   /   Tptn 4.3   /   Alb 1.8    /   DBili --      08-09  TBili 0.5   /   AST 37   /   ALT 15   /   AlkP 68   /   Tptn 4.1   /   Alb 1.8    /   DBili --      08-09  TBili 0.6   /   AST 35   /   ALT 15   /   AlkP 71   /   Tptn 4.1   /   Alb 1.8    /   DBili --      08-08  TBili 0.3   /   AST 41   /   ALT 16   /   AlkP 66   /   Tptn 4.2   /   Alb 2.2    /   DBili --      08-07  TBili 0.3   /   AST 28   /   ALT 13   /   AlkP 54   /   Tptn 4.4   /   Alb 2.1    /   DBili --      08-06  TBili 0.5   /   AST 25   /   ALT 11   /   AlkP 70   /   Tptn 5.1   /   Alb 2.7    /   DBili --      08-05  TBili 0.4   /   AST 19   /   ALT 11   /   AlkP 85   /   Tptn 5.7   /   Alb 2.9    /   DBili --      08-04  TBili 0.6   /   AST 29   /   ALT 13   /   AlkP 107   /   Tptn 6.7   /   Alb 3.1    /   DBili --      08-04             Radiology:

## 2023-08-13 NOTE — PROGRESS NOTE ADULT - ASSESSMENT
IMPRESSION:    DKA / HHS, resolved  Hungatella species bacteremia   Septic shock   BART - resolved   AMS toxic metabolic  HO DM  HO HTN  Abdominal aortic aneurysm  Parkinson disease  Appendicitis   Hypernatremia   Now GI bleed     PLAN:    CNS: Avoid sedation. Ms unchanged.     HEENT: Oral care.  NGT care     PULMONARY:  HOB @ 45 degrees.  Aspiration precautions, wean oxygen as tolerated. CXR unchanged.      CARDIOVASCULAR:   Avoid volume overload.  Weaned off levophed, IV fluid LR at 50cc/hr while NPO.      GI: ProtonixIV infusion. Keep NPO for now because of the GI bleed. Melena again this morning. GI follow up.     RENAL:  Follow up lytes.  Correct as needed. Voiding. No adler.     INFECTIOUS DISEASE: Repeat blood cultures done and pending.  Continue Meropenem and Caspo for now. ID follow up. Fungitell pending.     HEMATOLOGICAL:  DVT prophylaxis: SCDs. HIT panel negative. CBC in the afternoon. Keep hgb more than 7.     ENDOCRINE:  Follow up FS Q6h.  Insulin protocol.     MUSCULOSKELETAL: bedrest.    If no planned interventions then transfer to SDU.     Prognosis very poor. DNI DNR.

## 2023-08-13 NOTE — PROGRESS NOTE ADULT - ASSESSMENT
ASSESSMENT  89 y/o M w/ PMH of DM, HTN coming from home with complaint of decreased appetite, increased urinary output and craving sweets x 2 weeks. Admitted for management of DKA.     IMPRESSION  #GIB  #Septic shock requiring pressors   #Hungatella bacteremia likely GI translocation in the setting of appendicitis  8/8 BCX NGTD   8/5 BCX NGTD   8/4 UCX NGTD  8/4 BCX + 1/2 bottles  < from: CT Angio Abdomen and Pelvis w/ IV Cont (08.10.23 @ 01:18) >  No evidence of bowel ischemia. 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 Collateral flow presumed   to be present again there is no evidence of bowel ischemia.  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 Findings are concerning for appendicitis.  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  Additional findings detailed in the body the report  #DKA/HHS  #Atelectasis  Creatinine: 1.1 mg/dL (08.08.23 @ 05:55)  Weight (kg): 62.6 (08-04-23 @ 20:37)  crcl 41      RECOMMENDATIONS  - f/u bcx   - Rashi 1g q12h IV 8/7-  - On Caspo-  no evidence of perforation/ischemia on CT. OK for now, D/C if BCX fungitell NEG   - Surgery following  - Grave prognosis without surgical intervention, GOC    If any questions, please call or send a message on [employer] Teams  Please continue to update ID with any pertinent new laboratory or radiographic findings

## 2023-08-13 NOTE — PROGRESS NOTE ADULT - SUBJECTIVE AND OBJECTIVE BOX
MILEY MARES  88y, Male  Allergy: No Known Allergies      LOS  9d    CHIEF COMPLAINT: DKA/HHS (13 Aug 2023 13:22)      INTERVAL EVENTS/HPI  - T(F): , Max: 97.8 (08-13-23 @ 04:00)  - WBC Count: 24.10 (08-13-23 @ 05:25)  WBC Count: 26.77 (08-12-23 @ 22:50)     - Creatinine: 1.3 (08-13-23 @ 05:25)  Creatinine: 1.2 (08-12-23 @ 22:50)     -   -   -     ROS  cannot obtain secondary to patient's sedation and/or mental status    VITALS:  T(F): 97, Max: 97.8 (08-13-23 @ 04:00)  HR: 62  BP: 98/59  RR: 13Vital Signs Last 24 Hrs  T(C): 36.1 (13 Aug 2023 12:00), Max: 36.6 (13 Aug 2023 04:00)  T(F): 97 (13 Aug 2023 12:00), Max: 97.8 (13 Aug 2023 04:00)  HR: 62 (13 Aug 2023 14:00) (60 - 96)  BP: 98/59 (13 Aug 2023 13:00) (95/53 - 138/63)  BP(mean): 74 (13 Aug 2023 13:00) (67 - 102)  RR: 13 (13 Aug 2023 14:00) (13 - 28)  SpO2: 94% (13 Aug 2023 14:00) (94% - 100%)    Parameters below as of 13 Aug 2023 12:00  Patient On (Oxygen Delivery Method): nasal cannula  O2 Flow (L/min): 2      PHYSICAL EXAM:  Gen: chronically ill appearing   HEENT: Normocephalic, atraumatic  Neck: supple, no lymphadenopathy  CV: Regular rate & regular rhythm  Lungs: decreased BS at bases, no fremitus  Abdomen: Soft, BS present  Ext: Warm, well perfused  Neuro: non focal, not following commands  Skin: no rash, no erythema  Lines: no phlebitis   FH: Non-contributory  Social Hx: Non-contributory    TESTS & MEASUREMENTS:                        7.8    24.10 )-----------( 201      ( 13 Aug 2023 05:25 )             23.2     08-13    146  |  114<H>  |  33<H>  ----------------------------<  254<H>  3.7   |  22  |  1.3    Ca    6.6<L>      13 Aug 2023 05:25  Phos  3.3     08-13  Mg     2.5     08-13    TPro  4.0<L>  /  Alb  1.7<L>  /  TBili  0.3  /  DBili  x   /  AST  48<H>  /  ALT  24  /  AlkPhos  113  08-13      LIVER FUNCTIONS - ( 13 Aug 2023 05:25 )  Alb: 1.7 g/dL / Pro: 4.0 g/dL / ALK PHOS: 113 U/L / ALT: 24 U/L / AST: 48 U/L / GGT: x           Urinalysis Basic - ( 13 Aug 2023 05:25 )    Color: x / Appearance: x / SG: x / pH: x  Gluc: 254 mg/dL / Ketone: x  / Bili: x / Urobili: x   Blood: x / Protein: x / Nitrite: x   Leuk Esterase: x / RBC: x / WBC x   Sq Epi: x / Non Sq Epi: x / Bacteria: x        Culture - Blood (collected 08-08-23 @ 11:58)  Source: .Blood None  Preliminary Report (08-12-23 @ 23:00):    No growth at 4 days    Culture - Blood (collected 08-05-23 @ 07:10)  Source: .Blood Blood-Peripheral  Final Report (08-10-23 @ 19:01):    No growth at 5 days    Culture - Urine (collected 08-04-23 @ 14:42)  Source: Clean Catch Clean Catch (Midstream)  Final Report (08-05-23 @ 19:52):    No growth    Culture - Blood (collected 08-04-23 @ 11:20)  Source: .Blood Blood-Peripheral  Gram Stain (08-06-23 @ 02:58):    Growth in anaerobic bottle: Gram Negative Rods  Final Report (08-07-23 @ 19:28):    Growth in anaerobic bottle: Most closely resembling Hungatella species    "Susceptibilities not performed"    Please Note:************************************************    Hungatella species    may appear as gram negative rods in direct smears of clinical specimens.    Direct identification is available within approximately 3-5    hours either by Blood Panel Multiplexed PCR or Direct    MALDI-TOF. Details: [email or web address]  Organism: Blood Culture PCR (08-07-23 @ 19:28)  Organism: Blood Culture PCR (08-07-23 @ 19:28)      Method Type: PCR      -  Blood PCR Panel: NEG    Culture - Blood (collected 08-04-23 @ 11:20)  Source: .Blood Blood-Peripheral  Final Report (08-09-23 @ 23:00):    No growth at 5 days        Lactate, Blood: 1.5 mmol/L (08-13-23 @ 05:40)  Lactate, Blood: 1.4 mmol/L (08-11-23 @ 05:00)  Blood Gas Venous - Lactate: 2.00 mmol/L (08-10-23 @ 10:10)  Lactate, Blood: 2.5 mmol/L (08-10-23 @ 04:20)  Lactate, Blood: 7.1 mmol/L (08-09-23 @ 20:53)      INFECTIOUS DISEASES TESTING  MRSA PCR Result.: Negative (08-10-23 @ 10:55)  MRSA PCR Result.: Negative (08-07-23 @ 11:30)  Procalcitonin, Serum: 0.67 (08-06-23 @ 05:05)  Procalcitonin, Serum: 0.48 (08-04-23 @ 23:05)  COVID-19 PCR: NotDetec (11-21-22 @ 18:43)      INFLAMMATORY MARKERS      RADIOLOGY & ADDITIONAL TESTS:  I have personally reviewed the last available Chest xray  CXR  Xray Chest 1 View- PORTABLE-Urgent:   ACC: [id number] EXAM:  XR CHEST PORTABLE URGENT 1V   ORDERED BY: MÓNICA MINER     PROCEDURE DATE:  08/12/2023          INTERPRETATION:  CLINICAL HISTORY / REASON FOR EXAM: Shortness of breath.    COMPARISON: Chest radiograph from August 12, 2023.    TECHNIQUE/POSITIONING: Satisfactory. Single image, AP portable chest   radiograph.    FINDINGS:    SUPPORT DEVICES: Interval placement of right sided central venous   catheter with distal tip overlying the SVC. Enteric tube courses below   the left hemidiaphragm, with distal tip overlying the gastric body.    CARDIAC/MEDIASTINUM/HILUM: Unchanged cardiac silhouette.    LUNG PARENCHYMA/PLEURA: No focal consolidation or pleural effusion. No   pneumothorax.    SKELETON/SOFT TISSUES: Unchanged.      IMPRESSION:    Interval placement of enteric tube in satisfactory position.    --- End of Report ---            LUCIO HOOVER MD; Attending Radiologist  This document has been electronically signed. Aug 12 2023 12:27PM (08-12-23 @ 11:55)      CT      CARDIOLOGY TESTING  12 Lead ECG:   Ventricular Rate 106 BPM    Atrial Rate 106 BPM    P-R Interval 152 ms    QRS Duration 126 ms    Q-T Interval 400 ms    QTC Calculation(Bazett) 531 ms    P Axis 34 degrees    R Axis -50 degrees    T Axis -18 degrees    Diagnosis Line Sinus tachycardia  Right bundle branch block  Left anterior fascicular block  *** Bifascicular block ***  Moderate voltage criteria for LVH, may be normal variant      Abnormal ECG    Confirmed by RUDY FLOR MD ([id number]) on 8/4/2023 11:24:55 AM (08-04-23 @ 10:57)      MEDICATIONS  allopurinol 300  bacitracin   Ointment 1  caspofungin IVPB 50  caspofungin IVPB   chlorhexidine 2% Cloths 1  hydrocortisone sodium succinate Injectable 50  lactated ringers. 1000  meropenem  IVPB 1000  midodrine 15  pantoprazole Infusion 8      WEIGHT  Weight (kg): 62.6 (08-04-23 @ 20:37)  Creatinine: 1.3 mg/dL (08-13-23 @ 05:25)  Creatinine: 1.2 mg/dL (08-12-23 @ 22:50)  Creatinine: 1.3 mg/dL (08-12-23 @ 19:53)      ANTIBIOTICS:  caspofungin IVPB 50 milliGRAM(s) IV Intermittent every 24 hours  caspofungin IVPB      meropenem  IVPB 1000 milliGRAM(s) IV Intermittent every 12 hours      All available historical records have been reviewed

## 2023-08-13 NOTE — PROGRESS NOTE ADULT - ASSESSMENT
89 y/o M w/ PMH of DM, HTN coming from home with complaint of decreased appetite, increased urinary output and craving sweets x 2 weeks. Admitted for management of DKA.     IMPRESSION  #Septic shock requiring pressors   #Hungatella bacteremia likely GI translocation in the setting of appendicitis  8/8 BCX NGTD   8/5 BCX NGTD   8/4 UCX NGTD  8/4 BCX + 1/2 bottles    - F up BCX of 8/12    - Patient improved initially but on 8/9 -> At 6.30 pm patient started having diffuse severe abdominal pain with increased pressors (levo and jasen).   - Abdomen Not peritonitic.   -- Ct angio abdomen/pelvis to r/o ischemia ->No evidence of bowel ischemia. Unchanged findings suggesting acute appendicitis.  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 Collateral flow presumed to be present again there is no evidence of bowel ischemia.   - lactate level was 7 on 8/9 -> 2.5 on 8/10 -> 1.4 on 8/11.   - Surgery team on board ->                        - No surgery indicated --> not a surgical candidate at this time                       - C/w antibiotics --> non operative management for acute appendicitis    - Off pressors  - hydro stress dose > reduced to 50q8 - day 3  - Repeat UA, and MRSA negative on 8/10  - IVF based on cheetah  - Repeat TTE on 8/11: stable EF 61%  - no documented fevers  - on meropenem 1g Q12 day 7  - Caspofungin day 4 - F up fungitell   - repeated MRSA negative.   - ID on board  - NPO >per surgery there is no plan for surgical intervention at this time and per GI there is no plan for EGD as patient is too unstable for it at this time so could feed him but holding due to multiple episodes of melena today  - midodrine increased to 15mg BID    # Hypernatremia:  - D5W started at rate of 100 cc/hr  - F up BMP at 4.30 and 11 to avoid overcorrection    #Melena likely d/t PUD vs AVM vs Esophageal ulcer vs Erosive Hemorrhagic Gastritis vs Dieulafoy lesion Vs Malignancy    - Patient started having melena on Wednesday night 8/9 (NADEGE on 8/9 -in the morning- was negative)   - CT abdomen with PO contrast and repeated with IV contrast -> negative for bleed  - on 8/10 drop in hgb from 5 am till 11 am (from 7.2 to 6) after having 2 episodes of melena -> 2 units of pRBC were given  - NPO  - PPI BID IV  - GI on board  - EGD not possible now- patient is unstable  - HIT panel negative  - DIC panel repeated twice negative.     #DKA/HHS,   - resolved  - endo on board   - insulin IV drip when glucose level > 200    #AMS toxic metabolic   - CT head -> negative  - EEG -> There were no findings of active epilepsy.     89 y/o M w/ PMH of DM, HTN coming from home with complaint of decreased appetite, increased urinary output and craving sweets x 2 weeks. Admitted for management of DKA.     IMPRESSION  #Septic shock requiring pressors   #Hungatella bacteremia likely GI translocation in the setting of appendicitis  8/8 BCX NGTD   8/5 BCX NGTD   8/4 UCX NGTD  8/4 BCX + 1/2 bottles    - F up BCX of 8/12    - Patient improved initially but on 8/9 -> At 6.30 pm patient started having diffuse severe abdominal pain with increased pressors (levo and jasen).   - Abdomen Not peritonitic.   -- Ct angio abdomen/pelvis to r/o ischemia ->No evidence of bowel ischemia. Unchanged findings suggesting acute appendicitis.  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 Collateral flow presumed to be present again there is no evidence of bowel ischemia.   - lactate level was 7 on 8/9 -> 2.5 on 8/10 -> 1.4 on 8/11.   - Surgery team on board ->                        - No surgery indicated --> not a surgical candidate at this time                       - C/w antibiotics --> non operative management for acute appendicitis    - Off pressors  - hydro stress dose > reduced to 50q8 - day 3  - Repeat UA, and MRSA negative on 8/10  - IVF based on cheetah  - Repeat TTE on 8/11: stable EF 61%  - no documented fevers  - on meropenem 1g Q12 day 7  - Caspofungin day 4 - F up fungitell   - repeated MRSA negative.   - ID on board  - NPO >per surgery there is no plan for surgical intervention at this time and per GI there is no plan for EGD as patient is too unstable for it at this time so could feed him but holding due to multiple episodes of melena today  - midodrine increased to 15mg BID    # Hypernatremia 149 -> resolved with D5W    #Melena likely d/t PUD vs AVM vs Esophageal ulcer vs Erosive Hemorrhagic Gastritis vs Dieulafoy lesion Vs Malignancy    - Patient started having melena on Wednesday night 8/9 (NADEGE on 8/9 -in the morning- was negative)   - CT abdomen with PO contrast and repeated with IV contrast -> negative for bleed  - on 8/10 drop in hgb from 5 am till 11 am (from 7.2 to 6) after having 2 episodes of melena -> 2 units of pRBC were given  - NPO  - PPI BID IV  - GI on board  - EGD not possible now- patient is unstable  - HIT panel negative  - DIC panel repeated twice negative.     #DKA/HHS,   - resolved  - endo on board   - insulin IV drip when glucose level > 200    #AMS toxic metabolic   - CT head -> negative  - EEG -> There were no findings of active epilepsy.     87 y/o M w/ PMH of DM, HTN coming from home with complaint of decreased appetite, increased urinary output and craving sweets x 2 weeks. Admitted for management of DKA.     IMPRESSION  #Septic shock requiring pressors   #Hungatella bacteremia likely GI translocation in the setting of appendicitis  8/8 BCX NGTD   8/5 BCX NGTD   8/4 UCX NGTD  8/4 BCX + 1/2 bottles  - F up BCX of 8/12  - Patient improved initially but on 8/9 -> At 6.30 pm patient started having diffuse severe abdominal pain with increased pressors (levo and jasen).   - Abdomen Not peritonitic.   -- Ct angio abdomen/pelvis to r/o ischemia ->No evidence of bowel ischemia. Unchanged findings suggesting acute appendicitis.  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 Collateral flow presumed to be present again there is no evidence of bowel ischemia.   - lactate level was 7 on 8/9 -> 2.5 on 8/10 -> 1.4 on 8/11.   - Surgery team on board ->                        - No surgery indicated --> not a surgical candidate at this time                       - C/w antibiotics --> non operative management for acute appendicitis  - hydro stress dose > reduced to 50q8 - day 3  - Repeat UA, and MRSA negative on 8/10  - IVF based on cheetah  - Repeat TTE on 8/11: stable EF 61%  - no documented fevers  - on meropenem 1g Q12 day 7  - Caspofungin day 4 - F up fungitell   - repeated MRSA negative.   - ID on board  - NPO >per surgery there is no plan for surgical intervention at this time and per GI there is no plan for EGD as patient is too unstable for it at this time so could feed him but holding due to multiple episodes of melena today  - midodrine increased to 15mg BID - off pressors for now    # Hypernatremia 149 -> resolved with D5W    #Melena likely d/t PUD vs AVM vs Esophageal ulcer vs Erosive Hemorrhagic Gastritis vs Dieulafoy lesion Vs Malignancy    - Patient started having melena on Wednesday night 8/9 (NADEGE on 8/9 -in the morning- was negative)   - CT abdomen with PO contrast and repeated with IV contrast -> negative for bleed  - on 8/10 drop in hgb from 5 am till 11 am (from 7.2 to 6) after having 2 episodes of melena -> 2 units of pRBC were given  - NPO except water as per GI  - Protonix drip  - GI on board  - possible EGD? pt off pressors now - ARISCAT score 35  - EGD possibly   - HIT panel negative  - DIC panel repeated twice negative.     #DKA/HHS, - resolved  - resolved  - endo on board   - insulin IV drip when glucose level > 200    #AMS toxic metabolic   - CT head -> negative  - EEG -> There were no findings of active epilepsy.

## 2023-08-13 NOTE — PROGRESS NOTE ADULT - SUBJECTIVE AND OBJECTIVE BOX
Patient is a 88y old  Male who presents with a chief complaint of DKA/HHS (12 Aug 2023 15:00)      INTERVAL HPI/OVERNIGHT EVENTS:   No overnight events   Afebrile, hemodynamically stable     ICU Vital Signs Last 24 Hrs  T(C): 36.4 (12 Aug 2023 20:00), Max: 37 (12 Aug 2023 12:00)  T(F): 97.6 (12 Aug 2023 20:00), Max: 98.6 (12 Aug 2023 12:00)  HR: 63 (13 Aug 2023 00:00) (58 - 96)  BP: 122/60 (13 Aug 2023 00:00) (86/50 - 138/63)  BP(mean): 87 (13 Aug 2023 00:00) (61 - 102)  ABP: --  ABP(mean): --  RR: 21 (13 Aug 2023 00:00) (14 - 33)  SpO2: 97% (13 Aug 2023 00:00) (95% - 100%)    O2 Parameters below as of 13 Aug 2023 00:00  Patient On (Oxygen Delivery Method): nasal cannula  O2 Flow (L/min): 2        I&O's Summary    11 Aug 2023 07:01  -  12 Aug 2023 07:00  --------------------------------------------------------  IN: 1465.2 mL / OUT: 805 mL / NET: 660.2 mL    12 Aug 2023 07:01  -  13 Aug 2023 01:46  --------------------------------------------------------  IN: 401.6 mL / OUT: 584 mL / NET: -182.4 mL          LABS:                        7.8    26.77 )-----------( 202      ( 12 Aug 2023 22:50 )             23.5     08-12    149<H>  |  114<H>  |  36<H>  ----------------------------<  264<H>  3.7   |  17  |  1.2    Ca    6.4<L>      12 Aug 2023 22:50  Phos  2.7     08-12  Mg     2.6     08-12    TPro  3.9<L>  /  Alb  1.6<L>  /  TBili  0.3  /  DBili  x   /  AST  56<H>  /  ALT  26  /  AlkPhos  121<H>  08-12    PT/INR - ( 11 Aug 2023 05:00 )   PT: 13.00 sec;   INR: 1.14 ratio         PTT - ( 11 Aug 2023 05:00 )  PTT:34.4 sec  Urinalysis Basic - ( 12 Aug 2023 22:50 )    Color: x / Appearance: x / SG: x / pH: x  Gluc: 264 mg/dL / Ketone: x  / Bili: x / Urobili: x   Blood: x / Protein: x / Nitrite: x   Leuk Esterase: x / RBC: x / WBC x   Sq Epi: x / Non Sq Epi: x / Bacteria: x      CAPILLARY BLOOD GLUCOSE      POCT Blood Glucose.: 193 mg/dL (13 Aug 2023 00:14)  POCT Blood Glucose.: 358 mg/dL (12 Aug 2023 16:55)  POCT Blood Glucose.: 293 mg/dL (12 Aug 2023 13:56)  POCT Blood Glucose.: 148 mg/dL (12 Aug 2023 07:06)  POCT Blood Glucose.: 146 mg/dL (12 Aug 2023 06:02)  POCT Blood Glucose.: 117 mg/dL (12 Aug 2023 05:10)  POCT Blood Glucose.: 103 mg/dL (12 Aug 2023 04:11)  POCT Blood Glucose.: 99 mg/dL (12 Aug 2023 03:10)  POCT Blood Glucose.: 116 mg/dL (12 Aug 2023 02:23)        RADIOLOGY & ADDITIONAL TESTS:    Consultant(s) Notes Reviewed:  [x ] YES  [ ] NO    MEDICATIONS  (STANDING):  acetaminophen   IVPB .. 1000 milliGRAM(s) IV Intermittent once  allopurinol 300 milliGRAM(s) Oral daily  atorvastatin 10 milliGRAM(s) Oral at bedtime  bacitracin   Ointment 1 Application(s) Topical two times a day  caspofungin IVPB 50 milliGRAM(s) IV Intermittent every 24 hours  caspofungin IVPB      chlorhexidine 2% Cloths 1 Application(s) Topical daily  dextrose 5%. 1000 milliLiter(s) (100 mL/Hr) IV Continuous <Continuous>  hydrocortisone sodium succinate Injectable 50 milliGRAM(s) IV Push every 8 hours  meropenem  IVPB 1000 milliGRAM(s) IV Intermittent every 12 hours  midodrine 15 milliGRAM(s) Oral every 8 hours  norepinephrine Infusion 0.05 MICROgram(s)/kG/Min (5.87 mL/Hr) IV Continuous <Continuous>  pantoprazole  Injectable 40 milliGRAM(s) IV Push two times a day      PHYSICAL EXAM:  CONSTITUTIONAL:  Ill appearing in NAD    ENT:   Airway patent,   Mouth with normal mucosa.   Poor dentition    EYES:   Pupils equal,   Round and reactive to light.    CARDIAC:   Normal rate,   Tachycardic      RESPIRATORY: on 2 L NC  No wheezing// rhonchi + bilateral  Bilateral BS  Normal chest expansion  Not tachypneic,  No use of accessory muscles    GASTROINTESTINAL:  Abdomen Distended, Diffuse tenderness less than yesterday  No guarding,   + BS    MUSCULOSKELETAL:   Range of motion is not limited,  No clubbing, cyanosis    NEUROLOGICAL:   More Alert today    SKIN:   Skin normal color for race,   Right forearm blistering lesion    Care Discussed with Consultants/Other Providers [ x] YES  [ ] NO

## 2023-08-13 NOTE — PROGRESS NOTE ADULT - ASSESSMENT
87 y/o M w/ PMH of DM, HTN, Parkinson disease, Gout, HLD, former smoker coming from home with complaint of decreased appetite, increased urinary output and craving sweets x 2 weeks. Patient admitted on 8/4/23 for DKA and hypotension , was started on levophed lose dose. HIs course complicated with appendicitis , Hungatella bacteremia, acute anemia requiring 3u. GI consulted today for 3 episodes of melena and drop in Hb to 6. Last night patient was complaining of severe lower abdominal pain and distesion s/p CTAP IC with thrombus in decending AA, SMA and celiac axis narrowing but no bowel ischemia , lactate went upto 7 from 1.5    #Acute Normocytic anemia   #Melena likely d/t PUD vs AVM vs Esophageal ulcer vs Erosive Hemorrhagic Gastritis vs Dieulafoy lesion Vs Malignancy   - currently off pressors   - Baseline hemoglobin - 12  - Hemoglobin on admission - 11.2 (8/4)>>6.6>>3u> 8.6>7.9   - Coags - INR:  0.98 (7/4)>>1.35 (8/8)  - Lactate: 1.4<2.5< 7.5< 1.5  - CTAP IV Con: 8/10: No evidence of bowel ischemia. Unchanged findings suggesting acute appendicitis. Unopacified inferior mesenteric artery is new compared to 11/17/2022 as there is increased thrombus within the unchanged size of abdominal aortic   aneurysm with decreased opacified aortic lumen. Collateral flow presumed to be present again there is no evidence of bowel ischemia.  - CTAP oral and IV con: 8/8: New mild dilation of the appendix with small volume free fluid in the bilateral lower quadrants. Findings are concerning for appendicitis. 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 cm, as described  - INR: 1.37>1.14  - was on fondaparinux  - deemed high risk for endoscopy on friday and a shared decision was made to pursue conservative therapy  - recall for continuos melena. currently off pressors     #Rec  - NPO except for ice ships for now  - Trend H&H BID  - Please target Hb  >8  - Please avoid any NSAIDs  - will plan for EGD in am (discussed with wife at bedside and ICU team)    #Pancreatic body cyst  - 0.7x0.4cm cyst in pancreatic body, No PD dilation    Rec  MRI pancreas protocol as outpatient  - Follow up with our [facility] located at [address] [place] [address]. Phone Number: [phone]

## 2023-08-13 NOTE — CHART NOTE - NSCHARTNOTEFT_GEN_A_CORE
Registered Dietitian Follow-Up     Patient Profile Reviewed                           Yes [x]   No []     Nutrition History Previously Obtained        Yes [x]  No []       Pertinent Subjective Information: I spoke to the patient's spouse, who is present. Per spouse, the patient states they want to drink water. I also spoke to the RN, who states a swallow evaluation is pending. In addition, per RN, the patient has GI bleed and an EGD is pending.      Pertinent Medical Interventions: 89y/o male with h/o DM and HTN, coming from home with complaint of decreased appetite, increased urinary output and craving sweets x2 weeks. Found to have DKA/HHS (now resolved), gram (-) farhat bacteremia, septic shock on levo, LLL pneumonia and acute normocytic anemia. Admitted for management of DKA. Hospital course is comlicated by GI bleed, Hungatella bacteremia likely GI translocation in the setting of appendicitis. MAP-85.     Diet order: () NPO      Anthropometrics:  - Ht: 5'6"  - Wt: 62.6kg  - %wt change  - BMI: 22 (WNL)  - IBW: 65kg     Pertinent Lab Data: () Na-146, K-3.7, CL-114, BUN-33, Cr-1.3, GFR-53, Glucose-254mg/dL, POC-256, 193, 358, 293, 148mg/dL, Corrected Ca-8.4 (low), H/H-7.8/23.2, WBC-24.1     Pertinent Meds: Cancidas, Lactated Ringers at 50mL/hr, Allopurinol, Solucortef, Midodrine, Protonix      Physical Findings:  - Appearance: Currently asleep  - GI function: Per RN, the patient had a liquid and soft dark bowel movement every two hours   - Tubes: NG Tube (possibly for suctioning)  - Oral/Mouth cavity: A swallow re-evaluation was attempted (8/10) but could not be completed since the patient is NPO secondary to abdominal distension and no surgical intervention.   - Skin: Intact (Britton Score-15)      Nutrition Requirements  Weight Used: 62.6kg -Derived from nutrition note ()     Estimated Energy Needs    Continue [x]  Adjust []  Adjusted Energy Recommendations: 1500-1750kcal/day based on MSJ 1250*SF 1.2-1.4     Estimated Protein Needs    Continue [x]  Adjust []  Adjusted Protein Recommendations: 75-88gm/day (1.2-1.4grams/kg of admit weight) -Derived from nutrition note ()     Estimated Fluid Needs        Continue [x]  Adjust []  Adjusted Fluid Recommendations: 1565mL/day (25mL/kg of admit weight) -Derived from nutrition note ()     Nutrient Intake: No nutrient intake at this time      [x] Previous Nutrition Diagnosis: Inadequate Oral Intake            [x] Ongoing          [] Resolved    [] No active nutrition diagnosis identified at this time     Nutrition Diagnostic #1  Problem:  Etiology:  Statement:     Nutrition Diagnostic #2  Problem:  Etiology:  Statement:     Nutrition Intervention:  1.Meals and Snacks vs Enteral Nutrition vs Nutrition Support Team   2.Coordination of Care      Goal/Expected Outcome:  1.Meet >85% estimated nutritional needs in 3-5 days (High Risk)    Indicator/Monitorin.Diet order, energy intake, nutrition focused physical findings, Na, CL, Ca, glucose, renal and anemia profile    -A made a few attempts to contact the patient's physician but was unsuccessful.     Recommendations:  1.Recommend follow up on swallow evaluation recs  2.If the patient fails a swallow evaluation, recommend implement TF with Glucerna 1.2 at 480mL Q8hrs with water flush of 50mL AC/PC, to provide (1728kcal, 86gm protein, 1166mL free H2O)  2.If enteral nutrition cannot be implemented, recommend consult the nutrition support team

## 2023-08-13 NOTE — PROGRESS NOTE ADULT - SUBJECTIVE AND OBJECTIVE BOX
Patient is a 88y old  Male who presents with a chief complaint of DKA/HHS (13 Aug 2023 01:45)        Over Night Events:    Afebrile   Off pressors   Melena episodes         ROS:  See HPI    PHYSICAL EXAM    ICU Vital Signs Last 24 Hrs  T(C): 36.6 (13 Aug 2023 04:00), Max: 37 (12 Aug 2023 12:00)  T(F): 97.8 (13 Aug 2023 04:00), Max: 98.6 (12 Aug 2023 12:00)  HR: 61 (13 Aug 2023 07:00) (60 - 96)  BP: 113/57 (13 Aug 2023 07:00) (95/53 - 138/63)  BP(mean): 82 (13 Aug 2023 07:00) (67 - 102)  ABP: --  ABP(mean): --  RR: 18 (13 Aug 2023 07:00) (14 - 28)  SpO2: 96% (13 Aug 2023 07:00) (95% - 100%)    O2 Parameters below as of 13 Aug 2023 07:00  Patient On (Oxygen Delivery Method): nasal cannula  O2 Flow (L/min): 2          General: unchanged overall   HEENT: DELFINO             Lymphatic system: No cervical LN   Lungs: Bilateral BS; coarse   Cardiovascular: Regular   Gastrointestinal: Soft, Positive BS  Extremities: No clubbing.  Moves extremities.   Skin: Warm, intact  Neurological: No motor or sensory deficit       08-12-23 @ 07:01  -  08-13-23 @ 07:00  --------------------------------------------------------  IN:    dextrose 5%: 700 mL    Enteral Tube Flush: 50 mL    IV PiggyBack: 50 mL    Lactated Ringers: 250 mL    Norepinephrine: 151.6 mL  Total IN: 1201.6 mL    OUT:    Stool (mL): 4 mL    Voided (mL): 975 mL  Total OUT: 979 mL    Total NET: 222.6 mL          LABS:                            7.8    24.10 )-----------( 201      ( 13 Aug 2023 05:25 )             23.2                                               08-13    146  |  114<H>  |  33<H>  ----------------------------<  254<H>  3.7   |  22  |  1.3    Ca    6.6<L>      13 Aug 2023 05:25  Phos  3.3     08-13  Mg     2.5     08-13    TPro  4.0<L>  /  Alb  1.7<L>  /  TBili  0.3  /  DBili  x   /  AST  48<H>  /  ALT  24  /  AlkPhos  113  08-13                                             Urinalysis Basic - ( 13 Aug 2023 05:25 )    Color: x / Appearance: x / SG: x / pH: x  Gluc: 254 mg/dL / Ketone: x  / Bili: x / Urobili: x   Blood: x / Protein: x / Nitrite: x   Leuk Esterase: x / RBC: x / WBC x   Sq Epi: x / Non Sq Epi: x / Bacteria: x                                                  LIVER FUNCTIONS - ( 13 Aug 2023 05:25 )  Alb: 1.7 g/dL / Pro: 4.0 g/dL / ALK PHOS: 113 U/L / ALT: 24 U/L / AST: 48 U/L / GGT: x                                                                                                                                       MEDICATIONS  (STANDING):  allopurinol 300 milliGRAM(s) Oral daily  bacitracin   Ointment 1 Application(s) Topical two times a day  caspofungin IVPB 50 milliGRAM(s) IV Intermittent every 24 hours  caspofungin IVPB      chlorhexidine 2% Cloths 1 Application(s) Topical daily  hydrocortisone sodium succinate Injectable 50 milliGRAM(s) IV Push every 8 hours  lactated ringers. 1000 milliLiter(s) (50 mL/Hr) IV Continuous <Continuous>  meropenem  IVPB 1000 milliGRAM(s) IV Intermittent every 12 hours  midodrine 15 milliGRAM(s) Oral every 8 hours  pantoprazole  Injectable 40 milliGRAM(s) IV Push two times a day    MEDICATIONS  (PRN):      Xrays:                                                                                     ECHO

## 2023-08-14 ENCOUNTER — RESULT REVIEW (OUTPATIENT)
Age: 88
End: 2023-08-14

## 2023-08-14 ENCOUNTER — TRANSCRIPTION ENCOUNTER (OUTPATIENT)
Age: 88
End: 2023-08-14

## 2023-08-14 LAB
ALBUMIN SERPL ELPH-MCNC: 2 G/DL — LOW (ref 3.5–5.2)
ALP SERPL-CCNC: 115 U/L — SIGNIFICANT CHANGE UP (ref 30–115)
ALT FLD-CCNC: 24 U/L — SIGNIFICANT CHANGE UP (ref 0–41)
ANION GAP SERPL CALC-SCNC: 12 MMOL/L — SIGNIFICANT CHANGE UP (ref 7–14)
AST SERPL-CCNC: 47 U/L — HIGH (ref 0–41)
BASOPHILS # BLD AUTO: 0.01 K/UL — SIGNIFICANT CHANGE UP (ref 0–0.2)
BASOPHILS # BLD AUTO: 0.02 K/UL — SIGNIFICANT CHANGE UP (ref 0–0.2)
BASOPHILS NFR BLD AUTO: 0.1 % — SIGNIFICANT CHANGE UP (ref 0–1)
BASOPHILS NFR BLD AUTO: 0.1 % — SIGNIFICANT CHANGE UP (ref 0–1)
BILIRUB SERPL-MCNC: 0.4 MG/DL — SIGNIFICANT CHANGE UP (ref 0.2–1.2)
BLD GP AB SCN SERPL QL: SIGNIFICANT CHANGE UP
BUN SERPL-MCNC: 28 MG/DL — HIGH (ref 10–20)
CALCIUM SERPL-MCNC: 6.6 MG/DL — LOW (ref 8.4–10.5)
CHLORIDE SERPL-SCNC: 115 MMOL/L — HIGH (ref 98–110)
CO2 SERPL-SCNC: 20 MMOL/L — SIGNIFICANT CHANGE UP (ref 17–32)
CREAT SERPL-MCNC: 1 MG/DL — SIGNIFICANT CHANGE UP (ref 0.7–1.5)
EGFR: 73 ML/MIN/1.73M2 — SIGNIFICANT CHANGE UP
EOSINOPHIL # BLD AUTO: 0 K/UL — SIGNIFICANT CHANGE UP (ref 0–0.7)
EOSINOPHIL # BLD AUTO: 0 K/UL — SIGNIFICANT CHANGE UP (ref 0–0.7)
EOSINOPHIL NFR BLD AUTO: 0 % — SIGNIFICANT CHANGE UP (ref 0–8)
EOSINOPHIL NFR BLD AUTO: 0 % — SIGNIFICANT CHANGE UP (ref 0–8)
GLUCOSE BLDC GLUCOMTR-MCNC: 126 MG/DL — HIGH (ref 70–99)
GLUCOSE BLDC GLUCOMTR-MCNC: 129 MG/DL — HIGH (ref 70–99)
GLUCOSE BLDC GLUCOMTR-MCNC: 158 MG/DL — HIGH (ref 70–99)
GLUCOSE BLDC GLUCOMTR-MCNC: 202 MG/DL — HIGH (ref 70–99)
GLUCOSE BLDC GLUCOMTR-MCNC: 254 MG/DL — HIGH (ref 70–99)
GLUCOSE BLDC GLUCOMTR-MCNC: 259 MG/DL — HIGH (ref 70–99)
GLUCOSE BLDC GLUCOMTR-MCNC: 284 MG/DL — HIGH (ref 70–99)
GLUCOSE BLDC GLUCOMTR-MCNC: 290 MG/DL — HIGH (ref 70–99)
GLUCOSE SERPL-MCNC: 302 MG/DL — HIGH (ref 70–99)
HCT VFR BLD CALC: 24.1 % — LOW (ref 42–52)
HCT VFR BLD CALC: 25.5 % — LOW (ref 42–52)
HGB BLD-MCNC: 7.8 G/DL — LOW (ref 14–18)
HGB BLD-MCNC: 8.4 G/DL — LOW (ref 14–18)
IMM GRANULOCYTES NFR BLD AUTO: 1.3 % — HIGH (ref 0.1–0.3)
IMM GRANULOCYTES NFR BLD AUTO: 1.3 % — HIGH (ref 0.1–0.3)
LYMPHOCYTES # BLD AUTO: 0.31 K/UL — LOW (ref 1.2–3.4)
LYMPHOCYTES # BLD AUTO: 0.46 K/UL — LOW (ref 1.2–3.4)
LYMPHOCYTES # BLD AUTO: 2 % — LOW (ref 20.5–51.1)
LYMPHOCYTES # BLD AUTO: 3.2 % — LOW (ref 20.5–51.1)
MAGNESIUM SERPL-MCNC: 2.3 MG/DL — SIGNIFICANT CHANGE UP (ref 1.8–2.4)
MCHC RBC-ENTMCNC: 30.6 PG — SIGNIFICANT CHANGE UP (ref 27–31)
MCHC RBC-ENTMCNC: 31.2 PG — HIGH (ref 27–31)
MCHC RBC-ENTMCNC: 32.4 G/DL — SIGNIFICANT CHANGE UP (ref 32–37)
MCHC RBC-ENTMCNC: 32.9 G/DL — SIGNIFICANT CHANGE UP (ref 32–37)
MCV RBC AUTO: 94.5 FL — HIGH (ref 80–94)
MCV RBC AUTO: 94.8 FL — HIGH (ref 80–94)
MONOCYTES # BLD AUTO: 0.25 K/UL — SIGNIFICANT CHANGE UP (ref 0.1–0.6)
MONOCYTES # BLD AUTO: 0.27 K/UL — SIGNIFICANT CHANGE UP (ref 0.1–0.6)
MONOCYTES NFR BLD AUTO: 1.6 % — LOW (ref 1.7–9.3)
MONOCYTES NFR BLD AUTO: 1.9 % — SIGNIFICANT CHANGE UP (ref 1.7–9.3)
NEUTROPHILS # BLD AUTO: 13.52 K/UL — HIGH (ref 1.4–6.5)
NEUTROPHILS # BLD AUTO: 14.65 K/UL — HIGH (ref 1.4–6.5)
NEUTROPHILS NFR BLD AUTO: 93.5 % — HIGH (ref 42.2–75.2)
NEUTROPHILS NFR BLD AUTO: 95 % — HIGH (ref 42.2–75.2)
NRBC # BLD: 0 /100 WBCS — SIGNIFICANT CHANGE UP (ref 0–0)
NRBC # BLD: 1 /100 WBCS — HIGH (ref 0–0)
PHOSPHATE SERPL-MCNC: 3.2 MG/DL — SIGNIFICANT CHANGE UP (ref 2.1–4.9)
PLATELET # BLD AUTO: 202 K/UL — SIGNIFICANT CHANGE UP (ref 130–400)
PLATELET # BLD AUTO: 234 K/UL — SIGNIFICANT CHANGE UP (ref 130–400)
PMV BLD: 12.5 FL — HIGH (ref 7.4–10.4)
PMV BLD: 12.7 FL — HIGH (ref 7.4–10.4)
POTASSIUM SERPL-MCNC: 3.7 MMOL/L — SIGNIFICANT CHANGE UP (ref 3.5–5)
POTASSIUM SERPL-SCNC: 3.7 MMOL/L — SIGNIFICANT CHANGE UP (ref 3.5–5)
PROCALCITONIN SERPL-MCNC: 0.34 NG/ML — HIGH (ref 0.02–0.1)
PROT SERPL-MCNC: 4.2 G/DL — LOW (ref 6–8)
RBC # BLD: 2.55 M/UL — LOW (ref 4.7–6.1)
RBC # BLD: 2.69 M/UL — LOW (ref 4.7–6.1)
RBC # FLD: 19.7 % — HIGH (ref 11.5–14.5)
RBC # FLD: 20.1 % — HIGH (ref 11.5–14.5)
SODIUM SERPL-SCNC: 147 MMOL/L — HIGH (ref 135–146)
WBC # BLD: 14.46 K/UL — HIGH (ref 4.8–10.8)
WBC # BLD: 15.42 K/UL — HIGH (ref 4.8–10.8)
WBC # FLD AUTO: 14.46 K/UL — HIGH (ref 4.8–10.8)
WBC # FLD AUTO: 15.42 K/UL — HIGH (ref 4.8–10.8)

## 2023-08-14 PROCEDURE — 71045 X-RAY EXAM CHEST 1 VIEW: CPT | Mod: 26

## 2023-08-14 PROCEDURE — 99291 CRITICAL CARE FIRST HOUR: CPT

## 2023-08-14 PROCEDURE — 43239 EGD BIOPSY SINGLE/MULTIPLE: CPT | Mod: XU

## 2023-08-14 PROCEDURE — 88312 SPECIAL STAINS GROUP 1: CPT | Mod: 26

## 2023-08-14 PROCEDURE — 88305 TISSUE EXAM BY PATHOLOGIST: CPT | Mod: 26

## 2023-08-14 RX ORDER — INSULIN HUMAN 100 [IU]/ML
4 INJECTION, SOLUTION SUBCUTANEOUS
Qty: 100 | Refills: 0 | Status: DISCONTINUED | OUTPATIENT
Start: 2023-08-14 | End: 2023-08-14

## 2023-08-14 RX ORDER — ACETAMINOPHEN 500 MG
1000 TABLET ORAL ONCE
Refills: 0 | Status: COMPLETED | OUTPATIENT
Start: 2023-08-14 | End: 2023-08-14

## 2023-08-14 RX ORDER — INSULIN LISPRO 100/ML
5 VIAL (ML) SUBCUTANEOUS
Refills: 0 | Status: DISCONTINUED | OUTPATIENT
Start: 2023-08-14 | End: 2023-08-15

## 2023-08-14 RX ORDER — GLUCAGON INJECTION, SOLUTION 0.5 MG/.1ML
1 INJECTION, SOLUTION SUBCUTANEOUS ONCE
Refills: 0 | Status: DISCONTINUED | OUTPATIENT
Start: 2023-08-14 | End: 2023-08-15

## 2023-08-14 RX ORDER — DEXTROSE 50 % IN WATER 50 %
25 SYRINGE (ML) INTRAVENOUS ONCE
Refills: 0 | Status: DISCONTINUED | OUTPATIENT
Start: 2023-08-14 | End: 2023-08-15

## 2023-08-14 RX ORDER — DEXTROSE 50 % IN WATER 50 %
15 SYRINGE (ML) INTRAVENOUS ONCE
Refills: 0 | Status: DISCONTINUED | OUTPATIENT
Start: 2023-08-14 | End: 2023-08-15

## 2023-08-14 RX ORDER — INSULIN GLARGINE 100 [IU]/ML
15 INJECTION, SOLUTION SUBCUTANEOUS EVERY MORNING
Refills: 0 | Status: DISCONTINUED | OUTPATIENT
Start: 2023-08-14 | End: 2023-08-15

## 2023-08-14 RX ORDER — INSULIN HUMAN 100 [IU]/ML
2.5 INJECTION, SOLUTION SUBCUTANEOUS
Qty: 100 | Refills: 0 | Status: DISCONTINUED | OUTPATIENT
Start: 2023-08-14 | End: 2023-08-15

## 2023-08-14 RX ORDER — SODIUM CHLORIDE 9 MG/ML
1000 INJECTION, SOLUTION INTRAVENOUS
Refills: 0 | Status: DISCONTINUED | OUTPATIENT
Start: 2023-08-14 | End: 2023-08-15

## 2023-08-14 RX ORDER — DEXTROSE 50 % IN WATER 50 %
12.5 SYRINGE (ML) INTRAVENOUS ONCE
Refills: 0 | Status: DISCONTINUED | OUTPATIENT
Start: 2023-08-14 | End: 2023-08-15

## 2023-08-14 RX ORDER — INSULIN LISPRO 100/ML
VIAL (ML) SUBCUTANEOUS
Refills: 0 | Status: DISCONTINUED | OUTPATIENT
Start: 2023-08-14 | End: 2023-08-15

## 2023-08-14 RX ORDER — PANTOPRAZOLE SODIUM 20 MG/1
40 TABLET, DELAYED RELEASE ORAL EVERY 12 HOURS
Refills: 0 | Status: DISCONTINUED | OUTPATIENT
Start: 2023-08-14 | End: 2023-08-22

## 2023-08-14 RX ADMIN — Medication 50 MILLIGRAM(S): at 05:14

## 2023-08-14 RX ADMIN — Medication 1 APPLICATION(S): at 17:16

## 2023-08-14 RX ADMIN — MIDODRINE HYDROCHLORIDE 15 MILLIGRAM(S): 2.5 TABLET ORAL at 05:03

## 2023-08-14 RX ADMIN — Medication 300 MILLIGRAM(S): at 17:16

## 2023-08-14 RX ADMIN — Medication 1 APPLICATION(S): at 05:03

## 2023-08-14 RX ADMIN — PANTOPRAZOLE SODIUM 40 MILLIGRAM(S): 20 TABLET, DELAYED RELEASE ORAL at 17:17

## 2023-08-14 RX ADMIN — CASPOFUNGIN ACETATE 260 MILLIGRAM(S): 7 INJECTION, POWDER, LYOPHILIZED, FOR SOLUTION INTRAVENOUS at 11:00

## 2023-08-14 RX ADMIN — CHLORHEXIDINE GLUCONATE 1 APPLICATION(S): 213 SOLUTION TOPICAL at 05:04

## 2023-08-14 RX ADMIN — MIDODRINE HYDROCHLORIDE 15 MILLIGRAM(S): 2.5 TABLET ORAL at 21:39

## 2023-08-14 RX ADMIN — Medication 3: at 21:33

## 2023-08-14 RX ADMIN — MEROPENEM 100 MILLIGRAM(S): 1 INJECTION INTRAVENOUS at 17:18

## 2023-08-14 RX ADMIN — Medication 400 MILLIGRAM(S): at 22:15

## 2023-08-14 RX ADMIN — Medication 3: at 06:01

## 2023-08-14 RX ADMIN — MEROPENEM 100 MILLIGRAM(S): 1 INJECTION INTRAVENOUS at 05:05

## 2023-08-14 RX ADMIN — Medication 50 MILLIGRAM(S): at 17:17

## 2023-08-14 NOTE — CHART NOTE - NSCHARTNOTEFT_GEN_A_CORE
PACU ANESTHESIA ADMISSION NOTE      Procedure:   Post op diagnosis:      ____  Intubated  TV:______       Rate: ______      FiO2: ______    _x___  Patent Airway    _x___  Full return of protective reflexes    _x___  Full recovery from anesthesia / back to baseline status    Vitals:    see anesthesia record    Mental Status:  _x___ Awake   _____ Alert   _____ Drowsy   _____ Sedated    Nausea/Vomiting:  _x___  NO       ______Yes,   See Post - Op Orders         Pain Scale (0-10):  _____    Treatment: ____ None    __x__ See Post - Op/PCA Orders    Post - Operative Fluids:   ____ Oral   ___x_ See Post - Op Orders    Plan: Discharge:   ____Home       _____Floor     _x____Critical Care    _____  Other:_________________    Comments:  No anesthesia issues or complications noted.  Discharge when criteria met.

## 2023-08-14 NOTE — PROGRESS NOTE ADULT - SUBJECTIVE AND OBJECTIVE BOX
Patient is a 88y old  Male who presents with a chief complaint of DKA/HHS (13 Aug 2023 14:50)        Over Night Events:  Remains critically ill.  Melena overnight.  Possible endoscopy today         ROS:     All ROS are negative except HPI         PHYSICAL EXAM    ICU Vital Signs Last 24 Hrs  T(C): 36.7 (14 Aug 2023 04:00), Max: 36.7 (14 Aug 2023 04:00)  T(F): 98 (14 Aug 2023 04:00), Max: 98 (14 Aug 2023 04:00)  HR: 70 (14 Aug 2023 08:00) (56 - 82)  BP: 123/58 (14 Aug 2023 08:00) (98/59 - 148/78)  BP(mean): 83 (14 Aug 2023 08:00) (74 - 110)  ABP: --  ABP(mean): --  RR: 19 (14 Aug 2023 08:00) (13 - 25)  SpO2: 94% (14 Aug 2023 08:00) (92% - 98%)    O2 Parameters below as of 14 Aug 2023 08:00  Patient On (Oxygen Delivery Method): room air            CONSTITUTIONAL:  Ill appearing in NAD    ENT:   Airway patent,   Mouth with normal mucosa.       EYES:   Pupils equal,   Round and reactive to light.    CARDIAC:   Normal rate,   Regular rhythm.        RESPIRATORY:   No wheezing  Bilateral BS  Normal chest expansion  Not tachypneic,  No use of accessory muscles    GASTROINTESTINAL:  Abdomen soft,   Non-tender,   No guarding,   + BS    MUSCULOSKELETAL:   Range of motion is not limited,  No clubbing, cyanosis    NEUROLOGICAL:   More awake.    Moves extremities    SKIN:   Skin normal color for race,   No evidence of rash.        08-13-23 @ 07:01  -  08-14-23 @ 07:00  --------------------------------------------------------  IN:    Enteral Tube Flush: 100 mL    IV PiggyBack: 50 mL    Lactated Ringers: 100 mL    Lactated Ringers: 1100 mL    Pantoprazole: 210 mL  Total IN: 1560 mL    OUT:    Stool (mL): 4 mL    Voided (mL): 562 mL  Total OUT: 566 mL    Total NET: 994 mL      08-14-23 @ 07:01  -  08-14-23 @ 08:39  --------------------------------------------------------  IN:    Lactated Ringers: 100 mL    Pantoprazole: 20 mL  Total IN: 120 mL    OUT:  Total OUT: 0 mL    Total NET: 120 mL          LABS:                            7.8    14.46 )-----------( 202      ( 14 Aug 2023 04:45 )             24.1                                               08-14    147<H>  |  115<H>  |  28<H>  ----------------------------<  302<H>  3.7   |  20  |  1.0    Ca    6.6<L>      14 Aug 2023 04:45  Phos  3.2     08-14  Mg     2.3     08-14    TPro  4.2<L>  /  Alb  2.0<L>  /  TBili  0.4  /  DBili  x   /  AST  47<H>  /  ALT  24  /  AlkPhos  115  08-14      PT/INR - ( 13 Aug 2023 17:18 )   PT: 11.90 sec;   INR: 1.04 ratio         PTT - ( 13 Aug 2023 17:18 )  PTT:33.2 sec                                       Urinalysis Basic - ( 14 Aug 2023 04:45 )    Color: x / Appearance: x / SG: x / pH: x  Gluc: 302 mg/dL / Ketone: x  / Bili: x / Urobili: x   Blood: x / Protein: x / Nitrite: x   Leuk Esterase: x / RBC: x / WBC x   Sq Epi: x / Non Sq Epi: x / Bacteria: x                                                  LIVER FUNCTIONS - ( 14 Aug 2023 04:45 )  Alb: 2.0 g/dL / Pro: 4.2 g/dL / ALK PHOS: 115 U/L / ALT: 24 U/L / AST: 47 U/L / GGT: x                                                  Culture - Blood (collected 12 Aug 2023 11:44)  Source: .Blood None  Preliminary Report (13 Aug 2023 20:01):    No growth at 24 hours                                                                                           MEDICATIONS  (STANDING):  allopurinol 300 milliGRAM(s) Oral daily  bacitracin   Ointment 1 Application(s) Topical two times a day  caspofungin IVPB 50 milliGRAM(s) IV Intermittent every 24 hours  caspofungin IVPB      chlorhexidine 2% Cloths 1 Application(s) Topical daily  dextrose 5%. 1000 milliLiter(s) (100 mL/Hr) IV Continuous <Continuous>  dextrose 50% Injectable 25 Gram(s) IV Push once  hydrocortisone sodium succinate Injectable 50 milliGRAM(s) IV Push every 12 hours  insulin lispro (ADMELOG) corrective regimen sliding scale   SubCutaneous three times a day before meals  lactated ringers. 1000 milliLiter(s) (50 mL/Hr) IV Continuous <Continuous>  meropenem  IVPB 1000 milliGRAM(s) IV Intermittent every 12 hours  midodrine 15 milliGRAM(s) Oral every 8 hours  pantoprazole Infusion 8 mG/Hr (10 mL/Hr) IV Continuous <Continuous>    MEDICATIONS  (PRN):  dextrose Oral Gel 15 Gram(s) Oral once PRN Blood Glucose LESS THAN 70 milliGRAM(s)/deciliter

## 2023-08-14 NOTE — CONSULT NOTE ADULT - ASSESSMENT
- DKA, improving  - acute duodenal ulcer - EGD findings noted above  - post hemorrhagic anemia  - Septic shock requiring pressors         Hungatella bacteremia likely GI translocation in the setting of appendicitis  - dysphagia/ confusion - keep NPO per speech therapist  - prolonged PO or poor intake, since admission    SUGGEST:  - feed by intermittent gravity method, not continuous drip, in pt without protected airway, + confusion, and NG tube  - if feeds intermittent, then insulin should be Lispro given before each feeding, rather than drip  - continue pt's basal insulin  - due to longer period without feeding, start 240 ml Glucerna 1.2 over about 45 min q8h to start  - check glucose, lytes, and phos levels in am - correct lytes and adjust insulin dosing  - then increase to 360 ml Glucerna 1.2 at 7am and 1pm, and 480 ml at 7pm     each feed over 45 min,  with poc glucose testing and pre-feed insulin dosing accordingly  - again, f/u BMP and phos the next AM  d/w resident, questions answered - will follow with you  thank you

## 2023-08-14 NOTE — SWALLOW BEDSIDE ASSESSMENT ADULT - SWALLOW EVAL: DIAGNOSIS
Not appropriate for PO trials at this time 2/2 to increased lethargy and confusion. Inability to maintain arousal during the evaluation. Unaware of feeding situation.

## 2023-08-14 NOTE — PROGRESS NOTE ADULT - ASSESSMENT
87 y/o M w/ PMH of DM, HTN coming from home with complaint of decreased appetite, increased urinary output and craving sweets x 2 weeks. Admitted for management of HHS , now resolved .  Patient clinically stable , hemodynamically stable , Off iv PRESSORS .      IMPRESSION  #Septic shock  THE REQUIRED iV PRESSORS   #Hungatella bacteremia likely GI translocation in the setting of appendicitis  8/8 BCX NGTD   8/5 BCX NGTD   8/4 UCX NGTD  8/4 BCX + 1/2 bottles  - F up BCX of 8/12  - Patient improved initially but on 8/9 -> At 6.30 pm patient started having diffuse severe abdominal pain with increased pressors (levo and jasen).   - Abdomen Not peritonitic.   -- Ct angio abdomen/pelvis to r/o ischemia ->No evidence of bowel ischemia. Unchanged findings suggesting acute appendicitis.  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 Collateral flow presumed to be present again there is no evidence of bowel ischemia.   - lactate level was 7 on 8/9 -> 2.5 on 8/10 -> 1.4 on 8/11.   - Surgery team on board ->                        - No surgery indicated --> not a surgical candidate at this time                       - C/w antibiotics --> non operative management for acute appendicitis  - hydro stress dose > reduced to 50q12H. day 2   - Repeat UA, and MRSA negative on 8/10  - IVF based on cheetah  - Repeat TTE on 8/11: stable EF 61%  - no documented fevers  - on meropenem 1g Q12 day 7  - Caspofungin day 4 - F up fungitell   - repeated MRSA negative.   - ID on board  - NPO >per surgery there is no plan for surgical intervention at this time and per GI there is no plan for EGD as patient is too unstable for it at this time so could feed him but holding due to multiple episodes of melena today  - midodrine increased to 15mg BID - off pressors for now    # Hypernatremia 147 -> patient on LR ,     #Melena likely d/t PUD vs AVM vs Esophageal ulcer vs Erosive Hemorrhagic Gastritis vs Dieulafoy lesion Vs Malignancy    - Patient started having melena on Wednesday night 8/9 (NADEGE on 8/9 -in the morning- was negative)   - CT abdomen with PO contrast and repeated with IV contrast -> negative for bleed  - on 8/10 drop in hgb from 5 am till 11 am (from 7.2 to 6) after having 2 episodes of melena -> 2 units of pRBC were given  - NPO except water as per GI  - Protonix drip switched to BID oral now   - GI on board  -EGD in the afternoon today   - HIT panel negative  - DIC panel repeated twice negative.     #DKA/HHS, - resolved  - resolved  - endo on board   - insulin IV drip today to control HGt numbers     #AMS toxic metabolic   - CT head -> negative  - EEG -> There were no findings of active epilepsy.

## 2023-08-14 NOTE — CHART NOTE - NSCHARTNOTEFT_GEN_A_CORE
EGD :                 Erythema in the stomach compatible with non-erosive gastritis. (Biopsy).  	Normal mucosa in the whole esophagus.  	Ulcer in the duodenal bulb. (Hemostasis, Injection, Biopsy, Thermal Therapy).  	1cm clean based superficial ulcer noted in second portion of duodenum with no active bleeding. Cold forceps biopsies were from the margins. .    PLAN:  Advance diet as tolerated    Continue PPI 40mg PO BID for 8 weeks   Await pathologies    Repeat EGD in 2 months if within goals of care given ulcers    Follow up in [facility] in 3-4 weeks    Communicated with Bao Pagan over phone  Communicated with primary team member EGD :                 Erythema in the stomach compatible with non-erosive gastritis. (Biopsy).  	Normal mucosa in the whole esophagus.  	A single superficial 1 cm non- bleeding ulcer with visible vessel (Abdon IIa) was found in the duodenal bulb. Hemostasis was performed. 3 cc of Epinephrine 1/17473 injection was successfully applied for hemostasis and the visible vessel was cauterized with hot biopsy forceps. Multiple cold forceps biopsies were performed for histology from the edges of the ulcer. (Hemostasis, Injection, Biopsy).  	1cm clean based superficial ulcer noted in second portion of duodenum with no active bleeding. Cold forceps biopsies were from the margins    PLAN:  Advance diet as tolerated   per S&S  Continue PPI 40mg PO BID for 8 weeks   Await pathologies    Repeat EGD in 2 months if within goals of care given ulcers    Follow up in [facility] in 3-4 weeks    Communicated with Bao Pagan over phone  Communicated with primary team member

## 2023-08-14 NOTE — ASU PREOP CHECKLIST - NOTHING BY MOUTH SINCE
Called pt on 7/11 in am, and left VM that she should call us back to discuss results, again called on 7/12/18-unavailable, left VM that they should call us back.  Will try again tomorrow.    Please check what time will work for me to call, so that we are able to discuss results-thanks1   14-Aug-2023 11:34

## 2023-08-14 NOTE — PROGRESS NOTE ADULT - SUBJECTIVE AND OBJECTIVE BOX
24H events:    Patient is a 88y old Male who presents with a chief complaint of DKA/HHS (14 Aug 2023 08:38)    Primary diagnosis of Hyperosmolar syndrome    Today is hospital day 10d. This morning patient was seen and examined at bedside, patient obtunded , not replying to verbal question , patient speaks cantonese       PAST MEDICAL & SURGICAL HISTORY  HTN (hypertension)    Gout    BPH (benign prostatic hyperplasia)    Parkinson disease    HLD (hyperlipidemia)    Smoker within last 12 months    Diabetes mellitus    No significant past surgical history      SOCIAL HISTORY:  Social History:      ALLERGIES:  No Known Allergies    MEDICATIONS:  STANDING MEDICATIONS  allopurinol 300 milliGRAM(s) Oral daily  bacitracin   Ointment 1 Application(s) Topical two times a day  caspofungin IVPB      caspofungin IVPB 50 milliGRAM(s) IV Intermittent every 24 hours  chlorhexidine 2% Cloths 1 Application(s) Topical daily  dextrose 5%. 1000 milliLiter(s) IV Continuous <Continuous>  dextrose 50% Injectable 25 Gram(s) IV Push once  hydrocortisone sodium succinate Injectable 50 milliGRAM(s) IV Push every 12 hours  insulin lispro (ADMELOG) corrective regimen sliding scale   SubCutaneous three times a day before meals  insulin regular Infusion 4 Unit(s)/Hr IV Continuous <Continuous>  lactated ringers. 1000 milliLiter(s) IV Continuous <Continuous>  meropenem  IVPB 1000 milliGRAM(s) IV Intermittent every 12 hours  midodrine 15 milliGRAM(s) Oral every 8 hours  pantoprazole  Injectable 40 milliGRAM(s) IV Push every 12 hours    PRN MEDICATIONS  dextrose Oral Gel 15 Gram(s) Oral once PRN    VITALS:   T(F): 98  HR: 70  BP: 123/58  RR: 19  SpO2: 94%    PHYSICAL EXAM:  GENERAL:   (  ) NAD, lying in bed comfortably     (x  ) obtunded     (  ) lethargic     (  ) somnolent    HEAD:   ( x ) Atraumatic     (  ) hematoma     (  ) laceration (specify location:       )     NECK:  ( x ) Supple     (  ) neck stiffness     (  ) nuchal rigidity     (  )  no JVD     (  ) JVD present ( -- cm)    HEART:  Rate -->     (x  ) normal rate     (  ) bradycardic     (  ) tachycardic  Rhythm -->     (x  ) regular     (  ) regularly irregular     (  ) irregularly irregular  Murmurs -->     (x  ) normal s1s2     (  ) systolic murmur     (  ) diastolic murmur     (  ) continuous murmur      (  ) S3 present     (  ) S4 present    LUNGS:   (x  )Unlabored respirations     (  ) tachypnea  ( x ) B/L air entry     (  ) decreased breath sounds in:  (location     )    (  ) no adventitious sound     (  ) crackles     (  ) wheezing      (x  ) rhonchi      (specify location:       )  (  ) chest wall tenderness (specify location:       )    ABDOMEN:   (  ) Soft     (x  ) tense   |   ( x ) nondistended     (  ) distended   |   ( x ) +BS     (  ) hypoactive bowel sounds     (  ) hyperactive bowel sounds  (  ) nontender     (  ) RUQ tenderness     ( x ) RLQ tenderness     (  ) LLQ tenderness     (  ) epigastric tenderness     (  ) diffuse tenderness  (  ) Splenomegaly      (  ) Hepatomegaly      (  ) Jaundice     (  ) ecchymosis     EXTREMITIES:  ( x ) Normal     (  ) Rash     (  ) ecchymosis     (  ) varicose veins      (  ) pitting edema     (  ) non-pitting edema   (  ) ulceration     (  ) gangrene:     (location:     )    NERVOUS SYSTEM:    (  ) A&Ox3     (x  ) confused     (  ) lethargic  CN II-XII:     (x ) Intact     (  ) deficits found     (Specify:     )   Further neuroassesment could not be done due to the state of the patient     SKIN:   ( x ) No rashes or lesions     (  ) maculopapular rash     (  ) pustules     (  ) vesicles     (  ) ulcer     (  ) ecchymosis     (specify location:     )        ( x ) Central Line:   Location: (x  ) Right IJ     (  ) Left IJ     (  ) Right Fem     (  ) Left Fem    ( x ) Ng tube : position checked by xray : adequate     LABS:                        7.8    14.46 )-----------( 202      ( 14 Aug 2023 04:45 )             24.1     08-14    147<H>  |  115<H>  |  28<H>  ----------------------------<  302<H>  3.7   |  20  |  1.0    Ca    6.6<L>      14 Aug 2023 04:45  Phos  3.2     08-14  Mg     2.3     08-14    TPro  4.2<L>  /  Alb  2.0<L>  /  TBili  0.4  /  DBili  x   /  AST  47<H>  /  ALT  24  /  AlkPhos  115  08-14    PT/INR - ( 13 Aug 2023 17:18 )   PT: 11.90 sec;   INR: 1.04 ratio         PTT - ( 13 Aug 2023 17:18 )  PTT:33.2 sec  Urinalysis Basic - ( 14 Aug 2023 04:45 )    Color: x / Appearance: x / SG: x / pH: x  Gluc: 302 mg/dL / Ketone: x  / Bili: x / Urobili: x   Blood: x / Protein: x / Nitrite: x   Leuk Esterase: x / RBC: x / WBC x   Sq Epi: x / Non Sq Epi: x / Bacteria: x            Culture - Blood (collected 12 Aug 2023 11:44)  Source: .Blood None  Preliminary Report (13 Aug 2023 20:01):    No growth at 24 hours          RADIOLOGY:    Stable bibasilar opacities. on chest xray

## 2023-08-14 NOTE — CHART NOTE - NSCHARTNOTEFT_GEN_A_CORE
Blood glucose @ 21:00 is 290, resumed insulin infusion at 4mL/hr.  F/u BMP @ 23:30. Blood glucose @ 21:00 is 290, resumed insulin infusion at 4mL/hr.  F/u BMP @ 23:30.  Lab notified me (8/14) blood cultures positive for gram negative and positive organisms f/u susceptibilities. Blood glucose @ 21:00 is 290, resumed insulin infusion at 4mL/hr.  F/u BMP @ 23:30.  Lab notified me (8/14) blood cultures positive for gram negative and positive organisms f/u susceptibilities. Currently on meropenem. Blood glucose @ 21:00 is 290, resumed insulin infusion at 4mL/hr.  F/u BMP.  Lab notified me (8/14) blood cultures positive for gram negative and positive organisms f/u susceptibilities. Currently on meropenem.  FS is 131 @ 1:00, discontinued the bolus, continue the drip.

## 2023-08-14 NOTE — CONSULT NOTE ADULT - SUBJECTIVE AND OBJECTIVE BOX
NUTRITION SUPPORT TEAM  -  CONSULT NOTE     87 y/o M w/ PMH of DM, HTN coming from home with complaint of decreased appetite, increased urinary output and craving sweets x 2 weeks. Daughter is caretaker, states patient behavior has changed. Within the last 2 weeks, He is less active, requesting more coca cola and sugar. Pt noted to be hypotensive upon arrival.     ED vitals:  BP 74/ 50, HR 87, Temp 97.2F, satting 95% on room air  Labs significant for WBC 14K, Na 123 (corrected 141), Cr 2.4, AG 23, BHB 5.1. VBG pH 7.19, Lactate 5.8, K 8.6, pCO2 39  EKG tachycardia, bifascicular block, RBBB  CXR unremarkable  CT A/P: Extensive coronary atherosclerosis. Dependent atelectasis at the right base. 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 Hepatic cysts. Questionable sludge within the gallbladder. Unchanged 1 cm cystic lesion in the pancreatic body   s/p calcium gluconate 1g, Lasix 40mg push x1, barcarb 50meq, started on insulin drip at 7 units/hr.   Admitted to MICU for DKA/HHS.   (04 Aug 2023 15:16)    Hospital course:  Pt admitted to MICU for DKA with AMS. Also found to have Hungatella bacteremia in the setting of appendicitis.   DKA on admission with A1c of 15.5%. Patient had completed his insulin ggt with improvement in AG from 23 -> 9. Has had good glycemic control since then, but insulin drip intermittently off leading to hyperglycemia.   Drop in Hb noted - EGD today + large 1 cm duodenal ulcer with visible vessel.  Speech therapist evaluation rec pt be kept NPO with alternate means of nutrition.    NUTRITION SUPPORT NOTE:  medical resident called me this evening with questions r/t enteral feeding methods and ordering. Additional Q included glucose management, mismatch of feeds and insulin drip protocol (which was off during EGD as glc wnl), and noted that pt has been NPO for much of the past 10 days since admission.  pt awake, confused, NG in nose, right IJ  abd not distended, + tenderness R>L    EGD :               Erythema in the stomach compatible with non-erosive gastritis. (Biopsy).  	Normal mucosa in the whole esophagus.  	A single superficial 1 cm non- bleeding ulcer with visible vessel (Abdon IIa) was found in the duodenal bulb. Hemostasis was performed. 3 cc of Epinephrine 1/74158 injection was successfully applied for hemostasis and the visible vessel was cauterized with hot biopsy forceps. Multiple cold forceps biopsies were performed for histology from the edges of the ulcer. (Hemostasis, Injection, Biopsy).  	1cm clean based superficial ulcer noted in second portion of duodenum with no active bleeding. Cold forceps biopsies were from the margins    REVIEW OF SYSTEMS:  Negative except as noted above.     PAST MEDICAL/SURGICAL HISTORY:   HTN (hypertension)  Gout  BPH (benign prostatic hyperplasia)  Parkinson disease  HLD (hyperlipidemia)  Smoker within last 12 months  Diabetes mellitus  No significant past surgical history    ALLERGIES:  No Known Allergies    VITALS:  T(F): 98 (08-14 @ 14:00), Max: 98 (08-14 @ 14:00)  HR: 84 (08-14 @ 19:00) (69 - 102)  BP: 111/60 (08-14 @ 19:00) (90/64 - 157/68)  RR: 18 (08-14 @ 19:00) (18 - 27)  SpO2: 96% (08-14 @ 19:00) (93% - 98%)    HEIGHT/WEIGHT/BMI:   Height (cm): 167.6 (08-14), 162.6 (11-21), 162.6 (10-09)  Weight (kg): 62.6 (08-14), 61.2 (10-09)  BMI (kg/m2): 22.3 (08-14), 23.1 (11-21), 23.1 (10-09)    I/Os:     08-13-23 @ 07:01  -  08-14-23 @ 07:00  --------------------------------------------------------  IN:    Enteral Tube Flush: 100 mL    IV PiggyBack: 50 mL    Lactated Ringers: 100 mL    Lactated Ringers: 1100 mL    Pantoprazole: 210 mL  Total IN: 1560 mL    OUT:    Stool (mL): 4 mL    Voided (mL): 562 mL  Total OUT: 566 mL    Total NET: 994 mL    STANDING MEDICATIONS:   allopurinol 300 milliGRAM(s) Oral daily  bacitracin   Ointment 1 Application(s) Topical two times a day  caspofungin IVPB 50 milliGRAM(s) IV Intermittent every 24 hours  caspofungin IVPB      chlorhexidine 2% Cloths 1 Application(s) Topical daily  hydrocortisone sodium succinate Injectable 50 milliGRAM(s) IV Push every 12 hours  insulin glargine Injectable (LANTUS) 15 Unit(s) SubCutaneous every morning  insulin lispro (ADMELOG) corrective regimen sliding scale   SubCutaneous three times a day before meals  insulin lispro Injectable (ADMELOG) 5 Unit(s) SubCutaneous before breakfast  insulin lispro Injectable (ADMELOG) 5 Unit(s) SubCutaneous before dinner  insulin lispro Injectable (ADMELOG) 5 Unit(s) SubCutaneous before lunch  lactated ringers. 1000 milliLiter(s) IV Continuous <Continuous>  meropenem  IVPB 1000 milliGRAM(s) IV Intermittent every 12 hours  midodrine 15 milliGRAM(s) Oral every 8 hours  pantoprazole  Injectable 40 milliGRAM(s) IV Push every 12 hours      LABS:                         7.8    14.46 )-----------( 202      ( 14 Aug 2023 04:45 )               24.1     147<H>  |  115<H>  |  28<H>  ----------------------------<  302<H>          (08-14-23 @ 04:45)  3.7   |  20  |  1.0    Ca    6.6<L>          (08-14-23 @ 04:45)  Phos  3.2         (08-14-23 @ 04:45)  Mg     2.3         (08-14-23 @ 04:45)    TPro  4.2<L>  /  Alb  2.0<L>  /  TBili  0.4  /  DBili  x   /  AST  47<H>  /  ALT  24  /  AlkPhos  115       08-14-23 @ 04:45    A1c: 15.5 % (08-07-23 @ 05:10)      Blood Glucose (Past 24 hours):  158 mg/dL (08-14 @ 15:28)  129 mg/dL (08-14 @ 14:12)  126 mg/dL (08-14 @ 11:33)  202 mg/dL (08-14 @ 10:26)  284 mg/dL (08-14 @ 09:36)  259 mg/dL (08-14 @ 05:56)  235 mg/dL (08-13 @ 23:10)      RADIOLOGY:   < from: Xray Chest 1 View- PORTABLE-Routine (Xray Chest 1 View- PORTABLE-Routine in AM.) (08.13.23 @ 06:48) >  Support devices: Stable positioning    Cardiac/mediastinum/hilum: No significant change    Skeleton/soft tissues: No significant change.    Lung parenchyma/Pleura: Stable bibasilar opacities. No definite   pneumothorax.    < end of copied text >  < from: CT Angio Abdomen and Pelvis w/ IV Cont (08.10.23 @ 01:18) >  No evidence of bowel ischemia. Unchanged findings suggesting acute   appendicitis.    Unopacified inferior mesenteric artery is new compared to 11/17/2022 as   there is increased thrombus within the unchanged size of abdominal aortic   aneurysm with decreased opacified aortic lumen. Collateral flow presumed   to be present again there is no evidence of bowel ischemia.    < end of copied text >

## 2023-08-14 NOTE — PROGRESS NOTE ADULT - ASSESSMENT
IMPRESSION:    DKA / HHS, resolved  Hungatella species bacteremia   Septic shock resolved   Suspected UGIB   BART - resolved   AMS toxic metabolic  HO DM  HO HTN  Abdominal aortic aneurysm  Parkinson disease  Hypernatremia       PLAN:    CNS: Avoid sedation.     HEENT: Oral care.  NGT care     PULMONARY:  HOB @ 45 degrees.  Aspiration precautions.  On RA.        CARDIOVASCULAR:   Avoid volume overload.  IV fluid LR at 50cc/hr while NPO.      GI: Protonix BID.  Keep NPO for possible EGD today.  Melena again this morning. GI follow up.     RENAL:  Follow up lytes.  Correct as needed. Voiding. No adler. Bladder scans    INFECTIOUS DISEASE: Repeat blood cultures done and pending.  Continue Meropenem and Caspo for now. ID follow up. Fungitell pending.     HEMATOLOGICAL:  DVT prophylaxis: SCDs. HIT panel negative. Monitor CBC.  Keep hgb more than 7.     ENDOCRINE:  Follow up FS Q6h.  Insulin protocol.     MUSCULOSKELETAL: bedrest.      Disposition based on EGD findings.     Prognosis very poor. DNI DNR.

## 2023-08-15 LAB
ALBUMIN SERPL ELPH-MCNC: 1.9 G/DL — LOW (ref 3.5–5.2)
ALP SERPL-CCNC: 124 U/L — HIGH (ref 30–115)
ALT FLD-CCNC: 24 U/L — SIGNIFICANT CHANGE UP (ref 0–41)
ANION GAP SERPL CALC-SCNC: 10 MMOL/L — SIGNIFICANT CHANGE UP (ref 7–14)
ANION GAP SERPL CALC-SCNC: 10 MMOL/L — SIGNIFICANT CHANGE UP (ref 7–14)
ANION GAP SERPL CALC-SCNC: 7 MMOL/L — SIGNIFICANT CHANGE UP (ref 7–14)
ANION GAP SERPL CALC-SCNC: 8 MMOL/L — SIGNIFICANT CHANGE UP (ref 7–14)
AST SERPL-CCNC: 46 U/L — HIGH (ref 0–41)
BASOPHILS # BLD AUTO: 0.01 K/UL — SIGNIFICANT CHANGE UP (ref 0–0.2)
BASOPHILS NFR BLD AUTO: 0.1 % — SIGNIFICANT CHANGE UP (ref 0–1)
BILIRUB SERPL-MCNC: 0.4 MG/DL — SIGNIFICANT CHANGE UP (ref 0.2–1.2)
BUN SERPL-MCNC: 24 MG/DL — HIGH (ref 10–20)
BUN SERPL-MCNC: 25 MG/DL — HIGH (ref 10–20)
BUN SERPL-MCNC: 25 MG/DL — HIGH (ref 10–20)
BUN SERPL-MCNC: 26 MG/DL — HIGH (ref 10–20)
CALCIUM SERPL-MCNC: 6.3 MG/DL — LOW (ref 8.4–10.4)
CALCIUM SERPL-MCNC: 6.4 MG/DL — LOW (ref 8.4–10.5)
CALCIUM SERPL-MCNC: 6.6 MG/DL — LOW (ref 8.4–10.5)
CALCIUM SERPL-MCNC: 6.7 MG/DL — LOW (ref 8.4–10.5)
CHLORIDE SERPL-SCNC: 116 MMOL/L — HIGH (ref 98–110)
CHLORIDE SERPL-SCNC: 119 MMOL/L — HIGH (ref 98–110)
CHLORIDE SERPL-SCNC: 119 MMOL/L — HIGH (ref 98–110)
CHLORIDE SERPL-SCNC: 121 MMOL/L — HIGH (ref 98–110)
CO2 SERPL-SCNC: 21 MMOL/L — SIGNIFICANT CHANGE UP (ref 17–32)
CO2 SERPL-SCNC: 22 MMOL/L — SIGNIFICANT CHANGE UP (ref 17–32)
CO2 SERPL-SCNC: 23 MMOL/L — SIGNIFICANT CHANGE UP (ref 17–32)
CO2 SERPL-SCNC: 23 MMOL/L — SIGNIFICANT CHANGE UP (ref 17–32)
CREAT SERPL-MCNC: 0.9 MG/DL — SIGNIFICANT CHANGE UP (ref 0.7–1.5)
CREAT SERPL-MCNC: 0.9 MG/DL — SIGNIFICANT CHANGE UP (ref 0.7–1.5)
CREAT SERPL-MCNC: 1 MG/DL — SIGNIFICANT CHANGE UP (ref 0.7–1.5)
CREAT SERPL-MCNC: 1.1 MG/DL — SIGNIFICANT CHANGE UP (ref 0.7–1.5)
EGFR: 65 ML/MIN/1.73M2 — SIGNIFICANT CHANGE UP
EGFR: 73 ML/MIN/1.73M2 — SIGNIFICANT CHANGE UP
EGFR: 83 ML/MIN/1.73M2 — SIGNIFICANT CHANGE UP
EGFR: 83 ML/MIN/1.73M2 — SIGNIFICANT CHANGE UP
EOSINOPHIL # BLD AUTO: 0 K/UL — SIGNIFICANT CHANGE UP (ref 0–0.7)
EOSINOPHIL NFR BLD AUTO: 0 % — SIGNIFICANT CHANGE UP (ref 0–8)
GLUCOSE BLDC GLUCOMTR-MCNC: 110 MG/DL — HIGH (ref 70–99)
GLUCOSE BLDC GLUCOMTR-MCNC: 131 MG/DL — HIGH (ref 70–99)
GLUCOSE BLDC GLUCOMTR-MCNC: 207 MG/DL — HIGH (ref 70–99)
GLUCOSE BLDC GLUCOMTR-MCNC: 208 MG/DL — HIGH (ref 70–99)
GLUCOSE BLDC GLUCOMTR-MCNC: 229 MG/DL — HIGH (ref 70–99)
GLUCOSE BLDC GLUCOMTR-MCNC: 255 MG/DL — HIGH (ref 70–99)
GLUCOSE BLDC GLUCOMTR-MCNC: 312 MG/DL — HIGH (ref 70–99)
GLUCOSE BLDC GLUCOMTR-MCNC: 82 MG/DL — SIGNIFICANT CHANGE UP (ref 70–99)
GLUCOSE SERPL-MCNC: 123 MG/DL — HIGH (ref 70–99)
GLUCOSE SERPL-MCNC: 163 MG/DL — HIGH (ref 70–99)
GLUCOSE SERPL-MCNC: 267 MG/DL — HIGH (ref 70–99)
GLUCOSE SERPL-MCNC: 274 MG/DL — HIGH (ref 70–99)
HCT VFR BLD CALC: 25.7 % — LOW (ref 42–52)
HGB BLD-MCNC: 8.6 G/DL — LOW (ref 14–18)
IMM GRANULOCYTES NFR BLD AUTO: 1.4 % — HIGH (ref 0.1–0.3)
LYMPHOCYTES # BLD AUTO: 0.53 K/UL — LOW (ref 1.2–3.4)
LYMPHOCYTES # BLD AUTO: 3.5 % — LOW (ref 20.5–51.1)
MAGNESIUM SERPL-MCNC: 2.2 MG/DL — SIGNIFICANT CHANGE UP (ref 1.8–2.4)
MCHC RBC-ENTMCNC: 31.7 PG — HIGH (ref 27–31)
MCHC RBC-ENTMCNC: 33.5 G/DL — SIGNIFICANT CHANGE UP (ref 32–37)
MCV RBC AUTO: 94.8 FL — HIGH (ref 80–94)
MONOCYTES # BLD AUTO: 0.4 K/UL — SIGNIFICANT CHANGE UP (ref 0.1–0.6)
MONOCYTES NFR BLD AUTO: 2.6 % — SIGNIFICANT CHANGE UP (ref 1.7–9.3)
NEUTROPHILS # BLD AUTO: 13.96 K/UL — HIGH (ref 1.4–6.5)
NEUTROPHILS NFR BLD AUTO: 92.4 % — HIGH (ref 42.2–75.2)
NRBC # BLD: 0 /100 WBCS — SIGNIFICANT CHANGE UP (ref 0–0)
PHOSPHATE SERPL-MCNC: 2.1 MG/DL — SIGNIFICANT CHANGE UP (ref 2.1–4.9)
PLATELET # BLD AUTO: 251 K/UL — SIGNIFICANT CHANGE UP (ref 130–400)
PMV BLD: 12.6 FL — HIGH (ref 7.4–10.4)
POTASSIUM SERPL-MCNC: 2.9 MMOL/L — LOW (ref 3.5–5)
POTASSIUM SERPL-MCNC: 3.8 MMOL/L — SIGNIFICANT CHANGE UP (ref 3.5–5)
POTASSIUM SERPL-MCNC: 4.2 MMOL/L — SIGNIFICANT CHANGE UP (ref 3.5–5)
POTASSIUM SERPL-MCNC: 4.4 MMOL/L — SIGNIFICANT CHANGE UP (ref 3.5–5)
POTASSIUM SERPL-SCNC: 2.9 MMOL/L — LOW (ref 3.5–5)
POTASSIUM SERPL-SCNC: 3.8 MMOL/L — SIGNIFICANT CHANGE UP (ref 3.5–5)
POTASSIUM SERPL-SCNC: 4.2 MMOL/L — SIGNIFICANT CHANGE UP (ref 3.5–5)
POTASSIUM SERPL-SCNC: 4.4 MMOL/L — SIGNIFICANT CHANGE UP (ref 3.5–5)
PROT SERPL-MCNC: 4.4 G/DL — LOW (ref 6–8)
RBC # BLD: 2.71 M/UL — LOW (ref 4.7–6.1)
RBC # FLD: 20.6 % — HIGH (ref 11.5–14.5)
SODIUM SERPL-SCNC: 147 MMOL/L — HIGH (ref 135–146)
SODIUM SERPL-SCNC: 148 MMOL/L — HIGH (ref 135–146)
SODIUM SERPL-SCNC: 150 MMOL/L — HIGH (ref 135–146)
SODIUM SERPL-SCNC: 154 MMOL/L — HIGH (ref 135–146)
WBC # BLD: 15.11 K/UL — HIGH (ref 4.8–10.8)
WBC # FLD AUTO: 15.11 K/UL — HIGH (ref 4.8–10.8)

## 2023-08-15 PROCEDURE — 71045 X-RAY EXAM CHEST 1 VIEW: CPT | Mod: 26

## 2023-08-15 PROCEDURE — 99233 SBSQ HOSP IP/OBS HIGH 50: CPT

## 2023-08-15 PROCEDURE — 99291 CRITICAL CARE FIRST HOUR: CPT

## 2023-08-15 RX ORDER — INSULIN LISPRO 100/ML
VIAL (ML) SUBCUTANEOUS
Refills: 0 | Status: DISCONTINUED | OUTPATIENT
Start: 2023-08-15 | End: 2023-08-15

## 2023-08-15 RX ORDER — INSULIN LISPRO 100/ML
VIAL (ML) SUBCUTANEOUS
Refills: 0 | Status: DISCONTINUED | OUTPATIENT
Start: 2023-08-15 | End: 2023-08-21

## 2023-08-15 RX ORDER — INSULIN GLARGINE 100 [IU]/ML
4 INJECTION, SOLUTION SUBCUTANEOUS
Refills: 0 | Status: DISCONTINUED | OUTPATIENT
Start: 2023-08-15 | End: 2023-08-16

## 2023-08-15 RX ORDER — MEROPENEM 1 G/30ML
1000 INJECTION INTRAVENOUS EVERY 8 HOURS
Refills: 0 | Status: DISCONTINUED | OUTPATIENT
Start: 2023-08-15 | End: 2023-08-16

## 2023-08-15 RX ORDER — POTASSIUM CHLORIDE 20 MEQ
20 PACKET (EA) ORAL
Refills: 0 | Status: COMPLETED | OUTPATIENT
Start: 2023-08-15 | End: 2023-08-15

## 2023-08-15 RX ORDER — ENOXAPARIN SODIUM 100 MG/ML
40 INJECTION SUBCUTANEOUS EVERY 24 HOURS
Refills: 0 | Status: DISCONTINUED | OUTPATIENT
Start: 2023-08-15 | End: 2023-09-04

## 2023-08-15 RX ORDER — POTASSIUM CHLORIDE 20 MEQ
40 PACKET (EA) ORAL ONCE
Refills: 0 | Status: COMPLETED | OUTPATIENT
Start: 2023-08-15 | End: 2023-08-15

## 2023-08-15 RX ORDER — SODIUM CHLORIDE 9 MG/ML
500 INJECTION, SOLUTION INTRAVENOUS
Refills: 0 | Status: COMPLETED | OUTPATIENT
Start: 2023-08-15 | End: 2023-08-15

## 2023-08-15 RX ORDER — MIDODRINE HYDROCHLORIDE 2.5 MG/1
10 TABLET ORAL EVERY 8 HOURS
Refills: 0 | Status: DISCONTINUED | OUTPATIENT
Start: 2023-08-15 | End: 2023-08-16

## 2023-08-15 RX ORDER — INSULIN GLARGINE 100 [IU]/ML
4 INJECTION, SOLUTION SUBCUTANEOUS AT BEDTIME
Refills: 0 | Status: DISCONTINUED | OUTPATIENT
Start: 2023-08-15 | End: 2023-08-15

## 2023-08-15 RX ADMIN — ENOXAPARIN SODIUM 40 MILLIGRAM(S): 100 INJECTION SUBCUTANEOUS at 11:26

## 2023-08-15 RX ADMIN — Medication 50 MILLIEQUIVALENT(S): at 06:12

## 2023-08-15 RX ADMIN — Medication 4: at 11:06

## 2023-08-15 RX ADMIN — MEROPENEM 100 MILLIGRAM(S): 1 INJECTION INTRAVENOUS at 21:42

## 2023-08-15 RX ADMIN — Medication 50 MILLIGRAM(S): at 05:02

## 2023-08-15 RX ADMIN — Medication 40 MILLIEQUIVALENT(S): at 04:01

## 2023-08-15 RX ADMIN — MIDODRINE HYDROCHLORIDE 15 MILLIGRAM(S): 2.5 TABLET ORAL at 05:02

## 2023-08-15 RX ADMIN — CASPOFUNGIN ACETATE 260 MILLIGRAM(S): 7 INJECTION, POWDER, LYOPHILIZED, FOR SOLUTION INTRAVENOUS at 10:55

## 2023-08-15 RX ADMIN — Medication 300 MILLIGRAM(S): at 11:26

## 2023-08-15 RX ADMIN — INSULIN GLARGINE 4 UNIT(S): 100 INJECTION, SOLUTION SUBCUTANEOUS at 21:38

## 2023-08-15 RX ADMIN — SODIUM CHLORIDE 500 MILLILITER(S): 9 INJECTION, SOLUTION INTRAVENOUS at 10:53

## 2023-08-15 RX ADMIN — MIDODRINE HYDROCHLORIDE 10 MILLIGRAM(S): 2.5 TABLET ORAL at 21:39

## 2023-08-15 RX ADMIN — PANTOPRAZOLE SODIUM 40 MILLIGRAM(S): 20 TABLET, DELAYED RELEASE ORAL at 18:06

## 2023-08-15 RX ADMIN — Medication 2: at 06:26

## 2023-08-15 RX ADMIN — Medication 3: at 18:05

## 2023-08-15 RX ADMIN — MIDODRINE HYDROCHLORIDE 10 MILLIGRAM(S): 2.5 TABLET ORAL at 16:06

## 2023-08-15 RX ADMIN — Medication 2: at 21:39

## 2023-08-15 RX ADMIN — PANTOPRAZOLE SODIUM 40 MILLIGRAM(S): 20 TABLET, DELAYED RELEASE ORAL at 05:02

## 2023-08-15 RX ADMIN — CHLORHEXIDINE GLUCONATE 1 APPLICATION(S): 213 SOLUTION TOPICAL at 05:04

## 2023-08-15 RX ADMIN — Medication 50 MILLIEQUIVALENT(S): at 07:50

## 2023-08-15 RX ADMIN — Medication 1 APPLICATION(S): at 05:02

## 2023-08-15 RX ADMIN — MEROPENEM 100 MILLIGRAM(S): 1 INJECTION INTRAVENOUS at 05:02

## 2023-08-15 RX ADMIN — Medication 1 APPLICATION(S): at 18:05

## 2023-08-15 RX ADMIN — Medication 50 MILLIEQUIVALENT(S): at 04:13

## 2023-08-15 RX ADMIN — MEROPENEM 100 MILLIGRAM(S): 1 INJECTION INTRAVENOUS at 13:37

## 2023-08-15 RX ADMIN — Medication 50 MILLIGRAM(S): at 18:06

## 2023-08-15 NOTE — PROGRESS NOTE ADULT - ASSESSMENT
87 y/o M w/ PMH of DM, HTN coming from home with complaint of decreased appetite, increased urinary output and craving sweets x 2 weeks. Admitted for management of HHS , now resolved .  Patient clinically stable , hemodynamically stable , Off iv PRESSORS .      IMPRESSION  #Septic shock  THE REQUIRED iV PRESSORS   #Hungatella bacteremia likely GI translocation in the setting of appendicitis  8/8 BCX NGTD   8/5 BCX NGTD   8/4 UCX NGTD  8/4 BCX + 1/2 bottles  - BCX of 8/12 no growth at 48 hours , FU for final result   - Patient improved initially but on 8/9 -> At 6.30 pm patient started having diffuse severe abdominal pain with increased pressors (levo and jasen).   - Abdomen Not peritonitic.   -- Ct angio abdomen/pelvis to r/o ischemia ->No evidence of bowel ischemia. Unchanged findings suggesting acute appendicitis.  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 Collateral flow presumed to be present again there is no evidence of bowel ischemia.   - lactate level was 7 on 8/9 -> 2.5 on 8/10 -> 1.4 on 8/11.   - Surgery team on board ->                        - No surgery indicated --> not a surgical candidate at this time                       - C/w antibiotics --> non operative management for acute appendicitis  - hydro stress dose > reduced to 50q12H. day 3  - Repeat UA, and MRSA negative on 8/10  - Repeat TTE on 8/11: stable EF 61%  - no documented fevers  - on meropenem 1g Q12 day since 8/7   - Caspofungin day 5 - F up fungitell   - repeated MRSA negative.   - ID on board , keep on meropenem and stop caspo if fungitell negative   - NPO >per surgery there is no plan for surgical intervention at this time and per GI there is no plan for EGD as patient is too unstable for it at this time so could feed him but holding due to multiple episodes of melena today  - midodrine now at 15 mg BID  - off pressors for now    # Hypernatremia 150-> patient on  free water and had a 500 cc run of D5w , p[ending noon BMP     #Melena likely d/t PUD vs AVM vs Esophageal ulcer vs Erosive Hemorrhagic Gastritis vs Dieulafoy lesion Vs Malignancy    - Patient started having melena on Wednesday night 8/9 (NADEGE on 8/9 -in the morning- was negative)   - CT abdomen with PO contrast and repeated with IV contrast -> negative for bleed  - on 8/10 drop in hgb from 5 am till 11 am (from 7.2 to 6) after having 2 episodes of melena -> 2 units of pRBC were given  - NPO except water as per GI  - Protonix drip switched to BID oral now   - GI on board  -EGD  showed one clean based ulcer and another ulcer with a visible vessel at the base , vessel chauterized , patient on ppi BID follow up in 8 w   - HIT panel negative  - DIC panel repeated twice negative.     #DKA/HHS, - resolved  - resolved  - endo on board   - HGT number controlled , consider lantus     #AMS toxic metabolic   - CT head -> negative  - EEG -> There were no findings of active epilepsy.      # feeding   increase feed as tolerated   FU speech and swallow re-eval for swallow evaluation   NG position checked bt xray-adequate

## 2023-08-15 NOTE — PROGRESS NOTE ADULT - PROBLEM SELECTOR PLAN 1
off pressor support  bacteremia 2/2 GI translocation from appendicitis  ID following for recommendations - per ID "grave prognosis without surgery"   continue with IV Meropenum - Day 9, high risk, monitor blood cultures and WBC counts   surgery saw patient on 8/11 off pressor support  bacteremia 2/2 GI translocation from appendicitis  ID following for recommendations   continue with IV Meropenum - Day 9, high risk, monitor blood cultures and WBC counts   surgery saw patient on 8/11

## 2023-08-15 NOTE — PROGRESS NOTE ADULT - ASSESSMENT
IMPRESSION:    DKA / HHS, resolved  Hungatella species bacteremia   Septic shock resolved   Suspected UGIB s/p EGD s/p cauterization of visible vessel  BART - resolved   AMS toxic metabolic  HO DM  HO HTN  Abdominal aortic aneurysm  Parkinson disease  Hypernatremia       PLAN:    CNS: Avoid sedation.     HEENT: Oral care. NGT care     PULMONARY:  HOB @ 45 degrees.  Aspiration precautions. On RA.        CARDIOVASCULAR:   Avoid volume overload. DC IV fluids.    GI: Protonix BID. Resume tube feeds. Melena again this morning. GI eval appreciated.    RENAL: Follow up lytes.  Correct as needed. Voiding. No adler. Bladder scans. Increase free water to 300mL Q8H.    INFECTIOUS DISEASE: Repeat blood cultures negative x3. Continue caspofungin for 14 days. ID follow up. FU Fungitell.    HEMATOLOGICAL:  DVT prophylaxis: SCDs. HIT panel negative. Monitor CBC.  Transfuse to keep Hb>7.    ENDOCRINE:  Follow up FS QACHS. Insulin basal-bolus.    MUSCULOSKELETAL: Bedrest     Downgrade to SDU    DC TLC    Prognosis very poor. DNR/DNI IMPRESSION:    DKA / HHS, resolved  Hungatella species bacteremia   Septic shock resolved   Suspected UGIB s/p EGD s/p cauterization of visible vessel  BART - resolved   AMS toxic metabolic  HO DM  HO HTN  Abdominal aortic aneurysm  Parkinson disease  Hypernatremia       PLAN:    CNS: Avoid sedation.     HEENT: Oral care. NGT care     PULMONARY:  HOB @ 45 degrees.  Aspiration precautions. On RA.        CARDIOVASCULAR:   Avoid volume overload. DC IV fluids.    GI: Protonix BID. Resume tube feeds. Melena again this morning. GI eval appreciated.    RENAL: Follow up lytes.  Correct as needed. Voiding. No adler. Bladder scans. Increase free water to 300mL Q8H.    INFECTIOUS DISEASE: Repeat blood cultures negative x3. Continue caspofungin for 14 days. ID follow up. FU Fungitell.    HEMATOLOGICAL:  Resume DVT prophylaxis with heparin SQ. HIT panel negative. Monitor CBC.  Transfuse to keep Hb>7.    ENDOCRINE:  Follow up FS QACHS. Insulin basal-bolus.    MUSCULOSKELETAL: Bedrest     Downgrade to SDU    DC TLC    Prognosis very poor. DNR/DNI IMPRESSION:    DKA / HHS, resolved  Hungatella species bacteremia   Septic shock resolved   Suspected UGIB s/p EGD s/p cauterization of visible vessel  BART - resolved   AMS toxic metabolic  HO DM  HO HTN  Abdominal aortic aneurysm  Parkinson disease  Hypernatremia     PLAN:    CNS: Avoid sedation.     HEENT: Oral care. NGT care     PULMONARY:  HOB @ 45 degrees.  Aspiration precautions. On RA.        CARDIOVASCULAR:   Avoid volume overload. D5W to correct free H2O deficit.      GI: Protonix BID. Resume tube feeds. GI eval appreciated.    RENAL: Follow up lytes.  Correct as needed. Voiding. No adler. Bladder scans. Increase free water to 300mL Q8H.    INFECTIOUS DISEASE: Repeat blood cultures negative x3. Continue caspofungin for 14 days. ID follow up. FU Fungitell.  Finish Meropenem course.      HEMATOLOGICAL:  Resume DVT prophylaxis with heparin SQ. HIT panel negative. Monitor CBC.  Transfuse to keep Hb>7.    ENDOCRINE:  Follow up FS QACHS. Insulin basal-bolus.    MUSCULOSKELETAL: Bedrest     DC TLC    Prognosis very poor. DNR/DNI

## 2023-08-15 NOTE — SWALLOW BEDSIDE ASSESSMENT ADULT - SWALLOW EVAL: DIAGNOSIS
Pt more awake/alert today. + toleration observed without overt symptoms of penetration/aspiration for Puree/mildly thick. Concern for pharyngeal impairment with thins. Minimal acceptance

## 2023-08-15 NOTE — PROGRESS NOTE ADULT - SUBJECTIVE AND OBJECTIVE BOX
24H events:    Patient is a 88y old Male who presents with a chief complaint of DKA/HHS (15 Aug 2023 12:15)    Primary diagnosis of Hyperosmolar syndrome   now resolved , patient in the ICU currently awaiting transfer to stepdown         Today is hospital day 11d. This morning patient was seen and examined at bedside, patient looks lethargic complaining of mouth dryness     Code Status: DNI/DNR       PAST MEDICAL & SURGICAL HISTORY  HTN (hypertension)    Gout    BPH (benign prostatic hyperplasia)    Parkinson disease    HLD (hyperlipidemia)    Smoker within last 12 months    Diabetes mellitus    No significant past surgical history      SOCIAL HISTORY:  Social History:      ALLERGIES:  No Known Allergies    MEDICATIONS:  STANDING MEDICATIONS  allopurinol 300 milliGRAM(s) Oral daily  bacitracin   Ointment 1 Application(s) Topical two times a day  caspofungin IVPB      caspofungin IVPB 50 milliGRAM(s) IV Intermittent every 24 hours  chlorhexidine 2% Cloths 1 Application(s) Topical daily  dextrose 5%. 1000 milliLiter(s) IV Continuous <Continuous>  dextrose 5%. 1000 milliLiter(s) IV Continuous <Continuous>  dextrose 5%. 1000 milliLiter(s) IV Continuous <Continuous>  dextrose 50% Injectable 25 Gram(s) IV Push once  dextrose 50% Injectable 12.5 Gram(s) IV Push once  dextrose 50% Injectable 25 Gram(s) IV Push once  dextrose 50% Injectable 25 Gram(s) IV Push once  enoxaparin Injectable 40 milliGRAM(s) SubCutaneous every 24 hours  glucagon  Injectable 1 milliGRAM(s) IntraMuscular once  hydrocortisone sodium succinate Injectable 50 milliGRAM(s) IV Push every 12 hours  insulin lispro (ADMELOG) corrective regimen sliding scale   SubCutaneous three times a day before meals  meropenem  IVPB 1000 milliGRAM(s) IV Intermittent every 8 hours  midodrine 15 milliGRAM(s) Oral every 8 hours  pantoprazole  Injectable 40 milliGRAM(s) IV Push every 12 hours    PRN MEDICATIONS  dextrose Oral Gel 15 Gram(s) Oral once PRN  dextrose Oral Gel 15 Gram(s) Oral once PRN    VITALS:   T(F): 98.2  HR: 72  BP: 122/60  RR: 20  SpO2: 94%    PHYSICAL EXAM:  GENERAL:   PAtient looking chronically ill   (  ) NAD, lying in bed comfortably     (  ) obtunded     (x  ) lethargic     ( x ) somnolent    HEAD:   ( x ) Atraumatic     (  ) hematoma     (  ) laceration (specify location:       )     NECK:  ( x ) Supple     (  ) neck stiffness     (  ) nuchal rigidity     (  )  no JVD     (  ) JVD present ( -- cm)    HEART:  Rate -->     (  ) normal rate     (  ) bradycardic     (x  ) tachycardic  Rhythm -->     (x  ) regular     (  ) regularly irregular     (  ) irregularly irregular  Murmurs -->     (x ) normal s1s2     (  ) systolic murmur     (  ) diastolic murmur     (  ) continuous murmur      (  ) S3 present     (  ) S4 present    LUNGS:   ( x )Unlabored respirations     (  ) tachypnea  ( x ) B/L air entry     (  ) decreased breath sounds in:  (location     )    (  ) no adventitious sound     (  ) crackles     (  ) wheezing      (x  ) rhonchi      (specify location:       )  (  ) chest wall tenderness (specify location:       )    ABDOMEN:   (  ) Soft     ( x ) tense   |   (  ) nondistended     ( x ) distended   |   (x  ) +BS     (  ) hypoactive bowel sounds     (  ) hyperactive bowel sounds  (  ) nontender     (  ) RUQ tenderness     ( x ) RLQ tenderness     (  ) LLQ tenderness     (  ) epigastric tenderness     ( x ) diffuse tenderness  (  ) Splenomegaly      (  ) Hepatomegaly      (  ) Jaundice     (  ) ecchymosis     EXTREMITIES:  ( x ) Normal     (  ) Rash     (  ) ecchymosis     (  ) varicose veins      (  ) pitting edema     (  ) non-pitting edema   (  ) ulceration     (  ) gangrene:     (location:     )    NERVOUS SYSTEM:    (  ) A&Ox3     ( x ) confused     (  ) lethargic  Further neuro exam could not be done due to the patient status     SKIN:   (  ) No rashes or lesions     (  ) maculopapular rash     (  ) pustules     (  ) vesicles     ( x ) ulcer     (  ) ecchymosis     (specify location: right fore arm , wrapped by gauze caused by extravasation of pressors     )      (x  ) NG adequate position by xray   LABS:                        8.6    15.11 )-----------( 251      ( 15 Aug 2023 05:00 )             25.7     08-15    150<H>  |  119<H>  |  25<H>  ----------------------------<  123<H>  3.8   |  23  |  0.9    Ca    6.7<L>      15 Aug 2023 05:00  Phos  2.1     08-15  Mg     2.2     08-15    TPro  4.4<L>  /  Alb  1.9<L>  /  TBili  0.4  /  DBili  x   /  AST  46<H>  /  ALT  24  /  AlkPhos  124<H>  08-15    PT/INR - ( 13 Aug 2023 17:18 )   PT: 11.90 sec;   INR: 1.04 ratio         PTT - ( 13 Aug 2023 17:18 )  PTT:33.2 sec  Urinalysis Basic - ( 15 Aug 2023 05:00 )    Color: x / Appearance: x / SG: x / pH: x  Gluc: 123 mg/dL / Ketone: x  / Bili: x / Urobili: x   Blood: x / Protein: x / Nitrite: x   Leuk Esterase: x / RBC: x / WBC x   Sq Epi: x / Non Sq Epi: x / Bacteria: x    RADIOLOGY:  Chest xray 8/15 in AM     Support devices: Feeding tube coursing below the field-of-view. Stable   right-sided central venous catheter.    Cardiac/mediastinum/hilum: Unchanged    Lung parenchyma/Pleura: Bilateral opacities without significant change.   No pneumothorax.    Skeleton/soft tissues: Unchanged

## 2023-08-15 NOTE — PROGRESS NOTE ADULT - ASSESSMENT
89 y/o M w/ PMH of DM, HTN coming from home with complaint of decreased appetite, increased urinary output and craving sweets x 2 weeks. Patient admitted for management of HHS, with hospital stay complicated by septic shock, GIB. Palliative consulted for GOC.     patient comfortable on exam.       Education about palliative care provided to patient/family.  See Recs below.    Please call [phone] with questions or concerns 24/7.   We will continue to follow.    89 y/o M w/ PMH of DM, HTN coming from home with complaint of decreased appetite, increased urinary output and craving sweets x 2 weeks. Patient admitted for management of HHS, with hospital stay complicated by septic shock, GIB. Palliative consulted for GOC.     Patient comfortable on exam.   Left message for daughter Antonia to check in- asked for call back.     Education about palliative care provided to patient/family.  See Recs below.    Please call [phone] with questions or concerns 24/7.   We will continue to follow.

## 2023-08-15 NOTE — PROGRESS NOTE ADULT - SUBJECTIVE AND OBJECTIVE BOX
MILEY MARES  88y, Male  Allergy: No Known Allergies      LOS  11d    CHIEF COMPLAINT: DKA/HHS (15 Aug 2023 10:46)      INTERVAL EVENTS/HPI  - T(F): , Max: 98.3 (08-15-23 @ 04:00)  - WBC Count: 15.11 (08-15-23 @ 05:00)  WBC Count: 15.42 (08-14-23 @ 21:12)     - Creatinine: 0.9 (08-15-23 @ 05:00)  Creatinine: 1.0 (08-15-23 @ 00:55)     -   -   -     ROS  cannot obtain secondary to patient's sedation and/or mental status    VITALS:  T(F): 98.2, Max: 98.3 (08-15-23 @ 04:00)  HR: 72  BP: 122/60  RR: 20Vital Signs Last 24 Hrs  T(C): 36.8 (15 Aug 2023 08:00), Max: 36.8 (15 Aug 2023 04:00)  T(F): 98.2 (15 Aug 2023 08:00), Max: 98.3 (15 Aug 2023 04:00)  HR: 72 (15 Aug 2023 11:00) (64 - 102)  BP: 122/60 (15 Aug 2023 11:00) (90/64 - 160/60)  BP(mean): 86 (15 Aug 2023 11:00) (71 - 107)  RR: 20 (15 Aug 2023 11:00) (15 - 34)  SpO2: 94% (15 Aug 2023 11:00) (92% - 99%)    Parameters below as of 15 Aug 2023 10:00  Patient On (Oxygen Delivery Method): room air        PHYSICAL EXAM:  Gen: chronically ill appearing   HEENT: Normocephalic, atraumatic  Neck: supple, no lymphadenopathy  CV: Regular rate & regular rhythm  Lungs: decreased BS at bases, no fremitus  Abdomen: Soft, BS present  Ext: Warm, well perfused  Neuro: non focal, not following commands  Skin: no rash, no erythema  Lines: no phlebitis     FH: Non-contributory  Social Hx: Non-contributory    TESTS & MEASUREMENTS:                        8.6    15.11 )-----------( 251      ( 15 Aug 2023 05:00 )             25.7     08-15    150<H>  |  119<H>  |  25<H>  ----------------------------<  123<H>  3.8   |  23  |  0.9    Ca    6.7<L>      15 Aug 2023 05:00  Phos  2.1     08-15  Mg     2.2     08-15    TPro  4.4<L>  /  Alb  1.9<L>  /  TBili  0.4  /  DBili  x   /  AST  46<H>  /  ALT  24  /  AlkPhos  124<H>  08-15      LIVER FUNCTIONS - ( 15 Aug 2023 05:00 )  Alb: 1.9 g/dL / Pro: 4.4 g/dL / ALK PHOS: 124 U/L / ALT: 24 U/L / AST: 46 U/L / GGT: x           Urinalysis Basic - ( 15 Aug 2023 05:00 )    Color: x / Appearance: x / SG: x / pH: x  Gluc: 123 mg/dL / Ketone: x  / Bili: x / Urobili: x   Blood: x / Protein: x / Nitrite: x   Leuk Esterase: x / RBC: x / WBC x   Sq Epi: x / Non Sq Epi: x / Bacteria: x        Culture - Blood (collected 08-12-23 @ 11:44)  Source: .Blood None  Preliminary Report (08-14-23 @ 20:01):    No growth at 48 Hours    Culture - Blood (collected 08-08-23 @ 11:58)  Source: .Blood None  Final Report (08-13-23 @ 23:01):    No growth at 5 days    Culture - Blood (collected 08-05-23 @ 07:10)  Source: .Blood Blood-Peripheral  Final Report (08-10-23 @ 19:01):    No growth at 5 days    Culture - Urine (collected 08-04-23 @ 14:42)  Source: Clean Catch Clean Catch (Midstream)  Final Report (08-05-23 @ 19:52):    No growth    Culture - Blood (collected 08-04-23 @ 11:20)  Source: .Blood Blood-Peripheral  Gram Stain (08-06-23 @ 02:58):    Growth in anaerobic bottle: Gram Negative Rods  Final Report (08-13-23 @ 15:02):    Growth in anaerobic bottle:    Most closely resembling Hungatella species previously known as    Clostridium species.    Please Note:************************************************    this organism    may appear as gram negative rods in direct smears of clinical specimens.    Direct identification is available within approximately 3-5    hours either by Blood Panel Multiplexed PCR or Direct    MALDI-TOF. Details: [email or web address]  Organism: Blood Culture PCR  Clostridium species (08-13-23 @ 15:02)  Organism: Clostridium species (08-13-23 @ 15:02)      Method Type: ETEST      -  Amoxicillin/Clavulanic Acid: S 0.25      -  Clindamycin: S 0.25      -  Imipenem: S 2      -  Metronidazole: S 0.064  Organism: Blood Culture PCR (08-13-23 @ 15:02)      Method Type: PCR      -  Blood PCR Panel: NEG    Culture - Blood (collected 08-04-23 @ 11:20)  Source: .Blood Blood-Peripheral  Final Report (08-09-23 @ 23:00):    No growth at 5 days        Lactate, Blood: 1.5 mmol/L (08-13-23 @ 05:40)  Lactate, Blood: 1.4 mmol/L (08-11-23 @ 05:00)      INFECTIOUS DISEASES TESTING  Procalcitonin, Serum: 0.34 (08-14-23 @ 04:45)  MRSA PCR Result.: Negative (08-10-23 @ 10:55)  MRSA PCR Result.: Negative (08-07-23 @ 11:30)  Procalcitonin, Serum: 0.67 (08-06-23 @ 05:05)  Procalcitonin, Serum: 0.48 (08-04-23 @ 23:05)  COVID-19 PCR: NotDetec (11-21-22 @ 18:43)      INFLAMMATORY MARKERS      RADIOLOGY & ADDITIONAL TESTS:  I have personally reviewed the last available Chest xray  CXR      CT      CARDIOLOGY TESTING  12 Lead ECG:   Ventricular Rate 106 BPM    Atrial Rate 106 BPM    P-R Interval 152 ms    QRS Duration 126 ms    Q-T Interval 400 ms    QTC Calculation(Bazett) 531 ms    P Axis 34 degrees    R Axis -50 degrees    T Axis -18 degrees    Diagnosis Line Sinus tachycardia  Right bundle branch block  Left anterior fascicular block  *** Bifascicular block ***  Moderate voltage criteria for LVH, may be normal variant      Abnormal ECG    Confirmed by RUDY FLOR MD ([id number]) on 8/4/2023 11:24:55 AM (08-04-23 @ 10:57)      MEDICATIONS  allopurinol 300  bacitracin   Ointment 1  caspofungin IVPB   caspofungin IVPB 50  chlorhexidine 2% Cloths 1  dextrose 5%. 1000  dextrose 5%. 1000  dextrose 5%. 1000  dextrose 50% Injectable 25  dextrose 50% Injectable 25  dextrose 50% Injectable 12.5  dextrose 50% Injectable 25  enoxaparin Injectable 40  glucagon  Injectable 1  hydrocortisone sodium succinate Injectable 50  insulin lispro (ADMELOG) corrective regimen sliding scale   meropenem  IVPB 1000  midodrine 15  pantoprazole  Injectable 40      WEIGHT  Weight (kg): 62.6 (08-14-23 @ 11:44)  Creatinine: 0.9 mg/dL (08-15-23 @ 05:00)  Creatinine: 1.0 mg/dL (08-15-23 @ 00:55)      ANTIBIOTICS:  caspofungin IVPB      caspofungin IVPB 50 milliGRAM(s) IV Intermittent every 24 hours  meropenem  IVPB 1000 milliGRAM(s) IV Intermittent every 8 hours      All available historical records have been reviewed

## 2023-08-15 NOTE — PROGRESS NOTE ADULT - ASSESSMENT
ASSESSMENT  87 y/o M w/ PMH of DM, HTN coming from home with complaint of decreased appetite, increased urinary output and craving sweets x 2 weeks. Admitted for management of DKA.     IMPRESSION  #GIB  #Septic shock requiring pressors   #Hungatella bacteremia likely GI translocation in the setting of appendicitis  8/8 BCX NGTD   8/5 BCX NGTD   8/4 UCX NGTD  8/4 BCX + 1/2 bottles  < from: CT Angio Abdomen and Pelvis w/ IV Cont (08.10.23 @ 01:18) >  No evidence of bowel ischemia. 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 Collateral flow presumed   to be present again there is no evidence of bowel ischemia.  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 Findings are concerning for appendicitis.  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  Additional findings detailed in the body the report  #DKA/HHS  #Atelectasis  Creatinine: 1.1 mg/dL (08.08.23 @ 05:55)  Weight (kg): 62.6 (08-04-23 @ 20:37)  crcl 41      RECOMMENDATIONS  - Rashi 1g q12h IV 8/7-, continue for now  - On Caspo-  no evidence of perforation/ischemia on CT. OK for now, D/C if fungitell NEG   - Surgery following  - Grave prognosis without surgical intervention, GOC    If any questions, please call or send a message on [employer]  Please continue to update ID with any pertinent new laboratory or radiographic findings     ASSESSMENT  89 y/o M w/ PMH of DM, HTN coming from home with complaint of decreased appetite, increased urinary output and craving sweets x 2 weeks. Admitted for management of DKA.     IMPRESSION  #GIB  #Septic shock requiring pressors   #Hungatella bacteremia likely GI translocation in the setting of appendicitis  8/8 BCX NGTD   8/5 BCX NGTD   8/4 UCX NGTD  8/4 BCX + 1/2 bottles  < from: CT Angio Abdomen and Pelvis w/ IV Cont (08.10.23 @ 01:18) >  No evidence of bowel ischemia. 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 Collateral flow presumed   to be present again there is no evidence of bowel ischemia.  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 Findings are concerning for appendicitis.  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  Additional findings detailed in the body the report  #DKA/HHS  #Atelectasis  Creatinine: 1.1 mg/dL (08.08.23 @ 05:55)  Weight (kg): 62.6 (08-04-23 @ 20:37)  crcl 41      RECOMMENDATIONS  - Rashi 1g q12h IV 8/7-, continue for now  - On Caspo-  no evidence of perforation/ischemia on CT. OK for now, D/C if fungitell NEG   - Surgery following  - Grave prognosis  GOC    If any questions, please call or send a message on [employer] Teams  Please continue to update ID with any pertinent new laboratory or radiographic findings

## 2023-08-15 NOTE — PROGRESS NOTE ADULT - SUBJECTIVE AND OBJECTIVE BOX
Patient is a 88y old  Male who presents with a chief complaint of DKA/HHS (14 Aug 2023 20:20)        Over Night Events: EGD yesterday s/p cauterization of visibile vessel. Off pressors. 2 episodes of melena overnight.        ROS:     All ROS are negative except HPI       PHYSICAL EXAM    ICU Vital Signs Last 24 Hrs  T(C): 36.8 (15 Aug 2023 04:00), Max: 36.8 (15 Aug 2023 04:00)  T(F): 98.3 (15 Aug 2023 04:00), Max: 98.3 (15 Aug 2023 04:00)  HR: 92 (15 Aug 2023 08:00) (64 - 102)  BP: 144/91 (15 Aug 2023 08:00) (90/64 - 157/68)  BP(mean): 99 (15 Aug 2023 08:00) (71 - 107)  RR: 34 (15 Aug 2023 08:00) (15 - 34)  SpO2: 94% (15 Aug 2023 08:00) (92% - 99%)    O2 Parameters below as of 15 Aug 2023 04:00  Patient On (Oxygen Delivery Method): room air    Constitutional: no acute distress, chronically ill appearing  Neuro: moving all 4 limbs spontaneously, no facial droop or dysarthria  HEENT: NCAT, anicteric  Neck: no visible lymphadenopathy or goiter  Pulm: no respiratory distress. clear to auscultation bilaterally  Cardiac: extremities appear pink and well-perfused. Tachycardic, no murmur detected  Abdomen: distended, tender to palpation  Extremities: no peripheral edema      08-14-23 @ 07:01  -  08-15-23 @ 07:00  --------------------------------------------------------  IN:    Enteral Tube Flush: 150 mL    Glucerna: 480 mL    Insulin: 10 mL    Insulin: 24 mL    Insulin: 6 mL    IV PiggyBack: 500 mL    Lactated Ringers: 1200 mL    Pantoprazole: 20 mL  Total IN: 2390 mL    OUT:    Incontinent per Collection Bag (mL): 760 mL  Total OUT: 760 mL    Total NET: 1630 mL          LABS:                            8.6    15.11 )-----------( 251      ( 15 Aug 2023 05:00 )             25.7                                               08-15    150<H>  |  119<H>  |  25<H>  ----------------------------<  123<H>  3.8   |  23  |  0.9    Ca    6.7<L>      15 Aug 2023 05:00  Phos  2.1     08-15  Mg     2.2     08-15    TPro  4.4<L>  /  Alb  1.9<L>  /  TBili  0.4  /  DBili  x   /  AST  46<H>  /  ALT  24  /  AlkPhos  124<H>  08-15      PT/INR - ( 13 Aug 2023 17:18 )   PT: 11.90 sec;   INR: 1.04 ratio         PTT - ( 13 Aug 2023 17:18 )  PTT:33.2 sec                                       Urinalysis Basic - ( 15 Aug 2023 05:00 )    Color: x / Appearance: x / SG: x / pH: x  Gluc: 123 mg/dL / Ketone: x  / Bili: x / Urobili: x   Blood: x / Protein: x / Nitrite: x   Leuk Esterase: x / RBC: x / WBC x   Sq Epi: x / Non Sq Epi: x / Bacteria: x                                                  LIVER FUNCTIONS - ( 15 Aug 2023 05:00 )  Alb: 1.9 g/dL / Pro: 4.4 g/dL / ALK PHOS: 124 U/L / ALT: 24 U/L / AST: 46 U/L / GGT: x                                                  Culture - Blood (collected 12 Aug 2023 11:44)  Source: .Blood None  Preliminary Report (14 Aug 2023 20:01):    No growth at 48 Hours                                                                                           MEDICATIONS  (STANDING):  allopurinol 300 milliGRAM(s) Oral daily  bacitracin   Ointment 1 Application(s) Topical two times a day  caspofungin IVPB      caspofungin IVPB 50 milliGRAM(s) IV Intermittent every 24 hours  chlorhexidine 2% Cloths 1 Application(s) Topical daily  dextrose 5%. 1000 milliLiter(s) (50 mL/Hr) IV Continuous <Continuous>  dextrose 5%. 1000 milliLiter(s) (100 mL/Hr) IV Continuous <Continuous>  dextrose 5%. 1000 milliLiter(s) (100 mL/Hr) IV Continuous <Continuous>  dextrose 50% Injectable 25 Gram(s) IV Push once  dextrose 50% Injectable 25 Gram(s) IV Push once  dextrose 50% Injectable 12.5 Gram(s) IV Push once  dextrose 50% Injectable 25 Gram(s) IV Push once  glucagon  Injectable 1 milliGRAM(s) IntraMuscular once  hydrocortisone sodium succinate Injectable 50 milliGRAM(s) IV Push every 12 hours  insulin lispro (ADMELOG) corrective regimen sliding scale   SubCutaneous three times a day before meals  lactated ringers. 1000 milliLiter(s) (50 mL/Hr) IV Continuous <Continuous>  midodrine 15 milliGRAM(s) Oral every 8 hours  pantoprazole  Injectable 40 milliGRAM(s) IV Push every 12 hours    MEDICATIONS  (PRN):  dextrose Oral Gel 15 Gram(s) Oral once PRN Blood Glucose LESS THAN 70 milliGRAM(s)/deciliter  dextrose Oral Gel 15 Gram(s) Oral once PRN Blood Glucose LESS THAN 70 milliGRAM(s)/deciliter Patient is a 88y old  Male who presents with a chief complaint of DKA/HHS (14 Aug 2023 20:20)        Over Night Events: Remains critically ill.  EGD yesterday s/p cauterization of visibile vessel. Off pressors. 2 episodes of melena overnight.        ROS:     All ROS are negative except HPI       PHYSICAL EXAM    ICU Vital Signs Last 24 Hrs  T(C): 36.8 (15 Aug 2023 04:00), Max: 36.8 (15 Aug 2023 04:00)  T(F): 98.3 (15 Aug 2023 04:00), Max: 98.3 (15 Aug 2023 04:00)  HR: 92 (15 Aug 2023 08:00) (64 - 102)  BP: 144/91 (15 Aug 2023 08:00) (90/64 - 157/68)  BP(mean): 99 (15 Aug 2023 08:00) (71 - 107)  RR: 34 (15 Aug 2023 08:00) (15 - 34)  SpO2: 94% (15 Aug 2023 08:00) (92% - 99%)    O2 Parameters below as of 15 Aug 2023 04:00  Patient On (Oxygen Delivery Method): room air    Constitutional: no acute distress, chronically ill appearing  Neuro: moving all 4 limbs spontaneously, no facial droop or dysarthria  HEENT: NCAT, anicteric  Neck: no visible lymphadenopathy or goiter  Pulm: no respiratory distress. clear to auscultation bilaterally  Cardiac: extremities appear pink and well-perfused. Tachycardic, no murmur detected  Abdomen: distended, tender to palpation  Extremities: no peripheral edema      08-14-23 @ 07:01  -  08-15-23 @ 07:00  --------------------------------------------------------  IN:    Enteral Tube Flush: 150 mL    Glucerna: 480 mL    Insulin: 10 mL    Insulin: 24 mL    Insulin: 6 mL    IV PiggyBack: 500 mL    Lactated Ringers: 1200 mL    Pantoprazole: 20 mL  Total IN: 2390 mL    OUT:    Incontinent per Collection Bag (mL): 760 mL  Total OUT: 760 mL    Total NET: 1630 mL          LABS:                            8.6    15.11 )-----------( 251      ( 15 Aug 2023 05:00 )             25.7                8.6    15.11 )-----------( 251      ( 08-15 @ 05:00 )             25.7                8.4    15.42 )-----------( 234      ( 08-14 @ 21:12 )             25.5                7.8    14.46 )-----------( 202      ( 08-14 @ 04:45 )             24.1                8.0    20.67 )-----------( 199      ( 08-13 @ 17:18 )             24.5                7.8    24.10 )-----------( 201      ( 08-13 @ 05:25 )             23.2                7.8    26.77 )-----------( 202      ( 08-12 @ 22:50 )             23.5                7.8    26.43 )-----------( 204      ( 08-12 @ 19:53 )             23.7                                                08-15    150<H>  |  119<H>  |  25<H>  ----------------------------<  123<H>  3.8   |  23  |  0.9    Ca    6.7<L>      15 Aug 2023 05:00  Phos  2.1     08-15  Mg     2.2     08-15    TPro  4.4<L>  /  Alb  1.9<L>  /  TBili  0.4  /  DBili  x   /  AST  46<H>  /  ALT  24  /  AlkPhos  124<H>  08-15      PT/INR - ( 13 Aug 2023 17:18 )   PT: 11.90 sec;   INR: 1.04 ratio         PTT - ( 13 Aug 2023 17:18 )  PTT:33.2 sec                                       Urinalysis Basic - ( 15 Aug 2023 05:00 )    Color: x / Appearance: x / SG: x / pH: x  Gluc: 123 mg/dL / Ketone: x  / Bili: x / Urobili: x   Blood: x / Protein: x / Nitrite: x   Leuk Esterase: x / RBC: x / WBC x   Sq Epi: x / Non Sq Epi: x / Bacteria: x                                                  LIVER FUNCTIONS - ( 15 Aug 2023 05:00 )  Alb: 1.9 g/dL / Pro: 4.4 g/dL / ALK PHOS: 124 U/L / ALT: 24 U/L / AST: 46 U/L / GGT: x                                                  Culture - Blood (collected 12 Aug 2023 11:44)  Source: .Blood None  Preliminary Report (14 Aug 2023 20:01):    No growth at 48 Hours                                                                                           MEDICATIONS  (STANDING):  allopurinol 300 milliGRAM(s) Oral daily  bacitracin   Ointment 1 Application(s) Topical two times a day  caspofungin IVPB      caspofungin IVPB 50 milliGRAM(s) IV Intermittent every 24 hours  chlorhexidine 2% Cloths 1 Application(s) Topical daily  dextrose 5%. 1000 milliLiter(s) (50 mL/Hr) IV Continuous <Continuous>  dextrose 5%. 1000 milliLiter(s) (100 mL/Hr) IV Continuous <Continuous>  dextrose 5%. 1000 milliLiter(s) (100 mL/Hr) IV Continuous <Continuous>  dextrose 50% Injectable 25 Gram(s) IV Push once  dextrose 50% Injectable 25 Gram(s) IV Push once  dextrose 50% Injectable 12.5 Gram(s) IV Push once  dextrose 50% Injectable 25 Gram(s) IV Push once  glucagon  Injectable 1 milliGRAM(s) IntraMuscular once  hydrocortisone sodium succinate Injectable 50 milliGRAM(s) IV Push every 12 hours  insulin lispro (ADMELOG) corrective regimen sliding scale   SubCutaneous three times a day before meals  lactated ringers. 1000 milliLiter(s) (50 mL/Hr) IV Continuous <Continuous>  midodrine 15 milliGRAM(s) Oral every 8 hours  pantoprazole  Injectable 40 milliGRAM(s) IV Push every 12 hours    MEDICATIONS  (PRN):  dextrose Oral Gel 15 Gram(s) Oral once PRN Blood Glucose LESS THAN 70 milliGRAM(s)/deciliter  dextrose Oral Gel 15 Gram(s) Oral once PRN Blood Glucose LESS THAN 70 milliGRAM(s)/deciliter

## 2023-08-15 NOTE — PROGRESS NOTE ADULT - ASSESSMENT
89 y/o M w/ PMH of DM, HTN, Parkinson disease, Gout, HLD, former smoker coming from home with complaint of decreased appetite, increased urinary output and craving sweets x 2 weeks. Patient admitted on 8/4/23 for DKA and hypotension , was started on levophed lose dose. HIs course complicated with appendicitis , Hungatella bacteremia, acute anemia requiring 3u. GI consulted today for 3 episodes of melena and drop in Hb to 6. Last night patient was complaining of severe lower abdominal pain and distesion s/p CTAP IC with thrombus in decending AA, SMA and celiac axis narrowing but no bowel ischemia , lactate went upto 7 from 1.5    #Acute Normocytic anemia - stable  #Melena likely duodenal ulcer s/p epi and hot forceps thermal therapy  - currently off pressors   - Baseline hemoglobin - 12  - Hemoglobin on admission - 11.2 (8/4)>>6.6>>3u> 8.6>7.9 > 8.6 (8/15)  - Coags - INR:  0.98 (7/4)>>1.35 (8/8)  - Lactate: 1.4<2.5< 7.5< 1.5  - CTAP IV Con: 8/10: No evidence of bowel ischemia. Unchanged findings suggesting acute appendicitis. Unopacified inferior mesenteric artery is new compared to 11/17/2022 as there is increased thrombus within the unchanged size of abdominal aortic   aneurysm with decreased opacified aortic lumen. Collateral flow presumed to be present again there is no evidence of bowel ischemia.  - CTAP oral and IV con: 8/8: New mild dilation of the appendix with small volume free fluid in the bilateral lower quadrants. Findings are concerning for appendicitis. 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 cm, as described  - INR: 1.37>1.14  - was on fondaparinux  - deemed high risk for endoscopy on 8/11 and a shared decision was made to pursue conservative therapy  - recall for continuos melena. currently off pressors   - s/p EGD on 8/14/23 revealing 2 ulcers   - On NGT feeds as he failed S&S  - had 2 dark BMs    #Rec  - Trend H&H BID  - Please target Hb  >8  - Please avoid any NSAIDs  - monitor BM, if any active GIB call GI STAT    #Pancreatic body cyst  - 0.7x0.4cm cyst in pancreatic body, No PD dilation    Rec  MRI pancreas protocol as outpatient  - Follow up with our [facility] located at [address] [place] [address]. Phone Number: [phone]    Communicated with primary team

## 2023-08-15 NOTE — CHART NOTE - NSCHARTNOTEFT_GEN_A_CORE
MICU DOWN GRADE NOTE      Patient is a 88y old  Male who presents with a chief complaint of DKA/ HHS (16 Aug 2023 06:01)      HPI/ICU course :89 y/o M w/ PMH of DM, HTN coming from home with complaint of decreased appetite, increased urinary output and craving sweets x 2 weeks.  Patient was admitted for a diagnosis of HHS and was started on IV insulin .   PAtient was admitted and started on IV insulin for his HHS . Patient on SC insulin and low dose of long acting insulin prior to transfer .  PAtient developed abdominal pain . CT abdomen showed signs of appendicitis and and unopacified inferior mesenteric artery which is new.No signs of bowel ischemia .  Surgery consulted ,non surgical management for the appendicitis , patient was started on IV antibiotics and anti-fungal awaiting the fungitell results .  PAtient was also having melena  EGD done and showed 2 duodenal ulcers , one ulcer had a visible vessel which was cauterized   started on PPI BID and GI on board   Patient was receiving free water per NG for his hyponatremia             REVIEW OF SYSTEMS:  CONSTITUTIONAL: No fever, chills  HEENT:  No blurry vision No sinus or throat pain  NECK: No pain or stiffness  RESPIRATORY: Productive cough   CARDIOVASCULAR: No chest pain, palpitations  GASTROINTESTINAL: Duffuse abdominal pain   GENITOURINARY: No dysuria  NEUROLOGICAL: Patient lethargic   SKIN: Complaining of skin itchiness       MEDICATIONS:  allopurinol 300 milliGRAM(s) Oral daily  bacitracin   Ointment 1 Application(s) Topical two times a day  caspofungin IVPB      caspofungin IVPB 50 milliGRAM(s) IV Intermittent every 24 hours  chlorhexidine 2% Cloths 1 Application(s) Topical daily  enoxaparin Injectable 40 milliGRAM(s) SubCutaneous every 24 hours  hydrocortisone sodium succinate Injectable 50 milliGRAM(s) IV Push every 12 hours  insulin glargine Injectable (LANTUS) 4 Unit(s) SubCutaneous <User Schedule>  insulin lispro (ADMELOG) corrective regimen sliding scale   SubCutaneous three times a day before meals  meropenem  IVPB 1000 milliGRAM(s) IV Intermittent every 8 hours  midodrine 5 milliGRAM(s) Oral every 8 hours  pantoprazole  Injectable 40 milliGRAM(s) IV Push every 12 hours      T(C): 36.1 (08-16-23 @ 07:00), Max: 36.8 (08-15-23 @ 16:00)  HR: 84 (08-16-23 @ 07:00) (67 - 97)  BP: 135/86 (08-16-23 @ 07:00) (110/90 - 141/68)  RR: 20 (08-16-23 @ 04:04) (19 - 38)  SpO2: 95% (08-16-23 @ 07:00) (92% - 98%)  Wt(kg): --Vital Signs Last 24 Hrs  T(C): 36.1 (16 Aug 2023 07:00), Max: 36.8 (15 Aug 2023 16:00)  T(F): 97 (16 Aug 2023 07:00), Max: 98.3 (15 Aug 2023 16:00)  HR: 84 (16 Aug 2023 07:00) (67 - 97)  BP: 135/86 (16 Aug 2023 07:00) (110/90 - 141/68)  BP(mean): 103 (16 Aug 2023 07:00) (79 - 103)  RR: 20 (16 Aug 2023 04:04) (19 - 38)  SpO2: 95% (16 Aug 2023 07:00) (92% - 98%)    Parameters below as of 16 Aug 2023 07:00  Patient On (Oxygen Delivery Method): room air        PHYSICAL EXAM:  GENERAL: Lethargic , looks chronically ill   HEAD:  Atraumatic, Normocephalic  ENMT:  dry  mucous membranes  NECK: Supple, No JVD,  CHEST/LUNG: Decreased bilateral air entry , bilateral rhonchi   HEART: Regular rate and rhythm; No murmurs, rubs, or gallops  ABDOMEN: tender , distended , mainly tender in the RLQ   NEURO: Alert , lethargic , Non oriented   EXTREMITIES: Bilateral lower limb edema . Skin necrosis in the right forearm most probably due to extravasation of norepi while he was in shock   SKIN: No rashes or lesions    Consultant(s) Notes Reviewed:  [x ] YES  [ ] NO  Care Discussed with Consultants/Other Providers [ x] YES  [ ] NO    LABS:                        8.6    15.11 )-----------( 251      ( 15 Aug 2023 05:00 )             25.7     08-15    148<H>  |  116<H>  |  25<H>  ----------------------------<  267<H>  4.2   |  22  |  0.9    Ca    6.6<L>      15 Aug 2023 18:50  Phos  2.1     08-15  Mg     2.2     08-15    TPro  4.4<L>  /  Alb  1.9<L>  /  TBili  0.4  /  DBili  x   /  AST  46<H>  /  ALT  24  /  AlkPhos  124<H>  08-15      Urinalysis Basic - ( 15 Aug 2023 18:50 )    Color: x / Appearance: x / SG: x / pH: x  Gluc: 267 mg/dL / Ketone: x  / Bili: x / Urobili: x   Blood: x / Protein: x / Nitrite: x   Leuk Esterase: x / RBC: x / WBC x   Sq Epi: x / Non Sq Epi: x / Bacteria: x      CAPILLARY BLOOD GLUCOSE      POCT Blood Glucose.: 226 mg/dL (16 Aug 2023 05:46)  POCT Blood Glucose.: 229 mg/dL (15 Aug 2023 20:46)  POCT Blood Glucose.: 255 mg/dL (15 Aug 2023 17:45)        Urinalysis Basic - ( 15 Aug 2023 18:50 )    Color: x / Appearance: x / SG: x / pH: x  Gluc: 267 mg/dL / Ketone: x  / Bili: x / Urobili: x   Blood: x / Protein: x / Nitrite: x   Leuk Esterase: x / RBC: x / WBC x   Sq Epi: x / Non Sq Epi: x / Bacteria: x        RADIOLOGY & ADDITIONAL TESTS:    chest xray 15/8  Support devices: Feeding tube coursing below the field-of-view. Stable   right-sided central venous catheter.    Cardiac/mediastinum/hilum: Unchanged    Lung parenchyma/Pleura: Bilateral opacities without significant change.   No pneumothorax.    Skeleton/soft tissues: Unchanged      Imaging Personally Reviewed:  [x ] YES  [ ] NO

## 2023-08-15 NOTE — PROGRESS NOTE ADULT - SUBJECTIVE AND OBJECTIVE BOX
Gastroenterology progress note:     Patient is a 88y old  Male who presents with a chief complaint of DKA/HHS (15 Aug 2023 08:48)       Admitted on: 08-04-23    We are following the patient for: melena, anemia       Interval History: s/p EGD still having black dark stool x2 per primary team        PAST MEDICAL & SURGICAL HISTORY:  HTN (hypertension)      Gout      BPH (benign prostatic hyperplasia)      Parkinson disease      HLD (hyperlipidemia)      Smoker within last 12 months      Diabetes mellitus      No significant past surgical history          MEDICATIONS  (STANDING):  allopurinol 300 milliGRAM(s) Oral daily  bacitracin   Ointment 1 Application(s) Topical two times a day  caspofungin IVPB      caspofungin IVPB 50 milliGRAM(s) IV Intermittent every 24 hours  chlorhexidine 2% Cloths 1 Application(s) Topical daily  dextrose 5%. 500 milliLiter(s) (500 mL/Hr) IV Continuous <Continuous>  dextrose 5%. 1000 milliLiter(s) (50 mL/Hr) IV Continuous <Continuous>  dextrose 5%. 1000 milliLiter(s) (100 mL/Hr) IV Continuous <Continuous>  dextrose 5%. 1000 milliLiter(s) (100 mL/Hr) IV Continuous <Continuous>  dextrose 50% Injectable 25 Gram(s) IV Push once  dextrose 50% Injectable 25 Gram(s) IV Push once  dextrose 50% Injectable 12.5 Gram(s) IV Push once  dextrose 50% Injectable 25 Gram(s) IV Push once  enoxaparin Injectable 40 milliGRAM(s) SubCutaneous every 24 hours  glucagon  Injectable 1 milliGRAM(s) IntraMuscular once  hydrocortisone sodium succinate Injectable 50 milliGRAM(s) IV Push every 12 hours  insulin lispro (ADMELOG) corrective regimen sliding scale   SubCutaneous three times a day before meals  meropenem  IVPB 1000 milliGRAM(s) IV Intermittent every 8 hours  midodrine 15 milliGRAM(s) Oral every 8 hours  pantoprazole  Injectable 40 milliGRAM(s) IV Push every 12 hours    MEDICATIONS  (PRN):  dextrose Oral Gel 15 Gram(s) Oral once PRN Blood Glucose LESS THAN 70 milliGRAM(s)/deciliter  dextrose Oral Gel 15 Gram(s) Oral once PRN Blood Glucose LESS THAN 70 milliGRAM(s)/deciliter      Allergies  No Known Allergies      Review of Systems:   Cardiovascular:  No Chest Pain, No Palpitations  Respiratory:  No Cough, No Dyspnea  Gastrointestinal:  As described in HPI  Skin:  No Skin Lesions, No Jaundice  Neuro:  No Syncope, No Dizziness    Physical Examination:  T(C): 36.8 (08-15-23 @ 08:00), Max: 36.8 (08-15-23 @ 04:00)  HR: 84 (08-15-23 @ 10:00) (64 - 102)  BP: 160/60 (08-15-23 @ 10:00) (90/64 - 160/60)  RR: 25 (08-15-23 @ 10:00) (15 - 34)  SpO2: 96% (08-15-23 @ 10:00) (92% - 99%)  Weight (kg): 62.6 (08-14-23 @ 11:44)    08-14-23 @ 07:01  -  08-15-23 @ 07:00  --------------------------------------------------------  IN: 2390 mL / OUT: 760 mL / NET: 1630 mL    08-15-23 @ 07:01  -  08-15-23 @ 10:46  --------------------------------------------------------  IN: 650 mL / OUT: 0 mL / NET: 650 mL        GENERAL:  no acute distress.  HEAD:  Atraumatic, Normocephalic  EYES: conjunctiva and sclera clear  NECK: Supple, no JVD or thyromegaly  CHEST/LUNG: Clear to auscultation bilaterally; No wheeze, rhonchi, or rales  HEART: Regular rate and rhythm; normal S1, S2, ABDOMEN: mildly tender in lower abdomen; Soft,Bowel sounds present  NEUROLOGY: No asterixis or tremor.   SKIN: Intact, no jaundice     Data:                        8.6    15.11 )-----------( 251      ( 15 Aug 2023 05:00 )             25.7     Hgb trend:  8.6  08-15-23 @ 05:00  8.4  08-14-23 @ 21:12  7.8  08-14-23 @ 04:45  8.0  08-13-23 @ 17:18  7.8  08-13-23 @ 05:25  7.8  08-12-23 @ 22:50  7.8  08-12-23 @ 19:53        08-15    150<H>  |  119<H>  |  25<H>  ----------------------------<  123<H>  3.8   |  23  |  0.9    Ca    6.7<L>      15 Aug 2023 05:00  Phos  2.1     08-15  Mg     2.2     08-15    TPro  4.4<L>  /  Alb  1.9<L>  /  TBili  0.4  /  DBili  x   /  AST  46<H>  /  ALT  24  /  AlkPhos  124<H>  08-15    Liver panel trend:  TBili 0.4   /   AST 46   /   ALT 24   /   AlkP 124   /   Tptn 4.4   /   Alb 1.9    /   DBili --      08-15  TBili 0.4   /   AST 47   /   ALT 24   /   AlkP 115   /   Tptn 4.2   /   Alb 2.0    /   DBili --      08-14  TBili 0.3   /   AST 48   /   ALT 24   /   AlkP 113   /   Tptn 4.0   /   Alb 1.7    /   DBili --      08-13  TBili 0.3   /   AST 56   /   ALT 26   /   AlkP 121   /   Tptn 3.9   /   Alb 1.6    /   DBili --      08-12  TBili 0.3   /   AST 75   /   ALT 24   /   AlkP 84   /   Tptn 3.9   /   Alb 1.7    /   DBili --      08-12  TBili 0.4   /   AST 47   /   ALT 17   /   AlkP 69   /   Tptn 3.8   /   Alb 1.5    /   DBili --      08-11  TBili 0.6   /   AST 56   /   ALT 18   /   AlkP 76   /   Tptn 4.3   /   Alb 1.8    /   DBili --      08-09  TBili 0.5   /   AST 37   /   ALT 15   /   AlkP 68   /   Tptn 4.1   /   Alb 1.8    /   DBili --      08-09  TBili 0.6   /   AST 35   /   ALT 15   /   AlkP 71   /   Tptn 4.1   /   Alb 1.8    /   DBili --      08-08  TBili 0.3   /   AST 41   /   ALT 16   /   AlkP 66   /   Tptn 4.2   /   Alb 2.2    /   DBili --      08-07  TBili 0.3   /   AST 28   /   ALT 13   /   AlkP 54   /   Tptn 4.4   /   Alb 2.1    /   DBili --      08-06      PT/INR - ( 13 Aug 2023 17:18 )   PT: 11.90 sec;   INR: 1.04 ratio         PTT - ( 13 Aug 2023 17:18 )  PTT:33.2 sec    Culture - Blood (collected 12 Aug 2023 11:44)  Source: .Blood None  Preliminary Report (14 Aug 2023 20:01):    No growth at 48 Hours         Radiology:    < from: Xray Kidney Ureter Bladder (08.11.23 @ 10:44) >  Supine view the abdomen. Nasogastric tube not well visualized.    Mildly distended loops of small bowel which may reflect ileus. Air and   stool within the colon. Degenerative changes.      < end of copied text >

## 2023-08-15 NOTE — PROGRESS NOTE ADULT - SUBJECTIVE AND OBJECTIVE BOX
HPI:    87 y/o M w/ PMH of DM, HTN coming from home with complaint of decreased appetite, increased urinary output and craving sweets x 2 weeks. Patient admitted for management of HHS, with hospital stay complicated by septic shock, GIB. Palliative consulted for GOC.     Interval history:     8/15: s/p EGD on 8/14, found to have ulcer, vessel cauterized. patient with NGT in place and hemodynamically stable.      ADVANCE DIRECTIVES:     [ ] Full Code [ X] DNR/DNI  MOLST  [ ]  Living Will  [ ]   DECISION MAKER(s): Daughters- Maryjane Knight   [ ] Health Care Proxy(s)  [X ] Surrogate(s)  [ ] Guardian           Name(s): Phone Number(s): Children       BASELINE (I)ADL(s) (prior to admission):    Clarion: [ ]Total  [X ] Moderate [ ]Dependent - lives with dtr Antonia   Palliative Performance Status Version 2:         %    http://[facility].org/files/news/palliative_performance_scale_ppsv2.pdf    Allergies    No Known Allergies    Intolerances    MEDICATIONS  (STANDING):  allopurinol 300 milliGRAM(s) Oral daily  bacitracin   Ointment 1 Application(s) Topical two times a day  caspofungin IVPB      caspofungin IVPB 50 milliGRAM(s) IV Intermittent every 24 hours  chlorhexidine 2% Cloths 1 Application(s) Topical daily  dextrose 5%. 1000 milliLiter(s) (100 mL/Hr) IV Continuous <Continuous>  dextrose 5%. 1000 milliLiter(s) (100 mL/Hr) IV Continuous <Continuous>  dextrose 5%. 1000 milliLiter(s) (50 mL/Hr) IV Continuous <Continuous>  dextrose 50% Injectable 25 Gram(s) IV Push once  dextrose 50% Injectable 12.5 Gram(s) IV Push once  dextrose 50% Injectable 25 Gram(s) IV Push once  dextrose 50% Injectable 25 Gram(s) IV Push once  enoxaparin Injectable 40 milliGRAM(s) SubCutaneous every 24 hours  glucagon  Injectable 1 milliGRAM(s) IntraMuscular once  hydrocortisone sodium succinate Injectable 50 milliGRAM(s) IV Push every 12 hours  insulin lispro (ADMELOG) corrective regimen sliding scale   SubCutaneous three times a day before meals  meropenem  IVPB 1000 milliGRAM(s) IV Intermittent every 8 hours  midodrine 10 milliGRAM(s) Oral every 8 hours  pantoprazole  Injectable 40 milliGRAM(s) IV Push every 12 hours    MEDICATIONS  (PRN):  dextrose Oral Gel 15 Gram(s) Oral once PRN Blood Glucose LESS THAN 70 milliGRAM(s)/deciliter  dextrose Oral Gel 15 Gram(s) Oral once PRN Blood Glucose LESS THAN 70 milliGRAM(s)/deciliter    PRESENT SYMPTOMS: [X ]Unable to obtain due to poor mentation   Source if other than patient:  [ ]Family   [ ]Team     Pain: [ ]yes [ ]no  QOL impact -   Location -                    Aggravating factors -  Quality -  Radiation -  Timing-  Severity (0-10 scale):  Minimal acceptable level (0-10 scale):     CPOT:  0  https://www.[facility].org/getattachment/[email or web address]/Critical-Care-Pain-Observation-Tool-(CPOT)    PAIN AD Score:   http://geriatrictoolkit.[facility]/cog/painad.pdf (press ctrl +  left click to view)    Dyspnea:                           [ ]None[ ]Mild [ ]Moderate [ ]Severe     Respiratory Distress Observation Scale (RDOS): 0  A score of 0 to 2 signifies little or no respiratory distress, 3 signifies mild distress, scores 4 to 6 indicate moderate distress, and scores greater than 7 signify severe distress  https://www.[facility].ca/sites/default/files/PDFS/410824-tkfzgacrffh-tyztxral-mnsboyejbub-yomxk.pdf    Anxiety:                             [ ]None[ ]Mild [ ]Moderate [ ]Severe   Fatigue:                             [ ]None[ ]Mild [ ]Moderate [ ]Severe   Nausea:                             [ ]None[ ]Mild [ ]Moderate [ ]Severe   Loss of appetite:              [ ]None[ ]Mild [ ]Moderate [ ]Severe   Constipation:                    [ ]None[ ]Mild [ ]Moderate [ ]Severe    Other Symptoms:  [ ]All other review of systems negative     Palliative Performance Status Version 2:      30   %    http://npcrc.org/files/news/palliative_performance_scale_ppsv2.pdf    PHYSICAL EXAM:  Vital Signs Last 24 Hrs  T(C): 36.8 (15 Aug 2023 08:00), Max: 36.8 (15 Aug 2023 04:00)  T(F): 98.2 (15 Aug 2023 08:00), Max: 98.3 (15 Aug 2023 04:00)  HR: 72 (15 Aug 2023 11:00) (64 - 102)  BP: 122/60 (15 Aug 2023 11:00) (98/57 - 160/60)  BP(mean): 86 (15 Aug 2023 11:00) (71 - 107)  RR: 20 (15 Aug 2023 11:00) (15 - 34)  SpO2: 94% (15 Aug 2023 11:00) (92% - 99%)    GENERAL:  [X ] No acute distress [ ]Lethargic  [ ]Unarousable  [ ]Verbal  [ ]Non-Verbal [ ]Cachexia    BEHAVIORAL/PSYCH:  [ ]Alert and Oriented x  [ ] Anxiety [ ] Delirium [ ] Agitation [ X] Calm   EYES: [X ] No scleral icterus [ ] Scleral icterus [ ] Closed  ENMT:  [ ]Dry mouth  [X]No external oral lesions [ ] No external ear or nose lesions  CARDIOVASCULAR:  [ ]Regular [ ]Irregular [ ]Tachy [ ]Not Tachy  [ ]Meet [ ] Edema [X ] No edema  PULMONARY:  [ ]Tachypnea  [ ]Audible excessive secretions [ X] No labored breathing [ ] labored breathing  GASTROINTESTINAL: [ X]Soft  [ ]Distended  [ ]Not distended [ ]Non tender [ ]Tender  MUSCULOSKELETAL: [ X]No clubbing [ ] clubbing  [ ] No cyanosis [ ] cyanosis  NEUROLOGIC: [ ]No focal deficits  [ ]Follows commands  [ ]Does not follow commands  [X ]Cognitive impairment  [ ]Dysphagia  [ ]Dysarthria  [ ]Paresis   SKIN: [ ] Jaundiced [X ] Non-jaundiced [ ]Rash [ ]No Rash [ ] Warm [ ] Dry  MISC/LINES: [ ] ET tube [ ] Trach [X ]NGT/OGT [ ]PEG [ ]Jones    LABS: reviewed by me                        8.6    15.11 )-----------( 251      ( 15 Aug 2023 05:00 )             25.7   08-15    150<H>  |  119<H>  |  25<H>  ----------------------------<  123<H>  3.8   |  23  |  0.9    Ca    6.7<L>      15 Aug 2023 05:00  Phos  2.1     08-15  Mg     2.2     08-15    TPro  4.4<L>  /  Alb  1.9<L>  /  TBili  0.4  /  DBili  x   /  AST  46<H>  /  ALT  24  /  AlkPhos  124<H>  08-15  PT/INR - ( 13 Aug 2023 17:18 )   PT: 11.90 sec;   INR: 1.04 ratio         PTT - ( 13 Aug 2023 17:18 )  PTT:33.2 sec    Urinalysis Basic - ( 15 Aug 2023 05:00 )    Color: x / Appearance: x / SG: x / pH: x  Gluc: 123 mg/dL / Ketone: x  / Bili: x / Urobili: x   Blood: x / Protein: x / Nitrite: x   Leuk Esterase: x / RBC: x / WBC x   Sq Epi: x / Non Sq Epi: x / Bacteria: x      RADIOLOGY & ADDITIONAL STUDIES: reviewed by me      < from: Xray Chest 1 View- PORTABLE-Routine (Xray Chest 1 View- PORTABLE-Routine in AM.) (08.15.23 @ 06:30) >    Findings/  impression:    Support devices: Feeding tube coursing below the field-of-view. Stable   right-sided central venous catheter.    Cardiac/mediastinum/hilum: Unchanged    Lung parenchyma/Pleura: Bilateral opacities without significant change.   No pneumothorax.    Skeleton/soft tissues: Unchanged    --- End of Report ---    < end of copied text >  < from: Xray Chest 1 View- PORTABLE-Routine (Xray Chest 1 View- PORTABLE-Routine in AM.) (08.15.23 @ 06:30) >    Findings/  impression:    Support devices: Feeding tube coursing below the field-of-view. Stable   right-sided central venous catheter.    Cardiac/mediastinum/hilum: Unchanged    Lung parenchyma/Pleura: Bilateral opacities without significant change.   No pneumothorax.    Skeleton/soft tissues: Unchanged    --- End of Report ---    < end of copied text >      EKG: reviewed by me    < from: 12 Lead ECG (08.04.23 @ 10:57) >    Ventricular Rate 106 BPM    Atrial Rate 106 BPM    P-R Interval 152 ms    QRS Duration 126 ms    Q-T Interval 400 ms    QTC Calculation(Bazett) 531 ms    P Axis 34 degrees    R Axis -50 degrees    T Axis -18 degrees    Diagnosis Line Sinus tachycardia  Right bundle branch block  Left anterior fascicular block  *** Bifascicular block ***  Moderate voltage criteria for LVH, may be normal variant      Abnormal ECG    Confirmed by RUDY FLOR MD ([id number]) on 8/4/2023 11:24:55 AM    < end of copied text >      Palliative Care Spiritual/Emotional Screening Tool Question  Severity (0-4):  Score of 2 or greater indicates recommendation of Chaplaincy referral  Chaplaincy Referral: [ ] Yes [ ] Refused [ ] Following    Caregiver [place]:  [ ] Yes [ ] No [ ] Deferred  Social Work Referral [ ]  Patient and Family Centered Care Referral [ ]    Anticipatory Grief Present: [ ] Yes [ ] No [ ] Deferred  Social Work Referral [ ]  Patient and Family Centered Care Referral [ ]    Patient discussed with primary medical team MD  Palliative care education provided to patient and/or family

## 2023-08-16 DIAGNOSIS — E11.9 TYPE 2 DIABETES MELLITUS WITHOUT COMPLICATIONS: ICD-10-CM

## 2023-08-16 DIAGNOSIS — R78.81 BACTEREMIA: ICD-10-CM

## 2023-08-16 DIAGNOSIS — K92.1 MELENA: ICD-10-CM

## 2023-08-16 DIAGNOSIS — Z79.899 OTHER LONG TERM (CURRENT) DRUG THERAPY: ICD-10-CM

## 2023-08-16 DIAGNOSIS — I10 ESSENTIAL (PRIMARY) HYPERTENSION: ICD-10-CM

## 2023-08-16 LAB
1,3 BETA GLUCAN SER QL: NEGATIVE — SIGNIFICANT CHANGE UP
ALBUMIN SERPL ELPH-MCNC: 2.3 G/DL — LOW (ref 3.5–5.2)
ALP SERPL-CCNC: 145 U/L — HIGH (ref 30–115)
ALT FLD-CCNC: 38 U/L — SIGNIFICANT CHANGE UP (ref 0–41)
ANION GAP SERPL CALC-SCNC: 11 MMOL/L — SIGNIFICANT CHANGE UP (ref 7–14)
AST SERPL-CCNC: 94 U/L — HIGH (ref 0–41)
BILIRUB SERPL-MCNC: 0.4 MG/DL — SIGNIFICANT CHANGE UP (ref 0.2–1.2)
BUN SERPL-MCNC: 21 MG/DL — HIGH (ref 10–20)
CALCIUM SERPL-MCNC: 6.5 MG/DL — LOW (ref 8.4–10.5)
CHLORIDE SERPL-SCNC: 114 MMOL/L — HIGH (ref 98–110)
CO2 SERPL-SCNC: 20 MMOL/L — SIGNIFICANT CHANGE UP (ref 17–32)
CREAT SERPL-MCNC: 1 MG/DL — SIGNIFICANT CHANGE UP (ref 0.7–1.5)
EGFR: 73 ML/MIN/1.73M2 — SIGNIFICANT CHANGE UP
FUNGITELL: <31 PG/ML — SIGNIFICANT CHANGE UP
GLUCOSE BLDC GLUCOMTR-MCNC: 226 MG/DL — HIGH (ref 70–99)
GLUCOSE BLDC GLUCOMTR-MCNC: 232 MG/DL — HIGH (ref 70–99)
GLUCOSE BLDC GLUCOMTR-MCNC: 254 MG/DL — HIGH (ref 70–99)
GLUCOSE SERPL-MCNC: 227 MG/DL — HIGH (ref 70–99)
HCT VFR BLD CALC: 29.8 % — LOW (ref 42–52)
HGB BLD-MCNC: 9.6 G/DL — LOW (ref 14–18)
MAGNESIUM SERPL-MCNC: 2.1 MG/DL — SIGNIFICANT CHANGE UP (ref 1.8–2.4)
MCHC RBC-ENTMCNC: 31.2 PG — HIGH (ref 27–31)
MCHC RBC-ENTMCNC: 32.2 G/DL — SIGNIFICANT CHANGE UP (ref 32–37)
MCV RBC AUTO: 96.8 FL — HIGH (ref 80–94)
NRBC # BLD: 0 /100 WBCS — SIGNIFICANT CHANGE UP (ref 0–0)
PLATELET # BLD AUTO: 180 K/UL — SIGNIFICANT CHANGE UP (ref 130–400)
PMV BLD: 13.2 FL — HIGH (ref 7.4–10.4)
POTASSIUM SERPL-MCNC: 4.4 MMOL/L — SIGNIFICANT CHANGE UP (ref 3.5–5)
POTASSIUM SERPL-SCNC: 4.4 MMOL/L — SIGNIFICANT CHANGE UP (ref 3.5–5)
PROT SERPL-MCNC: 4.9 G/DL — LOW (ref 6–8)
RBC # BLD: 3.08 M/UL — LOW (ref 4.7–6.1)
RBC # FLD: 21.6 % — HIGH (ref 11.5–14.5)
SODIUM SERPL-SCNC: 145 MMOL/L — SIGNIFICANT CHANGE UP (ref 135–146)
SURGICAL PATHOLOGY STUDY: SIGNIFICANT CHANGE UP
WBC # BLD: 11.99 K/UL — HIGH (ref 4.8–10.8)
WBC # FLD AUTO: 11.99 K/UL — HIGH (ref 4.8–10.8)

## 2023-08-16 PROCEDURE — 99233 SBSQ HOSP IP/OBS HIGH 50: CPT

## 2023-08-16 PROCEDURE — 99291 CRITICAL CARE FIRST HOUR: CPT

## 2023-08-16 RX ORDER — MEROPENEM 1 G/30ML
1000 INJECTION INTRAVENOUS EVERY 12 HOURS
Refills: 0 | Status: DISCONTINUED | OUTPATIENT
Start: 2023-08-16 | End: 2023-08-17

## 2023-08-16 RX ORDER — INSULIN GLARGINE 100 [IU]/ML
6 INJECTION, SOLUTION SUBCUTANEOUS
Refills: 0 | Status: DISCONTINUED | OUTPATIENT
Start: 2023-08-16 | End: 2023-08-17

## 2023-08-16 RX ORDER — MIDODRINE HYDROCHLORIDE 2.5 MG/1
5 TABLET ORAL EVERY 8 HOURS
Refills: 0 | Status: DISCONTINUED | OUTPATIENT
Start: 2023-08-16 | End: 2023-08-17

## 2023-08-16 RX ADMIN — Medication 300 MILLIGRAM(S): at 12:54

## 2023-08-16 RX ADMIN — INSULIN GLARGINE 6 UNIT(S): 100 INJECTION, SOLUTION SUBCUTANEOUS at 20:58

## 2023-08-16 RX ADMIN — MIDODRINE HYDROCHLORIDE 5 MILLIGRAM(S): 2.5 TABLET ORAL at 21:25

## 2023-08-16 RX ADMIN — Medication 1 APPLICATION(S): at 05:12

## 2023-08-16 RX ADMIN — Medication 3: at 17:02

## 2023-08-16 RX ADMIN — Medication 2: at 06:22

## 2023-08-16 RX ADMIN — Medication 2: at 12:30

## 2023-08-16 RX ADMIN — CASPOFUNGIN ACETATE 260 MILLIGRAM(S): 7 INJECTION, POWDER, LYOPHILIZED, FOR SOLUTION INTRAVENOUS at 13:19

## 2023-08-16 RX ADMIN — MEROPENEM 100 MILLIGRAM(S): 1 INJECTION INTRAVENOUS at 21:24

## 2023-08-16 RX ADMIN — MEROPENEM 100 MILLIGRAM(S): 1 INJECTION INTRAVENOUS at 13:07

## 2023-08-16 RX ADMIN — Medication 50 MILLIGRAM(S): at 05:12

## 2023-08-16 RX ADMIN — PANTOPRAZOLE SODIUM 40 MILLIGRAM(S): 20 TABLET, DELAYED RELEASE ORAL at 17:30

## 2023-08-16 RX ADMIN — Medication 50 MILLIGRAM(S): at 17:31

## 2023-08-16 RX ADMIN — CHLORHEXIDINE GLUCONATE 1 APPLICATION(S): 213 SOLUTION TOPICAL at 05:12

## 2023-08-16 RX ADMIN — PANTOPRAZOLE SODIUM 40 MILLIGRAM(S): 20 TABLET, DELAYED RELEASE ORAL at 05:12

## 2023-08-16 RX ADMIN — MIDODRINE HYDROCHLORIDE 10 MILLIGRAM(S): 2.5 TABLET ORAL at 05:11

## 2023-08-16 RX ADMIN — Medication 1 APPLICATION(S): at 17:30

## 2023-08-16 RX ADMIN — MIDODRINE HYDROCHLORIDE 5 MILLIGRAM(S): 2.5 TABLET ORAL at 13:07

## 2023-08-16 RX ADMIN — MEROPENEM 100 MILLIGRAM(S): 1 INJECTION INTRAVENOUS at 05:13

## 2023-08-16 RX ADMIN — ENOXAPARIN SODIUM 40 MILLIGRAM(S): 100 INJECTION SUBCUTANEOUS at 12:54

## 2023-08-16 NOTE — PROGRESS NOTE ADULT - ASSESSMENT
87 y/o M w/ PMH of DM, HTN coming from home with complaint of decreased appetite, increased urinary output and craving sweets x 2 weeks. Patient admitted for management of HHS, with hospital stay complicated by septic shock, GIB. Palliative consulted for GOC.     Patient comfortable on exam.   Left second voicemail for daughter Antonia today. Will try Maryjane or Miracle tomorrow if no call back from Antonia. Will try to review GOC with family this week.     Education about palliative care provided to patient/family.  See Recs below.    Please call [phone] with questions or concerns 24/7.   We will continue to follow.

## 2023-08-16 NOTE — PHYSICAL THERAPY INITIAL EVALUATION ADULT - GENERAL OBSERVATIONS, REHAB EVAL
Pt was approached for PT IE, As per nurse Franklin, + hemodynamically unstable, active GIB. PT will follow up when appropriate
pt. encountered in bed in NAD + tele, +urine collection bag +sequentials, +IV lock, RN aware. [facility]  [facility]et # [id number] provided translation. [phone]

## 2023-08-16 NOTE — PROGRESS NOTE ADULT - ASSESSMENT
IMPRESSION:    DKA / HHS, resolved  Hungatella species bacteremia   Septic shock resolved   UGIB s/p EGD s/p cauterization of visible vessel  BART - resolved   AMS toxic metabolic  HO DM  HO HTN  Abdominal aortic aneurysm  Parkinson disease  Hypernatremia     PLAN:    CNS: Avoid sedation.     HEENT: Oral care. NGT care     PULMONARY:  HOB @ 45 degrees.  Aspiration precautions. On RA.        CARDIOVASCULAR:   Avoid volume overload. D5W , Dec midodrine 5 q 8     GI: Protonix BID. Resume tube feeds po feeds per speech eval    RENAL: Follow up lytes.  Correct as needed. Voiding. No adler. free water    INFECTIOUS DISEASE: abx per ID    HEMATOLOGICAL:  Resume DVT prophylaxis with heparin SQ. HIT panel negative. Monitor CBC.  Transfuse to keep Hb>7.    ENDOCRINE:  Follow up FS QACHS. Insulin basal-bolus.    MUSCULOSKELETAL: Bedrest     Prognosis very poor. DNR/DNI    SDU

## 2023-08-16 NOTE — PROGRESS NOTE ADULT - SUBJECTIVE AND OBJECTIVE BOX
INTERVAL HPI/OVERNIGHT EVENTS:    SUBJECTIVE: Patient seen and examined at bedside.     cc: decreased po  no cp, sob, abd pain, fever; hx limited    OBJECTIVE:    VITAL SIGNS:  Vital Signs Last 24 Hrs  T(C): 36.4 (16 Aug 2023 16:00), Max: 36.6 (15 Aug 2023 20:00)  T(F): 97.5 (16 Aug 2023 16:00), Max: 97.9 (15 Aug 2023 20:00)  HR: 74 (16 Aug 2023 16:00) (67 - 86)  BP: 131/65 (16 Aug 2023 16:00) (116/57 - 141/68)  BP(mean): 110 (16 Aug 2023 12:00) (79 - 110)  RR: 22 (16 Aug 2023 16:00) (19 - 22)  SpO2: 95% (16 Aug 2023 16:00) (92% - 98%)    Parameters below as of 16 Aug 2023 12:00  Patient On (Oxygen Delivery Method): room air          PHYSICAL EXAM:    General: NAD  HEENT: NC/AT; PERRL, clear conjunctiva  Neck: supple  Respiratory: CTA b/l  Cardiovascular: +S1/S2; RRR  Abdomen: soft, NT/ND; +BS x4  Extremities: WWP, 2+ peripheral pulses b/l; no LE edema  Skin: normal color and turgor; no rash  Neurological:    MEDICATIONS:  MEDICATIONS  (STANDING):  allopurinol 300 milliGRAM(s) Oral daily  bacitracin   Ointment 1 Application(s) Topical two times a day  chlorhexidine 2% Cloths 1 Application(s) Topical daily  enoxaparin Injectable 40 milliGRAM(s) SubCutaneous every 24 hours  hydrocortisone sodium succinate Injectable 50 milliGRAM(s) IV Push every 12 hours  insulin glargine Injectable (LANTUS) 6 Unit(s) SubCutaneous <User Schedule>  insulin lispro (ADMELOG) corrective regimen sliding scale   SubCutaneous three times a day before meals  meropenem  IVPB 1000 milliGRAM(s) IV Intermittent every 12 hours  midodrine 5 milliGRAM(s) Oral every 8 hours  pantoprazole  Injectable 40 milliGRAM(s) IV Push every 12 hours    MEDICATIONS  (PRN):      ALLERGIES:  Allergies    No Known Allergies    Intolerances        LABS:                        9.6    11.99 )-----------( 180      ( 16 Aug 2023 11:23 )             29.8     Hemoglobin: 9.6 g/dL (08-16 @ 11:23)  Hemoglobin: 8.6 g/dL (08-15 @ 05:00)  Hemoglobin: 8.4 g/dL (08-14 @ 21:12)  Hemoglobin: 7.8 g/dL (08-14 @ 04:45)  Hemoglobin: 8.0 g/dL (08-13 @ 17:18)    CBC Full  -  ( 16 Aug 2023 11:23 )  WBC Count : 11.99 K/uL  RBC Count : 3.08 M/uL  Hemoglobin : 9.6 g/dL  Hematocrit : 29.8 %  Platelet Count - Automated : 180 K/uL  Mean Cell Volume : 96.8 fL  Mean Cell Hemoglobin : 31.2 pg  Mean Cell Hemoglobin Concentration : 32.2 g/dL  Auto Neutrophil # : x  Auto Lymphocyte # : x  Auto Monocyte # : x  Auto Eosinophil # : x  Auto Basophil # : x  Auto Neutrophil % : x  Auto Lymphocyte % : x  Auto Monocyte % : x  Auto Eosinophil % : x  Auto Basophil % : x    08-16    145  |  114<H>  |  21<H>  ----------------------------<  227<H>  4.4   |  20  |  1.0    Ca    6.5<L>      16 Aug 2023 11:23  Phos  2.1     08-15  Mg     2.1     08-16    TPro  4.9<L>  /  Alb  2.3<L>  /  TBili  0.4  /  DBili  x   /  AST  94<H>  /  ALT  38  /  AlkPhos  145<H>  08-16    Creatinine Trend: 1.0<--, 0.9<--, 1.1<--, 0.9<--, 1.0<--, 1.0<--  LIVER FUNCTIONS - ( 16 Aug 2023 11:23 )  Alb: 2.3 g/dL / Pro: 4.9 g/dL / ALK PHOS: 145 U/L / ALT: 38 U/L / AST: 94 U/L / GGT: x               hs Troponin:            Urinalysis Basic - ( 16 Aug 2023 11:23 )    Color: x / Appearance: x / SG: x / pH: x  Gluc: 227 mg/dL / Ketone: x  / Bili: x / Urobili: x   Blood: x / Protein: x / Nitrite: x   Leuk Esterase: x / RBC: x / WBC x   Sq Epi: x / Non Sq Epi: x / Bacteria: x      CSF:                      EKG:   MICROBIOLOGY:    IMAGING:      Labs, imaging, EKG personally reviewed    RADIOLOGY & ADDITIONAL TESTS: Reviewed.

## 2023-08-16 NOTE — PROGRESS NOTE ADULT - SUBJECTIVE AND OBJECTIVE BOX
HPI:    87 y/o M w/ PMH of DM, HTN coming from home with complaint of decreased appetite, increased urinary output and craving sweets x 2 weeks. Patient admitted for management of HHS, with hospital stay complicated by septic shock, GIB. Palliative consulted for GOC.     Interval history:     8/15: s/p EGD on 8/14, found to have ulcer, vessel cauterized. patient with NGT in place and hemodynamically stable.   8/16: patient is downgraded to SDU, NGT in place. passed S & S. Complaining of thirst but denies pain.      ADVANCE DIRECTIVES:     [ ] Full Code [ X] DNR/DNI  MOLST  [ ]  Living Will  [ ]   DECISION MAKER(s): Daughters- Antonia, Miracle Wesley   [ ] Health Care Proxy(s)  [X ] Surrogate(s)  [ ] Guardian           Name(s): Phone Number(s): Children   Antonia- [phone]  Jkqmy-[phone]  [phone]      BASELINE (I)ADL(s) (prior to admission):    [place]: [ ]Total  [X ] Moderate [ ]Dependent - lives with dtr Antonia   Palliative Performance Status Version 2:         %    http://npcrc.org/files/news/palliative_performance_scale_ppsv2.pdf    Allergies    No Known Allergies    Intolerances    MEDICATIONS  (STANDING):  allopurinol 300 milliGRAM(s) Oral daily  bacitracin   Ointment 1 Application(s) Topical two times a day  caspofungin IVPB      caspofungin IVPB 50 milliGRAM(s) IV Intermittent every 24 hours  chlorhexidine 2% Cloths 1 Application(s) Topical daily  dextrose 5%. 1000 milliLiter(s) (100 mL/Hr) IV Continuous <Continuous>  dextrose 5%. 1000 milliLiter(s) (100 mL/Hr) IV Continuous <Continuous>  dextrose 5%. 1000 milliLiter(s) (50 mL/Hr) IV Continuous <Continuous>  dextrose 50% Injectable 25 Gram(s) IV Push once  dextrose 50% Injectable 12.5 Gram(s) IV Push once  dextrose 50% Injectable 25 Gram(s) IV Push once  dextrose 50% Injectable 25 Gram(s) IV Push once  enoxaparin Injectable 40 milliGRAM(s) SubCutaneous every 24 hours  glucagon  Injectable 1 milliGRAM(s) IntraMuscular once  hydrocortisone sodium succinate Injectable 50 milliGRAM(s) IV Push every 12 hours  insulin lispro (ADMELOG) corrective regimen sliding scale   SubCutaneous three times a day before meals  meropenem  IVPB 1000 milliGRAM(s) IV Intermittent every 8 hours  midodrine 10 milliGRAM(s) Oral every 8 hours  pantoprazole  Injectable 40 milliGRAM(s) IV Push every 12 hours    MEDICATIONS  (PRN):  dextrose Oral Gel 15 Gram(s) Oral once PRN Blood Glucose LESS THAN 70 milliGRAM(s)/deciliter  dextrose Oral Gel 15 Gram(s) Oral once PRN Blood Glucose LESS THAN 70 milliGRAM(s)/deciliter    PRESENT SYMPTOMS:  * Cantonese  used for interview.   Pain: [ ]yes [ X]no  QOL impact -   Location -                    Aggravating factors -  Quality -  Radiation -  Timing-  Severity (0-10 scale):  Minimal acceptable level (0-10 scale):     CPOT:    https://www.[facility].org/getattachment/[email or web address]/Critical-Care-Pain-Observation-Tool-(CPOT)    PAIN AD Score:   http://geriatrictoolkit.[place].[facility]/cog/painad.pdf (press ctrl +  left click to view)    Dyspnea:                           [ X]None[ ]Mild [ ]Moderate [ ]Severe     Respiratory Distress Observation Scale (RDOS):   A score of 0 to 2 signifies little or no respiratory distress, 3 signifies mild distress, scores 4 to 6 indicate moderate distress, and scores greater than 7 signify severe distress  https://www.[facility].ca/sites/default/files/PDFS/660426-qrluvqeqiyd-tlvzsrkt-mnvjflqptvu-efbyb.pdf    Anxiety:                             [ ]None[ ]Mild [ ]Moderate [ ]Severe   Fatigue:                             [ ]None[ ]Mild [ ]Moderate [ ]Severe   Nausea:                             [ ]None[ ]Mild [ ]Moderate [ ]Severe   Loss of appetite:              [ ]None[ ]Mild [ ]Moderate [ ]Severe   Constipation:                    [ ]None[ ]Mild [ ]Moderate [ ]Severe    Other Symptoms: ++ Thirst   [ X]All other review of systems negative     Palliative Performance Status Version 2:      30   %    http://npcrc.org/files/news/palliative_performance_scale_ppsv2.pdf    PHYSICAL EXAM:  ICU Vital Signs Last 24 Hrs  T(C): 36.1 (16 Aug 2023 07:00), Max: 36.8 (15 Aug 2023 16:00)  T(F): 97 (16 Aug 2023 07:00), Max: 98.3 (15 Aug 2023 16:00)  HR: 84 (16 Aug 2023 07:00) (67 - 97)  BP: 135/86 (16 Aug 2023 07:00) (110/90 - 141/68)  BP(mean): 103 (16 Aug 2023 07:00) (79 - 103)  RR: 20 (16 Aug 2023 04:04) (19 - 38)  SpO2: 95% (16 Aug 2023 07:00) (92% - 98%)    GENERAL:  [X ] No acute distress [ ]Lethargic  [ ]Unarousable  [X ]Verbal  [ ]Non-Verbal [ ]Cachexia    BEHAVIORAL/PSYCH:  [X ]Alert and Oriented x 2-3 [ ] Anxiety [ ] Delirium [ ] Agitation [ X] Calm   EYES: [X ] No scleral icterus [ ] Scleral icterus [ ] Closed  ENMT:  [ ]Dry mouth  [X]No external oral lesions [ ] No external ear or nose lesions  CARDIOVASCULAR:  [ ]Regular [ ]Irregular [ ]Tachy [ ]Not Tachy  [ ]Meet [ ] Edema [X ] No edema  PULMONARY:  [ ]Tachypnea  [ ]Audible excessive secretions [ X] No labored breathing [ ] labored breathing  GASTROINTESTINAL: [ X]Soft  [ ]Distended  [ ]Not distended [ ]Non tender [ ]Tender  MUSCULOSKELETAL: [ X]No clubbing [ ] clubbing  [ ] No cyanosis [ ] cyanosis  NEUROLOGIC: [ ]No focal deficits  [ ]Follows commands  [ ]Does not follow commands  [X ]Cognitive impairment  [ ]Dysphagia  [ ]Dysarthria  [ ]Paresis   SKIN: [ ] Jaundiced [X ] Non-jaundiced [ ]Rash [ ]No Rash [ ] Warm [ ] Dry  MISC/LINES: [ ] ET tube [ ] Trach [X ]NGT/OGT [ ]PEG [ ]Jones    LABS: reviewed by me                        8.6    15.11 )-----------( 251      ( 15 Aug 2023 05:00 )             25.7   08-15    150<H>  |  119<H>  |  25<H>  ----------------------------<  123<H>  3.8   |  23  |  0.9    Ca    6.7<L>      15 Aug 2023 05:00  Phos  2.1     08-15  Mg     2.2     08-15    TPro  4.4<L>  /  Alb  1.9<L>  /  TBili  0.4  /  DBili  x   /  AST  46<H>  /  ALT  24  /  AlkPhos  124<H>  08-15  PT/INR - ( 13 Aug 2023 17:18 )   PT: 11.90 sec;   INR: 1.04 ratio         PTT - ( 13 Aug 2023 17:18 )  PTT:33.2 sec    Urinalysis Basic - ( 15 Aug 2023 05:00 )    Color: x / Appearance: x / SG: x / pH: x  Gluc: 123 mg/dL / Ketone: x  / Bili: x / Urobili: x   Blood: x / Protein: x / Nitrite: x   Leuk Esterase: x / RBC: x / WBC x   Sq Epi: x / Non Sq Epi: x / Bacteria: x      RADIOLOGY & ADDITIONAL STUDIES: reviewed by me      < from: Xray Chest 1 View- PORTABLE-Routine (Xray Chest 1 View- PORTABLE-Routine in AM.) (08.15.23 @ 06:30) >    Findings/  impression:    Support devices: Feeding tube coursing below the field-of-view. Stable   right-sided central venous catheter.    Cardiac/mediastinum/hilum: Unchanged    Lung parenchyma/Pleura: Bilateral opacities without significant change.   No pneumothorax.    Skeleton/soft tissues: Unchanged    --- End of Report ---    < end of copied text >  < from: Xray Chest 1 View- PORTABLE-Routine (Xray Chest 1 View- PORTABLE-Routine in AM.) (08.15.23 @ 06:30) >    Findings/  impression:    Support devices: Feeding tube coursing below the field-of-view. Stable   right-sided central venous catheter.    Cardiac/mediastinum/hilum: Unchanged    Lung parenchyma/Pleura: Bilateral opacities without significant change.   No pneumothorax.    Skeleton/soft tissues: Unchanged    --- End of Report ---    < end of copied text >      EKG: reviewed by me    < from: 12 Lead ECG (08.04.23 @ 10:57) >    Ventricular Rate 106 BPM    Atrial Rate 106 BPM    P-R Interval 152 ms    QRS Duration 126 ms    Q-T Interval 400 ms    QTC Calculation(Bazett) 531 ms    P Axis 34 degrees    R Axis -50 degrees    T Axis -18 degrees    Diagnosis Line Sinus tachycardia  Right bundle branch block  Left anterior fascicular block  *** Bifascicular block ***  Moderate voltage criteria for LVH, may be normal variant      Abnormal ECG    Confirmed by RUDY FLOR MD ([id number]) on 8/4/2023 11:24:55 AM    < end of copied text >        Patient discussed with primary medical team MD  Palliative care education provided to patient and/or family

## 2023-08-16 NOTE — PROGRESS NOTE ADULT - PROBLEM SELECTOR PLAN 1
bcx 8/4 +hungatella  bcx 8/5 onwards ntd  tte no vegetations  kenyatta  f/u id  midodrine 5 bid  cont solucortef 50 bid for now

## 2023-08-16 NOTE — PROGRESS NOTE ADULT - ASSESSMENT
88M PMHx DM2, HTN here with decreased appetite found to have HHS/ DKA s/p insulin gtt. Melena s/p EGD demonstrating ulcer. Hungatella bacteremia. Appendicitis.

## 2023-08-16 NOTE — PHYSICAL THERAPY INITIAL EVALUATION ADULT - ADDITIONAL COMMENTS
pt lives in a second floor apartment with his daughter and her family. pt he used a walker to ambulate with assistance.

## 2023-08-16 NOTE — PHYSICAL THERAPY INITIAL EVALUATION ADULT - PERTINENT HX OF CURRENT PROBLEM, REHAB EVAL
9 y/o M w/ PMH of DM, HTN, Parkinson disease, Gout, HLD, former smoker coming from home with complaint of decreased appetite, increased urinary output and craving sweets x 2 weeks. Patient admitted on 8/4/23 for DKA and hypotension , was started on levophed lose dose. HIs course complicated with appendicitis , Hungatella bacteremia, acute anemia requiring 3u. GI consulted for 3 episodes of melena and drop in Hb to 6. patient was complaining of severe lower abdominal pain and distesion s/p CTAP IC with thrombus in decending AA, SMA and celiac axis narrowing but no bowel ischemia  Daughter is caretaker, states pt. behaviour has changed  within the last 2 weeks, he is less active, requesting more coca cola and sugar. Pt noted to be hypotensive upon arrival and was brought to critical care area.

## 2023-08-16 NOTE — PROGRESS NOTE ADULT - SUBJECTIVE AND OBJECTIVE BOX
Over Night Events: events noted, transferred from MICU, ID/ GI reviewed, afebrile    PHYSICAL EXAM    ICU Vital Signs Last 24 Hrs  T(C): 36.4 (16 Aug 2023 04:04), Max: 36.8 (15 Aug 2023 08:00)  T(F): 97.5 (16 Aug 2023 04:04), Max: 98.3 (15 Aug 2023 16:00)  HR: 77 (16 Aug 2023 04:34) (67 - 97)  BP: 137/86 (16 Aug 2023 04:04) (110/90 - 160/60)  BP(mean): 103 (16 Aug 2023 04:04) (79 - 103)  RR: 20 (16 Aug 2023 04:04) (17 - 38)  SpO2: 95% (16 Aug 2023 04:34) (92% - 98%)    O2 Parameters below as of 16 Aug 2023 04:04  Patient On (Oxygen Delivery Method): room air            General: ill looking  NGT  Lungs: dec bs both bases  Cardiovascular: RK 2.6  Abdomen: Soft, Positive BS  Extremities: No clubbing   Neurological: Non focal       08-14-23 @ 07:01  -  08-15-23 @ 07:00  --------------------------------------------------------  IN:    Enteral Tube Flush: 150 mL    Glucerna: 480 mL    Insulin: 10 mL    Insulin: 24 mL    Insulin: 6 mL    IV PiggyBack: 500 mL    Lactated Ringers: 1200 mL    Pantoprazole: 20 mL  Total IN: 2390 mL    OUT:    Incontinent per Collection Bag (mL): 760 mL  Total OUT: 760 mL    Total NET: 1630 mL      08-15-23 @ 07:01  -  08-16-23 @ 06:02  --------------------------------------------------------  IN:    dextrose 5%: 500 mL    Enteral Tube Flush: 400 mL    Glucerna: 360 mL    IV PiggyBack: 150 mL    Lactated Ringers: 50 mL    Oral Fluid: 25 mL  Total IN: 1485 mL    OUT:    Incontinent per Collection Bag (mL): 260 mL    Voided (mL): 1400 mL  Total OUT: 1660 mL    Total NET: -175 mL          LABS:                          8.6    15.11 )-----------( 251      ( 15 Aug 2023 05:00 )             25.7                                               08-15    148<H>  |  116<H>  |  25<H>  ----------------------------<  267<H>  4.2   |  22  |  0.9    Ca    6.6<L>      15 Aug 2023 18:50  Phos  2.1     08-15  Mg     2.2     08-15    TPro  4.4<L>  /  Alb  1.9<L>  /  TBili  0.4  /  DBili  x   /  AST  46<H>  /  ALT  24  /  AlkPhos  124<H>  08-15                                             Urinalysis Basic - ( 15 Aug 2023 18:50 )    Color: x / Appearance: x / SG: x / pH: x  Gluc: 267 mg/dL / Ketone: x  / Bili: x / Urobili: x   Blood: x / Protein: x / Nitrite: x   Leuk Esterase: x / RBC: x / WBC x   Sq Epi: x / Non Sq Epi: x / Bacteria: x                                                  LIVER FUNCTIONS - ( 15 Aug 2023 05:00 )  Alb: 1.9 g/dL / Pro: 4.4 g/dL / ALK PHOS: 124 U/L / ALT: 24 U/L / AST: 46 U/L / GGT: x                                                                                                                                       MEDICATIONS  (STANDING):  allopurinol 300 milliGRAM(s) Oral daily  bacitracin   Ointment 1 Application(s) Topical two times a day  caspofungin IVPB      caspofungin IVPB 50 milliGRAM(s) IV Intermittent every 24 hours  chlorhexidine 2% Cloths 1 Application(s) Topical daily  enoxaparin Injectable 40 milliGRAM(s) SubCutaneous every 24 hours  hydrocortisone sodium succinate Injectable 50 milliGRAM(s) IV Push every 12 hours  insulin glargine Injectable (LANTUS) 4 Unit(s) SubCutaneous <User Schedule>  insulin lispro (ADMELOG) corrective regimen sliding scale   SubCutaneous three times a day before meals  meropenem  IVPB 1000 milliGRAM(s) IV Intermittent every 8 hours  midodrine 10 milliGRAM(s) Oral every 8 hours  pantoprazole  Injectable 40 milliGRAM(s) IV Push every 12 hours

## 2023-08-16 NOTE — PROGRESS NOTE ADULT - SUBJECTIVE AND OBJECTIVE BOX
NUTRITION SUPPORT TEAM  -  PROGRESS NOTE   resting comfortably  on NGT feed  abdomen mildly distended    REVIEW OF SYSTEMS:  Negative except as noted above.     VITALS:  T(F): 97 (08-16 @ 07:00), Max: 97.5 (08-16 @ 04:04)  HR: 84 (08-16 @ 07:00) (77 - 84)  BP: 135/86 (08-16 @ 07:00) (135/86 - 137/86)  RR: 20 (08-16 @ 04:04) (20 - 20)  SpO2: 95% (08-16 @ 07:00) (95% - 95%)    HEIGHT/WEIGHT/BMI:   Height (cm): 167.6 (08-14), 162.6 (11-21), 162.6 (10-09)  Weight (kg): 62.6 (08-14), 61.2 (10-09)  BMI (kg/m2): 22.3 (08-14), 23.1 (11-21), 23.1 (10-09)  08-15-23 @ 07:01  -  08-16-23 @ 07:00  --------------------------------------------------------  IN:    dextrose 5%: 500 mL    Enteral Tube Flush: 400 mL    Glucerna: 360 mL    IV PiggyBack: 150 mL    Lactated Ringers: 50 mL    Oral Fluid: 25 mL  Total IN: 1485 mL    OUT:    Incontinent per Collection Bag (mL): 260 mL    Voided (mL): 1400 mL  Total OUT: 1660 mL  Total NET: -175 mL  MEDICATIONS:   allopurinol 300 milliGRAM(s) Oral daily  bacitracin   Ointment 1 Application(s) Topical two times a day  caspofungin IVPB      caspofungin IVPB 50 milliGRAM(s) IV Intermittent every 24 hours  chlorhexidine 2% Cloths 1 Application(s) Topical daily  enoxaparin Injectable 40 milliGRAM(s) SubCutaneous every 24 hours  hydrocortisone sodium succinate Injectable 50 milliGRAM(s) IV Push every 12 hours  insulin glargine Injectable (LANTUS) 4 Unit(s) SubCutaneous <User Schedule>  insulin lispro (ADMELOG) corrective regimen sliding scale   SubCutaneous three times a day before meals  meropenem  IVPB 1000 milliGRAM(s) IV Intermittent every 8 hours  midodrine 5 milliGRAM(s) Oral every 8 hours  pantoprazole  Injectable 40 milliGRAM(s) IV Push every 12 hours    LABS:                         9.6    11.99 )-----------( 180      ( 16 Aug 2023 11:23 )             29.8     145  |  114<H>  |  21<H>  ----------------------------<  227<H>          (08-16-23 @ 11:23)  4.4   |  20  |  1.0    Ca    6.5<L>          (08-16-23 @ 11:23)  Phos  2.1         (08-15-23 @ 05:00)  Mg     2.1         (08-16-23 @ 11:23)  TPro  4.9<L>  /  Alb  2.3<L>  /  TBili  0.4  /  DBili  x   /  AST  94<H>  /  ALT  38  /  AlkPhos  145<H>       08-16-23 @ 11:23  Blood Glucose (Past 24 hours):  232 mg/dL (08-16 @ 11:44)  226 mg/dL (08-16 @ 05:46)  229 mg/dL (08-15 @ 20:46)  255 mg/dL (08-15 @ 17:45)    DIET:   Diet, Pureed:   Mildly Thick Liquids (MILDTHICKLIQS)  Tube Feeding Modality: Nasogastric  Glucerna 1.2 Víctor  Bolus  Total Volume of Bolus (mL):  360  Tube Feed Frequency: Every 8 hours   Tube Feed Start Time: 16:30  Bolus Feed Rate (mL per Hour): 540   Bolus Feed Duration (in Hours): 0.75  Free Water Flush  Free Water Flush Instructions:  free water flush 100cc prior and after feeding (08-15-23 @ 15:35) [Active]    RADIOLOGY:   < from: Xray Chest 1 View- PORTABLE-Routine (Xray Chest 1 View- PORTABLE-Routine in AM.) (08.15.23 @ 06:30) >  impression:  Support devices: Feeding tube coursing below the field-of-view. Stable   right-sided central venous catheter.  Cardiac/mediastinum/hilum: Unchanged  Lung parenchyma/Pleura: Bilateral opacities without significant change.   No pneumothorax.  Skeleton/soft tissues: Unchanged   NUTRITION SUPPORT TEAM  -  PROGRESS NOTE   moaning on and off this morning  on NGT feedings  abdomen mildly distended, not quite soft, not tender on examination  per RN + BM earlier, it was soft but black    REVIEW OF SYSTEMS:  Negative except as noted above.     VITALS:  T(F): 97 (08-16 @ 07:00), Max: 97.5 (08-16 @ 04:04)  HR: 84 (08-16 @ 07:00) (77 - 84)  BP: 135/86 (08-16 @ 07:00) (135/86 - 137/86)  RR: 20 (08-16 @ 04:04) (20 - 20)  SpO2: 95% (08-16 @ 07:00) (95% - 95%)    HEIGHT/WEIGHT/BMI:   Height (cm): 167.6 (08-14), 162.6 (11-21), 162.6 (10-09)  Weight (kg): 62.6 (08-14), 61.2 (10-09)  BMI (kg/m2): 22.3 (08-14), 23.1 (11-21), 23.1 (10-09)  08-15-23 @ 07:01  -  08-16-23 @ 07:00  --------------------------------------------------------  IN:    dextrose 5%: 500 mL    Enteral Tube Flush: 400 mL    Glucerna: 360 mL    IV PiggyBack: 150 mL    Lactated Ringers: 50 mL    Oral Fluid: 25 mL  Total IN: 1485 mL    OUT:    Incontinent per Collection Bag (mL): 260 mL    Voided (mL): 1400 mL  Total OUT: 1660 mL  Total NET: -175 mL  MEDICATIONS:   allopurinol 300 milliGRAM(s) Oral daily  bacitracin   Ointment 1 Application(s) Topical two times a day  caspofungin IVPB      caspofungin IVPB 50 milliGRAM(s) IV Intermittent every 24 hours  chlorhexidine 2% Cloths 1 Application(s) Topical daily  enoxaparin Injectable 40 milliGRAM(s) SubCutaneous every 24 hours  hydrocortisone sodium succinate Injectable 50 milliGRAM(s) IV Push every 12 hours  insulin glargine Injectable (LANTUS) 4 Unit(s) SubCutaneous <User Schedule>  insulin lispro (ADMELOG) corrective regimen sliding scale   SubCutaneous three times a day before meals  meropenem  IVPB 1000 milliGRAM(s) IV Intermittent every 8 hours  midodrine 5 milliGRAM(s) Oral every 8 hours  pantoprazole  Injectable 40 milliGRAM(s) IV Push every 12 hours    LABS:                         9.6    11.99 )-----------( 180      ( 16 Aug 2023 11:23 )             29.8     145  |  114<H>  |  21<H>  ----------------------------<  227<H>          (08-16-23 @ 11:23)  4.4   |  20  |  1.0    Ca    6.5<L>          (08-16-23 @ 11:23)  Phos  2.1         (08-15-23 @ 05:00)  Mg     2.1         (08-16-23 @ 11:23)  TPro  4.9<L>  /  Alb  2.3<L>  /  TBili  0.4  /  DBili  x   /  AST  94<H>  /  ALT  38  /  AlkPhos  145<H>       08-16-23 @ 11:23    Blood Glucose (Past 24 hours):  232 mg/dL (08-16 @ 11:44)  226 mg/dL (08-16 @ 05:46)  229 mg/dL (08-15 @ 20:46)  255 mg/dL (08-15 @ 17:45)    DIET:   Diet, Pureed:   Mildly Thick Liquids (MILDTHICKLIQS)  Tube Feeding Modality: Nasogastric  Glucerna 1.2 Víctor  Bolus  Total Volume of Bolus (mL):  360  Tube Feed Frequency: Every 8 hours   Tube Feed Start Time: 16:30  Bolus Feed Rate (mL per Hour): 540   Bolus Feed Duration (in Hours): 0.75  Free Water Flush  Free Water Flush Instructions:  free water flush 100cc prior and after feeding (08-15-23 @ 15:35) [Active]    RADIOLOGY:   < from: Xray Chest 1 View- PORTABLE-Routine (Xray Chest 1 View- PORTABLE-Routine in AM.) (08.15.23 @ 06:30) >  impression:  Support devices: Feeding tube coursing below the field-of-view. Stable   right-sided central venous catheter.  Cardiac/mediastinum/hilum: Unchanged  Lung parenchyma/Pleura: Bilateral opacities without significant change.   No pneumothorax.  Skeleton/soft tissues: Unchanged

## 2023-08-16 NOTE — PHARMACOTHERAPY INTERVENTION NOTE - COMMENTS
As per policy, discontinued vancomycin random level in the setting of vancomycin being discontinued.    Shelton Gutierrez, PharmD  Clinical Pharmacy Specialist, Infectious Diseases  Tele-Antimicrobial Stewardship Program (Tele-ASP)  Tele-ASP Phone: [phone] 
Recommended discontinuing vancomycin as per ID consult recommendations.    Shelton Gutierrez, PharmD  Clinical Pharmacy Specialist, Infectious Diseases  Tele-Antimicrobial Stewardship Program (Tele-ASP)  Tele-ASP Phone: [phone] 
Patient is on vancomycin 1000 mg IV q24h for the treatment of bacteremia per Dr. Weston. Patient has received 4 doses of vancomycin 1000 mg IV q12h, without receiving a vanco level. Per Bubbl Bayesian vancomycin dosing software, past regimen of 1000mg IV q12h predicts a supratherapeutic steady-state AUC/UZAIR of 998.65 mg/L*hr (goal 400-600). Recommend to hold the current dose of Vancomycin 1000 mg IV q24h and get a level with AM labs on 08/08 to properly dose.   
Patient on meropenem 1g IV q8h for the treatment of bacteremia per Dr. Weston. Recommended to dose adjust to meropenem 1g IV q12h due to decreased renal function. Patients estimated CrCl is ~46 mL/min. 
Patient on vancomycin 1000mg IV q12h for an intra-abdominal infection per Dr. Weston. Patient has been on vancomycin since Aug 5th and has not gotten a vancomycin trough level drawn yet. Per Precise PK current dose predicts a supratherapeutic AUC. Recommended to hold next dose of Vancomycin and draw a level at 1600 today. Following level draw, will recommend next dose using level and precisePk dosing software. 
Patient on empiric caspfungin 50 mg IV q24h for the treatment of a suspected fungal infection per Dr. Weston. Recommended to d/c caspofungin due to negative fungitell result. 
Recommended continuing meropenem as per ID consult recommendations.    Shelton uGtierrez, PharmD  Clinical Pharmacy Specialist, Infectious Diseases  Tele-Antimicrobial Stewardship Program (Tele-ASP)  Tele-ASP Phone: [phone]

## 2023-08-16 NOTE — PROGRESS NOTE ADULT - ASSESSMENT
89 y/o M w/ PMH of DM, HTN, Parkinson disease, Gout, HLD, former smoker coming from home with complaint of decreased appetite, increased urinary output and craving sweets x 2 weeks. Patient admitted on 8/4/23 for DKA and hypotension , was started on levophed lose dose. HIs course complicated with appendicitis , Hungatella bacteremia, acute anemia requiring 3u. GI consulted today for 3 episodes of melena and drop in Hb to 6. Last night patient was complaining of severe lower abdominal pain and distesion s/p CTAP IC with thrombus in decending AA, SMA and celiac axis narrowing but no bowel ischemia , lactate went upto 7 from 1.5    #Acute Normocytic anemia - stable  #Melena likely duodenal ulcer s/p epi and hot forceps thermal therapy  - currently off pressors   - Baseline hemoglobin - 12  - Hemoglobin on admission - 11.2 (8/4)>>6.6>>3u> 8.6>7.9 > 8.6 (8/15)> no hb today  - Coags - INR:  0.98 (7/4)>>1.35 (8/8)  - Lactate: 1.4<2.5< 7.5< 1.5  - CTAP IV Con: 8/10: No evidence of bowel ischemia. Unchanged findings suggesting acute appendicitis. Unopacified inferior mesenteric artery is new compared to 11/17/2022 as there is increased thrombus within the unchanged size of abdominal aortic   aneurysm with decreased opacified aortic lumen. Collateral flow presumed to be present again there is no evidence of bowel ischemia.  - CTAP oral and IV con: 8/8: New mild dilation of the appendix with small volume free fluid in the bilateral lower quadrants. Findings are concerning for appendicitis. 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 cm, as described  - INR: 1.37>1.14  - was on fondaparinux  - deemed high risk for endoscopy on 8/11 and a shared decision was made to pursue conservative therapy  - recall for continuos melena. currently off pressors   - s/p EGD on 8/14/23 revealing 2 ulcers   - On NGT feeds as he failed S&S  - had 2 dark BMs 8/15  - no more melena, Hb stable    #Rec  - Trend H&H BID  - Please target Hb  >8  - Please avoid any NSAIDs  - monitor BM  - recall us PRN  - [facility] up with our [facility] located at [address] [place] [address]. Phone Number: [phone]      #Pancreatic body cyst  - 0.7x0.4cm cyst in pancreatic body, No PD dilation    Rec  MRI pancreas protocol as outpatient  - Follow up with our [facility] located at [address] [place] [address]. Phone Number: [phone]    Communicated with primary team 87 y/o M w/ PMH of DM, HTN, Parkinson disease, Gout, HLD, former smoker coming from home with complaint of decreased appetite, increased urinary output and craving sweets x 2 weeks. Patient admitted on 8/4/23 for DKA and hypotension , was started on levophed lose dose. HIs course complicated with appendicitis , Hungatella bacteremia, acute anemia requiring 3u. GI consulted today for 3 episodes of melena and drop in Hb to 6. Last night patient was complaining of severe lower abdominal pain and distesion s/p CTAP IC with thrombus in decending AA, SMA and celiac axis narrowing but no bowel ischemia , lactate went upto 7 from 1.5    #Acute Normocytic anemia - stable  #Melena likely duodenal ulcer s/p epi and hot forceps thermal therapy  - currently off pressors   - Baseline hemoglobin - 12  - Hemoglobin on admission - 11.2 (8/4)>>6.6>>3u> 8.6>7.9 > 8.6 (8/15)> no hb today  - Coags - INR:  0.98 (7/4)>>1.35 (8/8)  - Lactate: 1.4<2.5< 7.5< 1.5  - CTAP IV Con: 8/10: No evidence of bowel ischemia. Unchanged findings suggesting acute appendicitis. Unopacified inferior mesenteric artery is new compared to 11/17/2022 as there is increased thrombus within the unchanged size of abdominal aortic   aneurysm with decreased opacified aortic lumen. Collateral flow presumed to be present again there is no evidence of bowel ischemia.  - CTAP oral and IV con: 8/8: New mild dilation of the appendix with small volume free fluid in the bilateral lower quadrants. Findings are concerning for appendicitis. 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 cm, as described  - INR: 1.37>1.14  - was on fondaparinux  - deemed high risk for endoscopy on 8/11 and a shared decision was made to pursue conservative therapy  - recall for continuos melena. currently off pressors   - s/p EGD on 8/14/23 revealing 2 ulcers   - On NGT feeds as he failed S&S  - had 2 dark BMs 8/15  - had small BM which was dark this am    #Rec  - Trend H&H BID  - Please target Hb  >8  - Please avoid any NSAIDs  - monitor BM  - recall us PRN  - Follow up with our [facility] located at [address] [place] [address]. Phone Number: [phone]      #Pancreatic body cyst  - 0.7x0.4cm cyst in pancreatic body, No PD dilation    Rec  MRI pancreas protocol as outpatient  - Follow up with our [facility] located at [address] [place] [address]. Phone Number: [phone]    Communicated with primary team 87 y/o M w/ PMH of DM, HTN, Parkinson disease, Gout, HLD, former smoker coming from home with complaint of decreased appetite, increased urinary output and craving sweets x 2 weeks. Patient admitted on 8/4/23 for DKA and hypotension , was started on levophed lose dose. HIs course complicated with appendicitis , Hungatella bacteremia, acute anemia requiring 3u. GI consulted today for 3 episodes of melena and drop in Hb to 6. Last night patient was complaining of severe lower abdominal pain and distesion s/p CTAP IC with thrombus in decending AA, SMA and celiac axis narrowing but no bowel ischemia , lactate went upto 7 from 1.5, s/p EGD     #Acute Normocytic anemia - stable  #Melena likely duodenal ulcer s/p epi and hot forceps thermal therapy  - Baseline hemoglobin - 12  - Hemoglobin on admission - 11.2 (8/4)>>6.6>>3u> 8.6>7.9 > 8.6 (8/15)> no hb today  - Coags - INR:  0.98 (7/4)>>1.35 (8/8)  - Lactate: 1.4<2.5< 7.5< 1.5  - CTAP IV Con: 8/10: No evidence of bowel ischemia. Unchanged findings suggesting acute appendicitis. Unopacified inferior mesenteric artery is new compared to 11/17/2022 as there is increased thrombus within the unchanged size of abdominal aortic   aneurysm with decreased opacified aortic lumen. Collateral flow presumed to be present again there is no evidence of bowel ischemia.  - CTAP oral and IV con: 8/8: New mild dilation of the appendix with small volume free fluid in the bilateral lower quadrants. Findings are concerning for appendicitis. 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 cm, as described  - INR: 1.37>1.14  - was on fondaparinux  - deemed high risk for endoscopy on 8/11 and a shared decision was made to pursue conservative therapy  - recall for continuos melena. currently off pressors   - s/p EGD on 8/14/23 revealing 2 ulcers   - On NGT feeds as he failed S&S  - had 2 dark BMs 8/15  - had small BM which was dark this am, NADEGE today: dark green stool    #Rec  - Trend H&H BID  - Please target Hb  >8  - Please avoid any NSAIDs  - monitor BM  - recall us PRN  - [facility] up with our [facility] located at [address] [place] [address]. Phone Number: [phone]      #Pancreatic body cyst  - 0.7x0.4cm cyst in pancreatic body, No PD dilation    Rec  MRI pancreas protocol as outpatient  - Follow up with our [facility] located at [address] [place] [address]. Phone Number: [phone]    Communicated with primary team

## 2023-08-16 NOTE — PHYSICAL THERAPY INITIAL EVALUATION ADULT - NSPTDISCHREC_GEN_A_CORE
Patient requires assistance with functional mobility. Based on today's evaluation, PT recommends D/C to a rehabilitation facility when medically appropriate.

## 2023-08-16 NOTE — PROGRESS NOTE ADULT - SUBJECTIVE AND OBJECTIVE BOX
Gastroenterology progress note:     Patient is a 88y old  Male who presents with a chief complaint of DKA/ HHS (16 Aug 2023 06:01)       Admitted on: 08-04-23    We are following the patient for: melena       Interval History: BM improving    No acute events overnight.   - Diet - tolerating diet per S&S      PAST MEDICAL & SURGICAL HISTORY:  HTN (hypertension)      Gout      BPH (benign prostatic hyperplasia)      Parkinson disease      HLD (hyperlipidemia)      Smoker within last 12 months      Diabetes mellitus      No significant past surgical history          MEDICATIONS  (STANDING):  allopurinol 300 milliGRAM(s) Oral daily  bacitracin   Ointment 1 Application(s) Topical two times a day  caspofungin IVPB      caspofungin IVPB 50 milliGRAM(s) IV Intermittent every 24 hours  chlorhexidine 2% Cloths 1 Application(s) Topical daily  enoxaparin Injectable 40 milliGRAM(s) SubCutaneous every 24 hours  hydrocortisone sodium succinate Injectable 50 milliGRAM(s) IV Push every 12 hours  insulin glargine Injectable (LANTUS) 4 Unit(s) SubCutaneous <User Schedule>  insulin lispro (ADMELOG) corrective regimen sliding scale   SubCutaneous three times a day before meals  meropenem  IVPB 1000 milliGRAM(s) IV Intermittent every 8 hours  midodrine 5 milliGRAM(s) Oral every 8 hours  pantoprazole  Injectable 40 milliGRAM(s) IV Push every 12 hours    MEDICATIONS  (PRN):      Allergies  No Known Allergies      Review of Systems:   Cardiovascular:  No Chest Pain, No Palpitations  Respiratory:  No Cough, No Dyspnea  Gastrointestinal:  As described in HPI  Skin:  No Skin Lesions, No Jaundice  Neuro:  No Syncope, No Dizziness    Physical Examination:  T(C): 36.1 (08-16-23 @ 07:00), Max: 36.8 (08-15-23 @ 16:00)  HR: 84 (08-16-23 @ 07:00) (67 - 97)  BP: 135/86 (08-16-23 @ 07:00) (110/90 - 141/68)  RR: 20 (08-16-23 @ 04:04) (19 - 38)  SpO2: 95% (08-16-23 @ 07:00) (92% - 98%)      08-15-23 @ 07:01  -  08-16-23 @ 07:00  --------------------------------------------------------  IN: 1485 mL / OUT: 1660 mL / NET: -175 mL    08-16-23 @ 07:01  -  08-16-23 @ 11:37  --------------------------------------------------------  IN: 980 mL / OUT: 100 mL / NET: 880 mL        GENERAL: AAOx0, no acute distress.  HEAD:  Atraumatic, Normocephalic  EYES: conjunctiva and sclera clear  NECK: Supple, no JVD or thyromegaly  CHEST/LUNG: Clear to auscultation bilaterally; No wheeze, rhonchi, or rales  HEART: Regular rate and rhythm; normal S1, S2, No murmurs.  ABDOMEN: Soft, nontender, nondistended; Bowel sounds present  NEUROLOGY: No asterixis or tremor.   SKIN: Intact, no jaundice     Data:                        8.6    15.11 )-----------( 251      ( 15 Aug 2023 05:00 )             25.7     Hgb trend:  8.6  08-15-23 @ 05:00  8.4  08-14-23 @ 21:12  7.8  08-14-23 @ 04:45  8.0  08-13-23 @ 17:18        08-15    148<H>  |  116<H>  |  25<H>  ----------------------------<  267<H>  4.2   |  22  |  0.9    Ca    6.6<L>      15 Aug 2023 18:50  Phos  2.1     08-15  Mg     2.2     08-15    TPro  4.4<L>  /  Alb  1.9<L>  /  TBili  0.4  /  DBili  x   /  AST  46<H>  /  ALT  24  /  AlkPhos  124<H>  08-15    Liver panel trend:  TBili 0.4   /   AST 46   /   ALT 24   /   AlkP 124   /   Tptn 4.4   /   Alb 1.9    /   DBili --      08-15  TBili 0.4   /   AST 47   /   ALT 24   /   AlkP 115   /   Tptn 4.2   /   Alb 2.0    /   DBili --      08-14  TBili 0.3   /   AST 48   /   ALT 24   /   AlkP 113   /   Tptn 4.0   /   Alb 1.7    /   DBili --      08-13  TBili 0.3   /   AST 56   /   ALT 26   /   AlkP 121   /   Tptn 3.9   /   Alb 1.6    /   DBili --      08-12  TBili 0.3   /   AST 75   /   ALT 24   /   AlkP 84   /   Tptn 3.9   /   Alb 1.7    /   DBili --      08-12  TBili 0.4   /   AST 47   /   ALT 17   /   AlkP 69   /   Tptn 3.8   /   Alb 1.5    /   DBili --      08-11  TBili 0.6   /   AST 56   /   ALT 18   /   AlkP 76   /   Tptn 4.3   /   Alb 1.8    /   DBili --      08-09  TBili 0.5   /   AST 37   /   ALT 15   /   AlkP 68   /   Tptn 4.1   /   Alb 1.8    /   DBili --      08-09  TBili 0.6   /   AST 35   /   ALT 15   /   AlkP 71   /   Tptn 4.1   /   Alb 1.8    /   DBili --      08-08  TBili 0.3   /   AST 41   /   ALT 16   /   AlkP 66   /   Tptn 4.2   /   Alb 2.2    /   DBili --      08-07        Radiology:  < from: CT Angio Abdomen and Pelvis w/ IV Cont (08.10.23 @ 01:18) >  No evidence of bowel ischemia. Unchanged findings suggesting acute   appendicitis.    Unopacified inferior mesenteric artery is new compared to 11/17/2022 as   there is increased thrombus within the unchanged size of abdominal aortic   aneurysm with decreased opacified aortic lumen. Collateral flow presumed   to be present again there is no evidence of bowel ischemia.    Otherwise unchanged findings on the short interval follow-up.

## 2023-08-16 NOTE — PROGRESS NOTE ADULT - ASSESSMENT
ASSESSMENT  - DKA, improving  - acute duodenal ulcer   - EGD findings noted   - post hemorrhagic anemia  - Septic shock requiring pressors         Hungatella bacteremia likely GI translocation in the setting of appendicitis  - dysphagia/ confusion - keep NPO per speech therapist  - prolonged PO or poor intake, since admission    SUGGEST:  -increase to  Glucerna 1.2 at 360 ml x 4feeds  over about 45 min q8h   - check glucose, lytes, and phos levels in am  - then increase to 360 ml Glucerna 1.2 at 7am and 1pm, and 480 ml at 7pm     each feed over 45 min,  with poc glucose testing and pre-feed insulin dosing accordingly  -   - DKA, improving  - acute duodenal ulcer   - EGD findings noted   - post hemorrhagic anemia  - Septic shock requiring pressors         Hungatella bacteremia likely GI translocation in the setting of appendicitis  - dysphagia/ confusion - keep NPO per speech therapist  - prolonged PO or poor intake, since admission    SUGGEST:  - check glucose, lytes, and phos levels in am  - increase to 360 ml Glucerna 1.2 at 7am and 1pm, and 480 ml at 7pm     each feed over 45 min,  with poc glucose testing and pre-feed insulin dosing accordingly  - glycemic control, please  - f/u abdominal exam, KUB

## 2023-08-16 NOTE — PROGRESS NOTE ADULT - PROBLEM SELECTOR PLAN 1
off pressor support  bacteremia 2/2 GI translocation from appendicitis  ID following for recommendations   continue with IV Meropenum - Day 10, high risk, monitor blood cultures and WBC counts   continue IV caspofungin   surgery saw patient on 8/11

## 2023-08-16 NOTE — PHYSICAL THERAPY INITIAL EVALUATION ADULT - LEVEL OF INDEPENDENCE: SIT/STAND, REHAB EVAL
x 2 trials with 2 person assist. Pt declined despite max encouragement. he c/o feeling very tired and insisted he lie down./unable to perform

## 2023-08-17 DIAGNOSIS — R63.8 OTHER SYMPTOMS AND SIGNS CONCERNING FOOD AND FLUID INTAKE: ICD-10-CM

## 2023-08-17 LAB
ALBUMIN SERPL ELPH-MCNC: 1.9 G/DL — LOW (ref 3.5–5.2)
ALP SERPL-CCNC: 129 U/L — HIGH (ref 30–115)
ALT FLD-CCNC: 36 U/L — SIGNIFICANT CHANGE UP (ref 0–41)
ANION GAP SERPL CALC-SCNC: 8 MMOL/L — SIGNIFICANT CHANGE UP (ref 7–14)
AST SERPL-CCNC: 71 U/L — HIGH (ref 0–41)
BILIRUB SERPL-MCNC: 0.3 MG/DL — SIGNIFICANT CHANGE UP (ref 0.2–1.2)
BUN SERPL-MCNC: 19 MG/DL — SIGNIFICANT CHANGE UP (ref 10–20)
CALCIUM SERPL-MCNC: 6.1 MG/DL — LOW (ref 8.4–10.5)
CHLORIDE SERPL-SCNC: 107 MMOL/L — SIGNIFICANT CHANGE UP (ref 98–110)
CO2 SERPL-SCNC: 24 MMOL/L — SIGNIFICANT CHANGE UP (ref 17–32)
CREAT SERPL-MCNC: 0.9 MG/DL — SIGNIFICANT CHANGE UP (ref 0.7–1.5)
CULTURE RESULTS: SIGNIFICANT CHANGE UP
EGFR: 83 ML/MIN/1.73M2 — SIGNIFICANT CHANGE UP
GLUCOSE BLDC GLUCOMTR-MCNC: 136 MG/DL — HIGH (ref 70–99)
GLUCOSE BLDC GLUCOMTR-MCNC: 208 MG/DL — HIGH (ref 70–99)
GLUCOSE BLDC GLUCOMTR-MCNC: 227 MG/DL — HIGH (ref 70–99)
GLUCOSE BLDC GLUCOMTR-MCNC: 246 MG/DL — HIGH (ref 70–99)
GLUCOSE BLDC GLUCOMTR-MCNC: 262 MG/DL — HIGH (ref 70–99)
GLUCOSE SERPL-MCNC: 266 MG/DL — HIGH (ref 70–99)
HCT VFR BLD CALC: 25.1 % — LOW (ref 42–52)
HCT VFR BLD CALC: 25.6 % — LOW (ref 42–52)
HGB BLD-MCNC: 8.1 G/DL — LOW (ref 14–18)
HGB BLD-MCNC: 8.2 G/DL — LOW (ref 14–18)
MAGNESIUM SERPL-MCNC: 2 MG/DL — SIGNIFICANT CHANGE UP (ref 1.8–2.4)
MCHC RBC-ENTMCNC: 31 PG — SIGNIFICANT CHANGE UP (ref 27–31)
MCHC RBC-ENTMCNC: 31.2 PG — HIGH (ref 27–31)
MCHC RBC-ENTMCNC: 32 G/DL — SIGNIFICANT CHANGE UP (ref 32–37)
MCHC RBC-ENTMCNC: 32.3 G/DL — SIGNIFICANT CHANGE UP (ref 32–37)
MCV RBC AUTO: 96.2 FL — HIGH (ref 80–94)
MCV RBC AUTO: 97.3 FL — HIGH (ref 80–94)
NRBC # BLD: 0 /100 WBCS — SIGNIFICANT CHANGE UP (ref 0–0)
NRBC # BLD: 0 /100 WBCS — SIGNIFICANT CHANGE UP (ref 0–0)
PLATELET # BLD AUTO: 221 K/UL — SIGNIFICANT CHANGE UP (ref 130–400)
PLATELET # BLD AUTO: 225 K/UL — SIGNIFICANT CHANGE UP (ref 130–400)
PMV BLD: 12.2 FL — HIGH (ref 7.4–10.4)
PMV BLD: 12.6 FL — HIGH (ref 7.4–10.4)
POTASSIUM SERPL-MCNC: 3.1 MMOL/L — LOW (ref 3.5–5)
POTASSIUM SERPL-SCNC: 3.1 MMOL/L — LOW (ref 3.5–5)
PROT SERPL-MCNC: 4.2 G/DL — LOW (ref 6–8)
RBC # BLD: 2.61 M/UL — LOW (ref 4.7–6.1)
RBC # BLD: 2.63 M/UL — LOW (ref 4.7–6.1)
RBC # FLD: 21.1 % — HIGH (ref 11.5–14.5)
RBC # FLD: 21.4 % — HIGH (ref 11.5–14.5)
SODIUM SERPL-SCNC: 139 MMOL/L — SIGNIFICANT CHANGE UP (ref 135–146)
SPECIMEN SOURCE: SIGNIFICANT CHANGE UP
WBC # BLD: 10.04 K/UL — SIGNIFICANT CHANGE UP (ref 4.8–10.8)
WBC # BLD: 9.78 K/UL — SIGNIFICANT CHANGE UP (ref 4.8–10.8)
WBC # FLD AUTO: 10.04 K/UL — SIGNIFICANT CHANGE UP (ref 4.8–10.8)
WBC # FLD AUTO: 9.78 K/UL — SIGNIFICANT CHANGE UP (ref 4.8–10.8)

## 2023-08-17 PROCEDURE — 99233 SBSQ HOSP IP/OBS HIGH 50: CPT

## 2023-08-17 PROCEDURE — 99232 SBSQ HOSP IP/OBS MODERATE 35: CPT

## 2023-08-17 RX ORDER — HYDROCORTISONE 20 MG
25 TABLET ORAL EVERY 12 HOURS
Refills: 0 | Status: DISCONTINUED | OUTPATIENT
Start: 2023-08-17 | End: 2023-08-18

## 2023-08-17 RX ORDER — INSULIN GLARGINE 100 [IU]/ML
8 INJECTION, SOLUTION SUBCUTANEOUS
Refills: 0 | Status: DISCONTINUED | OUTPATIENT
Start: 2023-08-17 | End: 2023-08-21

## 2023-08-17 RX ORDER — ACETAMINOPHEN 500 MG
650 TABLET ORAL ONCE
Refills: 0 | Status: COMPLETED | OUTPATIENT
Start: 2023-08-17 | End: 2023-08-17

## 2023-08-17 RX ORDER — MIDODRINE HYDROCHLORIDE 2.5 MG/1
5 TABLET ORAL
Refills: 0 | Status: DISCONTINUED | OUTPATIENT
Start: 2023-08-17 | End: 2023-08-18

## 2023-08-17 RX ORDER — POTASSIUM CHLORIDE 20 MEQ
20 PACKET (EA) ORAL
Refills: 0 | Status: DISCONTINUED | OUTPATIENT
Start: 2023-08-17 | End: 2023-08-19

## 2023-08-17 RX ADMIN — CHLORHEXIDINE GLUCONATE 1 APPLICATION(S): 213 SOLUTION TOPICAL at 05:27

## 2023-08-17 RX ADMIN — INSULIN GLARGINE 8 UNIT(S): 100 INJECTION, SOLUTION SUBCUTANEOUS at 21:31

## 2023-08-17 RX ADMIN — MEROPENEM 100 MILLIGRAM(S): 1 INJECTION INTRAVENOUS at 10:37

## 2023-08-17 RX ADMIN — Medication 2: at 08:08

## 2023-08-17 RX ADMIN — PANTOPRAZOLE SODIUM 40 MILLIGRAM(S): 20 TABLET, DELAYED RELEASE ORAL at 05:25

## 2023-08-17 RX ADMIN — Medication 2: at 14:22

## 2023-08-17 RX ADMIN — Medication 300 MILLIGRAM(S): at 14:20

## 2023-08-17 RX ADMIN — Medication 25 MILLIGRAM(S): at 18:53

## 2023-08-17 RX ADMIN — Medication 50 MILLIGRAM(S): at 05:27

## 2023-08-17 RX ADMIN — Medication 650 MILLIGRAM(S): at 22:05

## 2023-08-17 RX ADMIN — Medication 1 APPLICATION(S): at 05:26

## 2023-08-17 RX ADMIN — Medication 50 MILLIEQUIVALENT(S): at 14:21

## 2023-08-17 RX ADMIN — Medication 1 APPLICATION(S): at 18:54

## 2023-08-17 RX ADMIN — MIDODRINE HYDROCHLORIDE 5 MILLIGRAM(S): 2.5 TABLET ORAL at 05:26

## 2023-08-17 RX ADMIN — Medication 2: at 16:52

## 2023-08-17 RX ADMIN — ENOXAPARIN SODIUM 40 MILLIGRAM(S): 100 INJECTION SUBCUTANEOUS at 14:20

## 2023-08-17 RX ADMIN — Medication 650 MILLIGRAM(S): at 23:40

## 2023-08-17 RX ADMIN — PANTOPRAZOLE SODIUM 40 MILLIGRAM(S): 20 TABLET, DELAYED RELEASE ORAL at 18:54

## 2023-08-17 NOTE — PROGRESS NOTE ADULT - ASSESSMENT
87 y/o M w/ PMH of DM, HTN coming from home with complaint of decreased appetite, increased urinary output and craving sweets x 2 weeks. Patient admitted for management of HHS, with hospital stay complicated by septic shock, GIB. Palliative consulted for GOC.     Patient comfortable on exam.   Palliative  spoke with Antonia and plan is for phone call tomorrow 11 AM to discuss GOC given his overall poor prognosis. patient may be a good candidate for hospice care if no surgical option.     Education about palliative care provided to patient/family.  See Recs below.    Please call [phone] with questions or concerns 24/7.   We will continue to follow.

## 2023-08-17 NOTE — SWALLOW BEDSIDE ASSESSMENT ADULT - COMMENTS
Min trials accepted. Toleration of puree with mildly thick liquids w/o overt s/s of penetration/aspiration.

## 2023-08-17 NOTE — PROGRESS NOTE ADULT - SUBJECTIVE AND OBJECTIVE BOX
Over Night Events: events noted, on RA, afebrile, GI/ palliative noted    PHYSICAL EXAM    ICU Vital Signs Last 24 Hrs  T(C): 36.1 (16 Aug 2023 20:38), Max: 36.4 (16 Aug 2023 16:00)  T(F): 97 (16 Aug 2023 20:38), Max: 97.5 (16 Aug 2023 16:00)  HR: 73 (16 Aug 2023 20:39) (67 - 84)  BP: 118/56 (16 Aug 2023 20:39) (118/56 - 135/86)  BP(mean): 80 (16 Aug 2023 20:39) (80 - 110)  RR: 40 (16 Aug 2023 20:39) (21 - 41)  SpO2: 95% (16 Aug 2023 20:39) (94% - 95%)    O2 Parameters below as of 16 Aug 2023 16:00  Patient On (Oxygen Delivery Method): room air            General: chronically ill looking  Lungs: Bilateral BS  Cardiovascular: RK 2.6  Abdomen: Soft, Positive BS  Extremities: No clubbing   Skin: Warm  Neurological: Non focal       08-15-23 @ 07:01  -  08-16-23 @ 07:00  --------------------------------------------------------  IN:    dextrose 5%: 500 mL    Enteral Tube Flush: 400 mL    Glucerna: 360 mL    IV PiggyBack: 150 mL    Lactated Ringers: 50 mL    Oral Fluid: 25 mL  Total IN: 1485 mL    OUT:    Incontinent per Collection Bag (mL): 260 mL    Voided (mL): 1400 mL  Total OUT: 1660 mL    Total NET: -175 mL      08-16-23 @ 07:01  -  08-17-23 @ 06:09  --------------------------------------------------------  IN:    Free Water: 1000 mL    Glucerna: 720 mL    IV PiggyBack: 200 mL    Oral Fluid: 360 mL  Total IN: 2280 mL    OUT:    Incontinent per Collection Bag (mL): 500 mL  Total OUT: 500 mL    Total NET: 1780 mL          LABS:                          9.6    11.99 )-----------( 180      ( 16 Aug 2023 11:23 )             29.8                                               08-16    145  |  114<H>  |  21<H>  ----------------------------<  227<H>  4.4   |  20  |  1.0    Ca    6.5<L>      16 Aug 2023 11:23  Mg     2.1     08-16    TPro  4.9<L>  /  Alb  2.3<L>  /  TBili  0.4  /  DBili  x   /  AST  94<H>  /  ALT  38  /  AlkPhos  145<H>  08-16                                             Urinalysis Basic - ( 16 Aug 2023 11:23 )    Color: x / Appearance: x / SG: x / pH: x  Gluc: 227 mg/dL / Ketone: x  / Bili: x / Urobili: x   Blood: x / Protein: x / Nitrite: x   Leuk Esterase: x / RBC: x / WBC x   Sq Epi: x / Non Sq Epi: x / Bacteria: x                                                  LIVER FUNCTIONS - ( 16 Aug 2023 11:23 )  Alb: 2.3 g/dL / Pro: 4.9 g/dL / ALK PHOS: 145 U/L / ALT: 38 U/L / AST: 94 U/L / GGT: x                                                                                                                                       MEDICATIONS  (STANDING):  allopurinol 300 milliGRAM(s) Oral daily  bacitracin   Ointment 1 Application(s) Topical two times a day  chlorhexidine 2% Cloths 1 Application(s) Topical daily  enoxaparin Injectable 40 milliGRAM(s) SubCutaneous every 24 hours  hydrocortisone sodium succinate Injectable 50 milliGRAM(s) IV Push every 12 hours  insulin glargine Injectable (LANTUS) 6 Unit(s) SubCutaneous <User Schedule>  insulin lispro (ADMELOG) corrective regimen sliding scale   SubCutaneous three times a day before meals  meropenem  IVPB 1000 milliGRAM(s) IV Intermittent every 12 hours  midodrine 5 milliGRAM(s) Oral every 8 hours  pantoprazole  Injectable 40 milliGRAM(s) IV Push every 12 hours

## 2023-08-17 NOTE — PROGRESS NOTE ADULT - ASSESSMENT
IMPRESSION:    DKA / HHS, resolved  Hungatella species bacteremia   Septic shock resolved   UGIB s/p EGD s/p cauterization of visible vessel  BART - resolved   AMS toxic metabolic  HO DM  HO HTN  Abdominal aortic aneurysm  Parkinson disease      PLAN:    CNS: Avoid sedation.     HEENT: Oral care. NGT care     PULMONARY:  HOB @ 45 degrees.  Aspiration precautions. On RA.        CARDIOVASCULAR:   Avoid volume overload. midodrine 5 q 8 DC VIF    GI: Protonix BID. Resume tube feeds po feeds per speech eval    RENAL: Follow up lytes.  Correct as needed. Voiding. No adler. free water    INFECTIOUS DISEASE: abx per ID    HEMATOLOGICAL:  Resume DVT prophylaxis with heparin SQ. HIT panel negative. Monitor CBC.  Transfuse to keep Hb>7.    ENDOCRINE:  Follow up FS QACHS. Insulin basal-bolus.    MUSCULOSKELETAL: Bedrest     Prognosis very poor. DNR/DNI    FLOOR

## 2023-08-17 NOTE — SWALLOW BEDSIDE ASSESSMENT ADULT - SWALLOW EVAL: DIAGNOSIS
Minimal acceptance of breakfast meal. + toleration observed without overt symptoms of penetration/aspiration for Puree/mildly thick. Concern for pharyngeal impairment with thin liquids. +Immediate cough.

## 2023-08-17 NOTE — PROGRESS NOTE ADULT - PROBLEM SELECTOR PLAN 1
off pressor support  bacteremia 2/2 GI translocation from appendicitis  ID following for recommendations   continue with IV Meropenum - Day 11, high risk, monitor blood cultures and WBC counts   continue IV caspofungin   surgery saw patient on 8/11

## 2023-08-17 NOTE — PROGRESS NOTE ADULT - SUBJECTIVE AND OBJECTIVE BOX
HPI:    89 y/o M w/ PMH of DM, HTN coming from home with complaint of decreased appetite, increased urinary output and craving sweets x 2 weeks. Patient admitted for management of HHS, with hospital stay complicated by septic shock, GIB. Palliative consulted for GOC.     Interval history:     8/15: s/p EGD on 8/14, found to have ulcer, vessel cauterized. patient with NGT in place and hemodynamically stable.   8/16: patient is downgraded to SDU, NGT in place. passed S & S. Complaining of thirst but denies pain.   8/17: patient sleeping, did not wake. per RN, patient only complains of being thirsty.      ADVANCE DIRECTIVES:     [ ] Full Code [ X] DNR/DNI  MOLST  [ ]  Living Will  [ ]   DECISION MAKER(s): Daughters- Maryjane Knight Cindy   [ ] Health Care Proxy(s)  [X ] Surrogate(s)  [ ] Guardian           Name(s): Phone Number(s): Children   Antonia- [phone]  Qochx-[phone]  Rrdfd-[phone]      BASELINE (I)ADL(s) (prior to admission):    [place]: [ ]Total  [X ] Moderate [ ]Dependent - lives with dtr Antonia   Palliative Performance Status Version 2:         %    http://npcrc.org/files/news/palliative_performance_scale_ppsv2.pdf    Allergies    No Known Allergies    Intolerances    MEDICATIONS  (STANDING):  allopurinol 300 milliGRAM(s) Oral daily  bacitracin   Ointment 1 Application(s) Topical two times a day  caspofungin IVPB      caspofungin IVPB 50 milliGRAM(s) IV Intermittent every 24 hours  chlorhexidine 2% Cloths 1 Application(s) Topical daily  dextrose 5%. 1000 milliLiter(s) (100 mL/Hr) IV Continuous <Continuous>  dextrose 5%. 1000 milliLiter(s) (100 mL/Hr) IV Continuous <Continuous>  dextrose 5%. 1000 milliLiter(s) (50 mL/Hr) IV Continuous <Continuous>  dextrose 50% Injectable 25 Gram(s) IV Push once  dextrose 50% Injectable 12.5 Gram(s) IV Push once  dextrose 50% Injectable 25 Gram(s) IV Push once  dextrose 50% Injectable 25 Gram(s) IV Push once  enoxaparin Injectable 40 milliGRAM(s) SubCutaneous every 24 hours  glucagon  Injectable 1 milliGRAM(s) IntraMuscular once  hydrocortisone sodium succinate Injectable 50 milliGRAM(s) IV Push every 12 hours  insulin lispro (ADMELOG) corrective regimen sliding scale   SubCutaneous three times a day before meals  meropenem  IVPB 1000 milliGRAM(s) IV Intermittent every 8 hours  midodrine 10 milliGRAM(s) Oral every 8 hours  pantoprazole  Injectable 40 milliGRAM(s) IV Push every 12 hours    MEDICATIONS  (PRN):  dextrose Oral Gel 15 Gram(s) Oral once PRN Blood Glucose LESS THAN 70 milliGRAM(s)/deciliter  dextrose Oral Gel 15 Gram(s) Oral once PRN Blood Glucose LESS THAN 70 milliGRAM(s)/deciliter    PRESENT SYMPTOMS:  * Did not wake patient. Per RN patient has been comfortable.   Pain: [ ]yes [ ]no  QOL impact -   Location -                    Aggravating factors -  Quality -  Radiation -  Timing-  Severity (0-10 scale):  Minimal acceptable level (0-10 scale):     CPOT:  0  https://www.sccm.org/getattachment/drj92f30-4q0g-4o4a-0z6f-8058g3594s8i/Critical-Care-Pain-Observation-Tool-(CPOT)    PAIN AD Score:   http://geriatrictoolkit.[facility]/cog/painad.pdf (press ctrl +  left click to view)    Dyspnea:                           [ ]None[ ]Mild [ ]Moderate [ ]Severe     Respiratory Distress Observation Scale (RDOS): 0  A score of 0 to 2 signifies little or no respiratory distress, 3 signifies mild distress, scores 4 to 6 indicate moderate distress, and scores greater than 7 signify severe distress  [email or web address]/[email or web address]/[email or web address]-ccmwrwszeia-bfggtvuk-bevokrtxauy-nzrrl.pdf    Anxiety:                             [ ]None[ ]Mild [ ]Moderate [ ]Severe   Fatigue:                             [ ]None[ ]Mild [ ]Moderate [ ]Severe   Nausea:                             [ ]None[ ]Mild [ ]Moderate [ ]Severe   Loss of appetite:              [ ]None[ ]Mild [ ]Moderate [ ]Severe   Constipation:                    [ ]None[ ]Mild [ ]Moderate [ ]Severe    Other Symptoms: ++ Thirst   [ X]All other review of systems negative     Palliative Performance Status Version 2:      30   %    [email or web address]/[email or web address]_performance_scale_[email or web address].pdf    PHYSICAL EXAM:  ICU Vital Signs Last 24 Hrs  T(C): 36.7 (17 Aug 2023 12:00), Max: 36.9 (17 Aug 2023 08:00)  T(F): 98 (17 Aug 2023 12:00), Max: 98.4 (17 Aug 2023 08:00)  HR: 96 (17 Aug 2023 08:00) (67 - 96)  BP: 142/69 (17 Aug 2023 08:00) (117/59 - 142/69)  BP(mean): 98 (17 Aug 2023 08:00) (80 - 98)  RR: 34 (17 Aug 2023 08:00) (22 - 41)  SpO2: 95% (17 Aug 2023 04:00) (94% - 95%)    GENERAL:  [X ] No acute distress [ ]Lethargic  [ ]Unarousable  [X ]Verbal  [ ]Non-Verbal [ ]Cachexia    BEHAVIORAL/PSYCH:  [X ]Alert and Oriented x 2-3 [ ] Anxiety [ ] Delirium [ ] Agitation [ X] Calm   EYES: [X ] No scleral icterus [ ] Scleral icterus [ ] Closed  ENMT:  [ ]Dry mouth  [X]No external oral lesions [ ] No external ear or nose lesions  CARDIOVASCULAR:  [ ]Regular [ ]Irregular [ ]Tachy [ ]Not Tachy  [ ]Meet [ ] Edema [X ] No edema  PULMONARY:  [ ]Tachypnea  [ ]Audible excessive secretions [ X] No labored breathing [ ] labored breathing  GASTROINTESTINAL: [ X]Soft  [ ]Distended  [ ]Not distended [ ]Non tender [ ]Tender  MUSCULOSKELETAL: [ X]No clubbing [ ] clubbing  [ ] No cyanosis [ ] cyanosis  NEUROLOGIC: [ ]No focal deficits  [ ]Follows commands  [ ]Does not follow commands  [X ]Cognitive impairment  [ ]Dysphagia  [ ]Dysarthria  [ ]Paresis   SKIN: [ ] Jaundiced [X ] Non-jaundiced [ ]Rash [ ]No Rash [ ] Warm [ ] Dry  MISC/LINES: [ ] ET tube [ ] Trach [X ]NGT/OGT [ ]PEG [ ]Jones    LABS: reviewed by me      08-17    139  |  107  |  19  ----------------------------<  266<H>  3.1<L>   |  24  |  0.9    Ca    6.1<L>      17 Aug 2023 11:00  Mg     2.0     08-17    TPro  4.2<L>  /  Alb  1.9<L>  /  TBili  0.3  /  DBili  x   /  AST  71<H>  /  ALT  36  /  AlkPhos  129<H>  08-17                            8.1    10.04 )-----------( 221      ( 17 Aug 2023 11:00 )             25.1       RADIOLOGY & ADDITIONAL STUDIES: reviewed by me      < from: Xray Chest 1 View- PORTABLE-Routine (Xray Chest 1 View- PORTABLE-Routine in AM.) (08.15.23 @ 06:30) >    Findings/  impression:    Support devices: Feeding tube coursing below the field-of-view. Stable   right-sided central venous catheter.    Cardiac/mediastinum/hilum: Unchanged    Lung parenchyma/Pleura: Bilateral opacities without significant change.   No pneumothorax.    Skeleton/soft tissues: Unchanged    --- End of Report ---    < end of copied text >  < from: Xray Chest 1 View- PORTABLE-Routine (Xray Chest 1 View- PORTABLE-Routine in AM.) (08.15.23 @ 06:30) >    Findings/  impression:    Support devices: Feeding tube coursing below the field-of-view. Stable   right-sided central venous catheter.    Cardiac/mediastinum/hilum: Unchanged    Lung parenchyma/Pleura: Bilateral opacities without significant change.   No pneumothorax.    Skeleton/soft tissues: Unchanged    --- End of Report ---    < end of copied text >      EKG: reviewed by me    < from: 12 Lead ECG (08.04.23 @ 10:57) >    Ventricular Rate 106 BPM    Atrial Rate 106 BPM    P-R Interval 152 ms    QRS Duration 126 ms    Q-T Interval 400 ms    QTC Calculation(Bazett) 531 ms    P Axis 34 degrees    R Axis -50 degrees    T Axis -18 degrees    Diagnosis Line Sinus tachycardia  Right bundle branch block  Left anterior fascicular block  *** Bifascicular block ***  Moderate voltage criteria for LVH, may be normal variant      Abnormal ECG    Confirmed by RUDY FLOR MD ([id number]) on 8/4/2023 11:24:55 AM    < end of copied text >        Patient discussed with primary medical team MD  Palliative care education provided to patient and/or family

## 2023-08-17 NOTE — CHART NOTE - NSCHARTNOTEFT_GEN_A_CORE
Transfer from: [facility] 2B    Transfer to: (Medicine, Telemetry, ICU, etc)    Sign out given to:     HPI / HOSPITAL COURSE:  87 y/o M w/ PMH of DM, HTN coming from home with complaint of decreased appetite, increased urinary output and craving sweets x 2 weeks. Daughter is caretaker, states patient behavior has changed. Within the last 2 weeks, He is less active, requesting more coca cola and sugar. Pt noted to be hypotensive upon arrival.     ED vitals:  BP 74/ 50, HR 87, Temp 97.2F, satting 95% on room air  Labs significant for WBC 14K, Na 123 (corrected 141), Cr 2.4, AG 23, BHB 5.1. VBG pH 7.19, Lactate 5.8, K 8.6, pCO2 39  EKG tachycardia, bifascicular block, RBBB  CXR unremarkable  CT A/P: Extensive coronary atherosclerosis. Dependent atelectasis at the right base. 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 Hepatic cysts. Questionable sludge within the gallbladder. Unchanged 1 cm cystic lesion in the pancreatic body      s/p calcium gluconate 1g, Lasix 40mg push x1, barcarb 50meq, started on insulin drip at 7 units/hr.   Admitted to MICU for DKA/HHS.    In MICU, pt was treated with IVF, insulin, and electrolyte repletion. Glucose normalized, gap closed, NG tube was placed as patient was unable to tolerate PO. While In ICU, pt remained consistently hypotensive requiring fluids and pressors. Bcx grew hungatella. Pt treated for septic shock w/ Meropenem, fluids, pressors. Bcx now negative. Pt developed severe abdominal pain, CT scan done which showed Appendicitis likely being the source. Sx following and recommended no surgical intervention. pt received caspo for a few days then fungitell came back negative   Patient was also having melena. EGD done and showed 2 duodenal ulcers , one ulcer had a visible vessel which was cauterized. on PPI BID, GI following   seen by speech and passed however not eating majority of food. Calorie count started this AM.         ASSESSMENT & PLAN:   87 y/o M w/ PMH of DM, HTN coming from home with complaint of decreased appetite, increased urinary output and craving sweets x 2 weeks. Admitted for management of HHS , now resolved .  Patient clinically stable , hemodynamically stable , Off iv PRESSORS .      IMPRESSION  #Septic shock  THE REQUIRED iV PRESSORS   #Hungatella bacteremia likely GI translocation in the setting of appendicitis  8/8 BCX NGTD   8/5 BCX NGTD   8/4 UCX NGTD  8/4 BCX + 1/2 bottles  - F up BCX of 8/12  - Patient improved initially but on 8/9 -> At 6.30 pm patient started having diffuse severe abdominal pain with increased pressors (levo and jasen).   - Abdomen Not peritonitic.   -- Ct angio abdomen/pelvis to r/o ischemia ->No evidence of bowel ischemia. Unchanged findings suggesting acute appendicitis.  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 Collateral flow presumed to be present again there is no evidence of bowel ischemia.   - lactate level was 7 on 8/9 -> 2.5 on 8/10 -> 1.4 on 8/11.   - Surgery team on board ->                        - No surgery indicated --> not a surgical candidate at this time                       - C/w antibiotics --> non operative management for acute appendicitis  - hydro stress dose > reduced to 50q12H. day 2   - Repeat UA, and MRSA negative on 8/10  - IVF based on cheetah  - Repeat TTE on 8/11: stable EF 61%  - no documented fevers  - on meropenem 1g Q12 day 7  - Caspofungin day 4 - F up fungitell   - repeated MRSA negative.   - ID on board  - NPO >per surgery there is no plan for surgical intervention at this time and per GI there is no plan for EGD as patient is too unstable for it at this time so could feed him but holding due to multiple episodes of melena today  - midodrine increased to 15mg BID - off pressors for now    # Hypernatremia 147 -> patient on LR ,     #Melena likely d/t PUD vs AVM vs Esophageal ulcer vs Erosive Hemorrhagic Gastritis vs Dieulafoy lesion Vs Malignancy    - Patient started having melena on Wednesday night 8/9 (NADEGE on 8/9 -in the morning- was negative)   - CT abdomen with PO contrast and repeated with IV contrast -> negative for bleed  - on 8/10 drop in hgb from 5 am till 11 am (from 7.2 to 6) after having 2 episodes of melena -> 2 units of pRBC were given  - NPO except water as per GI  - Protonix drip switched to BID oral now   - GI on board  -EGD in the afternoon today   - HIT panel negative  - DIC panel repeated twice negative.     #DKA/HHS, - resolved  - resolved  - endo on board   - insulin IV drip today to control HGt numbers     #AMS toxic metabolic   - CT head -> negative  - EEG -> There were no findings of active epilepsy.        FOR FOLLOW UP:  [ ] Calorie Count   [ ] ID for Abx end date   [ ] CBC Transfer from: [facility] 2B    Transfer to: (Medicine, Telemetry, ICU, etc)    Sign out given to:     HPI / HOSPITAL COURSE:  89 y/o M w/ PMH of DM, HTN coming from home with complaint of decreased appetite, increased urinary output and craving sweets x 2 weeks. Daughter is caretaker, states patient behavior has changed. Within the last 2 weeks, He is less active, requesting more coca cola and sugar. Pt noted to be hypotensive upon arrival.     ED vitals:  BP 74/ 50, HR 87, Temp 97.2F, satting 95% on room air  Labs significant for WBC 14K, Na 123 (corrected 141), Cr 2.4, AG 23, BHB 5.1. VBG pH 7.19, Lactate 5.8, K 8.6, pCO2 39  EKG tachycardia, bifascicular block, RBBB  CXR unremarkable  CT A/P: Extensive coronary atherosclerosis. Dependent atelectasis at the right base. 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 Hepatic cysts. Questionable sludge within the gallbladder. Unchanged 1 cm cystic lesion in the pancreatic body      s/p calcium gluconate 1g, Lasix 40mg push x1, barcarb 50meq, started on insulin drip at 7 units/hr.   Admitted to MICU for DKA/HHS.    In MICU, pt was treated with IVF, insulin, and electrolyte repletion. Glucose normalized, gap closed, NG tube was placed as patient was unable to tolerate PO. While In ICU, pt remained consistently hypotensive requiring fluids and pressors. Bcx grew hungatella. Pt treated for septic shock w/ Meropenem, fluids, pressors. Bcx now negative. Pt developed severe abdominal pain, CT scan done which showed Appendicitis likely being the source. Sx following and recommended no surgical intervention. pt received caspo for a few days then fungitell came back negative   Patient was also having melena. EGD done and showed 2 duodenal ulcers , one ulcer had a visible vessel which was cauterized. on PPI BID, GI following   seen by speech and passed however not eating majority of food. Calorie count started this AM.         ASSESSMENT & PLAN:   89 y/o M w/ PMH of DM, HTN coming from home with complaint of decreased appetite, increased urinary output and craving sweets x 2 weeks. Admitted for management of HHS , now resolved .  Patient clinically stable , hemodynamically stable , Off iv PRESSORS .      IMPRESSION  #Septic shock    #Hungatella bacteremia likely GI translocation in the setting of appendicitis  8/8 BCX NGTD   8/5 BCX NGTD   8/4 UCX NGTD  8/4 BCX + 1/2 bottles  8/5 Bcx NTD   8/8 Bcx NGTD  8/12 Bcx NGTD  - Patient improved initially but on 8/9 -> At 6.30 pm patient started having diffuse severe abdominal pain with increased pressors (levo and jasen).   - Abdomen Not peritonitic.   -- Ct angio abdomen/pelvis to r/o ischemia ->No evidence of bowel ischemia. Unchanged findings suggesting acute appendicitis.  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 Collateral flow presumed to be present again there is no evidence of bowel ischemia.   - lactate level was 7 on 8/9 -> 2.5 on 8/10 -> 1.4 on 8/11.   - Surgery team on board ->                        - No surgery indicated --> not a surgical candidate at this time                       - C/w antibiotics --> non operative management for acute appendicitis  - hydro stress dose > reduced to 50q12H. --  - Repeat UA, and MRSA negative on 8/10  - Repeat TTE on 8/11: stable EF 61%  - no documented fevers  - on meropenem 1g Q12 day 7  - Caspofungin day 4 - F up fungitell   - repeated MRSA negative.   - ID on board  - NPO >per surgery there is no plan for surgical intervention at this time and per GI there is no plan for EGD as patient is too unstable for it at this time so could feed him but holding due to multiple episodes of melena today  - midodrine increased to 15mg BID - off pressors for no    #Melena likely d/t PUD vs AVM vs Esophageal ulcer vs Erosive Hemorrhagic Gastritis vs Dieulafoy lesion Vs Malignancy    - Patient started having melena on Wednesday night 8/9 (NADEGE on 8/9 -in the morning- was negative)   - CT abdomen with PO contrast and repeated with IV contrast -> negative for bleed  - on 8/10 drop in hgb from 5 am till 11 am (from 7.2 to 6) after having 2 episodes of melena -> 2 units of pRBC were given  - NPO except water as per GI  - Protonix drip switched to BID oral now   - GI on board  -EGD in the afternoon today   - HIT panel negative  - DIC panel repeated twice negative.     #DKA/HHS, - resolved  - resolved  - endo on board   - insulin IV drip today to control HGt numbers     #AMS toxic metabolic   - CT head -> negative  - EEG -> There were no findings of active epilepsy.        FOR FOLLOW UP:  [ ] Calorie Count   [ ] ID for Abx end date   [ ] CBC Transfer from: [facility] 2B    Transfer to: (Medicine, Telemetry, ICU, etc)    Sign out given to:     HPI / HOSPITAL COURSE:  87 y/o M w/ PMH of DM, HTN coming from home with complaint of decreased appetite, increased urinary output and craving sweets x 2 weeks. Daughter is caretaker, states patient behavior has changed. Within the last 2 weeks, He is less active, requesting more coca cola and sugar. Pt noted to be hypotensive upon arrival.     ED vitals:  BP 74/ 50, HR 87, Temp 97.2F, satting 95% on room air  Labs significant for WBC 14K, Na 123 (corrected 141), Cr 2.4, AG 23, BHB 5.1. VBG pH 7.19, Lactate 5.8, K 8.6, pCO2 39  EKG tachycardia, bifascicular block, RBBB  CXR unremarkable  CT A/P: Extensive coronary atherosclerosis. Dependent atelectasis at the right base. 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 Hepatic cysts. Questionable sludge within the gallbladder. Unchanged 1 cm cystic lesion in the pancreatic body      s/p calcium gluconate 1g, Lasix 40mg push x1, barcarb 50meq, started on insulin drip at 7 units/hr.   Admitted to MICU for DKA/HHS.    In MICU, pt was treated with IVF, insulin, and electrolyte repletion. Glucose normalized, gap closed, NG tube was placed as patient was unable to tolerate PO. While In ICU, pt remained consistently hypotensive requiring fluids and pressors. Bcx grew hungatella. Pt treated for septic shock w/ Meropenem, fluids, pressors. Bcx now negative. Pt developed severe abdominal pain, CT scan done which showed Appendicitis likely being the source. Sx following and recommended no surgical intervention. pt received caspo for a few days then fungitell came back negative   Patient was also having melena. EGD done and showed 2 duodenal ulcers , one ulcer had a visible vessel which was cauterized. on PPI BID, GI following   seen by speech and passed however not eating majority of food. Calorie count started this AM.         ASSESSMENT & PLAN:     88M PMHx DM2, HTN here with decreased appetite found to have HHS/ DKA s/p insulin gtt. Melena s/p EGD demonstrating ulcer. Hungatella bacteremia. Appendicitis.     Problem/Plan - 1:  ·  Problem: Decreased oral intake.  ·  Plan: cont calorie count  pureed diet  supplemented with tf via ngt  ongoing goc, f/u palliative.  - If fails calorie count, will need to talk about PEG tube w/ Family      Problem/Plan - 2:  ·  Problem: Bacteremia.   ·  Plan: bcx 8/4 +hungatella  bcx 8/5 onwards ntd  tte no vegetations  kenyatta  f/u id - Monitor off Abx now   midodrine 5 bid  change Solu-cortef 50 bid to 25 bid; taper to off.     Problem/Plan - 3:  ·  Problem: Melena.   ·  Plan: s/p egd 8/14 with two ulcers; one was cauterized, given epi  h/h stable, trend  protonix 40 iv bid  pureed diet.     Problem/Plan - 4:  ·  Problem: DM2 (diabetes mellitus, type 2).   ·  Plan: s/p insulin gtt for HHS/ DKA  increase lantus to 8  ssi.     Problem/Plan - 5:  ·  Problem: HTN (hypertension).   ·  Plan: outpt f/u.     Problem/Plan - 6:  ·  Problem: On deep vein thrombosis (DVT) prophylaxis.   ·  Plan: lovenox.        FOR FOLLOW UP:  [ ] Calorie Count   [ ] Monitor CBC   [ ] PPI BID   - Monitor off Abx

## 2023-08-17 NOTE — SWALLOW BEDSIDE ASSESSMENT ADULT - ADDITIONAL RECOMMENDATIONS
Minimal acceptance of PO- consider calorie count and leaving NGT. SLP to f/u  GOC conversation warranted

## 2023-08-17 NOTE — CHART NOTE - NSCHARTNOTEFT_GEN_A_CORE
LMSW spoke with Pt's daughter: Antonia and arranged a family meeting with Palliative Care team via phone for tomorrow 8/18 11AM to discuss ACP. will continue to follow for on-going support. [phone]

## 2023-08-17 NOTE — PROGRESS NOTE ADULT - ASSESSMENT
ASSESSMENT  87 y/o M w/ PMH of DM, HTN coming from home with complaint of decreased appetite, increased urinary output and craving sweets x 2 weeks. Admitted for management of DKA.     IMPRESSION  #GIB, resolved   #Septic shock requiring pressors , resolved   #Hungatella bacteremia likely GI translocation in the setting of appendicitis  8/8 BCX NGTD   8/5 BCX NGTD   8/4 UCX NGTD  8/4 BCX + 1/2 bottles  < from: CT Angio Abdomen and Pelvis w/ IV Cont (08.10.23 @ 01:18) >  No evidence of bowel ischemia. 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 Collateral flow presumed   to be present again there is no evidence of bowel ischemia.  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 Findings are concerning for appendicitis.  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  Additional findings detailed in the body the report  #DKA/HHS  #Atelectasis  Creatinine: 1.1 mg/dL (08.08.23 @ 05:55)  Weight (kg): 62.6 (08-04-23 @ 20:37)  crcl 41      RECOMMENDATIONS  - Monitor off antimicrobials  - Surgery following  - Grave prognosis  GOC  - Please recall ID PRN. Please inform ID of any patient clinical change or any new pertinent laboratory or radiographic data    If any questions, please call or send a message on [employer] Teams  Please continue to update ID with any pertinent new laboratory or radiographic findings     ASSESSMENT  87 y/o M w/ PMH of DM, HTN coming from home with complaint of decreased appetite, increased urinary output and craving sweets x 2 weeks. Admitted for management of DKA.     IMPRESSION  #GIB, resolved   #Septic shock requiring pressors , resolved   #Hungatella bacteremia likely GI translocation in the setting of appendicitis  8/8 BCX NGTD   8/5 BCX NGTD   8/4 UCX NGTD  8/4 BCX + 1/2 bottles  < from: CT Angio Abdomen and Pelvis w/ IV Cont (08.10.23 @ 01:18) >  No evidence of bowel ischemia. 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 Collateral flow presumed   to be present again there is no evidence of bowel ischemia.  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 Findings are concerning for appendicitis.  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  Additional findings detailed in the body the report  #DKA/HHS  #Atelectasis  Creatinine: 1.1 mg/dL (08.08.23 @ 05:55)  Weight (kg): 62.6 (08-04-23 @ 20:37)  crcl 41      RECOMMENDATIONS  - Monitor off antimicrobials  - Please recall ID PRN. Please inform ID of any patient clinical change or any new pertinent laboratory or radiographic data    If any questions, please call or send a message on [employer] Teams  Please continue to update ID with any pertinent new laboratory or radiographic findings

## 2023-08-17 NOTE — CHART NOTE - NSCHARTNOTEFT_GEN_A_CORE
Registered Dietitian Follow-Up     Patient Profile Reviewed                           Yes [x]   No []     Nutrition History Previously Obtained        Yes [x]  No []       Pertinent Subjective Information: Spoke to pt at bedside in [facility]. Denies GI issues with feeds. Observed 0% intake from breakfast tray.      Pertinent Medical Interventions: Pt admitted for HHS/ DKA s/p insulin gtt, resolved. Septic shock resolved. UGIB s/p EGD s/p cauterization of visible vessel. BART resolved. Currently continues to be managed for AMS - toxic metabolic and Hungatella species bacteremia. Pt for transfer to med/surg floor.     Diet order: Diet, Pureed:   Mildly Thick Liquids (MILDTHICKLIQS)  Tube Feeding Modality: Nasogastric  Glucerna 1.2 Víctor  Bolus  Total Volume of Bolus (mL):  360  Tube Feed Frequency: Every 8 hours   Tube Feed Start Time: 16:30  Bolus Feed Rate (mL per Hour): 540   Bolus Feed Duration (in Hours): 0.75  Free Water Flush  Free Water Flush Instructions:  free water flush 100cc prior and after feeding (08-15-23 @ 15:35) [Active]     Anthropometrics:  Height (cm): 167.6 (23 @ 11:44)  Weight (kg): 62.6 (23 @ 11:44)  BMI (kg/m2): 22.3 (23 @ 11:44)  IBW: 65 kg  UBW: N/A    Daily Weight in k.6 (08-15), Weight in k.5 (), Weight in k.7 (), Weight in k.7 (), Weight in k.4 ()  Weight Change: trending up likely reflective of fluid retention    MEDICATIONS  (STANDING):  allopurinol 300 milliGRAM(s) Oral daily  bacitracin   Ointment 1 Application(s) Topical two times a day  chlorhexidine 2% Cloths 1 Application(s) Topical daily  enoxaparin Injectable 40 milliGRAM(s) SubCutaneous every 24 hours  hydrocortisone sodium succinate Injectable 25 milliGRAM(s) IV Push every 12 hours  insulin glargine Injectable (LANTUS) 8 Unit(s) SubCutaneous <User Schedule>  insulin lispro (ADMELOG) corrective regimen sliding scale   SubCutaneous three times a day before meals  meropenem  IVPB 1000 milliGRAM(s) IV Intermittent every 12 hours  midodrine 5 milliGRAM(s) Oral every 8 hours  pantoprazole  Injectable 40 milliGRAM(s) IV Push every 12 hours    MEDICATIONS  (PRN):    Pertinent Labs:                       9.6    11.99 )-----------( 180      ( 16 Aug 2023 11:23 )             29.8       145  |  114<H>  |  21<H>  ----------------------------<  227<H>  4.4   |  20  |  1.0    Ca    6.5<L>      16 Aug 2023 11:23  Mg     2.1         TPro  4.9<L>  /  Alb  2.3<L>  /  TBili  0.4  /  DBili  x   /  AST  94<H>  /  ALT  38  /  AlkPhos  145<H>      Finger Sticks:  POCT Blood Glucose.: 262 mg/dL ( @ 11:48)  POCT Blood Glucose.: 246 mg/dL ( @ 07:33)  POCT Blood Glucose.: 254 mg/dL ( @ 16:04)    Physical Findings:  - Appearance: A0x4  - GI function: abdomen rounded per flowsheets; last bowel movement 16-Aug-2023 - tarry  - Tubes: NGT  - Oral/Mouth cavity: Most recent S+S with recs for Puree/mildly thick and calorie count; cont NG tube  - Skin: right forearm IV infiltrate, penile excoriation, no pressure injury   - Edema: 3+ edema to right hand; left hand, 2+ edema to right foot; left foot, 1+ generalized edema     Nutrition Requirements:  Weight Used: dosing weight 62.6 kg    Estimated Energy Needs    Continue [x]  Adjust []  Adjusted Energy Recommendations: 1857-4662 kcal/day - MSJ 1250*SF 1.2-1.4     Estimated Protein Needs    Continue [x]  Adjust []  Adjusted Protein Recommendations: 75-88 g/day - 1.2-1.4g/kg     Estimated Fluid Needs        Continue [x]  Adjust []  Adjusted Fluid Recommendations: 1565 mL/day - 25mL/kg of     Nutrient Intake: current TF provides 1296kcal, 64g protein, 875ml free water. 0% tray intake.     [x] Previous Nutrition Diagnosis: Inadequate Oral Intake            [x] Ongoing          [] Resolved     Nutrition Education: N/A     Goal/Expected Outcome: Pt to meet >90% & <105% estimated needs via EN and PO in 3-5 days.     Indicator/Monitoring: Monitor diet order, kcal intake, body composition, NFPE, labs  (lytes, BG, renal indices)    Recommendation:  Glucerna 1.2 formula, gravity feeds (ordered as bolus in EMR). 360ml post-meal if tray intake <50% @9AM, 1PM, 6PM. Always give 360ml @9PM. Each feed to run over 1 hour. -- If all boluses are given, will provide 1728kcal, 85g protein, 1166ml free water. 50ml before and after each feed.  Start kcal count tomorrow once pt transferred to floor re: concerns of lost kcal count in transfer    Patient assessed at high nutrition risk; RD to follow in 3-5 days.   RD spectra [phone], also available on Teams. Registered Dietitian Follow-Up     Patient Profile Reviewed                           Yes [x]   No []     Nutrition History Previously Obtained        Yes [x]  No []       Pertinent Subjective Information: Spoke to pt at bedside in [facility]. Denies GI issues with feeds. Observed 0% intake from breakfast tray.      Pertinent Medical Interventions: Pt admitted for HHS/ DKA s/p insulin gtt, resolved. Septic shock resolved. UGIB s/p EGD s/p cauterization of visible vessel. BART resolved. Currently continues to be managed for AMS - toxic metabolic and Hungatella species bacteremia. Pt for transfer to med/surg floor.     Diet order: Diet, Pureed:   Mildly Thick Liquids (MILDTHICKLIQS)  Tube Feeding Modality: Nasogastric  Glucerna 1.2 Víctor  Bolus  Total Volume of Bolus (mL):  360  Tube Feed Frequency: Every 8 hours   Tube Feed Start Time: 16:30  Bolus Feed Rate (mL per Hour): 540   Bolus Feed Duration (in Hours): 0.75  Free Water Flush  Free Water Flush Instructions:  free water flush 100cc prior and after feeding (08-15-23 @ 15:35) [Active]     Anthropometrics:  Height (cm): 167.6 (23 @ 11:44)  Weight (kg): 62.6 (23 @ 11:44)  BMI (kg/m2): 22.3 (23 @ 11:44)  IBW: 65 kg  UBW: N/A    Daily Weight in k.6 (08-15), Weight in k.5 (), Weight in k.7 (), Weight in k.7 (), Weight in k.4 ()  Weight Change: trending up likely reflective of fluid retention    MEDICATIONS  (STANDING):  allopurinol 300 milliGRAM(s) Oral daily  bacitracin   Ointment 1 Application(s) Topical two times a day  chlorhexidine 2% Cloths 1 Application(s) Topical daily  enoxaparin Injectable 40 milliGRAM(s) SubCutaneous every 24 hours  hydrocortisone sodium succinate Injectable 25 milliGRAM(s) IV Push every 12 hours  insulin glargine Injectable (LANTUS) 8 Unit(s) SubCutaneous <User Schedule>  insulin lispro (ADMELOG) corrective regimen sliding scale   SubCutaneous three times a day before meals  meropenem  IVPB 1000 milliGRAM(s) IV Intermittent every 12 hours  midodrine 5 milliGRAM(s) Oral every 8 hours  pantoprazole  Injectable 40 milliGRAM(s) IV Push every 12 hours    MEDICATIONS  (PRN):    Pertinent Labs:                       9.6    11.99 )-----------( 180      ( 16 Aug 2023 11:23 )             29.8       145  |  114<H>  |  21<H>  ----------------------------<  227<H>  4.4   |  20  |  1.0    Ca    6.5<L>      16 Aug 2023 11:23  Mg     2.1         TPro  4.9<L>  /  Alb  2.3<L>  /  TBili  0.4  /  DBili  x   /  AST  94<H>  /  ALT  38  /  AlkPhos  145<H>      Finger Sticks:  POCT Blood Glucose.: 262 mg/dL ( @ 11:48)  POCT Blood Glucose.: 246 mg/dL ( @ 07:33)  POCT Blood Glucose.: 254 mg/dL ( @ 16:04)    Physical Findings:  - Appearance: A0x4  - GI function: abdomen rounded per flowsheets; last bowel movement 16-Aug-2023 - tarry  - Tubes: NGT  - Oral/Mouth cavity: Most recent S+S with recs for Puree/mildly thick and calorie count; cont NG tube  - Skin: right forearm IV infiltrate, penile excoriation, no pressure injury   - Edema: 3+ edema to right hand; left hand, 2+ edema to right foot; left foot, 1+ generalized edema     Nutrition Requirements:  Weight Used: dosing weight 62.6 kg    Estimated Energy Needs    Continue [x]  Adjust []  Adjusted Energy Recommendations: 2567-4937 kcal/day - MSJ 1250*SF 1.2-1.4     Estimated Protein Needs    Continue [x]  Adjust []  Adjusted Protein Recommendations: 75-88 g/day - 1.2-1.4g/kg     Estimated Fluid Needs        Continue [x]  Adjust []  Adjusted Fluid Recommendations: 1565 mL/day - 25mL/kg of     Nutrient Intake: current TF provides 1296kcal, 64g protein, 875ml free water. 0% tray intake.     [x] Previous Nutrition Diagnosis: Inadequate Oral Intake            [x] Ongoing          [] Resolved     Nutrition Education: N/A     Goal/Expected Outcome: Pt to meet >90% & <105% estimated needs via EN and PO in 3-5 days.     Indicator/Monitoring: Monitor diet order, kcal intake, body composition, NFPE, labs  (lytes, BG, renal indices)    Recommendation:  Cont with PO diet supplemented by NG feeds  Glucerna 1.2 formula, gravity feeds (ordered as bolus in EMR). 360ml post-meal if tray intake <50% @9AM, 1PM, 6PM. Always give 360ml @9PM. Each feed to run over 1 hour. -- If all boluses are given, will provide 1728kcal, 85g protein, 1166ml free water. 50ml before and after each feed.  Start kcal count tomorrow once pt transferred to floor re: concerns of lost kcal count in transfer    Patient assessed at high nutrition risk; RD to follow in 3-5 days.   RD spectra [phone], also available on Teams.

## 2023-08-17 NOTE — PROGRESS NOTE ADULT - SUBJECTIVE AND OBJECTIVE BOX
INTERVAL HPI/OVERNIGHT EVENTS:    SUBJECTIVE: Patient seen and examined at bedside.     cc: decreased po  no cp, sob, abd pain, fever; hx limited    OBJECTIVE:    VITAL SIGNS:  Vital Signs Last 24 Hrs  T(C): 36.9 (17 Aug 2023 08:00), Max: 36.9 (17 Aug 2023 08:00)  T(F): 98.4 (17 Aug 2023 08:00), Max: 98.4 (17 Aug 2023 08:00)  HR: 96 (17 Aug 2023 08:00) (67 - 96)  BP: 142/69 (17 Aug 2023 08:00) (117/59 - 142/69)  BP(mean): 98 (17 Aug 2023 08:00) (80 - 110)  RR: 34 (17 Aug 2023 08:00) (21 - 41)  SpO2: 95% (17 Aug 2023 04:00) (94% - 95%)    Parameters below as of 16 Aug 2023 16:00  Patient On (Oxygen Delivery Method): room air          PHYSICAL EXAM:    General: NAD  HEENT: NC/AT; PERRL, clear conjunctiva  Neck: supple  Respiratory: CTA b/l  Cardiovascular: +S1/S2; RRR  Abdomen: soft, NT/ND; +BS x4  Extremities: WWP, 2+ peripheral pulses b/l; no LE edema  Skin: normal color and turgor; no rash  Neurological:    MEDICATIONS:  MEDICATIONS  (STANDING):  allopurinol 300 milliGRAM(s) Oral daily  bacitracin   Ointment 1 Application(s) Topical two times a day  chlorhexidine 2% Cloths 1 Application(s) Topical daily  enoxaparin Injectable 40 milliGRAM(s) SubCutaneous every 24 hours  hydrocortisone sodium succinate Injectable 50 milliGRAM(s) IV Push every 12 hours  insulin glargine Injectable (LANTUS) 8 Unit(s) SubCutaneous <User Schedule>  insulin lispro (ADMELOG) corrective regimen sliding scale   SubCutaneous three times a day before meals  meropenem  IVPB 1000 milliGRAM(s) IV Intermittent every 12 hours  midodrine 5 milliGRAM(s) Oral every 8 hours  pantoprazole  Injectable 40 milliGRAM(s) IV Push every 12 hours    MEDICATIONS  (PRN):      ALLERGIES:  Allergies    No Known Allergies    Intolerances        LABS:                        9.6    11.99 )-----------( 180      ( 16 Aug 2023 11:23 )             29.8     Hemoglobin: 9.6 g/dL (08-16 @ 11:23)  Hemoglobin: 8.6 g/dL (08-15 @ 05:00)  Hemoglobin: 8.4 g/dL (08-14 @ 21:12)  Hemoglobin: 7.8 g/dL (08-14 @ 04:45)  Hemoglobin: 8.0 g/dL (08-13 @ 17:18)    CBC Full  -  ( 16 Aug 2023 11:23 )  WBC Count : 11.99 K/uL  RBC Count : 3.08 M/uL  Hemoglobin : 9.6 g/dL  Hematocrit : 29.8 %  Platelet Count - Automated : 180 K/uL  Mean Cell Volume : 96.8 fL  Mean Cell Hemoglobin : 31.2 pg  Mean Cell Hemoglobin Concentration : 32.2 g/dL  Auto Neutrophil # : x  Auto Lymphocyte # : x  Auto Monocyte # : x  Auto Eosinophil # : x  Auto Basophil # : x  Auto Neutrophil % : x  Auto Lymphocyte % : x  Auto Monocyte % : x  Auto Eosinophil % : x  Auto Basophil % : x    08-16    145  |  114<H>  |  21<H>  ----------------------------<  227<H>  4.4   |  20  |  1.0    Ca    6.5<L>      16 Aug 2023 11:23  Mg     2.1     08-16    TPro  4.9<L>  /  Alb  2.3<L>  /  TBili  0.4  /  DBili  x   /  AST  94<H>  /  ALT  38  /  AlkPhos  145<H>  08-16    Creatinine Trend: 1.0<--, 0.9<--, 1.1<--, 0.9<--, 1.0<--, 1.0<--  LIVER FUNCTIONS - ( 16 Aug 2023 11:23 )  Alb: 2.3 g/dL / Pro: 4.9 g/dL / ALK PHOS: 145 U/L / ALT: 38 U/L / AST: 94 U/L / GGT: x               hs Troponin:            Urinalysis Basic - ( 16 Aug 2023 11:23 )    Color: x / Appearance: x / SG: x / pH: x  Gluc: 227 mg/dL / Ketone: x  / Bili: x / Urobili: x   Blood: x / Protein: x / Nitrite: x   Leuk Esterase: x / RBC: x / WBC x   Sq Epi: x / Non Sq Epi: x / Bacteria: x      CSF:                      EKG:   MICROBIOLOGY:    IMAGING:      Labs, imaging, EKG personally reviewed    RADIOLOGY & ADDITIONAL TESTS: Reviewed.

## 2023-08-17 NOTE — PROGRESS NOTE ADULT - SUBJECTIVE AND OBJECTIVE BOX
MILEY MARES  88y, Male  Allergy: No Known Allergies      LOS  13d    CHIEF COMPLAINT: DKA/HHS (17 Aug 2023 11:19)      INTERVAL EVENTS/HPI  - No acute events overnight  - T(F): , Max: 98.4 (08-17-23 @ 08:00)  - Tolerating medication  - WBC Count: 10.04 (08-17-23 @ 11:00)  WBC Count: 11.99 (08-16-23 @ 11:23)     - Creatinine: 0.9 (08-17-23 @ 11:00)  Creatinine: 1.0 (08-16-23 @ 11:23)       ROS  General: Denies rigors, nightsweats  HEENT: Denies headache, rhinorrhea, sore throat, eye pain  CV: Denies CP, palpitations  PULM: Denies wheezing, hemoptysis  GI: Denies hematemesis, hematochezia, melena  : Denies discharge, hematuria  MSK: Denies arthralgias, myalgias  SKIN: Denies rash, lesions  NEURO: Denies paresthesias, weakness  PSYCH: Denies depression, anxiety     VITALS:  T(F): 98, Max: 98.4 (08-17-23 @ 08:00)  HR: 96  BP: 142/69  RR: 34Vital Signs Last 24 Hrs  T(C): 36.7 (17 Aug 2023 12:00), Max: 36.9 (17 Aug 2023 08:00)  T(F): 98 (17 Aug 2023 12:00), Max: 98.4 (17 Aug 2023 08:00)  HR: 96 (17 Aug 2023 08:00) (67 - 96)  BP: 142/69 (17 Aug 2023 08:00) (117/59 - 142/69)  BP(mean): 98 (17 Aug 2023 08:00) (80 - 98)  RR: 34 (17 Aug 2023 08:00) (22 - 41)  SpO2: 95% (17 Aug 2023 04:00) (94% - 95%)    Parameters below as of 16 Aug 2023 16:00  Patient On (Oxygen Delivery Method): room air        PHYSICAL EXAM:  Gen: NAD, resting in bed, more alert   HEENT: Normocephalic, atraumatic  Neck: supple, no lymphadenopathy  CV: Regular rate & regular rhythm  Lungs: decreased BS at bases, no fremitus  Abdomen: Soft, BS present  Ext: Warm, well perfused  Neuro: non focal, awake  Skin: no rash, no erythema  Lines: no phlebitis   FH: Non-contributory  Social Hx: Non-contributory    TESTS & MEASUREMENTS:                        8.1    10.04 )-----------( 221      ( 17 Aug 2023 11:00 )             25.1     08-17    139  |  107  |  19  ----------------------------<  266<H>  3.1<L>   |  24  |  0.9    Ca    6.1<L>      17 Aug 2023 11:00  Mg     2.0     08-17    TPro  4.2<L>  /  Alb  1.9<L>  /  TBili  0.3  /  DBili  x   /  AST  71<H>  /  ALT  36  /  AlkPhos  129<H>  08-17      LIVER FUNCTIONS - ( 17 Aug 2023 11:00 )  Alb: 1.9 g/dL / Pro: 4.2 g/dL / ALK PHOS: 129 U/L / ALT: 36 U/L / AST: 71 U/L / GGT: x           Urinalysis Basic - ( 17 Aug 2023 11:00 )    Color: x / Appearance: x / SG: x / pH: x  Gluc: 266 mg/dL / Ketone: x  / Bili: x / Urobili: x   Blood: x / Protein: x / Nitrite: x   Leuk Esterase: x / RBC: x / WBC x   Sq Epi: x / Non Sq Epi: x / Bacteria: x        Culture - Blood (collected 08-12-23 @ 11:44)  Source: .Blood None  Preliminary Report (08-16-23 @ 20:00):    No growth at 4 days    Culture - Blood (collected 08-08-23 @ 11:58)  Source: .Blood None  Final Report (08-13-23 @ 23:01):    No growth at 5 days    Culture - Blood (collected 08-05-23 @ 07:10)  Source: .Blood Blood-Peripheral  Final Report (08-10-23 @ 19:01):    No growth at 5 days    Culture - Urine (collected 08-04-23 @ 14:42)  Source: Clean Catch Clean Catch (Midstream)  Final Report (08-05-23 @ 19:52):    No growth    Culture - Blood (collected 08-04-23 @ 11:20)  Source: .Blood Blood-Peripheral  Gram Stain (08-06-23 @ 02:58):    Growth in anaerobic bottle: Gram Negative Rods  Final Report (08-13-23 @ 15:02):    Growth in anaerobic bottle:    Most closely resembling Hungatella species previously known as    Clostridium species.    Please Note:************************************************    this organism    may appear as gram negative rods in direct smears of clinical specimens.    Direct identification is available within approximately 3-5    hours either by Blood Panel Multiplexed PCR or Direct    MALDI-TOF. Details: [email or web address]  Organism: Blood Culture PCR  Clostridium species (08-13-23 @ 15:02)  Organism: Clostridium species (08-13-23 @ 15:02)      Method Type: ETEST      -  Amoxicillin/Clavulanic Acid: S 0.25      -  Clindamycin: S 0.25      -  Imipenem: S 2      -  Metronidazole: S 0.064  Organism: Blood Culture PCR (08-13-23 @ 15:02)      Method Type: PCR      -  Blood PCR Panel: NEG    Culture - Blood (collected 08-04-23 @ 11:20)  Source: .Blood Blood-Peripheral  Final Report (08-09-23 @ 23:00):    No growth at 5 days        Lactate, Blood: 1.5 mmol/L (08-13-23 @ 05:40)      INFECTIOUS DISEASES TESTING  Fungitell: <31 (08-14-23 @ 04:45)  Procalcitonin, Serum: 0.34 (08-14-23 @ 04:45)  Vancomycin Level, Random: 8.4 (08-10-23 @ 23:10)  MRSA PCR Result.: Negative (08-10-23 @ 10:55)  MRSA PCR Result.: Negative (08-07-23 @ 11:30)  Procalcitonin, Serum: 0.67 (08-06-23 @ 05:05)  Procalcitonin, Serum: 0.48 (08-04-23 @ 23:05)  COVID-19 PCR: NotDetec (11-21-22 @ 18:43)  strept    INFLAMMATORY MARKERS      RADIOLOGY & ADDITIONAL TESTS:  I have personally reviewed the last available Chest xray  CXR      CT      CARDIOLOGY TESTING  12 Lead ECG:   Ventricular Rate 106 BPM    Atrial Rate 106 BPM    P-R Interval 152 ms    QRS Duration 126 ms    Q-T Interval 400 ms    QTC Calculation(Bazett) 531 ms    P Axis 34 degrees    R Axis -50 degrees    T Axis -18 degrees    Diagnosis Line Sinus tachycardia  Right bundle branch block  Left anterior fascicular block  *** Bifascicular block ***  Moderate voltage criteria for LVH, may be normal variant      Abnormal ECG    Confirmed by RUDY FLOR MD ([id number]) on 8/4/2023 11:24:55 AM (08-04-23 @ 10:57)      MEDICATIONS  allopurinol 300 Oral daily  bacitracin   Ointment 1 Topical two times a day  chlorhexidine 2% Cloths 1 Topical daily  enoxaparin Injectable 40 SubCutaneous every 24 hours  hydrocortisone sodium succinate Injectable 25 IV Push every 12 hours  insulin glargine Injectable (LANTUS) 8 SubCutaneous <User Schedule>  insulin lispro (ADMELOG) corrective regimen sliding scale  SubCutaneous three times a day before meals  meropenem  IVPB 1000 IV Intermittent every 12 hours  midodrine 5 Oral every 8 hours  pantoprazole  Injectable 40 IV Push every 12 hours  potassium chloride  20 mEq/100 mL IVPB 20 IV Intermittent every 2 hours      WEIGHT  Weight (kg): 62.6 (08-14-23 @ 11:44)  Creatinine: 0.9 mg/dL (08-17-23 @ 11:00)      ANTIBIOTICS:  meropenem  IVPB 1000 milliGRAM(s) IV Intermittent every 12 hours      All available historical records have been reviewed

## 2023-08-18 DIAGNOSIS — E87.0 HYPEROSMOLALITY AND HYPERNATREMIA: ICD-10-CM

## 2023-08-18 DIAGNOSIS — R74.01 ELEVATION OF LEVELS OF LIVER TRANSAMINASE LEVELS: ICD-10-CM

## 2023-08-18 LAB
ALBUMIN SERPL ELPH-MCNC: 2.1 G/DL — LOW (ref 3.5–5.2)
ALP SERPL-CCNC: 129 U/L — HIGH (ref 30–115)
ALT FLD-CCNC: 31 U/L — SIGNIFICANT CHANGE UP (ref 0–41)
ANION GAP SERPL CALC-SCNC: 9 MMOL/L — SIGNIFICANT CHANGE UP (ref 7–14)
AST SERPL-CCNC: 45 U/L — HIGH (ref 0–41)
BASOPHILS # BLD AUTO: 0.01 K/UL — SIGNIFICANT CHANGE UP (ref 0–0.2)
BASOPHILS NFR BLD AUTO: 0.1 % — SIGNIFICANT CHANGE UP (ref 0–1)
BILIRUB SERPL-MCNC: 0.5 MG/DL — SIGNIFICANT CHANGE UP (ref 0.2–1.2)
BUN SERPL-MCNC: 16 MG/DL — SIGNIFICANT CHANGE UP (ref 10–20)
CALCIUM SERPL-MCNC: 6.5 MG/DL — LOW (ref 8.4–10.5)
CHLORIDE SERPL-SCNC: 113 MMOL/L — HIGH (ref 98–110)
CO2 SERPL-SCNC: 27 MMOL/L — SIGNIFICANT CHANGE UP (ref 17–32)
CREAT SERPL-MCNC: 0.9 MG/DL — SIGNIFICANT CHANGE UP (ref 0.7–1.5)
EGFR: 83 ML/MIN/1.73M2 — SIGNIFICANT CHANGE UP
EOSINOPHIL # BLD AUTO: 0 K/UL — SIGNIFICANT CHANGE UP (ref 0–0.7)
EOSINOPHIL NFR BLD AUTO: 0 % — SIGNIFICANT CHANGE UP (ref 0–8)
GLUCOSE BLDC GLUCOMTR-MCNC: 128 MG/DL — HIGH (ref 70–99)
GLUCOSE BLDC GLUCOMTR-MCNC: 185 MG/DL — HIGH (ref 70–99)
GLUCOSE BLDC GLUCOMTR-MCNC: 205 MG/DL — HIGH (ref 70–99)
GLUCOSE BLDC GLUCOMTR-MCNC: 287 MG/DL — HIGH (ref 70–99)
GLUCOSE SERPL-MCNC: 128 MG/DL — HIGH (ref 70–99)
HCT VFR BLD CALC: 29 % — LOW (ref 42–52)
HGB BLD-MCNC: 9.3 G/DL — LOW (ref 14–18)
IMM GRANULOCYTES NFR BLD AUTO: 0.8 % — HIGH (ref 0.1–0.3)
LYMPHOCYTES # BLD AUTO: 0.81 K/UL — LOW (ref 1.2–3.4)
LYMPHOCYTES # BLD AUTO: 9.2 % — LOW (ref 20.5–51.1)
MAGNESIUM SERPL-MCNC: 1.9 MG/DL — SIGNIFICANT CHANGE UP (ref 1.8–2.4)
MCHC RBC-ENTMCNC: 30.9 PG — SIGNIFICANT CHANGE UP (ref 27–31)
MCHC RBC-ENTMCNC: 32.1 G/DL — SIGNIFICANT CHANGE UP (ref 32–37)
MCV RBC AUTO: 96.3 FL — HIGH (ref 80–94)
MONOCYTES # BLD AUTO: 0.19 K/UL — SIGNIFICANT CHANGE UP (ref 0.1–0.6)
MONOCYTES NFR BLD AUTO: 2.2 % — SIGNIFICANT CHANGE UP (ref 1.7–9.3)
NEUTROPHILS # BLD AUTO: 7.72 K/UL — HIGH (ref 1.4–6.5)
NEUTROPHILS NFR BLD AUTO: 87.7 % — HIGH (ref 42.2–75.2)
NRBC # BLD: 0 /100 WBCS — SIGNIFICANT CHANGE UP (ref 0–0)
PLATELET # BLD AUTO: 247 K/UL — SIGNIFICANT CHANGE UP (ref 130–400)
PMV BLD: 12.3 FL — HIGH (ref 7.4–10.4)
POTASSIUM SERPL-MCNC: 3.1 MMOL/L — LOW (ref 3.5–5)
POTASSIUM SERPL-SCNC: 3.1 MMOL/L — LOW (ref 3.5–5)
PROT SERPL-MCNC: 4.8 G/DL — LOW (ref 6–8)
RBC # BLD: 3.01 M/UL — LOW (ref 4.7–6.1)
RBC # FLD: 21.6 % — HIGH (ref 11.5–14.5)
SODIUM SERPL-SCNC: 149 MMOL/L — HIGH (ref 135–146)
WBC # BLD: 8.8 K/UL — SIGNIFICANT CHANGE UP (ref 4.8–10.8)
WBC # FLD AUTO: 8.8 K/UL — SIGNIFICANT CHANGE UP (ref 4.8–10.8)

## 2023-08-18 PROCEDURE — 99232 SBSQ HOSP IP/OBS MODERATE 35: CPT

## 2023-08-18 PROCEDURE — 71045 X-RAY EXAM CHEST 1 VIEW: CPT | Mod: 26

## 2023-08-18 PROCEDURE — 99233 SBSQ HOSP IP/OBS HIGH 50: CPT

## 2023-08-18 RX ORDER — SODIUM CHLORIDE 9 MG/ML
1000 INJECTION, SOLUTION INTRAVENOUS
Refills: 0 | Status: DISCONTINUED | OUTPATIENT
Start: 2023-08-18 | End: 2023-08-19

## 2023-08-18 RX ORDER — POTASSIUM CHLORIDE 20 MEQ
20 PACKET (EA) ORAL
Refills: 0 | Status: COMPLETED | OUTPATIENT
Start: 2023-08-18 | End: 2023-08-18

## 2023-08-18 RX ORDER — IPRATROPIUM/ALBUTEROL SULFATE 18-103MCG
3 AEROSOL WITH ADAPTER (GRAM) INHALATION ONCE
Refills: 0 | Status: COMPLETED | OUTPATIENT
Start: 2023-08-18 | End: 2023-08-18

## 2023-08-18 RX ADMIN — PANTOPRAZOLE SODIUM 40 MILLIGRAM(S): 20 TABLET, DELAYED RELEASE ORAL at 06:17

## 2023-08-18 RX ADMIN — Medication 2: at 12:27

## 2023-08-18 RX ADMIN — ENOXAPARIN SODIUM 40 MILLIGRAM(S): 100 INJECTION SUBCUTANEOUS at 12:28

## 2023-08-18 RX ADMIN — Medication 3 MILLILITER(S): at 20:54

## 2023-08-18 RX ADMIN — PANTOPRAZOLE SODIUM 40 MILLIGRAM(S): 20 TABLET, DELAYED RELEASE ORAL at 17:16

## 2023-08-18 RX ADMIN — Medication 50 MILLIEQUIVALENT(S): at 12:25

## 2023-08-18 RX ADMIN — CHLORHEXIDINE GLUCONATE 1 APPLICATION(S): 213 SOLUTION TOPICAL at 06:13

## 2023-08-18 RX ADMIN — Medication 25 MILLIGRAM(S): at 06:09

## 2023-08-18 RX ADMIN — Medication 300 MILLIGRAM(S): at 12:35

## 2023-08-18 RX ADMIN — Medication 1: at 16:38

## 2023-08-18 RX ADMIN — SODIUM CHLORIDE 50 MILLILITER(S): 9 INJECTION, SOLUTION INTRAVENOUS at 12:30

## 2023-08-18 RX ADMIN — Medication 1 APPLICATION(S): at 06:17

## 2023-08-18 RX ADMIN — Medication 1 APPLICATION(S): at 17:15

## 2023-08-18 RX ADMIN — Medication 50 MILLIEQUIVALENT(S): at 09:12

## 2023-08-18 NOTE — PROGRESS NOTE ADULT - PROBLEM SELECTOR PROBLEM 5
On deep vein thrombosis (DVT) prophylaxis
Palliative care encounter
HTN (hypertension)
Palliative care encounter
Transaminitis

## 2023-08-18 NOTE — PROGRESS NOTE ADULT - CONVERSATION DETAILS
Spoke with family on phone. Discussed the patient's overall improving condition. Questions re: discharge and possible need for STR answered by palliative care . Discussed the calorie count and possible potential need for supplemental nutrition if he does not start taking more in PO. Explained he cannot go to [facility] with NGT in place. They plan to try bringing more food from home for him and encouraging PO intake. For now, plan is to continue ongoing medical management with plan for [facility] on d/c.

## 2023-08-18 NOTE — PROGRESS NOTE ADULT - PROBLEM SELECTOR PLAN 3
resolved   endo on board
- CT head negative  - rEEG negative  - frequent reorientation  - improving
s/p egd 8/14 with two ulcers; one was cauterized, given epi  h/h stable, trend  protonix 40 iv bid  pureed diet
- CT head negative  - rEEG negative  - frequent reorientation
resolved   endo on board
s/p insulin gtt for HHS/ DKA  increase lantus to 6  ssi
- per family incremental improvements  - CT head negative  - rEEG negative  - frequent reorientation  - consider Seroquel 12.5 or 25 qhs
resolved   endo on board
resolved   endo on board
- CT head negative  - rEEG negative  - frequent reorientation
s/p egd 8/14 with two ulcers; one was cauterized, given epi  h/h stable, trend  protonix 40 iv bid  pureed diet

## 2023-08-18 NOTE — PROGRESS NOTE ADULT - ASSESSMENT
IMPRESSION:    DKA / HHS, resolved  Hungatella species bacteremia   Septic shock resolved   UGIB s/p EGD s/p cauterization of visible vessel  BART - resolved   AMS toxic metabolic  HO DM  HO HTN  Abdominal aortic aneurysm  Parkinson disease      PLAN:    CNS: Avoid sedation.     HEENT: Oral care. NGT care     PULMONARY:  HOB @ 45 degrees.  Aspiration precautions. On RA.        CARDIOVASCULAR:   Avoid volume overload. midodrine 5 q 8    GI: Protonix BID. Resume tube feeds po feeds per speech eval,     RENAL: Follow up lytes.  Correct as needed. Voiding.  free water    INFECTIOUS DISEASE: abx per ID    HEMATOLOGICAL:  Resume DVT prophylaxis with heparin SQ..  Transfuse to keep Hb>7.    ENDOCRINE:  Follow up FS QACHS. Insulin basal-bolus.    MUSCULOSKELETAL: Bedrest     Prognosis very poor. DNR/DNI  FLOOR    FLOOR

## 2023-08-18 NOTE — PROGRESS NOTE ADULT - SUBJECTIVE AND OBJECTIVE BOX
Over Night Events: events noted, afebrile, ID reviewed, CBC noted    PHYSICAL EXAM    ICU Vital Signs Last 24 Hrs  T(C): 36.3 (18 Aug 2023 04:00), Max: 36.9 (17 Aug 2023 08:00)  T(F): 97.3 (18 Aug 2023 04:00), Max: 98.5 (17 Aug 2023 20:00)  HR: 82 (18 Aug 2023 04:00) (78 - 96)  BP: 150/67 (18 Aug 2023 04:00) (126/66 - 150/67)  BP(mean): 89 (17 Aug 2023 16:00) (89 - 98)  RR: 49 (18 Aug 2023 04:00) (34 - 49)  SpO2: 94% (18 Aug 2023 04:00) (94% - 96%)        General: ill looking  Lungs: dec bs both bases  Cardiovascular: Regular   Abdomen: Soft, Positive BS  Extremities: No clubbing   Neurological: Non focal       08-17-23 @ 07:01  -  08-18-23 @ 07:00  --------------------------------------------------------  IN:  Total IN: 0 mL    OUT:    Incontinent per Collection Bag (mL): 770 mL  Total OUT: 770 mL    Total NET: -770 mL          LABS:                          9.3    8.80  )-----------( 247      ( 18 Aug 2023 05:45 )             29.0                                               08-18    149<H>  |  113<H>  |  16  ----------------------------<  128<H>  3.1<L>   |  27  |  0.9    Ca    6.5<L>      18 Aug 2023 05:45  Mg     1.9     08-18    TPro  4.8<L>  /  Alb  2.1<L>  /  TBili  0.5  /  DBili  x   /  AST  45<H>  /  ALT  31  /  AlkPhos  129<H>  08-18                                             Urinalysis Basic - ( 18 Aug 2023 05:45 )    Color: x / Appearance: x / SG: x / pH: x  Gluc: 128 mg/dL / Ketone: x  / Bili: x / Urobili: x   Blood: x / Protein: x / Nitrite: x   Leuk Esterase: x / RBC: x / WBC x   Sq Epi: x / Non Sq Epi: x / Bacteria: x                                                  LIVER FUNCTIONS - ( 18 Aug 2023 05:45 )  Alb: 2.1 g/dL / Pro: 4.8 g/dL / ALK PHOS: 129 U/L / ALT: 31 U/L / AST: 45 U/L / GGT: x                                                                                                                                       MEDICATIONS  (STANDING):  allopurinol 300 milliGRAM(s) Oral daily  bacitracin   Ointment 1 Application(s) Topical two times a day  chlorhexidine 2% Cloths 1 Application(s) Topical daily  enoxaparin Injectable 40 milliGRAM(s) SubCutaneous every 24 hours  hydrocortisone sodium succinate Injectable 25 milliGRAM(s) IV Push every 12 hours  insulin glargine Injectable (LANTUS) 8 Unit(s) SubCutaneous <User Schedule>  insulin lispro (ADMELOG) corrective regimen sliding scale   SubCutaneous three times a day before meals  midodrine 5 milliGRAM(s) Oral <User Schedule>  pantoprazole  Injectable 40 milliGRAM(s) IV Push every 12 hours  potassium chloride  20 mEq/100 mL IVPB 20 milliEquivalent(s) IV Intermittent every 2 hours

## 2023-08-18 NOTE — PROGRESS NOTE ADULT - SUBJECTIVE AND OBJECTIVE BOX
INTERVAL HPI/OVERNIGHT EVENTS:    SUBJECTIVE: Patient seen and examined at bedside.     no cp, sob, abd pain, fever  no abd pain, nausea, vomiting, diarrhea    OBJECTIVE:    VITAL SIGNS:  Vital Signs Last 24 Hrs  T(C): 37.1 (18 Aug 2023 08:00), Max: 37.1 (18 Aug 2023 08:00)  T(F): 98.8 (18 Aug 2023 08:00), Max: 98.8 (18 Aug 2023 08:00)  HR: 88 (18 Aug 2023 08:00) (78 - 88)  BP: 156/74 (18 Aug 2023 08:00) (126/66 - 156/74)  BP(mean): 107 (18 Aug 2023 08:00) (89 - 107)  RR: 49 (18 Aug 2023 04:00) (34 - 49)  SpO2: 94% (18 Aug 2023 04:00) (94% - 96%)          PHYSICAL EXAM:    General: NAD  HEENT: NC/AT; PERRL, clear conjunctiva  Neck: supple  Respiratory: CTA b/l  Cardiovascular: +S1/S2; RRR  Abdomen: soft, NT/ND; +BS x4  Extremities: WWP, 2+ peripheral pulses b/l; no LE edema  Skin: normal color and turgor; no rash  Neurological:    MEDICATIONS:  MEDICATIONS  (STANDING):  allopurinol 300 milliGRAM(s) Oral daily  bacitracin   Ointment 1 Application(s) Topical two times a day  chlorhexidine 2% Cloths 1 Application(s) Topical daily  dextrose 5%. 1000 milliLiter(s) (50 mL/Hr) IV Continuous <Continuous>  enoxaparin Injectable 40 milliGRAM(s) SubCutaneous every 24 hours  insulin glargine Injectable (LANTUS) 8 Unit(s) SubCutaneous <User Schedule>  insulin lispro (ADMELOG) corrective regimen sliding scale   SubCutaneous three times a day before meals  pantoprazole  Injectable 40 milliGRAM(s) IV Push every 12 hours  potassium chloride  20 mEq/100 mL IVPB 20 milliEquivalent(s) IV Intermittent every 2 hours  potassium chloride  20 mEq/100 mL IVPB 20 milliEquivalent(s) IV Intermittent every 2 hours    MEDICATIONS  (PRN):      ALLERGIES:  Allergies    No Known Allergies    Intolerances        LABS:                        9.3    8.80  )-----------( 247      ( 18 Aug 2023 05:45 )             29.0     Hemoglobin: 9.3 g/dL (08-18 @ 05:45)  Hemoglobin: 8.2 g/dL (08-17 @ 22:12)  Hemoglobin: 8.1 g/dL (08-17 @ 11:00)  Hemoglobin: 9.6 g/dL (08-16 @ 11:23)  Hemoglobin: 8.6 g/dL (08-15 @ 05:00)    CBC Full  -  ( 18 Aug 2023 05:45 )  WBC Count : 8.80 K/uL  RBC Count : 3.01 M/uL  Hemoglobin : 9.3 g/dL  Hematocrit : 29.0 %  Platelet Count - Automated : 247 K/uL  Mean Cell Volume : 96.3 fL  Mean Cell Hemoglobin : 30.9 pg  Mean Cell Hemoglobin Concentration : 32.1 g/dL  Auto Neutrophil # : 7.72 K/uL  Auto Lymphocyte # : 0.81 K/uL  Auto Monocyte # : 0.19 K/uL  Auto Eosinophil # : 0.00 K/uL  Auto Basophil # : 0.01 K/uL  Auto Neutrophil % : 87.7 %  Auto Lymphocyte % : 9.2 %  Auto Monocyte % : 2.2 %  Auto Eosinophil % : 0.0 %  Auto Basophil % : 0.1 %    08-18    149<H>  |  113<H>  |  16  ----------------------------<  128<H>  3.1<L>   |  27  |  0.9    Ca    6.5<L>      18 Aug 2023 05:45  Mg     1.9     08-18    TPro  4.8<L>  /  Alb  2.1<L>  /  TBili  0.5  /  DBili  x   /  AST  45<H>  /  ALT  31  /  AlkPhos  129<H>  08-18    Creatinine Trend: 0.9<--, 0.9<--, 1.0<--, 0.9<--, 1.1<--, 0.9<--  LIVER FUNCTIONS - ( 18 Aug 2023 05:45 )  Alb: 2.1 g/dL / Pro: 4.8 g/dL / ALK PHOS: 129 U/L / ALT: 31 U/L / AST: 45 U/L / GGT: x               hs Troponin:            Urinalysis Basic - ( 18 Aug 2023 05:45 )    Color: x / Appearance: x / SG: x / pH: x  Gluc: 128 mg/dL / Ketone: x  / Bili: x / Urobili: x   Blood: x / Protein: x / Nitrite: x   Leuk Esterase: x / RBC: x / WBC x   Sq Epi: x / Non Sq Epi: x / Bacteria: x      CSF:                      EKG:   MICROBIOLOGY:    IMAGING:      Labs, imaging, EKG personally reviewed    RADIOLOGY & ADDITIONAL TESTS: Reviewed.

## 2023-08-18 NOTE — PROGRESS NOTE ADULT - PROBLEM SELECTOR PROBLEM 1
Severe sepsis with septic shock
Bacteremia
Decreased oral intake
Severe sepsis with septic shock
Severe sepsis with septic shock
Decreased oral intake

## 2023-08-18 NOTE — PROGRESS NOTE ADULT - PROBLEM SELECTOR PLAN 2
bcx 8/4 +hungatella  bcx 8/5 onwards ntd  tte no vegetations  kenyatta  f/u id  d/c midodrine 5 bid  d/c solucortef 25 bid
- Hg 7.5 vbg 10am; s/p 2u pRBC  - no overt signs of bleed, no signs of bleeding on CT w/ con
- pt 7.7 from 6.6 s/p 1 u pRBCs   - 6.2 8/9AM, getting 1u pRBCs  - no overt signs of bleed, no signs of retroperitoneal on CT  - f/u w/u
2/2 GIB  s/p EGD 8/14 showing ulcers w/ cauterization  GI following for recommendations  passed S & S  trend H & H
- pt 7.7 from 6.6 s/p 1 u pRBCs   - no overt signs of bleed  - f/u w/u
2/2 GIB  s/p EGD 8/14 showing ulcers w/ cauterization  GI following for recommendations  passed S & S  trend H & H
s/p egd 8/14 with two ulcers; one was cauterized, given epi  h/h stable, trend  protonix 40 iv bid  diet as tolerated; f/u s+s
2/2 GIB  s/p EGD yesterday showing ulcers w/ cauterization  GI following for recommendations  failed S & S on NGT feeds  trend H & H
- Hg 7.5 vbg 10am; s/p 2u pRBC  - no overt signs of bleed, no signs of bleeding on CT w/ con
bcx 8/4 +hungatella  bcx 8/5 onwards ntd  tte no vegetations  kenyatta  f/u id  midodrine 5 bid  change solucortef 50 bid to 25 bid; taper to off
2/2 GIB  s/p EGD 8/14 showing ulcers w/ cauterization  GI following for recommendations  passed S & S  trend H & H

## 2023-08-18 NOTE — PROGRESS NOTE ADULT - PROBLEM SELECTOR PROBLEM 2
Bacteremia
Acute anemia
Candy
Acute anemia
Bacteremia
Acute anemia

## 2023-08-18 NOTE — PROGRESS NOTE ADULT - PROBLEM SELECTOR PROBLEM 3
DKA (diabetic ketoacidosis)
Acute metabolic encephalopathy
Candy
Acute metabolic encephalopathy
DKA (diabetic ketoacidosis)
Candy
Acute metabolic encephalopathy
DKA (diabetic ketoacidosis)
Acute metabolic encephalopathy
DM2 (diabetes mellitus, type 2)
DKA (diabetic ketoacidosis)

## 2023-08-18 NOTE — PROGRESS NOTE ADULT - SUBJECTIVE AND OBJECTIVE BOX
HPI:    89 y/o M w/ PMH of DM, HTN coming from home with complaint of decreased appetite, increased urinary output and craving sweets x 2 weeks. Patient admitted for management of HHS, with hospital stay complicated by septic shock, GIB. Palliative consulted for GOC.     Interval history:     8/15: s/p EGD on 8/14, found to have ulcer, vessel cauterized. patient with NGT in place and hemodynamically stable.   8/16: patient is downgraded to SDU, NGT in place. passed S & S. Complaining of thirst but denies pain.   8/17: patient sleeping, did not wake. per RN, patient only complains of being thirsty.   8/18: patient sleeping comfortably, did not wake. spoke with RN who reports patient has been comfortable and is planned for downgrade to floor      ADVANCE DIRECTIVES:     [ ] Full Code [ X] DNR/DNI  MOLST  [ ]  Living Will  [ ]   DECISION MAKER(s): Daughters- Antonia, Miracle Wesley   [ ] Health Care Proxy(s)  [X ] Surrogate(s)  [ ] Guardian           Name(s): Phone Number(s): Children   Antonia- [phone]  Uqiaj-[phone]  Bcwvs-[phone]      BASELINE (I)ADL(s) (prior to admission):    Keith: [ ]Total  [X ] Moderate [ ]Dependent - lives with dtr Antonia   Palliative Performance Status Version 2:         %    http://npcrc.org/files/news/palliative_performance_scale_[email or web address].pdf    Allergies    No Known Allergies    Intolerances    MEDICATIONS  (STANDING):  allopurinol 300 milliGRAM(s) Oral daily  bacitracin   Ointment 1 Application(s) Topical two times a day  caspofungin IVPB      caspofungin IVPB 50 milliGRAM(s) IV Intermittent every 24 hours  chlorhexidine 2% Cloths 1 Application(s) Topical daily  dextrose 5%. 1000 milliLiter(s) (100 mL/Hr) IV Continuous <Continuous>  dextrose 5%. 1000 milliLiter(s) (100 mL/Hr) IV Continuous <Continuous>  dextrose 5%. 1000 milliLiter(s) (50 mL/Hr) IV Continuous <Continuous>  dextrose 50% Injectable 25 Gram(s) IV Push once  dextrose 50% Injectable 12.5 Gram(s) IV Push once  dextrose 50% Injectable 25 Gram(s) IV Push once  dextrose 50% Injectable 25 Gram(s) IV Push once  enoxaparin Injectable 40 milliGRAM(s) SubCutaneous every 24 hours  glucagon  Injectable 1 milliGRAM(s) IntraMuscular once  hydrocortisone sodium succinate Injectable 50 milliGRAM(s) IV Push every 12 hours  insulin lispro (ADMELOG) corrective regimen sliding scale   SubCutaneous three times a day before meals  meropenem  IVPB 1000 milliGRAM(s) IV Intermittent every 8 hours  midodrine 10 milliGRAM(s) Oral every 8 hours  pantoprazole  Injectable 40 milliGRAM(s) IV Push every 12 hours    MEDICATIONS  (PRN):  dextrose Oral Gel 15 Gram(s) Oral once PRN Blood Glucose LESS THAN 70 milliGRAM(s)/deciliter  dextrose Oral Gel 15 Gram(s) Oral once PRN Blood Glucose LESS THAN 70 milliGRAM(s)/deciliter    PRESENT SYMPTOMS:  * Did not wake patient. Per RN patient has been comfortable.   Pain: [ ]yes [ ]no  QOL impact -   Location -                    Aggravating factors -  Quality -  Radiation -  Timing-  Severity (0-10 scale):  Minimal acceptable level (0-10 scale):     CPOT:  0  https://www.[email or web address]/getattachment/[email or web address]/Critical-Care-Pain-Observation-Tool-(CPOT)    PAIN AD Score:   [email or web address]/painad.pdf (press ctrl +  left click to view)    Dyspnea:                           [ ]None[ ]Mild [ ]Moderate [ ]Severe     Respiratory Distress Observation Scale (RDOS): 0  A score of 0 to 2 signifies little or no respiratory distress, 3 signifies mild distress, scores 4 to 6 indicate moderate distress, and scores greater than 7 signify severe distress  [email or web address]/[email or web address]/files/[email or web address]-appliicwvjz-uqiiydsu-zqudlkxlxyc-qsglx.pdf    Anxiety:                             [ ]None[ ]Mild [ ]Moderate [ ]Severe   Fatigue:                             [ ]None[ ]Mild [ ]Moderate [ ]Severe   Nausea:                             [ ]None[ ]Mild [ ]Moderate [ ]Severe   Loss of appetite:              [ ]None[ ]Mild [ ]Moderate [ ]Severe   Constipation:                    [ ]None[ ]Mild [ ]Moderate [ ]Severe    Other Symptoms: ++ Thirst   [ X]All other review of systems negative     Palliative Performance Status Version 2:      30   %    http://[email or web address]/files/news/palliative_performance_scale_[email or web address].pdf    PHYSICAL EXAM:  ICU Vital Signs Last 24 Hrs  T(C): 36.3 (18 Aug 2023 12:00), Max: 37.1 (18 Aug 2023 08:00)  T(F): 97.4 (18 Aug 2023 12:00), Max: 98.8 (18 Aug 2023 08:00)  HR: 81 (18 Aug 2023 14:00) (78 - 93)  BP: 141/65 (18 Aug 2023 14:00) (126/66 - 169/81)  BP(mean): 93 (18 Aug 2023 14:00) (89 - 115)  ABP: --  ABP(mean): --  RR: 24 (18 Aug 2023 14:00) (22 - 49)  SpO2: 95% (18 Aug 2023 14:00) (94% - 96%)      GENERAL:  [X ] No acute distress [ ]Lethargic  [ ]Unarousable  [X ]Verbal  [ ]Non-Verbal [ ]Cachexia    BEHAVIORAL/PSYCH:  [X ]Alert and Oriented x 2-3 [ ] Anxiety [ ] Delirium [ ] Agitation [ X] Calm   EYES: [X ] No scleral icterus [ ] Scleral icterus [ ] Closed  ENMT:  [ ]Dry mouth  [X]No external oral lesions [ ] No external ear or nose lesions  CARDIOVASCULAR:  [ ]Regular [ ]Irregular [ ]Tachy [ ]Not Tachy  [ ]Meet [ ] Edema [X ] No edema  PULMONARY:  [ ]Tachypnea  [ ]Audible excessive secretions [ X] No labored breathing [ ] labored breathing  GASTROINTESTINAL: [ X]Soft  [ ]Distended  [ ]Not distended [ ]Non tender [ ]Tender  MUSCULOSKELETAL: [ X]No clubbing [ ] clubbing  [ ] No cyanosis [ ] cyanosis  NEUROLOGIC: [ ]No focal deficits  [ ]Follows commands  [ ]Does not follow commands  [X ]Cognitive impairment  [ ]Dysphagia  [ ]Dysarthria  [ ]Paresis   SKIN: [ ] Jaundiced [X ] Non-jaundiced [ ]Rash [ ]No Rash [ ] Warm [ ] Dry  MISC/LINES: [ ] ET tube [ ] Trach [X ]NGT/OGT [ ]PEG [ ]Jones    LABS: reviewed by me        08-18    149<H>  |  113<H>  |  16  ----------------------------<  128<H>  3.1<L>   |  27  |  0.9    Ca    6.5<L>      18 Aug 2023 05:45  Mg     1.9     08-18    TPro  4.8<L>  /  Alb  2.1<L>  /  TBili  0.5  /  DBili  x   /  AST  45<H>  /  ALT  31  /  AlkPhos  129<H>  08-18                            9.3    8.80  )-----------( 247      ( 18 Aug 2023 05:45 )             29.0           RADIOLOGY & ADDITIONAL STUDIES: reviewed by me      < from: Xray Chest 1 View- PORTABLE-Routine (Xray Chest 1 View- PORTABLE-Routine in AM.) (08.15.23 @ 06:30) >    Findings/  impression:    Support devices: Feeding tube coursing below the field-of-view. Stable   right-sided central venous catheter.    Cardiac/mediastinum/hilum: Unchanged    Lung parenchyma/Pleura: Bilateral opacities without significant change.   No pneumothorax.    Skeleton/soft tissues: Unchanged    --- End of Report ---    < end of copied text >  < from: Xray Chest 1 View- PORTABLE-Routine (Xray Chest 1 View- PORTABLE-Routine in AM.) (08.15.23 @ 06:30) >    Findings/  impression:    Support devices: Feeding tube coursing below the field-of-view. Stable   right-sided central venous catheter.    Cardiac/mediastinum/hilum: Unchanged    Lung parenchyma/Pleura: Bilateral opacities without significant change.   No pneumothorax.    Skeleton/soft tissues: Unchanged    --- End of Report ---    < end of copied text >      EKG: reviewed by me    < from: 12 Lead ECG (08.04.23 @ 10:57) >    Ventricular Rate 106 BPM    Atrial Rate 106 BPM    P-R Interval 152 ms    QRS Duration 126 ms    Q-T Interval 400 ms    QTC Calculation(Bazett) 531 ms    P Axis 34 degrees    R Axis -50 degrees    T Axis -18 degrees    Diagnosis Line Sinus tachycardia  Right bundle branch block  Left anterior fascicular block  *** Bifascicular block ***  Moderate voltage criteria for LVH, may be normal variant      Abnormal ECG    Confirmed by RUDY FLOR MD ([id number]) on 8/4/2023 11:24:55 AM    < end of copied text >        Patient discussed with primary medical team MD  Palliative care education provided to patient and/or family

## 2023-08-18 NOTE — PROGRESS NOTE ADULT - ASSESSMENT
89 y/o M w/ PMH of DM, HTN coming from home with complaint of decreased appetite, increased urinary output and craving sweets x 2 weeks. Patient admitted for management of HHS, with hospital stay complicated by septic shock, GIB. Palliative consulted for GOC.     Patient comfortable on exam.   Spoke with family on phone, all questions answered. They are agreeable to current plan of care.     Education about palliative care provided to patient/family.  See Recs below.    Please call [phone] with questions or concerns 24/7.   We will sing off as patient is improving and goals are clear.

## 2023-08-18 NOTE — PROGRESS NOTE ADULT - PROBLEM SELECTOR PLAN 4
DNR/DNI  Continue current medical management  Will follow for GOC
- Cr bumped and stable at 1.3  - pt fluid balance grossly positive, monitor for volume overload
DNR/DNI  Continue current medical management  Will follow for GOC - phone meeting tomorrow 11 AM
outpt f/u
- Cr bumped and stable at 1.3  - pt fluid balance grossly positive, monitor for volume overload
DNR/DNI  Continue current medical management  Will follow for GOC
start d5w 50 cc/hr  free water via ngt 250 qid  trend
- Cr improving  - pt fluid balance grossly positive, monitor for volume overload
s/p insulin gtt for HHS/ DKA  increase lantus to 8  ssi
- Cr improved and stable at 1.1  - pt fluid balance grossly positive, monitor for volume overload
DNR/DNI  Continue current medical management  Will sign off

## 2023-08-18 NOTE — PROGRESS NOTE ADULT - PROBLEM SELECTOR PROBLEM 4
DM2 (diabetes mellitus, type 2)
Encounter for palliative care
Encounter for palliative care
Prerenal acute renal failure
Encounter for palliative care
Prerenal acute renal failure
Encounter for palliative care
Hypernatremia
HTN (hypertension)

## 2023-08-18 NOTE — PROGRESS NOTE ADULT - NSPROGADDITIONALINFOA_GEN_ALL_CORE
#Progress Note Handoff:  Pending (specify):  Consults_________, Tests________, Test Results_______, Other____iv steroids_____  Family discussion: d/w pt at bedside  Disposition: Home___/SNF___/Other________/Unknown at this time____x____

## 2023-08-18 NOTE — PROGRESS NOTE ADULT - PROBLEM SELECTOR PLAN 5
- per family discussion most important concern is to "make sure he's not suffering"  - currently aware of NGT to suction and medical treatment  - confirm any escalation of care w/ family  - all updates and decisions should be filtered through pt daughters  - pt remains dnr/dni  - per discussion w/ family if condition continues to deteriorate and chances of a meaningful recovery dwindle will likely transition to CMO and prioritize comfort over aggressive medical management  - will follow
- per family discussion most important concern is to "make sure he's not suffering"  - currently agree with NGT and medical treatment  - would confirm escalation of care w/ family  - all updates and decisions should be filtered through pt daughter  - pt is uncomfortable, would trial low dose benzo +/- low dose Dilaudid iso BART  - pt remains dnr/dni  - pt condition subjectively improving incrementally  - will follow
mixed, in setting of infection  ct abd with hepatic cyst  trend
- per family discussion most important concern is to "make sure he's not suffering"  - currently agree with NGT and medical treatment  - would confirm escalation of care w/ family  - all updates and decisions should be filtered through pt daughter  - pt more comfortable today  - pt remains dnr/dni  - will follow
lovenox
- per family discussion most important concern is to "make sure he's not suffering"  - currently aware of NGT to suction and medical treatment  - confirm any escalation of care w/ family  - all updates and decisions should be filtered through pt daughters  - pt remains dnr/dni  - per discussion w/ family if condition continues to deteriorate and chances of a meaningful recovery dwindle will likely transition to CMO and prioritize comfort over aggressive medical management  - will follow
outpt f/u

## 2023-08-18 NOTE — PROGRESS NOTE ADULT - ASSESSMENT
- DKA, improving  - acute duodenal ulcer   - EGD findings noted   - post hemorrhagic anemia  - Septic shock requiring pressors         Hungatella bacteremia likely GI translocation in the setting of appendicitis  - dysphagia/ confusion - keep NPO per speech therapist  - prolonged PO or poor intake, since admission    SUGGEST:  - check glucose, lytes, and phos levels in am  - increase to 360 ml Glucerna 1.2 after attempted po breakfast and lunch, and 480 ml after attempted dinner     each feed over 45 min,  with poc glucose testing and pre-feed insulin dosing accordingly  - glycemic control, please  - f/u abdominal exam, KUB  - calorie counts to assess po intake, then f/u regarding necessity of enteral feeding; speech f/u per routine (as suspect he won't eat much puree)

## 2023-08-18 NOTE — PROGRESS NOTE ADULT - PROBLEM SELECTOR PLAN 1
off pressor support  bacteremia 2/2 GI translocation from appendicitis  ID following for recommendations   completed course of Meropenum - Day 11, high risk, monitor blood cultures and WBC counts   surgery saw patient on 8/11 - no sx

## 2023-08-18 NOTE — PROGRESS NOTE ADULT - SUBJECTIVE AND OBJECTIVE BOX
NUTRITION SUPPORT TEAM  -  PROGRESS NOTE     Interval Events:  pt sluggish but can be wakened, moans, answers yes or no  repeatedly asks for water but refused to eat the puree foods given to him this morning  NG [place] in place  abd not quite soft, mildly distended, + BM yesterday - d/w RN and PCA      VITALS:  T(F): 96.8 (08-18 @ 16:30), Max: 98.8 (08-18 @ 08:00)  HR: 99 (08-18 @ 16:30) (81 - 99)  BP: 100/63 (08-18 @ 16:30) (100/63 - 169/81)  RR: 20 (08-18 @ 16:30) (20 - 30)  SpO2: 97% (08-18 @ 16:00) (94% - 97%)    HEIGHT/WEIGHT/BMI:   Height (cm): 167.6 (08-14), 162.6 (11-21), 162.6 (10-09)  Weight (kg): 62.6 (08-14), 61.2 (10-09)  BMI (kg/m2): 22.3 (08-14), 23.1 (11-21), 23.1 (10-09)    I/Os:     08-17-23 @ 07:01  -  08-18-23 @ 07:00  --------------------------------------------------------  IN:  Total IN: 0 mL----- ??    OUT:    Incontinent per Collection Bag (mL): 770 mL  Total OUT: 770 mL    Total NET: -770 mL    STANDING MEDICATIONS:   allopurinol 300 milliGRAM(s) Oral daily  bacitracin   Ointment 1 Application(s) Topical two times a day  chlorhexidine 2% Cloths 1 Application(s) Topical daily  dextrose 5%. 1000 milliLiter(s) IV Continuous <Continuous>  enoxaparin Injectable 40 milliGRAM(s) SubCutaneous every 24 hours  insulin glargine Injectable (LANTUS) 8 Unit(s) SubCutaneous <User Schedule>  insulin lispro (ADMELOG) corrective regimen sliding scale   SubCutaneous three times a day before meals  pantoprazole  Injectable 40 milliGRAM(s) IV Push every 12 hours  potassium chloride  20 mEq/100 mL IVPB 20 milliEquivalent(s) IV Intermittent every 2 hours    LABS:                         9.3    8.80  )-----------( 247      ( 18 Aug 2023 05:45 )             29.0     149<H>  |  113<H>  |  16  ----------------------------<  128<H>          (08-18-23 @ 05:45)  3.1<L>   |  27  |  0.9    Ca    6.5<L>          (08-18-23 @ 05:45)  Mg     1.9         (08-18-23 @ 05:45)    TPro  4.8<L>  /  Alb  2.1<L>  /  TBili  0.5  /  DBili  x   /  AST  45<H>  /  ALT  31  /  AlkPhos  129<H>       08-18-23 @ 05:45    Blood Glucose (Past 24 hours):  185 mg/dL (08-18 @ 16:33)  205 mg/dL (08-18 @ 11:30)  128 mg/dL (08-18 @ 05:45)  136 mg/dL (08-17 @ 21:22)    DIET:   Diet, Pureed:   Mildly Thick Liquids (MILDTHICKLIQS)  Tube Feeding Modality: Nasogastric  Glucerna 1.2 Víctor  Total Volume for 24 Hours (mL): 1440  Bolus  Total Volume of Bolus (mL):  360  Total # of Feeds: 4  Tube Feed Frequency: Every 6 hours   Tube Feed Start Time: 13:00  Bolus Feed Rate (mL per Hour): 360   Bolus Feed Duration (in Hours): 1  Bolus Feed Instructions:  Give a feed post-meal @9AM, 1PM, 6PM if tray intake <50%, 360ml each  **Always give 360ml @9PM**  Free Water Flush  Free Water Flush Instructions:  free water flush 50cc prior and after feeding (08-17-23 @ 12:17) [Active]

## 2023-08-19 LAB
ALBUMIN SERPL ELPH-MCNC: 1.9 G/DL — LOW (ref 3.5–5.2)
ALP SERPL-CCNC: 107 U/L — SIGNIFICANT CHANGE UP (ref 30–115)
ALT FLD-CCNC: 24 U/L — SIGNIFICANT CHANGE UP (ref 0–41)
ANION GAP SERPL CALC-SCNC: 10 MMOL/L — SIGNIFICANT CHANGE UP (ref 7–14)
ANION GAP SERPL CALC-SCNC: 6 MMOL/L — LOW (ref 7–14)
AST SERPL-CCNC: 37 U/L — SIGNIFICANT CHANGE UP (ref 0–41)
BILIRUB SERPL-MCNC: 0.3 MG/DL — SIGNIFICANT CHANGE UP (ref 0.2–1.2)
BLD GP AB SCN SERPL QL: SIGNIFICANT CHANGE UP
BUN SERPL-MCNC: 13 MG/DL — SIGNIFICANT CHANGE UP (ref 10–20)
BUN SERPL-MCNC: 15 MG/DL — SIGNIFICANT CHANGE UP (ref 10–20)
CALCIUM SERPL-MCNC: 6.2 MG/DL — LOW (ref 8.4–10.5)
CALCIUM SERPL-MCNC: 6.4 MG/DL — LOW (ref 8.4–10.4)
CHLORIDE SERPL-SCNC: 107 MMOL/L — SIGNIFICANT CHANGE UP (ref 98–110)
CHLORIDE SERPL-SCNC: 108 MMOL/L — SIGNIFICANT CHANGE UP (ref 98–110)
CO2 SERPL-SCNC: 25 MMOL/L — SIGNIFICANT CHANGE UP (ref 17–32)
CO2 SERPL-SCNC: 29 MMOL/L — SIGNIFICANT CHANGE UP (ref 17–32)
CREAT SERPL-MCNC: 0.8 MG/DL — SIGNIFICANT CHANGE UP (ref 0.7–1.5)
CREAT SERPL-MCNC: 0.9 MG/DL — SIGNIFICANT CHANGE UP (ref 0.7–1.5)
EGFR: 83 ML/MIN/1.73M2 — SIGNIFICANT CHANGE UP
EGFR: 86 ML/MIN/1.73M2 — SIGNIFICANT CHANGE UP
GLUCOSE BLDC GLUCOMTR-MCNC: 105 MG/DL — HIGH (ref 70–99)
GLUCOSE BLDC GLUCOMTR-MCNC: 138 MG/DL — HIGH (ref 70–99)
GLUCOSE BLDC GLUCOMTR-MCNC: 76 MG/DL — SIGNIFICANT CHANGE UP (ref 70–99)
GLUCOSE SERPL-MCNC: 136 MG/DL — HIGH (ref 70–99)
GLUCOSE SERPL-MCNC: 75 MG/DL — SIGNIFICANT CHANGE UP (ref 70–99)
HCT VFR BLD CALC: 26.2 % — LOW (ref 42–52)
HGB BLD-MCNC: 8.4 G/DL — LOW (ref 14–18)
MAGNESIUM SERPL-MCNC: 1.9 MG/DL — SIGNIFICANT CHANGE UP (ref 1.8–2.4)
MCHC RBC-ENTMCNC: 31.6 PG — HIGH (ref 27–31)
MCHC RBC-ENTMCNC: 32.1 G/DL — SIGNIFICANT CHANGE UP (ref 32–37)
MCV RBC AUTO: 98.5 FL — HIGH (ref 80–94)
NRBC # BLD: 0 /100 WBCS — SIGNIFICANT CHANGE UP (ref 0–0)
PLATELET # BLD AUTO: 201 K/UL — SIGNIFICANT CHANGE UP (ref 130–400)
PMV BLD: 12.6 FL — HIGH (ref 7.4–10.4)
POTASSIUM SERPL-MCNC: 2.8 MMOL/L — LOW (ref 3.5–5)
POTASSIUM SERPL-MCNC: 2.9 MMOL/L — LOW (ref 3.5–5)
POTASSIUM SERPL-SCNC: 2.8 MMOL/L — LOW (ref 3.5–5)
POTASSIUM SERPL-SCNC: 2.9 MMOL/L — LOW (ref 3.5–5)
PROT SERPL-MCNC: 4.4 G/DL — LOW (ref 6–8)
RBC # BLD: 2.66 M/UL — LOW (ref 4.7–6.1)
RBC # FLD: 21.1 % — HIGH (ref 11.5–14.5)
SODIUM SERPL-SCNC: 142 MMOL/L — SIGNIFICANT CHANGE UP (ref 135–146)
SODIUM SERPL-SCNC: 143 MMOL/L — SIGNIFICANT CHANGE UP (ref 135–146)
WBC # BLD: 8.75 K/UL — SIGNIFICANT CHANGE UP (ref 4.8–10.8)
WBC # FLD AUTO: 8.75 K/UL — SIGNIFICANT CHANGE UP (ref 4.8–10.8)

## 2023-08-19 PROCEDURE — 99233 SBSQ HOSP IP/OBS HIGH 50: CPT

## 2023-08-19 PROCEDURE — 74177 CT ABD & PELVIS W/CONTRAST: CPT | Mod: 26

## 2023-08-19 RX ORDER — BUDESONIDE, MICRONIZED 100 %
0.25 POWDER (GRAM) MISCELLANEOUS ONCE
Refills: 0 | Status: COMPLETED | OUTPATIENT
Start: 2023-08-19 | End: 2023-08-19

## 2023-08-19 RX ORDER — POTASSIUM CHLORIDE 20 MEQ
20 PACKET (EA) ORAL ONCE
Refills: 0 | Status: COMPLETED | OUTPATIENT
Start: 2023-08-19 | End: 2023-08-19

## 2023-08-19 RX ORDER — POTASSIUM CHLORIDE 20 MEQ
20 PACKET (EA) ORAL ONCE
Refills: 0 | Status: DISCONTINUED | OUTPATIENT
Start: 2023-08-19 | End: 2023-08-21

## 2023-08-19 RX ORDER — IPRATROPIUM/ALBUTEROL SULFATE 18-103MCG
3 AEROSOL WITH ADAPTER (GRAM) INHALATION EVERY 6 HOURS
Refills: 0 | Status: DISCONTINUED | OUTPATIENT
Start: 2023-08-19 | End: 2023-09-15

## 2023-08-19 RX ORDER — IPRATROPIUM/ALBUTEROL SULFATE 18-103MCG
3 AEROSOL WITH ADAPTER (GRAM) INHALATION ONCE
Refills: 0 | Status: COMPLETED | OUTPATIENT
Start: 2023-08-19 | End: 2023-08-19

## 2023-08-19 RX ORDER — IOHEXOL 300 MG/ML
30 INJECTION, SOLUTION INTRAVENOUS ONCE
Refills: 0 | Status: COMPLETED | OUTPATIENT
Start: 2023-08-19 | End: 2023-08-19

## 2023-08-19 RX ORDER — POTASSIUM CHLORIDE 20 MEQ
20 PACKET (EA) ORAL
Refills: 0 | Status: COMPLETED | OUTPATIENT
Start: 2023-08-19 | End: 2023-08-20

## 2023-08-19 RX ORDER — SODIUM CHLORIDE 9 MG/ML
1000 INJECTION INTRAMUSCULAR; INTRAVENOUS; SUBCUTANEOUS
Refills: 0 | Status: COMPLETED | OUTPATIENT
Start: 2023-08-19 | End: 2023-08-20

## 2023-08-19 RX ADMIN — Medication 3 MILLILITER(S): at 13:51

## 2023-08-19 RX ADMIN — Medication 3 MILLILITER(S): at 00:26

## 2023-08-19 RX ADMIN — Medication 0.25 MILLIGRAM(S): at 02:21

## 2023-08-19 RX ADMIN — PANTOPRAZOLE SODIUM 40 MILLIGRAM(S): 20 TABLET, DELAYED RELEASE ORAL at 06:01

## 2023-08-19 RX ADMIN — INSULIN GLARGINE 8 UNIT(S): 100 INJECTION, SOLUTION SUBCUTANEOUS at 00:21

## 2023-08-19 RX ADMIN — Medication 1 APPLICATION(S): at 05:48

## 2023-08-19 RX ADMIN — ENOXAPARIN SODIUM 40 MILLIGRAM(S): 100 INJECTION SUBCUTANEOUS at 11:35

## 2023-08-19 RX ADMIN — PANTOPRAZOLE SODIUM 40 MILLIGRAM(S): 20 TABLET, DELAYED RELEASE ORAL at 17:35

## 2023-08-19 RX ADMIN — Medication 1 APPLICATION(S): at 17:34

## 2023-08-19 RX ADMIN — CHLORHEXIDINE GLUCONATE 1 APPLICATION(S): 213 SOLUTION TOPICAL at 05:48

## 2023-08-19 RX ADMIN — Medication 300 MILLIGRAM(S): at 11:35

## 2023-08-19 RX ADMIN — INSULIN GLARGINE 8 UNIT(S): 100 INJECTION, SOLUTION SUBCUTANEOUS at 21:25

## 2023-08-19 RX ADMIN — Medication 50 MILLIEQUIVALENT(S): at 17:25

## 2023-08-19 RX ADMIN — Medication 3 MILLILITER(S): at 08:55

## 2023-08-19 RX ADMIN — IOHEXOL 30 MILLILITER(S): 300 INJECTION, SOLUTION INTRAVENOUS at 17:25

## 2023-08-19 RX ADMIN — Medication 50 MILLIEQUIVALENT(S): at 21:25

## 2023-08-19 RX ADMIN — Medication 0.25 MILLIGRAM(S): at 06:05

## 2023-08-19 NOTE — PROGRESS NOTE ADULT - ASSESSMENT
88M PMHx DM2, HTN here with decreased appetite found to have HHS/ DKA s/p insulin gtt. Melena s/p EGD demonstrating ulcer. Hungatella bacteremia. Appendicitis.      #Decreased oral intake.   Plan: calorie count  pureed diet  supplemented with tf via ngt  f/u nutrition re: potential d/c tf.    #Bacteremia.   bcx 8/4 +hungatella  bcx 8/5 onwards ntd  tte no vegetations  kenyatta  f/u id - monitor off Abx  d/c midodrine 5 bid  d/c solucortef 25 bid.    #Melena.   s/p egd 8/14 with two ulcers; one was cauterized, given epi  h/h stable, trend  protonix 40 iv bid  pureed diet.    #Hypernatremia - resolved  start d5w 50 cc/hr  free water via ngt 250 qid  trend.    #Transaminitis - resolved  mixed, in setting of infection  ct abd with hepatic cyst  trend.  Abd pain and tenderness - check CT     #DM2 (diabetes mellitus, type 2).   s/p insulin gtt for HHS/ DKA  lantus to 8  ssi.    #Hypokalemia   replete   monitor     #HTN (hypertension).   outpt f/u.    DVT PPx    no

## 2023-08-19 NOTE — PROGRESS NOTE ADULT - SUBJECTIVE AND OBJECTIVE BOX
MILEY MARES  88y, Male  Allergy: No Known Allergies    Hospital Day: 15d    Patient seen and examined earlier today.     PMH/PSH:  PAST MEDICAL & SURGICAL HISTORY:  HTN (hypertension)      Gout      BPH (benign prostatic hyperplasia)      Parkinson disease      HLD (hyperlipidemia)      Smoker within last 12 months      Diabetes mellitus      No significant past surgical history          LAST 24-Hr EVENTS:    VITALS:  T(F): 98.1 (08-19-23 @ 08:00), Max: 98.9 (08-19-23 @ 00:33)  HR: 75 (08-19-23 @ 08:00)  BP: 134/64 (08-19-23 @ 08:00) (100/63 - 138/60)  RR: 18 (08-19-23 @ 08:00)  SpO2: 96% (08-19-23 @ 08:00)          TESTS & MEASUREMENTS:  Weight/BMI      08-17-23 @ 07:01  -  08-18-23 @ 07:00  --------------------------------------------------------  IN: 0 mL / OUT: 770 mL / NET: -770 mL    08-18-23 @ 07:01  -  08-19-23 @ 07:00  --------------------------------------------------------  IN: 2330 mL / OUT: 2150 mL / NET: 180 mL                            8.4    8.75  )-----------( 201      ( 19 Aug 2023 08:37 )             26.2         08-19    142  |  107  |  15  ----------------------------<  75  2.9<L>   |  25  |  0.9    Ca    6.2<L>      19 Aug 2023 08:37  Mg     1.9     08-19    TPro  4.4<L>  /  Alb  1.9<L>  /  TBili  0.3  /  DBili  x   /  AST  37  /  ALT  24  /  AlkPhos  107  08-19    LIVER FUNCTIONS - ( 19 Aug 2023 08:37 )  Alb: 1.9 g/dL / Pro: 4.4 g/dL / ALK PHOS: 107 U/L / ALT: 24 U/L / AST: 37 U/L / GGT: x                 Urinalysis Basic - ( 19 Aug 2023 08:37 )    Color: x / Appearance: x / SG: x / pH: x  Gluc: 75 mg/dL / Ketone: x  / Bili: x / Urobili: x   Blood: x / Protein: x / Nitrite: x   Leuk Esterase: x / RBC: x / WBC x   Sq Epi: x / Non Sq Epi: x / Bacteria: x      Procalcitonin, Serum: 0.34 ng/mL (08-14-23 @ 04:45)                A1C with Estimated Average Glucose Result: 15.5 % (08-07-23 @ 05:10)          RADIOLOGY, ECG, & ADDITIONAL TESTS:  12 Lead ECG:   Ventricular Rate 106 BPM    Atrial Rate 106 BPM    P-R Interval 152 ms    QRS Duration 126 ms    Q-T Interval 400 ms    QTC Calculation(Bazett) 531 ms    P Axis 34 degrees    R Axis -50 degrees    T Axis -18 degrees    Diagnosis Line Sinus tachycardia  Right bundle branch block  Left anterior fascicular block  *** Bifascicular block ***  Moderate voltage criteria for LVH, may be normal variant      Abnormal ECG    Confirmed by RUDY FLOR MD ([id number]) on 8/4/2023 11:24:55 AM (08-04-23 @ 10:57)      RECENT DIAGNOSTIC ORDERS:  CT Abdomen and Pelvis w/ Oral Cont and w/ IV Cont: Routine   Indication: Abd Pain  Transport: Stretcher-Crib,  w/ Monitor (08-19-23 @ 14:53)  Magnesium: AM Sched. Collection: 21-Aug-2023 04:30 (08-19-23 @ 11:34)  Basic Metabolic Panel: AM Sched. Collection: 21-Aug-2023 04:30 (08-19-23 @ 11:34)  Complete Blood Count + Automated Diff: AM Sched. Collection: 21-Aug-2023 04:30 (08-19-23 @ 11:34)  Magnesium: AM Sched. Collection: 20-Aug-2023 04:30 (08-19-23 @ 11:34)  Basic Metabolic Panel: AM Sched. Collection: 20-Aug-2023 04:30 (08-19-23 @ 11:34)  Complete Blood Count + Automated Diff: AM Sched. Collection: 20-Aug-2023 04:30 (08-19-23 @ 11:34)  Basic Metabolic Panel: 16:00 (08-19-23 @ 10:52)      MEDICATIONS:  MEDICATIONS  (STANDING):  albuterol/ipratropium for Nebulization 3 milliLiter(s) Nebulizer every 6 hours  allopurinol 300 milliGRAM(s) Oral daily  bacitracin   Ointment 1 Application(s) Topical two times a day  chlorhexidine 2% Cloths 1 Application(s) Topical daily  dextrose 5%. 1000 milliLiter(s) (50 mL/Hr) IV Continuous <Continuous>  enoxaparin Injectable 40 milliGRAM(s) SubCutaneous every 24 hours  insulin glargine Injectable (LANTUS) 8 Unit(s) SubCutaneous <User Schedule>  insulin lispro (ADMELOG) corrective regimen sliding scale   SubCutaneous three times a day before meals  iohexol 300 mG (iodine)/mL Oral Solution 30 milliLiter(s) Oral once  pantoprazole  Injectable 40 milliGRAM(s) IV Push every 12 hours  potassium chloride  20 mEq/100 mL IVPB 20 milliEquivalent(s) IV Intermittent once  potassium chloride  20 mEq/100 mL IVPB 20 milliEquivalent(s) IV Intermittent once    MEDICATIONS  (PRN):      HOME MEDICATIONS:  ALLOPURINOL  300 MG TABS (10-12)  AMLODIPINE BESYLATE  5 MG TABS (10-12)  ATORVASTATIN CALCIUM  10 MG TABS (10-12)  METFORMIN HYDROCHLORIDE ER  750 MG TB24 (10-12)  OLMESARTAN MEDOXOMIL  20 MG TABS (10-12)  TAMSULOSIN HYDROCHLORIDE  0.4 MG CAPS (10-12)      PHYSICAL EXAM:  General: NAD  HEENT: NC/AT; PERRL, clear conjunctiva  Neck: supple  Respiratory: CTA b/l  Cardiovascular: +S1/S2; RRR  Abdomen: soft, ND; +BS x4, Tender to palpatioin  Extremities: WWP, 2+ peripheral pulses b/l; no LE edema  Skin: normal color and turgor; no rash  Neurological:

## 2023-08-20 LAB
ANION GAP SERPL CALC-SCNC: 10 MMOL/L — SIGNIFICANT CHANGE UP (ref 7–14)
BUN SERPL-MCNC: 10 MG/DL — SIGNIFICANT CHANGE UP (ref 10–20)
CALCIUM SERPL-MCNC: 6.6 MG/DL — LOW (ref 8.4–10.5)
CHLORIDE SERPL-SCNC: 107 MMOL/L — SIGNIFICANT CHANGE UP (ref 98–110)
CO2 SERPL-SCNC: 25 MMOL/L — SIGNIFICANT CHANGE UP (ref 17–32)
CREAT SERPL-MCNC: 0.8 MG/DL — SIGNIFICANT CHANGE UP (ref 0.7–1.5)
EGFR: 86 ML/MIN/1.73M2 — SIGNIFICANT CHANGE UP
GLUCOSE BLDC GLUCOMTR-MCNC: 150 MG/DL — HIGH (ref 70–99)
GLUCOSE BLDC GLUCOMTR-MCNC: 159 MG/DL — HIGH (ref 70–99)
GLUCOSE BLDC GLUCOMTR-MCNC: 279 MG/DL — HIGH (ref 70–99)
GLUCOSE BLDC GLUCOMTR-MCNC: 296 MG/DL — HIGH (ref 70–99)
GLUCOSE SERPL-MCNC: 135 MG/DL — HIGH (ref 70–99)
POTASSIUM SERPL-MCNC: 4.3 MMOL/L — SIGNIFICANT CHANGE UP (ref 3.5–5)
POTASSIUM SERPL-SCNC: 4.3 MMOL/L — SIGNIFICANT CHANGE UP (ref 3.5–5)
SODIUM SERPL-SCNC: 142 MMOL/L — SIGNIFICANT CHANGE UP (ref 135–146)

## 2023-08-20 PROCEDURE — 99233 SBSQ HOSP IP/OBS HIGH 50: CPT

## 2023-08-20 RX ORDER — FUROSEMIDE 40 MG
20 TABLET ORAL ONCE
Refills: 0 | Status: COMPLETED | OUTPATIENT
Start: 2023-08-20 | End: 2023-08-20

## 2023-08-20 RX ADMIN — Medication 300 MILLIGRAM(S): at 12:31

## 2023-08-20 RX ADMIN — Medication 3 MILLILITER(S): at 14:55

## 2023-08-20 RX ADMIN — Medication 20 MILLIGRAM(S): at 14:26

## 2023-08-20 RX ADMIN — INSULIN GLARGINE 8 UNIT(S): 100 INJECTION, SOLUTION SUBCUTANEOUS at 20:48

## 2023-08-20 RX ADMIN — Medication 3 MILLILITER(S): at 08:25

## 2023-08-20 RX ADMIN — Medication 50 MILLIEQUIVALENT(S): at 04:36

## 2023-08-20 RX ADMIN — ENOXAPARIN SODIUM 40 MILLIGRAM(S): 100 INJECTION SUBCUTANEOUS at 12:30

## 2023-08-20 RX ADMIN — PANTOPRAZOLE SODIUM 40 MILLIGRAM(S): 20 TABLET, DELAYED RELEASE ORAL at 05:01

## 2023-08-20 RX ADMIN — Medication 3: at 16:42

## 2023-08-20 RX ADMIN — Medication 40 MILLIGRAM(S): at 12:32

## 2023-08-20 RX ADMIN — SODIUM CHLORIDE 70 MILLILITER(S): 9 INJECTION INTRAMUSCULAR; INTRAVENOUS; SUBCUTANEOUS at 03:55

## 2023-08-20 RX ADMIN — Medication 3 MILLILITER(S): at 19:32

## 2023-08-20 RX ADMIN — CHLORHEXIDINE GLUCONATE 1 APPLICATION(S): 213 SOLUTION TOPICAL at 05:00

## 2023-08-20 RX ADMIN — PANTOPRAZOLE SODIUM 40 MILLIGRAM(S): 20 TABLET, DELAYED RELEASE ORAL at 17:53

## 2023-08-20 RX ADMIN — Medication 50 MILLIEQUIVALENT(S): at 02:30

## 2023-08-20 RX ADMIN — Medication 1 APPLICATION(S): at 05:00

## 2023-08-20 RX ADMIN — Medication 1 APPLICATION(S): at 17:53

## 2023-08-20 NOTE — PROGRESS NOTE ADULT - SUBJECTIVE AND OBJECTIVE BOX
SUBJECTIVE:    Patient is a 88y old Male who presents with a chief complaint of DKA/HHS (19 Aug 2023 14:53)    Today is hospital day 16d. NAEON. Patient seen at bedside, aaox1, confused, agitated. No acute complaints. Patient has been pulling out iv lines, restraint started. K level 2.8 today, iv K given.    PAST MEDICAL & SURGICAL HISTORY  HTN (hypertension)    Gout    BPH (benign prostatic hyperplasia)    Parkinson disease    HLD (hyperlipidemia)    Smoker within last 12 months    Diabetes mellitus    No significant past surgical history      SOCIAL HISTORY:  Negative for smoking/alcohol/drug use.     ALLERGIES:  No Known Allergies    MEDICATIONS:  STANDING MEDICATIONS  albuterol/ipratropium for Nebulization 3 milliLiter(s) Nebulizer every 6 hours  allopurinol 300 milliGRAM(s) Oral daily  bacitracin   Ointment 1 Application(s) Topical two times a day  chlorhexidine 2% Cloths 1 Application(s) Topical daily  enoxaparin Injectable 40 milliGRAM(s) SubCutaneous every 24 hours  insulin glargine Injectable (LANTUS) 8 Unit(s) SubCutaneous <User Schedule>  insulin lispro (ADMELOG) corrective regimen sliding scale   SubCutaneous three times a day before meals  pantoprazole  Injectable 40 milliGRAM(s) IV Push every 12 hours  potassium chloride  20 mEq/100 mL IVPB 20 milliEquivalent(s) IV Intermittent every 2 hours  potassium chloride  20 mEq/100 mL IVPB 20 milliEquivalent(s) IV Intermittent once  sodium chloride 0.9%. 1000 milliLiter(s) IV Continuous <Continuous>    PRN MEDICATIONS    VITALS:   T(F): 98.1  HR: 105  BP: 131/86  RR: 18  SpO2: 96%    LABS:                        8.4    8.75  )-----------( 201      ( 19 Aug 2023 08:37 )             26.2     08-19    143  |  108  |  13  ----------------------------<  136<H>  2.8<L>   |  29  |  0.8    Ca    6.4<L>      19 Aug 2023 17:43  Mg     1.9     08-19    TPro  4.4<L>  /  Alb  1.9<L>  /  TBili  0.3  /  DBili  x   /  AST  37  /  ALT  24  /  AlkPhos  107  08-19      Urinalysis Basic - ( 19 Aug 2023 17:43 )    Color: x / Appearance: x / SG: x / pH: x  Gluc: 136 mg/dL / Ketone: x  / Bili: x / Urobili: x   Blood: x / Protein: x / Nitrite: x   Leuk Esterase: x / RBC: x / WBC x   Sq Epi: x / Non Sq Epi: x / Bacteria: x      RADIOLOGY:    PHYSICAL EXAM:  GEN: No acute distress  LUNGS: Clear to auscultation bilaterally   HEART: Regular  ABD: Soft, non-tender, non-distended.  EXT: NC/NC/NE/2+PP/HERNANDEZ/Skin Intact.   NEURO: AAOX1-2     SUBJECTIVE:    Patient is a 88y old Male who presents with a chief complaint of DKA/HHS (19 Aug 2023 14:53)    Today is hospital day 16d. NAEON. Patient seen at bedside, aaox1, confused, agitated. No acute complaints. Patient has been pulling out iv lines, restraint started. K level 2.8 today, iv K given overnight.    PAST MEDICAL & SURGICAL HISTORY  HTN (hypertension)    Gout    BPH (benign prostatic hyperplasia)    Parkinson disease    HLD (hyperlipidemia)    Smoker within last 12 months    Diabetes mellitus    No significant past surgical history      SOCIAL HISTORY:  Negative for smoking/alcohol/drug use.     ALLERGIES:  No Known Allergies    MEDICATIONS:  STANDING MEDICATIONS  albuterol/ipratropium for Nebulization 3 milliLiter(s) Nebulizer every 6 hours  allopurinol 300 milliGRAM(s) Oral daily  bacitracin   Ointment 1 Application(s) Topical two times a day  chlorhexidine 2% Cloths 1 Application(s) Topical daily  enoxaparin Injectable 40 milliGRAM(s) SubCutaneous every 24 hours  insulin glargine Injectable (LANTUS) 8 Unit(s) SubCutaneous <User Schedule>  insulin lispro (ADMELOG) corrective regimen sliding scale   SubCutaneous three times a day before meals  pantoprazole  Injectable 40 milliGRAM(s) IV Push every 12 hours  potassium chloride  20 mEq/100 mL IVPB 20 milliEquivalent(s) IV Intermittent every 2 hours  potassium chloride  20 mEq/100 mL IVPB 20 milliEquivalent(s) IV Intermittent once  sodium chloride 0.9%. 1000 milliLiter(s) IV Continuous <Continuous>    PRN MEDICATIONS    VITALS:   T(F): 98.1  HR: 105  BP: 131/86  RR: 18  SpO2: 96%    LABS:                        8.4    8.75  )-----------( 201      ( 19 Aug 2023 08:37 )             26.2     08-19    143  |  108  |  13  ----------------------------<  136<H>  2.8<L>   |  29  |  0.8    Ca    6.4<L>      19 Aug 2023 17:43  Mg     1.9     08-19    TPro  4.4<L>  /  Alb  1.9<L>  /  TBili  0.3  /  DBili  x   /  AST  37  /  ALT  24  /  AlkPhos  107  08-19      Urinalysis Basic - ( 19 Aug 2023 17:43 )    Color: x / Appearance: x / SG: x / pH: x  Gluc: 136 mg/dL / Ketone: x  / Bili: x / Urobili: x   Blood: x / Protein: x / Nitrite: x   Leuk Esterase: x / RBC: x / WBC x   Sq Epi: x / Non Sq Epi: x / Bacteria: x      RADIOLOGY:    PHYSICAL EXAM:  GEN: No acute distress  LUNGS: Clear to auscultation bilaterally   HEART: Regular  ABD: Soft, non-tender, non-distended.  EXT: NC/NC/NE/2+PP/HERNANDEZ/Skin Intact.   NEURO: AAOX1-2     SUBJECTIVE:    Patient is a 88y old Male who presents with a chief complaint of DKA/HHS (19 Aug 2023 14:53)    Today is hospital day 16d. NAEON, afebrile. Patient seen at bedside, aaox1, confused, agitated. No acute complaints. Patient has been pulling out iv lines, restraint started. K level 2.8 today, iv K given overnight.    PAST MEDICAL & SURGICAL HISTORY  HTN (hypertension)    Gout    BPH (benign prostatic hyperplasia)    Parkinson disease    HLD (hyperlipidemia)    Smoker within last 12 months    Diabetes mellitus    No significant past surgical history      SOCIAL HISTORY:  Negative for smoking/alcohol/drug use.     ALLERGIES:  No Known Allergies    MEDICATIONS:  STANDING MEDICATIONS  albuterol/ipratropium for Nebulization 3 milliLiter(s) Nebulizer every 6 hours  allopurinol 300 milliGRAM(s) Oral daily  bacitracin   Ointment 1 Application(s) Topical two times a day  chlorhexidine 2% Cloths 1 Application(s) Topical daily  enoxaparin Injectable 40 milliGRAM(s) SubCutaneous every 24 hours  insulin glargine Injectable (LANTUS) 8 Unit(s) SubCutaneous <User Schedule>  insulin lispro (ADMELOG) corrective regimen sliding scale   SubCutaneous three times a day before meals  pantoprazole  Injectable 40 milliGRAM(s) IV Push every 12 hours  potassium chloride  20 mEq/100 mL IVPB 20 milliEquivalent(s) IV Intermittent every 2 hours  potassium chloride  20 mEq/100 mL IVPB 20 milliEquivalent(s) IV Intermittent once  sodium chloride 0.9%. 1000 milliLiter(s) IV Continuous <Continuous>    PRN MEDICATIONS    VITALS:   T(F): 98.1  HR: 105  BP: 131/86  RR: 18  SpO2: 96%    LABS:                        8.4    8.75  )-----------( 201      ( 19 Aug 2023 08:37 )             26.2     08-19    143  |  108  |  13  ----------------------------<  136<H>  2.8<L>   |  29  |  0.8    Ca    6.4<L>      19 Aug 2023 17:43  Mg     1.9     08-19    TPro  4.4<L>  /  Alb  1.9<L>  /  TBili  0.3  /  DBili  x   /  AST  37  /  ALT  24  /  AlkPhos  107  08-19      Urinalysis Basic - ( 19 Aug 2023 17:43 )    Color: x / Appearance: x / SG: x / pH: x  Gluc: 136 mg/dL / Ketone: x  / Bili: x / Urobili: x   Blood: x / Protein: x / Nitrite: x   Leuk Esterase: x / RBC: x / WBC x   Sq Epi: x / Non Sq Epi: x / Bacteria: x      RADIOLOGY:    PHYSICAL EXAM:  GEN: No acute distress  LUNGS: Clear to auscultation bilaterally   HEART: Regular  ABD: Soft, non-tender, non-distended.  EXT: NC/NC/NE/2+PP/HERNANDEZ/Skin Intact.   NEURO: AAOX1-2     SUBJECTIVE:    Patient is a 88y old Male who presents with a chief complaint of DKA/HHS (19 Aug 2023 14:53)    Today is hospital day 16d. NAEON, afebrile. Patient seen at bedside, aaox1, confused, agitated. No acute complaints. Patient has been pulling out iv lines, restraint started. K level 2.8, iv K given overnight.      PAST MEDICAL & SURGICAL HISTORY  HTN (hypertension)    Gout    BPH (benign prostatic hyperplasia)    Parkinson disease    HLD (hyperlipidemia)    Smoker within last 12 months    Diabetes mellitus    No significant past surgical history      SOCIAL HISTORY:  Negative for smoking/alcohol/drug use.     ALLERGIES:  No Known Allergies    MEDICATIONS:  STANDING MEDICATIONS  albuterol/ipratropium for Nebulization 3 milliLiter(s) Nebulizer every 6 hours  allopurinol 300 milliGRAM(s) Oral daily  bacitracin   Ointment 1 Application(s) Topical two times a day  chlorhexidine 2% Cloths 1 Application(s) Topical daily  enoxaparin Injectable 40 milliGRAM(s) SubCutaneous every 24 hours  insulin glargine Injectable (LANTUS) 8 Unit(s) SubCutaneous <User Schedule>  insulin lispro (ADMELOG) corrective regimen sliding scale   SubCutaneous three times a day before meals  pantoprazole  Injectable 40 milliGRAM(s) IV Push every 12 hours  potassium chloride  20 mEq/100 mL IVPB 20 milliEquivalent(s) IV Intermittent every 2 hours  potassium chloride  20 mEq/100 mL IVPB 20 milliEquivalent(s) IV Intermittent once  sodium chloride 0.9%. 1000 milliLiter(s) IV Continuous <Continuous>    PRN MEDICATIONS    VITALS:   T(F): 98.1  HR: 105  BP: 131/86  RR: 18  SpO2: 96%    LABS:                        8.4    8.75  )-----------( 201      ( 19 Aug 2023 08:37 )             26.2     08-19    143  |  108  |  13  ----------------------------<  136<H>  2.8<L>   |  29  |  0.8    Ca    6.4<L>      19 Aug 2023 17:43  Mg     1.9     08-19    TPro  4.4<L>  /  Alb  1.9<L>  /  TBili  0.3  /  DBili  x   /  AST  37  /  ALT  24  /  AlkPhos  107  08-19      Urinalysis Basic - ( 19 Aug 2023 17:43 )    Color: x / Appearance: x / SG: x / pH: x  Gluc: 136 mg/dL / Ketone: x  / Bili: x / Urobili: x   Blood: x / Protein: x / Nitrite: x   Leuk Esterase: x / RBC: x / WBC x   Sq Epi: x / Non Sq Epi: x / Bacteria: x      RADIOLOGY:    PHYSICAL EXAM:  GEN: No acute distress  LUNGS: Clear to auscultation bilaterally   HEART: Regular  ABD: Soft, non-tender, non-distended.  EXT: NC/NC/NE/2+PP/HERNANDEZ/Skin Intact.   NEURO: AAOX1-2

## 2023-08-20 NOTE — PROGRESS NOTE ADULT - ASSESSMENT
88M PMHx DM2, HTN here with decreased appetite found to have HHS/ DKA s/p insulin gtt. Melena s/p EGD demonstrating ulcer. Hungatella bacteremia. Appendicitis.      #Decreased oral intake.   - c/w calorie count  - pureed diet, supplemented with tf via ngt  - f/u nutrition re: potential d/c tf    #Bacteremia.   - bcx 8/4 +hungatella  - bcx 8/5 onwards ntd  - tte no vegetations  - s/p kenyatta, flagyl, cefepime, vanco  - monitor off Abx per ID  d/c midodrine 5 bid  d/c solucortef 25 bid.    #Melena  - s/p egd 8/14 with two ulcers; one was cauterized, given epi  - h/h stable, trend  - protonix 40 iv bid    #Hypernatremia - resolved  - start d5w 50 cc/hr  - free water via ngt 250 qid  - trend    #Transaminitis - resolved  #abd tenderness  - mixed, in setting of infection  - ct abd with hepatic cyst  - trend LFTs  - f/u CTAP     #DM2 (diabetes mellitus, type 2).   - s/p insulin gtt for HHS/ DKA  - lantus to 8  - ssi    #Hypokalemia   - replete   - monitor     #HTN (hypertension).   - outpt f/u    DVT PPx    88M PMHx DM2, HTN here with decreased appetite found to have HHS/ DKA s/p insulin gtt. Melena s/p EGD demonstrating ulcer. Hungatella bacteremia. Appendicitis.      #Decreased oral intake.   - c/w calorie count  - pureed diet, supplemented with tf via ngt  - f/u nutrition re: potential d/c tf    #Bacteremia  - bcx 8/4 +hungatella  - bcx 8/5 onwards ntd  - tte no vegetations  - s/p kenyatta, flagyl, cefepime, vanco  - monitor off Abx per ID    #Melena  - s/p egd 8/14 with two ulcers; one was cauterized, given epi  - h/h stable, trend  - protonix 40 iv bid    #Hypernatremia - resolved  - s/p d5w  - free water via ngt 250 qid    #Transaminitis - resolved  #abd tenderness  - mixed, in setting of infection  - ct abd with hepatic cyst  - trend LFTs  - f/u CTAP     #DM2 (diabetes mellitus, type 2).   - s/p insulin gtt for HHS/ DKA  - lantus to 8  - ssi    #Hypokalemia   - replete   - monitor     #HTN (hypertension).   - outpt f/u    DVT PPx    88M PMHx DM2, HTN here with decreased appetite found to have HHS/ DKA s/p insulin gtt. Melena s/p EGD demonstrating ulcer. Hungatella bacteremia. Appendicitis.      #Decreased oral intake.   - c/w calorie count  - pureed diet, supplemented with tf via ngt  - f/u nutrition re: potential d/c tf    #Hungatella bacteremia likely GI translocation in the setting of appendicitis  - bcx 8/4 +hungatella  - bcx 8/5 onwards ntd  - TTE showed no vegetations  - s/p kenyatta, flagyl, cefepime, vanco  - monitor off Abx now per ID    #Melena  - s/p egd 8/14 with two ulcers; one was cauterized, given epi  - h/h stable, trend  - protonix 40 iv bid    #Hypernatremia - resolved  - s/p d5w  - free water via ngt 250 qid    #Transaminitis - resolved  #abd tenderness  - mixed, in setting of infection  - ct abd with hepatic cyst  - trend LFTs  - f/u CTAP     #DM2 (diabetes mellitus, type 2).   - s/p insulin gtt for HHS/ DKA  - lantus to 8  - ssi    #Hypokalemia   - replete   - monitor     #HTN (hypertension).   - outpt f/u    DVT PPx    88M PMHx DM2, HTN here with decreased appetite found to have HHS/ DKA s/p insulin gtt. Melena s/p EGD demonstrating ulcer. Hungatella bacteremia. Appendicitis.      #Decreased oral intake.   - c/w calorie count  - pureed diet, supplemented with tf via ngt  - f/u nutrition re: potential d/c tf    #Hungatella bacteremia likely GI translocation in the setting of appendicitis  - bcx 8/4 +hungatella  - bcx 8/5 onwards ntd  - TTE showed no vegetations  - s/p kenyatta, flagyl, cefepime, vanco  - monitor off Abx now per ID    #Melena  #Abdominal pain  - s/p egd 8/14 with two ulcers; one was cauterized, given epi  - CTAP 8/19: No definite correlate for acute abdominal pain. Unchanged nonspecific mild dilation of appendix, up to 0.8 cm, without definite periappendiceal inflammatory stranding.  - h/h stable, trend  - protonix 40 iv bid    #Hypernatremia - resolved  - s/p d5w  - free water via ngt 250 qid    #Transaminitis - resolved  #abd tenderness  - mixed, in setting of infection  - ct abd with hepatic cyst  - trend LFTs  - f/u CTAP     #DM2 (diabetes mellitus, type 2).   - s/p insulin gtt for HHS/ DKA  - lantus to 8  - ssi    #Hypokalemia   - replete   - monitor     #HTN (hypertension).   - outpt f/u    DVT PPx    88M PMHx DM2, HTN here with decreased appetite found to have HHS/ DKA s/p insulin gtt. Melena s/p EGD demonstrating ulcer. Hungatella bacteremia. Appendicitis.      #Decreased oral intake.   - c/w calorie count  - pureed diet, supplemented with tf via ngt  - f/u nutrition re: potential d/c tf    #Wheezing possibly undiagnosed COPD  #B/l pleural Effusion  patient has smoking history of 2 PPD for 60yrs - about 120 pack year smoking history   TTE Echo Complete w/o Contrast w/ Doppler (08.11.23 @ 14:56) >   1. Technically difficult study.   2. Normal global left ventricular systolic function with a biplane EF of   63%. Mild (grade I) diastolic dysfunction. No regional wall motion   abnormalities.   3. Normal right ventricular size and function.   4. Normal left atrial size.   5. No overt hemodynamically significant valvular disease.   6. There is no evidence of pericardial effusion.   7. No significant changes compared to prior study.    start Prednisone - monitor sugars   Duonebs   Lasix 20mg IV   pulmonary consult      #Hungatella bacteremia likely GI translocation in the setting of appendicitis  - bcx 8/4 +hungatella  - bcx 8/5 onwards ntd  - TTE showed no vegetations  - s/p kenyatta, flagyl, cefepime, vanco  - monitor off Abx now per ID    #Melena  #Abdominal pain  - s/p egd 8/14 with two ulcers; one was cauterized, given epi  - CTAP 8/19: No definite correlate for acute abdominal pain. Unchanged nonspecific mild dilation of appendix, up to 0.8 cm, without definite periappendiceal inflammatory stranding.  - h/h stable, trend  - protonix 40 iv bid    #Hypernatremia - resolved  - s/p d5w  - free water via ngt 250 qid    #Transaminitis - resolved  #abd tenderness  - mixed, in setting of infection  - ct abd with hepatic cyst  - trend LFTs  - f/u CTAP     #DM2 (diabetes mellitus, type 2).   - s/p insulin gtt for HHS/ DKA  - lantus to 8  - ssi    #Hypokalemia -resolved   - monitor     #HTN (hypertension).   - outpt f/u    DVT PPx     Pending: improvement in respiratory sxs/diuresis, pulm c/s

## 2023-08-21 LAB
ANION GAP SERPL CALC-SCNC: 9 MMOL/L — SIGNIFICANT CHANGE UP (ref 7–14)
BASOPHILS # BLD AUTO: 0.01 K/UL — SIGNIFICANT CHANGE UP (ref 0–0.2)
BASOPHILS NFR BLD AUTO: 0.1 % — SIGNIFICANT CHANGE UP (ref 0–1)
BUN SERPL-MCNC: 14 MG/DL — SIGNIFICANT CHANGE UP (ref 10–20)
CALCIUM SERPL-MCNC: 6.9 MG/DL — LOW (ref 8.4–10.5)
CHLORIDE SERPL-SCNC: 110 MMOL/L — SIGNIFICANT CHANGE UP (ref 98–110)
CO2 SERPL-SCNC: 24 MMOL/L — SIGNIFICANT CHANGE UP (ref 17–32)
CREAT SERPL-MCNC: 0.9 MG/DL — SIGNIFICANT CHANGE UP (ref 0.7–1.5)
EGFR: 83 ML/MIN/1.73M2 — SIGNIFICANT CHANGE UP
EOSINOPHIL # BLD AUTO: 0 K/UL — SIGNIFICANT CHANGE UP (ref 0–0.7)
EOSINOPHIL NFR BLD AUTO: 0 % — SIGNIFICANT CHANGE UP (ref 0–8)
GLUCOSE BLDC GLUCOMTR-MCNC: 100 MG/DL — HIGH (ref 70–99)
GLUCOSE BLDC GLUCOMTR-MCNC: 144 MG/DL — HIGH (ref 70–99)
GLUCOSE BLDC GLUCOMTR-MCNC: 218 MG/DL — HIGH (ref 70–99)
GLUCOSE BLDC GLUCOMTR-MCNC: 231 MG/DL — HIGH (ref 70–99)
GLUCOSE SERPL-MCNC: 225 MG/DL — HIGH (ref 70–99)
HCT VFR BLD CALC: 25.7 % — LOW (ref 42–52)
HGB BLD-MCNC: 8.2 G/DL — LOW (ref 14–18)
IMM GRANULOCYTES NFR BLD AUTO: 0.6 % — HIGH (ref 0.1–0.3)
LYMPHOCYTES # BLD AUTO: 0.44 K/UL — LOW (ref 1.2–3.4)
LYMPHOCYTES # BLD AUTO: 6.3 % — LOW (ref 20.5–51.1)
MAGNESIUM SERPL-MCNC: 2.2 MG/DL — SIGNIFICANT CHANGE UP (ref 1.8–2.4)
MCHC RBC-ENTMCNC: 31.5 PG — HIGH (ref 27–31)
MCHC RBC-ENTMCNC: 31.9 G/DL — LOW (ref 32–37)
MCV RBC AUTO: 98.8 FL — HIGH (ref 80–94)
MONOCYTES # BLD AUTO: 0.24 K/UL — SIGNIFICANT CHANGE UP (ref 0.1–0.6)
MONOCYTES NFR BLD AUTO: 3.4 % — SIGNIFICANT CHANGE UP (ref 1.7–9.3)
NEUTROPHILS # BLD AUTO: 6.26 K/UL — SIGNIFICANT CHANGE UP (ref 1.4–6.5)
NEUTROPHILS NFR BLD AUTO: 89.6 % — HIGH (ref 42.2–75.2)
NRBC # BLD: 0 /100 WBCS — SIGNIFICANT CHANGE UP (ref 0–0)
PLATELET # BLD AUTO: 221 K/UL — SIGNIFICANT CHANGE UP (ref 130–400)
PMV BLD: 12 FL — HIGH (ref 7.4–10.4)
POTASSIUM SERPL-MCNC: 3.9 MMOL/L — SIGNIFICANT CHANGE UP (ref 3.5–5)
POTASSIUM SERPL-SCNC: 3.9 MMOL/L — SIGNIFICANT CHANGE UP (ref 3.5–5)
RBC # BLD: 2.6 M/UL — LOW (ref 4.7–6.1)
RBC # FLD: 20.7 % — HIGH (ref 11.5–14.5)
SODIUM SERPL-SCNC: 143 MMOL/L — SIGNIFICANT CHANGE UP (ref 135–146)
WBC # BLD: 6.99 K/UL — SIGNIFICANT CHANGE UP (ref 4.8–10.8)
WBC # FLD AUTO: 6.99 K/UL — SIGNIFICANT CHANGE UP (ref 4.8–10.8)

## 2023-08-21 PROCEDURE — 99233 SBSQ HOSP IP/OBS HIGH 50: CPT

## 2023-08-21 PROCEDURE — 99232 SBSQ HOSP IP/OBS MODERATE 35: CPT

## 2023-08-21 RX ORDER — DEXTROSE 50 % IN WATER 50 %
12.5 SYRINGE (ML) INTRAVENOUS ONCE
Refills: 0 | Status: DISCONTINUED | OUTPATIENT
Start: 2023-08-21 | End: 2023-09-18

## 2023-08-21 RX ORDER — GLUCAGON INJECTION, SOLUTION 0.5 MG/.1ML
1 INJECTION, SOLUTION SUBCUTANEOUS ONCE
Refills: 0 | Status: DISCONTINUED | OUTPATIENT
Start: 2023-08-21 | End: 2023-09-18

## 2023-08-21 RX ORDER — INSULIN LISPRO 100/ML
3 VIAL (ML) SUBCUTANEOUS
Refills: 0 | Status: DISCONTINUED | OUTPATIENT
Start: 2023-08-21 | End: 2023-08-31

## 2023-08-21 RX ORDER — SODIUM CHLORIDE 9 MG/ML
1000 INJECTION, SOLUTION INTRAVENOUS
Refills: 0 | Status: DISCONTINUED | OUTPATIENT
Start: 2023-08-21 | End: 2023-09-18

## 2023-08-21 RX ORDER — DEXTROSE 50 % IN WATER 50 %
15 SYRINGE (ML) INTRAVENOUS ONCE
Refills: 0 | Status: DISCONTINUED | OUTPATIENT
Start: 2023-08-21 | End: 2023-09-18

## 2023-08-21 RX ORDER — DEXTROSE 50 % IN WATER 50 %
25 SYRINGE (ML) INTRAVENOUS ONCE
Refills: 0 | Status: DISCONTINUED | OUTPATIENT
Start: 2023-08-21 | End: 2023-09-18

## 2023-08-21 RX ORDER — BUDESONIDE AND FORMOTEROL FUMARATE DIHYDRATE 160; 4.5 UG/1; UG/1
2 AEROSOL RESPIRATORY (INHALATION)
Refills: 0 | Status: DISCONTINUED | OUTPATIENT
Start: 2023-08-21 | End: 2023-09-18

## 2023-08-21 RX ORDER — FUROSEMIDE 40 MG
40 TABLET ORAL ONCE
Refills: 0 | Status: COMPLETED | OUTPATIENT
Start: 2023-08-21 | End: 2023-08-22

## 2023-08-21 RX ORDER — INSULIN GLARGINE 100 [IU]/ML
8 INJECTION, SOLUTION SUBCUTANEOUS AT BEDTIME
Refills: 0 | Status: DISCONTINUED | OUTPATIENT
Start: 2023-08-21 | End: 2023-08-27

## 2023-08-21 RX ORDER — INSULIN LISPRO 100/ML
VIAL (ML) SUBCUTANEOUS
Refills: 0 | Status: DISCONTINUED | OUTPATIENT
Start: 2023-08-21 | End: 2023-08-31

## 2023-08-21 RX ADMIN — Medication 1 APPLICATION(S): at 18:06

## 2023-08-21 RX ADMIN — CHLORHEXIDINE GLUCONATE 1 APPLICATION(S): 213 SOLUTION TOPICAL at 05:21

## 2023-08-21 RX ADMIN — Medication 3 MILLILITER(S): at 08:24

## 2023-08-21 RX ADMIN — Medication 40 MILLIGRAM(S): at 05:21

## 2023-08-21 RX ADMIN — Medication 3 MILLILITER(S): at 14:37

## 2023-08-21 RX ADMIN — Medication 300 MILLIGRAM(S): at 12:02

## 2023-08-21 RX ADMIN — PANTOPRAZOLE SODIUM 40 MILLIGRAM(S): 20 TABLET, DELAYED RELEASE ORAL at 05:21

## 2023-08-21 RX ADMIN — Medication 2: at 08:14

## 2023-08-21 RX ADMIN — PANTOPRAZOLE SODIUM 40 MILLIGRAM(S): 20 TABLET, DELAYED RELEASE ORAL at 18:06

## 2023-08-21 RX ADMIN — Medication 3 UNIT(S): at 17:18

## 2023-08-21 RX ADMIN — ENOXAPARIN SODIUM 40 MILLIGRAM(S): 100 INJECTION SUBCUTANEOUS at 12:05

## 2023-08-21 RX ADMIN — INSULIN GLARGINE 8 UNIT(S): 100 INJECTION, SOLUTION SUBCUTANEOUS at 21:32

## 2023-08-21 RX ADMIN — Medication 3 MILLILITER(S): at 19:16

## 2023-08-21 RX ADMIN — Medication 1 APPLICATION(S): at 05:21

## 2023-08-21 RX ADMIN — Medication 3 MILLILITER(S): at 01:59

## 2023-08-21 RX ADMIN — Medication 2: at 12:03

## 2023-08-21 RX ADMIN — Medication 3 UNIT(S): at 12:03

## 2023-08-21 NOTE — PROGRESS NOTE ADULT - ASSESSMENT
IMPRESSION:    DKA / HHS, resolved  Hungatella species bacteremia   Septic shock resolved   Suspected COPD  UGIB s/p EGD s/p cauterization of visible vessel  BART - resolved   AMS toxic metabolic  HO DM  HO HTN  Abdominal aortic aneurysm  Parkinson disease      PLAN:    CNS: Avoid sedation.     HEENT: Oral care. NGT care     PULMONARY:  HOB @ 45 degrees.  Aspiration precautions. On RA, prednisone 40 for 5 days 20 for 5 days, Neb q 6 as needed       CARDIOVASCULAR:   Avoid volume overload.     GI: Protonix BID. Resume tube feeds po feeds per speech eval,     RENAL: Follow up lytes.  Correct as needed. Voiding.    INFECTIOUS DISEASE: abx per ID    HEMATOLOGICAL:  Resume DVT prophylaxis with heparin SQ..  Transfuse to keep Hb>7.    ENDOCRINE:  Follow up FS QACHS. Insulin basal-bolus.    MUSCULOSKELETAL: AAT  Prognosis very poor. DNR/DNI  FLOOR

## 2023-08-21 NOTE — PROGRESS NOTE ADULT - SUBJECTIVE AND OBJECTIVE BOX
SUBJECTIVE:    Patient is a 88y old Male who presents with a chief complaint of DKA/HHS (20 Aug 2023 02:01)     Today is hospital day 17d. BETO. Patient seen at bedside today, aapriyank, confused. Patient on RA. Bilateral wheezing on physical exam.    PAST MEDICAL & SURGICAL HISTORY  HTN (hypertension)    Gout    BPH (benign prostatic hyperplasia)    Parkinson disease    HLD (hyperlipidemia)    Smoker within last 12 months    Diabetes mellitus    No significant past surgical history      SOCIAL HISTORY:  Negative for smoking/alcohol/drug use.     ALLERGIES:  No Known Allergies    MEDICATIONS:  STANDING MEDICATIONS  albuterol/ipratropium for Nebulization 3 milliLiter(s) Nebulizer every 6 hours  allopurinol 300 milliGRAM(s) Oral daily  bacitracin   Ointment 1 Application(s) Topical two times a day  budesonide 160 MICROgram(s)/formoterol 4.5 MICROgram(s) Inhaler 2 Puff(s) Inhalation two times a day  chlorhexidine 2% Cloths 1 Application(s) Topical daily  dextrose 5%. 1000 milliLiter(s) IV Continuous <Continuous>  dextrose 5%. 1000 milliLiter(s) IV Continuous <Continuous>  dextrose 50% Injectable 12.5 Gram(s) IV Push once  dextrose 50% Injectable 25 Gram(s) IV Push once  dextrose 50% Injectable 25 Gram(s) IV Push once  enoxaparin Injectable 40 milliGRAM(s) SubCutaneous every 24 hours  glucagon  Injectable 1 milliGRAM(s) IntraMuscular once  insulin glargine Injectable (LANTUS) 8 Unit(s) SubCutaneous at bedtime  insulin lispro (ADMELOG) corrective regimen sliding scale   SubCutaneous three times a day before meals  insulin lispro Injectable (ADMELOG) 3 Unit(s) SubCutaneous three times a day before meals  pantoprazole  Injectable 40 milliGRAM(s) IV Push every 12 hours  predniSONE   Tablet 40 milliGRAM(s) Oral daily    PRN MEDICATIONS  dextrose Oral Gel 15 Gram(s) Oral once PRN    VITALS:   T(F): 98.5  HR: 80  BP: 111/76  RR: 18  SpO2: 98%    LABS:                        8.2    6.99  )-----------( 221      ( 21 Aug 2023 08:00 )             25.7     08-21    143  |  110  |  14  ----------------------------<  225<H>  3.9   |  24  |  0.9    Ca    6.9<L>      21 Aug 2023 08:00  Mg     2.2     08-21        Urinalysis Basic - ( 21 Aug 2023 08:00 )    Color: x / Appearance: x / SG: x / pH: x  Gluc: 225 mg/dL / Ketone: x  / Bili: x / Urobili: x   Blood: x / Protein: x / Nitrite: x   Leuk Esterase: x / RBC: x / WBC x   Sq Epi: x / Non Sq Epi: x / Bacteria: x      RADIOLOGY:    PHYSICAL EXAM:  GEN: No acute distress  LUNGS: wheezing   HEART: Regular  ABD: Soft, non-tender, non-distended.  EXT: NC/NC/NE/2+PP/HERNANDEZ/Skin Intact.   NEURO: AAOX1    NG tube taken out

## 2023-08-21 NOTE — PROGRESS NOTE ADULT - ASSESSMENT
88M PMHx DM2, HTN here with decreased appetite found to have HHS/ DKA s/p insulin gtt. Melena s/p EGD demonstrating ulcer. Hungatella bacteremia. Appendicitis.      #Wheezing possibly undiagnosed COPD  #B/l pleural Effusion  - patient has smoking history of 2 PPD for 60yrs  - TTE 8/11: EF 63%  - started po Prednisone 40 qd  - Duonebs   - s/p iv Lasix 20 x1  - pulmonary consulted    #Decreased oral intake  - calorie count completed, f/u nutrition eval  - c/w pureed diet, consistent CHO, DASH/TLC  - ng tube taken out    #Hungatella bacteremia likely GI translocation in the setting of appendicitis  - bcx 8/4 +hungatella  - bcx 8/5 onwards ntd  - TTE showed no vegetations  - s/p kenyatta, flagyl, cefepime, vanco  - monitor off Abx now per ID    #Melena  #Abdominal pain  - s/p egd 8/14 with two ulcers; one was cauterized, given epi  - CTAP 8/19: No definite correlate for acute abdominal pain. Unchanged nonspecific mild dilation of appendix, up to 0.8 cm, without definite periappendiceal inflammatory stranding.  - h/h stable, trend  - protonix 40 iv bid    #Transaminitis - resolved  #abd tenderness  - mixed, in setting of infection  - ct abd with hepatic cyst  - trend LFTs    #DM2 (diabetes mellitus, type 2).   - s/p insulin gtt for HHS/ DKA  - on lantus 8 and lispro 3  - ssi    #Hypokalemia -resolved   - monitor     #HTN (hypertension).   - outpt f/u    DVT PPx   GI PPx    Pending: improvement in respiratory sxs/diuresis, pulm c/s

## 2023-08-21 NOTE — CHART NOTE - NSCHARTNOTEFT_GEN_A_CORE
3 Day Calorie Count initiated from 8/18 - 8/20, the following are the results:    Day 1 - 8/18:  No PO documented     Day 2 - 8/19:  Not documented    Day 3 - 8/20:    Breakfast:  50% Milk  50% Juice  25% Cereal  Total for Breakfast: 147.5 kcal, 5 gm Protein     Lunch:  50% Juice  50% Veg  50% Soup  Total for Lunch: 118 kcal, 0.75 gm Protein     Dinner not documented    Total for Day 3: 265.5 kcal, 5.75 gm Protein     Please see RD f/u for further nutrition recommendations    RD to remain available: Maureen Hung, [facility] [phone] or via Teams

## 2023-08-21 NOTE — CHART NOTE - NSCHARTNOTEFT_GEN_A_CORE
Registered Dietitian Follow-Up     Patient Profile Reviewed                           Yes [x]   No []     Nutrition History Previously Obtained        Yes [x]  No []       Pertinent Subjective Information:  RD spoke with RN this morning - patient is planned for NGT removal today and discontinuation of EN. This morning he was able to consume 75% of breakfast and noted with >/=50% of meals during the weekend.     Pertinent Medical Interventions:  Decreased oral intake - c/w calorie count; pureed diet - supplemented with tf via ngt; f/u nutrition re: potential d/c tf; Wheezing possibly undiagnosed COPD; B/l pleural effusion; start prednisone - monitor sugars; Hungatella bacteremia likely GI translocation in the setting of appendicitis; Melena; Abdominal pain - s/p egd  with two ulcers; one was cauterized, given epi; CTAP : No definite correlate for acute abdominal pain. Unchanged nonspecific mild dilation of appendix, up to 0.8 cm, without definite periappendiceal inflammatory stranding; Hypernatremia - resolved, s/p d5w, free water via ngt 250 qid; DM2 - s/p insulin gtt for HHS / DKA, lantus to 8, ssi; Hypokalemia - resolved; HTN.    Diet order:   Diet, Pureed (23 @ 12:36) [Active]    Anthropometrics:  Height: 167.6 cm   Weight: 62.6 kg   BMI: 22.3  IBW: 54.5 kg     Daily Weight in k.6 (08-15)  % Weight Change    MEDICATIONS  (STANDING):  albuterol/ipratropium for Nebulization 3 milliLiter(s) Nebulizer every 6 hours  allopurinol 300 milliGRAM(s) Oral daily  bacitracin   Ointment 1 Application(s) Topical two times a day  budesonide 160 MICROgram(s)/formoterol 4.5 MICROgram(s) Inhaler 2 Puff(s) Inhalation two times a day  chlorhexidine 2% Cloths 1 Application(s) Topical daily  dextrose 5%. 1000 milliLiter(s) (50 mL/Hr) IV Continuous <Continuous>  dextrose 5%. 1000 milliLiter(s) (100 mL/Hr) IV Continuous <Continuous>  dextrose 50% Injectable 25 Gram(s) IV Push once  dextrose 50% Injectable 25 Gram(s) IV Push once  dextrose 50% Injectable 12.5 Gram(s) IV Push once  enoxaparin Injectable 40 milliGRAM(s) SubCutaneous every 24 hours  glucagon  Injectable 1 milliGRAM(s) IntraMuscular once  insulin glargine Injectable (LANTUS) 8 Unit(s) SubCutaneous at bedtime  insulin lispro (ADMELOG) corrective regimen sliding scale   SubCutaneous three times a day before meals  insulin lispro Injectable (ADMELOG) 3 Unit(s) SubCutaneous three times a day before meals  pantoprazole  Injectable 40 milliGRAM(s) IV Push every 12 hours  predniSONE   Tablet 40 milliGRAM(s) Oral daily    MEDICATIONS  (PRN):  dextrose Oral Gel 15 Gram(s) Oral once PRN Blood Glucose LESS THAN 70 milliGRAM(s)/deciliter    Pertinent Labs:  @ 08:00: Na 143, BUN 14, Cr 0.9, <H>, K+ 3.9, Phos --, Mg 2.2, Alk Phos --, ALT/SGPT --, AST/SGOT --, HbA1c --    Finger Sticks:  POCT Blood Glucose.: 218 mg/dL ( @ 11:25)  POCT Blood Glucose.: 231 mg/dL ( @ 08:04)  POCT Blood Glucose.: 296 mg/dL ( @ 20:42)  POCT Blood Glucose.: 279 mg/dL ( @ 16:23)    Physical Findings:  - Appearance: confused, alert; 1+ generalized edema, right leg  - GI function: small tarry BM on    - Tubes: +NGT - planned to be removed  - Oral/Mouth cavity: EN via NGT + puree, mildly thick liquids  - Skin: no pressure injuries indicated     Nutrition Requirements:  Weight Used: dosing weight 62.6 kg    Estimated Energy Needs    Continue []  Adjust [x]  Adjusted Energy Recommendations: 8702-0900 kcal/day - MSJ 1250*SF 1.2-1.3 (no longer in critical care)     Estimated Protein Needs    Continue []  Adjust [x]  Adjusted Protein Recommendations: 75-81 g/day - 1.2-1.3g/kg (no longer in crit care setting)     Estimated Fluid Needs        Continue [x]  Adjust []  Adjusted Fluid Recommendations: 1565 mL/day - 25mL/kg of    Nutrient Intake: 75% of breakfast meal this morning      [x] Previous Nutrition Diagnosis:  Inadequate Oral Intake - improving            [x] Ongoing          [] Resolved     Nutrition Intervention:  meals and snacks, medical food supplements, coordination of care     Goal/Expected Outcome:   PO intake >75% of meals within 3-5 days      Indicator/Monitoring:   diet order, energy intake, weight, labs, skin status, NFPE     Recommendation:  1) Continue current diet order Pureed per SLP recommendations - Add Consistent CHO, DASH/TLC modifier   2) recommend to add Glucerna Shake 2x/day (220 kcal, 10 gm Protein each) to optimize kcal and protein intake   3) Maximum Encouragement and assistance with all meals, snacks, and supplement    Patient is at high nutrition risk, RD f/u 3-5 days or PRN     RD to remain available: Maureen Hung RD [phone] or via Teams

## 2023-08-21 NOTE — PROGRESS NOTE ADULT - SUBJECTIVE AND OBJECTIVE BOX
Over Night Events: events noted, on RA, Afebrile    PHYSICAL EXAM    ICU Vital Signs Last 24 Hrs  T(C): 36.3 (21 Aug 2023 15:30), Max: 37.1 (21 Aug 2023 00:07)  T(F): 97.4 (21 Aug 2023 15:30), Max: 98.7 (21 Aug 2023 00:07)  HR: 79 (21 Aug 2023 15:30) (79 - 104)  BP: 129/60 (21 Aug 2023 15:30) (111/76 - 130/67)  RR: 18 (21 Aug 2023 15:30) (18 - 18)  SpO2: 98% (21 Aug 2023 08:00) (98% - 98%)    O2 Parameters below as of 21 Aug 2023 08:00  Patient On (Oxygen Delivery Method): room air            General: ill looking  Lungs: dec bs both bases  Cardiovascular: RK 2.6  Abdomen: Soft, Positive BS  Extremities: No clubbing   Neurological: Non focal       08-20-23 @ 07:01  -  08-21-23 @ 07:00  --------------------------------------------------------  IN:  Total IN: 0 mL    OUT:    Incontinent per Collection Bag (mL): 2200 mL    Voided (mL): 600 mL  Total OUT: 2800 mL    Total NET: -2800 mL          LABS:                          8.2    6.99  )-----------( 221      ( 21 Aug 2023 08:00 )             25.7                                               08-21    143  |  110  |  14  ----------------------------<  225<H>  3.9   |  24  |  0.9    Ca    6.9<L>      21 Aug 2023 08:00  Mg     2.2     08-21                                               Urinalysis Basic - ( 21 Aug 2023 08:00 )    Color: x / Appearance: x / SG: x / pH: x  Gluc: 225 mg/dL / Ketone: x  / Bili: x / Urobili: x   Blood: x / Protein: x / Nitrite: x   Leuk Esterase: x / RBC: x / WBC x   Sq Epi: x / Non Sq Epi: x / Bacteria: x                                                                                                                                                                                MEDICATIONS  (STANDING):  albuterol/ipratropium for Nebulization 3 milliLiter(s) Nebulizer every 6 hours  allopurinol 300 milliGRAM(s) Oral daily  bacitracin   Ointment 1 Application(s) Topical two times a day  budesonide 160 MICROgram(s)/formoterol 4.5 MICROgram(s) Inhaler 2 Puff(s) Inhalation two times a day  chlorhexidine 2% Cloths 1 Application(s) Topical daily  dextrose 5%. 1000 milliLiter(s) (50 mL/Hr) IV Continuous <Continuous>  dextrose 5%. 1000 milliLiter(s) (100 mL/Hr) IV Continuous <Continuous>  dextrose 50% Injectable 12.5 Gram(s) IV Push once  dextrose 50% Injectable 25 Gram(s) IV Push once  dextrose 50% Injectable 25 Gram(s) IV Push once  enoxaparin Injectable 40 milliGRAM(s) SubCutaneous every 24 hours  furosemide   Injectable 40 milliGRAM(s) IV Push once  glucagon  Injectable 1 milliGRAM(s) IntraMuscular once  insulin glargine Injectable (LANTUS) 8 Unit(s) SubCutaneous at bedtime  insulin lispro (ADMELOG) corrective regimen sliding scale   SubCutaneous three times a day before meals  insulin lispro Injectable (ADMELOG) 3 Unit(s) SubCutaneous three times a day before meals  pantoprazole  Injectable 40 milliGRAM(s) IV Push every 12 hours  predniSONE   Tablet 40 milliGRAM(s) Oral daily    MEDICATIONS  (PRN):  dextrose Oral Gel 15 Gram(s) Oral once PRN Blood Glucose LESS THAN 70 milliGRAM(s)/deciliter

## 2023-08-22 LAB
ALBUMIN SERPL ELPH-MCNC: 2.1 G/DL — LOW (ref 3.5–5.2)
ALP SERPL-CCNC: 98 U/L — SIGNIFICANT CHANGE UP (ref 30–115)
ALT FLD-CCNC: 19 U/L — SIGNIFICANT CHANGE UP (ref 0–41)
ANION GAP SERPL CALC-SCNC: 11 MMOL/L — SIGNIFICANT CHANGE UP (ref 7–14)
AST SERPL-CCNC: 32 U/L — SIGNIFICANT CHANGE UP (ref 0–41)
BILIRUB SERPL-MCNC: 0.5 MG/DL — SIGNIFICANT CHANGE UP (ref 0.2–1.2)
BLD GP AB SCN SERPL QL: SIGNIFICANT CHANGE UP
BUN SERPL-MCNC: 11 MG/DL — SIGNIFICANT CHANGE UP (ref 10–20)
CALCIUM SERPL-MCNC: 7.1 MG/DL — LOW (ref 8.4–10.5)
CHLORIDE SERPL-SCNC: 110 MMOL/L — SIGNIFICANT CHANGE UP (ref 98–110)
CO2 SERPL-SCNC: 23 MMOL/L — SIGNIFICANT CHANGE UP (ref 17–32)
CREAT SERPL-MCNC: 0.8 MG/DL — SIGNIFICANT CHANGE UP (ref 0.7–1.5)
EGFR: 86 ML/MIN/1.73M2 — SIGNIFICANT CHANGE UP
GLUCOSE BLDC GLUCOMTR-MCNC: 157 MG/DL — HIGH (ref 70–99)
GLUCOSE BLDC GLUCOMTR-MCNC: 241 MG/DL — HIGH (ref 70–99)
GLUCOSE BLDC GLUCOMTR-MCNC: 241 MG/DL — HIGH (ref 70–99)
GLUCOSE BLDC GLUCOMTR-MCNC: 84 MG/DL — SIGNIFICANT CHANGE UP (ref 70–99)
GLUCOSE SERPL-MCNC: 82 MG/DL — SIGNIFICANT CHANGE UP (ref 70–99)
HCT VFR BLD CALC: 27.8 % — LOW (ref 42–52)
HGB BLD-MCNC: 8.7 G/DL — LOW (ref 14–18)
MCHC RBC-ENTMCNC: 30.6 PG — SIGNIFICANT CHANGE UP (ref 27–31)
MCHC RBC-ENTMCNC: 31.3 G/DL — LOW (ref 32–37)
MCV RBC AUTO: 97.9 FL — HIGH (ref 80–94)
NRBC # BLD: 0 /100 WBCS — SIGNIFICANT CHANGE UP (ref 0–0)
PLATELET # BLD AUTO: 269 K/UL — SIGNIFICANT CHANGE UP (ref 130–400)
PMV BLD: 11.7 FL — HIGH (ref 7.4–10.4)
POTASSIUM SERPL-MCNC: 3.2 MMOL/L — LOW (ref 3.5–5)
POTASSIUM SERPL-SCNC: 3.2 MMOL/L — LOW (ref 3.5–5)
PROT SERPL-MCNC: 5 G/DL — LOW (ref 6–8)
RBC # BLD: 2.84 M/UL — LOW (ref 4.7–6.1)
RBC # FLD: 19.9 % — HIGH (ref 11.5–14.5)
SODIUM SERPL-SCNC: 144 MMOL/L — SIGNIFICANT CHANGE UP (ref 135–146)
WBC # BLD: 5.79 K/UL — SIGNIFICANT CHANGE UP (ref 4.8–10.8)
WBC # FLD AUTO: 5.79 K/UL — SIGNIFICANT CHANGE UP (ref 4.8–10.8)

## 2023-08-22 PROCEDURE — 99232 SBSQ HOSP IP/OBS MODERATE 35: CPT

## 2023-08-22 PROCEDURE — 71045 X-RAY EXAM CHEST 1 VIEW: CPT | Mod: 26

## 2023-08-22 RX ORDER — POTASSIUM CHLORIDE 20 MEQ
40 PACKET (EA) ORAL ONCE
Refills: 0 | Status: DISCONTINUED | OUTPATIENT
Start: 2023-08-22 | End: 2023-08-22

## 2023-08-22 RX ORDER — POTASSIUM CHLORIDE 20 MEQ
40 PACKET (EA) ORAL EVERY 4 HOURS
Refills: 0 | Status: COMPLETED | OUTPATIENT
Start: 2023-08-22 | End: 2023-08-22

## 2023-08-22 RX ORDER — PANTOPRAZOLE SODIUM 20 MG/1
40 TABLET, DELAYED RELEASE ORAL
Refills: 0 | Status: DISCONTINUED | OUTPATIENT
Start: 2023-08-22 | End: 2023-09-04

## 2023-08-22 RX ADMIN — Medication 1 APPLICATION(S): at 05:09

## 2023-08-22 RX ADMIN — Medication 3 UNIT(S): at 08:26

## 2023-08-22 RX ADMIN — Medication 3 UNIT(S): at 17:01

## 2023-08-22 RX ADMIN — Medication 40 MILLIEQUIVALENT(S): at 12:08

## 2023-08-22 RX ADMIN — PANTOPRAZOLE SODIUM 40 MILLIGRAM(S): 20 TABLET, DELAYED RELEASE ORAL at 05:10

## 2023-08-22 RX ADMIN — Medication 1 APPLICATION(S): at 17:04

## 2023-08-22 RX ADMIN — Medication 40 MILLIGRAM(S): at 05:09

## 2023-08-22 RX ADMIN — Medication 3 MILLILITER(S): at 13:47

## 2023-08-22 RX ADMIN — Medication 300 MILLIGRAM(S): at 11:57

## 2023-08-22 RX ADMIN — INSULIN GLARGINE 8 UNIT(S): 100 INJECTION, SOLUTION SUBCUTANEOUS at 21:38

## 2023-08-22 RX ADMIN — Medication 2: at 17:02

## 2023-08-22 RX ADMIN — PANTOPRAZOLE SODIUM 40 MILLIGRAM(S): 20 TABLET, DELAYED RELEASE ORAL at 17:04

## 2023-08-22 RX ADMIN — CHLORHEXIDINE GLUCONATE 1 APPLICATION(S): 213 SOLUTION TOPICAL at 05:09

## 2023-08-22 RX ADMIN — Medication 3 MILLILITER(S): at 19:35

## 2023-08-22 RX ADMIN — Medication 3 MILLILITER(S): at 08:30

## 2023-08-22 RX ADMIN — Medication 40 MILLIEQUIVALENT(S): at 12:43

## 2023-08-22 RX ADMIN — Medication 3 MILLILITER(S): at 02:45

## 2023-08-22 RX ADMIN — Medication 3 UNIT(S): at 11:58

## 2023-08-22 RX ADMIN — Medication 2: at 11:58

## 2023-08-22 RX ADMIN — BUDESONIDE AND FORMOTEROL FUMARATE DIHYDRATE 2 PUFF(S): 160; 4.5 AEROSOL RESPIRATORY (INHALATION) at 10:29

## 2023-08-22 RX ADMIN — ENOXAPARIN SODIUM 40 MILLIGRAM(S): 100 INJECTION SUBCUTANEOUS at 11:57

## 2023-08-22 RX ADMIN — Medication 40 MILLIGRAM(S): at 10:29

## 2023-08-22 NOTE — PROGRESS NOTE ADULT - ASSESSMENT
88M PMHx DM2, HTN here with decreased appetite found to have HHS/ DKA s/p insulin gtt. Melena s/p EGD demonstrating ulcer. Hungatella bacteremia. Appendicitis.      #suspected COPD, with wheezing  #B/l pleural Effusion  - patient has smoking history of 2 PPD for 60yrs, already quit  - TTE 8/11: EF 63%  - pulmonary consulted, recs appreciated  - started po prednisone 40 qd - 40 for 5 days, 20 for 5 days  - duonebs q6h prn  - repeat CXR 8/21: stable  - was given doses of iv lasix  - HOB elevation 45 degrees  - aspiration precautions    #Decreased oral intake  - calorie count completed, f/u nutrition eval  - c/w pureed diet, consistent CHO, DASH/TLC  - patient tolerating po intake, NG tube taken out    #Hungatella bacteremia likely GI translocation in the setting of appendicitis  - bcx 8/4 +hungatella  - bcx 8/5 onwards ntd  - TTE showed no vegetations  - s/p kenyatta, flagyl, cefepime, vanco  - monitor off Abx now per ID    #Melena  #Abdominal pain  - s/p egd 8/14 with two ulcers; one was cauterized, given epi  - CTAP 8/19: No definite correlate for acute abdominal pain. Unchanged nonspecific mild dilation of appendix, up to 0.8 cm, without definite periappendiceal inflammatory stranding.  - h/h stable, trend  - protonix 40 iv bid    #Transaminitis - resolved  #abd tenderness  - mixed, in setting of infection  - ct abd with hepatic cyst    #DM2 (diabetes mellitus, type 2).   - s/p insulin gtt for HHS/ DKA  - on lantus 8 and lispro 3  - ssi    #Hypokalemia -resolved   - monitor     #HTN (hypertension).   - outpt f/u    DVT PPx   GI PPx  Dispo: patient is from home   89yo Cantonese speaking male PMHx DM2, HTN here with decreased appetite found to have HHS/ DKA s/p insulin gtt. Melena s/p EGD demonstrating ulcer. Hungatella bacteremia. Appendicitis.      #suspected COPD, with wheezing  #B/l pleural Effusion  - patient has smoking history of 2 PPD for 60yrs, already quit  - TTE 8/11: EF 63%  - pulmonary consulted, recs appreciated  - started po prednisone 40 qd - 40 for 5 days, 20 for 5 days  - duonebs q6h prn  - repeat CXR 8/21: stable  - was given doses of iv lasix  - HOB elevation 45 degrees  - aspiration precautions    #Decreased oral intake  - calorie count completed, f/u nutrition eval  - c/w pureed diet, consistent CHO, DASH/TLC  - patient tolerating po intake, NG tube taken out    #Hungatella bacteremia likely GI translocation in the setting of appendicitis  - bcx 8/4 +hungatella  - bcx 8/5 onwards ntd  - TTE showed no vegetations  - s/p kenyatta, flagyl, cefepime, vanco  - monitor off Abx now per ID    #Melena  #Abdominal pain  - s/p egd 8/14 with two ulcers; one was cauterized, given epi  - CTAP 8/19: No definite correlate for acute abdominal pain. Unchanged nonspecific mild dilation of appendix, up to 0.8 cm, without definite periappendiceal inflammatory stranding.  - h/h stable, trend  - protonix 40 iv bid    #Transaminitis - resolved  #abd tenderness  - mixed, in setting of infection  - ct abd with hepatic cyst    #DM2 (diabetes mellitus, type 2).   - s/p insulin gtt for HHS/ DKA  - on lantus 8 and lispro 3  - ssi    #Hypokalemia -resolved   - monitor     #HTN (hypertension).   - outpt f/u    DVT PPx   GI PPx  Dispo: patient is from home

## 2023-08-22 NOTE — PROGRESS NOTE ADULT - SUBJECTIVE AND OBJECTIVE BOX
SUBJECTIVE:    Patient is a 88y old Male who presents with a chief complaint of DKA/HHS (21 Aug 2023 16:48)    Today is hospital day 18d. NAEON. Patient has no new complaints this morning. on RA. Contacted patient's daughter Antonia Pagan for updates, daughter stated that patient is aaox1-2 at baseline, at home uses a walker to assist movement, was never officially diagnosed of copd in the past.      PAST MEDICAL & SURGICAL HISTORY  HTN (hypertension)    Gout    BPH (benign prostatic hyperplasia)    Parkinson disease    HLD (hyperlipidemia)    Smoker within last 12 months    Diabetes mellitus    No significant past surgical history      SOCIAL HISTORY:  Negative for smoking/alcohol/drug use.     ALLERGIES:  No Known Allergies    MEDICATIONS:  STANDING MEDICATIONS  albuterol/ipratropium for Nebulization 3 milliLiter(s) Nebulizer every 6 hours  allopurinol 300 milliGRAM(s) Oral daily  bacitracin   Ointment 1 Application(s) Topical two times a day  budesonide 160 MICROgram(s)/formoterol 4.5 MICROgram(s) Inhaler 2 Puff(s) Inhalation two times a day  chlorhexidine 2% Cloths 1 Application(s) Topical daily  dextrose 5%. 1000 milliLiter(s) IV Continuous <Continuous>  dextrose 5%. 1000 milliLiter(s) IV Continuous <Continuous>  dextrose 50% Injectable 25 Gram(s) IV Push once  dextrose 50% Injectable 25 Gram(s) IV Push once  dextrose 50% Injectable 12.5 Gram(s) IV Push once  enoxaparin Injectable 40 milliGRAM(s) SubCutaneous every 24 hours  glucagon  Injectable 1 milliGRAM(s) IntraMuscular once  insulin glargine Injectable (LANTUS) 8 Unit(s) SubCutaneous at bedtime  insulin lispro (ADMELOG) corrective regimen sliding scale   SubCutaneous three times a day before meals  insulin lispro Injectable (ADMELOG) 3 Unit(s) SubCutaneous three times a day before meals  pantoprazole    Tablet 40 milliGRAM(s) Oral two times a day  potassium chloride   Powder 40 milliEquivalent(s) Oral every 4 hours  predniSONE   Tablet 40 milliGRAM(s) Oral daily    PRN MEDICATIONS  dextrose Oral Gel 15 Gram(s) Oral once PRN    VITALS:   T(F): 98.1  HR: 91  BP: 139/79  RR: 18  SpO2: 95%    LABS:                        8.7    5.79  )-----------( 269      ( 22 Aug 2023 07:03 )             27.8     08-22    144  |  110  |  11  ----------------------------<  82  3.2<L>   |  23  |  0.8    Ca    7.1<L>      22 Aug 2023 07:03  Mg     2.2     08-21    TPro  5.0<L>  /  Alb  2.1<L>  /  TBili  0.5  /  DBili  x   /  AST  32  /  ALT  19  /  AlkPhos  98  08-22      Urinalysis Basic - ( 22 Aug 2023 07:03 )    Color: x / Appearance: x / SG: x / pH: x  Gluc: 82 mg/dL / Ketone: x  / Bili: x / Urobili: x   Blood: x / Protein: x / Nitrite: x   Leuk Esterase: x / RBC: x / WBC x   Sq Epi: x / Non Sq Epi: x / Bacteria: x    RADIOLOGY:    PHYSICAL EXAM:  GEN: No acute distress  LUNGS: Clear to auscultation bilaterally   HEART: Regular  ABD: Soft, non-tender, non-distended.  EXT: NC/NC/NE/2+PP/HERNANDEZ/Skin Intact.   NEURO: AAOX1-2     SUBJECTIVE:    Patient is a 88y old Male who presents with a chief complaint of DKA/HHS (21 Aug 2023 16:48)    Today is hospital day 18d. NAEON. Patient has no new complaints this morning. on RA. Contacted patient's daughter Antonia Pagan for updates, daughter stated that patient is aaox1-2 at baseline, at home uses a walker to assist movement, was never officially diagnosed of copd in the past. Patient primary language is Cantonese.      PAST MEDICAL & SURGICAL HISTORY  HTN (hypertension)    Gout    BPH (benign prostatic hyperplasia)    Parkinson disease    HLD (hyperlipidemia)    Smoker within last 12 months    Diabetes mellitus    No significant past surgical history      SOCIAL HISTORY:  Negative for smoking/alcohol/drug use.     ALLERGIES:  No Known Allergies    MEDICATIONS:  STANDING MEDICATIONS  albuterol/ipratropium for Nebulization 3 milliLiter(s) Nebulizer every 6 hours  allopurinol 300 milliGRAM(s) Oral daily  bacitracin   Ointment 1 Application(s) Topical two times a day  budesonide 160 MICROgram(s)/formoterol 4.5 MICROgram(s) Inhaler 2 Puff(s) Inhalation two times a day  chlorhexidine 2% Cloths 1 Application(s) Topical daily  dextrose 5%. 1000 milliLiter(s) IV Continuous <Continuous>  dextrose 5%. 1000 milliLiter(s) IV Continuous <Continuous>  dextrose 50% Injectable 25 Gram(s) IV Push once  dextrose 50% Injectable 25 Gram(s) IV Push once  dextrose 50% Injectable 12.5 Gram(s) IV Push once  enoxaparin Injectable 40 milliGRAM(s) SubCutaneous every 24 hours  glucagon  Injectable 1 milliGRAM(s) IntraMuscular once  insulin glargine Injectable (LANTUS) 8 Unit(s) SubCutaneous at bedtime  insulin lispro (ADMELOG) corrective regimen sliding scale   SubCutaneous three times a day before meals  insulin lispro Injectable (ADMELOG) 3 Unit(s) SubCutaneous three times a day before meals  pantoprazole    Tablet 40 milliGRAM(s) Oral two times a day  potassium chloride   Powder 40 milliEquivalent(s) Oral every 4 hours  predniSONE   Tablet 40 milliGRAM(s) Oral daily    PRN MEDICATIONS  dextrose Oral Gel 15 Gram(s) Oral once PRN    VITALS:   T(F): 98.1  HR: 91  BP: 139/79  RR: 18  SpO2: 95%    LABS:                        8.7    5.79  )-----------( 269      ( 22 Aug 2023 07:03 )             27.8     08-22    144  |  110  |  11  ----------------------------<  82  3.2<L>   |  23  |  0.8    Ca    7.1<L>      22 Aug 2023 07:03  Mg     2.2     08-21    TPro  5.0<L>  /  Alb  2.1<L>  /  TBili  0.5  /  DBili  x   /  AST  32  /  ALT  19  /  AlkPhos  98  08-22      Urinalysis Basic - ( 22 Aug 2023 07:03 )    Color: x / Appearance: x / SG: x / pH: x  Gluc: 82 mg/dL / Ketone: x  / Bili: x / Urobili: x   Blood: x / Protein: x / Nitrite: x   Leuk Esterase: x / RBC: x / WBC x   Sq Epi: x / Non Sq Epi: x / Bacteria: x    RADIOLOGY:    PHYSICAL EXAM:  GEN: No acute distress  LUNGS: Clear to auscultation bilaterally   HEART: Regular  ABD: Soft, non-tender, non-distended.  EXT: NC/NC/NE/2+PP/HERNANDEZ/Skin Intact.   NEURO: AAOX1-2

## 2023-08-23 LAB
ANION GAP SERPL CALC-SCNC: 7 MMOL/L — SIGNIFICANT CHANGE UP (ref 7–14)
BUN SERPL-MCNC: 12 MG/DL — SIGNIFICANT CHANGE UP (ref 10–20)
CALCIUM SERPL-MCNC: 7.1 MG/DL — LOW (ref 8.4–10.5)
CHLORIDE SERPL-SCNC: 111 MMOL/L — HIGH (ref 98–110)
CO2 SERPL-SCNC: 26 MMOL/L — SIGNIFICANT CHANGE UP (ref 17–32)
CREAT SERPL-MCNC: 1 MG/DL — SIGNIFICANT CHANGE UP (ref 0.7–1.5)
EGFR: 73 ML/MIN/1.73M2 — SIGNIFICANT CHANGE UP
GLUCOSE BLDC GLUCOMTR-MCNC: 116 MG/DL — HIGH (ref 70–99)
GLUCOSE BLDC GLUCOMTR-MCNC: 138 MG/DL — HIGH (ref 70–99)
GLUCOSE BLDC GLUCOMTR-MCNC: 139 MG/DL — HIGH (ref 70–99)
GLUCOSE BLDC GLUCOMTR-MCNC: 161 MG/DL — HIGH (ref 70–99)
GLUCOSE SERPL-MCNC: 111 MG/DL — HIGH (ref 70–99)
HCT VFR BLD CALC: 25.8 % — LOW (ref 42–52)
HGB BLD-MCNC: 8.1 G/DL — LOW (ref 14–18)
MAGNESIUM SERPL-MCNC: 2 MG/DL — SIGNIFICANT CHANGE UP (ref 1.8–2.4)
MCHC RBC-ENTMCNC: 30.9 PG — SIGNIFICANT CHANGE UP (ref 27–31)
MCHC RBC-ENTMCNC: 31.4 G/DL — LOW (ref 32–37)
MCV RBC AUTO: 98.5 FL — HIGH (ref 80–94)
NRBC # BLD: 0 /100 WBCS — SIGNIFICANT CHANGE UP (ref 0–0)
PLATELET # BLD AUTO: 282 K/UL — SIGNIFICANT CHANGE UP (ref 130–400)
PMV BLD: 11.6 FL — HIGH (ref 7.4–10.4)
POTASSIUM SERPL-MCNC: 3.6 MMOL/L — SIGNIFICANT CHANGE UP (ref 3.5–5)
POTASSIUM SERPL-SCNC: 3.6 MMOL/L — SIGNIFICANT CHANGE UP (ref 3.5–5)
RBC # BLD: 2.62 M/UL — LOW (ref 4.7–6.1)
RBC # FLD: 19.9 % — HIGH (ref 11.5–14.5)
SODIUM SERPL-SCNC: 144 MMOL/L — SIGNIFICANT CHANGE UP (ref 135–146)
WBC # BLD: 6.19 K/UL — SIGNIFICANT CHANGE UP (ref 4.8–10.8)
WBC # FLD AUTO: 6.19 K/UL — SIGNIFICANT CHANGE UP (ref 4.8–10.8)

## 2023-08-23 PROCEDURE — 99232 SBSQ HOSP IP/OBS MODERATE 35: CPT | Mod: GC

## 2023-08-23 RX ADMIN — PANTOPRAZOLE SODIUM 40 MILLIGRAM(S): 20 TABLET, DELAYED RELEASE ORAL at 05:19

## 2023-08-23 RX ADMIN — BUDESONIDE AND FORMOTEROL FUMARATE DIHYDRATE 2 PUFF(S): 160; 4.5 AEROSOL RESPIRATORY (INHALATION) at 08:19

## 2023-08-23 RX ADMIN — PANTOPRAZOLE SODIUM 40 MILLIGRAM(S): 20 TABLET, DELAYED RELEASE ORAL at 16:58

## 2023-08-23 RX ADMIN — Medication 3 MILLILITER(S): at 19:17

## 2023-08-23 RX ADMIN — Medication 3 MILLILITER(S): at 14:39

## 2023-08-23 RX ADMIN — Medication 40 MILLIGRAM(S): at 05:19

## 2023-08-23 RX ADMIN — INSULIN GLARGINE 8 UNIT(S): 100 INJECTION, SOLUTION SUBCUTANEOUS at 21:12

## 2023-08-23 RX ADMIN — Medication 300 MILLIGRAM(S): at 11:19

## 2023-08-23 RX ADMIN — Medication 3 UNIT(S): at 08:17

## 2023-08-23 RX ADMIN — Medication 1 APPLICATION(S): at 06:26

## 2023-08-23 RX ADMIN — Medication 3 UNIT(S): at 11:19

## 2023-08-23 RX ADMIN — Medication 1: at 16:56

## 2023-08-23 RX ADMIN — Medication 3 UNIT(S): at 16:56

## 2023-08-23 RX ADMIN — CHLORHEXIDINE GLUCONATE 1 APPLICATION(S): 213 SOLUTION TOPICAL at 05:21

## 2023-08-23 RX ADMIN — Medication 1 APPLICATION(S): at 16:57

## 2023-08-23 RX ADMIN — Medication 3 MILLILITER(S): at 08:37

## 2023-08-23 RX ADMIN — ENOXAPARIN SODIUM 40 MILLIGRAM(S): 100 INJECTION SUBCUTANEOUS at 11:19

## 2023-08-23 NOTE — PROGRESS NOTE ADULT - SUBJECTIVE AND OBJECTIVE BOX
SUBJECTIVE:    Patient is a 88y old Male who presents with a chief complaint of DKA/HHS (22 Aug 2023 10:41)     Today is hospital day 19d. CHAVEZ PÉREZ. Patient seen at bedside today, no new complaints, endorses good appetite.      PAST MEDICAL & SURGICAL HISTORY  HTN (hypertension)    Gout    BPH (benign prostatic hyperplasia)    Parkinson disease    HLD (hyperlipidemia)    Smoker within last 12 months    Diabetes mellitus    No significant past surgical history      SOCIAL HISTORY:  Negative for smoking/alcohol/drug use.     ALLERGIES:  No Known Allergies    MEDICATIONS:  STANDING MEDICATIONS  albuterol/ipratropium for Nebulization 3 milliLiter(s) Nebulizer every 6 hours  allopurinol 300 milliGRAM(s) Oral daily  bacitracin   Ointment 1 Application(s) Topical two times a day  budesonide 160 MICROgram(s)/formoterol 4.5 MICROgram(s) Inhaler 2 Puff(s) Inhalation two times a day  chlorhexidine 2% Cloths 1 Application(s) Topical daily  dextrose 5%. 1000 milliLiter(s) IV Continuous <Continuous>  dextrose 5%. 1000 milliLiter(s) IV Continuous <Continuous>  dextrose 50% Injectable 25 Gram(s) IV Push once  dextrose 50% Injectable 25 Gram(s) IV Push once  dextrose 50% Injectable 12.5 Gram(s) IV Push once  enoxaparin Injectable 40 milliGRAM(s) SubCutaneous every 24 hours  glucagon  Injectable 1 milliGRAM(s) IntraMuscular once  insulin glargine Injectable (LANTUS) 8 Unit(s) SubCutaneous at bedtime  insulin lispro (ADMELOG) corrective regimen sliding scale   SubCutaneous three times a day before meals  insulin lispro Injectable (ADMELOG) 3 Unit(s) SubCutaneous three times a day before meals  pantoprazole    Tablet 40 milliGRAM(s) Oral two times a day  predniSONE   Tablet 40 milliGRAM(s) Oral daily    PRN MEDICATIONS  dextrose Oral Gel 15 Gram(s) Oral once PRN    VITALS:   T(F): 98.6  HR: 94  BP: 102/66  RR: 18  SpO2: 98%    LABS:                        8.1    6.19  )-----------( 282      ( 23 Aug 2023 07:52 )             25.8     08-23    144  |  111<H>  |  12  ----------------------------<  111<H>  3.6   |  26  |  1.0    Ca    7.1<L>      23 Aug 2023 07:52  Mg     2.0     08-23    TPro  5.0<L>  /  Alb  2.1<L>  /  TBili  0.5  /  DBili  x   /  AST  32  /  ALT  19  /  AlkPhos  98  08-22      Urinalysis Basic - ( 23 Aug 2023 07:52 )    Color: x / Appearance: x / SG: x / pH: x  Gluc: 111 mg/dL / Ketone: x  / Bili: x / Urobili: x   Blood: x / Protein: x / Nitrite: x   Leuk Esterase: x / RBC: x / WBC x   Sq Epi: x / Non Sq Epi: x / Bacteria: x      RADIOLOGY:    PHYSICAL EXAM:  GEN: No acute distress  LUNGS: wheezing   HEART: Regular  ABD: Soft, non-tender, non-distended.  EXT: NC/NC/NE/2+PP/HERNANDEZ/Skin Intact.   NEURO: AAOX1-2

## 2023-08-23 NOTE — PROGRESS NOTE ADULT - ASSESSMENT
ASSESSMENT  - DKA, improving  - acute duodenal ulcer   - EGD findings noted   - post hemorrhagic anemia  - Septic shock requiring pressors         Hungatella bacteremia likely GI translocation in the setting of appendicitis  - dysphagia/ confusion     SUGGEST:  - on po diet  encourage po intake mostly protein  - glycemic control  -calorie count - to assess po intake  mostly protein intake %  will f/u ASSESSMENT  - DKA, improving  - acute duodenal ulcer   - EGD findings noted   - post hemorrhagic anemia  - Septic shock requiring pressors         Hungatella bacteremia likely GI translocation in the setting of appendicitis  - dysphagia/ confusion     SUGGEST:  - on po diet  encourage po intake mostly protein  - glycemic control  will f/u ASSESSMENT  - DKA, improving  - acute duodenal ulcer   - EGD findings noted   - post hemorrhagic anemia  - Septic shock requiring pressors         Hungatella bacteremia likely GI translocation in the setting of appendicitis  - dysphagia/ confusion     SUGGEST:  - on po diet  - encourage po intake, particularly protein  - monitor intake for a few days to ensure adequacy  - glycemic control improved  will f/u

## 2023-08-23 NOTE — SWALLOW BEDSIDE ASSESSMENT ADULT - NS SPL SWALLOW CLINIC TRIAL FT
+ toleration observed without overt symptoms of penetration/aspiration
Concern for pharyngeal impairment
aversive to oral care and tsp to lips, unable to assess toleration of po
pt aversive to further PO trials.
Mild oral dysphagia for all consistencies +min overt s/s of penetration/aspiration w/ thin via straw +toleration of small cup sips of thin and mildly thick liquids and puree w/o overt s/s of penetration/aspiration
Concern for pharyngeal impairment

## 2023-08-23 NOTE — PROGRESS NOTE ADULT - ASSESSMENT
87yo Cantonese speaking male PMHx DM2, HTN here with decreased appetite found to have HHS/ DKA s/p insulin gtt. Melena s/p EGD demonstrating ulcer. Hungatella bacteremia. Appendicitis.      #suspected COPD, with wheezing  #B/l pleural Effusion  - patient has smoking history of 2 PPD for 60yrs, already quit  - TTE 8/11: EF 63%  - pulmonary consulted, recs appreciated  - started po prednisone 40 qd - 40 for 5 days, 20 for 5 days  - duonebs q6h prn  - repeat CXR 8/21: stable  - was given doses of iv lasix  - HOB elevation 45 degrees  - aspiration precautions  - PT rec SNF    #Decreased oral intake - improving  - calorie count completed, f/u nutrition eval  - patient tolerating po intake, NG tube taken out 8/22  - S&S eval 8/23: advance diet to soft bite-sized with thin liquid    #Hungatella bacteremia likely GI translocation in the setting of appendicitis  - bcx 8/4 +hungatella  - bcx 8/5 onwards ntd  - TTE showed no vegetations  - s/p kenyatta, flagyl, cefepime, vanco  - monitor off Abx now per ID    #Melena  #Abdominal pain  - s/p egd 8/14 with two ulcers; one was cauterized, given epi  - CTAP 8/19: No definite correlate for acute abdominal pain. Unchanged nonspecific mild dilation of appendix, up to 0.8 cm, without definite periappendiceal inflammatory stranding.  - h/h stable, trend  - protonix 40 iv bid    #Transaminitis - resolved  #abd tenderness  - mixed, in setting of infection  - ct abd with hepatic cyst    #DM2 (diabetes mellitus, type 2) - controlled  - s/p insulin gtt for HHS/ DKA  - on lantus 8 and lispro 3  - ssi    #Hypokalemia -resolved   - monitor     #HTN (hypertension) - stable  - outpt f/u      DVT PPx   GI PPx  Dispo: patient is from home, will req SNF per PT

## 2023-08-24 LAB
ANION GAP SERPL CALC-SCNC: 11 MMOL/L — SIGNIFICANT CHANGE UP (ref 7–14)
BUN SERPL-MCNC: 13 MG/DL — SIGNIFICANT CHANGE UP (ref 10–20)
CALCIUM SERPL-MCNC: 7.3 MG/DL — LOW (ref 8.4–10.4)
CHLORIDE SERPL-SCNC: 110 MMOL/L — SIGNIFICANT CHANGE UP (ref 98–110)
CO2 SERPL-SCNC: 24 MMOL/L — SIGNIFICANT CHANGE UP (ref 17–32)
CREAT SERPL-MCNC: 1 MG/DL — SIGNIFICANT CHANGE UP (ref 0.7–1.5)
EGFR: 73 ML/MIN/1.73M2 — SIGNIFICANT CHANGE UP
GLUCOSE BLDC GLUCOMTR-MCNC: 169 MG/DL — HIGH (ref 70–99)
GLUCOSE BLDC GLUCOMTR-MCNC: 179 MG/DL — HIGH (ref 70–99)
GLUCOSE BLDC GLUCOMTR-MCNC: 211 MG/DL — HIGH (ref 70–99)
GLUCOSE BLDC GLUCOMTR-MCNC: 247 MG/DL — HIGH (ref 70–99)
GLUCOSE SERPL-MCNC: 139 MG/DL — HIGH (ref 70–99)
HCT VFR BLD CALC: 26.4 % — LOW (ref 42–52)
HGB BLD-MCNC: 8.1 G/DL — LOW (ref 14–18)
MAGNESIUM SERPL-MCNC: 2 MG/DL — SIGNIFICANT CHANGE UP (ref 1.8–2.4)
MCHC RBC-ENTMCNC: 30.6 PG — SIGNIFICANT CHANGE UP (ref 27–31)
MCHC RBC-ENTMCNC: 30.7 G/DL — LOW (ref 32–37)
MCV RBC AUTO: 99.6 FL — HIGH (ref 80–94)
NRBC # BLD: 0 /100 WBCS — SIGNIFICANT CHANGE UP (ref 0–0)
PLATELET # BLD AUTO: 282 K/UL — SIGNIFICANT CHANGE UP (ref 130–400)
PMV BLD: 11.3 FL — HIGH (ref 7.4–10.4)
POTASSIUM SERPL-MCNC: 3.5 MMOL/L — SIGNIFICANT CHANGE UP (ref 3.5–5)
POTASSIUM SERPL-SCNC: 3.5 MMOL/L — SIGNIFICANT CHANGE UP (ref 3.5–5)
RBC # BLD: 2.65 M/UL — LOW (ref 4.7–6.1)
RBC # FLD: 19.4 % — HIGH (ref 11.5–14.5)
SODIUM SERPL-SCNC: 145 MMOL/L — SIGNIFICANT CHANGE UP (ref 135–146)
WBC # BLD: 5.97 K/UL — SIGNIFICANT CHANGE UP (ref 4.8–10.8)
WBC # FLD AUTO: 5.97 K/UL — SIGNIFICANT CHANGE UP (ref 4.8–10.8)

## 2023-08-24 PROCEDURE — 99232 SBSQ HOSP IP/OBS MODERATE 35: CPT

## 2023-08-24 PROCEDURE — 74018 RADEX ABDOMEN 1 VIEW: CPT | Mod: 26

## 2023-08-24 RX ADMIN — Medication 3 MILLILITER(S): at 14:47

## 2023-08-24 RX ADMIN — Medication 3 MILLILITER(S): at 21:22

## 2023-08-24 RX ADMIN — PANTOPRAZOLE SODIUM 40 MILLIGRAM(S): 20 TABLET, DELAYED RELEASE ORAL at 05:05

## 2023-08-24 RX ADMIN — Medication 3 UNIT(S): at 11:22

## 2023-08-24 RX ADMIN — Medication 1 APPLICATION(S): at 05:04

## 2023-08-24 RX ADMIN — Medication 1 APPLICATION(S): at 17:23

## 2023-08-24 RX ADMIN — ENOXAPARIN SODIUM 40 MILLIGRAM(S): 100 INJECTION SUBCUTANEOUS at 11:22

## 2023-08-24 RX ADMIN — Medication 40 MILLIGRAM(S): at 05:05

## 2023-08-24 RX ADMIN — Medication 2: at 11:23

## 2023-08-24 RX ADMIN — Medication 3 UNIT(S): at 08:14

## 2023-08-24 RX ADMIN — PANTOPRAZOLE SODIUM 40 MILLIGRAM(S): 20 TABLET, DELAYED RELEASE ORAL at 17:23

## 2023-08-24 RX ADMIN — CHLORHEXIDINE GLUCONATE 1 APPLICATION(S): 213 SOLUTION TOPICAL at 05:06

## 2023-08-24 RX ADMIN — Medication 1: at 08:15

## 2023-08-24 RX ADMIN — Medication 2: at 16:37

## 2023-08-24 RX ADMIN — Medication 3 MILLILITER(S): at 08:21

## 2023-08-24 RX ADMIN — INSULIN GLARGINE 8 UNIT(S): 100 INJECTION, SOLUTION SUBCUTANEOUS at 21:27

## 2023-08-24 RX ADMIN — Medication 300 MILLIGRAM(S): at 11:22

## 2023-08-24 RX ADMIN — Medication 3 UNIT(S): at 16:36

## 2023-08-24 NOTE — PROGRESS NOTE ADULT - ASSESSMENT
89yo Cantonese speaking male PMHx DM2, HTN here with decreased appetite found to have HHS/ DKA s/p insulin gtt. Melena s/p EGD demonstrating ulcer. Hungatella bacteremia. Acute Appendicitis. Patient aaox1-2 at baseline.  Patient was admitted to MICU 8/4 and transferred to the medicine floor 8/17  Patient was first in MICU for DKA/HHS    #suspected COPD, with wheezing  #B/l pleural Effusion  - patient has smoking history of 2 PPD for 60yrs, already quit  - TTE 8/11: EF 63%  - pulmonary consulted, recs appreciated  - started po prednisone 40 qd - 40 for 5 days, 20 for 5 days  - duonebs q6h prn  - repeat CXR 8/21: stable  - was given doses of iv lasix  - HOB elevation 45 degrees  - aspiration precautions  - PT rec SNF    #Decreased oral intake - improving  - calorie count, f/u nutrition eval  - patient tolerating po intake, NG tube taken out 8/22  - S&S eval 8/23: advance diet to soft bite-sized with thin liquid  - pending nutrition re-eval    #Hungatella bacteremia likely GI translocation in the setting of appendicitis  - bcx 8/4 +hungatella  - bcx 8/5 onwards ntd  - TTE showed no vegetations  - s/p kenyatta, flagyl, cefepime, vanco  - monitor off Abx now per ID    #Melena  #Abdominal pain  - s/p egd 8/14 with two ulcers; one was cauterized, given epi  - CTAP 8/19: No definite correlate for acute abdominal pain. Unchanged nonspecific mild dilation of appendix, up to 0.8 cm, without definite periappendiceal inflammatory stranding.  - h/h stable, trend  - protonix 40 po bid    #Transaminitis - resolved  #abd tenderness  - mixed, in setting of infection  - ct abd with hepatic cyst    #DM2 (diabetes mellitus, type 2) - controlled  - s/p insulin gtt for HHS/ DKA  - on lantus 8 and lispro 3  - ssi    #Hypokalemia -resolved     Dispo: snf  - monitor     #HTN (hypertension) - stable  - outpt f/u      DVT PPx   GI PPx  Dispo: patient is from home, will req SNF per PT  pending nutrition re-eval

## 2023-08-24 NOTE — PROGRESS NOTE ADULT - ASSESSMENT
89yo Cantonese speaking male PMHx DM2, HTN here with decreased appetite found to have HHS/ DKA s/p insulin gtt. Melena s/p EGD demonstrating ulcer. Hungatella bacteremia. Appendicitis.      #suspected COPD, with wheezing  #B/l pleural Effusion  - patient has smoking history of 2 PPD for 60yrs, already quit  - TTE 8/11: EF 63%  - pulmonary consulted, recs appreciated  - started po prednisone 40 qd - 40 for 5 days, 20 for 5 days  - duonebs q6h prn  - repeat CXR 8/21: stable  - was given doses of iv lasix  - HOB elevation 45 degrees  - aspiration precautions  - PT rec SNF    #Decreased oral intake - improving  - calorie count completed, f/u nutrition eval  - patient tolerating po intake, NG tube taken out 8/22  - S&S eval 8/23: advance diet to soft bite-sized with thin liquid    #Hungatella bacteremia likely GI translocation in the setting of appendicitis  - bcx 8/4 +hungatella  - bcx 8/5 onwards ntd  - TTE showed no vegetations  - s/p kenyatta, flagyl, cefepime, vanco  - monitor off Abx now per ID    #Melena  #Abdominal pain  - s/p egd 8/14 with two ulcers; one was cauterized, given epi  - CTAP 8/19: No definite correlate for acute abdominal pain. Unchanged nonspecific mild dilation of appendix, up to 0.8 cm, without definite periappendiceal inflammatory stranding.  - h/h stable, trend  - protonix 40 po bid    #Transaminitis - resolved  #abd tenderness  - mixed, in setting of infection  - ct abd with hepatic cyst    #DM2 (diabetes mellitus, type 2) - controlled  - s/p insulin gtt for HHS/ DKA  - on lantus 8 and lispro 3  - ssi    #Hypokalemia -resolved   - monitor     #HTN (hypertension) - stable  - outpt f/u      DVT PPx   GI PPx  Dispo: patient is from home, will req SNF per PT   89yo Cantonese speaking male PMHx DM2, HTN here with decreased appetite found to have HHS/ DKA s/p insulin gtt. Melena s/p EGD demonstrating ulcer. Hungatella bacteremia. Appendicitis. Patient aaox1-2 at baseline.      #suspected COPD, with wheezing  #B/l pleural Effusion  - patient has smoking history of 2 PPD for 60yrs, already quit  - TTE 8/11: EF 63%  - pulmonary consulted, recs appreciated  - started po prednisone 40 qd - 40 for 5 days, 20 for 5 days  - duonebs q6h prn  - repeat CXR 8/21: stable  - was given doses of iv lasix  - HOB elevation 45 degrees  - aspiration precautions  - PT rec SNF    #Decreased oral intake - improving  - calorie count, f/u nutrition eval  - patient tolerating po intake, NG tube taken out 8/22  - S&S eval 8/23: advance diet to soft bite-sized with thin liquid  - pending nutrition re-eval    #Hungatella bacteremia likely GI translocation in the setting of appendicitis  - bcx 8/4 +hungatella  - bcx 8/5 onwards ntd  - TTE showed no vegetations  - s/p kenyatta, flagyl, cefepime, vanco  - monitor off Abx now per ID    #Melena  #Abdominal pain  - s/p egd 8/14 with two ulcers; one was cauterized, given epi  - CTAP 8/19: No definite correlate for acute abdominal pain. Unchanged nonspecific mild dilation of appendix, up to 0.8 cm, without definite periappendiceal inflammatory stranding.  - h/h stable, trend  - protonix 40 po bid    #Transaminitis - resolved  #abd tenderness  - mixed, in setting of infection  - ct abd with hepatic cyst    #DM2 (diabetes mellitus, type 2) - controlled  - s/p insulin gtt for HHS/ DKA  - on lantus 8 and lispro 3  - ssi    #Hypokalemia -resolved     Dispo: snf  - monitor     #HTN (hypertension) - stable  - outpt f/u      DVT PPx   GI PPx  Dispo: patient is from home, will req SNF per PT  pending nutrition re-eval

## 2023-08-24 NOTE — PROGRESS NOTE ADULT - SUBJECTIVE AND OBJECTIVE BOX
Patient is a 88y old  Male who presents with a chief complaint of DKA/HHS (24 Aug 2023 10:50)      Patient seen and examined at bedside.  Patient denies any chest pain or shortness of breath   ALLERGIES:  No Known Allergies    MEDICATIONS:  albuterol/ipratropium for Nebulization 3 milliLiter(s) Nebulizer every 6 hours  allopurinol 300 milliGRAM(s) Oral daily  bacitracin   Ointment 1 Application(s) Topical two times a day  budesonide 160 MICROgram(s)/formoterol 4.5 MICROgram(s) Inhaler 2 Puff(s) Inhalation two times a day  chlorhexidine 2% Cloths 1 Application(s) Topical daily  dextrose 5%. 1000 milliLiter(s) IV Continuous <Continuous>  dextrose 5%. 1000 milliLiter(s) IV Continuous <Continuous>  dextrose 50% Injectable 25 Gram(s) IV Push once  dextrose 50% Injectable 25 Gram(s) IV Push once  dextrose 50% Injectable 12.5 Gram(s) IV Push once  dextrose Oral Gel 15 Gram(s) Oral once PRN  enoxaparin Injectable 40 milliGRAM(s) SubCutaneous every 24 hours  glucagon  Injectable 1 milliGRAM(s) IntraMuscular once  insulin glargine Injectable (LANTUS) 8 Unit(s) SubCutaneous at bedtime  insulin lispro (ADMELOG) corrective regimen sliding scale   SubCutaneous three times a day before meals  insulin lispro Injectable (ADMELOG) 3 Unit(s) SubCutaneous three times a day before meals  pantoprazole    Tablet 40 milliGRAM(s) Oral two times a day  predniSONE   Tablet 40 milliGRAM(s) Oral daily    Vital Signs Last 24 Hrs  T(F): 96.4 (24 Aug 2023 16:13), Max: 98.2 (24 Aug 2023 00:06)  HR: 94 (24 Aug 2023 16:13) (94 - 96)  BP: 128/78 (24 Aug 2023 16:13) (116/68 - 128/78)  RR: 18 (24 Aug 2023 16:13) (18 - 18)  SpO2: 95% (24 Aug 2023 16:13) (95% - 100%)  I&O's Summary    23 Aug 2023 07:01  -  24 Aug 2023 07:00  --------------------------------------------------------  IN: 480 mL / OUT: 0 mL / NET: 480 mL    24 Aug 2023 07:01  -  24 Aug 2023 17:10  --------------------------------------------------------  IN: 720 mL / OUT: 0 mL / NET: 720 mL        PHYSICAL EXAM:  General: NAD, A/O x 2  ENT: MMM  Neck: Supple, No JVD  Lungs: Clear to auscultation bilaterally  Cardio: RRR, S1/S2, No murmurs  Abdomen: Soft, Nontender, Nondistended; Bowel sounds present  Extremities: No cyanosis, No edema    LABS:                        8.1    5.97  )-----------( 282      ( 24 Aug 2023 06:25 )             26.4     08-24    145  |  110  |  13  ----------------------------<  139  3.5   |  24  |  1.0    Ca    7.3      24 Aug 2023 06:25  Mg     2.0     08-24    TPro  5.0  /  Alb  2.1  /  TBili  0.5  /  DBili  x   /  AST  32  /  ALT  19  /  AlkPhos  98  08-22                            POCT Blood Glucose.: 211 mg/dL (24 Aug 2023 16:25)  POCT Blood Glucose.: 247 mg/dL (24 Aug 2023 11:08)  POCT Blood Glucose.: 179 mg/dL (24 Aug 2023 08:06)  POCT Blood Glucose.: 139 mg/dL (23 Aug 2023 20:39)      Urinalysis Basic - ( 24 Aug 2023 06:25 )    Color: x / Appearance: x / SG: x / pH: x  Gluc: 139 mg/dL / Ketone: x  / Bili: x / Urobili: x   Blood: x / Protein: x / Nitrite: x   Leuk Esterase: x / RBC: x / WBC x   Sq Epi: x / Non Sq Epi: x / Bacteria: x            RADIOLOGY & ADDITIONAL TESTS:    Care Discussed with Consultants/Other Providers:

## 2023-08-24 NOTE — PROGRESS NOTE ADULT - SUBJECTIVE AND OBJECTIVE BOX
SUBJECTIVE:    Patient is a 88y old Male who presents with a chief complaint of DKA/HHS (23 Aug 2023 15:16)    Today is hospital day 20d. NAEON. VSS. This morning patient has no acute complaints. Tolerating po intake well, on RA.      PAST MEDICAL & SURGICAL HISTORY  HTN (hypertension)    Gout    BPH (benign prostatic hyperplasia)    Parkinson disease    HLD (hyperlipidemia)    Smoker within last 12 months    Diabetes mellitus    No significant past surgical history      SOCIAL HISTORY:  Negative for smoking/alcohol/drug use.     ALLERGIES:  No Known Allergies    MEDICATIONS:  STANDING MEDICATIONS  albuterol/ipratropium for Nebulization 3 milliLiter(s) Nebulizer every 6 hours  allopurinol 300 milliGRAM(s) Oral daily  bacitracin   Ointment 1 Application(s) Topical two times a day  budesonide 160 MICROgram(s)/formoterol 4.5 MICROgram(s) Inhaler 2 Puff(s) Inhalation two times a day  chlorhexidine 2% Cloths 1 Application(s) Topical daily  dextrose 5%. 1000 milliLiter(s) IV Continuous <Continuous>  dextrose 5%. 1000 milliLiter(s) IV Continuous <Continuous>  dextrose 50% Injectable 25 Gram(s) IV Push once  dextrose 50% Injectable 25 Gram(s) IV Push once  dextrose 50% Injectable 12.5 Gram(s) IV Push once  enoxaparin Injectable 40 milliGRAM(s) SubCutaneous every 24 hours  glucagon  Injectable 1 milliGRAM(s) IntraMuscular once  insulin glargine Injectable (LANTUS) 8 Unit(s) SubCutaneous at bedtime  insulin lispro (ADMELOG) corrective regimen sliding scale   SubCutaneous three times a day before meals  insulin lispro Injectable (ADMELOG) 3 Unit(s) SubCutaneous three times a day before meals  pantoprazole    Tablet 40 milliGRAM(s) Oral two times a day  predniSONE   Tablet 40 milliGRAM(s) Oral daily    PRN MEDICATIONS  dextrose Oral Gel 15 Gram(s) Oral once PRN    VITALS:   T(F): 98.1  HR: 96  BP: 125/78  RR: 18  SpO2: 100%    LABS:                        8.1    5.97  )-----------( 282      ( 24 Aug 2023 06:25 )             26.4     08-24    145  |  110  |  13  ----------------------------<  139<H>  3.5   |  24  |  1.0    Ca    7.3<L>      24 Aug 2023 06:25  Mg     2.0     08-24        Urinalysis Basic - ( 24 Aug 2023 06:25 )    Color: x / Appearance: x / SG: x / pH: x  Gluc: 139 mg/dL / Ketone: x  / Bili: x / Urobili: x   Blood: x / Protein: x / Nitrite: x   Leuk Esterase: x / RBC: x / WBC x   Sq Epi: x / Non Sq Epi: x / Bacteria: x    RADIOLOGY:    PHYSICAL EXAM:  GEN: No acute distress  LUNGS: bl wheezing   HEART: Regular  ABD: Soft, non-tender, non-distended.  EXT: NC/NC/NE/2+PP/HERNANDEZ/Skin Intact.   NEURO: AAOX2

## 2023-08-25 ENCOUNTER — TRANSCRIPTION ENCOUNTER (OUTPATIENT)
Age: 88
End: 2023-08-25

## 2023-08-25 LAB
ANION GAP SERPL CALC-SCNC: 10 MMOL/L — SIGNIFICANT CHANGE UP (ref 7–14)
BUN SERPL-MCNC: 13 MG/DL — SIGNIFICANT CHANGE UP (ref 10–20)
CALCIUM SERPL-MCNC: 7.3 MG/DL — LOW (ref 8.4–10.5)
CHLORIDE SERPL-SCNC: 109 MMOL/L — SIGNIFICANT CHANGE UP (ref 98–110)
CO2 SERPL-SCNC: 22 MMOL/L — SIGNIFICANT CHANGE UP (ref 17–32)
CREAT SERPL-MCNC: 0.9 MG/DL — SIGNIFICANT CHANGE UP (ref 0.7–1.5)
EGFR: 83 ML/MIN/1.73M2 — SIGNIFICANT CHANGE UP
GLUCOSE BLDC GLUCOMTR-MCNC: 147 MG/DL — HIGH (ref 70–99)
GLUCOSE BLDC GLUCOMTR-MCNC: 150 MG/DL — HIGH (ref 70–99)
GLUCOSE BLDC GLUCOMTR-MCNC: 178 MG/DL — HIGH (ref 70–99)
GLUCOSE BLDC GLUCOMTR-MCNC: 234 MG/DL — HIGH (ref 70–99)
GLUCOSE SERPL-MCNC: 148 MG/DL — HIGH (ref 70–99)
HCT VFR BLD CALC: 26.2 % — LOW (ref 42–52)
HGB BLD-MCNC: 8.4 G/DL — LOW (ref 14–18)
MAGNESIUM SERPL-MCNC: 1.9 MG/DL — SIGNIFICANT CHANGE UP (ref 1.8–2.4)
MCHC RBC-ENTMCNC: 31.1 PG — HIGH (ref 27–31)
MCHC RBC-ENTMCNC: 32.1 G/DL — SIGNIFICANT CHANGE UP (ref 32–37)
MCV RBC AUTO: 97 FL — HIGH (ref 80–94)
NRBC # BLD: 0 /100 WBCS — SIGNIFICANT CHANGE UP (ref 0–0)
PLATELET # BLD AUTO: 268 K/UL — SIGNIFICANT CHANGE UP (ref 130–400)
PMV BLD: 11.1 FL — HIGH (ref 7.4–10.4)
POTASSIUM SERPL-MCNC: 3.7 MMOL/L — SIGNIFICANT CHANGE UP (ref 3.5–5)
POTASSIUM SERPL-SCNC: 3.7 MMOL/L — SIGNIFICANT CHANGE UP (ref 3.5–5)
RBC # BLD: 2.7 M/UL — LOW (ref 4.7–6.1)
RBC # FLD: 19 % — HIGH (ref 11.5–14.5)
SODIUM SERPL-SCNC: 141 MMOL/L — SIGNIFICANT CHANGE UP (ref 135–146)
WBC # BLD: 6.46 K/UL — SIGNIFICANT CHANGE UP (ref 4.8–10.8)
WBC # FLD AUTO: 6.46 K/UL — SIGNIFICANT CHANGE UP (ref 4.8–10.8)

## 2023-08-25 PROCEDURE — 99232 SBSQ HOSP IP/OBS MODERATE 35: CPT

## 2023-08-25 RX ORDER — BUDESONIDE AND FORMOTEROL FUMARATE DIHYDRATE 160; 4.5 UG/1; UG/1
2 AEROSOL RESPIRATORY (INHALATION)
Qty: 0 | Refills: 0 | DISCHARGE
Start: 2023-08-25

## 2023-08-25 RX ORDER — PANTOPRAZOLE SODIUM 20 MG/1
1 TABLET, DELAYED RELEASE ORAL
Qty: 0 | Refills: 0 | DISCHARGE
Start: 2023-08-25

## 2023-08-25 RX ORDER — IPRATROPIUM/ALBUTEROL SULFATE 18-103MCG
3 AEROSOL WITH ADAPTER (GRAM) INHALATION
Qty: 0 | Refills: 0 | DISCHARGE
Start: 2023-08-25

## 2023-08-25 RX ADMIN — Medication 3 MILLILITER(S): at 08:27

## 2023-08-25 RX ADMIN — Medication 300 MILLIGRAM(S): at 11:47

## 2023-08-25 RX ADMIN — CHLORHEXIDINE GLUCONATE 1 APPLICATION(S): 213 SOLUTION TOPICAL at 06:08

## 2023-08-25 RX ADMIN — PANTOPRAZOLE SODIUM 40 MILLIGRAM(S): 20 TABLET, DELAYED RELEASE ORAL at 06:09

## 2023-08-25 RX ADMIN — Medication 2: at 17:13

## 2023-08-25 RX ADMIN — PANTOPRAZOLE SODIUM 40 MILLIGRAM(S): 20 TABLET, DELAYED RELEASE ORAL at 17:13

## 2023-08-25 RX ADMIN — Medication 1 APPLICATION(S): at 17:13

## 2023-08-25 RX ADMIN — INSULIN GLARGINE 8 UNIT(S): 100 INJECTION, SOLUTION SUBCUTANEOUS at 21:55

## 2023-08-25 RX ADMIN — Medication 40 MILLIGRAM(S): at 06:09

## 2023-08-25 RX ADMIN — ENOXAPARIN SODIUM 40 MILLIGRAM(S): 100 INJECTION SUBCUTANEOUS at 11:47

## 2023-08-25 RX ADMIN — Medication 3 UNIT(S): at 17:13

## 2023-08-25 RX ADMIN — BUDESONIDE AND FORMOTEROL FUMARATE DIHYDRATE 2 PUFF(S): 160; 4.5 AEROSOL RESPIRATORY (INHALATION) at 20:00

## 2023-08-25 RX ADMIN — Medication 1 APPLICATION(S): at 06:09

## 2023-08-25 RX ADMIN — Medication 3 MILLILITER(S): at 20:17

## 2023-08-25 RX ADMIN — Medication 3 UNIT(S): at 08:11

## 2023-08-25 RX ADMIN — Medication 3 MILLILITER(S): at 13:01

## 2023-08-25 RX ADMIN — Medication 3 UNIT(S): at 11:46

## 2023-08-25 NOTE — PROGRESS NOTE ADULT - ASSESSMENT
87yo Cantonese speaking male PMHx DM2, HTN here with decreased appetite found to have HHS/ DKA s/p insulin gtt. Melena s/p EGD demonstrating ulcer. Hungatella bacteremia. Appendicitis. Patient aaox1-2 at baseline.      #suspected COPD, with wheezing  #B/l pleural Effusion  - patient has smoking history of 2 PPD for 60yrs, already quit  - TTE 8/11: EF 63%  - pulmonary consulted, recs appreciated  - started po prednisone 40 qd - 40 for 5 days, 20 for 5 days  - duonebs q6h prn  - repeat CXR 8/21: stable  - was given doses of iv lasix  - HOB elevation 45 degrees  - aspiration precautions  - PT rec SNF    #Decreased oral intake - improving  - calorie count, f/u nutrition eval  - patient tolerating po intake, NG tube taken out 8/22  - S&S eval 8/23: advance diet to soft bite-sized with thin liquid  - nutrition re-eval 8/25 cleared patient for discharge    #Hungatella bacteremia likely GI translocation in the setting of appendicitis  - bcx 8/4 +hungatella  - bcx 8/5 onwards ntd  - TTE showed no vegetations  - s/p kenyatta, flagyl, cefepime, vanco  - monitor off Abx now per ID    #Melena  #Abdominal pain  - s/p egd 8/14 with two ulcers; one was cauterized, given epi  - CTAP 8/19: No definite correlate for acute abdominal pain. Unchanged nonspecific mild dilation of appendix, up to 0.8 cm, without definite periappendiceal inflammatory stranding.  - h/h stable, trend  - protonix 40 po bid    #Transaminitis - resolved  #abd tenderness  - mixed, in setting of infection  - ct abd with hepatic cyst    #DM2 (diabetes mellitus, type 2) - controlled  - s/p insulin gtt for HHS/ DKA  - on lantus 8 and lispro 3  - ssi    #Hypokalemia -resolved     Dispo: snf  - monitor     #HTN (hypertension) - stable  - outpt f/u      DVT PPx   GI PPx  Dispo: patient is from home, will req SNF per PT

## 2023-08-25 NOTE — CHART NOTE - NSCHARTNOTEFT_GEN_A_CORE
Notified by nurse of necrotic looking skin lesion along R forearm. Suspect from infiltrated IV that may have had levophed running through it earlier in patient's hospital course. Burn consulted was placed and called. They will follow up in AM. Patient currently stable. Notified by nurse of necrotic looking skin lesion along R forearm. Suspect from infiltrated IV that may have had levophed running through it earlier in patient's hospital course. Burn consulted was placed and called. They will follow up in AM. Patient currently stable.    On chart review, pt was on levophed in the ICU from ~ 8/5-8/13; unclear when IV infiltration occurred as there is no documentation of wound and unclear of how frequently dressing changes were occurring prior to today's; pt is a poor historian; R Forearm w/ necrotic appearing wound, non erythematous; suspect will need debridement

## 2023-08-25 NOTE — DISCHARGE NOTE PROVIDER - HOSPITAL COURSE
89yo Cantonese speaking male PMHx DM2, HTN here with decreased appetite found to have HHS/ DKA s/p insulin gtt. Melena s/p EGD demonstrating ulcer. Hungatella bacteremia. Appendicitis. Patient aaox1-2 at baseline.      #suspected COPD, with wheezing  #B/l pleural Effusion  - patient has smoking history of 2 PPD for 60yrs, already quit  - pulmonary consulted, recs appreciated  - started po prednisone 40 qd - will do taper 40 for 5 days, 20 for 5 days  - duonebs q6h prn  - repeat CXR 8/21: stable  - was given doses of iv lasix  - HOB elevation 45 degrees  - aspiration precautions    #Decreased oral intake - improving  - calorie count completed, meeting nutrition needs per nutrition eval  - patient tolerating po intake, NG tube taken out 8/22  - S&S eval 8/23: advance diet to soft bite-sized with thin liquid - patient tolerating well    #Hungatella bacteremia likely GI translocation in the setting of appendicitis  - bcx 8/4 +hungatella  - bcx 8/5 onwards ntd  - TTE showed no vegetations  - s/p kenyatta, flagyl, cefepime, vanco  - monitor off Abx now per ID - stable, bcx neg, asymptomatic    #Melena  #Abdominal pain  - s/p egd 8/14 showing two ulcers; one was cauterized, given epi  - CTAP 8/19: No definite correlate for acute abdominal pain. Unchanged nonspecific mild dilation of appendix, up to 0.8 cm, without definite periappendiceal inflammatory stranding.  - h/h stable, trend  - protonix 40 po bid    #Transaminitis - resolved  - mixed, in setting of infection  - ct abd with hepatic cyst    #DM2 (diabetes mellitus, type 2) - controlled  - s/p insulin gtt for HHS/ DKA  - on lantus 8 and lispro 3  - ssi    #Hypokalemia -resolved     Patient is now stable. BG under control. Tolerating po diet well, meeting nutrition needs. Stable for discharge. 87yo Cantonese speaking male PMHx DM2, HTN here with decreased appetite found to have HHS/ DKA s/p insulin gtt. Melena s/p EGD demonstrating ulcer. Hungatella bacteremia. Appendicitis. Patient aaox1-2 at baseline.    #Decreased oral intake - improving  - calorie count completed, meeting nutrition needs per nutrition eval  - patient tolerating po intake, NG tube taken out 8/22  - S&S eval 8/23: advance diet to soft bite-sized with thin liquid - patient tolerating well    #suspected COPD, with wheezing  #B/l pleural Effusion  - patient has smoking history of 2 PPD for 60yrs, already quit  - pulmonary consulted, recs appreciated  - started po prednisone 40 qd - will do taper 40 for 5 days, 20 for 5 days  - duonebs q6h prn  - repeat CXR 8/21: stable  - was given doses of iv lasix  - HOB elevation 45 degrees  - aspiration precautions    #Hungatella bacteremia likely GI translocation in the setting of appendicitis  - bcx 8/4 +hungatella  - bcx 8/5 onwards ntd  - TTE showed no vegetations  - s/p kenyatta, flagyl, cefepime, vanco  - monitor off Abx now per ID - stable, bcx neg, asymptomatic    #Melena  #Abdominal pain  - s/p egd 8/14 showing two ulcers; one was cauterized, given epi  - CTAP 8/19: No definite correlate for acute abdominal pain. Unchanged nonspecific mild dilation of appendix, up to 0.8 cm, without definite periappendiceal inflammatory stranding.  - h/h stable, trend  - protonix 40 po bid    #Transaminitis - resolved  - mixed, in setting of infection  - ct abd with hepatic cyst    #DM2 (diabetes mellitus, type 2) - controlled  - s/p insulin gtt for HHS/ DKA  - on lantus 8 and lispro 3  - ssi    #Hypokalemia -resolved     Patient is now stable. BG under control. Tolerating po diet well, meeting nutrition needs. Stable for discharge. 89yo Cantonese speaking male PMHx DM2, HTN here with decreased appetite found to have HHS/ DKA s/p insulin gtt. Melena s/p EGD demonstrating ulcer.   Hungatella bacteremia. Appendicitis. Patient aaox1-2 at baseline.    #Right forearm wound2/2 IV infiltrate ,s/p debridement    #Decreased oral intake - improving, calorie count completed, meeting nutrition needs per nutrition eval  patient tolerating po intake, NG tube taken out 8/22., S&S eval 8/23: advance diet to soft bite-sized with thin liquid - patient tolerating well    #suspected COPD, with wheezing  #B/l pleural Effusion, patient has smoking history of 2 PPD for 60yrs, already quit, pulmonary consulted, recs appreciated  started po prednisone 40 qd - will do taper 40 for 5 days, 20 for 5 days, duonebs q6h prn, repeat CXR 8/21: stable, was given doses of iv lasix  HOB elevation 45 degrees, aspiration precautions    #Hungatella bacteremia likely GI translocation in the setting of appendicitis, bcx 8/4 +hungatella, bcx 8/5 onwards ntd, TTE showed no vegetations  s/p kenyatta, flagyl, cefepime, vanco, monitor off Abx now per ID - stable, bcx neg, asymptomatic    #Melena  #Abdominal pain, s/p egd 8/14 showing two ulcers; one was cauterized, given epi  CTAP 8/19: No definite correlate for acute abdominal pain. Unchanged nonspecific mild dilation of appendix, up to 0.8 cm, without definite periappendiceal inflammatory stranding.   h/h stable, protonix 40 po bid    #Transaminitis - resolved, mixed, in setting of infection ct abd with hepatic cyst    #DM2 (diabetes mellitus, type 2) - controlled  s/p insulin gtt for HHS/ DKA  on lantus 8 and lispro 3      #Hypokalemia -resolved     Patient is now stable. BG under control. Tolerating po diet well, meeting nutrition needs. Stable for discharge. Admitted 8/4    88 yr man with a medical history significant for Parkinsonism, diabetes, who presented initially with DKA, recurrent shock after a long and complex hospitalization involving severe GI bleeding.     ICU Course:   Initially went for DKA/HHS treatment in ICU, resolved.   Then Patient developed abdominal pain . CT abdomen showed signs of appendicitis and unopacified inferior mesenteric artery which is new. No signs of bowel ischemia .  Surgery consulted ,non surgical management for the appendicitis , patient was started on IV antibiotics and anti-fungal   Patient was also having melena  EGD done and showed 2 duodenal ulcers , one ulcer had a visible vessel which was cauterized   started on PPI BID and GI on board   Patient was receiving free water per NG for his hyponatremia     SICU Course: Treated for Hungatella bacteremia. Appendicitis.    9/4   4B Course: RR called for hypotension BP 78/50. Pt currently receiving 1u pRBC for suspected UGIB w/ 2 episodes of melena this morning. Of note, pRBC was held initially for temp 101.5 and . Fever resolved with Tylenol and 1u pRBC was given with BP drop to 78/50 after ~1/2 bag given. No rash or SOB on exam. Pt AAOx1 at baseline.  2x 18 gauge IVs placed. Given 1.5L LR bolus with no improvement in BP. Started on peripheral Levophed, BP stabilized with Levo around 0.1. Blood cultures and STAT labs sent.   Wound cultures growing Pseudomonas. Pt already receiving Cefepime; will add on Levaquin for double Pseudomonas coverage   Hgb 7.1; will order a 2nd unit of blood  Discussed with GI fellow; no plan for urgent endoscopic intervention.   Will keep NPO for PPI  Discussed with ICU fellow; approved upgrade to ICU   Updated family at bedside   Pt is DNR/DNI      Patient stabilized and returned to floors, was found to be covid positive 9/14, started on remdesivir with last day of the course being today 9/18. Patient denies any complaints overnight and was satting well on RA.   # COVID +  - COVID-19 PCR: Detected:(09.14.23 @ 16:11)  - Xray Chest 1 View- PORTABLE-Urgent (Xray Chest 1 View- PORTABLE-Urgent .) (09.15.23 @ 09:34) >Right midlung atelectasis, without change.  - CT chest 9.15.23: No PE; Stable 6.5 mm RUL nodule. Centrilobular emphysematous changes are again noted in both upper lung fields.   - EKG : sinus tachycardia  - inflammatory markers elevated: ESR 93, CRP 28.5, ferritin 779 from 8/23; repeat ferritin, f/u procal was 0.09 as of 9/15   - oxygen sat stable on RA  - Started RDV x3 days (today Day 3 9/18)    # DKA - resolved   # DM type 2  # Hypoglycemia-resolved  - s/p insulin  drip  - hold lantus  - SSC insulin coverage    # Septic shock resolved  # Hungatella bacteremia  probably sec to  appendicitis  - bcx 8/4 +hungatella  - bcx 8/5 onwards ntd  - TTE showed no vegetations  - s/p kenyatta, flagyl, cefepime, vanco  - blood Culture have been negative (last NTD on 9/13/23)    # Right arm infected necrotic wound  -  s/p debridement 9/1  - S/p cefepime , flagyl  -  blood Culture Results: No growth at 24 hours (09.13.23 @ 11:40)  - Wound care - Santyl/Xeroform/Kerlix / ACE Wrap daily    # Suspected COPD   - S/p prednisone taper  - c/w duoneb, budesonide      # Duodenal ulcer  # Acute on chr anemia  - S/p P RBC  - s/p EGD 8/14 duodenal ulcer s/p epi and hot forceps thermal therapy  - c/w protonix  - monitor cbc    # Hypokalemia- resolved    # Ileus- resolved  - Xray Kidney Ureter Bladder (09.14.23 @ 10:01) >nonobstructive bowel gas pattern. Previously administered oral contrast seen throughout the colon. Stable visualized osseous structure.  - c/w miralax  -S/p  tap water enema    # Transaminitis- resolved    # Pancreatic body cyst  - outpt MRI    # AAA - stable  - outpt  f/u     # DVT prophylaxis    # DNR/DNI    RDV x3 days, tachycardia---> resolved HR 79bpm in AM of 9/18  # Disposition: [place]    Admitted 8/4    88 yr man with a medical history significant for Parkinsonism, diabetes, who presented initially with DKA, recurrent shock after a long and complex hospitalization involving severe GI bleeding.       ICU Course:   Initially went for DKA/HHS treatment in ICU, resolved.   Then Patient developed abdominal pain . CT abdomen showed signs of appendicitis and unopacified inferior mesenteric artery which is new. No signs of bowel ischemia .  Surgery consulted ,non surgical management for the appendicitis , patient was started on IV antibiotics and anti-fungal   Patient was also having melena  EGD done and showed 2 duodenal ulcers , one ulcer had a visible vessel which was cauterized   started on PPI BID and GI on board   Patient was receiving free water per NG for his hyponatremia     SICU Course: Treated for Hungatella bacteremia. Appendicitis.    9/4   4B Course: RR called for hypotension BP 78/50. Pt currently receiving 1u pRBC for suspected UGIB w/ 2 episodes of melena this morning. Of note, pRBC was held initially for temp 101.5 and . Fever resolved with Tylenol and 1u pRBC was given with BP drop to 78/50 after ~1/2 bag given. No rash or SOB on exam. Pt AAOx1 at baseline.  2x 18 gauge IVs placed. Given 1.5L LR bolus with no improvement in BP. Started on peripheral Levophed, BP stabilized with Levo around 0.1. Blood cultures and STAT labs sent.   Wound cultures growing Pseudomonas. Pt already receiving Cefepime; will add on Levaquin for double Pseudomonas coverage   Discussed with GI fellow; no plan for urgent endoscopic intervention.   Discussed with ICU fellow; approved upgrade to ICU       Patient stabilized and returned to floors, was found to be covid positive 9/14, started on remdesivir with last day of the course being today 9/18. Patient denies any complaints overnight and was satting well on RA.   # COVID +  - COVID-19 PCR: Detected:(09.14.23 @ 16:11)  - Xray Chest 1 View- PORTABLE-Urgent (Xray Chest 1 View- PORTABLE-Urgent .) (09.15.23 @ 09:34) >Right midlung atelectasis, without change.  - CT chest 9.15.23: No PE; Stable 6.5 mm RUL nodule. Centrilobular emphysematous changes are again noted in both upper lung fields.   - EKG : sinus tachycardia  - inflammatory markers elevated: ESR 93, CRP 28.5, ferritin 779 from 8/23; repeat ferritin, f/u procal was 0.09 as of 9/15   - oxygen sat stable on RA  - Started RDV x3 days (today Day 3 9/18)    # DKA - resolved   # DM type 2  # Hypoglycemia-resolved  - s/p insulin  drip  - SSC insulin coverage    # Septic shock resolved  # Hungatella bacteremia  probably sec to  appendicitis  - bcx 8/4 +hungatella  - bcx 8/5 onwards ntd  - TTE showed no vegetations  - s/p kenyatta, flagyl, cefepime, vanco  - blood Culture have been negative (last NTD on 9/13/23)    # Right arm infected necrotic wound  -  s/p debridement 9/1  - S/p cefepime , flagyl  -  blood Culture Results: No growth at 24 hours (09.13.23 @ 11:40)  - Wound care - Santyl/Xeroform/Kerlix / ACE Wrap daily    # Suspected COPD   - S/p prednisone taper  - c/w duoneb, budesonide    # Duodenal ulcer  # Acute on chr anemia  - S/p P RBC  - s/p EGD 8/14 duodenal ulcer s/p epi and hot forceps thermal therapy  - c/w protonix  - monitor cbc    # Hypokalemia- resolved    # Ileus- resolved  - Xray Kidney Ureter Bladder (09.14.23 @ 10:01) >nonobstructive bowel gas pattern. Previously administered oral contrast seen throughout the colon. Stable visualized osseous structure.  - c/w miralax  -S/p  tap water enema    # Transaminitis- resolved    # Pancreatic body cyst  - outpt MRI    # AAA - stable  - outpt  f/u       RDV x3 days, tachycardia---> resolved HR 79bpm in AM of 9/18    # Disposition: [facility]

## 2023-08-25 NOTE — DISCHARGE NOTE PROVIDER - CARE PROVIDER_API CALL
Lit Nikunj  Internal Medicine  [address] [facility] [place] [address]  Phone: ([phone]  Fax: ()-  Follow Up Time:    Nikunj Murrieta  Internal Medicine  [address] [place] [address]  Phone: ()-  Fax: ()-  Follow Up Time: 2 weeks    Michael Ortiz  Plastic Surgery  [address] [place] [id number]  Phone: [phone]  Fax: [phone]  Follow Up Time: 1 week    Mary Tuttle  Gastroenterology  [address] [place] [address]  Phone: [phone]  Fax: [phone]  Follow Up Time: 1 month    Curtis Blancas  Endocrinology/Metab/Diabetes  [address] [place] [address]  Phone: [phone]  Fax: [phone]  Follow Up Time: 1 week

## 2023-08-25 NOTE — PROGRESS NOTE ADULT - ASSESSMENT
89yo Cantonese speaking male PMHx DM2, HTN here with decreased appetite found to have HHS/ DKA s/p insulin gtt. Melena s/p EGD demonstrating ulcer. Hungatella bacteremia. Acute Appendicitis. Patient aaox1-2 at baseline.  Patient was admitted to MICU 8/4 and transferred to the medicine floor 8/17  Patient was first in MICU for DKA/HHS    #suspected COPD, with wheezing  #B/l pleural Effusion  - patient has smoking history of 2 PPD for 60yrs, already quit  - TTE 8/11: EF 63%  - pulmonary consulted, recs appreciated  - started po prednisone 40 qd - 40 for 5 days, 20 for 5 days  - duonebs q6h prn  - repeat CXR 8/21: stable  - was given doses of iv lasix  - HOB elevation 45 degrees  - aspiration precautions  - PT rec SNF    #Decreased oral intake - improving  - calorie count, f/u nutrition eval  - patient tolerating po intake, NG tube taken out 8/22  - S&S eval 8/23: advance diet to soft bite-sized with thin liquid  - pending nutrition re-eval    #Hungatella bacteremia likely GI translocation in the setting of appendicitis  - bcx 8/4 +hungatella  - bcx 8/5 onwards ntd  - TTE showed no vegetations  - s/p kenyatta, flagyl, cefepime, vanco  - monitor off Abx now per ID    #Melena  #Abdominal pain  - s/p egd 8/14 with two ulcers; one was cauterized, given epi  - CTAP 8/19: No definite correlate for acute abdominal pain. Unchanged nonspecific mild dilation of appendix, up to 0.8 cm, without definite periappendiceal inflammatory stranding.  - h/h stable, trend  - protonix 40 po bid    #Transaminitis - resolved  #abd tenderness  - mixed, in setting of infection  - ct abd with hepatic cyst    #DM2 (diabetes mellitus, type 2) - controlled  - s/p insulin gtt for HHS/ DKA  - on lantus 8 and lispro 3  - ssi    #Hypokalemia -resolved     Dispo: snf  - monitor     #HTN (hypertension) - stable  - outpt f/u      DVT PPx   GI PPx  Dispo: patient is from home, will req SNF per PT  pending nutrition re-eval     87yo Cantonese speaking male PMHx DM2, HTN here with decreased appetite found to have HHS/ DKA s/p insulin gtt. Melena s/p EGD demonstrating ulcer. Hungatella bacteremia. Acute Appendicitis. Patient aaox1-2 at baseline.  Patient was admitted to MICU 8/4 and transferred to the medicine floor 8/17  Patient was first in MICU for DKA/HHS    #suspected COPD, with wheezing  #B/l pleural Effusion  - patient has smoking history of 2 PPD for 60yrs, already quit  - TTE 8/11: EF 63%  - pulmonary consulted, recs appreciated  - started po prednisone 40 qd - 40 for 5 days, 20 for 5 days  - duonebs q6h prn  - repeat CXR 8/21: stable  - was given doses of iv lasix  - HOB elevation 45 degrees  - aspiration precautions  - PT rec SNF    #Decreased oral intake - improving  - calorie count, f/u nutrition eval  - patient tolerating po intake, NG tube taken out 8/22  - S&S eval 8/23: advance diet to soft bite-sized with thin liquid  - Nutrition consult appreciated - pt ok to continue oral diet     #Hungatella bacteremia likely GI translocation in the setting of appendicitis  - bcx 8/4 +hungatella  - bcx 8/5 onwards ntd  - TTE showed no vegetations  - s/p kenyatta, flagyl, cefepime, vanco  - monitor off Abx now per ID    #Melena  #Abdominal pain  - s/p egd 8/14 with two ulcers; one was cauterized, given epi  - CTAP 8/19: No definite correlate for acute abdominal pain. Unchanged nonspecific mild dilation of appendix, up to 0.8 cm, without definite periappendiceal inflammatory stranding.  - h/h stable, trend  - protonix 40 po bid    #Transaminitis - resolved  #abd tenderness  - mixed, in setting of infection  - ct abd with hepatic cyst    #DM2 (diabetes mellitus, type 2) - controlled  - s/p insulin gtt for HHS/ DKA  - on lantus 8 and lispro 3  - ssi    #Hypokalemia -resolved     Dispo: snf  - monitor     #HTN (hypertension) - stable  - outpt f/u      DVT PPx   GI PPx  Dispo: patient is from home, will req SNF per PT  pending [facility] admission

## 2023-08-25 NOTE — PROGRESS NOTE ADULT - SUBJECTIVE AND OBJECTIVE BOX
Patient is a 88y old  Male who presents with a chief complaint of DKA/HHS (25 Aug 2023 16:33)      Patient seen and examined at bedside.    ALLERGIES:  No Known Allergies    MEDICATIONS:  albuterol/ipratropium for Nebulization 3 milliLiter(s) Nebulizer every 6 hours  allopurinol 300 milliGRAM(s) Oral daily  bacitracin   Ointment 1 Application(s) Topical two times a day  budesonide 160 MICROgram(s)/formoterol 4.5 MICROgram(s) Inhaler 2 Puff(s) Inhalation two times a day  chlorhexidine 2% Cloths 1 Application(s) Topical daily  dextrose 5%. 1000 milliLiter(s) IV Continuous <Continuous>  dextrose 5%. 1000 milliLiter(s) IV Continuous <Continuous>  dextrose 50% Injectable 12.5 Gram(s) IV Push once  dextrose 50% Injectable 25 Gram(s) IV Push once  dextrose 50% Injectable 25 Gram(s) IV Push once  dextrose Oral Gel 15 Gram(s) Oral once PRN  enoxaparin Injectable 40 milliGRAM(s) SubCutaneous every 24 hours  glucagon  Injectable 1 milliGRAM(s) IntraMuscular once  insulin glargine Injectable (LANTUS) 8 Unit(s) SubCutaneous at bedtime  insulin lispro (ADMELOG) corrective regimen sliding scale   SubCutaneous three times a day before meals  insulin lispro Injectable (ADMELOG) 3 Unit(s) SubCutaneous three times a day before meals  pantoprazole    Tablet 40 milliGRAM(s) Oral two times a day  predniSONE   Tablet 40 milliGRAM(s) Oral daily    Vital Signs Last 24 Hrs  T(F): 96.6 (25 Aug 2023 15:49), Max: 98.4 (25 Aug 2023 01:07)  HR: 96 (25 Aug 2023 15:49) (91 - 96)  BP: 104/59 (25 Aug 2023 15:49) (104/59 - 125/72)  RR: 18 (25 Aug 2023 15:49) (18 - 18)  SpO2: 100% (25 Aug 2023 15:49) (100% - 100%)  I&O's Summary    24 Aug 2023 07:01  -  25 Aug 2023 07:00  --------------------------------------------------------  IN: 720 mL / OUT: 500 mL / NET: 220 mL    25 Aug 2023 07:01  -  25 Aug 2023 18:27  --------------------------------------------------------  IN: 720 mL / OUT: 0 mL / NET: 720 mL        PHYSICAL EXAM:  General: NAD, A/O x 2  ENT: MMM  Neck: Supple, No JVD  Lungs: Clear to auscultation bilaterally  Cardio: RRR, S1/S2, 2/6 systolic murmur   Abdomen: Soft, Nontender, Nondistended; Bowel sounds present  Extremities: No cyanosis, No edema    LABS:                        8.4    6.46  )-----------( 268      ( 25 Aug 2023 07:13 )             26.2     08-25    141  |  109  |  13  ----------------------------<  148  3.7   |  22  |  0.9    Ca    7.3      25 Aug 2023 07:13  Mg     1.9     08-25                              POCT Blood Glucose.: 234 mg/dL (25 Aug 2023 16:35)  POCT Blood Glucose.: 150 mg/dL (25 Aug 2023 11:07)  POCT Blood Glucose.: 147 mg/dL (25 Aug 2023 07:42)  POCT Blood Glucose.: 169 mg/dL (24 Aug 2023 21:08)      Urinalysis Basic - ( 25 Aug 2023 07:13 )    Color: x / Appearance: x / SG: x / pH: x  Gluc: 148 mg/dL / Ketone: x  / Bili: x / Urobili: x   Blood: x / Protein: x / Nitrite: x   Leuk Esterase: x / RBC: x / WBC x   Sq Epi: x / Non Sq Epi: x / Bacteria: x            RADIOLOGY & ADDITIONAL TESTS:    Care Discussed with Consultants/Other Providers:

## 2023-08-25 NOTE — PROGRESS NOTE ADULT - ASSESSMENT
- DKA, improving  - acute duodenal ulcer   - EGD findings noted   - post hemorrhagic anemia  - Septic shock requiring pressors         Hungatella bacteremia likely GI translocation in the setting of appendicitis  - dysphagia/ confusion     SUGGEST:  - on po diet  - encourage po intake, particularly protein  - monitor intake for a few days to ensure adequacy  - glycemic control improved  will f/u

## 2023-08-25 NOTE — PROGRESS NOTE ADULT - SUBJECTIVE AND OBJECTIVE BOX
SUBJECTIVE:    Patient is a 88y old Male who presents with a chief complaint of DKA/HHS (25 Aug 2023 10:38)    Today is hospital day 21d. NAEON. VSS. Patient this morning has no acute complaints.      PAST MEDICAL & SURGICAL HISTORY  HTN (hypertension)    Gout    BPH (benign prostatic hyperplasia)    Parkinson disease    HLD (hyperlipidemia)    Smoker within last 12 months    Diabetes mellitus    No significant past surgical history      SOCIAL HISTORY:  Negative for smoking/alcohol/drug use.     ALLERGIES:  No Known Allergies    MEDICATIONS:  STANDING MEDICATIONS  albuterol/ipratropium for Nebulization 3 milliLiter(s) Nebulizer every 6 hours  allopurinol 300 milliGRAM(s) Oral daily  bacitracin   Ointment 1 Application(s) Topical two times a day  budesonide 160 MICROgram(s)/formoterol 4.5 MICROgram(s) Inhaler 2 Puff(s) Inhalation two times a day  chlorhexidine 2% Cloths 1 Application(s) Topical daily  dextrose 5%. 1000 milliLiter(s) IV Continuous <Continuous>  dextrose 5%. 1000 milliLiter(s) IV Continuous <Continuous>  dextrose 50% Injectable 25 Gram(s) IV Push once  dextrose 50% Injectable 25 Gram(s) IV Push once  dextrose 50% Injectable 12.5 Gram(s) IV Push once  enoxaparin Injectable 40 milliGRAM(s) SubCutaneous every 24 hours  glucagon  Injectable 1 milliGRAM(s) IntraMuscular once  insulin glargine Injectable (LANTUS) 8 Unit(s) SubCutaneous at bedtime  insulin lispro (ADMELOG) corrective regimen sliding scale   SubCutaneous three times a day before meals  insulin lispro Injectable (ADMELOG) 3 Unit(s) SubCutaneous three times a day before meals  pantoprazole    Tablet 40 milliGRAM(s) Oral two times a day  predniSONE   Tablet 40 milliGRAM(s) Oral daily    PRN MEDICATIONS  dextrose Oral Gel 15 Gram(s) Oral once PRN    VITALS:   T(F): 96.6  HR: 96  BP: 104/59  RR: 18  SpO2: 100%    LABS:                        8.4    6.46  )-----------( 268      ( 25 Aug 2023 07:13 )             26.2     08-25    141  |  109  |  13  ----------------------------<  148<H>  3.7   |  22  |  0.9    Ca    7.3<L>      25 Aug 2023 07:13  Mg     1.9     08-25        Urinalysis Basic - ( 25 Aug 2023 07:13 )    Color: x / Appearance: x / SG: x / pH: x  Gluc: 148 mg/dL / Ketone: x  / Bili: x / Urobili: x   Blood: x / Protein: x / Nitrite: x   Leuk Esterase: x / RBC: x / WBC x   Sq Epi: x / Non Sq Epi: x / Bacteria: x      RADIOLOGY:    PHYSICAL EXAM:  GEN: No acute distress  LUNGS: Clear to auscultation bilaterally   HEART: Regular  ABD: Soft, non-tender, non-distended.  EXT: NC/NC/NE/2+PP/HERNANDEZ/Skin Intact.   NEURO: AAOX1-2

## 2023-08-25 NOTE — DISCHARGE NOTE PROVIDER - ATTENDING DISCHARGE PHYSICAL EXAMINATION:
GENERAL: NAD, lying in bed   NECK: Supple, no JVD   LUNGS: Unlabored respirations, b/l air entry clear   HEART: Regular rate and rhythm, normal s1s2  ABD: Soft, nontender nondistended   EXT: No clubbing, cyanosis or edema.  SKIN: Dressing in place on R forearm

## 2023-08-25 NOTE — PROGRESS NOTE ADULT - SUBJECTIVE AND OBJECTIVE BOX
NUTRITION SUPPORT TEAM  -  PROGRESS NOTE     Interval Events:  pt alert, verbal  skin turgor good  abd soft, nd, nt  pt eating better, glc better controlled  d/w med team & hospitalist    VITALS:  T(F): 96.6 (08-25 @ 15:49), Max: 96.9 (08-25 @ 08:00)  HR: 96 (08-25 @ 15:49) (93 - 96)  BP: 104/59 (08-25 @ 15:49) (104/59 - 123/72)  RR: 18 (08-25 @ 15:49) (18 - 18)  SpO2: 100% (08-25 @ 15:49) (100% - 100%)    HEIGHT/WEIGHT/BMI:   Height (cm): 167.6 (08-14), 162.6 (11-21), 162.6 (10-09)  Weight (kg): 62.6 (08-14), 61.2 (10-09)  BMI (kg/m2): 22.3 (08-14), 23.1 (11-21), 23.1 (10-09)    I/Os:   08-24-23 @ 07:01  -  08-25-23 @ 07:00  --------------------------------------------------------  IN:    Oral Fluid: 720 mL  Total IN: 720 mL  OUT:    Incontinent per Collection Bag (mL): 500 mL    Stool (mL): 0 mL  Total OUT: 500 mL  Total NET: 220 mL    STANDING MEDICATIONS:   albuterol/ipratropium for Nebulization 3 milliLiter(s) Nebulizer every 6 hours  allopurinol 300 milliGRAM(s) Oral daily  bacitracin   Ointment 1 Application(s) Topical two times a day  budesonide 160 MICROgram(s)/formoterol 4.5 MICROgram(s) Inhaler 2 Puff(s) Inhalation two times a day  chlorhexidine 2% Cloths 1 Application(s) Topical daily  enoxaparin Injectable 40 milliGRAM(s) SubCutaneous every 24 hours  insulin glargine Injectable (LANTUS) 8 Unit(s) SubCutaneous at bedtime  insulin lispro (ADMELOG) corrective regimen sliding scale   SubCutaneous three times a day before meals  insulin lispro Injectable (ADMELOG) 3 Unit(s) SubCutaneous three times a day before meals  pantoprazole    Tablet 40 milliGRAM(s) Oral two times a day  predniSONE   Tablet 40 milliGRAM(s) Oral daily    LABS:                         8.4    6.46  )-----------( 268      ( 25 Aug 2023 07:13 )             26.2     141  |  109  |  13  ----------------------------<  148<H>          (08-25-23 @ 07:13)  3.7   |  22  |  0.9    Ca    7.3<L>          (08-25-23 @ 07:13)  Mg     1.9         (08-25-23 @ 07:13)    Blood Glucose (Past 24 hours):  150 mg/dL (08-25 @ 11:07)  147 mg/dL (08-25 @ 07:42)  169 mg/dL (08-24 @ 21:08)    DIET:   Diet, Soft and Bite Sized:   Consistent Carbohydrate No Snacks  DASH/TLC Sodium & Cholesterol Restricted  Supplement Feeding Modality:  Oral  Glucerna Shake Cans or Servings Per Day:  1       Frequency:  Two Times a day (08-23-23 @ 16:29) [Active]

## 2023-08-25 NOTE — DISCHARGE NOTE PROVIDER - NSDCMRMEDTOKEN_GEN_ALL_CORE_FT
ALLOPURINOL  300 MG TABS: 1 tab(s) orally once a day  AMLODIPINE BESYLATE  5 MG TABS: 1 tab(s) orally once a day  ATORVASTATIN CALCIUM  10 MG TABS: 1 tab(s) orally once a day  METFORMIN HYDROCHLORIDE ER  750 MG TB24: 1 tab(s) orally once a day  OLMESARTAN MEDOXOMIL  20 MG TABS: 1 tab(s) orally once a day  TAMSULOSIN HYDROCHLORIDE  0.4 MG CAPS: 1 cap(s) orally once a day   ALLOPURINOL  300 MG TABS: 1 tab(s) orally once a day  AMLODIPINE BESYLATE  5 MG TABS: 1 tab(s) orally once a day  ATORVASTATIN CALCIUM  10 MG TABS: 1 tab(s) orally once a day  budesonide-formoterol 160 mcg-4.5 mcg/inh inhalation aerosol: 2 puff(s) inhaled 2 times a day  ipratropium-albuterol 0.5 mg-2.5 mg/3 mL inhalation solution: 3 milliliter(s) inhaled every 6 hours  METFORMIN HYDROCHLORIDE ER  750 MG TB24: 1 tab(s) orally once a day  OLMESARTAN MEDOXOMIL  20 MG TABS: 1 tab(s) orally once a day  pantoprazole 40 mg oral delayed release tablet: 1 tab(s) orally 2 times a day  predniSONE 20 mg oral tablet: 1 tab(s) orally once a day ends 8/30  TAMSULOSIN HYDROCHLORIDE  0.4 MG CAPS: 1 cap(s) orally once a day   acetaminophen 500 mg oral tablet: 1 tab(s) orally every 6 hours as needed for  moderate pain  ALLOPURINOL  300 MG TABS: 1 tab(s) orally once a day  ATORVASTATIN CALCIUM  10 MG TABS: 1 tab(s) orally once a day  budesonide-formoterol 160 mcg-4.5 mcg/inh inhalation aerosol: 2 puff(s) inhaled 2 times a day  ipratropium-albuterol 0.5 mg-2.5 mg/3 mL inhalation solution: 3 milliliter(s) inhaled every 6 hours as needed for  shortness of breath and/or wheezing  METFORMIN HYDROCHLORIDE ER  750 MG TB24: 1 tab(s) orally once a day  pantoprazole 40 mg oral delayed release tablet: 1 tab(s) orally 2 times a day  polyethylene glycol 3350 oral powder for reconstitution: 17 gram(s) orally once a day

## 2023-08-25 NOTE — DISCHARGE NOTE PROVIDER - CARE PROVIDERS DIRECT ADDRESSES
,DirectAddress_Unknown ,DirectAddress_Unknown,[email or web address],DirectAddress_Unknown,[email or web address]

## 2023-08-25 NOTE — DISCHARGE NOTE PROVIDER - DISCHARGE SERVICE FOR PATIENT
on the discharge service for the patient. I have reviewed and made amendments to the documentation where necessary. (1) 41-60 years

## 2023-08-25 NOTE — DISCHARGE NOTE PROVIDER - NSDCFUADDAPPT_GEN_ALL_CORE_FT
APPTS ARE READY TO BE MADE: [ ] YES    Best Family or Patient Contact (if needed): Daughter Antonia Pagan     Additional Information about above appointments (if needed):    1: Plastic Surgery   2: Gastroenterology   3: Endocrinology     Other comments or requests:     Patient needs Toishonese

## 2023-08-25 NOTE — DISCHARGE NOTE PROVIDER - PROVIDER TOKENS
PROVIDER:[TOKEN:[[id number]]] PROVIDER:[TOKEN:[[id number]],FOLLOWUP:[2 weeks]],PROVIDER:[TOKEN:[[id number]],FOLLOWUP:[1 week]],PROVIDER:[TOKEN:[[id number]],FOLLOWUP:[1 month]],PROVIDER:[TOKEN:[[id number]],FOLLOWUP:[1 week]]

## 2023-08-26 LAB
ANION GAP SERPL CALC-SCNC: 9 MMOL/L — SIGNIFICANT CHANGE UP (ref 7–14)
BUN SERPL-MCNC: 14 MG/DL — SIGNIFICANT CHANGE UP (ref 10–20)
CALCIUM SERPL-MCNC: 7.4 MG/DL — LOW (ref 8.4–10.5)
CHLORIDE SERPL-SCNC: 104 MMOL/L — SIGNIFICANT CHANGE UP (ref 98–110)
CO2 SERPL-SCNC: 25 MMOL/L — SIGNIFICANT CHANGE UP (ref 17–32)
CREAT SERPL-MCNC: 1 MG/DL — SIGNIFICANT CHANGE UP (ref 0.7–1.5)
EGFR: 73 ML/MIN/1.73M2 — SIGNIFICANT CHANGE UP
GLUCOSE BLDC GLUCOMTR-MCNC: 168 MG/DL — HIGH (ref 70–99)
GLUCOSE BLDC GLUCOMTR-MCNC: 294 MG/DL — HIGH (ref 70–99)
GLUCOSE BLDC GLUCOMTR-MCNC: 304 MG/DL — HIGH (ref 70–99)
GLUCOSE BLDC GLUCOMTR-MCNC: 95 MG/DL — SIGNIFICANT CHANGE UP (ref 70–99)
GLUCOSE SERPL-MCNC: 151 MG/DL — HIGH (ref 70–99)
HCT VFR BLD CALC: 26.1 % — LOW (ref 42–52)
HGB BLD-MCNC: 8.4 G/DL — LOW (ref 14–18)
MAGNESIUM SERPL-MCNC: 2 MG/DL — SIGNIFICANT CHANGE UP (ref 1.8–2.4)
MCHC RBC-ENTMCNC: 31 PG — SIGNIFICANT CHANGE UP (ref 27–31)
MCHC RBC-ENTMCNC: 32.2 G/DL — SIGNIFICANT CHANGE UP (ref 32–37)
MCV RBC AUTO: 96.3 FL — HIGH (ref 80–94)
NRBC # BLD: 0 /100 WBCS — SIGNIFICANT CHANGE UP (ref 0–0)
PLATELET # BLD AUTO: 277 K/UL — SIGNIFICANT CHANGE UP (ref 130–400)
PMV BLD: 11 FL — HIGH (ref 7.4–10.4)
POTASSIUM SERPL-MCNC: 3.5 MMOL/L — SIGNIFICANT CHANGE UP (ref 3.5–5)
POTASSIUM SERPL-SCNC: 3.5 MMOL/L — SIGNIFICANT CHANGE UP (ref 3.5–5)
RBC # BLD: 2.71 M/UL — LOW (ref 4.7–6.1)
RBC # FLD: 18.9 % — HIGH (ref 11.5–14.5)
SODIUM SERPL-SCNC: 138 MMOL/L — SIGNIFICANT CHANGE UP (ref 135–146)
WBC # BLD: 8.4 K/UL — SIGNIFICANT CHANGE UP (ref 4.8–10.8)
WBC # FLD AUTO: 8.4 K/UL — SIGNIFICANT CHANGE UP (ref 4.8–10.8)

## 2023-08-26 PROCEDURE — 71045 X-RAY EXAM CHEST 1 VIEW: CPT | Mod: 26

## 2023-08-26 PROCEDURE — 99221 1ST HOSP IP/OBS SF/LOW 40: CPT

## 2023-08-26 PROCEDURE — 99232 SBSQ HOSP IP/OBS MODERATE 35: CPT

## 2023-08-26 RX ORDER — COLLAGENASE CLOSTRIDIUM HIST. 250 UNIT/G
1 OINTMENT (GRAM) TOPICAL DAILY
Refills: 0 | Status: DISCONTINUED | OUTPATIENT
Start: 2023-08-26 | End: 2023-09-18

## 2023-08-26 RX ORDER — COLLAGENASE CLOSTRIDIUM HIST. 250 UNIT/G
1 OINTMENT (GRAM) TOPICAL
Refills: 0 | Status: DISCONTINUED | OUTPATIENT
Start: 2023-08-26 | End: 2023-09-18

## 2023-08-26 RX ADMIN — Medication 3 UNIT(S): at 16:52

## 2023-08-26 RX ADMIN — Medication 40 MILLIGRAM(S): at 06:06

## 2023-08-26 RX ADMIN — CHLORHEXIDINE GLUCONATE 1 APPLICATION(S): 213 SOLUTION TOPICAL at 06:05

## 2023-08-26 RX ADMIN — PANTOPRAZOLE SODIUM 40 MILLIGRAM(S): 20 TABLET, DELAYED RELEASE ORAL at 17:03

## 2023-08-26 RX ADMIN — Medication 3 UNIT(S): at 07:58

## 2023-08-26 RX ADMIN — Medication 3 UNIT(S): at 11:44

## 2023-08-26 RX ADMIN — Medication 3: at 16:52

## 2023-08-26 RX ADMIN — Medication 1 APPLICATION(S): at 06:05

## 2023-08-26 RX ADMIN — Medication 1 APPLICATION(S): at 17:03

## 2023-08-26 RX ADMIN — INSULIN GLARGINE 8 UNIT(S): 100 INJECTION, SOLUTION SUBCUTANEOUS at 21:30

## 2023-08-26 RX ADMIN — Medication 3 MILLILITER(S): at 15:08

## 2023-08-26 RX ADMIN — Medication 1: at 07:59

## 2023-08-26 RX ADMIN — PANTOPRAZOLE SODIUM 40 MILLIGRAM(S): 20 TABLET, DELAYED RELEASE ORAL at 06:06

## 2023-08-26 RX ADMIN — ENOXAPARIN SODIUM 40 MILLIGRAM(S): 100 INJECTION SUBCUTANEOUS at 11:45

## 2023-08-26 RX ADMIN — Medication 300 MILLIGRAM(S): at 11:45

## 2023-08-26 NOTE — PROGRESS NOTE ADULT - ASSESSMENT
89yo Cantonese speaking male PMHx DM2, HTN here with decreased appetite found to have HHS/ DKA s/p insulin gtt. Melena s/p EGD demonstrating ulcer. Hungatella bacteremia. Acute Appendicitis. Patient aaox1-2 at baseline.  Patient was admitted to MICU 8/4 and transferred to the medicine floor 8/17  Patient was first in MICU for DKA/HHS    #suspected COPD, with wheezing  #B/l pleural Effusion  - patient has smoking history of 2 PPD for 60yrs, already quit  - TTE 8/11: EF 63%  - pulmonary consulted, recs appreciated  - started po prednisone 40 qd - 40 for 5 days, 20 for 5 days  - duonebs q6h prn  - repeat CXR 8/21: stable  - was given doses of iv lasix  - HOB elevation 45 degrees  - aspiration precautions  - PT rec SNF    #Decreased oral intake - improving  - calorie count, f/u nutrition eval  - patient tolerating po intake, NG tube taken out 8/22  - S&S eval 8/23: advance diet to soft bite-sized with thin liquid  - Nutrition consult appreciated - pt ok to continue oral diet     #Hungatella bacteremia likely GI translocation in the setting of appendicitis  - bcx 8/4 +hungatella  - bcx 8/5 onwards ntd  - TTE showed no vegetations  - s/p kenyatta, flagyl, cefepime, vanco  - monitor off Abx now per ID    #Melena  #Abdominal pain  - s/p egd 8/14 with two ulcers; one was cauterized, given epi  - CTAP 8/19: No definite correlate for acute abdominal pain. Unchanged nonspecific mild dilation of appendix, up to 0.8 cm, without definite periappendiceal inflammatory stranding.  - h/h stable, trend  - protonix 40 po bid    #Transaminitis - resolved  #abd tenderness  - mixed, in setting of infection  - ct abd with hepatic cyst    #DM2 (diabetes mellitus, type 2) - controlled  - s/p insulin gtt for HHS/ DKA  - on lantus 8 and lispro 3  - ssi    #Hypokalemia -resolved     Dispo: snf  - monitor     #HTN (hypertension) - stable  - outpt f/u    #right arm necrosis  -burn consult appreciated  -will need debridement       DVT PPx   GI PPx  Dispo: patient is from home, will req SNF per PT  pending debridement

## 2023-08-26 NOTE — PROGRESS NOTE ADULT - SUBJECTIVE AND OBJECTIVE BOX
Patient is a 88y old  Male who presents with a chief complaint of DKA/HHS (26 Aug 2023 13:27)      Patient seen and examined at bedside.    ALLERGIES:  No Known Allergies    MEDICATIONS:  albuterol/ipratropium for Nebulization 3 milliLiter(s) Nebulizer every 6 hours  allopurinol 300 milliGRAM(s) Oral daily  bacitracin   Ointment 1 Application(s) Topical two times a day  budesonide 160 MICROgram(s)/formoterol 4.5 MICROgram(s) Inhaler 2 Puff(s) Inhalation two times a day  chlorhexidine 2% Cloths 1 Application(s) Topical daily  dextrose 5%. 1000 milliLiter(s) IV Continuous <Continuous>  dextrose 5%. 1000 milliLiter(s) IV Continuous <Continuous>  dextrose 50% Injectable 12.5 Gram(s) IV Push once  dextrose 50% Injectable 25 Gram(s) IV Push once  dextrose 50% Injectable 25 Gram(s) IV Push once  dextrose Oral Gel 15 Gram(s) Oral once PRN  enoxaparin Injectable 40 milliGRAM(s) SubCutaneous every 24 hours  glucagon  Injectable 1 milliGRAM(s) IntraMuscular once  insulin glargine Injectable (LANTUS) 8 Unit(s) SubCutaneous at bedtime  insulin lispro (ADMELOG) corrective regimen sliding scale   SubCutaneous three times a day before meals  insulin lispro Injectable (ADMELOG) 3 Unit(s) SubCutaneous three times a day before meals  pantoprazole    Tablet 40 milliGRAM(s) Oral two times a day  predniSONE   Tablet 40 milliGRAM(s) Oral daily    Vital Signs Last 24 Hrs  T(F): 98.5 (26 Aug 2023 12:00), Max: 98.5 (26 Aug 2023 12:00)  HR: 97 (26 Aug 2023 08:00) (93 - 97)  BP: 127/81 (26 Aug 2023 08:00) (104/59 - 150/73)  RR: 18 (26 Aug 2023 04:35) (18 - 18)  SpO2: 100% (26 Aug 2023 04:35) (100% - 100%)  I&O's Summary    25 Aug 2023 07:01  -  26 Aug 2023 07:00  --------------------------------------------------------  IN: 720 mL / OUT: 0 mL / NET: 720 mL        PHYSICAL EXAM:  General: NAD, alert, confused   ENT: MMM  Neck: Supple, No JVD  Lungs: Clear to auscultation bilaterally  Cardio: RRR, S1/S2, 2/6 systolic murmur   Abdomen: Soft, Nontender, Nondistended; Bowel sounds present  Extremities: No cyanosis, right forearm wound     LABS:                        8.4    8.40  )-----------( 277      ( 26 Aug 2023 07:53 )             26.1     08-26    138  |  104  |  14  ----------------------------<  151  3.5   |  25  |  1.0    Ca    7.4      26 Aug 2023 07:53  Mg     2.0     08-26                              POCT Blood Glucose.: 95 mg/dL (26 Aug 2023 11:33)  POCT Blood Glucose.: 168 mg/dL (26 Aug 2023 07:51)  POCT Blood Glucose.: 178 mg/dL (25 Aug 2023 21:41)  POCT Blood Glucose.: 234 mg/dL (25 Aug 2023 16:35)      Urinalysis Basic - ( 26 Aug 2023 07:53 )    Color: x / Appearance: x / SG: x / pH: x  Gluc: 151 mg/dL / Ketone: x  / Bili: x / Urobili: x   Blood: x / Protein: x / Nitrite: x   Leuk Esterase: x / RBC: x / WBC x   Sq Epi: x / Non Sq Epi: x / Bacteria: x            RADIOLOGY & ADDITIONAL TESTS:    Care Discussed with Consultants/Other Providers:

## 2023-08-26 NOTE — PROGRESS NOTE ADULT - SUBJECTIVE AND OBJECTIVE BOX
SUBJECTIVE:    Patient is a 88y old Male who presents with a chief complaint of DKA/HHS (25 Aug 2023 18:27)    Today is hospital day 22d. Overnight nurse reported patient necrotic skin changes on bl forearms, pictures reviewed, Burn team called said they will come to take a look. This morning patient seen at bedside, appeared agitated comparing to yesterday, was sweating. No fever or chills. CXR ordered.    PAST MEDICAL & SURGICAL HISTORY  HTN (hypertension)    Gout    BPH (benign prostatic hyperplasia)    Parkinson disease    HLD (hyperlipidemia)    Smoker within last 12 months    Diabetes mellitus    No significant past surgical history      SOCIAL HISTORY:  Negative for smoking/alcohol/drug use.     ALLERGIES:  No Known Allergies    MEDICATIONS:  STANDING MEDICATIONS  albuterol/ipratropium for Nebulization 3 milliLiter(s) Nebulizer every 6 hours  allopurinol 300 milliGRAM(s) Oral daily  bacitracin   Ointment 1 Application(s) Topical two times a day  budesonide 160 MICROgram(s)/formoterol 4.5 MICROgram(s) Inhaler 2 Puff(s) Inhalation two times a day  chlorhexidine 2% Cloths 1 Application(s) Topical daily  dextrose 5%. 1000 milliLiter(s) IV Continuous <Continuous>  dextrose 5%. 1000 milliLiter(s) IV Continuous <Continuous>  dextrose 50% Injectable 12.5 Gram(s) IV Push once  dextrose 50% Injectable 25 Gram(s) IV Push once  dextrose 50% Injectable 25 Gram(s) IV Push once  enoxaparin Injectable 40 milliGRAM(s) SubCutaneous every 24 hours  glucagon  Injectable 1 milliGRAM(s) IntraMuscular once  insulin glargine Injectable (LANTUS) 8 Unit(s) SubCutaneous at bedtime  insulin lispro (ADMELOG) corrective regimen sliding scale   SubCutaneous three times a day before meals  insulin lispro Injectable (ADMELOG) 3 Unit(s) SubCutaneous three times a day before meals  pantoprazole    Tablet 40 milliGRAM(s) Oral two times a day  predniSONE   Tablet 40 milliGRAM(s) Oral daily    PRN MEDICATIONS  dextrose Oral Gel 15 Gram(s) Oral once PRN    VITALS:   T(F): 97.1  HR: 97  BP: 127/81  RR: 18  SpO2: 100%    LABS:                        8.4    8.40  )-----------( 277      ( 26 Aug 2023 07:53 )             26.1     08-26    138  |  104  |  14  ----------------------------<  151<H>  3.5   |  25  |  1.0    Ca    7.4<L>      26 Aug 2023 07:53  Mg     2.0     08-26        Urinalysis Basic - ( 26 Aug 2023 07:53 )    Color: x / Appearance: x / SG: x / pH: x  Gluc: 151 mg/dL / Ketone: x  / Bili: x / Urobili: x   Blood: x / Protein: x / Nitrite: x   Leuk Esterase: x / RBC: x / WBC x   Sq Epi: x / Non Sq Epi: x / Bacteria: x      RADIOLOGY:    PHYSICAL EXAM:  GEN: No acute distress  LUNGS: Clear to auscultation bilaterally   HEART: Regular  ABD: Soft, non-tender, non-distended.  EXT: NC/NC/NE/2+PP/HERNANDEZ/Skin Intact.   NEURO: AAOX1-2    Intravenous access:   NG tube:   Jones Catheter:

## 2023-08-26 NOTE — PROGRESS NOTE ADULT - ASSESSMENT
89yo Cantonese speaking male PMHx DM2, HTN here with decreased appetite found to have HHS/ DKA s/p insulin gtt. Melena s/p EGD demonstrating ulcer. Hungatella bacteremia. Appendicitis. Patient aaox1-2 at baseline.      #Necrotic skin changes on forearms  - afebrile  - likely 2/2 iv infiltration from previous levophed infusion earlier  - Burn consulted, pending eval  - local wound care    #suspected COPD, with wheezing  #B/l pleural Effusion  - patient has smoking history of 2 PPD for 60yrs, already quit  - TTE 8/11: EF 63%  - pulmonary consulted, recs appreciated  - started po prednisone 40 qd - 40 for 5 days, 20 for 5 days  - duonebs q6h prn  - repeat CXR 8/21: stable  - was given doses of iv lasix  - HOB elevation 45 degrees  - aspiration precautions  - PT rec SNF    #Decreased oral intake - improving  - calorie count, f/u nutrition eval  - patient tolerating po intake, NG tube taken out 8/22  - S&S eval 8/23: advance diet to soft bite-sized with thin liquid  - nutrition re-eval 8/25 cleared patient for discharge    #Hungatella bacteremia likely GI translocation in the setting of appendicitis  - bcx 8/4 +hungatella  - bcx 8/5 onwards ntd  - TTE showed no vegetations  - s/p kenyatta, flagyl, cefepime, vanco  - monitor off Abx now per ID    #Melena  #Abdominal pain  - s/p egd 8/14 with two ulcers; one was cauterized, given epi  - CTAP 8/19: No definite correlate for acute abdominal pain. Unchanged nonspecific mild dilation of appendix, up to 0.8 cm, without definite periappendiceal inflammatory stranding.  - h/h stable, trend  - protonix 40 po bid    #Transaminitis - resolved  #abd tenderness  - mixed, in setting of infection  - ct abd with hepatic cyst    #DM2 (diabetes mellitus, type 2) - controlled  - s/p insulin gtt for HHS/ DKA  - on lantus 8 and lispro 3  - ssi    #Hypokalemia -resolved     #HTN (hypertension) - stable  - outpt f/u      DVT PPx   GI PPx  Dispo: patient is from home, will req SNF per PT  Note that per family, patient is likely challenged in hearing, thus in-person  will be beneficial, or high-volume phone translation

## 2023-08-26 NOTE — CONSULT NOTE ADULT - SUBJECTIVE AND OBJECTIVE BOX
88y  Male  HPI:  87 y/o M w/ PMH of DM, HTN coming from home with complaint of decreased appetite, increased urinary output and craving sweets x 2 weeks. Daughter is caretaker, states patient behavior has changed. Within the last 2 weeks, He is less active, requesting more coca cola and sugar. Pt noted to be hypotensive upon arrival.     ED vitals:  BP 74/ 50, HR 87, Temp 97.2F, satting 95% on room air  Labs significant for WBC 14K, Na 123 (corrected 141), Cr 2.4, AG 23, BHB 5.1. VBG pH 7.19, Lactate 5.8, K 8.6, pCO2 39  EKG tachycardia, bifascicular block, RBBB  CXR unremarkable  CT A/P: Extensive coronary atherosclerosis. Dependent atelectasis at the right base. 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 Hepatic cysts. Questionable sludge within the gallbladder. Unchanged 1 cm cystic lesion in the pancreatic body      s/p calcium gluconate 1g, Lasix 40mg push x1, barcarb 50meq, started on insulin drip at 7 units/hr.   Admitted to MICU for DKA/HHS.    Burn consulted for RUE IV infiltrate     (04 Aug 2023 15:16)    Hospital course***  Allergies    No Known Allergies    Intolerances      PAST MEDICAL & SURGICAL HISTORY:  HTN (hypertension)  Gout  BPH (benign prostatic hyperplasia)  Parkinson disease  HLD (hyperlipidemia)  Smoker within last 12 months  Diabetes mellitus  No significant past surgical history    Labs:                        8.4    8.40  )-----------( 277      ( 26 Aug 2023 07:53 )             26.1           PE:  PHYSICAL EXAM: Awake, Alert  Full thickness wound to Rt Forearm with soft necrotic tissue, serosang dc  approx 15x5x0.1 cm  no erythema, no swelling

## 2023-08-26 NOTE — CONSULT NOTE ADULT - ASSESSMENT
ASSESSMENT:  Full thickness wound to Rt Forearm s/p IV infiltrate    RECOMMENDATION:  Wound care - Santyl/Xeroform/Kerlex / ACE Wrap QD  Surgical debridement   Will follow.

## 2023-08-27 LAB
ALBUMIN SERPL ELPH-MCNC: 2.6 G/DL — LOW (ref 3.5–5.2)
ALP SERPL-CCNC: 90 U/L — SIGNIFICANT CHANGE UP (ref 30–115)
ALT FLD-CCNC: 26 U/L — SIGNIFICANT CHANGE UP (ref 0–41)
ANION GAP SERPL CALC-SCNC: 11 MMOL/L — SIGNIFICANT CHANGE UP (ref 7–14)
AST SERPL-CCNC: 29 U/L — SIGNIFICANT CHANGE UP (ref 0–41)
BASOPHILS # BLD AUTO: 0.01 K/UL — SIGNIFICANT CHANGE UP (ref 0–0.2)
BASOPHILS NFR BLD AUTO: 0.1 % — SIGNIFICANT CHANGE UP (ref 0–1)
BILIRUB SERPL-MCNC: 0.3 MG/DL — SIGNIFICANT CHANGE UP (ref 0.2–1.2)
BUN SERPL-MCNC: 13 MG/DL — SIGNIFICANT CHANGE UP (ref 10–20)
CALCIUM SERPL-MCNC: 7.7 MG/DL — LOW (ref 8.4–10.5)
CHLORIDE SERPL-SCNC: 104 MMOL/L — SIGNIFICANT CHANGE UP (ref 98–110)
CO2 SERPL-SCNC: 22 MMOL/L — SIGNIFICANT CHANGE UP (ref 17–32)
CREAT SERPL-MCNC: 1 MG/DL — SIGNIFICANT CHANGE UP (ref 0.7–1.5)
EGFR: 73 ML/MIN/1.73M2 — SIGNIFICANT CHANGE UP
EOSINOPHIL # BLD AUTO: 0.03 K/UL — SIGNIFICANT CHANGE UP (ref 0–0.7)
EOSINOPHIL NFR BLD AUTO: 0.4 % — SIGNIFICANT CHANGE UP (ref 0–8)
GLUCOSE BLDC GLUCOMTR-MCNC: 181 MG/DL — HIGH (ref 70–99)
GLUCOSE BLDC GLUCOMTR-MCNC: 191 MG/DL — HIGH (ref 70–99)
GLUCOSE BLDC GLUCOMTR-MCNC: 251 MG/DL — HIGH (ref 70–99)
GLUCOSE BLDC GLUCOMTR-MCNC: 307 MG/DL — HIGH (ref 70–99)
GLUCOSE SERPL-MCNC: 233 MG/DL — HIGH (ref 70–99)
HCT VFR BLD CALC: 28 % — LOW (ref 42–52)
HGB BLD-MCNC: 8.8 G/DL — LOW (ref 14–18)
IMM GRANULOCYTES NFR BLD AUTO: 2.1 % — HIGH (ref 0.1–0.3)
LYMPHOCYTES # BLD AUTO: 0.78 K/UL — LOW (ref 1.2–3.4)
LYMPHOCYTES # BLD AUTO: 10.9 % — LOW (ref 20.5–51.1)
MAGNESIUM SERPL-MCNC: 1.9 MG/DL — SIGNIFICANT CHANGE UP (ref 1.8–2.4)
MCHC RBC-ENTMCNC: 30.7 PG — SIGNIFICANT CHANGE UP (ref 27–31)
MCHC RBC-ENTMCNC: 31.4 G/DL — LOW (ref 32–37)
MCV RBC AUTO: 97.6 FL — HIGH (ref 80–94)
MONOCYTES # BLD AUTO: 0.3 K/UL — SIGNIFICANT CHANGE UP (ref 0.1–0.6)
MONOCYTES NFR BLD AUTO: 4.2 % — SIGNIFICANT CHANGE UP (ref 1.7–9.3)
NEUTROPHILS # BLD AUTO: 5.87 K/UL — SIGNIFICANT CHANGE UP (ref 1.4–6.5)
NEUTROPHILS NFR BLD AUTO: 82.3 % — HIGH (ref 42.2–75.2)
NRBC # BLD: 0 /100 WBCS — SIGNIFICANT CHANGE UP (ref 0–0)
PLATELET # BLD AUTO: 254 K/UL — SIGNIFICANT CHANGE UP (ref 130–400)
PMV BLD: 10.9 FL — HIGH (ref 7.4–10.4)
POTASSIUM SERPL-MCNC: 4.1 MMOL/L — SIGNIFICANT CHANGE UP (ref 3.5–5)
POTASSIUM SERPL-SCNC: 4.1 MMOL/L — SIGNIFICANT CHANGE UP (ref 3.5–5)
PROT SERPL-MCNC: 5.4 G/DL — LOW (ref 6–8)
RBC # BLD: 2.87 M/UL — LOW (ref 4.7–6.1)
RBC # FLD: 18.9 % — HIGH (ref 11.5–14.5)
SODIUM SERPL-SCNC: 137 MMOL/L — SIGNIFICANT CHANGE UP (ref 135–146)
WBC # BLD: 7.14 K/UL — SIGNIFICANT CHANGE UP (ref 4.8–10.8)
WBC # FLD AUTO: 7.14 K/UL — SIGNIFICANT CHANGE UP (ref 4.8–10.8)

## 2023-08-27 PROCEDURE — 99232 SBSQ HOSP IP/OBS MODERATE 35: CPT

## 2023-08-27 PROCEDURE — 74018 RADEX ABDOMEN 1 VIEW: CPT | Mod: 26

## 2023-08-27 RX ORDER — LACTULOSE 10 G/15ML
30 SOLUTION ORAL EVERY 6 HOURS
Refills: 0 | Status: DISCONTINUED | OUTPATIENT
Start: 2023-08-27 | End: 2023-08-30

## 2023-08-27 RX ORDER — SENNA PLUS 8.6 MG/1
2 TABLET ORAL AT BEDTIME
Refills: 0 | Status: DISCONTINUED | OUTPATIENT
Start: 2023-08-27 | End: 2023-09-07

## 2023-08-27 RX ORDER — INSULIN GLARGINE 100 [IU]/ML
15 INJECTION, SOLUTION SUBCUTANEOUS AT BEDTIME
Refills: 0 | Status: DISCONTINUED | OUTPATIENT
Start: 2023-08-27 | End: 2023-08-31

## 2023-08-27 RX ORDER — POLYETHYLENE GLYCOL 3350 17 G/17G
17 POWDER, FOR SOLUTION ORAL
Refills: 0 | Status: DISCONTINUED | OUTPATIENT
Start: 2023-08-27 | End: 2023-09-14

## 2023-08-27 RX ADMIN — Medication 1 APPLICATION(S): at 17:11

## 2023-08-27 RX ADMIN — Medication 3: at 11:24

## 2023-08-27 RX ADMIN — BUDESONIDE AND FORMOTEROL FUMARATE DIHYDRATE 2 PUFF(S): 160; 4.5 AEROSOL RESPIRATORY (INHALATION) at 07:41

## 2023-08-27 RX ADMIN — ENOXAPARIN SODIUM 40 MILLIGRAM(S): 100 INJECTION SUBCUTANEOUS at 11:17

## 2023-08-27 RX ADMIN — PANTOPRAZOLE SODIUM 40 MILLIGRAM(S): 20 TABLET, DELAYED RELEASE ORAL at 05:18

## 2023-08-27 RX ADMIN — Medication 1 APPLICATION(S): at 05:18

## 2023-08-27 RX ADMIN — Medication 3 UNIT(S): at 17:09

## 2023-08-27 RX ADMIN — INSULIN GLARGINE 15 UNIT(S): 100 INJECTION, SOLUTION SUBCUTANEOUS at 22:55

## 2023-08-27 RX ADMIN — LACTULOSE 30 GRAM(S): 10 SOLUTION ORAL at 17:11

## 2023-08-27 RX ADMIN — Medication 3 MILLILITER(S): at 21:10

## 2023-08-27 RX ADMIN — Medication 300 MILLIGRAM(S): at 11:17

## 2023-08-27 RX ADMIN — Medication 1 APPLICATION(S): at 17:10

## 2023-08-27 RX ADMIN — Medication 3 UNIT(S): at 07:41

## 2023-08-27 RX ADMIN — Medication 4: at 17:09

## 2023-08-27 RX ADMIN — Medication 20 MILLIGRAM(S): at 05:18

## 2023-08-27 RX ADMIN — Medication 3 UNIT(S): at 11:24

## 2023-08-27 RX ADMIN — CHLORHEXIDINE GLUCONATE 1 APPLICATION(S): 213 SOLUTION TOPICAL at 05:58

## 2023-08-27 RX ADMIN — POLYETHYLENE GLYCOL 3350 17 GRAM(S): 17 POWDER, FOR SOLUTION ORAL at 17:11

## 2023-08-27 RX ADMIN — POLYETHYLENE GLYCOL 3350 17 GRAM(S): 17 POWDER, FOR SOLUTION ORAL at 10:15

## 2023-08-27 RX ADMIN — PANTOPRAZOLE SODIUM 40 MILLIGRAM(S): 20 TABLET, DELAYED RELEASE ORAL at 17:11

## 2023-08-27 RX ADMIN — Medication 3 MILLILITER(S): at 15:35

## 2023-08-27 RX ADMIN — Medication 1 APPLICATION(S): at 11:17

## 2023-08-27 RX ADMIN — Medication 1: at 07:41

## 2023-08-27 RX ADMIN — Medication 3 MILLILITER(S): at 10:10

## 2023-08-27 NOTE — PROGRESS NOTE ADULT - ASSESSMENT
89yo Cantonese speaking male PMHx DM2, HTN here with decreased appetite found to have HHS/ DKA s/p insulin gtt. Melena s/p EGD demonstrating ulcer. Hungatella bacteremia. Acute Appendicitis. Patient aaox1-2 at baseline.  Patient was admitted to MICU 8/4 and transferred to the medicine floor 8/17  Patient was first in MICU for DKA/HHS    #suspected COPD, with wheezing  #B/l pleural Effusion  - patient has smoking history of 2 PPD for 60yrs, already quit  - TTE 8/11: EF 63%  - pulmonary consulted, recs appreciated  - completing prednisone tapber- 20 for 5 days-day 2/5  - duonebs q6h prn  - repeat CXR 8/21: stable  - was given doses of iv lasix  - HOB elevation 45 degrees  - aspiration precautions  - PT rec SNF    #Decreased oral intake - improving  - calorie count, f/u nutrition eval  - patient tolerating po intake, NG tube taken out 8/22  - S&S eval 8/23: advance diet to soft bite-sized with thin liquid  - Nutrition consult appreciated - pt ok to continue oral diet     #Hungatella bacteremia likely GI translocation in the setting of appendicitis  - bcx 8/4 +hungatella  - bcx 8/5 onwards ntd  - TTE showed no vegetations  - s/p kenyatta, flagyl, cefepime, vanco  - monitor off Abx now per ID    #Melena  #Abdominal pain  - s/p egd 8/14 with two ulcers; one was cauterized, given epi  - CTAP 8/19: No definite correlate for acute abdominal pain. Unchanged nonspecific mild dilation of appendix, up to 0.8 cm, without definite periappendiceal inflammatory stranding.  - h/h stable, trend  - protonix 40 po bid    #Transaminitis - resolved  #abd tenderness  - mixed, in setting of infection  - ct abd with hepatic cyst    #DM2 (diabetes mellitus, type 2) - controlled  - s/p insulin gtt for HHS/ DKA  - increase to lantus 15 and lispro 3  - ssi    #Hypokalemia -resolved     Dispo: snf  - monitor     #HTN (hypertension) - stable  - outpt f/u    #right arm skin necrosis  -burn consult appreciated  -will need debridement       DVT PPx   GI PPx  Dispo: patient is from home, will req SNF per PT  pending debridement , bs control      89yo Cantonese speaking male PMHx DM2, HTN here with decreased appetite found to have HHS/ DKA s/p insulin gtt. Melena s/p EGD demonstrating ulcer. Hungatella bacteremia. Acute Appendicitis. Patient aaox1-2 at baseline.  Patient was admitted to MICU 8/4 and transferred to the medicine floor 8/17  Patient was first in MICU for DKA/HHS    #suspected COPD, with wheezing  #B/l pleural Effusion  - patient has smoking history of 2 PPD for 60yrs, already quit  - TTE 8/11: EF 63%  - pulmonary consulted, recs appreciated  - completing prednisone tapber- 20 for 5 days-day 2/5  - duonebs q6h prn  - repeat CXR 8/21: stable  - was given doses of iv lasix  - HOB elevation 45 degrees  - aspiration precautions  - PT rec SNF    #Decreased oral intake - improving  - calorie count, f/u nutrition eval  - patient tolerating po intake, NG tube taken out 8/22  - S&S eval 8/23: advance diet to soft bite-sized with thin liquid  - Nutrition consult appreciated - pt ok to continue oral diet     #Hungatella bacteremia likely GI translocation in the setting of appendicitis  - bcx 8/4 +hungatella  - bcx 8/5 onwards ntd  - TTE showed no vegetations  - s/p kenyatta, flagyl, cefepime, vanco  - monitor off Abx now per ID    #Melena  #Abdominal pain  - s/p egd 8/14 with two ulcers; one was cauterized, given epi  - CTAP 8/19: No definite correlate for acute abdominal pain. Unchanged nonspecific mild dilation of appendix, up to 0.8 cm, without definite periappendiceal inflammatory stranding.  - h/h stable, trend  - protonix 40 po bid    #Transaminitis - resolved  #abd tenderness  - mixed, in setting of infection  - ct abd with hepatic cyst    #DM2 (diabetes mellitus, type 2) - controlled  - s/p insulin gtt for HHS/ DKA  - increase to lantus 15 and lispro 3  - ssi    #Constipation  #Distended abd  -kub 8/27 offical read pending - mod stool with dilated bowel  -started on senna/miralax and lactulose     #Hypokalemia -resolved     Dispo: snf  - monitor     #HTN (hypertension) - stable  - outpt f/u    #right arm skin necrosis  -burn consult appreciated  -will need debridement       DVT PPx   GI PPx  Dispo: patient is from home, will req SNF per PT  pending debridement , bs control, kub offical read, bowel movement

## 2023-08-27 NOTE — PROGRESS NOTE ADULT - SUBJECTIVE AND OBJECTIVE BOX
Patient is a 88y old  Male who presents with a chief complaint of DKA/HHS (26 Aug 2023 15:12)      Patient seen and examined at bedside.    ALLERGIES:  No Known Allergies    MEDICATIONS:  albuterol/ipratropium for Nebulization 3 milliLiter(s) Nebulizer every 6 hours  allopurinol 300 milliGRAM(s) Oral daily  bacitracin   Ointment 1 Application(s) Topical two times a day  budesonide 160 MICROgram(s)/formoterol 4.5 MICROgram(s) Inhaler 2 Puff(s) Inhalation two times a day  chlorhexidine 2% Cloths 1 Application(s) Topical daily  collagenase Ointment 1 Application(s) Topical daily  collagenase Ointment 1 Application(s) Topical two times a day  dextrose 5%. 1000 milliLiter(s) IV Continuous <Continuous>  dextrose 5%. 1000 milliLiter(s) IV Continuous <Continuous>  dextrose 50% Injectable 25 Gram(s) IV Push once  dextrose 50% Injectable 12.5 Gram(s) IV Push once  dextrose 50% Injectable 25 Gram(s) IV Push once  dextrose Oral Gel 15 Gram(s) Oral once PRN  enoxaparin Injectable 40 milliGRAM(s) SubCutaneous every 24 hours  glucagon  Injectable 1 milliGRAM(s) IntraMuscular once  insulin glargine Injectable (LANTUS) 15 Unit(s) SubCutaneous at bedtime  insulin lispro (ADMELOG) corrective regimen sliding scale   SubCutaneous three times a day before meals  insulin lispro Injectable (ADMELOG) 3 Unit(s) SubCutaneous three times a day before meals  lactulose Syrup 30 Gram(s) Oral every 6 hours PRN  pantoprazole    Tablet 40 milliGRAM(s) Oral two times a day  polyethylene glycol 3350 17 Gram(s) Oral two times a day  predniSONE   Tablet 20 milliGRAM(s) Oral daily  senna 2 Tablet(s) Oral at bedtime    Vital Signs Last 24 Hrs  T(F): 97.2 (27 Aug 2023 07:48), Max: 98.4 (26 Aug 2023 16:00)  HR: 93 (27 Aug 2023 07:48) (92 - 95)  BP: 134/72 (27 Aug 2023 07:48) (112/67 - 134/72)  RR: 18 (27 Aug 2023 00:00) (18 - 18)  SpO2: --  I&O's Summary      PHYSICAL EXAM:  General: NAD, Alert  ENT: MMM  Neck: Supple, No JVD  Lungs: diminished bilaterally, no crackles heard   Cardio: RRR, S1/S2, 2/6 systolic murmur   Abdomen: Soft, Nontender, Nondistended; Bowel sounds present  Extremities: No cyanosis, right arm in dressing     LABS:                        8.8    7.14  )-----------( 254      ( 27 Aug 2023 08:16 )             28.0     08-27    137  |  104  |  13  ----------------------------<  233  4.1   |  22  |  1.0    Ca    7.7      27 Aug 2023 08:16  Mg     1.9     08-27    TPro  5.4  /  Alb  2.6  /  TBili  0.3  /  DBili  x   /  AST  29  /  ALT  26  /  AlkPhos  90  08-27                            POCT Blood Glucose.: 251 mg/dL (27 Aug 2023 11:22)  POCT Blood Glucose.: 191 mg/dL (27 Aug 2023 07:21)  POCT Blood Glucose.: 304 mg/dL (26 Aug 2023 21:07)  POCT Blood Glucose.: 294 mg/dL (26 Aug 2023 16:41)      Urinalysis Basic - ( 27 Aug 2023 08:16 )    Color: x / Appearance: x / SG: x / pH: x  Gluc: 233 mg/dL / Ketone: x  / Bili: x / Urobili: x   Blood: x / Protein: x / Nitrite: x   Leuk Esterase: x / RBC: x / WBC x   Sq Epi: x / Non Sq Epi: x / Bacteria: x            RADIOLOGY & ADDITIONAL TESTS:    Care Discussed with Consultants/Other Providers:  Patient is a 88y old  Male who presents with a chief complaint of DKA/HHS (26 Aug 2023 15:12)      Patient seen and examined at bedside.    ALLERGIES:  No Known Allergies    MEDICATIONS:  albuterol/ipratropium for Nebulization 3 milliLiter(s) Nebulizer every 6 hours  allopurinol 300 milliGRAM(s) Oral daily  bacitracin   Ointment 1 Application(s) Topical two times a day  budesonide 160 MICROgram(s)/formoterol 4.5 MICROgram(s) Inhaler 2 Puff(s) Inhalation two times a day  chlorhexidine 2% Cloths 1 Application(s) Topical daily  collagenase Ointment 1 Application(s) Topical daily  collagenase Ointment 1 Application(s) Topical two times a day  dextrose 5%. 1000 milliLiter(s) IV Continuous <Continuous>  dextrose 5%. 1000 milliLiter(s) IV Continuous <Continuous>  dextrose 50% Injectable 25 Gram(s) IV Push once  dextrose 50% Injectable 12.5 Gram(s) IV Push once  dextrose 50% Injectable 25 Gram(s) IV Push once  dextrose Oral Gel 15 Gram(s) Oral once PRN  enoxaparin Injectable 40 milliGRAM(s) SubCutaneous every 24 hours  glucagon  Injectable 1 milliGRAM(s) IntraMuscular once  insulin glargine Injectable (LANTUS) 15 Unit(s) SubCutaneous at bedtime  insulin lispro (ADMELOG) corrective regimen sliding scale   SubCutaneous three times a day before meals  insulin lispro Injectable (ADMELOG) 3 Unit(s) SubCutaneous three times a day before meals  lactulose Syrup 30 Gram(s) Oral every 6 hours PRN  pantoprazole    Tablet 40 milliGRAM(s) Oral two times a day  polyethylene glycol 3350 17 Gram(s) Oral two times a day  predniSONE   Tablet 20 milliGRAM(s) Oral daily  senna 2 Tablet(s) Oral at bedtime    Vital Signs Last 24 Hrs  T(F): 97.2 (27 Aug 2023 07:48), Max: 98.4 (26 Aug 2023 16:00)  HR: 93 (27 Aug 2023 07:48) (92 - 95)  BP: 134/72 (27 Aug 2023 07:48) (112/67 - 134/72)  RR: 18 (27 Aug 2023 00:00) (18 - 18)  SpO2: --  I&O's Summary      PHYSICAL EXAM:  General: NAD, Alert  ENT: MMM  Neck: Supple, No JVD  Lungs: diminished bilaterally, no crackles heard   Cardio: RRR, S1/S2, 2/6 systolic murmur   Abdomen: Soft, Nontender, +distended; Bowel sounds present  Extremities: No cyanosis, right arm in dressing     LABS:                        8.8    7.14  )-----------( 254      ( 27 Aug 2023 08:16 )             28.0     08-27    137  |  104  |  13  ----------------------------<  233  4.1   |  22  |  1.0    Ca    7.7      27 Aug 2023 08:16  Mg     1.9     08-27    TPro  5.4  /  Alb  2.6  /  TBili  0.3  /  DBili  x   /  AST  29  /  ALT  26  /  AlkPhos  90  08-27                            POCT Blood Glucose.: 251 mg/dL (27 Aug 2023 11:22)  POCT Blood Glucose.: 191 mg/dL (27 Aug 2023 07:21)  POCT Blood Glucose.: 304 mg/dL (26 Aug 2023 21:07)  POCT Blood Glucose.: 294 mg/dL (26 Aug 2023 16:41)      Urinalysis Basic - ( 27 Aug 2023 08:16 )    Color: x / Appearance: x / SG: x / pH: x  Gluc: 233 mg/dL / Ketone: x  / Bili: x / Urobili: x   Blood: x / Protein: x / Nitrite: x   Leuk Esterase: x / RBC: x / WBC x   Sq Epi: x / Non Sq Epi: x / Bacteria: x            RADIOLOGY & ADDITIONAL TESTS:    Care Discussed with Consultants/Other Providers:

## 2023-08-28 LAB
ALBUMIN SERPL ELPH-MCNC: 2.2 G/DL — LOW (ref 3.5–5.2)
ALP SERPL-CCNC: 87 U/L — SIGNIFICANT CHANGE UP (ref 30–115)
ALT FLD-CCNC: 27 U/L — SIGNIFICANT CHANGE UP (ref 0–41)
ANION GAP SERPL CALC-SCNC: 10 MMOL/L — SIGNIFICANT CHANGE UP (ref 7–14)
AST SERPL-CCNC: 23 U/L — SIGNIFICANT CHANGE UP (ref 0–41)
BASOPHILS # BLD AUTO: 0.02 K/UL — SIGNIFICANT CHANGE UP (ref 0–0.2)
BASOPHILS NFR BLD AUTO: 0.2 % — SIGNIFICANT CHANGE UP (ref 0–1)
BILIRUB SERPL-MCNC: 0.3 MG/DL — SIGNIFICANT CHANGE UP (ref 0.2–1.2)
BUN SERPL-MCNC: 11 MG/DL — SIGNIFICANT CHANGE UP (ref 10–20)
CALCIUM SERPL-MCNC: 7.5 MG/DL — LOW (ref 8.4–10.5)
CHLORIDE SERPL-SCNC: 104 MMOL/L — SIGNIFICANT CHANGE UP (ref 98–110)
CO2 SERPL-SCNC: 24 MMOL/L — SIGNIFICANT CHANGE UP (ref 17–32)
CREAT SERPL-MCNC: 0.8 MG/DL — SIGNIFICANT CHANGE UP (ref 0.7–1.5)
EGFR: 86 ML/MIN/1.73M2 — SIGNIFICANT CHANGE UP
EOSINOPHIL # BLD AUTO: 0.03 K/UL — SIGNIFICANT CHANGE UP (ref 0–0.7)
EOSINOPHIL NFR BLD AUTO: 0.3 % — SIGNIFICANT CHANGE UP (ref 0–8)
GLUCOSE BLDC GLUCOMTR-MCNC: 102 MG/DL — HIGH (ref 70–99)
GLUCOSE BLDC GLUCOMTR-MCNC: 189 MG/DL — HIGH (ref 70–99)
GLUCOSE BLDC GLUCOMTR-MCNC: 215 MG/DL — HIGH (ref 70–99)
GLUCOSE BLDC GLUCOMTR-MCNC: 241 MG/DL — HIGH (ref 70–99)
GLUCOSE SERPL-MCNC: 85 MG/DL — SIGNIFICANT CHANGE UP (ref 70–99)
HCT VFR BLD CALC: 26.3 % — LOW (ref 42–52)
HGB BLD-MCNC: 8.4 G/DL — LOW (ref 14–18)
IMM GRANULOCYTES NFR BLD AUTO: 1.9 % — HIGH (ref 0.1–0.3)
LYMPHOCYTES # BLD AUTO: 0.68 K/UL — LOW (ref 1.2–3.4)
LYMPHOCYTES # BLD AUTO: 7.6 % — LOW (ref 20.5–51.1)
MAGNESIUM SERPL-MCNC: 1.9 MG/DL — SIGNIFICANT CHANGE UP (ref 1.8–2.4)
MCHC RBC-ENTMCNC: 30.8 PG — SIGNIFICANT CHANGE UP (ref 27–31)
MCHC RBC-ENTMCNC: 31.9 G/DL — LOW (ref 32–37)
MCV RBC AUTO: 96.3 FL — HIGH (ref 80–94)
MONOCYTES # BLD AUTO: 0.32 K/UL — SIGNIFICANT CHANGE UP (ref 0.1–0.6)
MONOCYTES NFR BLD AUTO: 3.6 % — SIGNIFICANT CHANGE UP (ref 1.7–9.3)
NEUTROPHILS # BLD AUTO: 7.71 K/UL — HIGH (ref 1.4–6.5)
NEUTROPHILS NFR BLD AUTO: 86.4 % — HIGH (ref 42.2–75.2)
NRBC # BLD: 0 /100 WBCS — SIGNIFICANT CHANGE UP (ref 0–0)
PLATELET # BLD AUTO: 196 K/UL — SIGNIFICANT CHANGE UP (ref 130–400)
PMV BLD: 11.5 FL — HIGH (ref 7.4–10.4)
POTASSIUM SERPL-MCNC: 3.8 MMOL/L — SIGNIFICANT CHANGE UP (ref 3.5–5)
POTASSIUM SERPL-SCNC: 3.8 MMOL/L — SIGNIFICANT CHANGE UP (ref 3.5–5)
PROT SERPL-MCNC: 4.9 G/DL — LOW (ref 6–8)
RBC # BLD: 2.73 M/UL — LOW (ref 4.7–6.1)
RBC # FLD: 18.9 % — HIGH (ref 11.5–14.5)
SODIUM SERPL-SCNC: 138 MMOL/L — SIGNIFICANT CHANGE UP (ref 135–146)
WBC # BLD: 8.93 K/UL — SIGNIFICANT CHANGE UP (ref 4.8–10.8)
WBC # FLD AUTO: 8.93 K/UL — SIGNIFICANT CHANGE UP (ref 4.8–10.8)

## 2023-08-28 PROCEDURE — 99232 SBSQ HOSP IP/OBS MODERATE 35: CPT

## 2023-08-28 PROCEDURE — 99231 SBSQ HOSP IP/OBS SF/LOW 25: CPT

## 2023-08-28 RX ADMIN — Medication 3 MILLILITER(S): at 19:46

## 2023-08-28 RX ADMIN — INSULIN GLARGINE 15 UNIT(S): 100 INJECTION, SOLUTION SUBCUTANEOUS at 21:30

## 2023-08-28 RX ADMIN — Medication 2: at 11:28

## 2023-08-28 RX ADMIN — Medication 3 MILLILITER(S): at 09:03

## 2023-08-28 RX ADMIN — Medication 1 APPLICATION(S): at 17:58

## 2023-08-28 RX ADMIN — PANTOPRAZOLE SODIUM 40 MILLIGRAM(S): 20 TABLET, DELAYED RELEASE ORAL at 05:09

## 2023-08-28 RX ADMIN — POLYETHYLENE GLYCOL 3350 17 GRAM(S): 17 POWDER, FOR SOLUTION ORAL at 05:08

## 2023-08-28 RX ADMIN — Medication 1 APPLICATION(S): at 05:07

## 2023-08-28 RX ADMIN — Medication 300 MILLIGRAM(S): at 11:30

## 2023-08-28 RX ADMIN — Medication 2: at 17:18

## 2023-08-28 RX ADMIN — BUDESONIDE AND FORMOTEROL FUMARATE DIHYDRATE 2 PUFF(S): 160; 4.5 AEROSOL RESPIRATORY (INHALATION) at 09:47

## 2023-08-28 RX ADMIN — PANTOPRAZOLE SODIUM 40 MILLIGRAM(S): 20 TABLET, DELAYED RELEASE ORAL at 17:16

## 2023-08-28 RX ADMIN — Medication 1 APPLICATION(S): at 17:15

## 2023-08-28 RX ADMIN — SENNA PLUS 2 TABLET(S): 8.6 TABLET ORAL at 21:31

## 2023-08-28 RX ADMIN — Medication 3 MILLILITER(S): at 14:46

## 2023-08-28 RX ADMIN — ENOXAPARIN SODIUM 40 MILLIGRAM(S): 100 INJECTION SUBCUTANEOUS at 11:25

## 2023-08-28 RX ADMIN — Medication 1 APPLICATION(S): at 05:09

## 2023-08-28 RX ADMIN — Medication 20 MILLIGRAM(S): at 05:12

## 2023-08-28 RX ADMIN — Medication 3 UNIT(S): at 17:18

## 2023-08-28 RX ADMIN — LACTULOSE 30 GRAM(S): 10 SOLUTION ORAL at 09:47

## 2023-08-28 RX ADMIN — Medication 3 UNIT(S): at 11:26

## 2023-08-28 RX ADMIN — POLYETHYLENE GLYCOL 3350 17 GRAM(S): 17 POWDER, FOR SOLUTION ORAL at 17:16

## 2023-08-28 RX ADMIN — CHLORHEXIDINE GLUCONATE 1 APPLICATION(S): 213 SOLUTION TOPICAL at 05:10

## 2023-08-28 NOTE — CHART NOTE - NSCHARTNOTEFT_GEN_A_CORE
Registered Dietitian Follow-Up     Patient Profile Reviewed                           Yes [x]   No []     Nutrition History Previously Obtained        Yes [x]  No []       Pertinent Subjective Information:  Patient with good PO intake - RN noted patient is having significant carbohydrate intake at meals and has a lot of soda.      Pertinent Medical Interventions:  Right arm skin necrosis; Constipation; Distended abd - KUB with mod stool with dilated bowel, non-obstructive patter; Lactulose changed to standing and add enemas if no BM by tomorrow; suspected COPD, with wheezing; b/l pleural effusion; Decreased oral intake - improving; s/p NGT removal 822; SLP eval  - advance diet to soft and bite sized with thin liquid; Melena; Abdominal pain - s/p EGD  with two ulcers; one was cauterized, given epi; CTAP : No definite correlate for acute  abdominal; DM2 (diabetes mellitus, type 2) - controlled - s/p insulin gtt for HHS/DKA; Hypokalemia - resolved; HTN - stable      Diet order:  Diet, Soft and Bite Sized:   Consistent Carbohydrate {No Snacks}  DASH/TLC {Sodium & Cholesterol Restricted}  Supplement Feeding Modality:  Oral  Glucerna Shake Cans or Servings Per Day:  1       Frequency:  Two Times a day (23 @ 16:29) [Active]    Anthropometrics:  Height: 167.6 cm  Weight: 62.6 kg (23)  BMI: 22.3  IBW: 59.1 kg     Daily Weight in k.7 ()  11.3% wt loss x 9 days?    MEDICATIONS  (STANDING):  albuterol/ipratropium for Nebulization 3 milliLiter(s) Nebulizer every 6 hours  allopurinol 300 milliGRAM(s) Oral daily  bacitracin   Ointment 1 Application(s) Topical two times a day  budesonide 160 MICROgram(s)/formoterol 4.5 MICROgram(s) Inhaler 2 Puff(s) Inhalation two times a day  chlorhexidine 2% Cloths 1 Application(s) Topical daily  collagenase Ointment 1 Application(s) Topical daily  collagenase Ointment 1 Application(s) Topical two times a day  dextrose 5%. 1000 milliLiter(s) (100 mL/Hr) IV Continuous <Continuous>  dextrose 5%. 1000 milliLiter(s) (50 mL/Hr) IV Continuous <Continuous>  dextrose 50% Injectable 25 Gram(s) IV Push once  dextrose 50% Injectable 12.5 Gram(s) IV Push once  dextrose 50% Injectable 25 Gram(s) IV Push once  enoxaparin Injectable 40 milliGRAM(s) SubCutaneous every 24 hours  glucagon  Injectable 1 milliGRAM(s) IntraMuscular once  insulin glargine Injectable (LANTUS) 15 Unit(s) SubCutaneous at bedtime  insulin lispro (ADMELOG) corrective regimen sliding scale   SubCutaneous three times a day before meals  insulin lispro Injectable (ADMELOG) 3 Unit(s) SubCutaneous three times a day before meals  pantoprazole    Tablet 40 milliGRAM(s) Oral two times a day  polyethylene glycol 3350 17 Gram(s) Oral two times a day  predniSONE   Tablet 20 milliGRAM(s) Oral daily  senna 2 Tablet(s) Oral at bedtime    MEDICATIONS  (PRN):  dextrose Oral Gel 15 Gram(s) Oral once PRN Blood Glucose LESS THAN 70 milliGRAM(s)/deciliter  lactulose Syrup 30 Gram(s) Oral every 6 hours PRN constipation    Pertinent Labs:  @ 07:25: Na 138, BUN 11, Cr 0.8, BG 85, K+ 3.8, Phos --, Mg 1.9, Alk Phos 87, ALT/SGPT 27, AST/SGOT 23, HbA1c --    Finger Sticks:  POCT Blood Glucose.: 241 mg/dL ( @ 11:21)  POCT Blood Glucose.: 102 mg/dL ( @ 08:04)  POCT Blood Glucose.: 181 mg/dL ( @ 21:09)  POCT Blood Glucose.: 307 mg/dL ( @ 16:45)    Physical Findings:  - Appearance: disoriented x 2  - GI function: last documented BM  - bowel regimen noted   - Tubes: n/a - no feeding tubes   - Oral/Mouth cavity: soft and bite sized, thin liquids   - Skin: wound right arm  - Edema: generalized 1+ edema     Nutrition Requirements:  Weight Used: dosing weight 62.6 kg    Estimated Energy Needs    Continue [x]  Adjust []  Adjusted Energy Recommendations: 6775-1973 kcal/day - MSJ 1250*SF 1.2-1.3 (no longer in critical care)     Estimated Protein Needs    Continue [x]  Adjust []  Adjusted Protein Recommendations: 75-81 g/day - 1.2-1.3g/kg (no longer in crit care setting)     Estimated Fluid Needs        Continue [x]  Adjust []  Adjusted Fluid Recommendations: 1565 mL/day - 25mL/kg of    Nutrient Intake: 75% of breakfast meal this morning      [x] Previous Nutrition Diagnosis:  Inadequate Oral Intake - improving            [x] Ongoing          [] Resolved     Nutrition Intervention:  meals and snacks, medical food supplements, coordination of care     Goal/Expected Outcome:   PO intake >75% of meals within 3-5 days      Indicator/Monitoring:   diet order, energy intake, weight, labs, skin status, NFPE     Recommendation:  1) Continue current diet order Soft and bite sized texture per SLP recommendations  2) recommend to add Glucerna Shake 2x/day (220 kcal, 10 gm Protein each) to optimize kcal and protein intake   3) Maximum Encouragement and assistance with all meals, snacks, and supplement  4) Daughter would benefit from Nutrition education, will attempt at follow up   5) Monitor BM  6) Monitor BG closely as patient with steroid use    Patient is at high nutrition risk, RD f/u 3-5 days or PRN     RD to remain available: Maureen Hung RD [phone] or via Teams. Registered Dietitian Follow-Up     Patient Profile Reviewed                           Yes [x]   No []     Nutrition History Previously Obtained        Yes [x]  No []       Pertinent Subjective Information:  Patient with good PO intake - RN noted patient is having significant carbohydrate intake at meals and has a lot of soda.      Pertinent Medical Interventions:  Right arm skin necrosis; Constipation; Distended abd - KUB with mod stool with dilated bowel, non-obstructive patter; Lactulose changed to standing and add enemas if no BM by tomorrow; suspected COPD, with wheezing; b/l pleural effusion; Decreased oral intake - improving; s/p NGT removal 822; SLP eval  - advance diet to soft and bite sized with thin liquid; Melena; Abdominal pain - s/p EGD  with two ulcers; one was cauterized, given epi; CTAP : No definite correlate for acute  abdominal; DM2 (diabetes mellitus, type 2) - controlled - s/p insulin gtt for HHS/DKA; Hypokalemia - resolved; HTN - stable      Diet order:  Diet, Soft and Bite Sized:   Consistent Carbohydrate {No Snacks}  DASH/TLC {Sodium & Cholesterol Restricted}  Supplement Feeding Modality:  Oral  Glucerna Shake Cans or Servings Per Day:  1       Frequency:  Two Times a day (23 @ 16:29) [Active]    Anthropometrics:  Height: 167.6 cm  Weight: 62.6 kg (23)  BMI: 22.3  IBW: 59.1 kg     Daily Weight in k.7 ()  11.3% wt loss x 9 days?    MEDICATIONS  (STANDING):  albuterol/ipratropium for Nebulization 3 milliLiter(s) Nebulizer every 6 hours  allopurinol 300 milliGRAM(s) Oral daily  bacitracin   Ointment 1 Application(s) Topical two times a day  budesonide 160 MICROgram(s)/formoterol 4.5 MICROgram(s) Inhaler 2 Puff(s) Inhalation two times a day  chlorhexidine 2% Cloths 1 Application(s) Topical daily  collagenase Ointment 1 Application(s) Topical daily  collagenase Ointment 1 Application(s) Topical two times a day  dextrose 5%. 1000 milliLiter(s) (100 mL/Hr) IV Continuous <Continuous>  dextrose 5%. 1000 milliLiter(s) (50 mL/Hr) IV Continuous <Continuous>  dextrose 50% Injectable 25 Gram(s) IV Push once  dextrose 50% Injectable 12.5 Gram(s) IV Push once  dextrose 50% Injectable 25 Gram(s) IV Push once  enoxaparin Injectable 40 milliGRAM(s) SubCutaneous every 24 hours  glucagon  Injectable 1 milliGRAM(s) IntraMuscular once  insulin glargine Injectable (LANTUS) 15 Unit(s) SubCutaneous at bedtime  insulin lispro (ADMELOG) corrective regimen sliding scale   SubCutaneous three times a day before meals  insulin lispro Injectable (ADMELOG) 3 Unit(s) SubCutaneous three times a day before meals  pantoprazole    Tablet 40 milliGRAM(s) Oral two times a day  polyethylene glycol 3350 17 Gram(s) Oral two times a day  predniSONE   Tablet 20 milliGRAM(s) Oral daily  senna 2 Tablet(s) Oral at bedtime    MEDICATIONS  (PRN):  dextrose Oral Gel 15 Gram(s) Oral once PRN Blood Glucose LESS THAN 70 milliGRAM(s)/deciliter  lactulose Syrup 30 Gram(s) Oral every 6 hours PRN constipation    Pertinent Labs:  @ 07:25: Na 138, BUN 11, Cr 0.8, BG 85, K+ 3.8, Phos --, Mg 1.9, Alk Phos 87, ALT/SGPT 27, AST/SGOT 23, HbA1c --    Finger Sticks:  POCT Blood Glucose.: 241 mg/dL ( @ 11:21)  POCT Blood Glucose.: 102 mg/dL ( @ 08:04)  POCT Blood Glucose.: 181 mg/dL ( @ 21:09)  POCT Blood Glucose.: 307 mg/dL ( @ 16:45)    Physical Findings:  - Appearance: disoriented x 2  - GI function: last documented BM  - bowel regimen noted   - Tubes: n/a - no feeding tubes   - Oral/Mouth cavity: soft and bite sized, thin liquids   - Skin: wound right arm  - Edema: generalized 1+ edema     Nutrition Requirements:  Weight Used: dosing weight 62.6 kg    Estimated Energy Needs    Continue [x]  Adjust []  Adjusted Energy Recommendations: 0602-4051 kcal/day - MSJ 1250*SF 1.2-1.3 (no longer in critical care)     Estimated Protein Needs    Continue [x]  Adjust []  Adjusted Protein Recommendations: 75-81 g/day - 1.2-1.3g/kg (no longer in crit care setting)     Estimated Fluid Needs        Continue [x]  Adjust []  Adjusted Fluid Recommendations: 1565 mL/day - 25mL/kg of    Nutrient Intake: 75% of breakfast meal this morning      [x] Previous Nutrition Diagnosis:  Inadequate Oral Intake - improving            [x] Ongoing          [] Resolved     Nutrition Intervention:  meals and snacks, medical food supplements, coordination of care     Goal/Expected Outcome:   PO intake >75% of meals within 3-5 days      Indicator/Monitoring:   diet order, energy intake, weight, labs, skin status, NFPE     Recommendation:  1) Continue current diet order Soft and bite sized texture per SLP recommendations  2) continue Glucerna Shake 2x/day (220 kcal, 10 gm Protein each) to optimize kcal and protein intake   3) Maximum Encouragement and assistance with all meals, snacks, and supplement  4) Daughter would benefit from Nutrition education, will attempt at follow up   5) Monitor BM  6) Monitor BG closely as patient with steroid use    Patient is at high nutrition risk, RD f/u 3-5 days or PRN     RD to remain available: Maureen Hung RD [phone] or via Teams.

## 2023-08-28 NOTE — PROGRESS NOTE ADULT - SUBJECTIVE AND OBJECTIVE BOX
Pt seen with attending on AM rounds  afebrile    ICU Vital Signs Last 24 Hrs  T(C): 35.9 (28 Aug 2023 15:30), Max: 36.4 (28 Aug 2023 07:21)  T(F): 96.7 (28 Aug 2023 15:30), Max: 97.5 (28 Aug 2023 07:21)  HR: 94 (28 Aug 2023 07:21) (94 - 97)  BP: 89/55 (28 Aug 2023 15:30) (89/55 - 131/59)  RR: 18 (28 Aug 2023 15:30) (18 - 18)  SpO2: 92% (28 Aug 2023 15:30) (92% - 92%)    O2 Parameters below as of 28 Aug 2023 15:30  Patient On (Oxygen Delivery Method): room air                          8.4    8.93  )-----------( 196      ( 28 Aug 2023 07:25 )             26.3       Allergies  No Known Allergies  Intolerances    PE:  PHYSICAL EXAM: Awake, Alert  Full thickness wound to Rt Forearm with soft necrotic tissue, serosang dc  approx 15x5x0.1 cm  no erythema, no swelling  dressing was changed

## 2023-08-28 NOTE — PROGRESS NOTE ADULT - ASSESSMENT
ASSESSMENT:  Full thickness wound to Rt Forearm s/p IV infiltrate    RECOMMENDATION:  Wound care - Santyl/Xeroform/Kerlex / ACE Wrap QD  Surgical debridement recommended in OR if medically stable  if unable to clear we can consider bedside debridement  Will follow.

## 2023-08-28 NOTE — PROGRESS NOTE ADULT - ASSESSMENT
87yo Cantonese speaking male PMHx DM2, HTN here with decreased appetite found to have HHS/ DKA s/p insulin gtt. Melena s/p EGD demonstrating ulcer. Hungatella bacteremia. Acute Appendicitis. Patient aaox1-2 at baseline.  Patient was admitted to MICU 8/4 and transferred to the medicine floor 8/17  Patient was first in MICU for DKA/HHS    #suspected COPD, with wheezing  #B/l pleural Effusion  - patient has smoking history of 2 PPD for 60yrs, already quit  - TTE 8/11: EF 63%  - pulmonary consulted, recs appreciated  - completing prednisone taper- 20 for 5 days-day 3/5  - duonebs q6h prn  - repeat CXR 8/21: stable  - was given doses of iv lasix  - HOB elevation 45 degrees  - aspiration precautions  - PT rec SNF    #Decreased oral intake - improving  - calorie count, f/u nutrition eval  - patient tolerating po intake, NG tube taken out 8/22  - S&S eval 8/23: advance diet to soft bite-sized with thin liquid  - Nutrition consult appreciated - pt ok to continue oral diet     #Hungatella bacteremia likely GI translocation in the setting of appendicitis  - bcx 8/4 +hungatella  - bcx 8/5 onwards ntd  - TTE showed no vegetations  - s/p kenyatta, flagyl, cefepime, vanco  - monitor off Abx now per ID    #Melena  #Abdominal pain  - s/p egd 8/14 with two ulcers; one was cauterized, given epi  - CTAP 8/19: No definite correlate for acute abdominal pain. Unchanged nonspecific mild dilation of appendix, up to 0.8 cm, without definite periappendiceal inflammatory stranding.  - h/h stable, trend  - protonix 40 po bid    #Transaminitis - resolved  #abd tenderness  - mixed, in setting of infection  - ct abd with hepatic cyst    #DM2 (diabetes mellitus, type 2) - controlled  - s/p insulin gtt for HHS/ DKA  - continue lantus 15 and lispro 3  - ssi    #Constipation-resolving   #Distended abd  -kub 8/27 offical read pending - mod stool with dilated bowel  -started on senna/miralax and lactulose     #Hypokalemia -resolved     Dispo: snf  - monitor     #HTN (hypertension) - stable  - outpt f/u    #right arm skin necrosis  -burn consult appreciated  -PT is currently medically optimized for surgical debridement, OR date to be determined by burn       DVT PPx   GI PPx  Dispo: patient is from home, will req SNF per PT  pending debridement , bs control  Family: resident called daughter 8/28

## 2023-08-28 NOTE — PROGRESS NOTE ADULT - SUBJECTIVE AND OBJECTIVE BOX
SUBJECTIVE:  HPI:  89 y/o M w/ PMH of DM, HTN coming from home with complaint of decreased appetite, increased urinary output and craving sweets x 2 weeks. Daughter is caretaker, states patient behavior has changed. Within the last 2 weeks, He is less active, requesting more coca cola and sugar. Pt noted to be hypotensive upon arrival.     ED vitals:  BP 74/ 50, HR 87, Temp 97.2F, satting 95% on room air  Labs significant for WBC 14K, Na 123 (corrected 141), Cr 2.4, AG 23, BHB 5.1. VBG pH 7.19, Lactate 5.8, K 8.6, pCO2 39  EKG tachycardia, bifascicular block, RBBB  CXR unremarkable  CT A/P: Extensive coronary atherosclerosis. Dependent atelectasis at the right base. 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 Hepatic cysts. Questionable sludge within the gallbladder. Unchanged 1 cm cystic lesion in the pancreatic body      s/p calcium gluconate 1g, Lasix 40mg push x1, barcarb 50meq, started on insulin drip at 7 units/hr.   Admitted to MICU for DKA/HHS.     (04 Aug 2023 15:16)      Patient is a 88y old Male who presents with a chief complaint of DKA/HHS (27 Aug 2023 14:44)    Currently admitted to medicine with the primary diagnosis of DKA, type 2       Today is hospital day 24d.     PAST MEDICAL & SURGICAL HISTORY  HTN (hypertension)    Gout    BPH (benign prostatic hyperplasia)    Parkinson disease    HLD (hyperlipidemia)    Smoker within last 12 months    Diabetes mellitus    No significant past surgical history        ALLERGIES:  No Known Allergies    MEDICATIONS:  ACTIVE MEDICATIONS  albuterol/ipratropium for Nebulization 3 milliLiter(s) Nebulizer every 6 hours  allopurinol 300 milliGRAM(s) Oral daily  bacitracin   Ointment 1 Application(s) Topical two times a day  budesonide 160 MICROgram(s)/formoterol 4.5 MICROgram(s) Inhaler 2 Puff(s) Inhalation two times a day  chlorhexidine 2% Cloths 1 Application(s) Topical daily  collagenase Ointment 1 Application(s) Topical daily  collagenase Ointment 1 Application(s) Topical two times a day  dextrose 5%. 1000 milliLiter(s) IV Continuous <Continuous>  dextrose 5%. 1000 milliLiter(s) IV Continuous <Continuous>  dextrose 50% Injectable 25 Gram(s) IV Push once  dextrose 50% Injectable 12.5 Gram(s) IV Push once  dextrose 50% Injectable 25 Gram(s) IV Push once  dextrose Oral Gel 15 Gram(s) Oral once PRN  enoxaparin Injectable 40 milliGRAM(s) SubCutaneous every 24 hours  glucagon  Injectable 1 milliGRAM(s) IntraMuscular once  insulin glargine Injectable (LANTUS) 15 Unit(s) SubCutaneous at bedtime  insulin lispro (ADMELOG) corrective regimen sliding scale   SubCutaneous three times a day before meals  insulin lispro Injectable (ADMELOG) 3 Unit(s) SubCutaneous three times a day before meals  lactulose Syrup 30 Gram(s) Oral every 6 hours PRN  pantoprazole    Tablet 40 milliGRAM(s) Oral two times a day  polyethylene glycol 3350 17 Gram(s) Oral two times a day  predniSONE   Tablet 20 milliGRAM(s) Oral daily  senna 2 Tablet(s) Oral at bedtime      VITALS:   T(F): 97.5  HR: 94  BP: 127/69  RR: 18  SpO2: --    LABS:                        8.4    8.93  )-----------( 196      ( 28 Aug 2023 07:25 )             26.3     08-28    138  |  104  |  11  ----------------------------<  85  3.8   |  24  |  0.8    Ca    7.5<L>      28 Aug 2023 07:25  Mg     1.9     08-28    TPro  4.9<L>  /  Alb  2.2<L>  /  TBili  0.3  /  DBili  x   /  AST  23  /  ALT  27  /  AlkPhos  87  08-28      Urinalysis Basic - ( 28 Aug 2023 07:25 )    Color: x / Appearance: x / SG: x / pH: x  Gluc: 85 mg/dL / Ketone: x  / Bili: x / Urobili: x   Blood: x / Protein: x / Nitrite: x   Leuk Esterase: x / RBC: x / WBC x   Sq Epi: x / Non Sq Epi: x / Bacteria: x                    PHYSICAL EXAM:  GEN: No acute distress  LUNGS: Clear to auscultation bilaterally   HEART: S1/S2 present.    ABD: Soft, non-tender, non-distended.   EXT: No pedal edema  NEURO: AAOX3

## 2023-08-28 NOTE — PROGRESS NOTE ADULT - SUBJECTIVE AND OBJECTIVE BOX
Patient is a 88y old  Male who presents with a chief complaint of DKA/HHS (28 Aug 2023 16:15)      Patient seen and examined at bedside.    ALLERGIES:  No Known Allergies    MEDICATIONS:  albuterol/ipratropium for Nebulization 3 milliLiter(s) Nebulizer every 6 hours  allopurinol 300 milliGRAM(s) Oral daily  bacitracin   Ointment 1 Application(s) Topical two times a day  budesonide 160 MICROgram(s)/formoterol 4.5 MICROgram(s) Inhaler 2 Puff(s) Inhalation two times a day  chlorhexidine 2% Cloths 1 Application(s) Topical daily  collagenase Ointment 1 Application(s) Topical daily  collagenase Ointment 1 Application(s) Topical two times a day  dextrose 5%. 1000 milliLiter(s) IV Continuous <Continuous>  dextrose 5%. 1000 milliLiter(s) IV Continuous <Continuous>  dextrose 50% Injectable 25 Gram(s) IV Push once  dextrose 50% Injectable 12.5 Gram(s) IV Push once  dextrose 50% Injectable 25 Gram(s) IV Push once  dextrose Oral Gel 15 Gram(s) Oral once PRN  enoxaparin Injectable 40 milliGRAM(s) SubCutaneous every 24 hours  glucagon  Injectable 1 milliGRAM(s) IntraMuscular once  insulin glargine Injectable (LANTUS) 15 Unit(s) SubCutaneous at bedtime  insulin lispro (ADMELOG) corrective regimen sliding scale   SubCutaneous three times a day before meals  insulin lispro Injectable (ADMELOG) 3 Unit(s) SubCutaneous three times a day before meals  lactulose Syrup 30 Gram(s) Oral every 6 hours PRN  pantoprazole    Tablet 40 milliGRAM(s) Oral two times a day  polyethylene glycol 3350 17 Gram(s) Oral two times a day  predniSONE   Tablet 20 milliGRAM(s) Oral daily  senna 2 Tablet(s) Oral at bedtime    Vital Signs Last 24 Hrs  T(F): 96.7 (28 Aug 2023 15:30), Max: 97.5 (28 Aug 2023 07:21)  HR: 94 (28 Aug 2023 07:21) (94 - 97)  BP: 89/55 (28 Aug 2023 15:30) (89/55 - 131/59)  RR: 18 (28 Aug 2023 15:30) (18 - 18)  SpO2: 92% (28 Aug 2023 15:30) (92% - 92%)  I&O's Summary      PHYSICAL EXAM:  General: NAD, A/O x 1  ENT: MMM  Neck: Supple, No JVD  Lungs: diminished bilaterally   Cardio: RRR, S1/S2, 2/6 systolic murmur   Abdomen: Soft, Nontender, mild distended; Bowel sounds present  Extremities: No cyanosis, right arm in dressing     LABS:                        8.4    8.93  )-----------( 196      ( 28 Aug 2023 07:25 )             26.3     08-28    138  |  104  |  11  ----------------------------<  85  3.8   |  24  |  0.8    Ca    7.5      28 Aug 2023 07:25  Mg     1.9     08-28    TPro  4.9  /  Alb  2.2  /  TBili  0.3  /  DBili  x   /  AST  23  /  ALT  27  /  AlkPhos  87  08-28                            POCT Blood Glucose.: 215 mg/dL (28 Aug 2023 16:22)  POCT Blood Glucose.: 241 mg/dL (28 Aug 2023 11:21)  POCT Blood Glucose.: 102 mg/dL (28 Aug 2023 08:04)  POCT Blood Glucose.: 181 mg/dL (27 Aug 2023 21:09)      Urinalysis Basic - ( 28 Aug 2023 07:25 )    Color: x / Appearance: x / SG: x / pH: x  Gluc: 85 mg/dL / Ketone: x  / Bili: x / Urobili: x   Blood: x / Protein: x / Nitrite: x   Leuk Esterase: x / RBC: x / WBC x   Sq Epi: x / Non Sq Epi: x / Bacteria: x            RADIOLOGY & ADDITIONAL TESTS:    Care Discussed with Consultants/Other Providers:

## 2023-08-29 LAB
ALBUMIN SERPL ELPH-MCNC: 2.4 G/DL — LOW (ref 3.5–5.2)
ALP SERPL-CCNC: 91 U/L — SIGNIFICANT CHANGE UP (ref 30–115)
ALT FLD-CCNC: 23 U/L — SIGNIFICANT CHANGE UP (ref 0–41)
ANION GAP SERPL CALC-SCNC: 10 MMOL/L — SIGNIFICANT CHANGE UP (ref 7–14)
APTT BLD: 27.7 SEC — SIGNIFICANT CHANGE UP (ref 27–39.2)
AST SERPL-CCNC: 18 U/L — SIGNIFICANT CHANGE UP (ref 0–41)
BASOPHILS # BLD AUTO: 0.02 K/UL — SIGNIFICANT CHANGE UP (ref 0–0.2)
BASOPHILS NFR BLD AUTO: 0.2 % — SIGNIFICANT CHANGE UP (ref 0–1)
BILIRUB SERPL-MCNC: 0.3 MG/DL — SIGNIFICANT CHANGE UP (ref 0.2–1.2)
BLD GP AB SCN SERPL QL: SIGNIFICANT CHANGE UP
BLD GP AB SCN SERPL QL: SIGNIFICANT CHANGE UP
BUN SERPL-MCNC: 11 MG/DL — SIGNIFICANT CHANGE UP (ref 10–20)
CALCIUM SERPL-MCNC: 7.8 MG/DL — LOW (ref 8.4–10.5)
CHLORIDE SERPL-SCNC: 103 MMOL/L — SIGNIFICANT CHANGE UP (ref 98–110)
CO2 SERPL-SCNC: 26 MMOL/L — SIGNIFICANT CHANGE UP (ref 17–32)
CREAT SERPL-MCNC: 0.8 MG/DL — SIGNIFICANT CHANGE UP (ref 0.7–1.5)
EGFR: 86 ML/MIN/1.73M2 — SIGNIFICANT CHANGE UP
EOSINOPHIL # BLD AUTO: 0.05 K/UL — SIGNIFICANT CHANGE UP (ref 0–0.7)
EOSINOPHIL NFR BLD AUTO: 0.5 % — SIGNIFICANT CHANGE UP (ref 0–8)
GLUCOSE BLDC GLUCOMTR-MCNC: 110 MG/DL — HIGH (ref 70–99)
GLUCOSE BLDC GLUCOMTR-MCNC: 196 MG/DL — HIGH (ref 70–99)
GLUCOSE BLDC GLUCOMTR-MCNC: 221 MG/DL — HIGH (ref 70–99)
GLUCOSE BLDC GLUCOMTR-MCNC: 244 MG/DL — HIGH (ref 70–99)
GLUCOSE SERPL-MCNC: 100 MG/DL — HIGH (ref 70–99)
HCT VFR BLD CALC: 27.5 % — LOW (ref 42–52)
HGB BLD-MCNC: 8.6 G/DL — LOW (ref 14–18)
IMM GRANULOCYTES NFR BLD AUTO: 2.4 % — HIGH (ref 0.1–0.3)
INR BLD: 0.93 RATIO — SIGNIFICANT CHANGE UP (ref 0.65–1.3)
INR BLD: 0.99 RATIO — SIGNIFICANT CHANGE UP (ref 0.65–1.3)
LYMPHOCYTES # BLD AUTO: 0.86 K/UL — LOW (ref 1.2–3.4)
LYMPHOCYTES # BLD AUTO: 9.4 % — LOW (ref 20.5–51.1)
MCHC RBC-ENTMCNC: 30.6 PG — SIGNIFICANT CHANGE UP (ref 27–31)
MCHC RBC-ENTMCNC: 31.3 G/DL — LOW (ref 32–37)
MCV RBC AUTO: 97.9 FL — HIGH (ref 80–94)
MONOCYTES # BLD AUTO: 0.36 K/UL — SIGNIFICANT CHANGE UP (ref 0.1–0.6)
MONOCYTES NFR BLD AUTO: 3.9 % — SIGNIFICANT CHANGE UP (ref 1.7–9.3)
NEUTROPHILS # BLD AUTO: 7.66 K/UL — HIGH (ref 1.4–6.5)
NEUTROPHILS NFR BLD AUTO: 83.6 % — HIGH (ref 42.2–75.2)
NRBC # BLD: 0 /100 WBCS — SIGNIFICANT CHANGE UP (ref 0–0)
PLATELET # BLD AUTO: 249 K/UL — SIGNIFICANT CHANGE UP (ref 130–400)
PMV BLD: 10.8 FL — HIGH (ref 7.4–10.4)
POTASSIUM SERPL-MCNC: 3.5 MMOL/L — SIGNIFICANT CHANGE UP (ref 3.5–5)
POTASSIUM SERPL-SCNC: 3.5 MMOL/L — SIGNIFICANT CHANGE UP (ref 3.5–5)
PROCALCITONIN SERPL-MCNC: 0.13 NG/ML — HIGH (ref 0.02–0.1)
PROT SERPL-MCNC: 5.4 G/DL — LOW (ref 6–8)
PROTHROM AB SERPL-ACNC: 10.6 SEC — SIGNIFICANT CHANGE UP (ref 9.95–12.87)
PROTHROM AB SERPL-ACNC: 11.3 SEC — SIGNIFICANT CHANGE UP (ref 9.95–12.87)
RBC # BLD: 2.81 M/UL — LOW (ref 4.7–6.1)
RBC # FLD: 18.6 % — HIGH (ref 11.5–14.5)
SODIUM SERPL-SCNC: 139 MMOL/L — SIGNIFICANT CHANGE UP (ref 135–146)
WBC # BLD: 9.17 K/UL — SIGNIFICANT CHANGE UP (ref 4.8–10.8)
WBC # FLD AUTO: 9.17 K/UL — SIGNIFICANT CHANGE UP (ref 4.8–10.8)

## 2023-08-29 PROCEDURE — 99232 SBSQ HOSP IP/OBS MODERATE 35: CPT

## 2023-08-29 RX ORDER — MORPHINE SULFATE 50 MG/1
2 CAPSULE, EXTENDED RELEASE ORAL EVERY 4 HOURS
Refills: 0 | Status: DISCONTINUED | OUTPATIENT
Start: 2023-08-29 | End: 2023-09-01

## 2023-08-29 RX ADMIN — POLYETHYLENE GLYCOL 3350 17 GRAM(S): 17 POWDER, FOR SOLUTION ORAL at 17:11

## 2023-08-29 RX ADMIN — Medication 2: at 11:47

## 2023-08-29 RX ADMIN — Medication 1 APPLICATION(S): at 05:58

## 2023-08-29 RX ADMIN — INSULIN GLARGINE 15 UNIT(S): 100 INJECTION, SOLUTION SUBCUTANEOUS at 21:55

## 2023-08-29 RX ADMIN — Medication 3 MILLILITER(S): at 09:52

## 2023-08-29 RX ADMIN — Medication 1 APPLICATION(S): at 17:11

## 2023-08-29 RX ADMIN — Medication 20 MILLIGRAM(S): at 05:46

## 2023-08-29 RX ADMIN — Medication 300 MILLIGRAM(S): at 11:48

## 2023-08-29 RX ADMIN — ENOXAPARIN SODIUM 40 MILLIGRAM(S): 100 INJECTION SUBCUTANEOUS at 11:47

## 2023-08-29 RX ADMIN — Medication 3 MILLILITER(S): at 19:45

## 2023-08-29 RX ADMIN — PANTOPRAZOLE SODIUM 40 MILLIGRAM(S): 20 TABLET, DELAYED RELEASE ORAL at 17:11

## 2023-08-29 RX ADMIN — Medication 2: at 17:10

## 2023-08-29 RX ADMIN — PANTOPRAZOLE SODIUM 40 MILLIGRAM(S): 20 TABLET, DELAYED RELEASE ORAL at 05:46

## 2023-08-29 RX ADMIN — POLYETHYLENE GLYCOL 3350 17 GRAM(S): 17 POWDER, FOR SOLUTION ORAL at 05:46

## 2023-08-29 RX ADMIN — Medication 3 UNIT(S): at 17:10

## 2023-08-29 RX ADMIN — BUDESONIDE AND FORMOTEROL FUMARATE DIHYDRATE 2 PUFF(S): 160; 4.5 AEROSOL RESPIRATORY (INHALATION) at 11:47

## 2023-08-29 RX ADMIN — Medication 3 MILLILITER(S): at 13:37

## 2023-08-29 RX ADMIN — Medication 3 UNIT(S): at 11:47

## 2023-08-29 RX ADMIN — Medication 1 APPLICATION(S): at 05:46

## 2023-08-29 RX ADMIN — CHLORHEXIDINE GLUCONATE 1 APPLICATION(S): 213 SOLUTION TOPICAL at 05:47

## 2023-08-29 NOTE — PROGRESS NOTE ADULT - SUBJECTIVE AND OBJECTIVE BOX
SUBJECTIVE:  HPI:  89 y/o M w/ PMH of DM, HTN coming from home with complaint of decreased appetite, increased urinary output and craving sweets x 2 weeks. Daughter is caretaker, states patient behavior has changed. Within the last 2 weeks, He is less active, requesting more coca cola and sugar. Pt noted to be hypotensive upon arrival.     ED vitals:  BP 74/ 50, HR 87, Temp 97.2F, satting 95% on room air  Labs significant for WBC 14K, Na 123 (corrected 141), Cr 2.4, AG 23, BHB 5.1. VBG pH 7.19, Lactate 5.8, K 8.6, pCO2 39  EKG tachycardia, bifascicular block, RBBB  CXR unremarkable  CT A/P: Extensive coronary atherosclerosis. Dependent atelectasis at the right base. 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 Hepatic cysts. Questionable sludge within the gallbladder. Unchanged 1 cm cystic lesion in the pancreatic body      s/p calcium gluconate 1g, Lasix 40mg push x1, barcarb 50meq, started on insulin drip at 7 units/hr.   Admitted to MICU for DKA/HHS.     (04 Aug 2023 15:16)      Patient is a 88y old Male who presents with a chief complaint of DKA/HHS (28 Aug 2023 18:31)    Currently admitted to medicine with the primary diagnosis of DKA, type 2       Today is hospital day 25d.     PAST MEDICAL & SURGICAL HISTORY  HTN (hypertension)    Gout    BPH (benign prostatic hyperplasia)    Parkinson disease    HLD (hyperlipidemia)    Smoker within last 12 months    Diabetes mellitus    No significant past surgical history        ALLERGIES:  No Known Allergies    MEDICATIONS:  ACTIVE MEDICATIONS  albuterol/ipratropium for Nebulization 3 milliLiter(s) Nebulizer every 6 hours  allopurinol 300 milliGRAM(s) Oral daily  bacitracin   Ointment 1 Application(s) Topical two times a day  budesonide 160 MICROgram(s)/formoterol 4.5 MICROgram(s) Inhaler 2 Puff(s) Inhalation two times a day  chlorhexidine 2% Cloths 1 Application(s) Topical daily  collagenase Ointment 1 Application(s) Topical daily  collagenase Ointment 1 Application(s) Topical two times a day  dextrose 5%. 1000 milliLiter(s) IV Continuous <Continuous>  dextrose 5%. 1000 milliLiter(s) IV Continuous <Continuous>  dextrose 50% Injectable 25 Gram(s) IV Push once  dextrose 50% Injectable 12.5 Gram(s) IV Push once  dextrose 50% Injectable 25 Gram(s) IV Push once  dextrose Oral Gel 15 Gram(s) Oral once PRN  enoxaparin Injectable 40 milliGRAM(s) SubCutaneous every 24 hours  glucagon  Injectable 1 milliGRAM(s) IntraMuscular once  insulin glargine Injectable (LANTUS) 15 Unit(s) SubCutaneous at bedtime  insulin lispro (ADMELOG) corrective regimen sliding scale   SubCutaneous three times a day before meals  insulin lispro Injectable (ADMELOG) 3 Unit(s) SubCutaneous three times a day before meals  lactulose Syrup 30 Gram(s) Oral every 6 hours PRN  morphine  - Injectable 2 milliGRAM(s) IV Push every 4 hours PRN  pantoprazole    Tablet 40 milliGRAM(s) Oral two times a day  polyethylene glycol 3350 17 Gram(s) Oral two times a day  predniSONE   Tablet 20 milliGRAM(s) Oral daily  senna 2 Tablet(s) Oral at bedtime      VITALS:   T(F): 98.4  HR: 91  BP: 133/72  RR: 18  SpO2: 96%    LABS:                        8.6    9.17  )-----------( 249      ( 29 Aug 2023 07:08 )             27.5     08-29    139  |  103  |  11  ----------------------------<  100<H>  3.5   |  26  |  0.8    Ca    7.8<L>      29 Aug 2023 07:08  Mg     1.9     08-28    TPro  5.4<L>  /  Alb  2.4<L>  /  TBili  0.3  /  DBili  x   /  AST  18  /  ALT  23  /  AlkPhos  91  08-29    PT/INR - ( 29 Aug 2023 12:00 )   PT: 11.30 sec;   INR: 0.99 ratio           Urinalysis Basic - ( 29 Aug 2023 07:08 )    Color: x / Appearance: x / SG: x / pH: x  Gluc: 100 mg/dL / Ketone: x  / Bili: x / Urobili: x   Blood: x / Protein: x / Nitrite: x   Leuk Esterase: x / RBC: x / WBC x   Sq Epi: x / Non Sq Epi: x / Bacteria: x                    PHYSICAL EXAM:  GEN: No acute distress  LUNGS: Clear to auscultation bilaterally   HEART: S1/S2 present.    ABD: Soft, non-tender, non-distended.   EXT: No pedal edema  NEURO: AAOX3

## 2023-08-29 NOTE — PROGRESS NOTE ADULT - SUBJECTIVE AND OBJECTIVE BOX
Patient is a 88y old  Male who presents with a chief complaint of DKA/HHS (29 Aug 2023 15:56)      Patient seen and examined at bedside.  ALLERGIES:  No Known Allergies    MEDICATIONS:  albuterol/ipratropium for Nebulization 3 milliLiter(s) Nebulizer every 6 hours  allopurinol 300 milliGRAM(s) Oral daily  bacitracin   Ointment 1 Application(s) Topical two times a day  budesonide 160 MICROgram(s)/formoterol 4.5 MICROgram(s) Inhaler 2 Puff(s) Inhalation two times a day  chlorhexidine 2% Cloths 1 Application(s) Topical daily  collagenase Ointment 1 Application(s) Topical daily  collagenase Ointment 1 Application(s) Topical two times a day  dextrose 5%. 1000 milliLiter(s) IV Continuous <Continuous>  dextrose 5%. 1000 milliLiter(s) IV Continuous <Continuous>  dextrose 50% Injectable 25 Gram(s) IV Push once  dextrose 50% Injectable 12.5 Gram(s) IV Push once  dextrose 50% Injectable 25 Gram(s) IV Push once  dextrose Oral Gel 15 Gram(s) Oral once PRN  enoxaparin Injectable 40 milliGRAM(s) SubCutaneous every 24 hours  glucagon  Injectable 1 milliGRAM(s) IntraMuscular once  insulin glargine Injectable (LANTUS) 15 Unit(s) SubCutaneous at bedtime  insulin lispro (ADMELOG) corrective regimen sliding scale   SubCutaneous three times a day before meals  insulin lispro Injectable (ADMELOG) 3 Unit(s) SubCutaneous three times a day before meals  lactulose Syrup 30 Gram(s) Oral every 6 hours PRN  morphine  - Injectable 2 milliGRAM(s) IV Push every 4 hours PRN  pantoprazole    Tablet 40 milliGRAM(s) Oral two times a day  polyethylene glycol 3350 17 Gram(s) Oral two times a day  predniSONE   Tablet 20 milliGRAM(s) Oral daily  senna 2 Tablet(s) Oral at bedtime    Vital Signs Last 24 Hrs  T(F): 96.6 (29 Aug 2023 16:04), Max: 98.4 (29 Aug 2023 07:19)  HR: 101 (29 Aug 2023 16:04) (91 - 101)  BP: 117/72 (29 Aug 2023 16:04) (117/72 - 133/72)  RR: 18 (29 Aug 2023 16:04) (18 - 18)  SpO2: 96% (29 Aug 2023 07:19) (96% - 96%)  I&O's Summary      PHYSICAL EXAM:  General: NAD, A/O x 1  ENT: MMM  Neck: Supple, No JVD  Lungs: Clear to auscultation bilaterally  Cardio: RRR, S1/S2, 2/6 systolic murmur   Abdomen: Soft, Nontender, Nondistended; Bowel sounds present  Extremities: No cyanosis, right arm in bandage     LABS:                        8.6    9.17  )-----------( 249      ( 29 Aug 2023 07:08 )             27.5     08-29    139  |  103  |  11  ----------------------------<  100  3.5   |  26  |  0.8    Ca    7.8      29 Aug 2023 07:08  Mg     1.9     08-28    TPro  5.4  /  Alb  2.4  /  TBili  0.3  /  DBili  x   /  AST  18  /  ALT  23  /  AlkPhos  91  08-29      PT/INR - ( 29 Aug 2023 12:00 )   PT: 11.30 sec;   INR: 0.99 ratio                               POCT Blood Glucose.: 221 mg/dL (29 Aug 2023 16:48)  POCT Blood Glucose.: 244 mg/dL (29 Aug 2023 11:11)  POCT Blood Glucose.: 110 mg/dL (29 Aug 2023 07:31)  POCT Blood Glucose.: 189 mg/dL (28 Aug 2023 21:26)      Urinalysis Basic - ( 29 Aug 2023 07:08 )    Color: x / Appearance: x / SG: x / pH: x  Gluc: 100 mg/dL / Ketone: x  / Bili: x / Urobili: x   Blood: x / Protein: x / Nitrite: x   Leuk Esterase: x / RBC: x / WBC x   Sq Epi: x / Non Sq Epi: x / Bacteria: x            RADIOLOGY & ADDITIONAL TESTS:    Care Discussed with Consultants/Other Providers:

## 2023-08-29 NOTE — CHART NOTE - NSCHARTNOTEFT_GEN_A_CORE
Pt scheduled for debridement with Dr Ortiz tomorrow on Wed 8/30/23.  Please keep pt NPO after midnight.  Pre-op labs: T and screen, CBC, BMP, Mg, INR/PT/PTT.

## 2023-08-29 NOTE — PROGRESS NOTE ADULT - ASSESSMENT
87yo Cantonese speaking male PMHx DM2, HTN here with decreased appetite found to have HHS/ DKA s/p insulin gtt. Melena s/p EGD demonstrating ulcer. Hungatella bacteremia. Acute Appendicitis. Patient aaox1-2 at baseline.  Patient was admitted to MICU 8/4 and transferred to the medicine floor 8/17  Patient was first in MICU for DKA/HHS    #suspected COPD, with wheezing-resolved   #B/l pleural Effusion  - patient has smoking history of 2 PPD for 60yrs, already quit  - TTE 8/11: EF 63%  - pulmonary consulted, recs appreciated  - completing prednisone taper- 20 for 5 days-day 4/5  - duonebs q6h prn  - repeat CXR 8/21: stable  - HOB elevation 45 degrees  - aspiration precautions  - PT rec SNF    #Decreased oral intake - improving  - calorie count, f/u nutrition eval  - patient tolerating po intake, NG tube taken out 8/22  - S&S eval 8/23: advance diet to soft bite-sized with thin liquid  - Nutrition consult appreciated - pt ok to continue oral diet     #Hungatella bacteremia likely GI translocation in the setting of appendicitis  - bcx 8/4 +hungatella  - bcx 8/5 onwards ntd  - TTE showed no vegetations  - s/p kenyatta, flagyl, cefepime, vanco  - monitor off Abx now per ID    #Melena  #Abdominal pain  - s/p egd 8/14 with two ulcers; one was cauterized, given epi  - CTAP 8/19: No definite correlate for acute abdominal pain. Unchanged nonspecific mild dilation of appendix, up to 0.8 cm, without definite periappendiceal inflammatory stranding.  - h/h stable, trend  - protonix 40 po bid    #Transaminitis - resolved  #abd tenderness  - mixed, in setting of infection  - ct abd with hepatic cyst    #DM2 (diabetes mellitus, type 2) - controlled  - s/p insulin gtt for HHS/ DKA  - continue lantus 15 and lispro 3  - ssi    #Constipation-resolving   #Distended abd  -kub 8/27 offical read pending - mod stool with dilated bowel  -started on senna/miralax and lactulose     #Hypokalemia -resolved     Dispo: snf  - monitor     #HTN (hypertension) - stable  - outpt f/u    #right arm skin necrosis  -burn consult appreciated  -PT is currently medically optimized for surgical debridement, OR date 8/30      DVT PPx   GI PPx  Dispo: patient is from home, will req SNF per PT  pending debridement , bs control  Family: resident called daughter 8/29

## 2023-08-29 NOTE — PROGRESS NOTE ADULT - ASSESSMENT
89yo Cantonese speaking male PMHx DM2, HTN here with decreased appetite found to have HHS/ DKA s/p insulin gtt. Melena s/p EGD demonstrating ulcer. Hungatella bacteremia. Acute Appendicitis. Patient aaox1-2 at baseline.  Patient was admitted to MICU 8/4 and transferred to the medicine floor 8/17  Patient was first in MICU for DKA/HHS    #right arm skin necrosis  -debridement tomorrow    #Constipation  #Distended abd  -kub 8/27 mod stool with dilated bowel, non obstructive pattern  -started on senna/miralax and lactulose   - had 1 bm today    #suspected COPD, with wheezing  #B/l pleural Effusion  - patient has smoking history of 2 PPD for 60yrs, already quit  - TTE 8/11: EF 63%  - pulmonary consulted, recs appreciated  - completing prednisone tapber- 20 for 5 days-day 3/5  - duonebs q6h prn  - repeat CXR 8/21: stable  - was given doses of iv lasix  - HOB elevation 45 degrees  - aspiration precautions  - PT rec SNF    #Decreased oral intake - improving  - calorie count, f/u nutrition eval  - patient tolerating po intake, NG tube taken out 8/22  - S&S eval 8/23: advance diet to soft bite-sized with thin liquid  - Nutrition consult appreciated - pt ok to continue oral diet     #Hungatella bacteremia likely GI translocation in the setting of appendicitis  - bcx 8/4 +hungatella  - bcx 8/5 onwards ntd  - TTE showed no vegetations  - s/p kenyatta, flagyl, cefepime, vanco  - monitor off Abx now per ID    #Melena  #Abdominal pain  - s/p egd 8/14 with two ulcers; one was cauterized, given epi  - CTAP 8/19: No definite correlate for acute abdominal pain. Unchanged nonspecific mild dilation of appendix, up to 0.8 cm, without definite periappendiceal inflammatory stranding.  - h/h stable, trend  - protonix 40 po bid    #Transaminitis - resolved  #abd tenderness  - mixed, in setting of infection  - ct abd with hepatic cyst    #DM2 (diabetes mellitus, type 2) - controlled  - s/p insulin gtt for HHS/ DKA  - increase to lantus 15 and lispro 3  - ssi        #Hypokalemia -resolved       #HTN (hypertension) - stable  - outpt f/u       daughter Antonia updated about clinical situation    DVT PPx   GI PPx  Dispo: patient is from home, will req SNF per PT  pending debridement , bs control, kub offical read, bowel movement

## 2023-08-30 LAB
ALBUMIN SERPL ELPH-MCNC: 2.3 G/DL — LOW (ref 3.5–5.2)
ALBUMIN SERPL ELPH-MCNC: 2.5 G/DL — LOW (ref 3.5–5.2)
ALP SERPL-CCNC: 92 U/L — SIGNIFICANT CHANGE UP (ref 30–115)
ALP SERPL-CCNC: 94 U/L — SIGNIFICANT CHANGE UP (ref 30–115)
ALT FLD-CCNC: 26 U/L — SIGNIFICANT CHANGE UP (ref 0–41)
ALT FLD-CCNC: 31 U/L — SIGNIFICANT CHANGE UP (ref 0–41)
ANION GAP SERPL CALC-SCNC: 11 MMOL/L — SIGNIFICANT CHANGE UP (ref 7–14)
ANION GAP SERPL CALC-SCNC: 15 MMOL/L — HIGH (ref 7–14)
AST SERPL-CCNC: 20 U/L — SIGNIFICANT CHANGE UP (ref 0–41)
AST SERPL-CCNC: 28 U/L — SIGNIFICANT CHANGE UP (ref 0–41)
BASOPHILS # BLD AUTO: 0.03 K/UL — SIGNIFICANT CHANGE UP (ref 0–0.2)
BASOPHILS NFR BLD AUTO: 0.2 % — SIGNIFICANT CHANGE UP (ref 0–1)
BILIRUB SERPL-MCNC: 0.3 MG/DL — SIGNIFICANT CHANGE UP (ref 0.2–1.2)
BILIRUB SERPL-MCNC: 0.3 MG/DL — SIGNIFICANT CHANGE UP (ref 0.2–1.2)
BUN SERPL-MCNC: 15 MG/DL — SIGNIFICANT CHANGE UP (ref 10–20)
BUN SERPL-MCNC: 18 MG/DL — SIGNIFICANT CHANGE UP (ref 10–20)
CALCIUM SERPL-MCNC: 7.8 MG/DL — LOW (ref 8.4–10.5)
CALCIUM SERPL-MCNC: 7.8 MG/DL — LOW (ref 8.4–10.5)
CHLORIDE SERPL-SCNC: 101 MMOL/L — SIGNIFICANT CHANGE UP (ref 98–110)
CHLORIDE SERPL-SCNC: 105 MMOL/L — SIGNIFICANT CHANGE UP (ref 98–110)
CO2 SERPL-SCNC: 21 MMOL/L — SIGNIFICANT CHANGE UP (ref 17–32)
CO2 SERPL-SCNC: 24 MMOL/L — SIGNIFICANT CHANGE UP (ref 17–32)
CREAT SERPL-MCNC: 0.9 MG/DL — SIGNIFICANT CHANGE UP (ref 0.7–1.5)
CREAT SERPL-MCNC: 1.1 MG/DL — SIGNIFICANT CHANGE UP (ref 0.7–1.5)
EGFR: 65 ML/MIN/1.73M2 — SIGNIFICANT CHANGE UP
EGFR: 83 ML/MIN/1.73M2 — SIGNIFICANT CHANGE UP
EOSINOPHIL # BLD AUTO: 0.03 K/UL — SIGNIFICANT CHANGE UP (ref 0–0.7)
EOSINOPHIL NFR BLD AUTO: 0.2 % — SIGNIFICANT CHANGE UP (ref 0–8)
GLUCOSE BLDC GLUCOMTR-MCNC: 134 MG/DL — HIGH (ref 70–99)
GLUCOSE BLDC GLUCOMTR-MCNC: 190 MG/DL — HIGH (ref 70–99)
GLUCOSE BLDC GLUCOMTR-MCNC: 330 MG/DL — HIGH (ref 70–99)
GLUCOSE BLDC GLUCOMTR-MCNC: 71 MG/DL — SIGNIFICANT CHANGE UP (ref 70–99)
GLUCOSE SERPL-MCNC: 364 MG/DL — HIGH (ref 70–99)
GLUCOSE SERPL-MCNC: 58 MG/DL — LOW (ref 70–99)
HCT VFR BLD CALC: 27.1 % — LOW (ref 42–52)
HCT VFR BLD CALC: 30.1 % — LOW (ref 42–52)
HGB BLD-MCNC: 8.6 G/DL — LOW (ref 14–18)
HGB BLD-MCNC: 9.6 G/DL — LOW (ref 14–18)
IMM GRANULOCYTES NFR BLD AUTO: 1.3 % — HIGH (ref 0.1–0.3)
LYMPHOCYTES # BLD AUTO: 0.99 K/UL — LOW (ref 1.2–3.4)
LYMPHOCYTES # BLD AUTO: 7.6 % — LOW (ref 20.5–51.1)
MAGNESIUM SERPL-MCNC: 2 MG/DL — SIGNIFICANT CHANGE UP (ref 1.8–2.4)
MCHC RBC-ENTMCNC: 31.3 PG — HIGH (ref 27–31)
MCHC RBC-ENTMCNC: 31.3 PG — HIGH (ref 27–31)
MCHC RBC-ENTMCNC: 31.7 G/DL — LOW (ref 32–37)
MCHC RBC-ENTMCNC: 31.9 G/DL — LOW (ref 32–37)
MCV RBC AUTO: 98 FL — HIGH (ref 80–94)
MCV RBC AUTO: 98.5 FL — HIGH (ref 80–94)
MONOCYTES # BLD AUTO: 0.48 K/UL — SIGNIFICANT CHANGE UP (ref 0.1–0.6)
MONOCYTES NFR BLD AUTO: 3.7 % — SIGNIFICANT CHANGE UP (ref 1.7–9.3)
NEUTROPHILS # BLD AUTO: 11.38 K/UL — HIGH (ref 1.4–6.5)
NEUTROPHILS NFR BLD AUTO: 87 % — HIGH (ref 42.2–75.2)
NRBC # BLD: 0 /100 WBCS — SIGNIFICANT CHANGE UP (ref 0–0)
NRBC # BLD: 0 /100 WBCS — SIGNIFICANT CHANGE UP (ref 0–0)
PLATELET # BLD AUTO: 255 K/UL — SIGNIFICANT CHANGE UP (ref 130–400)
PLATELET # BLD AUTO: 256 K/UL — SIGNIFICANT CHANGE UP (ref 130–400)
PMV BLD: 11.1 FL — HIGH (ref 7.4–10.4)
PMV BLD: 11.1 FL — HIGH (ref 7.4–10.4)
POTASSIUM SERPL-MCNC: 3.9 MMOL/L — SIGNIFICANT CHANGE UP (ref 3.5–5)
POTASSIUM SERPL-MCNC: 4.3 MMOL/L — SIGNIFICANT CHANGE UP (ref 3.5–5)
POTASSIUM SERPL-SCNC: 3.9 MMOL/L — SIGNIFICANT CHANGE UP (ref 3.5–5)
POTASSIUM SERPL-SCNC: 4.3 MMOL/L — SIGNIFICANT CHANGE UP (ref 3.5–5)
PROT SERPL-MCNC: 5.3 G/DL — LOW (ref 6–8)
PROT SERPL-MCNC: 5.4 G/DL — LOW (ref 6–8)
RBC # BLD: 2.75 M/UL — LOW (ref 4.7–6.1)
RBC # BLD: 3.07 M/UL — LOW (ref 4.7–6.1)
RBC # FLD: 18.6 % — HIGH (ref 11.5–14.5)
RBC # FLD: 18.6 % — HIGH (ref 11.5–14.5)
SODIUM SERPL-SCNC: 137 MMOL/L — SIGNIFICANT CHANGE UP (ref 135–146)
SODIUM SERPL-SCNC: 140 MMOL/L — SIGNIFICANT CHANGE UP (ref 135–146)
TROPONIN T SERPL-MCNC: <0.01 NG/ML — SIGNIFICANT CHANGE UP
WBC # BLD: 11.94 K/UL — HIGH (ref 4.8–10.8)
WBC # BLD: 13.08 K/UL — HIGH (ref 4.8–10.8)
WBC # FLD AUTO: 11.94 K/UL — HIGH (ref 4.8–10.8)
WBC # FLD AUTO: 13.08 K/UL — HIGH (ref 4.8–10.8)

## 2023-08-30 PROCEDURE — 74018 RADEX ABDOMEN 1 VIEW: CPT | Mod: 26

## 2023-08-30 PROCEDURE — 99233 SBSQ HOSP IP/OBS HIGH 50: CPT

## 2023-08-30 PROCEDURE — 93010 ELECTROCARDIOGRAM REPORT: CPT

## 2023-08-30 RX ORDER — SODIUM CHLORIDE 9 MG/ML
500 INJECTION INTRAMUSCULAR; INTRAVENOUS; SUBCUTANEOUS ONCE
Refills: 0 | Status: COMPLETED | OUTPATIENT
Start: 2023-08-30 | End: 2023-08-30

## 2023-08-30 RX ORDER — MIDODRINE HYDROCHLORIDE 2.5 MG/1
10 TABLET ORAL ONCE
Refills: 0 | Status: COMPLETED | OUTPATIENT
Start: 2023-08-30 | End: 2023-08-30

## 2023-08-30 RX ORDER — SODIUM CHLORIDE 9 MG/ML
1000 INJECTION, SOLUTION INTRAVENOUS
Refills: 0 | Status: DISCONTINUED | OUTPATIENT
Start: 2023-08-30 | End: 2023-09-01

## 2023-08-30 RX ORDER — LACTULOSE 10 G/15ML
30 SOLUTION ORAL EVERY 8 HOURS
Refills: 0 | Status: DISCONTINUED | OUTPATIENT
Start: 2023-08-30 | End: 2023-09-06

## 2023-08-30 RX ADMIN — Medication 1 APPLICATION(S): at 18:34

## 2023-08-30 RX ADMIN — INSULIN GLARGINE 15 UNIT(S): 100 INJECTION, SOLUTION SUBCUTANEOUS at 21:31

## 2023-08-30 RX ADMIN — SENNA PLUS 2 TABLET(S): 8.6 TABLET ORAL at 21:32

## 2023-08-30 RX ADMIN — SODIUM CHLORIDE 75 MILLILITER(S): 9 INJECTION, SOLUTION INTRAVENOUS at 20:31

## 2023-08-30 RX ADMIN — POLYETHYLENE GLYCOL 3350 17 GRAM(S): 17 POWDER, FOR SOLUTION ORAL at 17:41

## 2023-08-30 RX ADMIN — Medication 300 MILLIGRAM(S): at 17:39

## 2023-08-30 RX ADMIN — CHLORHEXIDINE GLUCONATE 1 APPLICATION(S): 213 SOLUTION TOPICAL at 06:38

## 2023-08-30 RX ADMIN — Medication 1 APPLICATION(S): at 06:37

## 2023-08-30 RX ADMIN — Medication 1 APPLICATION(S): at 06:38

## 2023-08-30 RX ADMIN — Medication 20 MILLIGRAM(S): at 06:37

## 2023-08-30 RX ADMIN — PANTOPRAZOLE SODIUM 40 MILLIGRAM(S): 20 TABLET, DELAYED RELEASE ORAL at 06:37

## 2023-08-30 RX ADMIN — LACTULOSE 30 GRAM(S): 10 SOLUTION ORAL at 21:31

## 2023-08-30 RX ADMIN — ENOXAPARIN SODIUM 40 MILLIGRAM(S): 100 INJECTION SUBCUTANEOUS at 17:39

## 2023-08-30 RX ADMIN — MIDODRINE HYDROCHLORIDE 10 MILLIGRAM(S): 2.5 TABLET ORAL at 19:03

## 2023-08-30 RX ADMIN — PANTOPRAZOLE SODIUM 40 MILLIGRAM(S): 20 TABLET, DELAYED RELEASE ORAL at 17:40

## 2023-08-30 RX ADMIN — LACTULOSE 30 GRAM(S): 10 SOLUTION ORAL at 10:00

## 2023-08-30 RX ADMIN — SODIUM CHLORIDE 1000 MILLILITER(S): 9 INJECTION INTRAMUSCULAR; INTRAVENOUS; SUBCUTANEOUS at 20:30

## 2023-08-30 RX ADMIN — POLYETHYLENE GLYCOL 3350 17 GRAM(S): 17 POWDER, FOR SOLUTION ORAL at 06:37

## 2023-08-30 RX ADMIN — Medication 3 MILLILITER(S): at 13:52

## 2023-08-30 RX ADMIN — Medication 1 APPLICATION(S): at 17:40

## 2023-08-30 NOTE — PRE-ANESTHESIA EVALUATION ADULT - NSANTHPMHFT_GEN_ALL_CORE
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

## 2023-08-30 NOTE — PROGRESS NOTE ADULT - SUBJECTIVE AND OBJECTIVE BOX
SUBJECTIVE:  HPI:  87 y/o M w/ PMH of DM, HTN coming from home with complaint of decreased appetite, increased urinary output and craving sweets x 2 weeks. Daughter is caretaker, states patient behavior has changed. Within the last 2 weeks, He is less active, requesting more coca cola and sugar. Pt noted to be hypotensive upon arrival.     ED vitals:  BP 74/ 50, HR 87, Temp 97.2F, satting 95% on room air  Labs significant for WBC 14K, Na 123 (corrected 141), Cr 2.4, AG 23, BHB 5.1. VBG pH 7.19, Lactate 5.8, K 8.6, pCO2 39  EKG tachycardia, bifascicular block, RBBB  CXR unremarkable  CT A/P: Extensive coronary atherosclerosis. Dependent atelectasis at the right base. 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 Hepatic cysts. Questionable sludge within the gallbladder. Unchanged 1 cm cystic lesion in the pancreatic body      s/p calcium gluconate 1g, Lasix 40mg push x1, barcarb 50meq, started on insulin drip at 7 units/hr.   Admitted to MICU for DKA/HHS.     (04 Aug 2023 15:16)      Patient is a 88y old Male who presents with a chief complaint of DKA/HHS (30 Aug 2023 12:52)    Currently admitted to medicine with the primary diagnosis of DKA, type 2       Today is hospital day 26d.     PAST MEDICAL & SURGICAL HISTORY  HTN (hypertension)    Gout    BPH (benign prostatic hyperplasia)    Parkinson disease    HLD (hyperlipidemia)    Smoker within last 12 months    Diabetes mellitus    No significant past surgical history        ALLERGIES:  No Known Allergies    MEDICATIONS:  ACTIVE MEDICATIONS  albuterol/ipratropium for Nebulization 3 milliLiter(s) Nebulizer every 6 hours  allopurinol 300 milliGRAM(s) Oral daily  bacitracin   Ointment 1 Application(s) Topical two times a day  budesonide 160 MICROgram(s)/formoterol 4.5 MICROgram(s) Inhaler 2 Puff(s) Inhalation two times a day  chlorhexidine 2% Cloths 1 Application(s) Topical daily  collagenase Ointment 1 Application(s) Topical two times a day  collagenase Ointment 1 Application(s) Topical daily  dextrose 5%. 1000 milliLiter(s) IV Continuous <Continuous>  dextrose 5%. 1000 milliLiter(s) IV Continuous <Continuous>  dextrose 50% Injectable 25 Gram(s) IV Push once  dextrose 50% Injectable 12.5 Gram(s) IV Push once  dextrose 50% Injectable 25 Gram(s) IV Push once  dextrose Oral Gel 15 Gram(s) Oral once PRN  enoxaparin Injectable 40 milliGRAM(s) SubCutaneous every 24 hours  glucagon  Injectable 1 milliGRAM(s) IntraMuscular once  insulin glargine Injectable (LANTUS) 15 Unit(s) SubCutaneous at bedtime  insulin lispro (ADMELOG) corrective regimen sliding scale   SubCutaneous three times a day before meals  insulin lispro Injectable (ADMELOG) 3 Unit(s) SubCutaneous three times a day before meals  lactulose Syrup 30 Gram(s) Oral every 8 hours  morphine  - Injectable 2 milliGRAM(s) IV Push every 4 hours PRN  pantoprazole    Tablet 40 milliGRAM(s) Oral two times a day  polyethylene glycol 3350 17 Gram(s) Oral two times a day  predniSONE   Tablet 20 milliGRAM(s) Oral daily  senna 2 Tablet(s) Oral at bedtime      VITALS:   T(F): 97.4  HR: 65  BP: 98/64  RR: 15  SpO2: 93%    LABS:                        9.6    13.08 )-----------( 255      ( 30 Aug 2023 07:49 )             30.1     08-30    140  |  105  |  15  ----------------------------<  58<L>  3.9   |  24  |  0.9    Ca    7.8<L>      30 Aug 2023 07:49  Mg     2.0     08-29    TPro  5.4<L>  /  Alb  2.3<L>  /  TBili  0.3  /  DBili  x   /  AST  28  /  ALT  31  /  AlkPhos  94  08-30    PT/INR - ( 29 Aug 2023 16:00 )   PT: 10.60 sec;   INR: 0.93 ratio         PTT - ( 29 Aug 2023 16:00 )  PTT:27.7 sec  Urinalysis Basic - ( 30 Aug 2023 07:49 )    Color: x / Appearance: x / SG: x / pH: x  Gluc: 58 mg/dL / Ketone: x  / Bili: x / Urobili: x   Blood: x / Protein: x / Nitrite: x   Leuk Esterase: x / RBC: x / WBC x   Sq Epi: x / Non Sq Epi: x / Bacteria: x                    PHYSICAL EXAM:  GEN: No acute distress  LUNGS: Clear to auscultation bilaterally   HEART: S1/S2 present.    ABD: Soft, non-tender, non-distended.   EXT: No pedal edema  NEURO: AAOX3

## 2023-08-30 NOTE — PROGRESS NOTE ADULT - ASSESSMENT
87yo Cantonese speaking male PMHx DM2, HTN here with decreased appetite found to have HHS/ DKA s/p insulin gtt. Melena s/p EGD demonstrating ulcer. Hungatella bacteremia. Acute Appendicitis. Patient aaox1-2 at baseline.  Patient was admitted to MICU 8/4 and transferred to the medicine floor 8/17  Patient was first in MICU for DKA/HHS    #right arm skin necrosis  -s/p debridment  -monitor h and h    #Constipation  #Distended abd  -kub 8/27 mod stool with dilated bowel, non obstructive pattern  -started on senna/miralax and lactulose   - had 1 bm yesterday  -kub non obstructive bowel gas pattern    #suspected COPD, with wheezing  #B/l pleural Effusion  - patient has smoking history of 2 PPD for 60yrs, already quit  - TTE 8/11: EF 63%  - pulmonary consulted, recs appreciated  - completing prednisone tapber- 20 for 5 days-day 3/5  - duonebs q6h prn  - repeat CXR 8/21: stable  - was given doses of iv lasix  - HOB elevation 45 degrees  - aspiration precautions  - PT rec SNF    #Decreased oral intake - improving  - calorie count, f/u nutrition eval  - patient tolerating po intake, NG tube taken out 8/22  - S&S eval 8/23: advance diet to soft bite-sized with thin liquid  - Nutrition consult appreciated - pt ok to continue oral diet     #Hungatella bacteremia likely GI translocation in the setting of appendicitis  - bcx 8/4 +hungatella  - bcx 8/5 onwards ntd  - TTE showed no vegetations  - s/p kenyatta, flagyl, cefepime, vanco  - monitor off Abx now per ID    #Melena  #Abdominal pain  - s/p egd 8/14 with two ulcers; one was cauterized, given epi  - CTAP 8/19: No definite correlate for acute abdominal pain. Unchanged nonspecific mild dilation of appendix, up to 0.8 cm, without definite periappendiceal inflammatory stranding.  - h/h stable, trend  - protonix 40 po bid    #Transaminitis - resolved  #abd tenderness  - mixed, in setting of infection  - ct abd with hepatic cyst    #DM2 (diabetes mellitus, type 2) - controlled  - s/p insulin gtt for HHS/ DKA  - increase to lantus 15 and lispro 3  - ssi        #Hypokalemia -resolved       #HTN (hypertension) - stable  - outpt f/u       daughter Antonia updated about clinical situation    DVT PPx   GI PPx  Dispo: patient is from home, will req SNF per PT  pending debridement , bs control, kub offical read, bowel movement

## 2023-08-30 NOTE — PROGRESS NOTE ADULT - SUBJECTIVE AND OBJECTIVE BOX
MILEY MARES  88y  Male      Patient is a 88y old  Male who presents with  DKA/HHS (29 Aug 2023 18:27)      INTERVAL HPI/OVERNIGHT EVENTS:  Patient seen and examined earlier this morning  lying in bed  in nad    REVIEW OF SYSTEMS:  unable to assess due to medical condition   pt not agreeable to using  services     T(C): 36.3 (08-30-23 @ 12:07), Max: 37.4 (08-30-23 @ 07:00)  HR: 65 (08-30-23 @ 12:07) (65 - 111)  BP: 98/64 (08-30-23 @ 12:07) (98/64 - 120/71)  RR: 15 (08-30-23 @ 12:07) (15 - 18)  SpO2: 93% (08-30-23 @ 12:07) (93% - 96%)    PHYSICAL EXAM:  GENERAL: NAD, well-groomed,   HEAD:  Atraumatic, Normocephalic  EYES: conjunctiva and sclera clear  ENMT: Moist mucous membranes,  No visible lesions  NECK: Supple, No JVD, Normal thyroid  NERVOUS SYSTEM:  Awake, moves all extremities   CHEST/LUNG: good air entry   HEART: Regular rate and rhythm; No murmurs, rubs, or gallops  ABDOMEN: Soft, Nontender, Nondistended; Bowel sounds present  EXTREMITIES:  2+ Peripheral Pulses, No clubbing, cyanosis, or edema b/l LE; LUE dressing in place   LYMPH: No lymphadenopathy noted  SKIN: No rashes or lesions    Consultant(s) Notes Reviewed:  [x ] YES  [ ] NO  Care Discussed with Consultants/Other Providers [ x] YES  [ ] NO    LAB:                        9.6    13.08 )-----------( 255      ( 30 Aug 2023 07:49 )             30.1     08-30    140  |  105  |  15  ----------------------------<  58<L>  3.9   |  24  |  0.9    Ca    7.8<L>      30 Aug 2023 07:49  Mg     2.0     08-29    TPro  5.4<L>  /  Alb  2.3<L>  /  TBili  0.3  /  DBili  x   /  AST  28  /  ALT  31  /  AlkPhos  94  08-30    LIVER FUNCTIONS - ( 30 Aug 2023 07:49 )  Alb: 2.3 g/dL / Pro: 5.4 g/dL / ALK PHOS: 94 U/L / ALT: 31 U/L / AST: 28 U/L / GGT: x               Drug Dosing Weight  Height (cm): 167.6 (30 Aug 2023 12:07)  Weight (kg): 62.6 (30 Aug 2023 12:07)  BMI (kg/m2): 22.3 (30 Aug 2023 12:07)  BSA (m2): 1.71 (30 Aug 2023 12:07)        CAPILLARY BLOOD GLUCOSE  POCT Blood Glucose.: 134 mg/dL (30 Aug 2023 11:13)  POCT Blood Glucose.: 71 mg/dL (30 Aug 2023 07:48)  POCT Blood Glucose.: 196 mg/dL (29 Aug 2023 21:17)  POCT Blood Glucose.: 221 mg/dL (29 Aug 2023 16:48)    I&O's Summary    Urinalysis Basic - ( 30 Aug 2023 07:49 )    Color: x / Appearance: x / SG: x / pH: x  Gluc: 58 mg/dL / Ketone: x  / Bili: x / Urobili: x   Blood: x / Protein: x / Nitrite: x   Leuk Esterase: x / RBC: x / WBC x   Sq Epi: x / Non Sq Epi: x / Bacteria: x        RADIOLOGY & ADDITIONAL TESTS:  Imaging Personally Reviewed:  [x] YES  [ ] NO    HEALTH ISSUES - PROBLEM Dx:  Severe sepsis with septic shock    Acute anemia    Acute metabolic encephalopathy    DKA (diabetic ketoacidosis)    Prerenal acute renal failure    Encounter for palliative care    Palliative care encounter    Bacteremia    Melena    DM2 (diabetes mellitus, type 2)    HTN (hypertension)    On deep vein thrombosis (DVT) prophylaxis    Decreased oral intake    Hypernatremia    Transaminitis            MEDS:  albuterol/ipratropium for Nebulization 3 milliLiter(s) Nebulizer every 6 hours  allopurinol 300 milliGRAM(s) Oral daily  bacitracin   Ointment 1 Application(s) Topical two times a day  budesonide 160 MICROgram(s)/formoterol 4.5 MICROgram(s) Inhaler 2 Puff(s) Inhalation two times a day  chlorhexidine 2% Cloths 1 Application(s) Topical daily  collagenase Ointment 1 Application(s) Topical two times a day  collagenase Ointment 1 Application(s) Topical daily  dextrose 5%. 1000 milliLiter(s) IV Continuous <Continuous>  dextrose 5%. 1000 milliLiter(s) IV Continuous <Continuous>  dextrose 50% Injectable 25 Gram(s) IV Push once  dextrose 50% Injectable 12.5 Gram(s) IV Push once  dextrose 50% Injectable 25 Gram(s) IV Push once  dextrose Oral Gel 15 Gram(s) Oral once PRN  enoxaparin Injectable 40 milliGRAM(s) SubCutaneous every 24 hours  glucagon  Injectable 1 milliGRAM(s) IntraMuscular once  insulin glargine Injectable (LANTUS) 15 Unit(s) SubCutaneous at bedtime  insulin lispro (ADMELOG) corrective regimen sliding scale   SubCutaneous three times a day before meals  insulin lispro Injectable (ADMELOG) 3 Unit(s) SubCutaneous three times a day before meals  lactulose Syrup 30 Gram(s) Oral every 8 hours  morphine  - Injectable 2 milliGRAM(s) IV Push every 4 hours PRN  pantoprazole    Tablet 40 milliGRAM(s) Oral two times a day  polyethylene glycol 3350 17 Gram(s) Oral two times a day  predniSONE   Tablet 20 milliGRAM(s) Oral daily  senna 2 Tablet(s) Oral at bedtime

## 2023-08-30 NOTE — PRE-ANESTHESIA EVALUATION ADULT - NSANTHOSAYNRD_GEN_A_CORE
No. LILIBETH screening performed.  STOP BANG Legend: 0-2 = LOW Risk; 3-4 = INTERMEDIATE Risk; 5-8 = HIGH Risk

## 2023-08-30 NOTE — PROGRESS NOTE ADULT - ASSESSMENT
89yo Cantonese speaking male PMHx DM2, HTN here with decreased appetite found to have HHS/ DKA s/p insulin gtt. Melena s/p EGD demonstrating ulcer. Hungatella bacteremia. Acute Appendicitis. Patient aaox1-2 at baseline.  Patient was admitted to MICU 8/4 and transferred to the medicine floor 8/17  Patient was first in MICU for DKA/HHS    #suspected COPD - resolved   #B/l pleural Effusion  - patient has smoking history of 2 PPD for 60yrs, currently not smokig  - TTE 8/11: EF 63%  - pulmonary consulted  - completing prednisone taper   - duonebs q6h prn  - HOB elevation 45 degrees  - aspiration precautions    #Decreased oral intake - improving  - s/p calorie count and nutrition eval  - patient tolerating po intake, NG tube taken out 8/22  - S&S eval 8/23: advance diet to soft bite-sized with thin liquid    #Hungatella bacteremia likely GI translocation in the setting of appendicitis  - bcx 8/4 +hungatella  - bcx 8/5 onwards ntd  - TTE showed no vegetations  - s/p kenyatta, flagyl, cefepime, vanco  - monitor off Abx now per ID    #Melena - resolved   #Abdominal pain  - s/p egd 8/14 with two ulcers; one was cauterized, given epi  - CTAP 8/19: No definite correlate for acute abdominal pain. Unchanged nonspecific mild dilation of appendix, up to 0.8 cm, without definite periappendiceal inflammatory stranding.  - h/h stable, trend  - protonix 40 po q12    #Transaminitis - resolved  #abd tenderness  - mixed, in setting of infection  - ct abd with hepatic cyst    #DM II - well controlled  - continue current tx    #Constipation-resolving   -kub 8/30 - large stool burden   -started on senna/miralax and lactulose   -added enemas today     #HTN   - stable     #right arm skin necrosis s/p levophed??  -burn consult appreciated - schedled for debridement today     Progress Note Handoff  Pending Consults: burn  Pending Tests: labs  Pending Results: labs  Family Discussion: Discussed labs, meds, laxatives, debridement and overall plan of care with medical staff. House staff to update pt's family today. DC to [facility] when stable   Disposition: Home_____/SNF_x_____/Other_____/Unknown at this time_____  Spent over 55 min reviewing chart, speaking with patient/family and on coordinating patient care during interdisciplinary rounds     Please call me with any questions at extension [phone]

## 2023-08-31 LAB
ALBUMIN SERPL ELPH-MCNC: 2.1 G/DL — LOW (ref 3.5–5.2)
ALP SERPL-CCNC: 83 U/L — SIGNIFICANT CHANGE UP (ref 30–115)
ALT FLD-CCNC: 22 U/L — SIGNIFICANT CHANGE UP (ref 0–41)
ANION GAP SERPL CALC-SCNC: 7 MMOL/L — SIGNIFICANT CHANGE UP (ref 7–14)
AST SERPL-CCNC: 15 U/L — SIGNIFICANT CHANGE UP (ref 0–41)
BASOPHILS # BLD AUTO: 0.01 K/UL — SIGNIFICANT CHANGE UP (ref 0–0.2)
BASOPHILS NFR BLD AUTO: 0.1 % — SIGNIFICANT CHANGE UP (ref 0–1)
BILIRUB SERPL-MCNC: 0.3 MG/DL — SIGNIFICANT CHANGE UP (ref 0.2–1.2)
BUN SERPL-MCNC: 15 MG/DL — SIGNIFICANT CHANGE UP (ref 10–20)
CALCIUM SERPL-MCNC: 7.7 MG/DL — LOW (ref 8.4–10.5)
CHLORIDE SERPL-SCNC: 106 MMOL/L — SIGNIFICANT CHANGE UP (ref 98–110)
CO2 SERPL-SCNC: 26 MMOL/L — SIGNIFICANT CHANGE UP (ref 17–32)
CREAT SERPL-MCNC: 0.9 MG/DL — SIGNIFICANT CHANGE UP (ref 0.7–1.5)
EGFR: 83 ML/MIN/1.73M2 — SIGNIFICANT CHANGE UP
EOSINOPHIL # BLD AUTO: 0.08 K/UL — SIGNIFICANT CHANGE UP (ref 0–0.7)
EOSINOPHIL NFR BLD AUTO: 0.8 % — SIGNIFICANT CHANGE UP (ref 0–8)
GLUCOSE BLDC GLUCOMTR-MCNC: 122 MG/DL — HIGH (ref 70–99)
GLUCOSE BLDC GLUCOMTR-MCNC: 149 MG/DL — HIGH (ref 70–99)
GLUCOSE BLDC GLUCOMTR-MCNC: 208 MG/DL — HIGH (ref 70–99)
GLUCOSE BLDC GLUCOMTR-MCNC: 235 MG/DL — HIGH (ref 70–99)
GLUCOSE BLDC GLUCOMTR-MCNC: 242 MG/DL — HIGH (ref 70–99)
GLUCOSE BLDC GLUCOMTR-MCNC: 47 MG/DL — CRITICAL LOW (ref 70–99)
GLUCOSE BLDC GLUCOMTR-MCNC: 85 MG/DL — SIGNIFICANT CHANGE UP (ref 70–99)
GLUCOSE BLDC GLUCOMTR-MCNC: 90 MG/DL — SIGNIFICANT CHANGE UP (ref 70–99)
GLUCOSE SERPL-MCNC: 42 MG/DL — CRITICAL LOW (ref 70–99)
HCT VFR BLD CALC: 26.9 % — LOW (ref 42–52)
HGB BLD-MCNC: 8.4 G/DL — LOW (ref 14–18)
IMM GRANULOCYTES NFR BLD AUTO: 0.9 % — HIGH (ref 0.1–0.3)
LYMPHOCYTES # BLD AUTO: 1.64 K/UL — SIGNIFICANT CHANGE UP (ref 1.2–3.4)
LYMPHOCYTES # BLD AUTO: 17 % — LOW (ref 20.5–51.1)
MCHC RBC-ENTMCNC: 30.7 PG — SIGNIFICANT CHANGE UP (ref 27–31)
MCHC RBC-ENTMCNC: 31.2 G/DL — LOW (ref 32–37)
MCV RBC AUTO: 98.2 FL — HIGH (ref 80–94)
MONOCYTES # BLD AUTO: 0.53 K/UL — SIGNIFICANT CHANGE UP (ref 0.1–0.6)
MONOCYTES NFR BLD AUTO: 5.5 % — SIGNIFICANT CHANGE UP (ref 1.7–9.3)
NEUTROPHILS # BLD AUTO: 7.29 K/UL — HIGH (ref 1.4–6.5)
NEUTROPHILS NFR BLD AUTO: 75.7 % — HIGH (ref 42.2–75.2)
NRBC # BLD: 0 /100 WBCS — SIGNIFICANT CHANGE UP (ref 0–0)
PLATELET # BLD AUTO: 283 K/UL — SIGNIFICANT CHANGE UP (ref 130–400)
PMV BLD: 10.9 FL — HIGH (ref 7.4–10.4)
POTASSIUM SERPL-MCNC: 3.8 MMOL/L — SIGNIFICANT CHANGE UP (ref 3.5–5)
POTASSIUM SERPL-SCNC: 3.8 MMOL/L — SIGNIFICANT CHANGE UP (ref 3.5–5)
PROT SERPL-MCNC: 4.9 G/DL — LOW (ref 6–8)
RBC # BLD: 2.74 M/UL — LOW (ref 4.7–6.1)
RBC # FLD: 18.6 % — HIGH (ref 11.5–14.5)
SODIUM SERPL-SCNC: 139 MMOL/L — SIGNIFICANT CHANGE UP (ref 135–146)
WBC # BLD: 9.64 K/UL — SIGNIFICANT CHANGE UP (ref 4.8–10.8)
WBC # FLD AUTO: 9.64 K/UL — SIGNIFICANT CHANGE UP (ref 4.8–10.8)

## 2023-08-31 PROCEDURE — 99233 SBSQ HOSP IP/OBS HIGH 50: CPT

## 2023-08-31 RX ORDER — INSULIN GLARGINE 100 [IU]/ML
12 INJECTION, SOLUTION SUBCUTANEOUS AT BEDTIME
Refills: 0 | Status: DISCONTINUED | OUTPATIENT
Start: 2023-08-31 | End: 2023-09-05

## 2023-08-31 RX ORDER — INSULIN LISPRO 100/ML
VIAL (ML) SUBCUTANEOUS EVERY 4 HOURS
Refills: 0 | Status: DISCONTINUED | OUTPATIENT
Start: 2023-08-31 | End: 2023-09-03

## 2023-08-31 RX ORDER — DEXTROSE 50 % IN WATER 50 %
25 SYRINGE (ML) INTRAVENOUS ONCE
Refills: 0 | Status: COMPLETED | OUTPATIENT
Start: 2023-08-31 | End: 2023-08-31

## 2023-08-31 RX ADMIN — Medication 25 GRAM(S): at 08:35

## 2023-08-31 RX ADMIN — Medication 1 APPLICATION(S): at 06:09

## 2023-08-31 RX ADMIN — Medication 1 APPLICATION(S): at 17:08

## 2023-08-31 RX ADMIN — Medication 20 MILLIGRAM(S): at 06:09

## 2023-08-31 RX ADMIN — Medication 1 APPLICATION(S): at 12:07

## 2023-08-31 RX ADMIN — Medication 3 MILLILITER(S): at 19:51

## 2023-08-31 RX ADMIN — Medication 25 GRAM(S): at 16:14

## 2023-08-31 RX ADMIN — Medication 1 APPLICATION(S): at 17:01

## 2023-08-31 RX ADMIN — INSULIN GLARGINE 12 UNIT(S): 100 INJECTION, SOLUTION SUBCUTANEOUS at 22:07

## 2023-08-31 RX ADMIN — PANTOPRAZOLE SODIUM 40 MILLIGRAM(S): 20 TABLET, DELAYED RELEASE ORAL at 17:08

## 2023-08-31 RX ADMIN — PANTOPRAZOLE SODIUM 40 MILLIGRAM(S): 20 TABLET, DELAYED RELEASE ORAL at 06:09

## 2023-08-31 RX ADMIN — Medication 300 MILLIGRAM(S): at 11:38

## 2023-08-31 RX ADMIN — ENOXAPARIN SODIUM 40 MILLIGRAM(S): 100 INJECTION SUBCUTANEOUS at 11:38

## 2023-08-31 RX ADMIN — CHLORHEXIDINE GLUCONATE 1 APPLICATION(S): 213 SOLUTION TOPICAL at 06:17

## 2023-08-31 RX ADMIN — Medication 2: at 22:11

## 2023-08-31 NOTE — PROGRESS NOTE ADULT - SUBJECTIVE AND OBJECTIVE BOX
SUBJECTIVE:  HPI:  87 y/o M w/ PMH of DM, HTN coming from home with complaint of decreased appetite, increased urinary output and craving sweets x 2 weeks. Daughter is caretaker, states patient behavior has changed. Within the last 2 weeks, He is less active, requesting more coca cola and sugar. Pt noted to be hypotensive upon arrival.     ED vitals:  BP 74/ 50, HR 87, Temp 97.2F, satting 95% on room air  Labs significant for WBC 14K, Na 123 (corrected 141), Cr 2.4, AG 23, BHB 5.1. VBG pH 7.19, Lactate 5.8, K 8.6, pCO2 39  EKG tachycardia, bifascicular block, RBBB  CXR unremarkable  CT A/P: Extensive coronary atherosclerosis. Dependent atelectasis at the right base. 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 Hepatic cysts. Questionable sludge within the gallbladder. Unchanged 1 cm cystic lesion in the pancreatic body      s/p calcium gluconate 1g, Lasix 40mg push x1, barcarb 50meq, started on insulin drip at 7 units/hr.   Admitted to MICU for DKA/HHS.     (04 Aug 2023 15:16)      Patient is a 88y old Male who presents with a chief complaint of DKA/HHS (31 Aug 2023 13:07)    Currently admitted to medicine with the primary diagnosis of DKA, type 2       Today is hospital day 27d.     PAST MEDICAL & SURGICAL HISTORY  HTN (hypertension)    Gout    BPH (benign prostatic hyperplasia)    Parkinson disease    HLD (hyperlipidemia)    Smoker within last 12 months    Diabetes mellitus    No significant past surgical history        ALLERGIES:  No Known Allergies    MEDICATIONS:  ACTIVE MEDICATIONS  albuterol/ipratropium for Nebulization 3 milliLiter(s) Nebulizer every 6 hours  allopurinol 300 milliGRAM(s) Oral daily  bacitracin   Ointment 1 Application(s) Topical two times a day  budesonide 160 MICROgram(s)/formoterol 4.5 MICROgram(s) Inhaler 2 Puff(s) Inhalation two times a day  chlorhexidine 2% Cloths 1 Application(s) Topical daily  collagenase Ointment 1 Application(s) Topical daily  collagenase Ointment 1 Application(s) Topical two times a day  dextrose 5%. 1000 milliLiter(s) IV Continuous <Continuous>  dextrose 5%. 1000 milliLiter(s) IV Continuous <Continuous>  dextrose 50% Injectable 12.5 Gram(s) IV Push once  dextrose 50% Injectable 25 Gram(s) IV Push once  dextrose 50% Injectable 25 Gram(s) IV Push once  dextrose 50% Injectable 25 Gram(s) IV Push once  dextrose Oral Gel 15 Gram(s) Oral once PRN  enoxaparin Injectable 40 milliGRAM(s) SubCutaneous every 24 hours  glucagon  Injectable 1 milliGRAM(s) IntraMuscular once  insulin glargine Injectable (LANTUS) 12 Unit(s) SubCutaneous at bedtime  insulin lispro (ADMELOG) corrective regimen sliding scale   SubCutaneous three times a day before meals  lactated ringers. 1000 milliLiter(s) IV Continuous <Continuous>  lactulose Syrup 30 Gram(s) Oral every 8 hours  morphine  - Injectable 2 milliGRAM(s) IV Push every 4 hours PRN  pantoprazole    Tablet 40 milliGRAM(s) Oral two times a day  polyethylene glycol 3350 17 Gram(s) Oral two times a day  senna 2 Tablet(s) Oral at bedtime      VITALS:   T(F): 97  HR: 100  BP: 130/69  RR: 18  SpO2: 98%    LABS:                        8.4    9.64  )-----------( 283      ( 31 Aug 2023 07:07 )             26.9     08-31    139  |  106  |  15  ----------------------------<  42<LL>  3.8   |  26  |  0.9    Ca    7.7<L>      31 Aug 2023 07:07  Mg     2.0     08-29    TPro  4.9<L>  /  Alb  2.1<L>  /  TBili  0.3  /  DBili  x   /  AST  15  /  ALT  22  /  AlkPhos  83  08-31    PT/INR - ( 29 Aug 2023 16:00 )   PT: 10.60 sec;   INR: 0.93 ratio         PTT - ( 29 Aug 2023 16:00 )  PTT:27.7 sec  Urinalysis Basic - ( 31 Aug 2023 07:07 )    Color: x / Appearance: x / SG: x / pH: x  Gluc: 42 mg/dL / Ketone: x  / Bili: x / Urobili: x   Blood: x / Protein: x / Nitrite: x   Leuk Esterase: x / RBC: x / WBC x   Sq Epi: x / Non Sq Epi: x / Bacteria: x        Troponin T, Serum: <0.01 ng/mL (08-30-23 @ 20:40)      CARDIAC MARKERS ( 30 Aug 2023 20:40 )  x     / <0.01 ng/mL / x     / x     / x              PHYSICAL EXAM:  GEN: No acute distress  LUNGS: Clear to auscultation bilaterally   HEART: S1/S2 present.    ABD: Soft, non-tender, non-distended.   EXT: No pedal edema  NEURO: AAOX3

## 2023-08-31 NOTE — PROGRESS NOTE ADULT - ASSESSMENT
89yo Cantonese speaking male PMHx DM2, HTN here with decreased appetite found to have HHS/ DKA s/p insulin gtt. Melena s/p EGD demonstrating ulcer. Hungatella bacteremia. Acute Appendicitis. Patient aaox1-2 at baseline.  Patient was admitted to MICU 8/4 and transferred to the medicine floor 8/17  Patient was first in MICU for DKA/HHS    #suspected COPD - resolved   #B/l pleural Effusion  - patient has smoking history of 2 PPD for 60yrs, currently not smoking  - TTE 8/11: EF 63%  - pulmonary consulted  - completing prednisone taper   - duonebs q6h prn  - HOB elevation 45 degrees  - aspiration precautions    #Decreased oral intake - improving  - s/p calorie count and nutrition eval  - patient tolerating po intake, NG tube taken out 8/22  - S&S eval 8/23: advanced diet to soft bite-sized with thin liquid and     #Hungatella bacteremia likely GI translocation in the setting of appendicitis  - bcx 8/4 +hungatella  - bcx 8/5 onwards ntd  - TTE showed no vegetations  - s/p kenyatta, flagyl, cefepime, vanco  - monitor off Abx now per ID    #Melena - resolved   #Abdominal pain  - s/p egd 8/14 with two ulcers; one was cauterized, given epi  - CTAP 8/19: No definite correlate for acute abdominal pain. Unchanged nonspecific mild dilation of appendix, up to 0.8 cm, without definite periappendiceal inflammatory stranding.  - h/h stable, trend  - protonix 40 po q12    #Transaminitis - resolved  #abd tenderness  - mixed, in setting of infection  - ct abd with hepatic cyst    #DM II - well controlled  - continue current tx    #Constipation-resolving   -kub 8/30 - large stool burden   -started on senna/miralax and lactulose   -added enemas today     #HTN   - stable     #right arm skin necrosis s/p levophed??  -burn consult appreciated - was scheduled for debridement on 8/30 but procedure was cancelled due to hypotension  -BP improved today     Progress Note Handoff  Pending Consults: burn  Pending Tests: labs  Pending Results: labs  Family Discussion: Discussed labs, meds, laxatives, debridement and overall plan of care with medical staff. House staff to update pt's family today. DC to [facility] when stable   Disposition: Home_____/SNF_x_____/Other_____/Unknown at this time_____  Spent over 55 min reviewing chart, speaking with patient/family and on coordinating patient care during interdisciplinary rounds     Please call me with any questions at extension [phone]

## 2023-08-31 NOTE — PROGRESS NOTE ADULT - SUBJECTIVE AND OBJECTIVE BOX
MILEY MARES  88y  Male      Patient is a 88y old  Male who presents with DKA/HHS (29 Aug 2023 18:27)      INTERVAL HPI/OVERNIGHT EVENTS:  Patient seen and examined earlier this morning  lying in bed  in nad  debridement cancelled yesterday due to hypotension     REVIEW OF SYSTEMS:  unable to assess due to medical condition   pt not agreeable to using  services     Vital Signs Last 24 Hrs  T(C): 36.3 (31 Aug 2023 08:00), Max: 36.9 (30 Aug 2023 23:58)  T(F): 97.3 (31 Aug 2023 08:00), Max: 98.4 (30 Aug 2023 23:58)  HR: 85 (31 Aug 2023 08:00) (85 - 120)  BP: 109/68 (31 Aug 2023 08:00) (69/52 - 114/62)  BP(mean): 60 (30 Aug 2023 19:00) (59 - 105)  RR: 18 (31 Aug 2023 08:00) (18 - 22)  SpO2: 98% (30 Aug 2023 20:00) (94% - 99%)    Parameters below as of 30 Aug 2023 23:58  Patient On (Oxygen Delivery Method): room air        PHYSICAL EXAM:  GENERAL: NAD, well-groomed,   HEAD:  Atraumatic, Normocephalic  EYES: conjunctiva and sclera clear  ENMT: Moist mucous membranes,  No visible lesions  NECK: Supple, No JVD, Normal thyroid  NERVOUS SYSTEM:  Awake, moves all extremities   CHEST/LUNG: good air entry   HEART: Regular rate and rhythm; No murmurs, rubs, or gallops  ABDOMEN: Soft, Nontender, Nondistended; Bowel sounds present  EXTREMITIES:  2+ Peripheral Pulses, No clubbing, cyanosis, or edema b/l LE; LUE dressing in place   LYMPH: No lymphadenopathy noted  SKIN: No rashes or lesions    Consultant(s) Notes Reviewed:  [x ] YES  [ ] NO  Care Discussed with Consultants/Other Providers [ x] YES  [ ] NO    LAB:                        8.4    9.64  )-----------( 283      ( 31 Aug 2023 07:07 )             26.9     08-31    139  |  106  |  15  ----------------------------<  42<LL>  3.8   |  26  |  0.9    Ca    7.7<L>      31 Aug 2023 07:07  Mg     2.0     08-29    TPro  4.9<L>  /  Alb  2.1<L>  /  TBili  0.3  /  DBili  x   /  AST  15  /  ALT  22  /  AlkPhos  83  08-31      Drug Dosing Weight  Height (cm): 167.6 (30 Aug 2023 12:07)  Weight (kg): 62.6 (30 Aug 2023 12:07)  BMI (kg/m2): 22.3 (30 Aug 2023 12:07)  BSA (m2): 1.71 (30 Aug 2023 12:07)        CAPILLARY BLOOD GLUCOSE  POCT Blood Glucose.: 122 mg/dL (31 Aug 2023 11:08)  POCT Blood Glucose.: 208 mg/dL (31 Aug 2023 08:48)  POCT Blood Glucose.: 47 mg/dL (31 Aug 2023 08:00)  POCT Blood Glucose.: 330 mg/dL (30 Aug 2023 21:18)  POCT Blood Glucose.: 242 mg/dL (30 Aug 2023 16:50)  POCT Blood Glucose.: 190 mg/dL (30 Aug 2023 15:15)        Urinalysis Basic - ( 30 Aug 2023 07:49 )    Color: x / Appearance: x / SG: x / pH: x  Gluc: 58 mg/dL / Ketone: x  / Bili: x / Urobili: x   Blood: x / Protein: x / Nitrite: x   Leuk Esterase: x / RBC: x / WBC x   Sq Epi: x / Non Sq Epi: x / Bacteria: x        RADIOLOGY & ADDITIONAL TESTS:  Imaging Personally Reviewed:  [x] YES  [ ] NO    HEALTH ISSUES - PROBLEM Dx:  Severe sepsis with septic shock    Acute anemia    Acute metabolic encephalopathy    DKA (diabetic ketoacidosis)    Prerenal acute renal failure    Encounter for palliative care    Palliative care encounter    Bacteremia    Melena    DM2 (diabetes mellitus, type 2)    HTN (hypertension)    On deep vein thrombosis (DVT) prophylaxis    Decreased oral intake    Hypernatremia    Transaminitis          MEDICATIONS  (STANDING):  albuterol/ipratropium for Nebulization 3 milliLiter(s) Nebulizer every 6 hours  allopurinol 300 milliGRAM(s) Oral daily  bacitracin   Ointment 1 Application(s) Topical two times a day  budesonide 160 MICROgram(s)/formoterol 4.5 MICROgram(s) Inhaler 2 Puff(s) Inhalation two times a day  chlorhexidine 2% Cloths 1 Application(s) Topical daily  collagenase Ointment 1 Application(s) Topical two times a day  collagenase Ointment 1 Application(s) Topical daily  dextrose 5%. 1000 milliLiter(s) (100 mL/Hr) IV Continuous <Continuous>  dextrose 5%. 1000 milliLiter(s) (50 mL/Hr) IV Continuous <Continuous>  dextrose 50% Injectable 25 Gram(s) IV Push once  dextrose 50% Injectable 25 Gram(s) IV Push once  dextrose 50% Injectable 12.5 Gram(s) IV Push once  enoxaparin Injectable 40 milliGRAM(s) SubCutaneous every 24 hours  glucagon  Injectable 1 milliGRAM(s) IntraMuscular once  insulin glargine Injectable (LANTUS) 12 Unit(s) SubCutaneous at bedtime  insulin lispro (ADMELOG) corrective regimen sliding scale   SubCutaneous three times a day before meals  insulin lispro Injectable (ADMELOG) 3 Unit(s) SubCutaneous three times a day before meals  lactated ringers. 1000 milliLiter(s) (75 mL/Hr) IV Continuous <Continuous>  lactulose Syrup 30 Gram(s) Oral every 8 hours  pantoprazole    Tablet 40 milliGRAM(s) Oral two times a day  polyethylene glycol 3350 17 Gram(s) Oral two times a day  senna 2 Tablet(s) Oral at bedtime    MEDICATIONS  (PRN):  dextrose Oral Gel 15 Gram(s) Oral once PRN Blood Glucose LESS THAN 70 milliGRAM(s)/deciliter  morphine  - Injectable 2 milliGRAM(s) IV Push every 4 hours PRN Severe Pain (7 - 10)

## 2023-08-31 NOTE — PROGRESS NOTE ADULT - ASSESSMENT
87yo Cantonese speaking male PMHx DM2, HTN here with decreased appetite found to have HHS/ DKA s/p insulin gtt. Melena s/p EGD demonstrating ulcer. Hungatella bacteremia. Acute Appendicitis. Patient aaox1-2 at baseline.  Patient was admitted to MICU 8/4 and transferred to the medicine floor 8/17  Patient was first in MICU for DKA/HHS    #right arm skin necrosis  - debridement tomorrow   -monitor h and h    #DM  -hypoglycemia episodes  -decreased lantus to 12    #Constipation  #Distended abd  -kub 8/27 mod stool with dilated bowel, non obstructive pattern  -started on senna/miralax and lactulose   - had 1 bm yesterday  -kub non obstructive bowel gas pattern    #suspected COPD, with wheezing  #B/l pleural Effusion  - patient has smoking history of 2 PPD for 60yrs, already quit  - TTE 8/11: EF 63%  - pulmonary consulted, recs appreciated  - completing prednisone tapber- 20 for 5 days-day 3/5  - duonebs q6h prn  - repeat CXR 8/21: stable  - was given doses of iv lasix  - HOB elevation 45 degrees  - aspiration precautions  - PT rec SNF    #Decreased oral intake - improving  - calorie count, f/u nutrition eval  - patient tolerating po intake, NG tube taken out 8/22  - S&S eval 8/23: advance diet to soft bite-sized with thin liquid  - Nutrition consult appreciated - pt ok to continue oral diet     #Hungatella bacteremia likely GI translocation in the setting of appendicitis  - bcx 8/4 +hungatella  - bcx 8/5 onwards ntd  - TTE showed no vegetations  - s/p kenyatta, flagyl, cefepime, vanco  - monitor off Abx now per ID    #Melena  #Abdominal pain  - s/p egd 8/14 with two ulcers; one was cauterized, given epi  - CTAP 8/19: No definite correlate for acute abdominal pain. Unchanged nonspecific mild dilation of appendix, up to 0.8 cm, without definite periappendiceal inflammatory stranding.  - h/h stable, trend  - protonix 40 po bid    #Transaminitis - resolved  #abd tenderness  - mixed, in setting of infection  - ct abd with hepatic cyst    #DM2 (diabetes mellitus, type 2) - controlled  - s/p insulin gtt for HHS/ DKA  - increase to lantus 15 and lispro 3  - ssi        #Hypokalemia -resolved       #HTN (hypertension) - stable  - outpt f/u

## 2023-09-01 LAB
ALBUMIN SERPL ELPH-MCNC: 2.5 G/DL — LOW (ref 3.5–5.2)
ALP SERPL-CCNC: 90 U/L — SIGNIFICANT CHANGE UP (ref 30–115)
ALT FLD-CCNC: 25 U/L — SIGNIFICANT CHANGE UP (ref 0–41)
ANION GAP SERPL CALC-SCNC: 11 MMOL/L — SIGNIFICANT CHANGE UP (ref 7–14)
AST SERPL-CCNC: 26 U/L — SIGNIFICANT CHANGE UP (ref 0–41)
BASOPHILS # BLD AUTO: 0.01 K/UL — SIGNIFICANT CHANGE UP (ref 0–0.2)
BASOPHILS NFR BLD AUTO: 0.1 % — SIGNIFICANT CHANGE UP (ref 0–1)
BILIRUB SERPL-MCNC: 0.3 MG/DL — SIGNIFICANT CHANGE UP (ref 0.2–1.2)
BUN SERPL-MCNC: 10 MG/DL — SIGNIFICANT CHANGE UP (ref 10–20)
CALCIUM SERPL-MCNC: 7.6 MG/DL — LOW (ref 8.4–10.5)
CHLORIDE SERPL-SCNC: 104 MMOL/L — SIGNIFICANT CHANGE UP (ref 98–110)
CO2 SERPL-SCNC: 23 MMOL/L — SIGNIFICANT CHANGE UP (ref 17–32)
CREAT SERPL-MCNC: 1 MG/DL — SIGNIFICANT CHANGE UP (ref 0.7–1.5)
EGFR: 73 ML/MIN/1.73M2 — SIGNIFICANT CHANGE UP
EOSINOPHIL # BLD AUTO: 0.04 K/UL — SIGNIFICANT CHANGE UP (ref 0–0.7)
EOSINOPHIL NFR BLD AUTO: 0.5 % — SIGNIFICANT CHANGE UP (ref 0–8)
GLUCOSE BLDC GLUCOMTR-MCNC: 111 MG/DL — HIGH (ref 70–99)
GLUCOSE BLDC GLUCOMTR-MCNC: 111 MG/DL — HIGH (ref 70–99)
GLUCOSE BLDC GLUCOMTR-MCNC: 136 MG/DL — HIGH (ref 70–99)
GLUCOSE BLDC GLUCOMTR-MCNC: 202 MG/DL — HIGH (ref 70–99)
GLUCOSE BLDC GLUCOMTR-MCNC: 245 MG/DL — HIGH (ref 70–99)
GLUCOSE SERPL-MCNC: 93 MG/DL — SIGNIFICANT CHANGE UP (ref 70–99)
HCT VFR BLD CALC: 30 % — LOW (ref 42–52)
HGB BLD-MCNC: 9.2 G/DL — LOW (ref 14–18)
IMM GRANULOCYTES NFR BLD AUTO: 1.1 % — HIGH (ref 0.1–0.3)
LYMPHOCYTES # BLD AUTO: 0.98 K/UL — LOW (ref 1.2–3.4)
LYMPHOCYTES # BLD AUTO: 11.9 % — LOW (ref 20.5–51.1)
MCHC RBC-ENTMCNC: 30.4 PG — SIGNIFICANT CHANGE UP (ref 27–31)
MCHC RBC-ENTMCNC: 30.7 G/DL — LOW (ref 32–37)
MCV RBC AUTO: 99 FL — HIGH (ref 80–94)
MONOCYTES # BLD AUTO: 0.32 K/UL — SIGNIFICANT CHANGE UP (ref 0.1–0.6)
MONOCYTES NFR BLD AUTO: 3.9 % — SIGNIFICANT CHANGE UP (ref 1.7–9.3)
NEUTROPHILS # BLD AUTO: 6.83 K/UL — HIGH (ref 1.4–6.5)
NEUTROPHILS NFR BLD AUTO: 82.5 % — HIGH (ref 42.2–75.2)
NRBC # BLD: 0 /100 WBCS — SIGNIFICANT CHANGE UP (ref 0–0)
PLATELET # BLD AUTO: 209 K/UL — SIGNIFICANT CHANGE UP (ref 130–400)
PMV BLD: 11.9 FL — HIGH (ref 7.4–10.4)
POTASSIUM SERPL-MCNC: 4.1 MMOL/L — SIGNIFICANT CHANGE UP (ref 3.5–5)
POTASSIUM SERPL-SCNC: 4.1 MMOL/L — SIGNIFICANT CHANGE UP (ref 3.5–5)
PROT SERPL-MCNC: 5.3 G/DL — LOW (ref 6–8)
RBC # BLD: 3.03 M/UL — LOW (ref 4.7–6.1)
RBC # FLD: 17.9 % — HIGH (ref 11.5–14.5)
SODIUM SERPL-SCNC: 138 MMOL/L — SIGNIFICANT CHANGE UP (ref 135–146)
WBC # BLD: 8.27 K/UL — SIGNIFICANT CHANGE UP (ref 4.8–10.8)
WBC # FLD AUTO: 8.27 K/UL — SIGNIFICANT CHANGE UP (ref 4.8–10.8)

## 2023-09-01 PROCEDURE — 11046 DBRDMT MUSC&/FSCA EA ADDL: CPT

## 2023-09-01 PROCEDURE — 99233 SBSQ HOSP IP/OBS HIGH 50: CPT

## 2023-09-01 PROCEDURE — 74018 RADEX ABDOMEN 1 VIEW: CPT | Mod: 26

## 2023-09-01 PROCEDURE — 11043 DBRDMT MUSC&/FSCA 1ST 20/<: CPT

## 2023-09-01 RX ORDER — ACETAMINOPHEN 500 MG
650 TABLET ORAL ONCE
Refills: 0 | Status: COMPLETED | OUTPATIENT
Start: 2023-09-01 | End: 2023-09-01

## 2023-09-01 RX ORDER — MORPHINE SULFATE 50 MG/1
2 CAPSULE, EXTENDED RELEASE ORAL ONCE
Refills: 0 | Status: DISCONTINUED | OUTPATIENT
Start: 2023-09-01 | End: 2023-09-18

## 2023-09-01 RX ORDER — MINERAL OIL
133 OIL (ML) MISCELLANEOUS ONCE
Refills: 0 | Status: COMPLETED | OUTPATIENT
Start: 2023-09-01 | End: 2023-09-01

## 2023-09-01 RX ORDER — SODIUM CHLORIDE 9 MG/ML
1000 INJECTION, SOLUTION INTRAVENOUS
Refills: 0 | Status: DISCONTINUED | OUTPATIENT
Start: 2023-09-01 | End: 2023-09-04

## 2023-09-01 RX ADMIN — INSULIN GLARGINE 12 UNIT(S): 100 INJECTION, SOLUTION SUBCUTANEOUS at 22:50

## 2023-09-01 RX ADMIN — Medication 3 MILLILITER(S): at 20:14

## 2023-09-01 RX ADMIN — Medication 1 APPLICATION(S): at 06:26

## 2023-09-01 RX ADMIN — Medication 300 MILLIGRAM(S): at 11:27

## 2023-09-01 RX ADMIN — Medication 1 APPLICATION(S): at 17:48

## 2023-09-01 RX ADMIN — LACTULOSE 30 GRAM(S): 10 SOLUTION ORAL at 23:51

## 2023-09-01 RX ADMIN — SENNA PLUS 2 TABLET(S): 8.6 TABLET ORAL at 23:52

## 2023-09-01 RX ADMIN — PANTOPRAZOLE SODIUM 40 MILLIGRAM(S): 20 TABLET, DELAYED RELEASE ORAL at 06:26

## 2023-09-01 RX ADMIN — PANTOPRAZOLE SODIUM 40 MILLIGRAM(S): 20 TABLET, DELAYED RELEASE ORAL at 17:46

## 2023-09-01 RX ADMIN — Medication 2: at 03:01

## 2023-09-01 RX ADMIN — Medication 2: at 11:28

## 2023-09-01 RX ADMIN — LACTULOSE 30 GRAM(S): 10 SOLUTION ORAL at 06:27

## 2023-09-01 RX ADMIN — Medication 650 MILLIGRAM(S): at 23:51

## 2023-09-01 RX ADMIN — MORPHINE SULFATE 2 MILLIGRAM(S): 50 CAPSULE, EXTENDED RELEASE ORAL at 14:46

## 2023-09-01 RX ADMIN — POLYETHYLENE GLYCOL 3350 17 GRAM(S): 17 POWDER, FOR SOLUTION ORAL at 17:46

## 2023-09-01 RX ADMIN — MORPHINE SULFATE 2 MILLIGRAM(S): 50 CAPSULE, EXTENDED RELEASE ORAL at 14:17

## 2023-09-01 RX ADMIN — POLYETHYLENE GLYCOL 3350 17 GRAM(S): 17 POWDER, FOR SOLUTION ORAL at 06:27

## 2023-09-01 RX ADMIN — Medication 3 MILLILITER(S): at 07:57

## 2023-09-01 RX ADMIN — CHLORHEXIDINE GLUCONATE 1 APPLICATION(S): 213 SOLUTION TOPICAL at 06:29

## 2023-09-01 RX ADMIN — Medication 1 APPLICATION(S): at 17:47

## 2023-09-01 RX ADMIN — Medication 1 APPLICATION(S): at 11:27

## 2023-09-01 NOTE — PROGRESS NOTE ADULT - SUBJECTIVE AND OBJECTIVE BOX
MILEY MARES  88y  Male      Patient is a 88y old  Male who presents with DKA/HHS (29 Aug 2023 18:27)      INTERVAL HPI/OVERNIGHT EVENTS:  Patient seen and examined earlier this morning  lying in bed  in nad  debridement scheduled for this evening  low grade temp overnight     REVIEW OF SYSTEMS:  unable to assess due to medical condition   pt not agreeable to using  services     Vital Signs Last 24 Hrs  T(C): 37.7 (01 Sep 2023 08:08), Max: 37.8 (01 Sep 2023 01:00)  T(F): 99.8 (01 Sep 2023 08:08), Max: 100.1 (01 Sep 2023 01:00)  HR: 119 (01 Sep 2023 08:08) (100 - 124)  BP: 138/60 (01 Sep 2023 08:08) (130/69 - 138/60)  BP(mean): --  RR: 18 (01 Sep 2023 08:08) (18 - 18)  SpO2: 100% (01 Sep 2023 08:08) (100% - 100%)    Parameters below as of 01 Sep 2023 08:08  Patient On (Oxygen Delivery Method): room air      PHYSICAL EXAM:  GENERAL: NAD, well-groomed,   HEAD:  Atraumatic, Normocephalic  EYES: conjunctiva and sclera clear  ENMT: Moist mucous membranes,  No visible lesions  NECK: Supple, No JVD, Normal thyroid  NERVOUS SYSTEM:  Awake, moves all extremities   CHEST/LUNG: good air entry   HEART: Regular rate and rhythm; No murmurs, rubs, or gallops  ABDOMEN: Soft, Nontender, Nondistended; Bowel sounds present  EXTREMITIES:  2+ Peripheral Pulses, No clubbing, cyanosis, or edema b/l LE; LUE dressing in place   LYMPH: No lymphadenopathy noted  SKIN: No rashes or lesions    Consultant(s) Notes Reviewed:  [x ] YES  [ ] NO  Care Discussed with Consultants/Other Providers [ x] YES  [ ] NO    LAB:                        8.4    9.64  )-----------( 283      ( 31 Aug 2023 07:07 )             26.9     08-31    139  |  106  |  15  ----------------------------<  42<LL>  3.8   |  26  |  0.9    Ca    7.7<L>      31 Aug 2023 07:07  Mg     2.0     08-29    TPro  4.9<L>  /  Alb  2.1<L>  /  TBili  0.3  /  DBili  x   /  AST  15  /  ALT  22  /  AlkPhos  83  08-31      Drug Dosing Weight  Height (cm): 167.6 (30 Aug 2023 12:07)  Weight (kg): 62.6 (30 Aug 2023 12:07)  BMI (kg/m2): 22.3 (30 Aug 2023 12:07)  BSA (m2): 1.71 (30 Aug 2023 12:07)      CAPILLARY BLOOD GLUCOSE  POCT Blood Glucose.: 202 mg/dL (01 Sep 2023 11:21)  POCT Blood Glucose.: 111 mg/dL (01 Sep 2023 08:12)  POCT Blood Glucose.: 245 mg/dL (01 Sep 2023 02:33)  POCT Blood Glucose.: 235 mg/dL (31 Aug 2023 21:23)  POCT Blood Glucose.: 149 mg/dL (31 Aug 2023 19:21)  POCT Blood Glucose.: 85 mg/dL (31 Aug 2023 15:05)  POCT Blood Glucose.: 90 mg/dL (31 Aug 2023 14:37)      Urinalysis Basic - ( 30 Aug 2023 07:49 )    Color: x / Appearance: x / SG: x / pH: x  Gluc: 58 mg/dL / Ketone: x  / Bili: x / Urobili: x   Blood: x / Protein: x / Nitrite: x   Leuk Esterase: x / RBC: x / WBC x   Sq Epi: x / Non Sq Epi: x / Bacteria: x        RADIOLOGY & ADDITIONAL TESTS:  Imaging Personally Reviewed:  [x] YES  [ ] NO    HEALTH ISSUES - PROBLEM Dx:  Severe sepsis with septic shock    Acute anemia    Acute metabolic encephalopathy    DKA (diabetic ketoacidosis)    Prerenal acute renal failure    Encounter for palliative care    Palliative care encounter    Bacteremia    Melena    DM2 (diabetes mellitus, type 2)    HTN (hypertension)    On deep vein thrombosis (DVT) prophylaxis    Decreased oral intake    Hypernatremia    Transaminitis          MEDICATIONS  (STANDING):  albuterol/ipratropium for Nebulization 3 milliLiter(s) Nebulizer every 6 hours  allopurinol 300 milliGRAM(s) Oral daily  bacitracin   Ointment 1 Application(s) Topical two times a day  budesonide 160 MICROgram(s)/formoterol 4.5 MICROgram(s) Inhaler 2 Puff(s) Inhalation two times a day  chlorhexidine 2% Cloths 1 Application(s) Topical daily  collagenase Ointment 1 Application(s) Topical two times a day  collagenase Ointment 1 Application(s) Topical daily  dextrose 5%. 1000 milliLiter(s) (100 mL/Hr) IV Continuous <Continuous>  dextrose 5%. 1000 milliLiter(s) (50 mL/Hr) IV Continuous <Continuous>  dextrose 50% Injectable 25 Gram(s) IV Push once  dextrose 50% Injectable 25 Gram(s) IV Push once  dextrose 50% Injectable 12.5 Gram(s) IV Push once  enoxaparin Injectable 40 milliGRAM(s) SubCutaneous every 24 hours  glucagon  Injectable 1 milliGRAM(s) IntraMuscular once  insulin glargine Injectable (LANTUS) 12 Unit(s) SubCutaneous at bedtime  insulin lispro (ADMELOG) corrective regimen sliding scale   SubCutaneous every 4 hours  lactated ringers. 1000 milliLiter(s) (75 mL/Hr) IV Continuous <Continuous>  lactulose Syrup 30 Gram(s) Oral every 8 hours  pantoprazole    Tablet 40 milliGRAM(s) Oral two times a day  polyethylene glycol 3350 17 Gram(s) Oral two times a day  senna 2 Tablet(s) Oral at bedtime    MEDICATIONS  (PRN):  dextrose Oral Gel 15 Gram(s) Oral once PRN Blood Glucose LESS THAN 70 milliGRAM(s)/deciliter  morphine  - Injectable 2 milliGRAM(s) IV Push every 4 hours PRN Severe Pain (7 - 10)

## 2023-09-01 NOTE — PROGRESS NOTE ADULT - SUBJECTIVE AND OBJECTIVE BOX
SUBJECTIVE:  HPI:  87 y/o M w/ PMH of DM, HTN coming from home with complaint of decreased appetite, increased urinary output and craving sweets x 2 weeks. Daughter is caretaker, states patient behavior has changed. Within the last 2 weeks, He is less active, requesting more coca cola and sugar. Pt noted to be hypotensive upon arrival.     ED vitals:  BP 74/ 50, HR 87, Temp 97.2F, satting 95% on room air  Labs significant for WBC 14K, Na 123 (corrected 141), Cr 2.4, AG 23, BHB 5.1. VBG pH 7.19, Lactate 5.8, K 8.6, pCO2 39  EKG tachycardia, bifascicular block, RBBB  CXR unremarkable  CT A/P: Extensive coronary atherosclerosis. Dependent atelectasis at the right base. 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 Hepatic cysts. Questionable sludge within the gallbladder. Unchanged 1 cm cystic lesion in the pancreatic body      s/p calcium gluconate 1g, Lasix 40mg push x1, barcarb 50meq, started on insulin drip at 7 units/hr.   Admitted to MICU for DKA/HHS.     (04 Aug 2023 15:16)      Patient is a 88y old Male who presents with a chief complaint of DKA/HHS (01 Sep 2023 13:12)    Currently admitted to medicine with the primary diagnosis of DKA, type 2       Today is hospital day 28d.     PAST MEDICAL & SURGICAL HISTORY  HTN (hypertension)    Gout    BPH (benign prostatic hyperplasia)    Parkinson disease    HLD (hyperlipidemia)    Smoker within last 12 months    Diabetes mellitus    No significant past surgical history        ALLERGIES:  No Known Allergies    MEDICATIONS:  ACTIVE MEDICATIONS  albuterol/ipratropium for Nebulization 3 milliLiter(s) Nebulizer every 6 hours  allopurinol 300 milliGRAM(s) Oral daily  bacitracin   Ointment 1 Application(s) Topical two times a day  budesonide 160 MICROgram(s)/formoterol 4.5 MICROgram(s) Inhaler 2 Puff(s) Inhalation two times a day  chlorhexidine 2% Cloths 1 Application(s) Topical daily  collagenase Ointment 1 Application(s) Topical two times a day  collagenase Ointment 1 Application(s) Topical daily  dextrose 5%. 1000 milliLiter(s) IV Continuous <Continuous>  dextrose 5%. 1000 milliLiter(s) IV Continuous <Continuous>  dextrose 50% Injectable 25 Gram(s) IV Push once  dextrose 50% Injectable 25 Gram(s) IV Push once  dextrose 50% Injectable 12.5 Gram(s) IV Push once  dextrose Oral Gel 15 Gram(s) Oral once PRN  enoxaparin Injectable 40 milliGRAM(s) SubCutaneous every 24 hours  glucagon  Injectable 1 milliGRAM(s) IntraMuscular once  insulin glargine Injectable (LANTUS) 12 Unit(s) SubCutaneous at bedtime  insulin lispro (ADMELOG) corrective regimen sliding scale   SubCutaneous every 4 hours  lactated ringers. 1000 milliLiter(s) IV Continuous <Continuous>  lactulose Syrup 30 Gram(s) Oral every 8 hours  mineral oil enema 133 milliLiter(s) Rectal once  morphine  - Injectable 2 milliGRAM(s) IV Push every 4 hours PRN  morphine  - Injectable 2 milliGRAM(s) IV Push once  pantoprazole    Tablet 40 milliGRAM(s) Oral two times a day  polyethylene glycol 3350 17 Gram(s) Oral two times a day  senna 2 Tablet(s) Oral at bedtime      VITALS:   T(F): 99.8  HR: 119  BP: 138/60  RR: 18  SpO2: 100%    LABS:                        9.2    8.27  )-----------( 209      ( 01 Sep 2023 07:38 )             30.0     09-01    138  |  104  |  10  ----------------------------<  93  4.1   |  23  |  1.0    Ca    7.6<L>      01 Sep 2023 07:38    TPro  5.3<L>  /  Alb  2.5<L>  /  TBili  0.3  /  DBili  x   /  AST  26  /  ALT  25  /  AlkPhos  90  09-01      Urinalysis Basic - ( 01 Sep 2023 07:38 )    Color: x / Appearance: x / SG: x / pH: x  Gluc: 93 mg/dL / Ketone: x  / Bili: x / Urobili: x   Blood: x / Protein: x / Nitrite: x   Leuk Esterase: x / RBC: x / WBC x   Sq Epi: x / Non Sq Epi: x / Bacteria: x            Culture - Blood (collected 30 Aug 2023 20:40)  Source: .Blood Blood  Preliminary Report (01 Sep 2023 02:03):    No growth at 24 hours      CARDIAC MARKERS ( 30 Aug 2023 20:40 )  x     / <0.01 ng/mL / x     / x     / x              PHYSICAL EXAM:  GEN: No acute distress  LUNGS: Clear to auscultation bilaterally   HEART: S1/S2 present.    ABD: Soft, non-tender, non-distended.   EXT: No pedal edema  NEURO: AAOX3

## 2023-09-01 NOTE — PROGRESS NOTE ADULT - ASSESSMENT
87yo Cantonese speaking male PMHx DM2, HTN here with decreased appetite found to have HHS/ DKA s/p insulin gtt. Melena s/p EGD demonstrating ulcer. Hungatella bacteremia. Acute Appendicitis. Patient aaox1-2 at baseline.  Patient was admitted to MICU 8/4 and transferred to the medicine floor 8/17  Patient was first in MICU for DKA/HHS    #suspected COPD - resolved   #B/l pleural Effusions  - patient has smoking history of 2 PPD for 60yrs, currently not smoking  - TTE 8/11: EF 63%  - pulmonary was following - outpatient follow up  - completed prednisone taper   - duonebs q6h prn  - HOB elevation to 45 degrees  - aspiration precautions; 1:1 feeds    #Decreased oral intake - improving  - s/p calorie count and nutrition eval  - patient has been tolerating po intake, NG tube taken out 8/22  - S&S eval 8/23: advanced diet to soft bite-sized with thin liquid and is tolerating     #Hungatella bacteremia likely GI translocation in the setting of appendicitis  - bcx 8/4 +hungatella  - bcx 8/5 onwards ntd  - TTE showed no vegetations  - s/p kenyatta, flagyl, cefepime, vanco  - monitor off Abx now per ID    #Melena - resolved   #Abdominal pain - resolved   - s/p egd 8/14 with two ulcers; one was cauterized, given epi  - CTAP 8/19: No definite correlate for acute abdominal pain. Unchanged nonspecific mild dilation of appendix, up to 0.8 cm, without definite periappendiceal inflammatory stranding.  - h/h stable, trend  - protonix 40 po q12    #Transaminitis - resolved  #abd tenderness  - mixed, in setting of infection  - ct abd with hepatic cyst    #DM II - well controlled  - continue current tx  - insulin lowerd on 8/31 due to hypoglycemia     #Constipation-resolving   -kub 8/30 - large stool burden   -started on senna/miralax and lactulose   -repeat KUB today     #HTN   - stable     #right arm skin necrosis s/p levophed??  -burn consult appreciated - was scheduled for debridement on 8/30 but procedure was cancelled due to hypotension  -BP improved   -debridement scheduled for this weekend    Progress Note Handoff  Pending Consults: burn  Pending Tests: labs, kub  Pending Results: labs  Family Discussion: Discussed labs, meds, laxatives, debridement and overall plan of care with medical staff. House staff to update pt's family today. DC to Health system when stable   Disposition: Home_____/SNF_x_____/Other_____/Unknown at this time_____  Spent over 55 min reviewing chart, speaking with patient/family and on coordinating patient care during interdisciplinary rounds     Please call me with any questions at extension [phone]

## 2023-09-01 NOTE — BRIEF OPERATIVE NOTE - NSICDXBRIEFPROCEDURE_GEN_ALL_CORE_FT
PROCEDURES:  Selective debridement 01-Sep-2023 14:53:03 excisional debridement with scalpel right forearm Michael Ortiz

## 2023-09-01 NOTE — PRE-OP CHECKLIST - 1.
cantonese
surgery cancelled as per MD Ortiz, patient sent back to 4B 12B due to unstable bp, taken up by MD Ortiz, endorsed to SHARON Lewis

## 2023-09-01 NOTE — PROGRESS NOTE ADULT - ASSESSMENT
87yo Cantonese speaking male PMHx DM2, HTN here with decreased appetite found to have HHS/ DKA s/p insulin gtt. Melena s/p EGD demonstrating ulcer. Hungatella bacteremia. Acute Appendicitis. Patient aaox1-2 at baseline.  Patient was admitted to MICU 8/4 and transferred to the medicine floor 8/17  Patient was first in MICU for DKA/HHS    #right arm skin necrosis  - s/p debridment  -monitor h and h and BP    #DM  -hypoglycemia episodes  -decreased lantus to 12    #Constipation  #Distended abd  -kub 8/27 mod stool with dilated bowel, non obstructive pattern  -started on senna/miralax and lactulose   - had 1 bm yesterday  -kub non obstructive bowel gas pattern    #suspected COPD, with wheezing  #B/l pleural Effusion  - patient has smoking history of 2 PPD for 60yrs, already quit  - TTE 8/11: EF 63%  - pulmonary consulted, recs appreciated  - completing prednisone tapber- 20 for 5 days-day 3/5  - duonebs q6h prn  - repeat CXR 8/21: stable  - was given doses of iv lasix  - HOB elevation 45 degrees  - aspiration precautions  - PT rec SNF    #Decreased oral intake - improving  - calorie count, f/u nutrition eval  - patient tolerating po intake, NG tube taken out 8/22  - S&S eval 8/23: advance diet to soft bite-sized with thin liquid  - Nutrition consult appreciated - pt ok to continue oral diet     #Hungatella bacteremia likely GI translocation in the setting of appendicitis  - bcx 8/4 +hungatella  - bcx 8/5 onwards ntd  - TTE showed no vegetations  - s/p kenyatta, flagyl, cefepime, vanco  - monitor off Abx now per ID    #Melena  #Abdominal pain  - s/p egd 8/14 with two ulcers; one was cauterized, given epi  - CTAP 8/19: No definite correlate for acute abdominal pain. Unchanged nonspecific mild dilation of appendix, up to 0.8 cm, without definite periappendiceal inflammatory stranding.  - h/h stable, trend  - protonix 40 po bid    #Transaminitis - resolved  #abd tenderness  - mixed, in setting of infection  - ct abd with hepatic cyst    #DM2 (diabetes mellitus, type 2) - controlled  - s/p insulin gtt for HHS/ DKA  - increase to lantus 15 and lispro 3  - ssi        #Hypokalemia -resolved       #HTN (hypertension) - stable  - outpt f/u

## 2023-09-02 LAB
ALBUMIN SERPL ELPH-MCNC: 2.3 G/DL — LOW (ref 3.5–5.2)
ALP SERPL-CCNC: 105 U/L — SIGNIFICANT CHANGE UP (ref 30–115)
ALT FLD-CCNC: 20 U/L — SIGNIFICANT CHANGE UP (ref 0–41)
ANION GAP SERPL CALC-SCNC: 11 MMOL/L — SIGNIFICANT CHANGE UP (ref 7–14)
APPEARANCE UR: CLEAR — SIGNIFICANT CHANGE UP
AST SERPL-CCNC: 21 U/L — SIGNIFICANT CHANGE UP (ref 0–41)
BACTERIA # UR AUTO: NEGATIVE /HPF — SIGNIFICANT CHANGE UP
BILIRUB SERPL-MCNC: 0.4 MG/DL — SIGNIFICANT CHANGE UP (ref 0.2–1.2)
BILIRUB UR-MCNC: NEGATIVE — SIGNIFICANT CHANGE UP
BUN SERPL-MCNC: 15 MG/DL — SIGNIFICANT CHANGE UP (ref 10–20)
CALCIUM SERPL-MCNC: 7.7 MG/DL — LOW (ref 8.4–10.5)
CAST: 1 /LPF — SIGNIFICANT CHANGE UP (ref 0–4)
CHLORIDE SERPL-SCNC: 99 MMOL/L — SIGNIFICANT CHANGE UP (ref 98–110)
CO2 SERPL-SCNC: 25 MMOL/L — SIGNIFICANT CHANGE UP (ref 17–32)
COLOR SPEC: YELLOW — SIGNIFICANT CHANGE UP
CREAT SERPL-MCNC: 1 MG/DL — SIGNIFICANT CHANGE UP (ref 0.7–1.5)
DIFF PNL FLD: NEGATIVE — SIGNIFICANT CHANGE UP
EGFR: 73 ML/MIN/1.73M2 — SIGNIFICANT CHANGE UP
GLUCOSE BLDC GLUCOMTR-MCNC: 153 MG/DL — HIGH (ref 70–99)
GLUCOSE BLDC GLUCOMTR-MCNC: 184 MG/DL — HIGH (ref 70–99)
GLUCOSE BLDC GLUCOMTR-MCNC: 205 MG/DL — HIGH (ref 70–99)
GLUCOSE BLDC GLUCOMTR-MCNC: 94 MG/DL — SIGNIFICANT CHANGE UP (ref 70–99)
GLUCOSE SERPL-MCNC: 168 MG/DL — HIGH (ref 70–99)
GLUCOSE UR QL: NEGATIVE MG/DL — SIGNIFICANT CHANGE UP
GRAM STN FLD: SIGNIFICANT CHANGE UP
HCT VFR BLD CALC: 29.8 % — LOW (ref 42–52)
HGB BLD-MCNC: 9.3 G/DL — LOW (ref 14–18)
KETONES UR-MCNC: NEGATIVE MG/DL — SIGNIFICANT CHANGE UP
LEUKOCYTE ESTERASE UR-ACNC: NEGATIVE — SIGNIFICANT CHANGE UP
MCHC RBC-ENTMCNC: 30.9 PG — SIGNIFICANT CHANGE UP (ref 27–31)
MCHC RBC-ENTMCNC: 31.2 G/DL — LOW (ref 32–37)
MCV RBC AUTO: 99 FL — HIGH (ref 80–94)
NITRITE UR-MCNC: NEGATIVE — SIGNIFICANT CHANGE UP
NRBC # BLD: 0 /100 WBCS — SIGNIFICANT CHANGE UP (ref 0–0)
PH UR: 7.5 — SIGNIFICANT CHANGE UP (ref 5–8)
PLATELET # BLD AUTO: 313 K/UL — SIGNIFICANT CHANGE UP (ref 130–400)
PMV BLD: 10.4 FL — SIGNIFICANT CHANGE UP (ref 7.4–10.4)
POTASSIUM SERPL-MCNC: 4.1 MMOL/L — SIGNIFICANT CHANGE UP (ref 3.5–5)
POTASSIUM SERPL-SCNC: 4.1 MMOL/L — SIGNIFICANT CHANGE UP (ref 3.5–5)
PROT SERPL-MCNC: 5.5 G/DL — LOW (ref 6–8)
PROT UR-MCNC: 30 MG/DL
RBC # BLD: 3.01 M/UL — LOW (ref 4.7–6.1)
RBC # FLD: 17.9 % — HIGH (ref 11.5–14.5)
RBC CASTS # UR COMP ASSIST: 0 /HPF — SIGNIFICANT CHANGE UP (ref 0–4)
SODIUM SERPL-SCNC: 135 MMOL/L — SIGNIFICANT CHANGE UP (ref 135–146)
SP GR SPEC: 1.02 — SIGNIFICANT CHANGE UP (ref 1–1.03)
SPECIMEN SOURCE: SIGNIFICANT CHANGE UP
SQUAMOUS # UR AUTO: 0 /HPF — SIGNIFICANT CHANGE UP (ref 0–5)
UROBILINOGEN FLD QL: 1 MG/DL — SIGNIFICANT CHANGE UP (ref 0.2–1)
WBC # BLD: 15.12 K/UL — HIGH (ref 4.8–10.8)
WBC # FLD AUTO: 15.12 K/UL — HIGH (ref 4.8–10.8)
WBC UR QL: 3 /HPF — SIGNIFICANT CHANGE UP (ref 0–5)

## 2023-09-02 PROCEDURE — 71045 X-RAY EXAM CHEST 1 VIEW: CPT | Mod: 26

## 2023-09-02 PROCEDURE — 99231 SBSQ HOSP IP/OBS SF/LOW 25: CPT

## 2023-09-02 PROCEDURE — 99233 SBSQ HOSP IP/OBS HIGH 50: CPT

## 2023-09-02 RX ORDER — VANCOMYCIN HCL 1 G
1000 VIAL (EA) INTRAVENOUS EVERY 24 HOURS
Refills: 0 | Status: DISCONTINUED | OUTPATIENT
Start: 2023-09-02 | End: 2023-09-04

## 2023-09-02 RX ORDER — MEROPENEM 1 G/30ML
1000 INJECTION INTRAVENOUS EVERY 12 HOURS
Refills: 0 | Status: DISCONTINUED | OUTPATIENT
Start: 2023-09-02 | End: 2023-09-04

## 2023-09-02 RX ADMIN — Medication 2: at 22:01

## 2023-09-02 RX ADMIN — Medication 300 MILLIGRAM(S): at 12:50

## 2023-09-02 RX ADMIN — Medication 1 APPLICATION(S): at 17:27

## 2023-09-02 RX ADMIN — Medication 1: at 12:50

## 2023-09-02 RX ADMIN — LACTULOSE 30 GRAM(S): 10 SOLUTION ORAL at 13:14

## 2023-09-02 RX ADMIN — Medication 1: at 13:00

## 2023-09-02 RX ADMIN — PANTOPRAZOLE SODIUM 40 MILLIGRAM(S): 20 TABLET, DELAYED RELEASE ORAL at 17:27

## 2023-09-02 RX ADMIN — CHLORHEXIDINE GLUCONATE 1 APPLICATION(S): 213 SOLUTION TOPICAL at 05:11

## 2023-09-02 RX ADMIN — Medication 3 MILLILITER(S): at 07:39

## 2023-09-02 RX ADMIN — BUDESONIDE AND FORMOTEROL FUMARATE DIHYDRATE 2 PUFF(S): 160; 4.5 AEROSOL RESPIRATORY (INHALATION) at 12:49

## 2023-09-02 RX ADMIN — Medication 1 APPLICATION(S): at 05:11

## 2023-09-02 RX ADMIN — POLYETHYLENE GLYCOL 3350 17 GRAM(S): 17 POWDER, FOR SOLUTION ORAL at 05:24

## 2023-09-02 RX ADMIN — MEROPENEM 100 MILLIGRAM(S): 1 INJECTION INTRAVENOUS at 17:26

## 2023-09-02 RX ADMIN — Medication 3 MILLILITER(S): at 13:11

## 2023-09-02 RX ADMIN — Medication 650 MILLIGRAM(S): at 05:23

## 2023-09-02 RX ADMIN — SENNA PLUS 2 TABLET(S): 8.6 TABLET ORAL at 21:20

## 2023-09-02 RX ADMIN — Medication 1 APPLICATION(S): at 12:50

## 2023-09-02 RX ADMIN — LACTULOSE 30 GRAM(S): 10 SOLUTION ORAL at 21:19

## 2023-09-02 RX ADMIN — PANTOPRAZOLE SODIUM 40 MILLIGRAM(S): 20 TABLET, DELAYED RELEASE ORAL at 05:23

## 2023-09-02 RX ADMIN — MEROPENEM 100 MILLIGRAM(S): 1 INJECTION INTRAVENOUS at 12:49

## 2023-09-02 RX ADMIN — SODIUM CHLORIDE 75 MILLILITER(S): 9 INJECTION, SOLUTION INTRAVENOUS at 17:26

## 2023-09-02 RX ADMIN — INSULIN GLARGINE 12 UNIT(S): 100 INJECTION, SOLUTION SUBCUTANEOUS at 21:20

## 2023-09-02 RX ADMIN — ENOXAPARIN SODIUM 40 MILLIGRAM(S): 100 INJECTION SUBCUTANEOUS at 12:52

## 2023-09-02 RX ADMIN — Medication 3 MILLILITER(S): at 20:48

## 2023-09-02 RX ADMIN — Medication 250 MILLIGRAM(S): at 12:50

## 2023-09-02 RX ADMIN — POLYETHYLENE GLYCOL 3350 17 GRAM(S): 17 POWDER, FOR SOLUTION ORAL at 17:27

## 2023-09-02 NOTE — PROGRESS NOTE ADULT - ASSESSMENT
89yo Cantonese speaking male PMHx DM2, HTN here with decreased appetite found to have HHS/ DKA s/p insulin gtt. Melena s/p EGD demonstrating ulcer. Hungatella bacteremia. Acute Appendicitis. Patient aaox1-2 at baseline.  Patient was admitted to MICU 8/4 and transferred to the medicine floor 8/17  Patient was first in MICU for DKA/HHS    #right arm skin necrosis  - s/p debridment  -H and H and blood pressure stable  - spiked a fever, f/u blood cx, ua and urine cx, ID consult  -started vanco and kenyatta    #DM  -hypoglycemia episodes  -decreased lantus to 12  -BS controlled    #Constipation  #Distended abd  -kub 8/27 mod stool with dilated bowel, non obstructive pattern  -started on senna/miralax and lactulose, daily enemas  - had 1 bm yesterday  -kub non obstructive bowel gas pattern    #suspected COPD, with wheezing  #B/l pleural Effusion  - patient has smoking history of 2 PPD for 60yrs, already quit  - TTE 8/11: EF 63%  - pulmonary consulted, recs appreciated  - completing prednisone tapber- 20 for 5 days-day 3/5  - duonebs q6h prn  - repeat CXR 8/21: stable  - was given doses of iv lasix  - HOB elevation 45 degrees  - aspiration precautions  - PT rec SNF    #Decreased oral intake - improving  - calorie count, f/u nutrition eval  - patient tolerating po intake, NG tube taken out 8/22  - S&S eval 8/23: advance diet to soft bite-sized with thin liquid  - Nutrition consult appreciated - pt ok to continue oral diet     #Hungatella bacteremia likely GI translocation in the setting of appendicitis  - bcx 8/4 +hungatella  - bcx 8/5 onwards ntd  - TTE showed no vegetations  - s/p kenyatta, flagyl, cefepime, vanco  - monitor off Abx now per ID    #Melena  #Abdominal pain  - s/p egd 8/14 with two ulcers; one was cauterized, given epi  - CTAP 8/19: No definite correlate for acute abdominal pain. Unchanged nonspecific mild dilation of appendix, up to 0.8 cm, without definite periappendiceal inflammatory stranding.  - h/h stable, trend  - protonix 40 po bid    #Transaminitis - resolved  #abd tenderness  - mixed, in setting of infection  - ct abd with hepatic cyst    #DM2 (diabetes mellitus, type 2) - controlled  - s/p insulin gtt for HHS/ DKA  - increase to lantus 15 and lispro 3  - ssi        #Hypokalemia -resolved       #HTN (hypertension) - stable  - outpt f/u   89yo Cantonese speaking male PMHx DM2, HTN here with decreased appetite found to have HHS/ DKA s/p insulin gtt. Melena s/p EGD demonstrating ulcer. Hungatella bacteremia. Acute Appendicitis. Patient aaox1-2 at baseline.  Patient was admitted to MICU 8/4 and transferred to the medicine floor 8/17  Patient was first in MICU for DKA/HHS    #right arm skin necrosis  - s/p debridment  -H and H and blood pressure stable  - spiked a fever, f/u blood cx, ua and urine cx, ID consult  -Wound cx: gram -ve rods  -started vanco and kenyatta    #DM  -hypoglycemia episodes  -decreased lantus to 12  -BS controlled    #Constipation  #Distended abd  -kub 8/27 mod stool with dilated bowel, non obstructive pattern  -started on senna/miralax and lactulose, daily enemas  - had 1 bm yesterday  -kub non obstructive bowel gas pattern    #suspected COPD, with wheezing  #B/l pleural Effusion  - patient has smoking history of 2 PPD for 60yrs, already quit  - TTE 8/11: EF 63%  - pulmonary consulted, recs appreciated  - completing prednisone tapber- 20 for 5 days-day 3/5  - duonebs q6h prn  - repeat CXR 8/21: stable  - was given doses of iv lasix  - HOB elevation 45 degrees  - aspiration precautions  - PT rec SNF    #Decreased oral intake - improving  - calorie count, f/u nutrition eval  - patient tolerating po intake, NG tube taken out 8/22  - S&S eval 8/23: advance diet to soft bite-sized with thin liquid  - Nutrition consult appreciated - pt ok to continue oral diet     #Hungatella bacteremia likely GI translocation in the setting of appendicitis  - bcx 8/4 +hungatella  - bcx 8/5 onwards ntd  - TTE showed no vegetations  - s/p kenyatta, flagyl, cefepime, vanco  - monitor off Abx now per ID    #Melena  #Abdominal pain  - s/p egd 8/14 with two ulcers; one was cauterized, given epi  - CTAP 8/19: No definite correlate for acute abdominal pain. Unchanged nonspecific mild dilation of appendix, up to 0.8 cm, without definite periappendiceal inflammatory stranding.  - h/h stable, trend  - protonix 40 po bid    #Transaminitis - resolved  #abd tenderness  - mixed, in setting of infection  - ct abd with hepatic cyst    #DM2 (diabetes mellitus, type 2) - controlled  - s/p insulin gtt for HHS/ DKA  - increase to lantus 15 and lispro 3  - ssi        #Hypokalemia -resolved       #HTN (hypertension) - stable  - outpt f/u

## 2023-09-02 NOTE — PROGRESS NOTE ADULT - SUBJECTIVE AND OBJECTIVE BOX
SUBJECTIVE:  HPI:  87 y/o M w/ PMH of DM, HTN coming from home with complaint of decreased appetite, increased urinary output and craving sweets x 2 weeks. Daughter is caretaker, states patient behavior has changed. Within the last 2 weeks, He is less active, requesting more coca cola and sugar. Pt noted to be hypotensive upon arrival.     ED vitals:  BP 74/ 50, HR 87, Temp 97.2F, satting 95% on room air  Labs significant for WBC 14K, Na 123 (corrected 141), Cr 2.4, AG 23, BHB 5.1. VBG pH 7.19, Lactate 5.8, K 8.6, pCO2 39  EKG tachycardia, bifascicular block, RBBB  CXR unremarkable  CT A/P: Extensive coronary atherosclerosis. Dependent atelectasis at the right base. 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 Hepatic cysts. Questionable sludge within the gallbladder. Unchanged 1 cm cystic lesion in the pancreatic body      s/p calcium gluconate 1g, Lasix 40mg push x1, barcarb 50meq, started on insulin drip at 7 units/hr.   Admitted to MICU for DKA/HHS.     (04 Aug 2023 15:16)      Patient is a 88y old Male who presents with a chief complaint of DKA/HHS (01 Sep 2023 16:09)    Currently admitted to medicine with the primary diagnosis of DKA, type 2       Today is hospital day 29d.     PAST MEDICAL & SURGICAL HISTORY  HTN (hypertension)    Gout    BPH (benign prostatic hyperplasia)    Parkinson disease    HLD (hyperlipidemia)    Smoker within last 12 months    Diabetes mellitus    No significant past surgical history        ALLERGIES:  No Known Allergies    MEDICATIONS:  ACTIVE MEDICATIONS  albuterol/ipratropium for Nebulization 3 milliLiter(s) Nebulizer every 6 hours  allopurinol 300 milliGRAM(s) Oral daily  bacitracin   Ointment 1 Application(s) Topical two times a day  budesonide 160 MICROgram(s)/formoterol 4.5 MICROgram(s) Inhaler 2 Puff(s) Inhalation two times a day  chlorhexidine 2% Cloths 1 Application(s) Topical daily  collagenase Ointment 1 Application(s) Topical two times a day  collagenase Ointment 1 Application(s) Topical daily  dextrose 5%. 1000 milliLiter(s) IV Continuous <Continuous>  dextrose 5%. 1000 milliLiter(s) IV Continuous <Continuous>  dextrose 50% Injectable 25 Gram(s) IV Push once  dextrose 50% Injectable 25 Gram(s) IV Push once  dextrose 50% Injectable 12.5 Gram(s) IV Push once  dextrose Oral Gel 15 Gram(s) Oral once PRN  enoxaparin Injectable 40 milliGRAM(s) SubCutaneous every 24 hours  glucagon  Injectable 1 milliGRAM(s) IntraMuscular once  insulin glargine Injectable (LANTUS) 12 Unit(s) SubCutaneous at bedtime  insulin lispro (ADMELOG) corrective regimen sliding scale   SubCutaneous every 4 hours  lactated ringers. 1000 milliLiter(s) IV Continuous <Continuous>  lactulose Syrup 30 Gram(s) Oral every 8 hours  meropenem  IVPB 1000 milliGRAM(s) IV Intermittent every 12 hours  morphine  - Injectable 2 milliGRAM(s) IV Push every 4 hours PRN  morphine  - Injectable 2 milliGRAM(s) IV Push once  pantoprazole    Tablet 40 milliGRAM(s) Oral two times a day  polyethylene glycol 3350 17 Gram(s) Oral two times a day  senna 2 Tablet(s) Oral at bedtime  vancomycin  IVPB 1000 milliGRAM(s) IV Intermittent every 24 hours      VITALS:   T(F): 97.9  HR: 99  BP: 113/78  RR: 20  SpO2: --    LABS:                        9.3    15.12 )-----------( x        ( 02 Sep 2023 10:50 )             29.8     09-01    138  |  104  |  10  ----------------------------<  93  4.1   |  23  |  1.0    Ca    7.6<L>      01 Sep 2023 07:38    TPro  5.3<L>  /  Alb  2.5<L>  /  TBili  0.3  /  DBili  x   /  AST  26  /  ALT  25  /  AlkPhos  90  09-01      Urinalysis Basic - ( 01 Sep 2023 07:38 )    Color: x / Appearance: x / SG: x / pH: x  Gluc: 93 mg/dL / Ketone: x  / Bili: x / Urobili: x   Blood: x / Protein: x / Nitrite: x   Leuk Esterase: x / RBC: x / WBC x   Sq Epi: x / Non Sq Epi: x / Bacteria: x            Culture - Tissue with Gram Stain (collected 01 Sep 2023 18:15)  Source: .Tissue Wound  Gram Stain (02 Sep 2023 01:46):    Moderate polymorphonuclear leukocytes seen per low power field    Numerous Gram Negative Rods seen per oil power field    Rare Yeast like cells seen per oil power field    Culture - Blood (collected 30 Aug 2023 20:40)  Source: .Blood Blood  Preliminary Report (02 Sep 2023 02:02):    No growth at 48 Hours              PHYSICAL EXAM:  GEN: No acute distress  LUNGS: Clear to auscultation bilaterally   HEART: S1/S2 present.    ABD: Soft, non-tender, non-distended.   EXT: No pedal edema  NEURO: AAOX3

## 2023-09-02 NOTE — PROGRESS NOTE ADULT - SUBJECTIVE AND OBJECTIVE BOX
POD#1 post debridement right arm   Vital Signs Last 24 Hrs  T(C): 36.6 (02 Sep 2023 07:42), Max: 38.7 (02 Sep 2023 00:06)  T(F): 97.9 (02 Sep 2023 07:42), Max: 101.6 (02 Sep 2023 00:06)  HR: 99 (02 Sep 2023 07:42) (99 - 127)  BP: 113/78 (02 Sep 2023 07:42) (96/51 - 132/64)  BP(mean): --  RR: 20 (02 Sep 2023 07:42) (18 - 20)  SpO2: --    Parameters below as of 02 Sep 2023 00:06  Patient On (Oxygen Delivery Method): room air        CVP:  T(C): 36.6 (09-02-23 @ 07:42), Max: 38.7 (09-02-23 @ 00:06)  HR: 99 (09-02-23 @ 07:42) (99 - 127)  BP: 113/78 (09-02-23 @ 07:42) (96/51 - 132/64)  RR: 20 (09-02-23 @ 07:42) (18 - 20)  SpO2: --  CVP(mm Hg): --    U.O.:  I&O's Detail                                    9.3    15.12 )-----------( 313      ( 02 Sep 2023 10:50 )             29.8     09-02    135  |  99  |  15  ----------------------------<  168<H>  4.1   |  25  |  1.0    Ca    7.7<L>      02 Sep 2023 10:50    TPro  5.5<L>  /  Alb  2.3<L>  /  TBili  0.4  /  DBili  x   /  AST  21  /  ALT  20  /  AlkPhos  105  09-02      Large Dressing Change-> right arm with viable tissue post debridement
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
## 2023-09-02 NOTE — PROGRESS NOTE ADULT - ASSESSMENT
right arm with viable tissue post debridement --> local wound care , will check cultures, may need skin graft
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
## 2023-09-03 LAB
-  AMIKACIN: SIGNIFICANT CHANGE UP
-  AMIKACIN: SIGNIFICANT CHANGE UP
-  AMOXICILLIN/CLAVULANIC ACID: SIGNIFICANT CHANGE UP
-  AMPICILLIN/SULBACTAM: SIGNIFICANT CHANGE UP
-  AMPICILLIN: SIGNIFICANT CHANGE UP
-  AZTREONAM: SIGNIFICANT CHANGE UP
-  AZTREONAM: SIGNIFICANT CHANGE UP
-  CEFAZOLIN: SIGNIFICANT CHANGE UP
-  CEFEPIME: SIGNIFICANT CHANGE UP
-  CEFEPIME: SIGNIFICANT CHANGE UP
-  CEFOXITIN: SIGNIFICANT CHANGE UP
-  CEFTAZIDIME/AVIBACTAM: SIGNIFICANT CHANGE UP
-  CEFTAZIDIME: SIGNIFICANT CHANGE UP
-  CEFTOLOZANE/TAZOBACTAM: SIGNIFICANT CHANGE UP
-  CEFTRIAXONE: SIGNIFICANT CHANGE UP
-  CIPROFLOXACIN: SIGNIFICANT CHANGE UP
-  CIPROFLOXACIN: SIGNIFICANT CHANGE UP
-  ERTAPENEM: SIGNIFICANT CHANGE UP
-  GENTAMICIN: SIGNIFICANT CHANGE UP
-  GENTAMICIN: SIGNIFICANT CHANGE UP
-  IMIPENEM: SIGNIFICANT CHANGE UP
-  LEVOFLOXACIN: SIGNIFICANT CHANGE UP
-  LEVOFLOXACIN: SIGNIFICANT CHANGE UP
-  MEROPENEM: SIGNIFICANT CHANGE UP
-  MEROPENEM: SIGNIFICANT CHANGE UP
-  PIPERACILLIN/TAZOBACTAM: SIGNIFICANT CHANGE UP
-  PIPERACILLIN/TAZOBACTAM: SIGNIFICANT CHANGE UP
-  TOBRAMYCIN: SIGNIFICANT CHANGE UP
-  TOBRAMYCIN: SIGNIFICANT CHANGE UP
-  TRIMETHOPRIM/SULFAMETHOXAZOLE: SIGNIFICANT CHANGE UP
ALBUMIN SERPL ELPH-MCNC: 2.1 G/DL — LOW (ref 3.5–5.2)
ALP SERPL-CCNC: 99 U/L — SIGNIFICANT CHANGE UP (ref 30–115)
ALT FLD-CCNC: 17 U/L — SIGNIFICANT CHANGE UP (ref 0–41)
ANION GAP SERPL CALC-SCNC: 11 MMOL/L — SIGNIFICANT CHANGE UP (ref 7–14)
AST SERPL-CCNC: 20 U/L — SIGNIFICANT CHANGE UP (ref 0–41)
BASOPHILS # BLD AUTO: 0.03 K/UL — SIGNIFICANT CHANGE UP (ref 0–0.2)
BASOPHILS NFR BLD AUTO: 0.2 % — SIGNIFICANT CHANGE UP (ref 0–1)
BILIRUB SERPL-MCNC: 0.5 MG/DL — SIGNIFICANT CHANGE UP (ref 0.2–1.2)
BLANDM BLD POS QL PROBE: SIGNIFICANT CHANGE UP
BUN SERPL-MCNC: 15 MG/DL — SIGNIFICANT CHANGE UP (ref 10–20)
CALCIUM SERPL-MCNC: 7.6 MG/DL — LOW (ref 8.4–10.5)
CHLORIDE SERPL-SCNC: 103 MMOL/L — SIGNIFICANT CHANGE UP (ref 98–110)
CO2 SERPL-SCNC: 23 MMOL/L — SIGNIFICANT CHANGE UP (ref 17–32)
CREAT SERPL-MCNC: 1 MG/DL — SIGNIFICANT CHANGE UP (ref 0.7–1.5)
EGFR: 73 ML/MIN/1.73M2 — SIGNIFICANT CHANGE UP
EOSINOPHIL # BLD AUTO: 0.05 K/UL — SIGNIFICANT CHANGE UP (ref 0–0.7)
EOSINOPHIL NFR BLD AUTO: 0.4 % — SIGNIFICANT CHANGE UP (ref 0–8)
GLUCOSE BLDC GLUCOMTR-MCNC: 100 MG/DL — HIGH (ref 70–99)
GLUCOSE BLDC GLUCOMTR-MCNC: 132 MG/DL — HIGH (ref 70–99)
GLUCOSE BLDC GLUCOMTR-MCNC: 138 MG/DL — HIGH (ref 70–99)
GLUCOSE BLDC GLUCOMTR-MCNC: 183 MG/DL — HIGH (ref 70–99)
GLUCOSE BLDC GLUCOMTR-MCNC: 183 MG/DL — HIGH (ref 70–99)
GLUCOSE SERPL-MCNC: 86 MG/DL — SIGNIFICANT CHANGE UP (ref 70–99)
HCT VFR BLD CALC: 27.8 % — LOW (ref 42–52)
HGB BLD-MCNC: 8.6 G/DL — LOW (ref 14–18)
IMM GRANULOCYTES NFR BLD AUTO: 0.9 % — HIGH (ref 0.1–0.3)
LYMPHOCYTES # BLD AUTO: 1.31 K/UL — SIGNIFICANT CHANGE UP (ref 1.2–3.4)
LYMPHOCYTES # BLD AUTO: 10.4 % — LOW (ref 20.5–51.1)
MCHC RBC-ENTMCNC: 30.6 PG — SIGNIFICANT CHANGE UP (ref 27–31)
MCHC RBC-ENTMCNC: 30.9 G/DL — LOW (ref 32–37)
MCV RBC AUTO: 98.9 FL — HIGH (ref 80–94)
METHOD TYPE: SIGNIFICANT CHANGE UP
MONOCYTES # BLD AUTO: 0.43 K/UL — SIGNIFICANT CHANGE UP (ref 0.1–0.6)
MONOCYTES NFR BLD AUTO: 3.4 % — SIGNIFICANT CHANGE UP (ref 1.7–9.3)
NEUTROPHILS # BLD AUTO: 10.72 K/UL — HIGH (ref 1.4–6.5)
NEUTROPHILS NFR BLD AUTO: 84.7 % — HIGH (ref 42.2–75.2)
NRBC # BLD: 0 /100 WBCS — SIGNIFICANT CHANGE UP (ref 0–0)
PLATELET # BLD AUTO: 304 K/UL — SIGNIFICANT CHANGE UP (ref 130–400)
PMV BLD: 10.8 FL — HIGH (ref 7.4–10.4)
POTASSIUM SERPL-MCNC: 3.9 MMOL/L — SIGNIFICANT CHANGE UP (ref 3.5–5)
POTASSIUM SERPL-SCNC: 3.9 MMOL/L — SIGNIFICANT CHANGE UP (ref 3.5–5)
PROT SERPL-MCNC: 5.1 G/DL — LOW (ref 6–8)
RBC # BLD: 2.81 M/UL — LOW (ref 4.7–6.1)
RBC # FLD: 17.2 % — HIGH (ref 11.5–14.5)
SODIUM SERPL-SCNC: 137 MMOL/L — SIGNIFICANT CHANGE UP (ref 135–146)
WBC # BLD: 12.65 K/UL — HIGH (ref 4.8–10.8)
WBC # FLD AUTO: 12.65 K/UL — HIGH (ref 4.8–10.8)

## 2023-09-03 PROCEDURE — 74018 RADEX ABDOMEN 1 VIEW: CPT | Mod: 26

## 2023-09-03 PROCEDURE — 99233 SBSQ HOSP IP/OBS HIGH 50: CPT

## 2023-09-03 RX ORDER — INSULIN LISPRO 100/ML
VIAL (ML) SUBCUTANEOUS EVERY 6 HOURS
Refills: 0 | Status: DISCONTINUED | OUTPATIENT
Start: 2023-09-03 | End: 2023-09-18

## 2023-09-03 RX ADMIN — LACTULOSE 30 GRAM(S): 10 SOLUTION ORAL at 21:25

## 2023-09-03 RX ADMIN — Medication 3 MILLILITER(S): at 14:05

## 2023-09-03 RX ADMIN — PANTOPRAZOLE SODIUM 40 MILLIGRAM(S): 20 TABLET, DELAYED RELEASE ORAL at 17:06

## 2023-09-03 RX ADMIN — Medication 300 MILLIGRAM(S): at 11:28

## 2023-09-03 RX ADMIN — MEROPENEM 100 MILLIGRAM(S): 1 INJECTION INTRAVENOUS at 17:08

## 2023-09-03 RX ADMIN — Medication 3 MILLILITER(S): at 07:49

## 2023-09-03 RX ADMIN — POLYETHYLENE GLYCOL 3350 17 GRAM(S): 17 POWDER, FOR SOLUTION ORAL at 05:43

## 2023-09-03 RX ADMIN — Medication 1 APPLICATION(S): at 05:42

## 2023-09-03 RX ADMIN — Medication 1 APPLICATION(S): at 17:05

## 2023-09-03 RX ADMIN — Medication 1 APPLICATION(S): at 17:08

## 2023-09-03 RX ADMIN — MEROPENEM 100 MILLIGRAM(S): 1 INJECTION INTRAVENOUS at 05:42

## 2023-09-03 RX ADMIN — Medication 1: at 23:03

## 2023-09-03 RX ADMIN — Medication 3 MILLILITER(S): at 19:22

## 2023-09-03 RX ADMIN — PANTOPRAZOLE SODIUM 40 MILLIGRAM(S): 20 TABLET, DELAYED RELEASE ORAL at 05:42

## 2023-09-03 RX ADMIN — SENNA PLUS 2 TABLET(S): 8.6 TABLET ORAL at 21:25

## 2023-09-03 RX ADMIN — LACTULOSE 30 GRAM(S): 10 SOLUTION ORAL at 05:43

## 2023-09-03 RX ADMIN — BUDESONIDE AND FORMOTEROL FUMARATE DIHYDRATE 2 PUFF(S): 160; 4.5 AEROSOL RESPIRATORY (INHALATION) at 21:27

## 2023-09-03 RX ADMIN — Medication 250 MILLIGRAM(S): at 08:14

## 2023-09-03 RX ADMIN — CHLORHEXIDINE GLUCONATE 1 APPLICATION(S): 213 SOLUTION TOPICAL at 05:44

## 2023-09-03 RX ADMIN — Medication 1: at 11:25

## 2023-09-03 RX ADMIN — ENOXAPARIN SODIUM 40 MILLIGRAM(S): 100 INJECTION SUBCUTANEOUS at 11:28

## 2023-09-03 RX ADMIN — INSULIN GLARGINE 12 UNIT(S): 100 INJECTION, SOLUTION SUBCUTANEOUS at 23:04
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
## 2023-09-03 NOTE — PROGRESS NOTE ADULT - SUBJECTIVE AND OBJECTIVE BOX
INFECTIOUS DISEASE FOLLOW UP NOTE:    Interval History/ROS: Patient is a 88y old  Male who presents with a chief complaint of DKA/HHS (03 Sep 2023 10:42)      Overnight events:    REVIEW OF SYSTEMS:  CONSTITUTIONAL: No fever or chills  HEAD: No lesion on scalp  EYES: No visual disturbance  ENT: No sore throat  RESPIRATORY: No cough, no shortness of breath  CARDIOVASCULAR: No chest pain or palpitations  GASTROINTESTINAL: No abdominal or epigastric pain  GENITOURINARY: No dysuria  NEUROLOGICAL: No headache/dizziness  MUSCULOSKELETAL: No joint pain, erythema, or swelling; no back pain  SKIN: No itching, rashes  All other ROS negative except noted above      Prior hospital charts reviewed [Yes]  Primary team notes reviewed [Yes]  Other consultant notes reviewed [Yes]    Allergies:  No Known Allergies      ANTIMICROBIALS:   meropenem  IVPB 1000 every 12 hours  vancomycin  IVPB 1000 every 24 hours      OTHER MEDS: MEDICATIONS  (STANDING):  albuterol/ipratropium for Nebulization 3 every 6 hours  allopurinol 300 daily  budesonide 160 MICROgram(s)/formoterol 4.5 MICROgram(s) Inhaler 2 two times a day  dextrose 50% Injectable 25 once  dextrose 50% Injectable 25 once  dextrose 50% Injectable 12.5 once  dextrose Oral Gel 15 once PRN  enoxaparin Injectable 40 every 24 hours  glucagon  Injectable 1 once  insulin glargine Injectable (LANTUS) 12 at bedtime  insulin lispro (ADMELOG) corrective regimen sliding scale  every 6 hours  lactulose Syrup 30 every 8 hours  morphine  - Injectable 2 every 4 hours PRN  morphine  - Injectable 2 once  pantoprazole    Tablet 40 two times a day  polyethylene glycol 3350 17 two times a day  senna 2 at bedtime      Vital Signs Last 24 Hrs  T(F): 97.2 (09-03-23 @ 08:00), Max: 101.6 (09-02-23 @ 00:06)    Vital Signs Last 24 Hrs  HR: 109 (09-03-23 @ 08:00) (102 - 114)  BP: 122/67 (09-03-23 @ 08:00) (88/50 - 122/67)  RR: 18 (09-03-23 @ 08:00)  SpO2: 100% (09-03-23 @ 08:00) (100% - 100%)  Wt(kg): --    EXAM:  GENERAL: NAD, lying in bed  HEAD: No head lesions  EYES: Conjunctiva pink and cornea white  EAR, NOSE, MOUTH, THROAT: Normal external ears and nose, no discharges; moist mucous membranes  NECK: Supple, nontender to palpation; no JVD  RESPIRATORY: Clear to auscultation bilaterally  CARDIOVASCULAR: S1 S2  GASTROINTESTINAL: Soft, nontender, nondistended; normoactive bowel sounds  EXTREMITIES: No clubbing, cyanosis, or petal edema  NERVOUS SYSTEM: Alert and oriented to person, time, place and situation, speech clear. No focal deficits   MUSCULOSKELETAL: No joint erythema, swelling or pain  SKIN: No rashes or lesions, no superficial thrombophlebitis  PSYCH: Normal affect    Labs:                        8.6    12.65 )-----------( 304      ( 03 Sep 2023 08:20 )             27.8     09-03    137  |  103  |  15  ----------------------------<  86  3.9   |  23  |  1.0    Ca    7.6<L>      03 Sep 2023 08:20    TPro  5.1<L>  /  Alb  2.1<L>  /  TBili  0.5  /  DBili  x   /  AST  20  /  ALT  17  /  AlkPhos  99  09-03      WBC Trend:  WBC Count: 12.65 (09-03-23 @ 08:20)  WBC Count: 15.12 (09-02-23 @ 10:50)  WBC Count: 8.27 (09-01-23 @ 07:38)  WBC Count: 9.64 (08-31-23 @ 07:07)      Creatine Trend:  Creatinine: 1.0 (09-03)  Creatinine: 1.0 (09-02)  Creatinine: 1.0 (09-01)  Creatinine: 0.9 (08-31)      Liver Biochemical Testing Trend:  Alanine Aminotransferase (ALT/SGPT): 17 (09-03)  Alanine Aminotransferase (ALT/SGPT): 20 (09-02)  Alanine Aminotransferase (ALT/SGPT): 25 (09-01)  Alanine Aminotransferase (ALT/SGPT): 22 (08-31)  Alanine Aminotransferase (ALT/SGPT): 26 (08-30)  Aspartate Aminotransferase (AST/SGOT): 20 (09-03-23 @ 08:20)  Aspartate Aminotransferase (AST/SGOT): 21 (09-02-23 @ 10:50)  Aspartate Aminotransferase (AST/SGOT): 26 (09-01-23 @ 07:38)  Aspartate Aminotransferase (AST/SGOT): 15 (08-31-23 @ 07:07)  Aspartate Aminotransferase (AST/SGOT): 20 (08-30-23 @ 20:40)  Bilirubin Total: 0.5 (09-03)  Bilirubin Total: 0.4 (09-02)  Bilirubin Total: 0.3 (09-01)  Bilirubin Total: 0.3 (08-31)  Bilirubin Total: 0.3 (08-30)      Trend LDH  08-08-23 @ 20:33  284<H>      Urinalysis Basic - ( 03 Sep 2023 08:20 )    Color: x / Appearance: x / SG: x / pH: x  Gluc: 86 mg/dL / Ketone: x  / Bili: x / Urobili: x   Blood: x / Protein: x / Nitrite: x   Leuk Esterase: x / RBC: x / WBC x   Sq Epi: x / Non Sq Epi: x / Bacteria: x        MICROBIOLOGY:    MRSA PCR Result.: Negative (08-10-23 @ 10:55)  MRSA PCR Result.: Negative (08-07-23 @ 11:30)      Culture - Tissue with Gram Stain (collected 01 Sep 2023 18:15)  Source: .Tissue Wound  Preliminary Report:    Numerous Pseudomonas aeruginosa    Numerous Proteus mirabilis    Few Yeast like cells  Organism: Pseudomonas aeruginosa (Carbapenem Resistant)  Pseudomonas aeruginosa (Carbapenem Resistant)  Proteus mirabilis  Organism: Pseudomonas aeruginosa (Carbapenem Resistant)    Sensitivities:      Method Type: CarbaR      -  Resistance Gene to Carbapenem: Nondet  Organism: Proteus mirabilis    Sensitivities:      Method Type: UZAIR      -  Amikacin: S <=16      -  Amoxicillin/Clavulanic Acid: S <=8/4      -  Ampicillin: S <=8 These ampicillin results predict results for amoxicillin      -  Ampicillin/Sulbactam: S <=4/2      -  Aztreonam: S <=4      -  Cefazolin: S <=2      -  Cefepime: S <=2      -  Cefoxitin: S <=8      -  Ceftriaxone: S <=1      -  Ciprofloxacin: S <=0.25      -  Ertapenem: S <=0.5      -  Gentamicin: S <=2      -  Levofloxacin: S <=0.5      -  Meropenem: S <=1      -  Piperacillin/Tazobactam: S <=8      -  Tobramycin: S <=2      -  Trimethoprim/Sulfamethoxazole: S <=0.5/9.5  Organism: Pseudomonas aeruginosa (Carbapenem Resistant)    Sensitivities:      Method Type: UZAIR      -  Amikacin: S <=16      -  Aztreonam: S <=4      -  Cefepime: S <=2      -  Ceftazidime: S 4      -  Ceftazidime/Avibactam: S <=4      -  Ceftolozane/tazobactam: S <=2      -  Ciprofloxacin: S <=0.25      -  Gentamicin: S 4      -  Imipenem: R >8      -  Levofloxacin: S <=0.5      -  Meropenem: I 4      -  Piperacillin/Tazobactam: S <=8      -  Tobramycin: S <=2    Culture - Blood (collected 30 Aug 2023 20:40)  Source: .Blood Blood  Preliminary Report:    No growth at 72 Hours    Culture - Blood (collected 12 Aug 2023 11:44)  Source: .Blood None  Final Report:    No growth at 5 days    Culture - Blood (collected 08 Aug 2023 11:58)  Source: .Blood None  Final Report:    No growth at 5 days    Culture - Blood (collected 05 Aug 2023 07:10)  Source: .Blood Blood-Peripheral  Final Report:    No growth at 5 days    Culture - Urine (collected 04 Aug 2023 14:42)  Source: Clean Catch Clean Catch (Midstream)  Final Report:    No growth    Culture - Blood (collected 04 Aug 2023 11:20)  Source: .Blood Blood-Peripheral  Final Report:    Growth in anaerobic bottle:    Most closely resembling Hungatella species previously known as    Clostridium species.    Please Note:************************************************    this organism    may appear as gram negative rods in direct smears of clinical specimens.    Direct identification is available within approximately 3-5    hours either by Blood Panel Multiplexed PCR or Direct    MALDI-TOF. Details: [email or web address]  Organism: Blood Culture PCR  Clostridium species  Organism: Clostridium species    Sensitivities:      Method Type: ETEST      -  Amoxicillin/Clavulanic Acid: S 0.25      -  Clindamycin: S 0.25      -  Imipenem: S 2      -  Metronidazole: S 0.064  Organism: Blood Culture PCR    Sensitivities:      Method Type: PCR      -  Blood PCR Panel: NEG    Culture - Blood (collected 04 Aug 2023 11:20)  Source: .Blood Blood-Peripheral  Final Report:    No growth at 5 days    Culture - Urine (collected 21 Nov 2022 16:22)  Source: Clean Catch Clean Catch (Midstream)  Final Report:    No growth    Culture - Urine (collected 08 Oct 2022 20:42)  Source: Clean Catch Clean Catch (Midstream)  Final Report:    >100,000 CFU/ml Klebsiella variicola  Organism: Klebsiella variicola  Organism: Klebsiella variicola    Sensitivities:      Method Type: UZAIR      -  Amikacin: S <=16      -  Amoxicillin/Clavulanic Acid: S <=8/4      -  Ampicillin: R <=8 These ampicillin results predict results for amoxicillin      -  Ampicillin/Sulbactam: S <=4/2 Enterobacter, Klebsiella aerogenes, Citrobacter, and Serratia may develop resistance during prolonged therapy (3-4 days)      -  Aztreonam: S <=4      -  Cefazolin: S <=2      -  Cefepime: S <=2      -  Cefoxitin: S <=8      -  Ceftriaxone: S <=1 Enterobacter, Klebsiella aerogenes, Citrobacter, and Serratia may develop resistance during prolonged therapy      -  Ciprofloxacin: S <=0.25      -  Ertapenem: S <=0.5      -  Gentamicin: S <=2      -  Imipenem: S <=1      -  Levofloxacin: S <=0.5      -  Meropenem: S <=1      -  Nitrofurantoin: I 64 Should not be used to treat pyelonephritis      -  Piperacillin/Tazobactam: S <=8      -  Tigecycline: S <=2      -  Tobramycin: S <=2      -  Trimethoprim/Sulfamethoxazole: S <=0.5/9.5                              Procalcitonin, Serum: 0.13 (08-28)                    RADIOLOGY:  imaging below personally reviewed    < from: Xray Kidney Ureter Bladder (09.03.23 @ 06:48) >  Findings/  impression:    No significant change in generalized bowel distention with moderate fecal   load. Findings again suggest ileus. Degenerative changes are again noted.    < end of copied text >    < from: Xray Chest 1 View-PORTABLE IMMEDIATE (Xray Chest 1 View-PORTABLE IMMEDIATE .) (09.02.23 @ 12:33) >  IMPRESSION:    No radiographic evidence of acute cardiopulmonary disease.    < end of copied text >   INFECTIOUS DISEASE FOLLOW UP NOTE:    Interval History/ROS: Patient is a 88y old  Male who presents with a chief complaint of DKA/HHS (03 Sep 2023 10:42)      Overnight events: No overnight event. Feels well today    REVIEW OF SYSTEMS:  CONSTITUTIONAL: No fever or chills  HEAD: No lesion on scalp  EYES: No visual disturbance  ENT: No sore throat  RESPIRATORY: No cough, no shortness of breath  CARDIOVASCULAR: No chest pain or palpitations  GASTROINTESTINAL: No abdominal or epigastric pain  GENITOURINARY: No dysuria  NEUROLOGICAL: No headache/dizziness  MUSCULOSKELETAL: No joint pain, erythema, or swelling; no back pain  SKIN: + R arm wrapped with ACE , with swelling and warmth  All other ROS negative except noted above      Prior hospital charts reviewed [Yes]  Primary team notes reviewed [Yes]  Other consultant notes reviewed [Yes]    Allergies:  No Known Allergies      ANTIMICROBIALS:   meropenem  IVPB 1000 every 12 hours  vancomycin  IVPB 1000 every 24 hours      OTHER MEDS: MEDICATIONS  (STANDING):  albuterol/ipratropium for Nebulization 3 every 6 hours  allopurinol 300 daily  budesonide 160 MICROgram(s)/formoterol 4.5 MICROgram(s) Inhaler 2 two times a day  dextrose 50% Injectable 25 once  dextrose 50% Injectable 25 once  dextrose 50% Injectable 12.5 once  dextrose Oral Gel 15 once PRN  enoxaparin Injectable 40 every 24 hours  glucagon  Injectable 1 once  insulin glargine Injectable (LANTUS) 12 at bedtime  insulin lispro (ADMELOG) corrective regimen sliding scale  every 6 hours  lactulose Syrup 30 every 8 hours  morphine  - Injectable 2 every 4 hours PRN  morphine  - Injectable 2 once  pantoprazole    Tablet 40 two times a day  polyethylene glycol 3350 17 two times a day  senna 2 at bedtime      Vital Signs Last 24 Hrs  T(F): 97.2 (09-03-23 @ 08:00), Max: 101.6 (09-02-23 @ 00:06)    Vital Signs Last 24 Hrs  HR: 109 (09-03-23 @ 08:00) (102 - 114)  BP: 122/67 (09-03-23 @ 08:00) (88/50 - 122/67)  RR: 18 (09-03-23 @ 08:00)  SpO2: 100% (09-03-23 @ 08:00) (100% - 100%)  Wt(kg): --    EXAM:  GENERAL: NAD, lying in bed  HEAD: No head lesions  EYES: Conjunctiva pink and cornea white  EAR, NOSE, MOUTH, THROAT: Normal external ears and nose, no discharges; moist mucous membranes  NECK: Supple, nontender to palpation; no JVD  RESPIRATORY: Decreased bibasilar crackles  CARDIOVASCULAR: S1 S2  GASTROINTESTINAL: Soft, nontender, nondistended; normoactive bowel sounds  EXTREMITIES: No clubbing, cyanosis, or petal edema  NERVOUS SYSTEM: Alert and oriented to person, time, place and situation, speech clear. No focal deficits   MUSCULOSKELETAL: No joint erythema, swelling or pain  SKIN: Right arm wrapped with ACE, no superficial thrombophlebitis  PSYCH: Normal affect    Labs:                        8.6    12.65 )-----------( 304      ( 03 Sep 2023 08:20 )             27.8     09-03    137  |  103  |  15  ----------------------------<  86  3.9   |  23  |  1.0    Ca    7.6<L>      03 Sep 2023 08:20    TPro  5.1<L>  /  Alb  2.1<L>  /  TBili  0.5  /  DBili  x   /  AST  20  /  ALT  17  /  AlkPhos  99  09-03      WBC Trend:  WBC Count: 12.65 (09-03-23 @ 08:20)  WBC Count: 15.12 (09-02-23 @ 10:50)  WBC Count: 8.27 (09-01-23 @ 07:38)  WBC Count: 9.64 (08-31-23 @ 07:07)      Creatine Trend:  Creatinine: 1.0 (09-03)  Creatinine: 1.0 (09-02)  Creatinine: 1.0 (09-01)  Creatinine: 0.9 (08-31)      Liver Biochemical Testing Trend:  Alanine Aminotransferase (ALT/SGPT): 17 (09-03)  Alanine Aminotransferase (ALT/SGPT): 20 (09-02)  Alanine Aminotransferase (ALT/SGPT): 25 (09-01)  Alanine Aminotransferase (ALT/SGPT): 22 (08-31)  Alanine Aminotransferase (ALT/SGPT): 26 (08-30)  Aspartate Aminotransferase (AST/SGOT): 20 (09-03-23 @ 08:20)  Aspartate Aminotransferase (AST/SGOT): 21 (09-02-23 @ 10:50)  Aspartate Aminotransferase (AST/SGOT): 26 (09-01-23 @ 07:38)  Aspartate Aminotransferase (AST/SGOT): 15 (08-31-23 @ 07:07)  Aspartate Aminotransferase (AST/SGOT): 20 (08-30-23 @ 20:40)  Bilirubin Total: 0.5 (09-03)  Bilirubin Total: 0.4 (09-02)  Bilirubin Total: 0.3 (09-01)  Bilirubin Total: 0.3 (08-31)  Bilirubin Total: 0.3 (08-30)      Trend LDH  08-08-23 @ 20:33  284<H>      Urinalysis Basic - ( 03 Sep 2023 08:20 )    Color: x / Appearance: x / SG: x / pH: x  Gluc: 86 mg/dL / Ketone: x  / Bili: x / Urobili: x   Blood: x / Protein: x / Nitrite: x   Leuk Esterase: x / RBC: x / WBC x   Sq Epi: x / Non Sq Epi: x / Bacteria: x        MICROBIOLOGY:    MRSA PCR Result.: Negative (08-10-23 @ 10:55)  MRSA PCR Result.: Negative (08-07-23 @ 11:30)      Culture - Tissue with Gram Stain (collected 01 Sep 2023 18:15)  Source: .Tissue Wound  Preliminary Report:    Numerous Pseudomonas aeruginosa    Numerous Proteus mirabilis    Few Yeast like cells  Organism: Pseudomonas aeruginosa (Carbapenem Resistant)  Pseudomonas aeruginosa (Carbapenem Resistant)  Proteus mirabilis  Organism: Pseudomonas aeruginosa (Carbapenem Resistant)    Sensitivities:      Method Type: CarbaR      -  Resistance Gene to Carbapenem: Nondet  Organism: Proteus mirabilis    Sensitivities:      Method Type: UZAIR      -  Amikacin: S <=16      -  Amoxicillin/Clavulanic Acid: S <=8/4      -  Ampicillin: S <=8 These ampicillin results predict results for amoxicillin      -  Ampicillin/Sulbactam: S <=4/2      -  Aztreonam: S <=4      -  Cefazolin: S <=2      -  Cefepime: S <=2      -  Cefoxitin: S <=8      -  Ceftriaxone: S <=1      -  Ciprofloxacin: S <=0.25      -  Ertapenem: S <=0.5      -  Gentamicin: S <=2      -  Levofloxacin: S <=0.5      -  Meropenem: S <=1      -  Piperacillin/Tazobactam: S <=8      -  Tobramycin: S <=2      -  Trimethoprim/Sulfamethoxazole: S <=0.5/9.5  Organism: Pseudomonas aeruginosa (Carbapenem Resistant)    Sensitivities:      Method Type: UZAIR      -  Amikacin: S <=16      -  Aztreonam: S <=4      -  Cefepime: S <=2      -  Ceftazidime: S 4      -  Ceftazidime/Avibactam: S <=4      -  Ceftolozane/tazobactam: S <=2      -  Ciprofloxacin: S <=0.25      -  Gentamicin: S 4      -  Imipenem: R >8      -  Levofloxacin: S <=0.5      -  Meropenem: I 4      -  Piperacillin/Tazobactam: S <=8      -  Tobramycin: S <=2    Culture - Blood (collected 30 Aug 2023 20:40)  Source: .Blood Blood  Preliminary Report:    No growth at 72 Hours    Culture - Blood (collected 12 Aug 2023 11:44)  Source: .Blood None  Final Report:    No growth at 5 days    Culture - Blood (collected 08 Aug 2023 11:58)  Source: .Blood None  Final Report:    No growth at 5 days    Culture - Blood (collected 05 Aug 2023 07:10)  Source: .Blood Blood-Peripheral  Final Report:    No growth at 5 days    Culture - Urine (collected 04 Aug 2023 14:42)  Source: Clean Catch Clean Catch (Midstream)  Final Report:    No growth    Culture - Blood (collected 04 Aug 2023 11:20)  Source: .Blood Blood-Peripheral  Final Report:    Growth in anaerobic bottle:    Most closely resembling Hungatella species previously known as    Clostridium species.    Please Note:************************************************    this organism    may appear as gram negative rods in direct smears of clinical specimens.    Direct identification is available within approximately 3-5    hours either by Blood Panel Multiplexed PCR or Direct    MALDI-TOF. Details: [email or web address]  Organism: Blood Culture PCR  Clostridium species  Organism: Clostridium species    Sensitivities:      Method Type: ETEST      -  Amoxicillin/Clavulanic Acid: S 0.25      -  Clindamycin: S 0.25      -  Imipenem: S 2      -  Metronidazole: S 0.064  Organism: Blood Culture PCR    Sensitivities:      Method Type: PCR      -  Blood PCR Panel: NEG    Culture - Blood (collected 04 Aug 2023 11:20)  Source: .Blood Blood-Peripheral  Final Report:    No growth at 5 days    Culture - Urine (collected 21 Nov 2022 16:22)  Source: Clean Catch Clean Catch (Midstream)  Final Report:    No growth    Culture - Urine (collected 08 Oct 2022 20:42)  Source: Clean Catch Clean Catch (Midstream)  Final Report:    >100,000 CFU/ml Klebsiella variicola  Organism: Klebsiella variicola  Organism: Klebsiella variicola    Sensitivities:      Method Type: UZAIR      -  Amikacin: S <=16      -  Amoxicillin/Clavulanic Acid: S <=8/4      -  Ampicillin: R <=8 These ampicillin results predict results for amoxicillin      -  Ampicillin/Sulbactam: S <=4/2 Enterobacter, Klebsiella aerogenes, Citrobacter, and Serratia may develop resistance during prolonged therapy (3-4 days)      -  Aztreonam: S <=4      -  Cefazolin: S <=2      -  Cefepime: S <=2      -  Cefoxitin: S <=8      -  Ceftriaxone: S <=1 Enterobacter, Klebsiella aerogenes, Citrobacter, and Serratia may develop resistance during prolonged therapy      -  Ciprofloxacin: S <=0.25      -  Ertapenem: S <=0.5      -  Gentamicin: S <=2      -  Imipenem: S <=1      -  Levofloxacin: S <=0.5      -  Meropenem: S <=1      -  Nitrofurantoin: I 64 Should not be used to treat pyelonephritis      -  Piperacillin/Tazobactam: S <=8      -  Tigecycline: S <=2      -  Tobramycin: S <=2      -  Trimethoprim/Sulfamethoxazole: S <=0.5/9.5                              Procalcitonin, Serum: 0.13 (08-28)                    RADIOLOGY:  imaging below personally reviewed    < from: Xray Kidney Ureter Bladder (09.03.23 @ 06:48) >  Findings/  impression:    No significant change in generalized bowel distention with moderate fecal   load. Findings again suggest ileus. Degenerative changes are again noted.    < end of copied text >    < from: Xray Chest 1 View-PORTABLE IMMEDIATE (Xray Chest 1 View-PORTABLE IMMEDIATE .) (09.02.23 @ 12:33) >  IMPRESSION:    No radiographic evidence of acute cardiopulmonary disease.    < end of copied text >

## 2023-09-03 NOTE — PROGRESS NOTE ADULT - ASSESSMENT
87 y/o M w/ PMH of DM, HTN coming from home with complaint of decreased appetite, increased urinary output and craving sweets x 2 weeks. Admitted for management of DKA. Prolonged hospital stay complicated by Hungatella bacteremia s/p meropenem and caspofungin    IMPRESSION  #Right arm infected necrotic wound, s/p debridement 9/1  Tissue Cx (9/1) CR pseudomonas (R to imipenem, I to kenyatta, S to other abx), Proteus (pan-sensitive); also some yeast (unclear significance)  #Fever  CXR 9/2 no PNA  UA negative 9/2  COVID negative  BCx 8/30 NGTD  #Leukocytosis  #Hungatella bacteremia likely GI translocation in the setting of appendicitis  8/8 BCX NGTD   8/5 BCX NGTD   8/4 UCX NGTD  8/4 BCX + 1/2 bottles  #DKA/HHS      RECOMMENDATIONS  - D/C meropenem and vancomycin, start IV cefepime 2gm q12hrs (no gram positive isolated in tissue Cx)  - Continue wound care  - Burn follow up  - Trend WBC, fever curve, transaminases, creatinine daily  - Will continue to follow      * THIS IS AN INCOMPLETE NOTE. FINAL RECOMMENDATION IS PENDING *   87 y/o M w/ PMH of DM, HTN coming from home with complaint of decreased appetite, increased urinary output and craving sweets x 2 weeks. Admitted for management of DKA. Prolonged hospital stay complicated by Hungatella bacteremia s/p meropenem and caspofungin    IMPRESSION  #Right arm infected necrotic wound, s/p debridement 9/1  Tissue Cx (9/1) CR pseudomonas (R to imipenem, I to kenyatta, S to other abx), Proteus (pan-sensitive); also some yeast (unclear significance)  #Fever  CXR 9/2 no PNA  UA negative 9/2  COVID negative  BCx 8/30 NGTD  #Leukocytosis  #Hungatella bacteremia likely GI translocation in the setting of appendicitis  8/8 BCX NGTD   8/5 BCX NGTD   8/4 UCX NGTD  8/4 BCX + 1/2 bottles  #DKA/HHS      RECOMMENDATIONS  - D/C meropenem and vancomycin, start IV cefepime 2gm q12hrs (no gram positive isolated in tissue Cx)  - Continue wound care  - Burn follow up  - Trend WBC, fever curve, transaminases, creatinine daily  - Will continue to follow      Gali Barton D.O.  Attending Physician  Division of Infectious Diseases  [facility] - [facility], [place]  Please contact me via [employer] Teams
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
## 2023-09-04 LAB
ALBUMIN SERPL ELPH-MCNC: 1.9 G/DL — LOW (ref 3.5–5.2)
ALP SERPL-CCNC: 93 U/L — SIGNIFICANT CHANGE UP (ref 30–115)
ALT FLD-CCNC: 20 U/L — SIGNIFICANT CHANGE UP (ref 0–41)
ANION GAP SERPL CALC-SCNC: 7 MMOL/L — SIGNIFICANT CHANGE UP (ref 7–14)
AST SERPL-CCNC: 29 U/L — SIGNIFICANT CHANGE UP (ref 0–41)
BASE EXCESS BLDA CALC-SCNC: -2 MMOL/L — SIGNIFICANT CHANGE UP (ref -2–3)
BILIRUB SERPL-MCNC: 0.4 MG/DL — SIGNIFICANT CHANGE UP (ref 0.2–1.2)
BLD GP AB SCN SERPL QL: SIGNIFICANT CHANGE UP
BUN SERPL-MCNC: 15 MG/DL — SIGNIFICANT CHANGE UP (ref 10–20)
CALCIUM SERPL-MCNC: 7.3 MG/DL — LOW (ref 8.4–10.4)
CHLORIDE SERPL-SCNC: 107 MMOL/L — SIGNIFICANT CHANGE UP (ref 98–110)
CO2 SERPL-SCNC: 23 MMOL/L — SIGNIFICANT CHANGE UP (ref 17–32)
CREAT SERPL-MCNC: 1 MG/DL — SIGNIFICANT CHANGE UP (ref 0.7–1.5)
EGFR: 73 ML/MIN/1.73M2 — SIGNIFICANT CHANGE UP
GAS PNL BLDA: SIGNIFICANT CHANGE UP
GLUCOSE BLDC GLUCOMTR-MCNC: 100 MG/DL — HIGH (ref 70–99)
GLUCOSE BLDC GLUCOMTR-MCNC: 105 MG/DL — HIGH (ref 70–99)
GLUCOSE BLDC GLUCOMTR-MCNC: 134 MG/DL — HIGH (ref 70–99)
GLUCOSE BLDC GLUCOMTR-MCNC: 84 MG/DL — SIGNIFICANT CHANGE UP (ref 70–99)
GLUCOSE BLDC GLUCOMTR-MCNC: 85 MG/DL — SIGNIFICANT CHANGE UP (ref 70–99)
GLUCOSE BLDC GLUCOMTR-MCNC: 86 MG/DL — SIGNIFICANT CHANGE UP (ref 70–99)
GLUCOSE SERPL-MCNC: 75 MG/DL — SIGNIFICANT CHANGE UP (ref 70–99)
HCO3 BLDA-SCNC: 22 MMOL/L — SIGNIFICANT CHANGE UP (ref 21–28)
HCT VFR BLD CALC: 23 % — LOW (ref 42–52)
HCT VFR BLD CALC: 24.1 % — LOW (ref 42–52)
HGB BLD-MCNC: 7.1 G/DL — LOW (ref 14–18)
HGB BLD-MCNC: 7.6 G/DL — LOW (ref 14–18)
HOROWITZ INDEX BLDA+IHG-RTO: 28 — SIGNIFICANT CHANGE UP
MCHC RBC-ENTMCNC: 30 PG — SIGNIFICANT CHANGE UP (ref 27–31)
MCHC RBC-ENTMCNC: 30.6 PG — SIGNIFICANT CHANGE UP (ref 27–31)
MCHC RBC-ENTMCNC: 30.9 G/DL — LOW (ref 32–37)
MCHC RBC-ENTMCNC: 31.5 G/DL — LOW (ref 32–37)
MCV RBC AUTO: 97 FL — HIGH (ref 80–94)
MCV RBC AUTO: 97.2 FL — HIGH (ref 80–94)
NRBC # BLD: 0 /100 WBCS — SIGNIFICANT CHANGE UP (ref 0–0)
NRBC # BLD: 0 /100 WBCS — SIGNIFICANT CHANGE UP (ref 0–0)
PCO2 BLDA: 32 MMHG — LOW (ref 35–48)
PH BLDA: 7.44 — SIGNIFICANT CHANGE UP (ref 7.35–7.45)
PLATELET # BLD AUTO: 293 K/UL — SIGNIFICANT CHANGE UP (ref 130–400)
PLATELET # BLD AUTO: 300 K/UL — SIGNIFICANT CHANGE UP (ref 130–400)
PMV BLD: 10.3 FL — SIGNIFICANT CHANGE UP (ref 7.4–10.4)
PMV BLD: 10.6 FL — HIGH (ref 7.4–10.4)
PO2 BLDA: 83 MMHG — SIGNIFICANT CHANGE UP (ref 83–108)
POTASSIUM SERPL-MCNC: 3.6 MMOL/L — SIGNIFICANT CHANGE UP (ref 3.5–5)
POTASSIUM SERPL-SCNC: 3.6 MMOL/L — SIGNIFICANT CHANGE UP (ref 3.5–5)
PROT SERPL-MCNC: 4.7 G/DL — LOW (ref 6–8)
RBC # BLD: 2.37 M/UL — LOW (ref 4.7–6.1)
RBC # BLD: 2.48 M/UL — LOW (ref 4.7–6.1)
RBC # FLD: 16.7 % — HIGH (ref 11.5–14.5)
RBC # FLD: 16.9 % — HIGH (ref 11.5–14.5)
SAO2 % BLDA: 99.1 % — HIGH (ref 94–98)
SODIUM SERPL-SCNC: 137 MMOL/L — SIGNIFICANT CHANGE UP (ref 135–146)
WBC # BLD: 8.46 K/UL — SIGNIFICANT CHANGE UP (ref 4.8–10.8)
WBC # BLD: 8.7 K/UL — SIGNIFICANT CHANGE UP (ref 4.8–10.8)
WBC # FLD AUTO: 8.46 K/UL — SIGNIFICANT CHANGE UP (ref 4.8–10.8)
WBC # FLD AUTO: 8.7 K/UL — SIGNIFICANT CHANGE UP (ref 4.8–10.8)

## 2023-09-04 PROCEDURE — 93010 ELECTROCARDIOGRAM REPORT: CPT

## 2023-09-04 PROCEDURE — 99233 SBSQ HOSP IP/OBS HIGH 50: CPT

## 2023-09-04 PROCEDURE — 71045 X-RAY EXAM CHEST 1 VIEW: CPT | Mod: 26

## 2023-09-04 RX ORDER — PANTOPRAZOLE SODIUM 20 MG/1
8 TABLET, DELAYED RELEASE ORAL
Qty: 80 | Refills: 0 | Status: DISCONTINUED | OUTPATIENT
Start: 2023-09-04 | End: 2023-09-05

## 2023-09-04 RX ORDER — CEFEPIME 1 G/1
INJECTION, POWDER, FOR SOLUTION INTRAMUSCULAR; INTRAVENOUS
Refills: 0 | Status: DISCONTINUED | OUTPATIENT
Start: 2023-09-04 | End: 2023-09-12

## 2023-09-04 RX ORDER — SODIUM CHLORIDE 9 MG/ML
1000 INJECTION INTRAMUSCULAR; INTRAVENOUS; SUBCUTANEOUS
Refills: 0 | Status: DISCONTINUED | OUTPATIENT
Start: 2023-09-04 | End: 2023-09-04

## 2023-09-04 RX ORDER — NOREPINEPHRINE BITARTRATE/D5W 8 MG/250ML
0.1 PLASTIC BAG, INJECTION (ML) INTRAVENOUS
Qty: 8 | Refills: 0 | Status: DISCONTINUED | OUTPATIENT
Start: 2023-09-04 | End: 2023-09-04

## 2023-09-04 RX ORDER — NOREPINEPHRINE BITARTRATE/D5W 8 MG/250ML
0.1 PLASTIC BAG, INJECTION (ML) INTRAVENOUS
Qty: 8 | Refills: 0 | Status: DISCONTINUED | OUTPATIENT
Start: 2023-09-04 | End: 2023-09-09

## 2023-09-04 RX ORDER — CEFEPIME 1 G/1
2000 INJECTION, POWDER, FOR SOLUTION INTRAMUSCULAR; INTRAVENOUS EVERY 12 HOURS
Refills: 0 | Status: DISCONTINUED | OUTPATIENT
Start: 2023-09-04 | End: 2023-09-12

## 2023-09-04 RX ORDER — PANTOPRAZOLE SODIUM 20 MG/1
40 TABLET, DELAYED RELEASE ORAL EVERY 12 HOURS
Refills: 0 | Status: DISCONTINUED | OUTPATIENT
Start: 2023-09-04 | End: 2023-09-04

## 2023-09-04 RX ORDER — FUROSEMIDE 40 MG
40 TABLET ORAL ONCE
Refills: 0 | Status: COMPLETED | OUTPATIENT
Start: 2023-09-04 | End: 2023-09-04

## 2023-09-04 RX ORDER — CEFEPIME 1 G/1
2000 INJECTION, POWDER, FOR SOLUTION INTRAMUSCULAR; INTRAVENOUS ONCE
Refills: 0 | Status: COMPLETED | OUTPATIENT
Start: 2023-09-04 | End: 2023-09-04

## 2023-09-04 RX ORDER — ACETAMINOPHEN 500 MG
650 TABLET ORAL EVERY 6 HOURS
Refills: 0 | Status: DISCONTINUED | OUTPATIENT
Start: 2023-09-04 | End: 2023-09-18

## 2023-09-04 RX ORDER — SODIUM CHLORIDE 9 MG/ML
500 INJECTION, SOLUTION INTRAVENOUS ONCE
Refills: 0 | Status: COMPLETED | OUTPATIENT
Start: 2023-09-04 | End: 2023-09-04

## 2023-09-04 RX ADMIN — MEROPENEM 100 MILLIGRAM(S): 1 INJECTION INTRAVENOUS at 05:44

## 2023-09-04 RX ADMIN — PANTOPRAZOLE SODIUM 40 MILLIGRAM(S): 20 TABLET, DELAYED RELEASE ORAL at 05:44

## 2023-09-04 RX ADMIN — Medication 11.7 MICROGRAM(S)/KG/MIN: at 22:10

## 2023-09-04 RX ADMIN — PANTOPRAZOLE SODIUM 10 MG/HR: 20 TABLET, DELAYED RELEASE ORAL at 22:09

## 2023-09-04 RX ADMIN — Medication 3 MILLILITER(S): at 02:22

## 2023-09-04 RX ADMIN — LACTULOSE 30 GRAM(S): 10 SOLUTION ORAL at 05:43

## 2023-09-04 RX ADMIN — PANTOPRAZOLE SODIUM 10 MG/HR: 20 TABLET, DELAYED RELEASE ORAL at 19:49

## 2023-09-04 RX ADMIN — Medication 40 MILLIGRAM(S): at 22:03

## 2023-09-04 RX ADMIN — CEFEPIME 100 MILLIGRAM(S): 1 INJECTION, POWDER, FOR SOLUTION INTRAMUSCULAR; INTRAVENOUS at 08:29

## 2023-09-04 RX ADMIN — Medication 650 MILLIGRAM(S): at 15:21

## 2023-09-04 RX ADMIN — PANTOPRAZOLE SODIUM 40 MILLIGRAM(S): 20 TABLET, DELAYED RELEASE ORAL at 13:51

## 2023-09-04 RX ADMIN — CHLORHEXIDINE GLUCONATE 1 APPLICATION(S): 213 SOLUTION TOPICAL at 05:52

## 2023-09-04 RX ADMIN — Medication 11.7 MICROGRAM(S)/KG/MIN: at 19:00

## 2023-09-04 RX ADMIN — Medication 1 APPLICATION(S): at 05:44

## 2023-09-04 RX ADMIN — Medication 3 MILLILITER(S): at 08:46

## 2023-09-04 RX ADMIN — Medication 1 APPLICATION(S): at 05:43

## 2023-09-04 RX ADMIN — POLYETHYLENE GLYCOL 3350 17 GRAM(S): 17 POWDER, FOR SOLUTION ORAL at 05:45

## 2023-09-04 RX ADMIN — SODIUM CHLORIDE 75 MILLILITER(S): 9 INJECTION, SOLUTION INTRAVENOUS at 08:29

## 2023-09-04 RX ADMIN — LACTULOSE 30 GRAM(S): 10 SOLUTION ORAL at 13:51

## 2023-09-04 RX ADMIN — Medication 300 MILLIGRAM(S): at 12:41

## 2023-09-04 RX ADMIN — Medication 1 APPLICATION(S): at 13:53

## 2023-09-04 RX ADMIN — SODIUM CHLORIDE 1000 MILLILITER(S): 9 INJECTION, SOLUTION INTRAVENOUS at 15:29

## 2023-09-04 NOTE — PROVIDER CONTACT NOTE (OTHER) - SITUATION
pt blood sugar 47, sp dextrose ivpush, pt blood sugar 208 on reassessment
Patient due for 1 unit pack red blood cells. Patient in bed wife at bedside. Vitals 103/60 hr 118 t: 101.5. Patient denies any discomfort at this time.
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
## 2023-09-04 NOTE — PROVIDER CONTACT NOTE (OTHER) - ACTION/TREATMENT ORDERED:
Blood on hold at this time, LR bolus 500cc, Tylenol and ice packs administered. CBC and blood cultures ordered. Will continue to monitor patient status.
md mcintyre notified and aware, no further interventions ordered
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
## 2023-09-04 NOTE — PROGRESS NOTE ADULT - SUBJECTIVE AND OBJECTIVE BOX
SUBJECTIVE:  HPI:  87 y/o M w/ PMH of DM, HTN coming from home with complaint of decreased appetite, increased urinary output and craving sweets x 2 weeks. Daughter is caretaker, states patient behavior has changed. Within the last 2 weeks, He is less active, requesting more coca cola and sugar. Pt noted to be hypotensive upon arrival.     ED vitals:  BP 74/ 50, HR 87, Temp 97.2F, satting 95% on room air  Labs significant for WBC 14K, Na 123 (corrected 141), Cr 2.4, AG 23, BHB 5.1. VBG pH 7.19, Lactate 5.8, K 8.6, pCO2 39  EKG tachycardia, bifascicular block, RBBB  CXR unremarkable  CT A/P: Extensive coronary atherosclerosis. Dependent atelectasis at the right base. 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 Hepatic cysts. Questionable sludge within the gallbladder. Unchanged 1 cm cystic lesion in the pancreatic body      s/p calcium gluconate 1g, Lasix 40mg push x1, barcarb 50meq, started on insulin drip at 7 units/hr.   Admitted to MICU for DKA/HHS.     (04 Aug 2023 15:16)      Patient is a 88y old Male who presents with a chief complaint of DKA/HHS (04 Sep 2023 10:09)    Currently admitted to medicine with the primary diagnosis of DKA, type 2       Today is hospital day 31d.     PAST MEDICAL & SURGICAL HISTORY  HTN (hypertension)    Gout    BPH (benign prostatic hyperplasia)    Parkinson disease    HLD (hyperlipidemia)    Smoker within last 12 months    Diabetes mellitus    No significant past surgical history        ALLERGIES:  No Known Allergies    MEDICATIONS:  ACTIVE MEDICATIONS  albuterol/ipratropium for Nebulization 3 milliLiter(s) Nebulizer every 6 hours  allopurinol 300 milliGRAM(s) Oral daily  bacitracin   Ointment 1 Application(s) Topical two times a day  budesonide 160 MICROgram(s)/formoterol 4.5 MICROgram(s) Inhaler 2 Puff(s) Inhalation two times a day  cefepime   IVPB 2000 milliGRAM(s) IV Intermittent every 12 hours  cefepime   IVPB      chlorhexidine 2% Cloths 1 Application(s) Topical daily  collagenase Ointment 1 Application(s) Topical two times a day  collagenase Ointment 1 Application(s) Topical daily  dextrose 5%. 1000 milliLiter(s) IV Continuous <Continuous>  dextrose 5%. 1000 milliLiter(s) IV Continuous <Continuous>  dextrose 50% Injectable 12.5 Gram(s) IV Push once  dextrose 50% Injectable 25 Gram(s) IV Push once  dextrose 50% Injectable 25 Gram(s) IV Push once  dextrose Oral Gel 15 Gram(s) Oral once PRN  enoxaparin Injectable 40 milliGRAM(s) SubCutaneous every 24 hours  glucagon  Injectable 1 milliGRAM(s) IntraMuscular once  insulin glargine Injectable (LANTUS) 12 Unit(s) SubCutaneous at bedtime  insulin lispro (ADMELOG) corrective regimen sliding scale   SubCutaneous every 6 hours  lactulose Syrup 30 Gram(s) Oral every 8 hours  morphine  - Injectable 2 milliGRAM(s) IV Push once  morphine  - Injectable 2 milliGRAM(s) IV Push every 4 hours PRN  pantoprazole    Tablet 40 milliGRAM(s) Oral two times a day  polyethylene glycol 3350 17 Gram(s) Oral two times a day  senna 2 Tablet(s) Oral at bedtime      VITALS:   T(F): 98.1  HR: 96  BP: 108/66  RR: 18  SpO2: 99%    LABS:                        7.6    8.46  )-----------( 300      ( 04 Sep 2023 07:12 )             24.1     09-04    137  |  107  |  15  ----------------------------<  75  3.6   |  23  |  1.0    Ca    7.3<L>      04 Sep 2023 07:12    TPro  4.7<L>  /  Alb  1.9<L>  /  TBili  0.4  /  DBili  x   /  AST  29  /  ALT  20  /  AlkPhos  93  09-04      Urinalysis Basic - ( 04 Sep 2023 07:12 )    Color: x / Appearance: x / SG: x / pH: x  Gluc: 75 mg/dL / Ketone: x  / Bili: x / Urobili: x   Blood: x / Protein: x / Nitrite: x   Leuk Esterase: x / RBC: x / WBC x   Sq Epi: x / Non Sq Epi: x / Bacteria: x            Culture - Blood (collected 02 Sep 2023 10:50)  Source: .Blood Blood  Preliminary Report (03 Sep 2023 20:02):    No growth at 24 hours    Culture - Tissue with Gram Stain (collected 01 Sep 2023 18:15)  Source: .Tissue Wound  Gram Stain (02 Sep 2023 01:46):    Moderate polymorphonuclear leukocytes seen per low power field    Numerous Gram Negative Rods seen per oil power field    Rare Yeast like cells seen per oil power field  Preliminary Report (03 Sep 2023 19:25):    Numerous Pseudomonas aeruginosa (Carbapenem Resistant)    Numerous Proteus mirabilis    Few Dolly albicans "Susceptibilities not performed"  Organism: Pseudomonas aeruginosa (Carbapenem Resistant)  Pseudomonas aeruginosa (Carbapenem Resistant)  Proteus mirabilis (03 Sep 2023 15:30)  Organism: Pseudomonas aeruginosa (Carbapenem Resistant) (03 Sep 2023 15:30)  Organism: Proteus mirabilis (03 Sep 2023 15:05)  Organism: Pseudomonas aeruginosa (Carbapenem Resistant) (03 Sep 2023 15:05)              PHYSICAL EXAM:  GEN: No acute distress  LUNGS: Clear to auscultation bilaterally   HEART: S1/S2 present.    ABD: Soft, non-tender, non-distended.   EXT: No pedal edema  NEURO: AAOX3

## 2023-09-04 NOTE — CHART NOTE - NSCHARTNOTEFT_GEN_A_CORE
Called about the patient from primary team  rapid response called this evening Called about the patient from primary team  rapid response called this evening for hypotension and change in mental status.  reported by the team 2 episodes of melena this morning.  lab noted for Hb 7.1 > 7.6 > 8.6   patient seen and examined  confused  NADEGE: brown stool, no evidence of melena, dark stool or hematochezia   Tmax 101.5 at 3 pm   on low dose levo  less likely that patient is hypotensive from GI bleeding given negative through rectal exam, acute onset of hypotension and fever   but given patient with recent history of duodenal ulcer requiring intervention 2 weeks ago, would recommend to keep NPO, start PPI infusion, transfuse pRBCs, repeat CBC  will monitor clinically and decide on need for endoscopic intervention accordingly     discussed with daughter at bedside  discussed with primary team
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
## 2023-09-05 LAB
-  AMIKACIN: SIGNIFICANT CHANGE UP
-  AZTREONAM: SIGNIFICANT CHANGE UP
-  CEFEPIME: SIGNIFICANT CHANGE UP
-  CEFTAZIDIME/AVIBACTAM: SIGNIFICANT CHANGE UP
-  CEFTAZIDIME: SIGNIFICANT CHANGE UP
-  CEFTOLOZANE/TAZOBACTAM: SIGNIFICANT CHANGE UP
-  CIPROFLOXACIN: SIGNIFICANT CHANGE UP
-  GENTAMICIN: SIGNIFICANT CHANGE UP
-  IMIPENEM: SIGNIFICANT CHANGE UP
-  LEVOFLOXACIN: SIGNIFICANT CHANGE UP
-  MEROPENEM: SIGNIFICANT CHANGE UP
-  PIPERACILLIN/TAZOBACTAM: SIGNIFICANT CHANGE UP
-  TOBRAMYCIN: SIGNIFICANT CHANGE UP
ALBUMIN SERPL ELPH-MCNC: 2.4 G/DL — LOW (ref 3.5–5.2)
ALP SERPL-CCNC: 111 U/L — SIGNIFICANT CHANGE UP (ref 30–115)
ALT FLD-CCNC: 27 U/L — SIGNIFICANT CHANGE UP (ref 0–41)
ANION GAP SERPL CALC-SCNC: 10 MMOL/L — SIGNIFICANT CHANGE UP (ref 7–14)
APPEARANCE UR: CLEAR — SIGNIFICANT CHANGE UP
APTT BLD: 34.2 SEC — SIGNIFICANT CHANGE UP (ref 27–39.2)
AST SERPL-CCNC: 33 U/L — SIGNIFICANT CHANGE UP (ref 0–41)
BACTERIA # UR AUTO: NEGATIVE /HPF — SIGNIFICANT CHANGE UP
BASOPHILS # BLD AUTO: 0.03 K/UL — SIGNIFICANT CHANGE UP (ref 0–0.2)
BASOPHILS NFR BLD AUTO: 0.3 % — SIGNIFICANT CHANGE UP (ref 0–1)
BILIRUB SERPL-MCNC: 0.8 MG/DL — SIGNIFICANT CHANGE UP (ref 0.2–1.2)
BILIRUB UR-MCNC: NEGATIVE — SIGNIFICANT CHANGE UP
BLD GP AB SCN SERPL QL: SIGNIFICANT CHANGE UP
BUN SERPL-MCNC: 14 MG/DL — SIGNIFICANT CHANGE UP (ref 10–20)
CALCIUM SERPL-MCNC: 7.3 MG/DL — LOW (ref 8.4–10.5)
CAST: 1 /LPF — SIGNIFICANT CHANGE UP (ref 0–4)
CHLORIDE SERPL-SCNC: 107 MMOL/L — SIGNIFICANT CHANGE UP (ref 98–110)
CO2 SERPL-SCNC: 23 MMOL/L — SIGNIFICANT CHANGE UP (ref 17–32)
COLOR SPEC: YELLOW — SIGNIFICANT CHANGE UP
CREAT SERPL-MCNC: 0.9 MG/DL — SIGNIFICANT CHANGE UP (ref 0.7–1.5)
CULTURE RESULTS: SIGNIFICANT CHANGE UP
DIFF PNL FLD: ABNORMAL
EGFR: 83 ML/MIN/1.73M2 — SIGNIFICANT CHANGE UP
EOSINOPHIL # BLD AUTO: 0.02 K/UL — SIGNIFICANT CHANGE UP (ref 0–0.7)
EOSINOPHIL NFR BLD AUTO: 0.2 % — SIGNIFICANT CHANGE UP (ref 0–8)
GLUCOSE BLDC GLUCOMTR-MCNC: 127 MG/DL — HIGH (ref 70–99)
GLUCOSE BLDC GLUCOMTR-MCNC: 139 MG/DL — HIGH (ref 70–99)
GLUCOSE BLDC GLUCOMTR-MCNC: 145 MG/DL — HIGH (ref 70–99)
GLUCOSE BLDC GLUCOMTR-MCNC: 92 MG/DL — SIGNIFICANT CHANGE UP (ref 70–99)
GLUCOSE BLDC GLUCOMTR-MCNC: 95 MG/DL — SIGNIFICANT CHANGE UP (ref 70–99)
GLUCOSE SERPL-MCNC: 95 MG/DL — SIGNIFICANT CHANGE UP (ref 70–99)
GLUCOSE UR QL: NEGATIVE MG/DL — SIGNIFICANT CHANGE UP
GRAM STN FLD: SIGNIFICANT CHANGE UP
HCT VFR BLD CALC: 35.9 % — LOW (ref 42–52)
HCT VFR BLD CALC: 37.9 % — LOW (ref 42–52)
HCT VFR BLD CALC: 38.2 % — LOW (ref 42–52)
HGB BLD-MCNC: 11.4 G/DL — LOW (ref 14–18)
HGB BLD-MCNC: 12.3 G/DL — LOW (ref 14–18)
HGB BLD-MCNC: 12.4 G/DL — LOW (ref 14–18)
IMM GRANULOCYTES NFR BLD AUTO: 0.9 % — HIGH (ref 0.1–0.3)
INR BLD: 1.16 RATIO — SIGNIFICANT CHANGE UP (ref 0.65–1.3)
KETONES UR-MCNC: NEGATIVE MG/DL — SIGNIFICANT CHANGE UP
LEUKOCYTE ESTERASE UR-ACNC: NEGATIVE — SIGNIFICANT CHANGE UP
LYMPHOCYTES # BLD AUTO: 0.73 K/UL — LOW (ref 1.2–3.4)
LYMPHOCYTES # BLD AUTO: 8.2 % — LOW (ref 20.5–51.1)
MAGNESIUM SERPL-MCNC: 2.2 MG/DL — SIGNIFICANT CHANGE UP (ref 1.8–2.4)
MCHC RBC-ENTMCNC: 30.1 PG — SIGNIFICANT CHANGE UP (ref 27–31)
MCHC RBC-ENTMCNC: 30.6 PG — SIGNIFICANT CHANGE UP (ref 27–31)
MCHC RBC-ENTMCNC: 30.8 PG — SIGNIFICANT CHANGE UP (ref 27–31)
MCHC RBC-ENTMCNC: 31.8 G/DL — LOW (ref 32–37)
MCHC RBC-ENTMCNC: 32.2 G/DL — SIGNIFICANT CHANGE UP (ref 32–37)
MCHC RBC-ENTMCNC: 32.7 G/DL — SIGNIFICANT CHANGE UP (ref 32–37)
MCV RBC AUTO: 94.3 FL — HIGH (ref 80–94)
MCV RBC AUTO: 94.7 FL — HIGH (ref 80–94)
MCV RBC AUTO: 95 FL — HIGH (ref 80–94)
METHOD TYPE: SIGNIFICANT CHANGE UP
MONOCYTES # BLD AUTO: 0.37 K/UL — SIGNIFICANT CHANGE UP (ref 0.1–0.6)
MONOCYTES NFR BLD AUTO: 4.2 % — SIGNIFICANT CHANGE UP (ref 1.7–9.3)
NEUTROPHILS # BLD AUTO: 7.63 K/UL — HIGH (ref 1.4–6.5)
NEUTROPHILS NFR BLD AUTO: 86.2 % — HIGH (ref 42.2–75.2)
NITRITE UR-MCNC: NEGATIVE — SIGNIFICANT CHANGE UP
NRBC # BLD: 0 /100 WBCS — SIGNIFICANT CHANGE UP (ref 0–0)
PH UR: 6.5 — SIGNIFICANT CHANGE UP (ref 5–8)
PLATELET # BLD AUTO: 260 K/UL — SIGNIFICANT CHANGE UP (ref 130–400)
PLATELET # BLD AUTO: 285 K/UL — SIGNIFICANT CHANGE UP (ref 130–400)
PLATELET # BLD AUTO: 355 K/UL — SIGNIFICANT CHANGE UP (ref 130–400)
PMV BLD: 10.3 FL — SIGNIFICANT CHANGE UP (ref 7.4–10.4)
PMV BLD: 10.5 FL — HIGH (ref 7.4–10.4)
PMV BLD: 12.2 FL — HIGH (ref 7.4–10.4)
POTASSIUM SERPL-MCNC: 3.5 MMOL/L — SIGNIFICANT CHANGE UP (ref 3.5–5)
POTASSIUM SERPL-SCNC: 3.5 MMOL/L — SIGNIFICANT CHANGE UP (ref 3.5–5)
PROT SERPL-MCNC: 5.4 G/DL — LOW (ref 6–8)
PROT UR-MCNC: 30 MG/DL
PROTHROM AB SERPL-ACNC: 13.3 SEC — HIGH (ref 9.95–12.87)
RBC # BLD: 3.79 M/UL — LOW (ref 4.7–6.1)
RBC # BLD: 4.02 M/UL — LOW (ref 4.7–6.1)
RBC # BLD: 4.02 M/UL — LOW (ref 4.7–6.1)
RBC # FLD: 16 % — HIGH (ref 11.5–14.5)
RBC # FLD: 16.6 % — HIGH (ref 11.5–14.5)
RBC # FLD: 16.7 % — HIGH (ref 11.5–14.5)
RBC CASTS # UR COMP ASSIST: 12 /HPF — HIGH (ref 0–4)
SODIUM SERPL-SCNC: 140 MMOL/L — SIGNIFICANT CHANGE UP (ref 135–146)
SP GR SPEC: 1.02 — SIGNIFICANT CHANGE UP (ref 1–1.03)
SPECIMEN SOURCE: SIGNIFICANT CHANGE UP
SPECIMEN SOURCE: SIGNIFICANT CHANGE UP
SQUAMOUS # UR AUTO: 1 /HPF — SIGNIFICANT CHANGE UP (ref 0–5)
TSH SERPL-MCNC: 9.75 UIU/ML — HIGH (ref 0.27–4.2)
UROBILINOGEN FLD QL: 1 MG/DL — SIGNIFICANT CHANGE UP (ref 0.2–1)
WBC # BLD: 6.44 K/UL — SIGNIFICANT CHANGE UP (ref 4.8–10.8)
WBC # BLD: 8.38 K/UL — SIGNIFICANT CHANGE UP (ref 4.8–10.8)
WBC # BLD: 8.86 K/UL — SIGNIFICANT CHANGE UP (ref 4.8–10.8)
WBC # FLD AUTO: 6.44 K/UL — SIGNIFICANT CHANGE UP (ref 4.8–10.8)
WBC # FLD AUTO: 8.38 K/UL — SIGNIFICANT CHANGE UP (ref 4.8–10.8)
WBC # FLD AUTO: 8.86 K/UL — SIGNIFICANT CHANGE UP (ref 4.8–10.8)
WBC UR QL: 1 /HPF — SIGNIFICANT CHANGE UP (ref 0–5)

## 2023-09-05 PROCEDURE — 99232 SBSQ HOSP IP/OBS MODERATE 35: CPT

## 2023-09-05 PROCEDURE — 99291 CRITICAL CARE FIRST HOUR: CPT | Mod: GC

## 2023-09-05 PROCEDURE — 99233 SBSQ HOSP IP/OBS HIGH 50: CPT

## 2023-09-05 PROCEDURE — 74018 RADEX ABDOMEN 1 VIEW: CPT | Mod: 26

## 2023-09-05 PROCEDURE — 71045 X-RAY EXAM CHEST 1 VIEW: CPT | Mod: 26

## 2023-09-05 RX ORDER — BUMETANIDE 0.25 MG/ML
2 INJECTION INTRAMUSCULAR; INTRAVENOUS ONCE
Refills: 0 | Status: COMPLETED | OUTPATIENT
Start: 2023-09-05 | End: 2023-09-05

## 2023-09-05 RX ORDER — METRONIDAZOLE 500 MG
500 TABLET ORAL ONCE
Refills: 0 | Status: COMPLETED | OUTPATIENT
Start: 2023-09-05 | End: 2023-09-05

## 2023-09-05 RX ORDER — SODIUM CHLORIDE 9 MG/ML
500 INJECTION, SOLUTION INTRAVENOUS ONCE
Refills: 0 | Status: COMPLETED | OUTPATIENT
Start: 2023-09-05 | End: 2023-09-05

## 2023-09-05 RX ORDER — SODIUM CHLORIDE 9 MG/ML
1000 INJECTION INTRAMUSCULAR; INTRAVENOUS; SUBCUTANEOUS
Refills: 0 | Status: DISCONTINUED | OUTPATIENT
Start: 2023-09-05 | End: 2023-09-06

## 2023-09-05 RX ORDER — SODIUM CHLORIDE 9 MG/ML
250 INJECTION INTRAMUSCULAR; INTRAVENOUS; SUBCUTANEOUS ONCE
Refills: 0 | Status: COMPLETED | OUTPATIENT
Start: 2023-09-05 | End: 2023-09-05

## 2023-09-05 RX ORDER — METRONIDAZOLE 500 MG
TABLET ORAL
Refills: 0 | Status: DISCONTINUED | OUTPATIENT
Start: 2023-09-05 | End: 2023-09-12

## 2023-09-05 RX ORDER — INSULIN GLARGINE 100 [IU]/ML
8 INJECTION, SOLUTION SUBCUTANEOUS AT BEDTIME
Refills: 0 | Status: DISCONTINUED | OUTPATIENT
Start: 2023-09-05 | End: 2023-09-07

## 2023-09-05 RX ORDER — METRONIDAZOLE 500 MG
500 TABLET ORAL EVERY 8 HOURS
Refills: 0 | Status: DISCONTINUED | OUTPATIENT
Start: 2023-09-05 | End: 2023-09-12

## 2023-09-05 RX ORDER — PANTOPRAZOLE SODIUM 20 MG/1
40 TABLET, DELAYED RELEASE ORAL EVERY 12 HOURS
Refills: 0 | Status: DISCONTINUED | OUTPATIENT
Start: 2023-09-05 | End: 2023-09-08

## 2023-09-05 RX ADMIN — BUMETANIDE 2 MILLIGRAM(S): 0.25 INJECTION INTRAMUSCULAR; INTRAVENOUS at 20:29

## 2023-09-05 RX ADMIN — LACTULOSE 30 GRAM(S): 10 SOLUTION ORAL at 13:44

## 2023-09-05 RX ADMIN — LACTULOSE 30 GRAM(S): 10 SOLUTION ORAL at 21:08

## 2023-09-05 RX ADMIN — SODIUM CHLORIDE 70 MILLILITER(S): 9 INJECTION INTRAMUSCULAR; INTRAVENOUS; SUBCUTANEOUS at 17:56

## 2023-09-05 RX ADMIN — INSULIN GLARGINE 8 UNIT(S): 100 INJECTION, SOLUTION SUBCUTANEOUS at 21:20

## 2023-09-05 RX ADMIN — BUDESONIDE AND FORMOTEROL FUMARATE DIHYDRATE 2 PUFF(S): 160; 4.5 AEROSOL RESPIRATORY (INHALATION) at 08:39

## 2023-09-05 RX ADMIN — Medication 1 APPLICATION(S): at 17:58

## 2023-09-05 RX ADMIN — Medication 100 MILLIGRAM(S): at 21:07

## 2023-09-05 RX ADMIN — SODIUM CHLORIDE 250 MILLILITER(S): 9 INJECTION INTRAMUSCULAR; INTRAVENOUS; SUBCUTANEOUS at 17:56

## 2023-09-05 RX ADMIN — Medication 100 MILLIGRAM(S): at 08:46

## 2023-09-05 RX ADMIN — PANTOPRAZOLE SODIUM 40 MILLIGRAM(S): 20 TABLET, DELAYED RELEASE ORAL at 08:39

## 2023-09-05 RX ADMIN — CHLORHEXIDINE GLUCONATE 1 APPLICATION(S): 213 SOLUTION TOPICAL at 06:41

## 2023-09-05 RX ADMIN — PANTOPRAZOLE SODIUM 40 MILLIGRAM(S): 20 TABLET, DELAYED RELEASE ORAL at 17:57

## 2023-09-05 RX ADMIN — Medication 300 MILLIGRAM(S): at 12:41

## 2023-09-05 RX ADMIN — Medication 3 MILLILITER(S): at 09:15

## 2023-09-05 RX ADMIN — CEFEPIME 100 MILLIGRAM(S): 1 INJECTION, POWDER, FOR SOLUTION INTRAMUSCULAR; INTRAVENOUS at 17:57

## 2023-09-05 RX ADMIN — Medication 1 APPLICATION(S): at 06:40

## 2023-09-05 RX ADMIN — Medication 1 APPLICATION(S): at 18:05

## 2023-09-05 RX ADMIN — SODIUM CHLORIDE 500 MILLILITER(S): 9 INJECTION, SOLUTION INTRAVENOUS at 15:15

## 2023-09-05 RX ADMIN — POLYETHYLENE GLYCOL 3350 17 GRAM(S): 17 POWDER, FOR SOLUTION ORAL at 17:57

## 2023-09-05 RX ADMIN — CEFEPIME 100 MILLIGRAM(S): 1 INJECTION, POWDER, FOR SOLUTION INTRAMUSCULAR; INTRAVENOUS at 06:36

## 2023-09-05 RX ADMIN — SENNA PLUS 2 TABLET(S): 8.6 TABLET ORAL at 21:08

## 2023-09-05 RX ADMIN — Medication 100 MILLIGRAM(S): at 13:44

## 2023-09-05 RX ADMIN — Medication 1 APPLICATION(S): at 12:20

## 2023-09-05 NOTE — SWALLOW BEDSIDE ASSESSMENT ADULT - SWALLOW EVAL: ORAL MUSCULATURE
+generalized weakness
unable to assess due to poor participation/comprehension
generally intact
+generalized weakness

## 2023-09-05 NOTE — PROGRESS NOTE ADULT - SUBJECTIVE AND OBJECTIVE BOX
Gastroenterology progress note:     Patient is a 88y old  Male who presents with a chief complaint of DKA/HHS (04 Sep 2023 11:02)       Admitted on: 08-04-23    We are following the patient for:       Interval History:    No acute events overnight.   - Diet -   - last BM -   - Abdominal pain -       PAST MEDICAL & SURGICAL HISTORY:  HTN (hypertension)      Gout      BPH (benign prostatic hyperplasia)      Parkinson disease      HLD (hyperlipidemia)      Smoker within last 12 months      Diabetes mellitus      No significant past surgical history          MEDICATIONS  (STANDING):  albuterol/ipratropium for Nebulization 3 milliLiter(s) Nebulizer every 6 hours  allopurinol 300 milliGRAM(s) Oral daily  bacitracin   Ointment 1 Application(s) Topical two times a day  budesonide 160 MICROgram(s)/formoterol 4.5 MICROgram(s) Inhaler 2 Puff(s) Inhalation two times a day  cefepime   IVPB      cefepime   IVPB 2000 milliGRAM(s) IV Intermittent every 12 hours  chlorhexidine 2% Cloths 1 Application(s) Topical daily  collagenase Ointment 1 Application(s) Topical two times a day  collagenase Ointment 1 Application(s) Topical daily  dextrose 5%. 1000 milliLiter(s) (100 mL/Hr) IV Continuous <Continuous>  dextrose 5%. 1000 milliLiter(s) (50 mL/Hr) IV Continuous <Continuous>  dextrose 50% Injectable 12.5 Gram(s) IV Push once  dextrose 50% Injectable 25 Gram(s) IV Push once  dextrose 50% Injectable 25 Gram(s) IV Push once  glucagon  Injectable 1 milliGRAM(s) IntraMuscular once  insulin glargine Injectable (LANTUS) 12 Unit(s) SubCutaneous at bedtime  insulin lispro (ADMELOG) corrective regimen sliding scale   SubCutaneous every 6 hours  lactulose Syrup 30 Gram(s) Oral every 8 hours  levoFLOXacin IVPB 750 milliGRAM(s) IV Intermittent every 24 hours  morphine  - Injectable 2 milliGRAM(s) IV Push once  norepinephrine Infusion 0.1 MICROgram(s)/kG/Min (11.7 mL/Hr) IV Continuous <Continuous>  pantoprazole Infusion 8 mG/Hr (10 mL/Hr) IV Continuous <Continuous>  polyethylene glycol 3350 17 Gram(s) Oral two times a day  senna 2 Tablet(s) Oral at bedtime    MEDICATIONS  (PRN):  acetaminophen     Tablet .. 650 milliGRAM(s) Oral every 6 hours PRN Temp greater or equal to 38C (100.4F)  dextrose Oral Gel 15 Gram(s) Oral once PRN Blood Glucose LESS THAN 70 milliGRAM(s)/deciliter  morphine  - Injectable 2 milliGRAM(s) IV Push every 4 hours PRN Severe Pain (7 - 10)      Allergies  No Known Allergies      Review of Systems:   Cardiovascular:  No Chest Pain, No Palpitations  Respiratory:  No Cough, No Dyspnea  Gastrointestinal:  As described in HPI  Skin:  No Skin Lesions, No Jaundice  Neuro:  No Syncope, No Dizziness    Physical Examination:  T(C): 35.2 (09-05-23 @ 04:00), Max: 38.6 (09-04-23 @ 15:00)  HR: 92 (09-05-23 @ 05:00) (80 - 118)  BP: 141/73 (09-05-23 @ 05:00) (80/54 - 141/73)  RR: 23 (09-05-23 @ 05:00) (16 - 25)  SpO2: 100% (09-05-23 @ 05:00) (92% - 100%)      09-03-23 @ 07:01  -  09-04-23 @ 07:00  --------------------------------------------------------  IN: 2400 mL / OUT: 5 mL / NET: 2395 mL    09-04-23 @ 07:01  -  09-05-23 @ 06:28  --------------------------------------------------------  IN: 1381 mL / OUT: 3104 mL / NET: -1723 mL        GENERAL: AAOx3, no acute distress.  HEAD:  Atraumatic, Normocephalic  EYES: conjunctiva and sclera clear  NECK: Supple, no JVD or thyromegaly  CHEST/LUNG: Clear to auscultation bilaterally; No wheeze, rhonchi, or rales  HEART: Regular rate and rhythm; normal S1, S2, No murmurs.  ABDOMEN: Soft, nontender, nondistended; Bowel sounds present  NEUROLOGY: No asterixis or tremor.   SKIN: Intact, no jaundice     Data:                        12.4   8.86  )-----------( 285      ( 05 Sep 2023 04:34 )             37.9     Hgb trend:  12.4  09-05-23 @ 04:34  7.1  09-04-23 @ 16:00  7.6  09-04-23 @ 07:12  8.6  09-03-23 @ 08:20  9.3  09-02-23 @ 10:50      09-04-23 @ 07:01  -  09-05-23 @ 06:28  --------------------------------------------------------  IN: 617 mL      09-05    140  |  107  |  14  ----------------------------<  95  3.5   |  23  |  0.9    Ca    7.3<L>      05 Sep 2023 04:34  Mg     2.2     09-05    TPro  5.4<L>  /  Alb  2.4<L>  /  TBili  0.8  /  DBili  x   /  AST  33  /  ALT  27  /  AlkPhos  111  09-05    Liver panel trend:  TBili 0.8   /   AST 33   /   ALT 27   /   AlkP 111   /   Tptn 5.4   /   Alb 2.4    /   DBili --      09-05  TBili 0.4   /   AST 29   /   ALT 20   /   AlkP 93   /   Tptn 4.7   /   Alb 1.9    /   DBili --      09-04  TBili 0.5   /   AST 20   /   ALT 17   /   AlkP 99   /   Tptn 5.1   /   Alb 2.1    /   DBili --      09-03  TBili 0.4   /   AST 21   /   ALT 20   /   AlkP 105   /   Tptn 5.5   /   Alb 2.3    /   DBili --      09-02  TBili 0.3   /   AST 26   /   ALT 25   /   AlkP 90   /   Tptn 5.3   /   Alb 2.5    /   DBili --      09-01  TBili 0.3   /   AST 15   /   ALT 22   /   AlkP 83   /   Tptn 4.9   /   Alb 2.1    /   DBili --      08-31  TBili 0.3   /   AST 20   /   ALT 26   /   AlkP 92   /   Tptn 5.3   /   Alb 2.5    /   DBili --      08-30  TBili 0.3   /   AST 28   /   ALT 31   /   AlkP 94   /   Tptn 5.4   /   Alb 2.3    /   DBili --      08-30  TBili 0.3   /   AST 18   /   ALT 23   /   AlkP 91   /   Tptn 5.4   /   Alb 2.4    /   DBili --      08-29  TBili 0.3   /   AST 23   /   ALT 27   /   AlkP 87   /   Tptn 4.9   /   Alb 2.2    /   DBili --      08-28  TBili 0.3   /   AST 29   /   ALT 26   /   AlkP 90   /   Tptn 5.4   /   Alb 2.6    /   DBili --      08-27      PT/INR - ( 05 Sep 2023 04:34 )   PT: 13.30 sec;   INR: 1.16 ratio         PTT - ( 05 Sep 2023 04:34 )  PTT:34.2 sec    Culture - Urine (collected 02 Sep 2023 17:07)  Source: Clean Catch Clean Catch (Midstream)  Preliminary Report (04 Sep 2023 13:59):    10,000 - 49,000 CFU/mL Gram Negative Rods    Culture - Blood (collected 02 Sep 2023 10:50)  Source: .Blood Blood  Preliminary Report (04 Sep 2023 20:01):    No growth at 48 Hours         Radiology:    < from: Xray Kidney Ureter Bladder (09.03.23 @ 06:48) >  No significant change in generalized bowel distention with moderate fecal   load. Findings again suggest ileus. Degenerative changes are again noted.    < end of copied text >  < from: Xray Kidney Ureter Bladder (09.01.23 @ 13:59) >  IMPRESSION: Large fecal load. Nonobstructive ileus    < end of copied text >     Gastroenterology progress note:     Patient is a 88y old  Male who presents with a chief complaint of DKA/HHS (04 Sep 2023 11:02)       Admitted on: 08-04-23    We are following the patient for: anemia       Interval History:  patient had 2 brown BMs, received 2u per RN    No acute events overnight.     PAST MEDICAL & SURGICAL HISTORY:  HTN (hypertension)      Gout      BPH (benign prostatic hyperplasia)      Parkinson disease      HLD (hyperlipidemia)      Smoker within last 12 months      Diabetes mellitus      No significant past surgical history          MEDICATIONS  (STANDING):  albuterol/ipratropium for Nebulization 3 milliLiter(s) Nebulizer every 6 hours  allopurinol 300 milliGRAM(s) Oral daily  bacitracin   Ointment 1 Application(s) Topical two times a day  budesonide 160 MICROgram(s)/formoterol 4.5 MICROgram(s) Inhaler 2 Puff(s) Inhalation two times a day  cefepime   IVPB      cefepime   IVPB 2000 milliGRAM(s) IV Intermittent every 12 hours  chlorhexidine 2% Cloths 1 Application(s) Topical daily  collagenase Ointment 1 Application(s) Topical two times a day  collagenase Ointment 1 Application(s) Topical daily  dextrose 5%. 1000 milliLiter(s) (100 mL/Hr) IV Continuous <Continuous>  dextrose 5%. 1000 milliLiter(s) (50 mL/Hr) IV Continuous <Continuous>  dextrose 50% Injectable 12.5 Gram(s) IV Push once  dextrose 50% Injectable 25 Gram(s) IV Push once  dextrose 50% Injectable 25 Gram(s) IV Push once  glucagon  Injectable 1 milliGRAM(s) IntraMuscular once  insulin glargine Injectable (LANTUS) 12 Unit(s) SubCutaneous at bedtime  insulin lispro (ADMELOG) corrective regimen sliding scale   SubCutaneous every 6 hours  lactulose Syrup 30 Gram(s) Oral every 8 hours  levoFLOXacin IVPB 750 milliGRAM(s) IV Intermittent every 24 hours  morphine  - Injectable 2 milliGRAM(s) IV Push once  norepinephrine Infusion 0.1 MICROgram(s)/kG/Min (11.7 mL/Hr) IV Continuous <Continuous>  pantoprazole Infusion 8 mG/Hr (10 mL/Hr) IV Continuous <Continuous>  polyethylene glycol 3350 17 Gram(s) Oral two times a day  senna 2 Tablet(s) Oral at bedtime    MEDICATIONS  (PRN):  acetaminophen     Tablet .. 650 milliGRAM(s) Oral every 6 hours PRN Temp greater or equal to 38C (100.4F)  dextrose Oral Gel 15 Gram(s) Oral once PRN Blood Glucose LESS THAN 70 milliGRAM(s)/deciliter  morphine  - Injectable 2 milliGRAM(s) IV Push every 4 hours PRN Severe Pain (7 - 10)      Allergies  No Known Allergies      Review of Systems:   Cardiovascular:  No Chest Pain, No Palpitations  Respiratory:  No Cough, No Dyspnea  Gastrointestinal:  As described in HPI  Skin:  No Skin Lesions, No Jaundice  Neuro:  No Syncope, No Dizziness    Physical Examination:  T(C): 35.2 (09-05-23 @ 04:00), Max: 38.6 (09-04-23 @ 15:00)  HR: 92 (09-05-23 @ 05:00) (80 - 118)  BP: 141/73 (09-05-23 @ 05:00) (80/54 - 141/73)  RR: 23 (09-05-23 @ 05:00) (16 - 25)  SpO2: 100% (09-05-23 @ 05:00) (92% - 100%)      09-03-23 @ 07:01  -  09-04-23 @ 07:00  --------------------------------------------------------  IN: 2400 mL / OUT: 5 mL / NET: 2395 mL    09-04-23 @ 07:01  -  09-05-23 @ 06:28  --------------------------------------------------------  IN: 1381 mL / OUT: 3104 mL / NET: -1723 mL        GENERAL: AAOx3, no acute distress.  HEAD:  Atraumatic, Normocephalic  EYES: conjunctiva and sclera clear  NECK: Supple, no JVD or thyromegaly  CHEST/LUNG: Clear to auscultation bilaterally; No wheeze, rhonchi, or rales  HEART: Regular rate and rhythm; normal S1, S2, No murmurs.  ABDOMEN: Soft, nontender, nondistended; Bowel sounds present  NEUROLOGY: No asterixis or tremor.   SKIN: Intact, no jaundice     Data:                        12.4   8.86  )-----------( 285      ( 05 Sep 2023 04:34 )             37.9     Hgb trend:  12.4  09-05-23 @ 04:34  7.1  09-04-23 @ 16:00  7.6  09-04-23 @ 07:12  8.6  09-03-23 @ 08:20  9.3  09-02-23 @ 10:50      09-04-23 @ 07:01  -  09-05-23 @ 06:28  --------------------------------------------------------  IN: 617 mL      09-05    140  |  107  |  14  ----------------------------<  95  3.5   |  23  |  0.9    Ca    7.3<L>      05 Sep 2023 04:34  Mg     2.2     09-05    TPro  5.4<L>  /  Alb  2.4<L>  /  TBili  0.8  /  DBili  x   /  AST  33  /  ALT  27  /  AlkPhos  111  09-05    Liver panel trend:  TBili 0.8   /   AST 33   /   ALT 27   /   AlkP 111   /   Tptn 5.4   /   Alb 2.4    /   DBili --      09-05  TBili 0.4   /   AST 29   /   ALT 20   /   AlkP 93   /   Tptn 4.7   /   Alb 1.9    /   DBili --      09-04  TBili 0.5   /   AST 20   /   ALT 17   /   AlkP 99   /   Tptn 5.1   /   Alb 2.1    /   DBili --      09-03  TBili 0.4   /   AST 21   /   ALT 20   /   AlkP 105   /   Tptn 5.5   /   Alb 2.3    /   DBili --      09-02  TBili 0.3   /   AST 26   /   ALT 25   /   AlkP 90   /   Tptn 5.3   /   Alb 2.5    /   DBili --      09-01  TBili 0.3   /   AST 15   /   ALT 22   /   AlkP 83   /   Tptn 4.9   /   Alb 2.1    /   DBili --      08-31  TBili 0.3   /   AST 20   /   ALT 26   /   AlkP 92   /   Tptn 5.3   /   Alb 2.5    /   DBili --      08-30  TBili 0.3   /   AST 28   /   ALT 31   /   AlkP 94   /   Tptn 5.4   /   Alb 2.3    /   DBili --      08-30  TBili 0.3   /   AST 18   /   ALT 23   /   AlkP 91   /   Tptn 5.4   /   Alb 2.4    /   DBili --      08-29  TBili 0.3   /   AST 23   /   ALT 27   /   AlkP 87   /   Tptn 4.9   /   Alb 2.2    /   DBili --      08-28  TBili 0.3   /   AST 29   /   ALT 26   /   AlkP 90   /   Tptn 5.4   /   Alb 2.6    /   DBili --      08-27      PT/INR - ( 05 Sep 2023 04:34 )   PT: 13.30 sec;   INR: 1.16 ratio         PTT - ( 05 Sep 2023 04:34 )  PTT:34.2 sec    Culture - Urine (collected 02 Sep 2023 17:07)  Source: Clean Catch Clean Catch (Midstream)  Preliminary Report (04 Sep 2023 13:59):    10,000 - 49,000 CFU/mL Gram Negative Rods    Culture - Blood (collected 02 Sep 2023 10:50)  Source: .Blood Blood  Preliminary Report (04 Sep 2023 20:01):    No growth at 48 Hours         Radiology:    < from: Xray Kidney Ureter Bladder (09.03.23 @ 06:48) >  No significant change in generalized bowel distention with moderate fecal   load. Findings again suggest ileus. Degenerative changes are again noted.    < end of copied text >  < from: Xray Kidney Ureter Bladder (09.01.23 @ 13:59) >  IMPRESSION: Large fecal load. Nonobstructive ileus    < end of copied text >

## 2023-09-05 NOTE — PROGRESS NOTE ADULT - SUBJECTIVE AND OBJECTIVE BOX
Patient is a 88y old  Male who presents with a chief complaint of DKA/HHS (05 Sep 2023 07:41)  AM ROUNDS  Burn following for right upper extremity wound    ICU Vital Signs Last 24 Hrs  T(C): 36.1 (05 Sep 2023 08:00), Max: 38.6 (04 Sep 2023 15:00)  T(F): 96.9 (05 Sep 2023 08:00), Max: 101.5 (04 Sep 2023 15:00)  HR: 94 (05 Sep 2023 10:00) (80 - 118)  BP: 97/58 (05 Sep 2023 10:00) (80/54 - 141/73)  BP(mean): 72 (05 Sep 2023 10:00) (70 - 100)  RR: 19 (05 Sep 2023 10:00) (15 - 26)  SpO2: 94% (05 Sep 2023 10:00) (92% - 100%)    O2 Parameters below as of 05 Sep 2023 11:00  Patient On (Oxygen Delivery Method): room air    LABS:                        12.4   8.86  )-----------( 285      ( 05 Sep 2023 04:34 )             37.9     09-05    140  |  107  |  14  ----------------------------<  95  3.5   |  23  |  0.9    Ca    7.3<L>      05 Sep 2023 04:34  Mg     2.2     09-05    TPro  5.4<L>  /  Alb  2.4<L>  /  TBili  0.8  /  DBili  x   /  AST  33  /  ALT  27  /  AlkPhos  111  09-05    PT/INR - ( 05 Sep 2023 04:34 )   PT: 13.30 sec;   INR: 1.16 ratio    PTT - ( 05 Sep 2023 04:34 )  PTT:34.2 sec  Urinalysis Basic - ( 05 Sep 2023 10:22 )    MEDICATIONS  (STANDING):  albuterol/ipratropium for Nebulization 3 milliLiter(s) Nebulizer every 6 hours  allopurinol 300 milliGRAM(s) Oral daily  bacitracin   Ointment 1 Application(s) Topical two times a day  budesonide 160 MICROgram(s)/formoterol 4.5 MICROgram(s) Inhaler 2 Puff(s) Inhalation two times a day  cefepime   IVPB 2000 milliGRAM(s) IV Intermittent every 12 hours  cefepime   IVPB      chlorhexidine 2% Cloths 1 Application(s) Topical daily  collagenase Ointment 1 Application(s) Topical two times a day  collagenase Ointment 1 Application(s) Topical daily  dextrose 5%. 1000 milliLiter(s) (100 mL/Hr) IV Continuous <Continuous>  dextrose 5%. 1000 milliLiter(s) (50 mL/Hr) IV Continuous <Continuous>  dextrose 50% Injectable 25 Gram(s) IV Push once  dextrose 50% Injectable 25 Gram(s) IV Push once  dextrose 50% Injectable 12.5 Gram(s) IV Push once  glucagon  Injectable 1 milliGRAM(s) IntraMuscular once  insulin glargine Injectable (LANTUS) 8 Unit(s) SubCutaneous at bedtime  insulin lispro (ADMELOG) corrective regimen sliding scale   SubCutaneous every 6 hours  lactulose Syrup 30 Gram(s) Oral every 8 hours  levoFLOXacin IVPB 750 milliGRAM(s) IV Intermittent every 24 hours  metroNIDAZOLE  IVPB      metroNIDAZOLE  IVPB 500 milliGRAM(s) IV Intermittent every 8 hours  morphine  - Injectable 2 milliGRAM(s) IV Push once  norepinephrine Infusion 0.1 MICROgram(s)/kG/Min (11.7 mL/Hr) IV Continuous <Continuous>  pantoprazole  Injectable 40 milliGRAM(s) IV Push every 12 hours  polyethylene glycol 3350 17 Gram(s) Oral two times a day  senna 2 Tablet(s) Oral at bedtime    MEDICATIONS  (PRN):  acetaminophen     Tablet .. 650 milliGRAM(s) Oral every 6 hours PRN Temp greater or equal to 38C (100.4F)  dextrose Oral Gel 15 Gram(s) Oral once PRN Blood Glucose LESS THAN 70 milliGRAM(s)/deciliter  morphine  - Injectable 2 milliGRAM(s) IV Push every 4 hours PRN Severe Pain (7 - 10)      PHYSICAL EXAM: Awake, Alert  Full thickness wound to Rt Forearm with mostly pink moist subcutis and small scattered areas of soft necrotic tissue, serosang dc  approx 15x5x0.5 cm no active bleeding, purulence or surrounding erythema

## 2023-09-05 NOTE — PROGRESS NOTE ADULT - ASSESSMENT
87 y/o M w/ PMH of DM, HTN, Parkinson disease, Gout, HLD, former smoker coming from home with complaint of decreased appetite, increased urinary output and craving sweets x 2 weeks. Patient admitted on 8/4/23 for DKA and hypotension , was started on levophed lose dose. HIs course complicated with appendicitis , Hungatella bacteremia, acute anemia requiring 3u. GI consulted today for 3 episodes of melena and drop in Hb to 6. Last night patient was complaining of severe lower abdominal pain and distesion s/p CTAP IC with thrombus in decending AA, SMA and celiac axis narrowing but no bowel ischemia , lactate went upto 7 from 1.5, s/p EGD with duodenal ulcers. GI recalled on 9/4 for downtrending Hb, hypotension, 2 episodes of black stool.    #Reported melena  #Chronic  Normocytic anemia - stable  #  s/p EGD 8/14 duodenal ulcer s/p epi and hot forceps thermal therapy  - Baseline hemoglobin - 12  - Hemoglobin on admission - 11.2 (8/4)>>6.6>>3u> 8.6>7.9 > 8.6 (8/15)> 7.1 > 7.6 > 8.6 > repeat 9/4: 12  - Coags - INR:  0.98 (7/4)>>1.35 (8/8)>   - Lactate: 1.4<2.5< 7.5< 1.5  - CTAP IV Con: 8/10: No evidence of bowel ischemia. Unchanged findings suggesting acute appendicitis. Unopacified inferior mesenteric artery is new compared to 11/17/2022 as there is increased thrombus within the unchanged size of abdominal aortic   aneurysm with decreased opacified aortic lumen. Collateral flow presumed to be present again there is no evidence of bowel ischemia.  - CTAP oral and IV con: 8/8: New mild dilation of the appendix with small volume free fluid in the bilateral lower quadrants. Findings are concerning for appendicitis. 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 cm, as described  - was on fondaparinux  - deemed high risk for endoscopy on 8/11 and a shared decision was made to pursue conservative therapy  - recall for continuos melena. currently off pressors   - s/p EGD on 8/14/23 revealing 2 ulcers   - had 2 dark BMs 8/15  - recalled on 9/4 for hypotension witth Tmax 101.5, 2 episodes of dark stool, dropping hb which corrected on repeat lab draw, on low dose levo>>patient upgraded to CCU  - NADEGE: brown stool, no evidence of melena, dark stool or hematochezia     #Rec  - less likely that patient is hypotensive from GI bleeding given negative through rectal exam, acute onset of hypotension and fever  - Trend H&H BID  - recent EGD on 8/14/23: Abdon IIa 1cm ulcer in duodenal bulb s/p eoi + hot bx forceps, 1cm clean based ulcer in 2nd portion of duodenum  - please change drip to IV PPI BID 40mg   - Please target Hb  >8  - Please avoid any NSAIDs  - monitor BM    #Ileus  8/27: large colonic burden  8/30: large colonic burden with distension  9/3: moderate  fecal load  9/1: KUB: large fecal load  - Lactate - 1.4 (9/4)  - Days since Symptom Onset -     Rec  - Obtain CBC, BMP, Mg, PO4 = Target K >4, Mg >2, PO4 >1  - Obtain Sepsis workup (CXR, UA, UCx, Blood Cx, If diarrhea - GI PCR and C. Diff) and TSH  - Keep patient NPO  - C/w IV Fluids  - Frequent repositioning (Q2 hour left to right lateral decubitus position, elevate hip with pillow, knees to chest)  - Serial abdominal exams and daily KUBs  - Avoid Anticholingerics, Opioids and Calcium channel blockers  - If patient begins to have bowel movements - start bowel regimen - miralax  - Get STAT supine and upright abdominal plain film for any signs of perforation and call Surgery      #Pancreatic body cyst  - 0.7x0.4cm cyst in pancreatic body, No PD dilation    Rec  MRI pancreas protocol as outpatient  - Follow up with our [facility] located at [address] [place] [address]. Phone Number: [phone]    Communicated with primary team   89 y/o M w/ PMH of DM, HTN, Parkinson disease, Gout, HLD, former smoker coming from home with complaint of decreased appetite, increased urinary output and craving sweets x 2 weeks. Patient admitted on 8/4/23 for DKA and hypotension , was started on levophed lose dose. HIs course complicated with appendicitis , Hungatella bacteremia, acute anemia requiring 3u. GI consulted today for 3 episodes of melena and drop in Hb to 6. Last night patient was complaining of severe lower abdominal pain and distesion s/p CTAP IC with thrombus in decending AA, SMA and celiac axis narrowing but no bowel ischemia , lactate went upto 7 from 1.5, s/p EGD with duodenal ulcers. GI recalled on 9/4 for downtrending Hb, hypotension, 2 episodes of black stool.    #Reported melena  #Chronic  Normocytic anemia - stable  #  s/p EGD 8/14 duodenal ulcer s/p epi and hot forceps thermal therapy  - Baseline hemoglobin - 12  - Hemoglobin on admission - 11.2 (8/4)>>6.6>>3u> 8.6>7.9 > 8.6 (8/15)> 7.1 > 7.6 > 8.6 > repeat 9/4: 12  - Coags - INR:  0.98 (7/4)>>1.35 (8/8)>   - Lactate: 1.4<2.5< 7.5< 1.5  - CTAP IV Con: 8/10: No evidence of bowel ischemia. Unchanged findings suggesting acute appendicitis. Unopacified inferior mesenteric artery is new compared to 11/17/2022 as there is increased thrombus within the unchanged size of abdominal aortic   aneurysm with decreased opacified aortic lumen. Collateral flow presumed to be present again there is no evidence of bowel ischemia.  - CTAP oral and IV con: 8/8: New mild dilation of the appendix with small volume free fluid in the bilateral lower quadrants. Findings are concerning for appendicitis. 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 cm, as described  - was on fondaparinux  - deemed high risk for endoscopy on 8/11 and a shared decision was made to pursue conservative therapy  - recall for continuos melena. currently off pressors   - recent EGD on 8/14/23: Abdon IIa 1cm ulcer in duodenal bulb s/p eoi + hot bx forceps, 1cm clean based ulcer in 2nd portion of duodenum  - recalled on 9/4 for hypotension witth Tmax 101.5, 2 episodes of dark stool, dropping hb which corrected on repeat lab draw, on low dose levo>>patient upgraded to CCU  - NADEGE: brown stool today and yesterday, no evidence of melena, dark stool or hematochezia   - per RN had brown BMs last night x2, received 2u pRBC overnight (documented 1 in chart)    #Rec  - less likely that patient is hypotensive from GI bleeding given negative through rectal exam, acute onset of hypotension and fever, consider sepsis work up  - Trend H&H BID  - please change drip to IV PPI BID 40mg   - Please target Hb  >8  - Please avoid any NSAIDs  - monitor BM    #Ileus  8/27: large colonic burden  8/30: large colonic burden with distension  9/3: moderate  fecal load  9/1: KUB: large fecal load  - Lactate - 1.4 (9/4)    Rec  - Obtain CBC, BMP, Mg, PO4 = Target K >4, Mg >2, PO4 >1  - Obtain Sepsis workup (CXR, UA, UCx, Blood Cx, If diarrhea - GI PCR and C. Diff) and TSH  - Keep patient NPO  - C/w IV Fluids  - Frequent repositioning (Q2 hour left to right lateral decubitus position, elevate hip with pillow, knees to chest)  - Serial abdominal exams and daily KUBs  - Avoid Anticholingerics, Opioids and Calcium channel blockers  - If patient begins to have bowel movements - start bowel regimen - miralax BID   - Get STAT supine and upright abdominal plain film for any signs of perforation and call Surgery      #Pancreatic body cyst  - 0.7x0.4cm cyst in pancreatic body, No PD dilation    Rec  MRI pancreas protocol as outpatient  - Follow up with our [facility] located at [address] [place] [address]. Phone Number: [phone]    Communicated with primary team   89 y/o M w/ PMH of DM, HTN, Parkinson disease, Gout, HLD, former smoker coming from home with complaint of decreased appetite, increased urinary output and craving sweets x 2 weeks. Patient admitted on 8/4/23 for DKA and hypotension , was started on levophed lose dose. HIs course complicated with appendicitis , Hungatella bacteremia, acute anemia requiring 3u. GI consulted today for 3 episodes of melena and drop in Hb to 6. Last night patient was complaining of severe lower abdominal pain and distesion s/p CTAP IC with thrombus in decending AA, SMA and celiac axis narrowing but no bowel ischemia , lactate went upto 7 from 1.5, s/p EGD with duodenal ulcers. GI recalled on 9/4 for downtrending Hb, hypotension, 2 episodes of black stool.    #Reported melena  #Chronic  Normocytic anemia - stable  #  s/p EGD 8/14 duodenal ulcer s/p epi and hot forceps thermal therapy  - Baseline hemoglobin - 12  - Hemoglobin on admission - 11.2 (8/4)>>6.6>>3u> 8.6>7.9 > 8.6 (8/15)> 7.1 > 7.6 > 8.6 > repeat 9/4: 12  - Coags - INR:  0.98 (7/4)>>1.35 (8/8)>   - Lactate: 1.4<2.5< 7.5< 1.5  - CTAP IV Con: 8/10: No evidence of bowel ischemia. Unchanged findings suggesting acute appendicitis. Unopacified inferior mesenteric artery is new compared to 11/17/2022 as there is increased thrombus within the unchanged size of abdominal aortic   aneurysm with decreased opacified aortic lumen. Collateral flow presumed to be present again there is no evidence of bowel ischemia.  - CTAP oral and IV con: 8/8: New mild dilation of the appendix with small volume free fluid in the bilateral lower quadrants. Findings are concerning for appendicitis. 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 cm, as described  - was on fondaparinux  - deemed high risk for endoscopy on 8/11 and a shared decision was made to pursue conservative therapy  - recall for continuos melena. currently off pressors   - recent EGD on 8/14/23: Abdon IIa 1cm ulcer in duodenal bulb s/p eoi + hot bx forceps, 1cm clean based ulcer in 2nd portion of duodenum  - recalled on 9/4 for hypotension witth Tmax 101.5, 2 episodes of dark stool, dropping hb which corrected on repeat lab draw, on low dose levo>>patient upgraded to CCU  - NADEGE: brown stool today and yesterday, no evidence of melena, dark stool or hematochezia   - per RN had brown BMs last night x2, received 2u pRBC overnight (documented 1 in chart)    #Rec  - recommend repeat CTAP with IV contrast given hx of appendicitis  - less likely that patient is hypotensive from GI bleeding given negative through rectal exam, acute onset of hypotension and fever, consider sepsis work up  - Trend H&H BID  - please change drip to IV PPI BID 40mg   - Please target Hb  >8  - Please avoid any NSAIDs  - monitor BM    #Ileus  8/27: large colonic burden  8/30: large colonic burden with distension  9/1: KUB: large fecal load  9/3: moderate  fecal load  - Lactate - 1.4 (9/4)    Rec  - Obtain CBC, BMP, Mg, PO4 = Target K >4, Mg >2, PO4 >1  - C/w IV Fluids  - Frequent repositioning (Q2 hour left to right lateral decubitus position, elevate hip with pillow, knees to chest)  - Serial abdominal exams and daily KUBs  - Avoid Anticholingerics, Opioids and Calcium channel blockers  - If patient begins to have bowel movements - start bowel regimen - miralax BID   - Get STAT supine and upright abdominal plain film for any signs of perforation and call Surgery      #Pancreatic body cyst  - 0.7x0.4cm cyst in pancreatic body, No PD dilation    Rec  MRI pancreas protocol as outpatient  - Follow up with our [facility] located at [address] [place] [address]. Phone Number: [phone]    Communicated with primary team

## 2023-09-05 NOTE — PROGRESS NOTE ADULT - CRITICAL CARE ATTENDING COMMENT
This patient is critically ill due to the following:  * Hemodynamic instability requiring titration of vasopressors or other vasoactive agents  * Multiple organ failure requiring complex decision-making, and there is a high probability of imminent or life-threatening deterioration in the patient’s condition  * The patient required frequent reassessments and monitoring to ensure response to interventions and therapies.    Critical care time includes time spent evaluating and treating the patient's acute illness as well as time spent reviewing labs, radiology,  and discussing the case with a multidisciplinary team in an effort to prevent further life threatening deterioration or end organ damage. This time is independent of any procedures performed.
time spent on review of labs, imaging studies, old records, obtaining history, personally examining patient, counselling and communicating with patient/ family, entering orders for medications/tests/etc, discussions with other health care providers, documentation in electronic health records, independent interpretation of labs, imaging/procedure results and care coordination.

## 2023-09-05 NOTE — PROGRESS NOTE ADULT - ASSESSMENT
ASSESSMENT:  Full thickness wound to Rt Forearm s/p IV infiltrate    RECOMMENDATION:  Wound care - Santyl/Xeroform/Kerlix / ACE Wrap daily  No plans for surgical debridement at this time, wound stable and patient's clinical status deteriorating  IV antibiotics, wound culture taken 9/5 f/u results  Please ensure adequate nutrition

## 2023-09-05 NOTE — SWALLOW BEDSIDE ASSESSMENT ADULT - SWALLOW EVAL: DIAGNOSIS
Inconsistent overt s/s of penetration/aspiration for thin liquids and mildly thick liquids. Wet/gurgly congested sounding cough. Unable to assess solids at this time 2/2 to GI recommendations.

## 2023-09-05 NOTE — PROGRESS NOTE ADULT - SUBJECTIVE AND OBJECTIVE BOX
Patient is a 88y old  Male who presents with a chief complaint of DKA/HHS (05 Sep 2023 06:27)        Over Night Events:    transferred to ICU for melena yesterday with associated hypotension  was on levophed, now stable off  febrile yesterday as well  Added levaquin for wound culture positive  NADEGE by GI yesterday showed brown stool, no obvious blood    ROS:     All ROS are negative except HPI           PHYSICAL EXAM    ICU Vital Signs Last 24 Hrs  T(C): 35.2 (05 Sep 2023 04:00), Max: 38.6 (04 Sep 2023 15:00)  T(F): 95.3 (05 Sep 2023 04:00), Max: 101.5 (04 Sep 2023 15:00)  HR: 89 (05 Sep 2023 06:00) (80 - 118)  BP: 114/71 (05 Sep 2023 06:00) (80/54 - 141/73)  BP(mean): 87 (05 Sep 2023 06:00) (70 - 100)  ABP: --  ABP(mean): --  RR: 15 (05 Sep 2023 06:00) (15 - 25)  SpO2: 100% (05 Sep 2023 06:00) (92% - 100%)    O2 Parameters below as of 05 Sep 2023 06:00  Patient On (Oxygen Delivery Method): nasal cannula  O2 Flow (L/min): 2          Constitutional: no acute distress, well nourished well developed  Neuro: moving all 4 limbs spontaneously, no facial droop or dysarthria  HEENT: NCAT, anicteric  Neck: no visible lymphadenopathy or goiter  Pulm: no respiratory distress. clear to auscultation bilaterally  Cardiac: extremities appear pink and well-perfused.  regular rhythm and rate, no murmur detected  Abdomen: non-distended  Extremities: no peripheral edema      09-04-23 @ 07:01  -  09-05-23 @ 07:00  --------------------------------------------------------  IN:    Norepinephrine: 44 mL    Oral Fluid: 600 mL    Pantoprazole: 130 mL    PRBCs (Packed Red Blood Cells): 617 mL  Total IN: 1391 mL    OUT:    Indwelling Catheter - Urethral (mL): 3000 mL    Stool (mL): 4 mL  Total OUT: 3004 mL    Total NET: -1613 mL      09-05-23 @ 07:01  -  09-05-23 @ 07:41  --------------------------------------------------------  IN:    Pantoprazole: 10 mL  Total IN: 10 mL    OUT:  Total OUT: 0 mL    Total NET: 10 mL          LABS:                            12.4   8.86  )-----------( 285      ( 05 Sep 2023 04:34 )             37.9                                               09-05    140  |  107  |  14  ----------------------------<  95  3.5   |  23  |  0.9    Ca    7.3<L>      05 Sep 2023 04:34  Mg     2.2     09-05    TPro  5.4<L>  /  Alb  2.4<L>  /  TBili  0.8  /  DBili  x   /  AST  33  /  ALT  27  /  AlkPhos  111  09-05      PT/INR - ( 05 Sep 2023 04:34 )   PT: 13.30 sec;   INR: 1.16 ratio         PTT - ( 05 Sep 2023 04:34 )  PTT:34.2 sec                                       Urinalysis Basic - ( 05 Sep 2023 04:34 )    Color: x / Appearance: x / SG: x / pH: x  Gluc: 95 mg/dL / Ketone: x  / Bili: x / Urobili: x   Blood: x / Protein: x / Nitrite: x   Leuk Esterase: x / RBC: x / WBC x   Sq Epi: x / Non Sq Epi: x / Bacteria: x                                                  LIVER FUNCTIONS - ( 05 Sep 2023 04:34 )  Alb: 2.4 g/dL / Pro: 5.4 g/dL / ALK PHOS: 111 U/L / ALT: 27 U/L / AST: 33 U/L / GGT: x                                                  Culture - Urine (collected 02 Sep 2023 17:07)  Source: Clean Catch Clean Catch (Midstream)  Preliminary Report (04 Sep 2023 13:59):    10,000 - 49,000 CFU/mL Gram Negative Rods    Culture - Blood (collected 02 Sep 2023 10:50)  Source: .Blood Blood  Preliminary Report (04 Sep 2023 20:01):    No growth at 48 Hours                                                                                       ABG - ( 04 Sep 2023 18:58 )  pH, Arterial: 7.44  pH, Blood: x     /  pCO2: 32    /  pO2: 83    / HCO3: 22    / Base Excess: -2.0  /  SaO2: 99.1                MEDICATIONS  (STANDING):  albuterol/ipratropium for Nebulization 3 milliLiter(s) Nebulizer every 6 hours  allopurinol 300 milliGRAM(s) Oral daily  bacitracin   Ointment 1 Application(s) Topical two times a day  budesonide 160 MICROgram(s)/formoterol 4.5 MICROgram(s) Inhaler 2 Puff(s) Inhalation two times a day  cefepime   IVPB 2000 milliGRAM(s) IV Intermittent every 12 hours  cefepime   IVPB      chlorhexidine 2% Cloths 1 Application(s) Topical daily  collagenase Ointment 1 Application(s) Topical two times a day  collagenase Ointment 1 Application(s) Topical daily  dextrose 5%. 1000 milliLiter(s) (50 mL/Hr) IV Continuous <Continuous>  dextrose 5%. 1000 milliLiter(s) (100 mL/Hr) IV Continuous <Continuous>  dextrose 50% Injectable 25 Gram(s) IV Push once  dextrose 50% Injectable 25 Gram(s) IV Push once  dextrose 50% Injectable 12.5 Gram(s) IV Push once  glucagon  Injectable 1 milliGRAM(s) IntraMuscular once  insulin glargine Injectable (LANTUS) 12 Unit(s) SubCutaneous at bedtime  insulin lispro (ADMELOG) corrective regimen sliding scale   SubCutaneous every 6 hours  lactulose Syrup 30 Gram(s) Oral every 8 hours  levoFLOXacin IVPB 750 milliGRAM(s) IV Intermittent every 24 hours  morphine  - Injectable 2 milliGRAM(s) IV Push once  norepinephrine Infusion 0.1 MICROgram(s)/kG/Min (11.7 mL/Hr) IV Continuous <Continuous>  polyethylene glycol 3350 17 Gram(s) Oral two times a day  senna 2 Tablet(s) Oral at bedtime    MEDICATIONS  (PRN):  acetaminophen     Tablet .. 650 milliGRAM(s) Oral every 6 hours PRN Temp greater or equal to 38C (100.4F)  dextrose Oral Gel 15 Gram(s) Oral once PRN Blood Glucose LESS THAN 70 milliGRAM(s)/deciliter  morphine  - Injectable 2 milliGRAM(s) IV Push every 4 hours PRN Severe Pain (7 - 10)

## 2023-09-05 NOTE — CHART NOTE - NSCHARTNOTEFT_GEN_A_CORE
Registered Dietitian Follow-Up     Patient Profile Reviewed                           Yes [x]   No []     Nutrition History Previously Obtained        Yes [x]  No []       Pertinent Subjective Information:  Patient was NPO due to acute GI bleed; Now advanced Clear Liquids      Pertinent Medical Interventions:  Reported melena - s/p EGD  duodenal ulcer s/p epi and hot forceps thermal therapy; NADEGE: brown stool today and yesterday, no evidence of melena, dark stool or hematochezia   Ileus  : large colonic burden  : large colonic burden with distension  9/3: moderate  fecal load  : KUB: large fecal load    - per RN had brown BMs last night x2, received 2u pRBC overnight (documented 1 in chart)     Diet order:   Diet, Clear Liquid (23 @ 07:54) [Active]    Anthropometrics:  Height (cm): 167.6 (23 @ 12:59)  Weight (kg): 62.6 (23 @ 12:59)  BMI (kg/m2): 22.3 (23 @ 12:59)  IBW: 64.5 kg     Daily Weight in k.9 (), Weight in k.2 ()  % Weight Change    MEDICATIONS  (STANDING):  albuterol/ipratropium for Nebulization 3 milliLiter(s) Nebulizer every 6 hours  allopurinol 300 milliGRAM(s) Oral daily  bacitracin   Ointment 1 Application(s) Topical two times a day  budesonide 160 MICROgram(s)/formoterol 4.5 MICROgram(s) Inhaler 2 Puff(s) Inhalation two times a day  cefepime   IVPB      cefepime   IVPB 2000 milliGRAM(s) IV Intermittent every 12 hours  chlorhexidine 2% Cloths 1 Application(s) Topical daily  collagenase Ointment 1 Application(s) Topical two times a day  collagenase Ointment 1 Application(s) Topical daily  dextrose 5%. 1000 milliLiter(s) (100 mL/Hr) IV Continuous <Continuous>  dextrose 5%. 1000 milliLiter(s) (50 mL/Hr) IV Continuous <Continuous>  dextrose 50% Injectable 12.5 Gram(s) IV Push once  dextrose 50% Injectable 25 Gram(s) IV Push once  dextrose 50% Injectable 25 Gram(s) IV Push once  glucagon  Injectable 1 milliGRAM(s) IntraMuscular once  insulin glargine Injectable (LANTUS) 8 Unit(s) SubCutaneous at bedtime  insulin lispro (ADMELOG) corrective regimen sliding scale   SubCutaneous every 6 hours  lactulose Syrup 30 Gram(s) Oral every 8 hours  levoFLOXacin IVPB 750 milliGRAM(s) IV Intermittent every 24 hours  metroNIDAZOLE  IVPB 500 milliGRAM(s) IV Intermittent every 8 hours  metroNIDAZOLE  IVPB      morphine  - Injectable 2 milliGRAM(s) IV Push once  norepinephrine Infusion 0.1 MICROgram(s)/kG/Min (11.7 mL/Hr) IV Continuous <Continuous>  pantoprazole  Injectable 40 milliGRAM(s) IV Push every 12 hours  polyethylene glycol 3350 17 Gram(s) Oral two times a day  senna 2 Tablet(s) Oral at bedtime    MEDICATIONS  (PRN):  acetaminophen     Tablet .. 650 milliGRAM(s) Oral every 6 hours PRN Temp greater or equal to 38C (100.4F)  dextrose Oral Gel 15 Gram(s) Oral once PRN Blood Glucose LESS THAN 70 milliGRAM(s)/deciliter  morphine  - Injectable 2 milliGRAM(s) IV Push every 4 hours PRN Severe Pain (7 - 10)    Pertinent Labs:  @ 04:34: Na 140, BUN 14, Cr 0.9, BG 95, K+ 3.5, Phos --, Mg 2.2, Alk Phos 111, ALT/SGPT 27, AST/SGOT 33, HbA1c --    Finger Sticks:  POCT Blood Glucose.: 95 mg/dL ( @ 11:24)  POCT Blood Glucose.: 92 mg/dL ( @ 07:37)  POCT Blood Glucose.: 105 mg/dL ( @ 21:12)  POCT Blood Glucose.: 85 mg/dL ( @ 17:42)  POCT Blood Glucose.: 100 mg/dL ( @ 16:59)    Physical Findings:  - Appearance: disoriented x1  - GI function: fecal incontinence 3x 9/   - Tubes: n/a - no feeding tubes   - Oral/Mouth cavity: Clear Liquids  - Skin: no pressure injuries indicated      Nutrition Requirements:  Weight Used: dosing weight 62.6 kg    Estimated Energy Needs    Continue [x]  Adjust []  Adjusted Energy Recommendations: 2512-0802 kcal/day - MSJ 1250*SF 1.2-1.3      Estimated Protein Needs    Continue []  Adjust [x]  Adjusted Protein Recommendations: 75-88 g/day - 1.2-1.4 g/kg     Estimated Fluid Needs        Continue [x]  Adjust []  Adjusted Fluid Recommendations: 1565 mL/day - 25mL/kg of    Nutrient Intake: 75% of breakfast meal this morning      [x] Previous Nutrition Diagnosis:  Inadequate Oral Intake - improving            [x] Ongoing          [] Resolved     Nutrition Intervention:  meals and snacks, medical food supplements, coordination of care     Goal/Expected Outcome:   As medically feasible and tolerated by patient, advance diet to Consistent CHO, DASH/TLC within 3-5 days      Indicator/Monitoring:   diet order, energy intake, weight, labs, skin status, NFPE     Recommendation:  1) Recommend to add Prosource Gelatein 20 SF 3x/day (80-90 kcal, 20 gm Protein each) to optimize kcal and protein intake  2) Continue to monitor BM, GI s/s     Patient is at high nutrition risk, RD f/u 3-5 days or PRN     RD to remain available: Maureen Hung RD [phone] or via Teams.

## 2023-09-05 NOTE — PROGRESS NOTE ADULT - SUBJECTIVE AND OBJECTIVE BOX
MILEY MARES, 88y, Male; MRN# [id number]  Hospital Stay: 32d.    Patient is a 88y old Male who presents with a chief complaint of DKA/HHS (05 Sep 2023 11:44)  Currently admitted to the ICU with the primary diagnosis of Hyperosmolar syndrome    DKA, type 2      SUBJECTIVE:  Interval/Overnight events:     REVIEW OF SYSTEMS:  As per HPI  OBJECTIVE:  VITALS:  T(F): 98.4 (09-05-23 @ 16:00), Max: 98.7 (09-05-23 @ 12:00)  HR: 102 (09-05-23 @ 18:00) (80 - 104)  BP: 117/69 (09-05-23 @ 18:00) (94/58 - 141/73)  ABP: --  ABP(mean): --  RR: 26 (09-05-23 @ 18:00) (14 - 26)  SpO2: 97% (09-05-23 @ 18:00) (92% - 100%)    Vent Settings    Blood Gas  09-04-23 @ 18:58  pH 7.44 | pCO2 32 | pO2 83 | HCO3 22 | O2 Sat 99.1    Drips  dextrose 5%. 1000 milliLiter(s) (100 mL/Hr) IV Continuous <Continuous>  dextrose 5%. 1000 milliLiter(s) (50 mL/Hr) IV Continuous <Continuous>  norepinephrine Infusion 0.1 MICROgram(s)/kG/Min (11.7 mL/Hr) IV Continuous <Continuous>  sodium chloride 0.9%. 1000 milliLiter(s) (70 mL/Hr) IV Continuous <Continuous>    I&Os:    09-04-23 @ 07:01  -  09-05-23 @ 07:00  --------------------------------------------------------  IN: 1391 mL / OUT: 3004 mL / NET: -1613 mL    09-05-23 @ 07:01  -  09-05-23 @ 18:42  --------------------------------------------------------  IN: 1160 mL / OUT: 595 mL / NET: 565 mL      PHYSICAL EXAM:    ACTIVE MEDICATIONS:  Standing  albuterol/ipratropium for Nebulization 3 milliLiter(s) Nebulizer every 6 hours  allopurinol 300 milliGRAM(s) Oral daily  bacitracin   Ointment 1 Application(s) Topical two times a day  budesonide 160 MICROgram(s)/formoterol 4.5 MICROgram(s) Inhaler 2 Puff(s) Inhalation two times a day  cefepime   IVPB 2000 milliGRAM(s) IV Intermittent every 12 hours  cefepime   IVPB      chlorhexidine 2% Cloths 1 Application(s) Topical daily  collagenase Ointment 1 Application(s) Topical two times a day  collagenase Ointment 1 Application(s) Topical daily  dextrose 5%. 1000 milliLiter(s) (100 mL/Hr) IV Continuous <Continuous>  dextrose 5%. 1000 milliLiter(s) (50 mL/Hr) IV Continuous <Continuous>  dextrose 50% Injectable 25 Gram(s) IV Push once  dextrose 50% Injectable 25 Gram(s) IV Push once  dextrose 50% Injectable 12.5 Gram(s) IV Push once  glucagon  Injectable 1 milliGRAM(s) IntraMuscular once  insulin glargine Injectable (LANTUS) 8 Unit(s) SubCutaneous at bedtime  insulin lispro (ADMELOG) corrective regimen sliding scale   SubCutaneous every 6 hours  lactulose Syrup 30 Gram(s) Oral every 8 hours  levoFLOXacin IVPB 750 milliGRAM(s) IV Intermittent every 24 hours  metroNIDAZOLE  IVPB      metroNIDAZOLE  IVPB 500 milliGRAM(s) IV Intermittent every 8 hours  morphine  - Injectable 2 milliGRAM(s) IV Push once  norepinephrine Infusion 0.1 MICROgram(s)/kG/Min (11.7 mL/Hr) IV Continuous <Continuous>  pantoprazole  Injectable 40 milliGRAM(s) IV Push every 12 hours  polyethylene glycol 3350 17 Gram(s) Oral two times a day  senna 2 Tablet(s) Oral at bedtime  sodium chloride 0.9%. 1000 milliLiter(s) (70 mL/Hr) IV Continuous <Continuous>    PRN  acetaminophen     Tablet .. 650 milliGRAM(s) Oral every 6 hours PRN  dextrose Oral Gel 15 Gram(s) Oral once PRN  morphine  - Injectable 2 milliGRAM(s) IV Push every 4 hours PRN    LABS:  09-05-23 @ 11:22  WBC 8.38, H/H 12.3<L>/38.2<L>, plt 355  Na --, K --, Cl --, CO2 --, BUN --, Cr --, Glu --    Ca --, Mg --, Phosphorus --    Tot Protein --, Alb --, T. Bili --, D. Bili --  AST --, ALT --, Alk phos --    09-05-23 @ 04:34  WBC 8.86, H/H 12.4<L>/37.9<L>, plt 285  Na 140, K 3.5, Cl 107, CO2 23, BUN 14, Cr 0.9, Glu 95    Ca 7.3<L>, Mg 2.2, Phosphorus --    Tot Protein 5.4<L>, Alb 2.4<L>, T. Bili 0.8, D. Bili --  AST 33, ALT 27, Alk phos 111      09-05-23 @ 04:34:  PT 13.30<H>, INR 1.16, aPTT 34.2      09-05-23 @ 13:12:  TSH 9.75<H>      08-07-23 @ 05:10:  Hgb A1c 15.5<H>% | Mean plasma glucose 398<H>      08-30-23 @ 20:40:  Troponin T <0.01, BNP --          CULTURES:    Culture - Urine (collected 09-02-23 @ 17:07)  Source: Clean Catch Clean Catch (Midstream)  Preliminary Report (09-04-23 @ 13:59):    10,000 - 49,000 CFU/mL Gram Negative Rods    Culture - Blood (collected 09-02-23 @ 10:50)  Source: .Blood Blood  Preliminary Report (09-04-23 @ 20:01):    No growth at 48 Hours    Culture - Tissue with Gram Stain (collected 09-01-23 @ 18:15)  Source: .Tissue Wound  Gram Stain (09-02-23 @ 01:46):    Moderate polymorphonuclear leukocytes seen per low power field    Numerous Gram Negative Rods seen per oil power field    Rare Yeast like cells seen per oil power field  Preliminary Report (09-03-23 @ 19:25):    Numerous Pseudomonas aeruginosa (Carbapenem Resistant)    Numerous Proteus mirabilis    Few Dolly albicans "Susceptibilities not performed"  Organism: Pseudomonas aeruginosa (Carbapenem Resistant)  Pseudomonas aeruginosa (Carbapenem Resistant)  Proteus mirabilis (09-03-23 @ 15:30)  Organism: Pseudomonas aeruginosa (Carbapenem Resistant) (09-03-23 @ 15:30)      Method Type: CarbaR      -  Resistance Gene to Carbapenem: Nondet  Organism: Proteus mirabilis (09-03-23 @ 15:05)      Method Type: UZAIR      -  Amikacin: S <=16      -  Amoxicillin/Clavulanic Acid: S <=8/4      -  Ampicillin: S <=8 These ampicillin results predict results for amoxicillin      -  Ampicillin/Sulbactam: S <=4/2      -  Aztreonam: S <=4      -  Cefazolin: S <=2      -  Cefepime: S <=2      -  Cefoxitin: S <=8      -  Ceftriaxone: S <=1      -  Ciprofloxacin: S <=0.25      -  Ertapenem: S <=0.5      -  Gentamicin: S <=2      -  Levofloxacin: S <=0.5      -  Meropenem: S <=1      -  Piperacillin/Tazobactam: S <=8      -  Tobramycin: S <=2      -  Trimethoprim/Sulfamethoxazole: S <=0.5/9.5  Organism: Pseudomonas aeruginosa (Carbapenem Resistant) (09-03-23 @ 15:05)      Method Type: UZAIR      -  Amikacin: S <=16      -  Aztreonam: S <=4      -  Cefepime: S <=2      -  Ceftazidime: S 4      -  Ceftazidime/Avibactam: S <=4      -  Ceftolozane/tazobactam: S <=2      -  Ciprofloxacin: S <=0.25      -  Gentamicin: S 4      -  Imipenem: R >8      -  Levofloxacin: S <=0.5      -  Meropenem: I 4      -  Piperacillin/Tazobactam: S <=8      -  Tobramycin: S <=2    Culture - Blood (collected 08-30-23 @ 20:40)  Source: .Blood Blood  Final Report (09-05-23 @ 02:01):    No growth at 5 days    Culture - Blood (collected 08-12-23 @ 11:44)  Source: .Blood None  Final Report (08-17-23 @ 20:00):    No growth at 5 days    Culture - Blood (collected 08-08-23 @ 11:58)  Source: .Blood None  Final Report (08-13-23 @ 23:01):    No growth at 5 days      PENDING:  Magnesium: AM Sched. Collection: 06-Sep-2023 04:30 (09-05-23 @ 18:21)  Comprehensive Metabolic Panel: AM Sched. Collection: 06-Sep-2023 04:30 (09-05-23 @ 18:21)  Complete Blood Count + Automated Diff: AM Sched. Collection: 06-Sep-2023 04:30 (09-05-23 @ 18:21)  Complete Blood Count: 20:00 (09-05-23 @ 18:20)  Diet, Clear Liquid:   Free Water Flush Instructions:  **NO RED PROSOURCE GELATEIN  Prosource Gelatein 20 Sugar Free     Qty per Day:  3 (09-05-23 @ 14:30)  Culture - Tissue with Gram Stain: Routine  Specimen Source: right upper extremity (09-05-23 @ 08:41)  Complete Blood Count: 16:00 (09-05-23 @ 07:59)  Complete Blood Count: 16:00 (09-05-23 @ 07:59)  Culture - Blood: 11:00  Specimen Source: Blood (09-05-23 @ 07:59)  Culture - Urine: Routine  Specimen Source: Catheterized  Addl Info: If indwelling urinary catheter > 14 days, obtain an order to remove and replace prior to c (09-05-23 @ 07:59)  Culture - Blood: 11:00  Specimen Source: Blood (09-05-23 @ 07:56)    IMAGING:  Latest imaging reviewed.  Xray Chest 1 View- PORTABLE-Routine: AM   Indication: Fever  Transport: Portable,  w/ Monitor (09-05-23 @ 18:21)  Xray Chest 1 View- PORTABLE-Urgent: Urgent   Indication: icu  Transport: Portable,  w/ Monitor  Exam Completed (09-05-23 @ 08:05)  Xray Chest 1 View- PORTABLE-Urgent: Urgent   Indication: f/u  Transport: Portable,  w/ Monitor  Exam Completed (09-04-23 @ 21:33)    ECHO:  TTE Echo Complete w/o Contrast w/ Doppler:   Transport: Portable  Monitor: w/ Monitor  Addl Info: portable (08-11-23 @ 10:41)

## 2023-09-05 NOTE — PROGRESS NOTE ADULT - ASSESSMENT
MILEY MARES is a 88y man with a medical history significant for Parkinsonism, diabetes, who presented initially with DKA, and is now in the critical care unit for recurrent shock after a long and complex hospitalization involving severe GI bleeding.     Impression    Melena ?  Septic shock  bacteremia (Hungatella spp)  Appendicitis  Pseudomonal wound growth  Diabetes  Parkinsonism  120PY history    Plan:      CNS:  Mental status at baseline, AAOx1-2    HEENT:  Oral care      CARDIOVASCULAR  Vasopressors: off levophed this AM  Shock most likely septic rather than hemorrhagic given overcorrection of Hgb    PULMONARY  HOB @ 45 degrees, aspiration precautions  Standing duonebs, Symbicort BID, already s/p steroids earlier this admission    GASTROINTESTINAL  GI prophylaxis: PPI BID today for acute GI bleed, and recent interventions with cautery  Feeds: clear liquids  BM: regimen PRN  Maintain 2x large bore IV access, maintain active type & screen, transfuse to maintain Hgb > 7.    Resuscitation with crystalloid fluid as needed.  Obtain stat CTA abdomen/pelvis for clinically significant bleeding.    Appreciate GI input    GENITOURINARY/RENAL  Jones: d/c today  renal function appropriate  monitor lytes, replete prn    INFECTIOUS  On Cefepime  Added levaquin, add flagyl today  MRSA nares (-) on 8/10  Repeat ID eval today for what appears to be worsening septic shock  Repeat BCx PRN fever  UA, CXR    HEMATOLOGIC  DVT ppx: SCD only  Monitor q12 CBC    ENDOCRINE  Follow up FS.  Insulin protocol as needed.  BG goal 140-180    MSK/DERM  PT/OT when cooperative, OOBAT      CODE STATUS: DNR/DNI  DISPO: remain in ICU  LINES: 2x large bore IV

## 2023-09-06 LAB
ALBUMIN SERPL ELPH-MCNC: 2.1 G/DL — LOW (ref 3.5–5.2)
ALP SERPL-CCNC: 102 U/L — SIGNIFICANT CHANGE UP (ref 30–115)
ALT FLD-CCNC: 21 U/L — SIGNIFICANT CHANGE UP (ref 0–41)
ANION GAP SERPL CALC-SCNC: 9 MMOL/L — SIGNIFICANT CHANGE UP (ref 7–14)
AST SERPL-CCNC: 23 U/L — SIGNIFICANT CHANGE UP (ref 0–41)
BASOPHILS # BLD AUTO: 0.02 K/UL — SIGNIFICANT CHANGE UP (ref 0–0.2)
BASOPHILS NFR BLD AUTO: 0.2 % — SIGNIFICANT CHANGE UP (ref 0–1)
BILIRUB SERPL-MCNC: 0.5 MG/DL — SIGNIFICANT CHANGE UP (ref 0.2–1.2)
BLANDM BLD POS QL PROBE: SIGNIFICANT CHANGE UP
BUN SERPL-MCNC: 14 MG/DL — SIGNIFICANT CHANGE UP (ref 10–20)
CALCIUM SERPL-MCNC: 7.3 MG/DL — LOW (ref 8.4–10.5)
CHLORIDE SERPL-SCNC: 108 MMOL/L — SIGNIFICANT CHANGE UP (ref 98–110)
CO2 SERPL-SCNC: 25 MMOL/L — SIGNIFICANT CHANGE UP (ref 17–32)
CREAT SERPL-MCNC: 0.9 MG/DL — SIGNIFICANT CHANGE UP (ref 0.7–1.5)
CULTURE RESULTS: SIGNIFICANT CHANGE UP
CULTURE RESULTS: SIGNIFICANT CHANGE UP
EGFR: 83 ML/MIN/1.73M2 — SIGNIFICANT CHANGE UP
EOSINOPHIL # BLD AUTO: 0.02 K/UL — SIGNIFICANT CHANGE UP (ref 0–0.7)
EOSINOPHIL NFR BLD AUTO: 0.2 % — SIGNIFICANT CHANGE UP (ref 0–8)
GLUCOSE BLDC GLUCOMTR-MCNC: 102 MG/DL — HIGH (ref 70–99)
GLUCOSE BLDC GLUCOMTR-MCNC: 113 MG/DL — HIGH (ref 70–99)
GLUCOSE BLDC GLUCOMTR-MCNC: 137 MG/DL — HIGH (ref 70–99)
GLUCOSE BLDC GLUCOMTR-MCNC: 162 MG/DL — HIGH (ref 70–99)
GLUCOSE BLDC GLUCOMTR-MCNC: 171 MG/DL — HIGH (ref 70–99)
GLUCOSE BLDC GLUCOMTR-MCNC: 60 MG/DL — LOW (ref 70–99)
GLUCOSE SERPL-MCNC: 46 MG/DL — CRITICAL LOW (ref 70–99)
HCT VFR BLD CALC: 38 % — LOW (ref 42–52)
HGB BLD-MCNC: 12.6 G/DL — LOW (ref 14–18)
IMM GRANULOCYTES NFR BLD AUTO: 0.8 % — HIGH (ref 0.1–0.3)
LYMPHOCYTES # BLD AUTO: 1.02 K/UL — LOW (ref 1.2–3.4)
LYMPHOCYTES # BLD AUTO: 12.2 % — LOW (ref 20.5–51.1)
MAGNESIUM SERPL-MCNC: 2.1 MG/DL — SIGNIFICANT CHANGE UP (ref 1.8–2.4)
MCHC RBC-ENTMCNC: 31.2 PG — HIGH (ref 27–31)
MCHC RBC-ENTMCNC: 33.2 G/DL — SIGNIFICANT CHANGE UP (ref 32–37)
MCV RBC AUTO: 94.1 FL — HIGH (ref 80–94)
METHOD TYPE: SIGNIFICANT CHANGE UP
MONOCYTES # BLD AUTO: 0.44 K/UL — SIGNIFICANT CHANGE UP (ref 0.1–0.6)
MONOCYTES NFR BLD AUTO: 5.3 % — SIGNIFICANT CHANGE UP (ref 1.7–9.3)
NEUTROPHILS # BLD AUTO: 6.77 K/UL — HIGH (ref 1.4–6.5)
NEUTROPHILS NFR BLD AUTO: 81.3 % — HIGH (ref 42.2–75.2)
NRBC # BLD: 0 /100 WBCS — SIGNIFICANT CHANGE UP (ref 0–0)
ORGANISM # SPEC MICROSCOPIC CNT: SIGNIFICANT CHANGE UP
PLATELET # BLD AUTO: 262 K/UL — SIGNIFICANT CHANGE UP (ref 130–400)
PMV BLD: 10.5 FL — HIGH (ref 7.4–10.4)
POTASSIUM SERPL-MCNC: 3.2 MMOL/L — LOW (ref 3.5–5)
POTASSIUM SERPL-SCNC: 3.2 MMOL/L — LOW (ref 3.5–5)
PROT SERPL-MCNC: 5.1 G/DL — LOW (ref 6–8)
RBC # BLD: 4.04 M/UL — LOW (ref 4.7–6.1)
RBC # FLD: 16.7 % — HIGH (ref 11.5–14.5)
SODIUM SERPL-SCNC: 142 MMOL/L — SIGNIFICANT CHANGE UP (ref 135–146)
SPECIMEN SOURCE: SIGNIFICANT CHANGE UP
SPECIMEN SOURCE: SIGNIFICANT CHANGE UP
T4 FREE SERPL-MCNC: 1 NG/DL — SIGNIFICANT CHANGE UP (ref 0.9–1.8)
T4 FREE+ TSH PNL SERPL: 11.06 UIU/ML — HIGH (ref 0.27–4.2)
WBC # BLD: 8.34 K/UL — SIGNIFICANT CHANGE UP (ref 4.8–10.8)
WBC # FLD AUTO: 8.34 K/UL — SIGNIFICANT CHANGE UP (ref 4.8–10.8)

## 2023-09-06 PROCEDURE — 99233 SBSQ HOSP IP/OBS HIGH 50: CPT | Mod: GC

## 2023-09-06 PROCEDURE — 74177 CT ABD & PELVIS W/CONTRAST: CPT | Mod: 26

## 2023-09-06 PROCEDURE — 99233 SBSQ HOSP IP/OBS HIGH 50: CPT

## 2023-09-06 PROCEDURE — 71045 X-RAY EXAM CHEST 1 VIEW: CPT | Mod: 26

## 2023-09-06 RX ORDER — TOBRAMYCIN SULFATE 40 MG/ML
310 VIAL (ML) INJECTION ONCE
Refills: 0 | Status: COMPLETED | OUTPATIENT
Start: 2023-09-06 | End: 2023-09-06

## 2023-09-06 RX ORDER — POTASSIUM CHLORIDE 20 MEQ
20 PACKET (EA) ORAL ONCE
Refills: 0 | Status: COMPLETED | OUTPATIENT
Start: 2023-09-06 | End: 2023-09-06

## 2023-09-06 RX ORDER — POTASSIUM CHLORIDE 20 MEQ
40 PACKET (EA) ORAL ONCE
Refills: 0 | Status: COMPLETED | OUTPATIENT
Start: 2023-09-06 | End: 2023-09-06

## 2023-09-06 RX ORDER — POTASSIUM CHLORIDE 20 MEQ
20 PACKET (EA) ORAL ONCE
Refills: 0 | Status: COMPLETED | OUTPATIENT
Start: 2023-09-06 | End: 2023-09-07

## 2023-09-06 RX ORDER — DEXTROSE 50 % IN WATER 50 %
50 SYRINGE (ML) INTRAVENOUS ONCE
Refills: 0 | Status: COMPLETED | OUTPATIENT
Start: 2023-09-06 | End: 2023-09-06

## 2023-09-06 RX ADMIN — Medication 50 MILLIEQUIVALENT(S): at 06:51

## 2023-09-06 RX ADMIN — Medication 115.5 MILLIGRAM(S): at 10:02

## 2023-09-06 RX ADMIN — Medication 1 APPLICATION(S): at 11:58

## 2023-09-06 RX ADMIN — Medication 1 APPLICATION(S): at 05:43

## 2023-09-06 RX ADMIN — LACTULOSE 30 GRAM(S): 10 SOLUTION ORAL at 05:43

## 2023-09-06 RX ADMIN — Medication 100 MILLIGRAM(S): at 21:39

## 2023-09-06 RX ADMIN — Medication 40 MILLIEQUIVALENT(S): at 10:02

## 2023-09-06 RX ADMIN — Medication 50 MILLILITER(S): at 06:18

## 2023-09-06 RX ADMIN — Medication 1: at 12:29

## 2023-09-06 RX ADMIN — CHLORHEXIDINE GLUCONATE 1 APPLICATION(S): 213 SOLUTION TOPICAL at 05:42

## 2023-09-06 RX ADMIN — Medication 300 MILLIGRAM(S): at 12:26

## 2023-09-06 RX ADMIN — Medication 3 MILLILITER(S): at 10:02

## 2023-09-06 RX ADMIN — PANTOPRAZOLE SODIUM 40 MILLIGRAM(S): 20 TABLET, DELAYED RELEASE ORAL at 18:00

## 2023-09-06 RX ADMIN — Medication 1 APPLICATION(S): at 05:34

## 2023-09-06 RX ADMIN — Medication 100 MILLIGRAM(S): at 15:22

## 2023-09-06 RX ADMIN — CEFEPIME 100 MILLIGRAM(S): 1 INJECTION, POWDER, FOR SOLUTION INTRAMUSCULAR; INTRAVENOUS at 05:37

## 2023-09-06 RX ADMIN — INSULIN GLARGINE 8 UNIT(S): 100 INJECTION, SOLUTION SUBCUTANEOUS at 21:44

## 2023-09-06 RX ADMIN — Medication 100 MILLIGRAM(S): at 05:37

## 2023-09-06 RX ADMIN — POLYETHYLENE GLYCOL 3350 17 GRAM(S): 17 POWDER, FOR SOLUTION ORAL at 05:43

## 2023-09-06 RX ADMIN — CEFEPIME 100 MILLIGRAM(S): 1 INJECTION, POWDER, FOR SOLUTION INTRAMUSCULAR; INTRAVENOUS at 20:31

## 2023-09-06 RX ADMIN — PANTOPRAZOLE SODIUM 40 MILLIGRAM(S): 20 TABLET, DELAYED RELEASE ORAL at 05:35

## 2023-09-06 NOTE — PROGRESS NOTE ADULT - ASSESSMENT
87 y/o M w/ PMH of DM, HTN, Parkinson disease, Gout, HLD, former smoker coming from home with complaint of decreased appetite, increased urinary output and craving sweets x 2 weeks. Patient admitted on 8/4/23 for DKA and hypotension , was started on levophed lose dose. HIs course complicated with appendicitis , Hungatella bacteremia, acute anemia requiring 3u. GI consulted today for 3 episodes of melena and drop in Hb to 6. Last night patient was complaining of severe lower abdominal pain and distesion s/p CTAP IC with thrombus in decending AA, SMA and celiac axis narrowing but no bowel ischemia , lactate went upto 7 from 1.5, s/p EGD with duodenal ulcers. GI recalled on 9/4 for downtrending Hb, hypotension, 2 episodes of black stool.    #Reported melena  #Acute on Chronic  Normocytic anemia - stable  #  s/p EGD 8/14 duodenal ulcer s/p epi and hot forceps thermal therapy  - off pressors today  - Baseline hemoglobin - 12  - Hemoglobin on admission - 11.2 (8/4)>>6.6>>3u> 8.6>7.9 > 8.6 (8/15)> 7.1 > 7.6 > 8.6 > repeat 9/4: 12 s/p 2uprbc   - Coags - INR:  0.98 (7/4)>>1.35 (8/8)>   - Lactate: 1.4<2.5< 7.5< 1.5  - CTAP IV Con: 8/10: No evidence of bowel ischemia. Unchanged findings suggesting acute appendicitis. Unopacified inferior mesenteric artery is new compared to 11/17/2022 as there is increased thrombus within the unchanged size of abdominal aortic   aneurysm with decreased opacified aortic lumen. Collateral flow presumed to be present again there is no evidence of bowel ischemia.  - CTAP oral and IV con: 8/8: New mild dilation of the appendix with small volume free fluid in the bilateral lower quadrants. Findings are concerning for appendicitis. 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 cm, as described  - was on fondaparinux  - deemed high risk for endoscopy on 8/11 and a shared decision was made to pursue conservative therapy  - recall for continuos melena. currently off pressors   - recent EGD on 8/14/23: Abdon IIa 1cm ulcer in duodenal bulb s/p eoi + hot bx forceps, 1cm clean based ulcer in 2nd portion of duodenum  - recalled on 9/4 for hypotension witth Tmax 101.5, 2 episodes of dark stool, dropping hb which corrected on repeat lab draw, on low dose levo>>patient upgraded to CCU  - NADEGE: brown stool today and yesterday, no evidence of melena, dark stool or hematochezia   - per RN had brown BMs last night x2, received 2u pRBC overnight (documented 1 in chart)    #Rec  - recommend repeat CTAP with IV contrast given hx of appendicitis  - less likely that patient is hypotensive from GI bleeding given negative through rectal exam, acute onset of hypotension and fever, consider sepsis work up  - Trend H&H BID  - c/w PPI BID 40mg   - Please target Hb  >8  - Please avoid any NSAIDs  - monitor BM  - recall us PRN    #Ileus- improving  8/27: large colonic burden  8/30: large colonic burden with distension  9/1: KUB: large fecal load  9/3: moderate  fecal load  - Lactate - 1.4 (9/4)  - patient having multiple BMs    Rec  -  Target K >4, Mg >2, PO4 >1  - Frequent repositioning (Q2 hour left to right lateral decubitus position, elevate hip with pillow, knees to chest)  - Serial abdominal exams   - Avoid Anticholingerics, Opioids and Calcium channel blockers      #Pancreatic body cyst  - 0.7x0.4cm cyst in pancreatic body, No PD dilation    Rec  MRI pancreas protocol as outpatient  - Follow up with our [facility] located at [address] [place] [address]. Phone Number: [phone]    Communicated with primary team     87 y/o M w/ PMH of DM, HTN, Parkinson disease, Gout, HLD, former smoker coming from home with complaint of decreased appetite, increased urinary output and craving sweets x 2 weeks. Patient admitted on 8/4/23 for DKA and hypotension , was started on levophed lose dose. HIs course complicated with appendicitis , Hungatella bacteremia, acute anemia requiring 3u. GI consulted today for 3 episodes of melena and drop in Hb to 6. Last night patient was complaining of severe lower abdominal pain and distesion s/p CTAP IC with thrombus in decending AA, SMA and celiac axis narrowing but no bowel ischemia , lactate went upto 7 from 1.5, s/p EGD with duodenal ulcers. GI recalled on 9/4 for downtrending Hb, hypotension, 2 episodes of black stool.    #Reported melena  #Acute on Chronic  Normocytic anemia - stable  #  s/p EGD 8/14 duodenal ulcer s/p epi and hot forceps thermal therapy  - off pressors today  - Baseline hemoglobin - 12  - Hemoglobin on admission - 11.2 (8/4)>>6.6>>3u> 8.6>7.9 > 8.6 (8/15)> 7.1 > 7.6 > 8.6 > repeat 9/4: 12 s/p 2uprbc   - Coags - INR:  0.98 (7/4)>>1.35 (8/8)>   - Lactate: 1.4<2.5< 7.5< 1.5  - CTAP IV Con: 8/10: No evidence of bowel ischemia. Unchanged findings suggesting acute appendicitis. Unopacified inferior mesenteric artery is new compared to 11/17/2022 as there is increased thrombus within the unchanged size of abdominal aortic   aneurysm with decreased opacified aortic lumen. Collateral flow presumed to be present again there is no evidence of bowel ischemia.  - CTAP oral and IV con: 8/8: New mild dilation of the appendix with small volume free fluid in the bilateral lower quadrants. Findings are concerning for appendicitis. 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 cm, as described  - was on fondaparinux  - deemed high risk for endoscopy on 8/11 and a shared decision was made to pursue conservative therapy  - recall for continuos melena. currently off pressors   - recent EGD on 8/14/23: Abdon IIa 1cm ulcer in duodenal bulb s/p eoi + hot bx forceps, 1cm clean based ulcer in 2nd portion of duodenum  - recalled on 9/4 for hypotension witth Tmax 101.5, 2 episodes of dark stool, dropping hb which corrected on repeat lab draw, on low dose levo>>patient upgraded to CCU  - NADEGE: brown stool today and yesterday, no evidence of melena, dark stool or hematochezia   - per RN had brown BMs last night x2, received 2u pRBC overnight (documented 1 in chart)    #Rec  - recommend repeat CTAP with IV contrast given hx of appendicitis- pending  - less likely that patient is hypotensive from GI bleeding given negative through rectal exam, acute onset of hypotension and fever, consider sepsis work up  - Trend H&H BID  - c/w PPI BID 40mg   - Please target Hb  >8  - Please avoid any NSAIDs  - monitor BM    #Ileus- improving  8/27: large colonic burden  8/30: large colonic burden with distension  9/1: KUB: large fecal load  9/3: moderate  fecal load  - Lactate - 1.4 (9/4)  - patient having multiple BMs    Rec  -  Target K >4, Mg >2, PO4 >1  - Frequent repositioning (Q2 hour left to right lateral decubitus position, elevate hip with pillow, knees to chest)  - Serial abdominal exams   - Avoid Anticholingerics, Opioids and Calcium channel blockers      #Pancreatic body cyst  - 0.7x0.4cm cyst in pancreatic body, No PD dilation    Rec  MRI pancreas protocol as outpatient  - Follow up with our [facility] located at [address] [place] [address]. Phone Number: [phone]    Communicated with primary team

## 2023-09-06 NOTE — OCCUPATIONAL THERAPY INITIAL EVALUATION ADULT - GENERAL OBSERVATIONS, REHAB EVAL
Pt received semi valdez in bed in NAD, agreeable to OT evaluation, +tele, +BP cuff, +pulse oxi, left semi valdez in bed at pt's request 2* to recently returnign to bed, certified cantonese  # [id number] utilized- however pt able to understand  but  with some difficulty understanding pt at times.  states pt may speak unique dialiect

## 2023-09-06 NOTE — PROGRESS NOTE ADULT - ASSESSMENT
- DKA, improved  - acute duodenal ulcer, melena   - EGD findings noted   - post hemorrhagic anemia  - Septic shock requiring pressors         Hungatella bacteremia likely GI translocation in the setting of appendicitis        pseudomonas in wound c/s  - dysphagia/ confusion      SUGGEST:  - on po diet - speech f/u to advance diet to soft solids?  - glycemic control improved  - decrease Miralax to once a day and d/c Senna, as ileus has resolved and pt having loose stools  - once diet advanced, add Glucerna Shake - with one sugar free prosource added to it) once a day - twice a day if meal intake poor  will f/u

## 2023-09-06 NOTE — PROGRESS NOTE ADULT - SUBJECTIVE AND OBJECTIVE BOX
NUTRITION SUPPORT TEAM  -  PROGRESS NOTE     Interval Events:  pt more alert  O2 mask, no resp discomfort  levo d/c yesterday, PPI drip off now  s/p melena 9/4, transfused, seen by GI  ileus on KUB o 9/3 has clinically resolved - abd soft, ND, NT, + loose BM yesterday a nd one BM this morning  I&O negative 1600 ml   speech eval yesterday rec       UCx & WCx demonstrates carbapenem resistant pseudomonas    VITALS:  T(F): 97 (09-06 @ 04:00), Max: 97.2 (09-06 @ 00:00)  HR: 100 (09-06 @ 10:00) (93 - 105)  BP: 124/71 (09-06 @ 10:00) (122/67 - 139/76)  RR: 23 (09-06 @ 10:00) (18 - 33)  SpO2: 97% (09-06 @ 10:00) (93% - 100%)    HEIGHT/WEIGHT/BMI:   Height (cm): 167.6 (09-02), 162.6 (11-21), 162.6 (10-09)  Weight (kg): 62.6 (09-02), 61.2 (10-09)  BMI (kg/m2): 22.3 (09-02), 23.1 (11-21), 23.1 (10-09)    I/Os:     09-05-23 @ 07:01  -  09-06-23 @ 07:00  --------------------------------------------------------  IN:    IV PiggyBack: 500 mL    Lactated Ringers Bolus: 500 mL    Oral Fluid: 250 mL    Pantoprazole: 10 mL    sodium chloride 0.9%: 910 mL    Sodium Chloride 0.9% Bolus: 250 mL  Total IN: 2420 mL    OUT:    Indwelling Catheter - Urethral (mL): 2335 mL    Stool (mL): 1 mL  Total OUT: 2336 mL    Total NET: 84 mL    STANDING MEDICATIONS:   albuterol/ipratropium for Nebulization 3 milliLiter(s) Nebulizer every 6 hours  allopurinol 300 milliGRAM(s) Oral daily  bacitracin   Ointment 1 Application(s) Topical two times a day  budesonide 160 MICROgram(s)/formoterol 4.5 MICROgram(s) Inhaler 2 Puff(s) Inhalation two times a day  cefepime   IVPB 2000 milliGRAM(s) IV Intermittent every 12 hours  cefepime   IVPB      chlorhexidine 2% Cloths 1 Application(s) Topical daily  collagenase Ointment 1 Application(s) Topical two times a day  collagenase Ointment 1 Application(s) Topical daily  insulin glargine Injectable (LANTUS) 8 Unit(s) SubCutaneous at bedtime  insulin lispro (ADMELOG) corrective regimen sliding scale   SubCutaneous every 6 hours  lactulose Syrup 30 Gram(s) Oral every 8 hours  metroNIDAZOLE  IVPB      metroNIDAZOLE  IVPB 500 milliGRAM(s) IV Intermittent every 8 hours  morphine  - Injectable 2 milliGRAM(s) IV Push once  norepinephrine Infusion 0.1 MICROgram(s)/kG/Min IV Continuous <Continuous>  pantoprazole  Injectable 40 milliGRAM(s) IV Push every 12 hours  polyethylene glycol 3350 17 Gram(s) Oral two times a day  potassium chloride  20 mEq/100 mL IVPB 20 milliEquivalent(s) IV Intermittent once  senna 2 Tablet(s) Oral at bedtime      LABS:                         12.6   8.34  )-----------( 262      ( 06 Sep 2023 04:37 )             38.0     142  |  108  |  14  ----------------------------<  46<LL>          (09-06-23 @ 04:37)  3.2<L>   |  25  |  0.9    Ca    7.3<L>          (09-06-23 @ 04:37)  Mg     2.1         (09-06-23 @ 04:37)    TPro  5.1<L>  /  Alb  2.1<L>  /  TBili  0.5  /  DBili  x   /  AST  23  /  ALT  21  /  AlkPhos  102       09-06-23 @ 04:37    Blood Glucose (Past 24 hours):  162 mg/dL (09-06 @ 06:54)  60 mg/dL (09-06 @ 05:47)  127 mg/dL (09-05 @ 23:25)  139 mg/dL (09-05 @ 21:23)  145 mg/dL (09-05 @ 18:04)  95 mg/dL (09-05 @ 11:24)    Speech eval:  · Diagnosis	Inconsistent overt s/s of penetration/aspiration for thin liquids and mildly thick liquids. Wet/gurgly congested sounding cough. Unable to assess solids at this time 2/2 to GI recommendations.    Recommendations:  · Date	05-Sep-2023  · Recommended Consistencies	Clear liquids  · Recommended Diagnostics	FEES; To further assess swallow function and integrity  DIET:   Diet, DASH/TLC:   Sodium & Cholesterol Restricted  Consistent Carbohydrate No Snacks (09-06-23 @ 08:05) [Active]    RADIOLOGY:   < from: Xray Chest 1 View- PORTABLE-Routine (09.06.23 @ 05:41) >  Support devices: None.    Cardiac/mediastinum/hilum: Stable.    Lung parenchyma/Pleura: No focal parenchymal opacities, pleural   effusions, or pneumothorax.    < end of copied text >    < from: CT Abdomen and Pelvis w/ Oral Cont and w/ IV Cont (08.19.23 @ 19:47) >  No definite correlate for acute abdominal pain.    Unchanged nonspecific mild dilation of appendix, up to 0.8 cm, without   definite periappendiceal inflammatory stranding.    < end of copied text >    < from: Xray Kidney Ureter Bladder (09.03.23 @ 06:48) >  No significant change in generalized bowel distention with moderate fecal   load. Findings again suggest ileus. Degenerative changes are again noted.    < end of copied text >

## 2023-09-06 NOTE — PROGRESS NOTE ADULT - ASSESSMENT
MILEY MARES is a 88y man with a medical history significant for Parkinsonism, diabetes, who presented initially with DKA, and is now in the critical care unit for recurrent shock after a long and complex hospitalization involving severe GI bleeding.     Impression    Melena ?  Septic shock  bacteremia (Hungatella spp)  Appendicitis  Pseudomonal wound growth  Diabetes  Parkinsonism  120PY history    Plan:      CNS:  Mental status at baseline, AAOx3 this AM    HEENT:  Oral care    CARDIOVASCULAR  Vasopressors: off since yesterday  Shock most likely septic rather than hemorrhagic given overcorrection of Hgb, now with carbapenem resistant pseudomonas in 2x cultures  Mildly tachycardic yesterday, was given IVF and then later lasix, both without response    PULMONARY  HOB @ 45 degrees, aspiration precautions  Standing duonebs, Symbicort BID, already s/p steroids earlier this admission    GASTROINTESTINAL  GI prophylaxis: PPI BID today for acute GI bleed, and recent interventions with cautery  Feeds: advance diet today  BM: regimen PRN  Maintain 2x large bore IV access, maintain active type & screen, transfuse to maintain Hgb > 7.    Resuscitation with crystalloid fluid as needed.  Obtain stat CTA abdomen/pelvis for clinically significant bleeding.    Appreciate GI input    GENITOURINARY/RENAL  Jones: d/c today  UOP had dropped yesterday, repeat IVF as needed  renal function appropriate  monitor lytes, replete prn    INFECTIOUS  On Cefepime & Flagyl  D/c levaquin today, give loading dose Tobramycin for resistant pseudomonas  MRSA nares (-) on 8/10  Repeat ID eval today for what was likely septic shock on trasnfer to CCU  Repeat BCx PRN fever  FOllow-up culture data    HEMATOLOGIC  DVT ppx: SCD only  Monitor daily CBC & PRN clinical bleed    ENDOCRINE  Follow up FS.  Insulin protocol as needed.  BG goal 140-180  TSH elevated, check T4    MSK/DERM  PT/OT, OOBAT      CODE STATUS: DNR/DNI  DISPO: SDU  LINES: 2x large bore IV
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
## 2023-09-06 NOTE — OCCUPATIONAL THERAPY INITIAL EVALUATION ADULT - ADDITIONAL COMMENTS
Pt asked specific questions re: PLOF, however pt providing only broad answers to  stating he needed help at home with everything.  with difficulty clarifying. will continue to clarify

## 2023-09-06 NOTE — SWALLOW FEES ASSESSMENT ADULT - ORAL PHASE
Uncontrolled bolus/spillover in hypopharynx Uncontrolled bolus/spillover in william-pharynx/Uncontrolled bolus/spillover in hypopharynx

## 2023-09-06 NOTE — PROGRESS NOTE ADULT - SUBJECTIVE AND OBJECTIVE BOX
MILEY MARES  88y, Male  Allergy: No Known Allergies      LOS  33d    CHIEF COMPLAINT: DKA/HHS (05 Sep 2023 18:41)      INTERVAL EVENTS/HPI  - T(F): , Max: 98.7 (23 @ 12:00)  - upgraded to unit for possible GI bleed, Hypotension  - was on pressors, now off  - WBC Count: 8.34 (23 @ 04:37)  WBC Count: 6.44 (23 @ 21:58)     - Creatinine: 0.9 (23 @ 04:34)  Creatinine: 1.0 (23 @ 07:12)       ROS  General: Denies rigors, nightsweats  HEENT: Denies headache, rhinorrhea, sore throat, eye pain  CV: Denies CP, palpitations  PULM: Denies wheezing, hemoptysis  GI: Denies hematemesis, hematochezia, melena  : Denies discharge, hematuria  MSK: Denies arthralgias, myalgias  SKIN: Denies rash, lesions  NEURO: Denies paresthesias, weakness  PSYCH: Denies depression, anxiety    VITALS:  T(F): 97, Max: 98.7 (23 @ 12:00)  HR: 94  BP: 131/66  RR: 18Vital Signs Last 24 Hrs  T(C): 36.1 (06 Sep 2023 04:00), Max: 37.1 (05 Sep 2023 12:00)  T(F): 97 (06 Sep 2023 04:00), Max: 98.7 (05 Sep 2023 12:00)  HR: 94 (06 Sep 2023 06:00) (89 - 105)  BP: 131/66 (06 Sep 2023 06:00) (97/58 - 133/75)  BP(mean): 93 (06 Sep 2023 06:00) (72 - 98)  RR: 18 (06 Sep 2023 06:00) (14 - 33)  SpO2: 100% (06 Sep 2023 06:00) (92% - 100%)    Parameters below as of 06 Sep 2023 06:00  Patient On (Oxygen Delivery Method): room air        PHYSICAL EXAM:  Gen: NAD, resting in bed  HEENT: Normocephalic, atraumatic  Neck: supple, no lymphadenopathy  CV: Regular rate & regular rhythm  Lungs: decreased BS at bases, no fremitus  Abdomen: Soft, BS present  Ext: Warm, well perfused  Neuro: non focal, awake  Skin: no rash, no erythema  Lines: no phlebitis    FH: Non-contributory  Social Hx: Non-contributory    TESTS & MEASUREMENTS:                        12.6  8.34  )-----------( 262      ( 06 Sep 2023 04:37 )             38.0        140  |  107  |  14  ----------------------------<  95  3.5   |  23  |  0.9    Ca    7.3<L>      05 Sep 2023 04:34  Mg     2.2         TPro  5.4<L>  /  Alb  2.4<L>  /  TBili  0.8  /  DBili  x   /  AST  33  /  ALT  27  /  AlkPhos  111  -      LIVER FUNCTIONS - ( 05 Sep 2023 04:34 )  Alb: 2.4 g/dL / Pro: 5.4 g/dL / ALK PHOS: 111 U/L / ALT: 27 U/L / AST: 33 U/L / GGT: x          Urinalysis Basic - ( 05 Sep 2023 10:22 )    Color: Yellow / Appearance: Clear / S.019 / pH: x  Gluc: x / Ketone: Negative mg/dL  / Bili: Negative / Urobili: 1.0 mg/dL  Blood: x / Protein: 30 mg/dL / Nitrite: Negative  Leuk Esterase: Negative / RBC: 12 /HPF / WBC 1 /HPF  Sq Epi: x / Non Sq Epi: 1 /HPF / Bacteria: Negative /HPF        Culture - Tissue with Gram Stain (collected 23 @ 09:03)  Source: .Tissue Other, right upper extremity  Gram Stain (23 @ 19:41):    Rare polymorphonuclear leukocytes per low power field    Rare Red blood cells per low power field    Few Gram Negative Coccobacilli per oil power field    Culture - Urine (collected 23 @ 17:07)  Source: Clean Catch Clean Catch (Midstream)  Final Report (23 @ 00:03):    10,000 - 49,000 CFU/mL Pseudomonas aeruginosa (Carbapenem Resistant)  Organism: Pseudomonas aeruginosa (Carbapenem Resistant)  Pseudomonas aeruginosa (Carbapenem Resistant) (23 @ 00:03)  Organism: Pseudomonas aeruginosa (Carbapenem Resistant) (23 @ 00:03)      -  Resistance Gene to Carbapenem: Nondet      Method Type: CarbaR  Organism: Pseudomonas aeruginosa (Carbapenem Resistant) (23 @ 00:03)      -  Levofloxacin: I 2      -  Tobramycin: S <=2      -  Ceftazidime/Avibactam: S 8      -  Aztreonam: R >16      -  Gentamicin: S 4      -  Cefepime: S 8      -  Piperacillin/Tazobactam: S 16      -  Ciprofloxacin: I 1      -  Imipenem: R >8      Method Type: UZAIR      -  Meropenem: R >8      -  Ceftazidime: S 8      -  Amikacin: S <=16      -  Ceftolozane/tazobactam: S <=2    Culture - Blood (collected 23 @ 10:50)  Source: .Blood Blood  Preliminary Report (23 @ 20:01):    No growth at 72 Hours    Culture - Tissue with Gram Stain (collected 23 @ 18:15)  Source: .Tissue Wound  Gram Stain (23 @ 01:46):    Moderate polymorphonuclear leukocytes seen per low power field    Numerous Gram Negative Rods seen per oil power field    Rare Yeast like cells seen per oil power field  Preliminary Report (23 @ 19:25):    Numerous Pseudomonas aeruginosa (Carbapenem Resistant)    Numerous Proteus mirabilis    Few Dolly albicans "Susceptibilities not performed"  Organism: Pseudomonas aeruginosa (Carbapenem Resistant)  Pseudomonas aeruginosa (Carbapenem Resistant)  Proteus mirabilis (23 @ 15:30)  Organism: Pseudomonas aeruginosa (Carbapenem Resistant) (23 @ 15:30)      -  Resistance Gene to Carbapenem: Nondet      Method Type: CarbaR  Organism: Proteus mirabilis (23 @ 15:05)      -  Levofloxacin: S <=0.5      -  Tobramycin: S <=2      -  Aztreonam: S <=4      -  Gentamicin: S <=2      -  Cefazolin: S <=2      -  Cefepime: S <=2      -  Piperacillin/Tazobactam: S <=8      -  Ciprofloxacin: S <=0.25      -  Ceftriaxone: S <=1      -  Ampicillin: S <=8 These ampicillin results predict results for amoxicillin      Method Type: UZAIR      -  Meropenem: S <=1      -  Ampicillin/Sulbactam: S <=4/2      -  Cefoxitin: S <=8      -  Amikacin: S <=16      -  Amoxicillin/Clavulanic Acid: S <=8/4      -  Trimethoprim/Sulfamethoxazole: S <=0.5/9.5      -  Ertapenem: S <=0.5  Organism: Pseudomonas aeruginosa (Carbapenem Resistant) (23 @ 15:05)      -  Levofloxacin: S <=0.5      -  Tobramycin: S <=2      -  Ceftazidime/Avibactam: S <=4      -  Aztreonam: S <=4      -  Gentamicin: S 4      -  Cefepime: S <=2      -  Piperacillin/Tazobactam: S <=8      -  Ciprofloxacin: S <=0.25      -  Imipenem: R >8      Method Type: UZAIR      -  Meropenem: I 4      -  Ceftazidime: S 4      -  Amikacin: S <=16      -  Ceftolozane/tazobactam: S <=2    Culture - Blood (collected 23 @ 20:40)  Source: .Blood Blood  Final Report (23 @ 02:01):    No growth at 5 days    Culture - Blood (collected 23 @ 11:44)  Source: .Blood None  Final Report (23 @ 20:00):    No growth at 5 days    Culture - Blood (collected 23 @ 11:58)  Source: .Blood None  Final Report (23 @ 23:01):    No growth at 5 days            INFECTIOUS DISEASES TESTING  Procalcitonin, Serum: 0.13 (23 @ 07:25)  Fungitell: <31 (23 @ 04:45)  Procalcitonin, Serum: 0.34 (23 @ 04:45)  MRSA PCR Result.: Negative (08-10-23 @ 10:55)  MRSA PCR Result.: Negative (23 @ 11:30)  Procalcitonin, Serum: 0.67 (23 @ 05:05)  Procalcitonin, Serum: 0.48 (23 @ 23:05)  COVID-19 PCR: NotDetec (11-21-22 @ 18:43)      INFLAMMATORY MARKERS      RADIOLOGY & ADDITIONAL TESTS:  I have personally reviewed the last available Chest xray  CXR  Xray Chest 1 View- PORTABLE-Urgent:  ACC: [id number] EXAM:  XR CHEST PORTABLE URGENT 1V   ORDERED BY: TATIANA HAMM    PROCEDURE DATE:  2023          INTERPRETATION:  Clinical History / Reason for exam: Shortness of breath    Comparison : Chest radiograph prior day.    Technique/Positioning: Frontal portable.    Findings:    Support devices: Telemetry leads    Cardiac/mediastinum/hilum: Unremarkable.    Lung parenchyma/Pleura: Retrocardiac atelectasis    Skeleton/soft tissues: Unremarkable.    Impression:    Retrocardiac atelectasis.    Grossly unchanged.    --- End of Report ---            CHRISTIN AYALA MD; Attending Interventional Radiologist  This document has been electronically signed. Sep  5 2023  8:37AM (23 @ 08:14)      CT      CARDIOLOGY TESTING  12 Lead ECG:  Ventricular Rate 134 BPM    Atrial Rate 134 BPM    P-R Interval 136 ms    QRS Duration 110 ms    Q-T Interval 344 ms    QTC Calculation(Bazett) 513 ms    P Axis 43 degrees    R Axis -3 degrees    T Axis 32 degrees    Diagnosis Line Sinus tachycardia  Incomplete right bundle branch block  ST & T wave abnormality, consider anterior ischemia  Abnormal ECG    Confirmed by haydee leslie ([id number]) on 2023 9:23:08 PM (23 @ 12:14)  12 Lead ECG:  Ventricular Rate 121 BPM    Atrial Rate 121 BPM    P-R Interval 146 ms    QRS Duration 118 ms    Q-T Interval 432 ms    QTC Calculation(Bazett) 613 ms    P Axis 30 degrees    R Axis -24 degrees    T Axis 73 degrees    Diagnosis Line Sinus tachycardia  Right bundle branch block  Minimal voltage criteria for LVH, may be normal variant  Septal infarct , age undetermined  T wave abnormality, consider lateral ischemia  Abnormal ECG    Confirmed by haydee leslie ([id number]) on 2023 9:13:48 PM (23 @ 17:10)      MEDICATIONS  albuterol/ipratropium for Nebulization 3 Nebulizer every 6 hours  allopurinol 300 Oral daily  bacitracin   Ointment 1 Topical two times a day  budesonide 160 MICROgram(s)/formoterol 4.5 MICROgram(s) Inhaler 2 Inhalation two times a day  cefepime   IVPB    cefepime   IVPB 2000 IV Intermittent every 12 hours  chlorhexidine 2% Cloths 1 Topical daily  collagenase Ointment 1 Topical two times a day  collagenase Ointment 1 Topical daily  dextrose 5%. 1000 IV Continuous <Continuous>  dextrose 5%. 1000 IV Continuous <Continuous>  dextrose 50% Injectable 12.5 IV Push once  dextrose 50% Injectable 25 IV Push once  dextrose 50% Injectable 25 IV Push once  dextrose 50% Injectable 50 IV Push once  glucagon  Injectable 1 IntraMuscular once  insulin glargine Injectable (LANTUS) 8 SubCutaneous at bedtime  insulin lispro (ADMELOG) corrective regimen sliding scale  SubCutaneous every 6 hours  lactulose Syrup 30 Oral every 8 hours  levoFLOXacin IVPB 750 IV Intermittent every 24 hours  metroNIDAZOLE  IVPB 500 IV Intermittent every 8 hours  metroNIDAZOLE  IVPB    morphine  - Injectable 2 IV Push once  norepinephrine Infusion 0.1 IV Continuous <Continuous>  pantoprazole  Injectable 40 IV Push every 12 hours  polyethylene glycol 3350 17 Oral two times a day  senna 2 Oral at bedtime  sodium chloride 0.9%. 1000 IV Continuous <Continuous>      WEIGHT  Weight (kg): 62.6 (23 @ 12:59)      ANTIBIOTICS:  cefepime   IVPB      cefepime   IVPB 2000 milliGRAM(s) IV Intermittent every 12 hours  levoFLOXacin IVPB 750 milliGRAM(s) IV Intermittent every 24 hours  metroNIDAZOLE  IVPB      metroNIDAZOLE  IVPB 500 milliGRAM(s) IV Intermittent every 8 hours      All available historical records have been reviewed

## 2023-09-06 NOTE — SWALLOW FEES ASSESSMENT ADULT - UNSUCCESSFUL STRATEGIES TRIALED DURING PROCEDURE
pt tense and aversive to scope which limited his ability to follow directions for compensatory strategies

## 2023-09-06 NOTE — PROGRESS NOTE ADULT - SUBJECTIVE AND OBJECTIVE BOX
Gastroenterology progress note:     Patient is a 88y old  Male who presents with a chief complaint of DKA/HHS (06 Sep 2023 14:02)       Admitted on: 08-04-23    We are following the patient for:       Interval History:    No acute events overnight.   - Diet -   - last BM -   - Abdominal pain -       PAST MEDICAL & SURGICAL HISTORY:  HTN (hypertension)      Gout      BPH (benign prostatic hyperplasia)      Parkinson disease      HLD (hyperlipidemia)      Smoker within last 12 months      Diabetes mellitus      No significant past surgical history          MEDICATIONS  (STANDING):  albuterol/ipratropium for Nebulization 3 milliLiter(s) Nebulizer every 6 hours  allopurinol 300 milliGRAM(s) Oral daily  bacitracin   Ointment 1 Application(s) Topical two times a day  budesonide 160 MICROgram(s)/formoterol 4.5 MICROgram(s) Inhaler 2 Puff(s) Inhalation two times a day  cefepime   IVPB      cefepime   IVPB 2000 milliGRAM(s) IV Intermittent every 12 hours  chlorhexidine 2% Cloths 1 Application(s) Topical daily  collagenase Ointment 1 Application(s) Topical daily  collagenase Ointment 1 Application(s) Topical two times a day  dextrose 5%. 1000 milliLiter(s) (100 mL/Hr) IV Continuous <Continuous>  dextrose 5%. 1000 milliLiter(s) (50 mL/Hr) IV Continuous <Continuous>  dextrose 50% Injectable 12.5 Gram(s) IV Push once  dextrose 50% Injectable 25 Gram(s) IV Push once  dextrose 50% Injectable 25 Gram(s) IV Push once  glucagon  Injectable 1 milliGRAM(s) IntraMuscular once  insulin glargine Injectable (LANTUS) 8 Unit(s) SubCutaneous at bedtime  insulin lispro (ADMELOG) corrective regimen sliding scale   SubCutaneous every 6 hours  metroNIDAZOLE  IVPB 500 milliGRAM(s) IV Intermittent every 8 hours  metroNIDAZOLE  IVPB      morphine  - Injectable 2 milliGRAM(s) IV Push once  norepinephrine Infusion 0.1 MICROgram(s)/kG/Min (11.7 mL/Hr) IV Continuous <Continuous>  pantoprazole  Injectable 40 milliGRAM(s) IV Push every 12 hours  polyethylene glycol 3350 17 Gram(s) Oral two times a day  potassium chloride  20 mEq/100 mL IVPB 20 milliEquivalent(s) IV Intermittent once  senna 2 Tablet(s) Oral at bedtime    MEDICATIONS  (PRN):  acetaminophen     Tablet .. 650 milliGRAM(s) Oral every 6 hours PRN Temp greater or equal to 38C (100.4F)  dextrose Oral Gel 15 Gram(s) Oral once PRN Blood Glucose LESS THAN 70 milliGRAM(s)/deciliter      Allergies  No Known Allergies      Review of Systems:   Cardiovascular:  No Chest Pain, No Palpitations  Respiratory:  No Cough, No Dyspnea  Gastrointestinal:  As described in HPI  Skin:  No Skin Lesions, No Jaundice  Neuro:  No Syncope, No Dizziness    Physical Examination:  T(C): 36.1 (09-06-23 @ 04:00), Max: 36.9 (09-05-23 @ 16:00)  HR: 119 (09-06-23 @ 15:00) (93 - 119)  BP: 121/73 (09-06-23 @ 14:00) (98/58 - 139/76)  RR: 29 (09-06-23 @ 15:00) (18 - 33)  SpO2: 81% (09-06-23 @ 15:00) (81% - 100%)      09-05-23 @ 07:01  -  09-06-23 @ 07:00  --------------------------------------------------------  IN: 2420 mL / OUT: 2336 mL / NET: 84 mL    09-06-23 @ 07:01  -  09-06-23 @ 15:57  --------------------------------------------------------  IN: 0 mL / OUT: 51 mL / NET: -51 mL        GENERAL: AAOx3, no acute distress.  HEAD:  Atraumatic, Normocephalic  EYES: conjunctiva and sclera clear  NECK: Supple, no JVD or thyromegaly  CHEST/LUNG: Clear to auscultation bilaterally; No wheeze, rhonchi, or rales  HEART: Regular rate and rhythm; normal S1, S2, No murmurs.  ABDOMEN: Soft, nontender, nondistended; Bowel sounds present  NEUROLOGY: No asterixis or tremor.   SKIN: Intact, no jaundice     Data:                        12.6   8.34  )-----------( 262      ( 06 Sep 2023 04:37 )             38.0     Hgb trend:  12.6  09-06-23 @ 04:37  11.4  09-05-23 @ 21:58  12.3  09-05-23 @ 11:22  12.4  09-05-23 @ 04:34  7.1  09-04-23 @ 16:00  7.6  09-04-23 @ 07:12      09-04-23 @ 07:01  -  09-05-23 @ 07:00  --------------------------------------------------------  IN: 617 mL      09-06    142  |  108  |  14  ----------------------------<  46<LL>  3.2<L>   |  25  |  0.9    Ca    7.3<L>      06 Sep 2023 04:37  Mg     2.1     09-06    TPro  5.1<L>  /  Alb  2.1<L>  /  TBili  0.5  /  DBili  x   /  AST  23  /  ALT  21  /  AlkPhos  102  09-06    Liver panel trend:  TBili 0.5   /   AST 23   /   ALT 21   /   AlkP 102   /   Tptn 5.1   /   Alb 2.1    /   DBili --      09-06  TBili 0.8   /   AST 33   /   ALT 27   /   AlkP 111   /   Tptn 5.4   /   Alb 2.4    /   DBili --      09-05  TBili 0.4   /   AST 29   /   ALT 20   /   AlkP 93   /   Tptn 4.7   /   Alb 1.9    /   DBili --      09-04  TBili 0.5   /   AST 20   /   ALT 17   /   AlkP 99   /   Tptn 5.1   /   Alb 2.1    /   DBili --      09-03  TBili 0.4   /   AST 21   /   ALT 20   /   AlkP 105   /   Tptn 5.5   /   Alb 2.3    /   DBili --      09-02  TBili 0.3   /   AST 26   /   ALT 25   /   AlkP 90   /   Tptn 5.3   /   Alb 2.5    /   DBili --      09-01  TBili 0.3   /   AST 15   /   ALT 22   /   AlkP 83   /   Tptn 4.9   /   Alb 2.1    /   DBili --      08-31  TBili 0.3   /   AST 20   /   ALT 26   /   AlkP 92   /   Tptn 5.3   /   Alb 2.5    /   DBili --      08-30  TBili 0.3   /   AST 28   /   ALT 31   /   AlkP 94   /   Tptn 5.4   /   Alb 2.3    /   DBili --      08-30  TBili 0.3   /   AST 18   /   ALT 23   /   AlkP 91   /   Tptn 5.4   /   Alb 2.4    /   DBili --      08-29  TBili 0.3   /   AST 23   /   ALT 27   /   AlkP 87   /   Tptn 4.9   /   Alb 2.2    /   DBili --      08-28      PT/INR - ( 05 Sep 2023 04:34 )   PT: 13.30 sec;   INR: 1.16 ratio         PTT - ( 05 Sep 2023 04:34 )  PTT:34.2 sec    Culture - Tissue with Gram Stain (collected 05 Sep 2023 09:03)  Source: .Tissue Other, right upper extremity  Gram Stain (05 Sep 2023 19:41):    Rare polymorphonuclear leukocytes per low power field    Rare Red blood cells per low power field    Few Gram Negative Coccobacilli per oil power field  Preliminary Report (06 Sep 2023 14:18):    Numerous Acinetobacter baumannii/nosocomialis group    Rare Proteus mirabilis         Radiology:       Gastroenterology progress note:     Patient is a 88y old  Male who presents with a chief complaint of DKA/HHS (06 Sep 2023 14:02)       Admitted on: 08-04-23    We are following the patient for: anemia       Interval History: patient Hb stable, pending CT. Levo off    No acute events overnight.     PAST MEDICAL & SURGICAL HISTORY:  HTN (hypertension)      Gout      BPH (benign prostatic hyperplasia)      Parkinson disease      HLD (hyperlipidemia)      Smoker within last 12 months      Diabetes mellitus      No significant past surgical history          MEDICATIONS  (STANDING):  albuterol/ipratropium for Nebulization 3 milliLiter(s) Nebulizer every 6 hours  allopurinol 300 milliGRAM(s) Oral daily  bacitracin   Ointment 1 Application(s) Topical two times a day  budesonide 160 MICROgram(s)/formoterol 4.5 MICROgram(s) Inhaler 2 Puff(s) Inhalation two times a day  cefepime   IVPB      cefepime   IVPB 2000 milliGRAM(s) IV Intermittent every 12 hours  chlorhexidine 2% Cloths 1 Application(s) Topical daily  collagenase Ointment 1 Application(s) Topical daily  collagenase Ointment 1 Application(s) Topical two times a day  dextrose 5%. 1000 milliLiter(s) (100 mL/Hr) IV Continuous <Continuous>  dextrose 5%. 1000 milliLiter(s) (50 mL/Hr) IV Continuous <Continuous>  dextrose 50% Injectable 12.5 Gram(s) IV Push once  dextrose 50% Injectable 25 Gram(s) IV Push once  dextrose 50% Injectable 25 Gram(s) IV Push once  glucagon  Injectable 1 milliGRAM(s) IntraMuscular once  insulin glargine Injectable (LANTUS) 8 Unit(s) SubCutaneous at bedtime  insulin lispro (ADMELOG) corrective regimen sliding scale   SubCutaneous every 6 hours  metroNIDAZOLE  IVPB 500 milliGRAM(s) IV Intermittent every 8 hours  metroNIDAZOLE  IVPB      morphine  - Injectable 2 milliGRAM(s) IV Push once  norepinephrine Infusion 0.1 MICROgram(s)/kG/Min (11.7 mL/Hr) IV Continuous <Continuous>  pantoprazole  Injectable 40 milliGRAM(s) IV Push every 12 hours  polyethylene glycol 3350 17 Gram(s) Oral two times a day  potassium chloride  20 mEq/100 mL IVPB 20 milliEquivalent(s) IV Intermittent once  senna 2 Tablet(s) Oral at bedtime    MEDICATIONS  (PRN):  acetaminophen     Tablet .. 650 milliGRAM(s) Oral every 6 hours PRN Temp greater or equal to 38C (100.4F)  dextrose Oral Gel 15 Gram(s) Oral once PRN Blood Glucose LESS THAN 70 milliGRAM(s)/deciliter      Allergies  No Known Allergies      Review of Systems:   Cardiovascular:  No Chest Pain, No Palpitations  Respiratory:  No Cough, No Dyspnea  Gastrointestinal:  As described in HPI  Skin:  No Skin Lesions, No Jaundice  Neuro:  No Syncope, No Dizziness    Physical Examination:  T(C): 36.1 (09-06-23 @ 04:00), Max: 36.9 (09-05-23 @ 16:00)  HR: 119 (09-06-23 @ 15:00) (93 - 119)  BP: 121/73 (09-06-23 @ 14:00) (98/58 - 139/76)  RR: 29 (09-06-23 @ 15:00) (18 - 33)  SpO2: 81% (09-06-23 @ 15:00) (81% - 100%)      09-05-23 @ 07:01  -  09-06-23 @ 07:00  --------------------------------------------------------  IN: 2420 mL / OUT: 2336 mL / NET: 84 mL    09-06-23 @ 07:01  -  09-06-23 @ 15:57  --------------------------------------------------------  IN: 0 mL / OUT: 51 mL / NET: -51 mL        GENERAL:  no acute distress.  HEAD:  Atraumatic, Normocephalic  EYES: conjunctiva and sclera clear  NECK: Supple, no JVD or thyromegaly  CHEST/LUNG: Clear to auscultation bilaterally; No wheeze, rhonchi, or rales  HEART: Regular rate and rhythm; normal S1, S2, No murmurs.  ABDOMEN: Soft,  nondistended; Bowel sounds present  NEUROLOGY: No asterixis or tremor.   SKIN: Intact, no jaundice     Data:                        12.6   8.34  )-----------( 262      ( 06 Sep 2023 04:37 )             38.0     Hgb trend:  12.6  09-06-23 @ 04:37  11.4  09-05-23 @ 21:58  12.3  09-05-23 @ 11:22  12.4  09-05-23 @ 04:34  7.1  09-04-23 @ 16:00  7.6  09-04-23 @ 07:12      09-04-23 @ 07:01  -  09-05-23 @ 07:00  --------------------------------------------------------  IN: 617 mL      09-06    142  |  108  |  14  ----------------------------<  46<LL>  3.2<L>   |  25  |  0.9    Ca    7.3<L>      06 Sep 2023 04:37  Mg     2.1     09-06    TPro  5.1<L>  /  Alb  2.1<L>  /  TBili  0.5  /  DBili  x   /  AST  23  /  ALT  21  /  AlkPhos  102  09-06    Liver panel trend:  TBili 0.5   /   AST 23   /   ALT 21   /   AlkP 102   /   Tptn 5.1   /   Alb 2.1    /   DBili --      09-06  TBili 0.8   /   AST 33   /   ALT 27   /   AlkP 111   /   Tptn 5.4   /   Alb 2.4    /   DBili --      09-05  TBili 0.4   /   AST 29   /   ALT 20   /   AlkP 93   /   Tptn 4.7   /   Alb 1.9    /   DBili --      09-04  TBili 0.5   /   AST 20   /   ALT 17   /   AlkP 99   /   Tptn 5.1   /   Alb 2.1    /   DBili --      09-03  TBili 0.4   /   AST 21   /   ALT 20   /   AlkP 105   /   Tptn 5.5   /   Alb 2.3    /   DBili --      09-02  TBili 0.3   /   AST 26   /   ALT 25   /   AlkP 90   /   Tptn 5.3   /   Alb 2.5    /   DBili --      09-01  TBili 0.3   /   AST 15   /   ALT 22   /   AlkP 83   /   Tptn 4.9   /   Alb 2.1    /   DBili --      08-31  TBili 0.3   /   AST 20   /   ALT 26   /   AlkP 92   /   Tptn 5.3   /   Alb 2.5    /   DBili --      08-30  TBili 0.3   /   AST 28   /   ALT 31   /   AlkP 94   /   Tptn 5.4   /   Alb 2.3    /   DBili --      08-30  TBili 0.3   /   AST 18   /   ALT 23   /   AlkP 91   /   Tptn 5.4   /   Alb 2.4    /   DBili --      08-29  TBili 0.3   /   AST 23   /   ALT 27   /   AlkP 87   /   Tptn 4.9   /   Alb 2.2    /   DBili --      08-28      PT/INR - ( 05 Sep 2023 04:34 )   PT: 13.30 sec;   INR: 1.16 ratio         PTT - ( 05 Sep 2023 04:34 )  PTT:34.2 sec    Culture - Tissue with Gram Stain (collected 05 Sep 2023 09:03)  Source: .Tissue Other, right upper extremity  Gram Stain (05 Sep 2023 19:41):    Rare polymorphonuclear leukocytes per low power field    Rare Red blood cells per low power field    Few Gram Negative Coccobacilli per oil power field  Preliminary Report (06 Sep 2023 14:18):    Numerous Acinetobacter baumannii/nosocomialis group    Rare Proteus mirabilis

## 2023-09-06 NOTE — PROGRESS NOTE ADULT - SUBJECTIVE AND OBJECTIVE BOX
Patient is a 88y old  Male who presents with a chief complaint of DKA/HHS (05 Sep 2023 18:41)        Over Night Events:    UCx & WCx demonstrates carbapenem resistant pseudomonas  Mildly tachycardic yesterday, was given IVF and then later lasix, both without response    ROS:   All ROS are negative except HPI       PHYSICAL EXAM    ICU Vital Signs Last 24 Hrs  T(C): 36.1 (06 Sep 2023 04:00), Max: 37.1 (05 Sep 2023 12:00)  T(F): 97 (06 Sep 2023 04:00), Max: 98.7 (05 Sep 2023 12:00)  HR: 98 (06 Sep 2023 07:00) (94 - 105)  BP: 133/68 (06 Sep 2023 07:00) (97/58 - 133/75)  BP(mean): 95 (06 Sep 2023 07:00) (72 - 98)  ABP: --  ABP(mean): --  RR: 20 (06 Sep 2023 07:00) (14 - 33)  SpO2: 96% (06 Sep 2023 07:00) (92% - 100%)    O2 Parameters below as of 06 Sep 2023 07:00  Patient On (Oxygen Delivery Method): room air            Constitutional: no acute distress, well nourished well developed  Neuro: moving all 4 limbs spontaneously, no facial droop or dysarthria  HEENT: NCAT, anicteric  Neck: no visible lymphadenopathy or goiter  Pulm: no respiratory distress. clear to auscultation bilaterally  Cardiac: extremities appear pink and well-perfused.  regular rhythm and rate, no murmur detected  Abdomen: non-distended  Extremities: no peripheral edema      09-05-23 @ 07:01  -  09-06-23 @ 07:00  --------------------------------------------------------  IN:    IV PiggyBack: 500 mL    Lactated Ringers Bolus: 500 mL    Oral Fluid: 250 mL    Pantoprazole: 10 mL    sodium chloride 0.9%: 840 mL    Sodium Chloride 0.9% Bolus: 250 mL  Total IN: 2350 mL    OUT:    Indwelling Catheter - Urethral (mL): 2290 mL    Stool (mL): 1 mL  Total OUT: 2291 mL    Total NET: 59 mL          LABS:                            12.6   8.34  )-----------( 262      ( 06 Sep 2023 04:37 )             38.0                                               09-06    142  |  108  |  14  ----------------------------<  46<LL>  3.2<L>   |  25  |  0.9    Ca    7.3<L>      06 Sep 2023 04:37  Mg     2.1     09-06    TPro  5.1<L>  /  Alb  2.1<L>  /  TBili  0.5  /  DBili  x   /  AST  23  /  ALT  21  /  AlkPhos  102  09-06      PT/INR - ( 05 Sep 2023 04:34 )   PT: 13.30 sec;   INR: 1.16 ratio         PTT - ( 05 Sep 2023 04:34 )  PTT:34.2 sec                                       Urinalysis Basic - ( 06 Sep 2023 04:37 )    Color: x / Appearance: x / SG: x / pH: x  Gluc: 46 mg/dL / Ketone: x  / Bili: x / Urobili: x   Blood: x / Protein: x / Nitrite: x   Leuk Esterase: x / RBC: x / WBC x   Sq Epi: x / Non Sq Epi: x / Bacteria: x                                                  LIVER FUNCTIONS - ( 06 Sep 2023 04:37 )  Alb: 2.1 g/dL / Pro: 5.1 g/dL / ALK PHOS: 102 U/L / ALT: 21 U/L / AST: 23 U/L / GGT: x                                                  Culture - Tissue with Gram Stain (collected 05 Sep 2023 09:03)  Source: .Tissue Other, right upper extremity  Gram Stain (05 Sep 2023 19:41):    Rare polymorphonuclear leukocytes per low power field    Rare Red blood cells per low power field    Few Gram Negative Coccobacilli per oil power field                                                                                       ABG - ( 04 Sep 2023 18:58 )  pH, Arterial: 7.44  pH, Blood: x     /  pCO2: 32    /  pO2: 83    / HCO3: 22    / Base Excess: -2.0  /  SaO2: 99.1                MEDICATIONS  (STANDING):  albuterol/ipratropium for Nebulization 3 milliLiter(s) Nebulizer every 6 hours  allopurinol 300 milliGRAM(s) Oral daily  bacitracin   Ointment 1 Application(s) Topical two times a day  budesonide 160 MICROgram(s)/formoterol 4.5 MICROgram(s) Inhaler 2 Puff(s) Inhalation two times a day  cefepime   IVPB 2000 milliGRAM(s) IV Intermittent every 12 hours  cefepime   IVPB      chlorhexidine 2% Cloths 1 Application(s) Topical daily  collagenase Ointment 1 Application(s) Topical two times a day  collagenase Ointment 1 Application(s) Topical daily  dextrose 5%. 1000 milliLiter(s) (100 mL/Hr) IV Continuous <Continuous>  dextrose 5%. 1000 milliLiter(s) (50 mL/Hr) IV Continuous <Continuous>  dextrose 50% Injectable 25 Gram(s) IV Push once  dextrose 50% Injectable 25 Gram(s) IV Push once  dextrose 50% Injectable 12.5 Gram(s) IV Push once  glucagon  Injectable 1 milliGRAM(s) IntraMuscular once  insulin glargine Injectable (LANTUS) 8 Unit(s) SubCutaneous at bedtime  insulin lispro (ADMELOG) corrective regimen sliding scale   SubCutaneous every 6 hours  lactulose Syrup 30 Gram(s) Oral every 8 hours  levoFLOXacin IVPB 750 milliGRAM(s) IV Intermittent every 24 hours  metroNIDAZOLE  IVPB      metroNIDAZOLE  IVPB 500 milliGRAM(s) IV Intermittent every 8 hours  morphine  - Injectable 2 milliGRAM(s) IV Push once  norepinephrine Infusion 0.1 MICROgram(s)/kG/Min (11.7 mL/Hr) IV Continuous <Continuous>  pantoprazole  Injectable 40 milliGRAM(s) IV Push every 12 hours  polyethylene glycol 3350 17 Gram(s) Oral two times a day  potassium chloride   Powder 40 milliEquivalent(s) Oral once  potassium chloride  20 mEq/100 mL IVPB 20 milliEquivalent(s) IV Intermittent once  senna 2 Tablet(s) Oral at bedtime  sodium chloride 0.9%. 1000 milliLiter(s) (70 mL/Hr) IV Continuous <Continuous>    MEDICATIONS  (PRN):  acetaminophen     Tablet .. 650 milliGRAM(s) Oral every 6 hours PRN Temp greater or equal to 38C (100.4F)  dextrose Oral Gel 15 Gram(s) Oral once PRN Blood Glucose LESS THAN 70 milliGRAM(s)/deciliter      New X-rays reviewed:       ECHO reviewed    CXR interpreted by me:    9/6  images and radiologist read reviewed, by my read demonstrating possible new RML opacity, new from yesterday

## 2023-09-06 NOTE — SWALLOW FEES ASSESSMENT ADULT - SLP PERTINENT HISTORY OF CURRENT PROBLEM
- Likely secondary to high dose ASA and contras induce nephropathy from CTA done on admission to rule out aortic dissection  - resolving, continue daily CMPs     Pt is an 89 y/o M with a medical history significant for Parkinsonism, diabetes, who presented initially with DKA, and is now in the critical care unit for recurrent shock after a long and complex hospitalization involving severe GI bleeding. +Melena, septic shock, bacteremia, appendicitis, pseudomonal wound growth.

## 2023-09-07 LAB
-  AMIKACIN: SIGNIFICANT CHANGE UP
-  AMOXICILLIN/CLAVULANIC ACID: SIGNIFICANT CHANGE UP
-  AMPICILLIN/SULBACTAM: SIGNIFICANT CHANGE UP
-  AMPICILLIN: SIGNIFICANT CHANGE UP
-  AZTREONAM: SIGNIFICANT CHANGE UP
-  CEFAZOLIN: SIGNIFICANT CHANGE UP
-  CEFEPIME: SIGNIFICANT CHANGE UP
-  CEFOXITIN: SIGNIFICANT CHANGE UP
-  CEFTRIAXONE: SIGNIFICANT CHANGE UP
-  CIPROFLOXACIN: SIGNIFICANT CHANGE UP
-  ERTAPENEM: SIGNIFICANT CHANGE UP
-  GENTAMICIN: SIGNIFICANT CHANGE UP
-  LEVOFLOXACIN: SIGNIFICANT CHANGE UP
-  MEROPENEM: SIGNIFICANT CHANGE UP
-  PIPERACILLIN/TAZOBACTAM: SIGNIFICANT CHANGE UP
-  TOBRAMYCIN: SIGNIFICANT CHANGE UP
-  TRIMETHOPRIM/SULFAMETHOXAZOLE: SIGNIFICANT CHANGE UP
ALBUMIN SERPL ELPH-MCNC: 1.8 G/DL — LOW (ref 3.5–5.2)
ALP SERPL-CCNC: 92 U/L — SIGNIFICANT CHANGE UP (ref 30–115)
ALT FLD-CCNC: 16 U/L — SIGNIFICANT CHANGE UP (ref 0–41)
ANION GAP SERPL CALC-SCNC: 8 MMOL/L — SIGNIFICANT CHANGE UP (ref 7–14)
AST SERPL-CCNC: 28 U/L — SIGNIFICANT CHANGE UP (ref 0–41)
BASOPHILS # BLD AUTO: 0.06 K/UL — SIGNIFICANT CHANGE UP (ref 0–0.2)
BASOPHILS NFR BLD AUTO: 0.9 % — SIGNIFICANT CHANGE UP (ref 0–1)
BILIRUB SERPL-MCNC: 0.4 MG/DL — SIGNIFICANT CHANGE UP (ref 0.2–1.2)
BUN SERPL-MCNC: 13 MG/DL — SIGNIFICANT CHANGE UP (ref 10–20)
CALCIUM SERPL-MCNC: 7.2 MG/DL — LOW (ref 8.4–10.5)
CHLORIDE SERPL-SCNC: 106 MMOL/L — SIGNIFICANT CHANGE UP (ref 98–110)
CO2 SERPL-SCNC: 23 MMOL/L — SIGNIFICANT CHANGE UP (ref 17–32)
CREAT SERPL-MCNC: 1 MG/DL — SIGNIFICANT CHANGE UP (ref 0.7–1.5)
CULTURE RESULTS: SIGNIFICANT CHANGE UP
EGFR: 73 ML/MIN/1.73M2 — SIGNIFICANT CHANGE UP
EOSINOPHIL # BLD AUTO: 0 K/UL — SIGNIFICANT CHANGE UP (ref 0–0.7)
EOSINOPHIL NFR BLD AUTO: 0 % — SIGNIFICANT CHANGE UP (ref 0–8)
GLUCOSE BLDC GLUCOMTR-MCNC: 135 MG/DL — HIGH (ref 70–99)
GLUCOSE BLDC GLUCOMTR-MCNC: 136 MG/DL — HIGH (ref 70–99)
GLUCOSE BLDC GLUCOMTR-MCNC: 151 MG/DL — HIGH (ref 70–99)
GLUCOSE BLDC GLUCOMTR-MCNC: 159 MG/DL — HIGH (ref 70–99)
GLUCOSE BLDC GLUCOMTR-MCNC: 175 MG/DL — HIGH (ref 70–99)
GLUCOSE BLDC GLUCOMTR-MCNC: 48 MG/DL — CRITICAL LOW (ref 70–99)
GLUCOSE SERPL-MCNC: 54 MG/DL — CRITICAL LOW (ref 70–99)
HCT VFR BLD CALC: 36.3 % — LOW (ref 42–52)
HGB BLD-MCNC: 11.7 G/DL — LOW (ref 14–18)
LYMPHOCYTES # BLD AUTO: 0.53 K/UL — LOW (ref 1.2–3.4)
LYMPHOCYTES # BLD AUTO: 7.8 % — LOW (ref 20.5–51.1)
MAGNESIUM SERPL-MCNC: 2.1 MG/DL — SIGNIFICANT CHANGE UP (ref 1.8–2.4)
MCHC RBC-ENTMCNC: 30.3 PG — SIGNIFICANT CHANGE UP (ref 27–31)
MCHC RBC-ENTMCNC: 32.2 G/DL — SIGNIFICANT CHANGE UP (ref 32–37)
MCV RBC AUTO: 94 FL — SIGNIFICANT CHANGE UP (ref 80–94)
METHOD TYPE: SIGNIFICANT CHANGE UP
MONOCYTES # BLD AUTO: 0.66 K/UL — HIGH (ref 0.1–0.6)
MONOCYTES NFR BLD AUTO: 9.6 % — HIGH (ref 1.7–9.3)
NEUTROPHILS # BLD AUTO: 5.41 K/UL — SIGNIFICANT CHANGE UP (ref 1.4–6.5)
NEUTROPHILS NFR BLD AUTO: 79.1 % — HIGH (ref 42.2–75.2)
PLATELET # BLD AUTO: 242 K/UL — SIGNIFICANT CHANGE UP (ref 130–400)
PMV BLD: 10.5 FL — HIGH (ref 7.4–10.4)
POTASSIUM SERPL-MCNC: 3.7 MMOL/L — SIGNIFICANT CHANGE UP (ref 3.5–5)
POTASSIUM SERPL-SCNC: 3.7 MMOL/L — SIGNIFICANT CHANGE UP (ref 3.5–5)
PROT SERPL-MCNC: 5.1 G/DL — LOW (ref 6–8)
RBC # BLD: 3.86 M/UL — LOW (ref 4.7–6.1)
RBC # FLD: 16.3 % — HIGH (ref 11.5–14.5)
SODIUM SERPL-SCNC: 137 MMOL/L — SIGNIFICANT CHANGE UP (ref 135–146)
SPECIMEN SOURCE: SIGNIFICANT CHANGE UP
WBC # BLD: 6.84 K/UL — SIGNIFICANT CHANGE UP (ref 4.8–10.8)
WBC # FLD AUTO: 6.84 K/UL — SIGNIFICANT CHANGE UP (ref 4.8–10.8)

## 2023-09-07 PROCEDURE — 71045 X-RAY EXAM CHEST 1 VIEW: CPT | Mod: 26

## 2023-09-07 PROCEDURE — 99232 SBSQ HOSP IP/OBS MODERATE 35: CPT | Mod: GC

## 2023-09-07 PROCEDURE — 99233 SBSQ HOSP IP/OBS HIGH 50: CPT

## 2023-09-07 RX ORDER — DEXTROSE 50 % IN WATER 50 %
50 SYRINGE (ML) INTRAVENOUS ONCE
Refills: 0 | Status: COMPLETED | OUTPATIENT
Start: 2023-09-07 | End: 2023-09-07

## 2023-09-07 RX ORDER — INSULIN GLARGINE 100 [IU]/ML
6 INJECTION, SOLUTION SUBCUTANEOUS AT BEDTIME
Refills: 0 | Status: DISCONTINUED | OUTPATIENT
Start: 2023-09-07 | End: 2023-09-13

## 2023-09-07 RX ADMIN — Medication 100 MILLIGRAM(S): at 21:07

## 2023-09-07 RX ADMIN — PANTOPRAZOLE SODIUM 40 MILLIGRAM(S): 20 TABLET, DELAYED RELEASE ORAL at 05:13

## 2023-09-07 RX ADMIN — Medication 50 MILLILITER(S): at 05:57

## 2023-09-07 RX ADMIN — Medication 1 APPLICATION(S): at 11:19

## 2023-09-07 RX ADMIN — CEFEPIME 100 MILLIGRAM(S): 1 INJECTION, POWDER, FOR SOLUTION INTRAMUSCULAR; INTRAVENOUS at 17:13

## 2023-09-07 RX ADMIN — Medication 1: at 17:12

## 2023-09-07 RX ADMIN — BUDESONIDE AND FORMOTEROL FUMARATE DIHYDRATE 2 PUFF(S): 160; 4.5 AEROSOL RESPIRATORY (INHALATION) at 11:16

## 2023-09-07 RX ADMIN — POLYETHYLENE GLYCOL 3350 17 GRAM(S): 17 POWDER, FOR SOLUTION ORAL at 17:13

## 2023-09-07 RX ADMIN — Medication 1: at 23:08

## 2023-09-07 RX ADMIN — Medication 1 APPLICATION(S): at 17:14

## 2023-09-07 RX ADMIN — CHLORHEXIDINE GLUCONATE 1 APPLICATION(S): 213 SOLUTION TOPICAL at 05:15

## 2023-09-07 RX ADMIN — PANTOPRAZOLE SODIUM 40 MILLIGRAM(S): 20 TABLET, DELAYED RELEASE ORAL at 17:14

## 2023-09-07 RX ADMIN — Medication 100 MILLIGRAM(S): at 05:14

## 2023-09-07 RX ADMIN — Medication 1 APPLICATION(S): at 05:14

## 2023-09-07 RX ADMIN — Medication 300 MILLIGRAM(S): at 11:17

## 2023-09-07 RX ADMIN — Medication 1 APPLICATION(S): at 05:15

## 2023-09-07 RX ADMIN — Medication 50 MILLIEQUIVALENT(S): at 05:57

## 2023-09-07 RX ADMIN — INSULIN GLARGINE 6 UNIT(S): 100 INJECTION, SOLUTION SUBCUTANEOUS at 21:07

## 2023-09-07 RX ADMIN — Medication 100 MILLIGRAM(S): at 13:22

## 2023-09-07 RX ADMIN — CEFEPIME 100 MILLIGRAM(S): 1 INJECTION, POWDER, FOR SOLUTION INTRAMUSCULAR; INTRAVENOUS at 05:14

## 2023-09-07 RX ADMIN — Medication 1 APPLICATION(S): at 17:13

## 2023-09-07 RX ADMIN — BUDESONIDE AND FORMOTEROL FUMARATE DIHYDRATE 2 PUFF(S): 160; 4.5 AEROSOL RESPIRATORY (INHALATION) at 21:07

## 2023-09-07 NOTE — SWALLOW BEDSIDE ASSESSMENT ADULT - SWALLOW EVAL: DIAGNOSIS
+toleration for mildly thick liquids w/o overt s/s aspiration/penetration; pt declined further PO trials. -3

## 2023-09-07 NOTE — PROGRESS NOTE ADULT - ASSESSMENT
87 y/o M w/ PMH of DM, HTN, Parkinson disease, Gout, HLD, former smoker coming from home with complaint of decreased appetite, increased urinary output and craving sweets x 2 weeks. Patient admitted on 8/4/23 for DKA and hypotension , was started on levophed lose dose. HIs course complicated with appendicitis , Hungatella bacteremia, acute anemia requiring 3u. GI consulted today for 3 episodes of melena and drop in Hb to 6. Last night patient was complaining of severe lower abdominal pain and distesion s/p CTAP IC with thrombus in decending AA, SMA and celiac axis narrowing but no bowel ischemia , lactate went upto 7 from 1.5, s/p EGD with duodenal ulcers. GI recalled on 9/4 for downtrending Hb, hypotension, 2 episodes of black stool.    #Reported melena- resolved now brown stool  #Acute on Chronic  Normocytic anemia - stable  #  s/p EGD 8/14 duodenal ulcer s/p epi and hot forceps thermal therapy  - off pressors today  - Baseline hemoglobin - 12  - Hemoglobin on admission - 11.2 (8/4)>>6.6>>3u> 8.6>7.9 > 8.6 (8/15)> 7.1 > 7.6 > 8.6 > repeat 9/4: 12 s/p 2uprbc >>11.7 (9/7)  - Coags - INR:  0.98 (7/4)>>1.35 (8/8)> 1.16 (9/5)  - Lactate: 1.4<2.5< 7.5< 1.5  - CTAP IV Con: 8/10: No evidence of bowel ischemia. Unchanged findings suggesting acute appendicitis. Unopacified inferior mesenteric artery is new compared to 11/17/2022 as there is increased thrombus within the unchanged size of abdominal aortic   aneurysm with decreased opacified aortic lumen. Collateral flow presumed to be present again there is no evidence of bowel ischemia.  - CTAP oral and IV con: 8/8: New mild dilation of the appendix with small volume free fluid in the bilateral lower quadrants. Findings are concerning for appendicitis. 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 cm, as described  - CTAP with IV con: 9/6/23:  Stable prominence of the appendix measuring 8 mm, with no hever   inflammatory changes. Stable infrarenal abdominal aortic aneurysm measuring   5.3 (AP) x 6.8 (craniocaudal) cm (series 601 image 73). 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    - was on fondaparinux  - deemed high risk for endoscopy on 8/11 and a shared decision was made to pursue conservative therapy  - recall for continuos melena. currently off pressors   - recent EGD on 8/14/23: Abdon IIa 1cm ulcer in duodenal bulb s/p eoi + hot bx forceps, 1cm clean based ulcer in 2nd portion of duodenum  - recalled on 9/4 for hypotension witth Tmax 101.5, 2 episodes of dark stool, dropping hb which corrected on repeat lab draw, on low dose levo>>patient upgraded to CCU  - NADEGE: brown stool today and yesterday, no evidence of melena, dark stool or hematochezia   - per RN had brown BMs last night x2, received 2u pRBC overnight (documented 1 in chart)  - 9/7/23: had multiple brown BMs    #Rec  - patient off pressors, Hb stable , brown stool  - no further endoscopic intervention given hemodynamic stability, brown stool   - Trend H&H   - c/w PPI BID 40mg   - Please target Hb  >8  - Please avoid any NSAIDs  - monitor BM  - recall us PRN  - Follow up with our [facility] located at [address] [place] [address]. Phone Number: [phone]      #Ileus- resolved  8/27: large colonic burden  8/30: large colonic burden with distension  9/1: KUB: large fecal load  9/3: moderate  fecal load  - Lactate - 1.4 (9/4)  - patient having multiple BMs    Rec  -  Target K >4, Mg >2, PO4 >1  - Frequent repositioning (Q2 hour left to right lateral decubitus position, elevate hip with pillow, knees to chest)  - Serial abdominal exams   - Avoid Anticholingerics, Opioids and Calcium channel blockers      #Pancreatic body cyst  - 0.7x0.4cm cyst in pancreatic body, No PD dilation  - CTAP with IV con 9/6/23: Stable pancreatic body hypodense lesion, likely a cyst   (series 7 image 36). No duct dilation.    Rec  MRI pancreas protocol as outpatient  - Follow up with our [facility] located at [address] [place] [address]. Phone Number: [phone]    Communicated with primary team

## 2023-09-07 NOTE — PROGRESS NOTE ADULT - SUBJECTIVE AND OBJECTIVE BOX
NUTRITION SUPPORT TEAM  -  PROGRESS NOTE   resting comfortably  off pressor  on po diet   GI and peech f/u noted  abdomen soft n/d  REVIEW OF SYSTEMS:  Negative except as noted above.     VITALS:  T(F): 97 (09-07 @ 12:00), Max: 97.3 (09-07 @ 08:00)  HR: 91 (09-07 @ 14:00) (82 - 95)  BP: 105/56 (09-07 @ 14:00) (92/55 - 138/63)  RR: 23 (09-07 @ 14:00) (17 - 23)  SpO2: 97% (09-07 @ 14:00) (95% - 100%)    HEIGHT/WEIGHT/BMI:   Height (cm): 167.6 (09-02), 162.6 (11-21), 162.6 (10-09)  Weight (kg): 62.6 (09-02), 61.2 (10-09)  BMI (kg/m2): 22.3 (09-02), 23.1 (11-21), 23.1 (10-09)  09-06-23 @ 07:01  -  09-07-23 @ 07:00  --------------------------------------------------------  IN:    IV PiggyBack: 300 mL  Total IN: 300 mL    OUT:    Indwelling Catheter - Urethral (mL): 50 mL    Norepinephrine: 0 mL    Stool (mL): 5 mL    Voided (mL): 2 mL  Total OUT: 57 mL    Total NET: 243 mL        MEDICATIONS:   acetaminophen     Tablet .. 650 milliGRAM(s) Oral every 6 hours PRN  albuterol/ipratropium for Nebulization 3 milliLiter(s) Nebulizer every 6 hours  allopurinol 300 milliGRAM(s) Oral daily  bacitracin   Ointment 1 Application(s) Topical two times a day  budesonide 160 MICROgram(s)/formoterol 4.5 MICROgram(s) Inhaler 2 Puff(s) Inhalation two times a day  cefepime   IVPB      cefepime   IVPB 2000 milliGRAM(s) IV Intermittent every 12 hours  chlorhexidine 2% Cloths 1 Application(s) Topical daily  collagenase Ointment 1 Application(s) Topical two times a day  collagenase Ointment 1 Application(s) Topical daily  glucagon  Injectable 1 milliGRAM(s) IntraMuscular once  insulin glargine Injectable (LANTUS) 6 Unit(s) SubCutaneous at bedtime  insulin lispro (ADMELOG) corrective regimen sliding scale   SubCutaneous every 6 hours  metroNIDAZOLE  IVPB 500 milliGRAM(s) IV Intermittent every 8 hours  metroNIDAZOLE  IVPB      morphine  - Injectable 2 milliGRAM(s) IV Push once  norepinephrine Infusion 0.1 MICROgram(s)/kG/Min (11.7 mL/Hr) IV Continuous <Continuous>  pantoprazole  Injectable 40 milliGRAM(s) IV Push every 12 hours  polyethylene glycol 3350 17 Gram(s) Oral two times a day    LABS:                         11.7   6.84  )-----------( 242      ( 07 Sep 2023 05:24 )             36.3     137  |  106  |  13  ----------------------------<  54<LL>          (09-07-23 @ 05:24)  3.7   |  23  |  1.0    Ca    7.2<L>          (09-07-23 @ 05:24)  Mg     2.1         (09-07-23 @ 05:24)  TPro  5.1<L>  /  Alb  1.8<L>  /  TBili  0.4  /  DBili  x   /  AST  28  /  ALT  16  /  AlkPhos  92       09-07-23 @ 05:24  Blood Glucose (Past 24 hours):  136 mg/dL (09-07 @ 11:15)  135 mg/dL (09-07 @ 07:30)  48 mg/dL (09-07 @ 05:22)  102 mg/dL (09-06 @ 23:42)    DIET:   Diet, Soft and Bite Sized:   Consistent Carbohydrate No Snacks  DASH/TLC Sodium & Cholesterol Restricted (09-07-23 @ 08:06) [Active]

## 2023-09-07 NOTE — SWALLOW BEDSIDE ASSESSMENT ADULT - COMMENTS
Pt s/p FEES 9/6, recs for soft and bite sized, mildly thick liquids. Pt s/p FEES 9/6, recs for soft and bite sized, mildly thick liquids. Pt for downgrade to medical floor.

## 2023-09-07 NOTE — PROGRESS NOTE ADULT - ASSESSMENT
ASSESSMENT  DKA, improved  - acute duodenal ulcer, melena   - EGD findings noted   - post hemorrhagic anemia  - Septic shock requiring pressors         Hungatella bacteremia likely GI translocation in the setting of appendicitis        pseudomonas in wound c/s  - dysphagia/ confusion      SUGGEST:  - on po diet  advance as per S/S  GI f/u noted  - decrease Miralax to once a day   - add Glucerna Shake - with one sugar free prosource added to it) once a day - twice a day if meal intake poor  will f/u

## 2023-09-07 NOTE — PROGRESS NOTE ADULT - ASSESSMENT
MILEY MARES is a 88y man with a medical history significant for Parkinsonism, diabetes, who presented initially with DKA, and is now in the critical care unit for recurrent shock after a long and complex hospitalization involving severe GI bleeding.     Impression    Melena ?  Septic shock  bacteremia (Hungatella spp)  Appendicitis  Pseudomonal wound growth  Diabetes  Parkinsonism  120PY history    Plan:      CNS:  Mental status at baseline, AAOx3 this AM    HEENT:  Oral care    CARDIOVASCULAR  Vasopressors: off  Shock most likely septic rather than hemorrhagic given overcorrection of Hgb, now with carbapenem resistant pseudomonas in 2x cultures    PULMONARY  HOB @ 45 degrees, aspiration precautions  Standing duonebs, Symbicort BID, already s/p steroids earlier this admission    GASTROINTESTINAL  GI prophylaxis: PPI BID for acute GI bleed, and recent interventions with cautery  Feeds: diet per speech  BM: regimen PRN  Maintain 2x large bore IV access, maintain active type & screen, transfuse to maintain Hgb > 7.    Resuscitation with crystalloid fluid as needed.  Obtain stat CTA abdomen/pelvis for clinically significant bleeding.    Appreciate GI input, MRCP as outpatient    GENITOURINARY/RENAL  Jones: d/c today  UOP had dropped yesterday, repeat IVF as needed  renal function appropriate  monitor lytes, replete prn    INFECTIOUS  On Cefepime & Flagyl  Gave loading dose Tobramycin for resistant pseudomonas  MRSA nares (-) on 8/10  Appreciate ID reccs  Repeat BCx PRN fever  Follow-up culture data    HEMATOLOGIC  DVT ppx: SCD only  Monitor daily CBC & PRN clinical bleed    ENDOCRINE  Follow up FS.  Insulin protocol as needed.  BG goal 140-180  TSH elevated, check T4    MSK/DERM  PT/OT, OOBAT      CODE STATUS: DNR/DNI  DISPO: GMF  LINES: 2x large bore IV

## 2023-09-07 NOTE — PROGRESS NOTE ADULT - SUBJECTIVE AND OBJECTIVE BOX
Patient is a 88y old  Male who presents with a chief complaint of DKA/HHS (06 Sep 2023 15:57)        Over Night Events:    NO acute events  feels well  off oxygen    ROS:     All ROS are negative except HPI         PHYSICAL EXAM    ICU Vital Signs Last 24 Hrs  T(C): 36.1 (07 Sep 2023 04:00), Max: 36.8 (06 Sep 2023 20:00)  T(F): 97 (07 Sep 2023 04:00), Max: 98.2 (06 Sep 2023 20:00)  HR: 89 (07 Sep 2023 07:00) (82 - 119)  BP: 126/65 (07 Sep 2023 06:00) (98/58 - 139/76)  BP(mean): 89 (07 Sep 2023 06:00) (72 - 100)  ABP: --  ABP(mean): --  RR: 22 (07 Sep 2023 07:00) (17 - 30)  SpO2: 100% (07 Sep 2023 07:00) (90% - 100%)    O2 Parameters below as of 07 Sep 2023 08:00  Patient On (Oxygen Delivery Method): room air            Constitutional: no acute distress, well nourished well developed  Neuro: moving all 4 limbs spontaneously, no facial droop or dysarthria  HEENT: NCAT, anicteric  Neck: no visible lymphadenopathy or goiter  Pulm: no respiratory distress. clear to auscultation bilaterally  Cardiac: extremities appear pink and well-perfused.  regular rhythm and rate, no murmur detected  Abdomen: non-distended  Extremities: no peripheral edema      09-06-23 @ 07:01  -  09-07-23 @ 07:00  --------------------------------------------------------  IN:    IV PiggyBack: 300 mL  Total IN: 300 mL    OUT:    Indwelling Catheter - Urethral (mL): 50 mL    Norepinephrine: 0 mL    Stool (mL): 5 mL    Voided (mL): 2 mL  Total OUT: 57 mL    Total NET: 243 mL      09-07-23 @ 07:01  -  09-07-23 @ 07:59  --------------------------------------------------------  IN:    Oral Fluid: 240 mL  Total IN: 240 mL    OUT:    Norepinephrine: 0 mL  Total OUT: 0 mL    Total NET: 240 mL          LABS:                            11.7   6.84  )-----------( 242      ( 07 Sep 2023 05:24 )             36.3                                               09-07    137  |  106  |  13  ----------------------------<  54<LL>  3.7   |  23  |  1.0    Ca    7.2<L>      07 Sep 2023 05:24  Mg     2.1     09-07    TPro  5.1<L>  /  Alb  1.8<L>  /  TBili  0.4  /  DBili  x   /  AST  28  /  ALT  16  /  AlkPhos  92  09-07                                             Urinalysis Basic - ( 07 Sep 2023 05:24 )    Color: x / Appearance: x / SG: x / pH: x  Gluc: 54 mg/dL / Ketone: x  / Bili: x / Urobili: x   Blood: x / Protein: x / Nitrite: x   Leuk Esterase: x / RBC: x / WBC x   Sq Epi: x / Non Sq Epi: x / Bacteria: x                                                  LIVER FUNCTIONS - ( 07 Sep 2023 05:24 )  Alb: 1.8 g/dL / Pro: 5.1 g/dL / ALK PHOS: 92 U/L / ALT: 16 U/L / AST: 28 U/L / GGT: x                                                  Culture - Blood (collected 05 Sep 2023 11:22)  Source: .Blood None  Preliminary Report (06 Sep 2023 23:01):    No growth at 24 hours    Culture - Blood (collected 05 Sep 2023 11:22)  Source: .Blood None  Preliminary Report (06 Sep 2023 23:01):    No growth at 24 hours    Culture - Tissue with Gram Stain (collected 05 Sep 2023 09:03)  Source: .Tissue Other, right upper extremity  Gram Stain (05 Sep 2023 19:41):    Rare polymorphonuclear leukocytes per low power field    Rare Red blood cells per low power field    Few Gram Negative Coccobacilli per oil power field  Preliminary Report (06 Sep 2023 14:18):    Numerous Acinetobacter baumannii/nosocomialis group    Rare Proteus mirabilis                                                                                           MEDICATIONS  (STANDING):  albuterol/ipratropium for Nebulization 3 milliLiter(s) Nebulizer every 6 hours  allopurinol 300 milliGRAM(s) Oral daily  bacitracin   Ointment 1 Application(s) Topical two times a day  budesonide 160 MICROgram(s)/formoterol 4.5 MICROgram(s) Inhaler 2 Puff(s) Inhalation two times a day  cefepime   IVPB      cefepime   IVPB 2000 milliGRAM(s) IV Intermittent every 12 hours  chlorhexidine 2% Cloths 1 Application(s) Topical daily  collagenase Ointment 1 Application(s) Topical two times a day  collagenase Ointment 1 Application(s) Topical daily  dextrose 5%. 1000 milliLiter(s) (100 mL/Hr) IV Continuous <Continuous>  dextrose 5%. 1000 milliLiter(s) (50 mL/Hr) IV Continuous <Continuous>  dextrose 50% Injectable 12.5 Gram(s) IV Push once  dextrose 50% Injectable 25 Gram(s) IV Push once  dextrose 50% Injectable 25 Gram(s) IV Push once  glucagon  Injectable 1 milliGRAM(s) IntraMuscular once  insulin glargine Injectable (LANTUS) 8 Unit(s) SubCutaneous at bedtime  insulin lispro (ADMELOG) corrective regimen sliding scale   SubCutaneous every 6 hours  metroNIDAZOLE  IVPB 500 milliGRAM(s) IV Intermittent every 8 hours  metroNIDAZOLE  IVPB      morphine  - Injectable 2 milliGRAM(s) IV Push once  norepinephrine Infusion 0.1 MICROgram(s)/kG/Min (11.7 mL/Hr) IV Continuous <Continuous>  pantoprazole  Injectable 40 milliGRAM(s) IV Push every 12 hours  polyethylene glycol 3350 17 Gram(s) Oral two times a day  senna 2 Tablet(s) Oral at bedtime    MEDICATIONS  (PRN):  acetaminophen     Tablet .. 650 milliGRAM(s) Oral every 6 hours PRN Temp greater or equal to 38C (100.4F)  dextrose Oral Gel 15 Gram(s) Oral once PRN Blood Glucose LESS THAN 70 milliGRAM(s)/deciliter      New X-rays reviewed:       ECHO reviewed    CXR interpreted by me:    9/7  images and radiologist read reviewed, by my read demonstrating no intrathoracic pathology

## 2023-09-07 NOTE — PROGRESS NOTE ADULT - SUBJECTIVE AND OBJECTIVE BOX
Gastroenterology progress note:     Patient is a 88y old  Male who presents with a chief complaint of DKA/HHS (07 Sep 2023 07:59)       Admitted on: 08-04-23    We are following the patient for:       Interval History: s/p CT abdomen    No acute events overnight.         PAST MEDICAL & SURGICAL HISTORY:  HTN (hypertension)      Gout      BPH (benign prostatic hyperplasia)      Parkinson disease      HLD (hyperlipidemia)      Smoker within last 12 months      Diabetes mellitus      No significant past surgical history          MEDICATIONS  (STANDING):  albuterol/ipratropium for Nebulization 3 milliLiter(s) Nebulizer every 6 hours  allopurinol 300 milliGRAM(s) Oral daily  bacitracin   Ointment 1 Application(s) Topical two times a day  budesonide 160 MICROgram(s)/formoterol 4.5 MICROgram(s) Inhaler 2 Puff(s) Inhalation two times a day  cefepime   IVPB      cefepime   IVPB 2000 milliGRAM(s) IV Intermittent every 12 hours  chlorhexidine 2% Cloths 1 Application(s) Topical daily  collagenase Ointment 1 Application(s) Topical daily  collagenase Ointment 1 Application(s) Topical two times a day  dextrose 5%. 1000 milliLiter(s) (100 mL/Hr) IV Continuous <Continuous>  dextrose 5%. 1000 milliLiter(s) (50 mL/Hr) IV Continuous <Continuous>  dextrose 50% Injectable 12.5 Gram(s) IV Push once  dextrose 50% Injectable 25 Gram(s) IV Push once  dextrose 50% Injectable 25 Gram(s) IV Push once  glucagon  Injectable 1 milliGRAM(s) IntraMuscular once  insulin glargine Injectable (LANTUS) 6 Unit(s) SubCutaneous at bedtime  insulin lispro (ADMELOG) corrective regimen sliding scale   SubCutaneous every 6 hours  metroNIDAZOLE  IVPB      metroNIDAZOLE  IVPB 500 milliGRAM(s) IV Intermittent every 8 hours  morphine  - Injectable 2 milliGRAM(s) IV Push once  norepinephrine Infusion 0.1 MICROgram(s)/kG/Min (11.7 mL/Hr) IV Continuous <Continuous>  pantoprazole  Injectable 40 milliGRAM(s) IV Push every 12 hours  polyethylene glycol 3350 17 Gram(s) Oral two times a day    MEDICATIONS  (PRN):  acetaminophen     Tablet .. 650 milliGRAM(s) Oral every 6 hours PRN Temp greater or equal to 38C (100.4F)  dextrose Oral Gel 15 Gram(s) Oral once PRN Blood Glucose LESS THAN 70 milliGRAM(s)/deciliter      Allergies  No Known Allergies      Review of Systems:   Cardiovascular:  No Chest Pain, No Palpitations  Respiratory:  No Cough, No Dyspnea  Gastrointestinal:  As described in HPI  Skin:  No Skin Lesions, No Jaundice  Neuro:  No Syncope, No Dizziness    Physical Examination:  T(C): 36.1 (09-07-23 @ 12:00), Max: 36.8 (09-06-23 @ 20:00)  HR: 95 (09-07-23 @ 12:00) (82 - 119)  BP: 107/59 (09-07-23 @ 12:00) (92/55 - 138/63)  RR: 23 (09-07-23 @ 12:00) (17 - 30)  SpO2: 97% (09-07-23 @ 12:00) (90% - 100%)      09-06-23 @ 07:01  -  09-07-23 @ 07:00  --------------------------------------------------------  IN: 300 mL / OUT: 57 mL / NET: 243 mL    09-07-23 @ 07:01  -  09-07-23 @ 13:45  --------------------------------------------------------  IN: 340 mL / OUT: 0 mL / NET: 340 mL        GENERAL: AAOx3, no acute distress.  HEAD:  Atraumatic, Normocephalic  EYES: conjunctiva and sclera clear  NECK: Supple, no JVD or thyromegaly  CHEST/LUNG: Clear to auscultation bilaterally; No wheeze, rhonchi, or rales  HEART: Regular rate and rhythm; normal S1, S2, No murmurs.  ABDOMEN: Soft, nontender, nondistended; Bowel sounds present  NEUROLOGY: No asterixis or tremor.   SKIN: Intact, no jaundice     Data:                        11.7   6.84  )-----------( 242      ( 07 Sep 2023 05:24 )             36.3     Hgb trend:  11.7  09-07-23 @ 05:24  12.6  09-06-23 @ 04:37  11.4  09-05-23 @ 21:58  12.3  09-05-23 @ 11:22  12.4  09-05-23 @ 04:34  7.1  09-04-23 @ 16:00      09-04-23 @ 07:01  -  09-05-23 @ 07:00  --------------------------------------------------------  IN: 617 mL      09-07    137  |  106  |  13  ----------------------------<  54<LL>  3.7   |  23  |  1.0    Ca    7.2<L>      07 Sep 2023 05:24  Mg     2.1     09-07    TPro  5.1<L>  /  Alb  1.8<L>  /  TBili  0.4  /  DBili  x   /  AST  28  /  ALT  16  /  AlkPhos  92  09-07    Liver panel trend:  TBili 0.4   /   AST 28   /   ALT 16   /   AlkP 92   /   Tptn 5.1   /   Alb 1.8    /   DBili --      09-07  TBili 0.5   /   AST 23   /   ALT 21   /   AlkP 102   /   Tptn 5.1   /   Alb 2.1    /   DBili --      09-06  TBili 0.8   /   AST 33   /   ALT 27   /   AlkP 111   /   Tptn 5.4   /   Alb 2.4    /   DBili --      09-05  TBili 0.4   /   AST 29   /   ALT 20   /   AlkP 93   /   Tptn 4.7   /   Alb 1.9    /   DBili --      09-04  TBili 0.5   /   AST 20   /   ALT 17   /   AlkP 99   /   Tptn 5.1   /   Alb 2.1    /   DBili --      09-03  TBili 0.4   /   AST 21   /   ALT 20   /   AlkP 105   /   Tptn 5.5   /   Alb 2.3    /   DBili --      09-02  TBili 0.3   /   AST 26   /   ALT 25   /   AlkP 90   /   Tptn 5.3   /   Alb 2.5    /   DBili --      09-01  TBili 0.3   /   AST 15   /   ALT 22   /   AlkP 83   /   Tptn 4.9   /   Alb 2.1    /   DBili --      08-31  TBili 0.3   /   AST 20   /   ALT 26   /   AlkP 92   /   Tptn 5.3   /   Alb 2.5    /   DBili --      08-30  TBili 0.3   /   AST 28   /   ALT 31   /   AlkP 94   /   Tptn 5.4   /   Alb 2.3    /   DBili --      08-30  TBili 0.3   /   AST 18   /   ALT 23   /   AlkP 91   /   Tptn 5.4   /   Alb 2.4    /   DBili --      08-29          Culture - Blood (collected 05 Sep 2023 11:22)  Source: .Blood None  Preliminary Report (06 Sep 2023 23:01):    No growth at 24 hours    Culture - Blood (collected 05 Sep 2023 11:22)  Source: .Blood None  Preliminary Report (06 Sep 2023 23:01):    No growth at 24 hours    Culture - Tissue with Gram Stain (collected 05 Sep 2023 09:03)  Source: .Tissue Other, right upper extremity  Gram Stain (05 Sep 2023 19:41):    Rare polymorphonuclear leukocytes per low power field    Rare Red blood cells per low power field    Few Gram Negative Coccobacilli per oil power field  Preliminary Report (06 Sep 2023 14:18):    Numerous Acinetobacter baumannii/nosocomialis group    Rare Proteus mirabilis  Organism: Proteus mirabilis (07 Sep 2023 11:56)  Organism: Proteus mirabilis (07 Sep 2023 11:56)         Radiology:  CT Abdomen and Pelvis w/ IV Cont:   ACC: [id number] EXAM:  CT ABDOMEN AND PELVIS IC   ORDERED BY: TATIANA HAMM     PROCEDURE DATE:  09/06/2023          INTERPRETATION:  Clinical Indication: Evaluate for appendicitis.    Technique: Multidetector-row CT images of the abdomen and pelvis were   obtained from the xiphoid through the symphysis pubis. Oral and   intravenous contrast were administered. Coronal and sagittal   reconstructions were performed.    Contrast: 100 cc Omnipaque 350 non-ionic intravenous contrast.    Comparison: CT abdomen pelvis 8/19/2023, 11/17/2022    Findings:    01. LIVER: Normal morphology. Hepatic cyst and (essentially stable),   subcentimeter hypodensities too small to further characterize.  02. SPLEEN: Normal.  03. PANCREAS: Stable pancreatic body hypodense lesion, likely a cyst   (series 7 image 36). No duct dilation.  04. GALLBLADDER/BILIARY TREE: No biliary duct dilatation.  Normal   gallbladder.  05. ADRENALS: Normal.  06. KIDNEYS: Symmetric enhancement.  No hydronephrosis, renal stone, or   soft tissue attenuation mass.  07. LYMPHADENOPATHY/RETROPERITONEUM: No lymphadenopathy.  08. BOWEL: No bowel obstruction. Stable mild prominence of the appendix   measuring 8 mm (series 5 image 99). Liquid stool is noted within the   sigmoid colon and rectum.  09. PELVIC VISCERA: Prostate measures 4.4 cm in transverse dimension   (series 5 image 117). Few foci of air are noted within the urinary   bladder. Trace perivesicular stranding.  10. PELVIC LYMPH NODES: No lymphadenopathy.  11. VASCULATURE: Stable infrarenal abdominal aortic aneurysm measuring   5.3 (AP) x 6.8 (craniocaudal) cm (series 601 image 73). 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  12. PERITONEUM/ABDOMINAL WALL: No gross ascites. Tiny fat-containing   umbilical hernia.  13. SKELETAL: Degenerative changes.  14. LUNG BASES: Decrease in bilateral pleural effusions now trace with   adjacent small atelectasis.    IMPRESSION:    Stable prominence of the appendix measuring 8 mm, with no hever   inflammatory changes.    Few foci of air within the urinary bladder and trace surrounding   inflammation, correlate with history of instrumentation and urinalysis.    Liquid stool within the sigmoid colon and rectum, likely representing a   diarrheal illness.    Additional findings (including vascular pathology) detailed in the body   of the report.      --- End of Report ---            DAVID TRENT MD; Attending Radiologist  This document has been electronically signed. Sep  7 2023  8:30AM (09-06-23 @ 20:31)       Gastroenterology progress note:     Patient is a 88y old  Male who presents with a chief complaint of DKA/HHS (07 Sep 2023 07:59)       Admitted on: 08-04-23    We are following the patient for: anemia       Interval History: s/p CT abdomen    No acute events overnight.         PAST MEDICAL & SURGICAL HISTORY:  HTN (hypertension)      Gout      BPH (benign prostatic hyperplasia)      Parkinson disease      HLD (hyperlipidemia)      Smoker within last 12 months      Diabetes mellitus      No significant past surgical history          MEDICATIONS  (STANDING):  albuterol/ipratropium for Nebulization 3 milliLiter(s) Nebulizer every 6 hours  allopurinol 300 milliGRAM(s) Oral daily  bacitracin   Ointment 1 Application(s) Topical two times a day  budesonide 160 MICROgram(s)/formoterol 4.5 MICROgram(s) Inhaler 2 Puff(s) Inhalation two times a day  cefepime   IVPB      cefepime   IVPB 2000 milliGRAM(s) IV Intermittent every 12 hours  chlorhexidine 2% Cloths 1 Application(s) Topical daily  collagenase Ointment 1 Application(s) Topical daily  collagenase Ointment 1 Application(s) Topical two times a day  dextrose 5%. 1000 milliLiter(s) (100 mL/Hr) IV Continuous <Continuous>  dextrose 5%. 1000 milliLiter(s) (50 mL/Hr) IV Continuous <Continuous>  dextrose 50% Injectable 12.5 Gram(s) IV Push once  dextrose 50% Injectable 25 Gram(s) IV Push once  dextrose 50% Injectable 25 Gram(s) IV Push once  glucagon  Injectable 1 milliGRAM(s) IntraMuscular once  insulin glargine Injectable (LANTUS) 6 Unit(s) SubCutaneous at bedtime  insulin lispro (ADMELOG) corrective regimen sliding scale   SubCutaneous every 6 hours  metroNIDAZOLE  IVPB      metroNIDAZOLE  IVPB 500 milliGRAM(s) IV Intermittent every 8 hours  morphine  - Injectable 2 milliGRAM(s) IV Push once  norepinephrine Infusion 0.1 MICROgram(s)/kG/Min (11.7 mL/Hr) IV Continuous <Continuous>  pantoprazole  Injectable 40 milliGRAM(s) IV Push every 12 hours  polyethylene glycol 3350 17 Gram(s) Oral two times a day    MEDICATIONS  (PRN):  acetaminophen     Tablet .. 650 milliGRAM(s) Oral every 6 hours PRN Temp greater or equal to 38C (100.4F)  dextrose Oral Gel 15 Gram(s) Oral once PRN Blood Glucose LESS THAN 70 milliGRAM(s)/deciliter      Allergies  No Known Allergies      Review of Systems:   Cardiovascular:  No Chest Pain, No Palpitations  Respiratory:  No Cough, No Dyspnea  Gastrointestinal:  As described in HPI  Skin:  No Skin Lesions, No Jaundice  Neuro:  No Syncope, No Dizziness    Physical Examination:  T(C): 36.1 (09-07-23 @ 12:00), Max: 36.8 (09-06-23 @ 20:00)  HR: 95 (09-07-23 @ 12:00) (82 - 119)  BP: 107/59 (09-07-23 @ 12:00) (92/55 - 138/63)  RR: 23 (09-07-23 @ 12:00) (17 - 30)  SpO2: 97% (09-07-23 @ 12:00) (90% - 100%)      09-06-23 @ 07:01  -  09-07-23 @ 07:00  --------------------------------------------------------  IN: 300 mL / OUT: 57 mL / NET: 243 mL    09-07-23 @ 07:01  -  09-07-23 @ 13:45  --------------------------------------------------------  IN: 340 mL / OUT: 0 mL / NET: 340 mL        GENERAL: , no acute distress.  HEAD:  Atraumatic, Normocephalic  EYES: conjunctiva and sclera clear  NECK: Supple, no JVD or thyromegaly  CHEST/LUNG: Clear to auscultation bilaterally; No wheeze, rhonchi, or rales  HEART: Regular rate and rhythm; normal S1, S2, No murmurs.  ABDOMEN: Soft, nontender, nondistended; Bowel sounds present  NEUROLOGY: No asterixis or tremor.   SKIN: Intact, no jaundice     Data:                        11.7   6.84  )-----------( 242      ( 07 Sep 2023 05:24 )             36.3     Hgb trend:  11.7  09-07-23 @ 05:24  12.6  09-06-23 @ 04:37  11.4  09-05-23 @ 21:58  12.3  09-05-23 @ 11:22  12.4  09-05-23 @ 04:34  7.1  09-04-23 @ 16:00      09-04-23 @ 07:01  -  09-05-23 @ 07:00  --------------------------------------------------------  IN: 617 mL      09-07    137  |  106  |  13  ----------------------------<  54<LL>  3.7   |  23  |  1.0    Ca    7.2<L>      07 Sep 2023 05:24  Mg     2.1     09-07    TPro  5.1<L>  /  Alb  1.8<L>  /  TBili  0.4  /  DBili  x   /  AST  28  /  ALT  16  /  AlkPhos  92  09-07    Liver panel trend:  TBili 0.4   /   AST 28   /   ALT 16   /   AlkP 92   /   Tptn 5.1   /   Alb 1.8    /   DBili --      09-07  TBili 0.5   /   AST 23   /   ALT 21   /   AlkP 102   /   Tptn 5.1   /   Alb 2.1    /   DBili --      09-06  TBili 0.8   /   AST 33   /   ALT 27   /   AlkP 111   /   Tptn 5.4   /   Alb 2.4    /   DBili --      09-05  TBili 0.4   /   AST 29   /   ALT 20   /   AlkP 93   /   Tptn 4.7   /   Alb 1.9    /   DBili --      09-04  TBili 0.5   /   AST 20   /   ALT 17   /   AlkP 99   /   Tptn 5.1   /   Alb 2.1    /   DBili --      09-03  TBili 0.4   /   AST 21   /   ALT 20   /   AlkP 105   /   Tptn 5.5   /   Alb 2.3    /   DBili --      09-02  TBili 0.3   /   AST 26   /   ALT 25   /   AlkP 90   /   Tptn 5.3   /   Alb 2.5    /   DBili --      09-01  TBili 0.3   /   AST 15   /   ALT 22   /   AlkP 83   /   Tptn 4.9   /   Alb 2.1    /   DBili --      08-31  TBili 0.3   /   AST 20   /   ALT 26   /   AlkP 92   /   Tptn 5.3   /   Alb 2.5    /   DBili --      08-30  TBili 0.3   /   AST 28   /   ALT 31   /   AlkP 94   /   Tptn 5.4   /   Alb 2.3    /   DBili --      08-30  TBili 0.3   /   AST 18   /   ALT 23   /   AlkP 91   /   Tptn 5.4   /   Alb 2.4    /   DBili --      08-29          Culture - Blood (collected 05 Sep 2023 11:22)  Source: .Blood None  Preliminary Report (06 Sep 2023 23:01):    No growth at 24 hours    Culture - Blood (collected 05 Sep 2023 11:22)  Source: .Blood None  Preliminary Report (06 Sep 2023 23:01):    No growth at 24 hours    Culture - Tissue with Gram Stain (collected 05 Sep 2023 09:03)  Source: .Tissue Other, right upper extremity  Gram Stain (05 Sep 2023 19:41):    Rare polymorphonuclear leukocytes per low power field    Rare Red blood cells per low power field    Few Gram Negative Coccobacilli per oil power field  Preliminary Report (06 Sep 2023 14:18):    Numerous Acinetobacter baumannii/nosocomialis group    Rare Proteus mirabilis  Organism: Proteus mirabilis (07 Sep 2023 11:56)  Organism: Proteus mirabilis (07 Sep 2023 11:56)         Radiology:  CT Abdomen and Pelvis w/ IV Cont:   ACC: [id number] EXAM:  CT ABDOMEN AND PELVIS IC   ORDERED BY: TATIANA HAMM     PROCEDURE DATE:  09/06/2023          INTERPRETATION:  Clinical Indication: Evaluate for appendicitis.    Technique: Multidetector-row CT images of the abdomen and pelvis were   obtained from the xiphoid through the symphysis pubis. Oral and   intravenous contrast were administered. Coronal and sagittal   reconstructions were performed.    Contrast: 100 cc Omnipaque 350 non-ionic intravenous contrast.    Comparison: CT abdomen pelvis 8/19/2023, 11/17/2022    Findings:    01. LIVER: Normal morphology. Hepatic cyst and (essentially stable),   subcentimeter hypodensities too small to further characterize.  02. SPLEEN: Normal.  03. PANCREAS: Stable pancreatic body hypodense lesion, likely a cyst   (series 7 image 36). No duct dilation.  04. GALLBLADDER/BILIARY TREE: No biliary duct dilatation.  Normal   gallbladder.  05. ADRENALS: Normal.  06. KIDNEYS: Symmetric enhancement.  No hydronephrosis, renal stone, or   soft tissue attenuation mass.  07. LYMPHADENOPATHY/RETROPERITONEUM: No lymphadenopathy.  08. BOWEL: No bowel obstruction. Stable mild prominence of the appendix   measuring 8 mm (series 5 image 99). Liquid stool is noted within the   sigmoid colon and rectum.  09. PELVIC VISCERA: Prostate measures 4.4 cm in transverse dimension   (series 5 image 117). Few foci of air are noted within the urinary   bladder. Trace perivesicular stranding.  10. PELVIC LYMPH NODES: No lymphadenopathy.  11. VASCULATURE: Stable infrarenal abdominal aortic aneurysm measuring   5.3 (AP) x 6.8 (craniocaudal) cm (series 601 image 73). 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  12. PERITONEUM/ABDOMINAL WALL: No gross ascites. Tiny fat-containing   umbilical hernia.  13. SKELETAL: Degenerative changes.  14. LUNG BASES: Decrease in bilateral pleural effusions now trace with   adjacent small atelectasis.    IMPRESSION:    Stable prominence of the appendix measuring 8 mm, with no hever   inflammatory changes.    Few foci of air within the urinary bladder and trace surrounding   inflammation, correlate with history of instrumentation and urinalysis.    Liquid stool within the sigmoid colon and rectum, likely representing a   diarrheal illness.    Additional findings (including vascular pathology) detailed in the body   of the report.      --- End of Report ---            DAVID TRENT MD; Attending Radiologist  This document has been electronically signed. Sep  7 2023  8:30AM (09-06-23 @ 20:31)

## 2023-09-08 LAB
-  AMIKACIN: SIGNIFICANT CHANGE UP
-  AMPICILLIN/SULBACTAM: SIGNIFICANT CHANGE UP
-  CEFEPIME: SIGNIFICANT CHANGE UP
-  CEFTAZIDIME: SIGNIFICANT CHANGE UP
-  CIPROFLOXACIN: SIGNIFICANT CHANGE UP
-  GENTAMICIN: SIGNIFICANT CHANGE UP
-  IMIPENEM: SIGNIFICANT CHANGE UP
-  LEVOFLOXACIN: SIGNIFICANT CHANGE UP
-  MEROPENEM: SIGNIFICANT CHANGE UP
-  MINOCYCLINE: SIGNIFICANT CHANGE UP
-  PIPERACILLIN/TAZOBACTAM: SIGNIFICANT CHANGE UP
-  TOBRAMYCIN: SIGNIFICANT CHANGE UP
-  TRIMETHOPRIM/SULFAMETHOXAZOLE: SIGNIFICANT CHANGE UP
GLUCOSE BLDC GLUCOMTR-MCNC: 100 MG/DL — HIGH (ref 70–99)
GLUCOSE BLDC GLUCOMTR-MCNC: 142 MG/DL — HIGH (ref 70–99)
GLUCOSE BLDC GLUCOMTR-MCNC: 224 MG/DL — HIGH (ref 70–99)
GLUCOSE BLDC GLUCOMTR-MCNC: 234 MG/DL — HIGH (ref 70–99)
GLUCOSE BLDC GLUCOMTR-MCNC: 251 MG/DL — HIGH (ref 70–99)
METHOD TYPE: SIGNIFICANT CHANGE UP
METHOD TYPE: SIGNIFICANT CHANGE UP

## 2023-09-08 RX ORDER — PANTOPRAZOLE SODIUM 20 MG/1
40 TABLET, DELAYED RELEASE ORAL
Refills: 0 | Status: DISCONTINUED | OUTPATIENT
Start: 2023-09-08 | End: 2023-09-18

## 2023-09-08 RX ADMIN — INSULIN GLARGINE 6 UNIT(S): 100 INJECTION, SOLUTION SUBCUTANEOUS at 21:12

## 2023-09-08 RX ADMIN — Medication 2: at 12:16

## 2023-09-08 RX ADMIN — Medication 1 APPLICATION(S): at 05:33

## 2023-09-08 RX ADMIN — POLYETHYLENE GLYCOL 3350 17 GRAM(S): 17 POWDER, FOR SOLUTION ORAL at 05:35

## 2023-09-08 RX ADMIN — PANTOPRAZOLE SODIUM 40 MILLIGRAM(S): 20 TABLET, DELAYED RELEASE ORAL at 17:27

## 2023-09-08 RX ADMIN — CHLORHEXIDINE GLUCONATE 1 APPLICATION(S): 213 SOLUTION TOPICAL at 05:37

## 2023-09-08 RX ADMIN — CEFEPIME 100 MILLIGRAM(S): 1 INJECTION, POWDER, FOR SOLUTION INTRAMUSCULAR; INTRAVENOUS at 05:32

## 2023-09-08 RX ADMIN — Medication 300 MILLIGRAM(S): at 12:15

## 2023-09-08 RX ADMIN — CEFEPIME 100 MILLIGRAM(S): 1 INJECTION, POWDER, FOR SOLUTION INTRAMUSCULAR; INTRAVENOUS at 17:27

## 2023-09-08 RX ADMIN — Medication 3 MILLILITER(S): at 19:29

## 2023-09-08 RX ADMIN — Medication 100 MILLIGRAM(S): at 21:12

## 2023-09-08 RX ADMIN — Medication 1 APPLICATION(S): at 17:28

## 2023-09-08 RX ADMIN — Medication 100 MILLIGRAM(S): at 05:47

## 2023-09-08 RX ADMIN — PANTOPRAZOLE SODIUM 40 MILLIGRAM(S): 20 TABLET, DELAYED RELEASE ORAL at 05:34

## 2023-09-08 RX ADMIN — Medication 1 APPLICATION(S): at 05:37

## 2023-09-08 RX ADMIN — Medication 100 MILLIGRAM(S): at 14:19

## 2023-09-08 NOTE — SWALLOW BEDSIDE ASSESSMENT ADULT - SWALLOW EVAL: DIAGNOSIS
+toleration for mildly thick liquids, puree, and soft and bite sized w/o overt s/s aspiration/penetration

## 2023-09-08 NOTE — SWALLOW BEDSIDE ASSESSMENT ADULT - COMMENTS
Pt s/p FEES 9/6, recs for soft and bite sized, mildly thick liquids. Pt downgraded to medical floor.

## 2023-09-09 LAB
-  POLYMYXIN B: SIGNIFICANT CHANGE UP
GLUCOSE BLDC GLUCOMTR-MCNC: 115 MG/DL — HIGH (ref 70–99)
GLUCOSE BLDC GLUCOMTR-MCNC: 135 MG/DL — HIGH (ref 70–99)
GLUCOSE BLDC GLUCOMTR-MCNC: 176 MG/DL — HIGH (ref 70–99)
GLUCOSE BLDC GLUCOMTR-MCNC: 193 MG/DL — HIGH (ref 70–99)
GLUCOSE BLDC GLUCOMTR-MCNC: 212 MG/DL — HIGH (ref 70–99)

## 2023-09-09 RX ADMIN — CEFEPIME 100 MILLIGRAM(S): 1 INJECTION, POWDER, FOR SOLUTION INTRAMUSCULAR; INTRAVENOUS at 17:36

## 2023-09-09 RX ADMIN — Medication 300 MILLIGRAM(S): at 11:48

## 2023-09-09 RX ADMIN — Medication 1 APPLICATION(S): at 05:20

## 2023-09-09 RX ADMIN — Medication 100 MILLIGRAM(S): at 13:36

## 2023-09-09 RX ADMIN — CHLORHEXIDINE GLUCONATE 1 APPLICATION(S): 213 SOLUTION TOPICAL at 05:20

## 2023-09-09 RX ADMIN — Medication 1 APPLICATION(S): at 17:36

## 2023-09-09 RX ADMIN — PANTOPRAZOLE SODIUM 40 MILLIGRAM(S): 20 TABLET, DELAYED RELEASE ORAL at 05:21

## 2023-09-09 RX ADMIN — Medication 3 MILLILITER(S): at 01:53

## 2023-09-09 RX ADMIN — Medication 1 APPLICATION(S): at 17:37

## 2023-09-09 RX ADMIN — POLYETHYLENE GLYCOL 3350 17 GRAM(S): 17 POWDER, FOR SOLUTION ORAL at 17:37

## 2023-09-09 RX ADMIN — Medication 1 APPLICATION(S): at 11:48

## 2023-09-09 RX ADMIN — Medication 3 MILLILITER(S): at 20:21

## 2023-09-09 RX ADMIN — Medication 1: at 11:48

## 2023-09-09 RX ADMIN — Medication 100 MILLIGRAM(S): at 05:21

## 2023-09-09 RX ADMIN — CEFEPIME 100 MILLIGRAM(S): 1 INJECTION, POWDER, FOR SOLUTION INTRAMUSCULAR; INTRAVENOUS at 05:20

## 2023-09-09 RX ADMIN — PANTOPRAZOLE SODIUM 40 MILLIGRAM(S): 20 TABLET, DELAYED RELEASE ORAL at 17:37

## 2023-09-09 RX ADMIN — INSULIN GLARGINE 6 UNIT(S): 100 INJECTION, SOLUTION SUBCUTANEOUS at 21:28

## 2023-09-09 RX ADMIN — Medication 1: at 23:28

## 2023-09-09 RX ADMIN — Medication 100 MILLIGRAM(S): at 21:28

## 2023-09-09 RX ADMIN — POLYETHYLENE GLYCOL 3350 17 GRAM(S): 17 POWDER, FOR SOLUTION ORAL at 05:20

## 2023-09-09 NOTE — PROGRESS NOTE ADULT - SUBJECTIVE AND OBJECTIVE BOX
24H events:    Patient is a 88y old Male who presents with a chief complaint of DKA/HHS (07 Sep 2023 15:12)    Primary diagnosis of DKA, type 2    Today is hospital day 36d. This morning patient was seen and examined at bedside, resting comfortably in bed.    No acute or major events overnight.    PAST MEDICAL & SURGICAL HISTORY  HTN (hypertension)    Gout    BPH (benign prostatic hyperplasia)    Parkinson disease    HLD (hyperlipidemia)    Smoker within last 12 months    Diabetes mellitus    No significant past surgical history      SOCIAL HISTORY:  Social History:      ALLERGIES:  No Known Allergies    MEDICATIONS:  STANDING MEDICATIONS  albuterol/ipratropium for Nebulization 3 milliLiter(s) Nebulizer every 6 hours  allopurinol 300 milliGRAM(s) Oral daily  bacitracin   Ointment 1 Application(s) Topical two times a day  budesonide 160 MICROgram(s)/formoterol 4.5 MICROgram(s) Inhaler 2 Puff(s) Inhalation two times a day  cefepime   IVPB 2000 milliGRAM(s) IV Intermittent every 12 hours  cefepime   IVPB      chlorhexidine 2% Cloths 1 Application(s) Topical daily  collagenase Ointment 1 Application(s) Topical two times a day  collagenase Ointment 1 Application(s) Topical daily  dextrose 5%. 1000 milliLiter(s) IV Continuous <Continuous>  dextrose 5%. 1000 milliLiter(s) IV Continuous <Continuous>  dextrose 50% Injectable 12.5 Gram(s) IV Push once  dextrose 50% Injectable 25 Gram(s) IV Push once  dextrose 50% Injectable 25 Gram(s) IV Push once  glucagon  Injectable 1 milliGRAM(s) IntraMuscular once  insulin glargine Injectable (LANTUS) 6 Unit(s) SubCutaneous at bedtime  insulin lispro (ADMELOG) corrective regimen sliding scale   SubCutaneous every 6 hours  metroNIDAZOLE  IVPB 500 milliGRAM(s) IV Intermittent every 8 hours  metroNIDAZOLE  IVPB      morphine  - Injectable 2 milliGRAM(s) IV Push once  pantoprazole    Tablet 40 milliGRAM(s) Oral two times a day  polyethylene glycol 3350 17 Gram(s) Oral two times a day    PRN MEDICATIONS  acetaminophen     Tablet .. 650 milliGRAM(s) Oral every 6 hours PRN  dextrose Oral Gel 15 Gram(s) Oral once PRN    VITALS:   T(F): 97.4  HR: 94  BP: 95/56  RR: 18  SpO2: --    PHYSICAL EXAM:    CONSTITUTIONAL: No acute distress.  SKIN: Skin exam is warm and dry. Patient has dressing on Rt forearm due to severe IV inflitration  HEAD: Normocephalic; atraumatic.  EYES: Pupils equal round reactive to light, Extraocular movements intact;   NECK: Supple; non tender. No rigidity  CARD: Regular rate and rhythm. Normal S1, S2;   RESP: Lungs clear to auscultation bilaterally. No wheezes, rales or rhonchi. B/L air entry  ABD: Abdomen soft; non-tender; non-distended;    EXT: No clubbing, cyanosis or edema.   NEURO: Patient is awake, unable to asses orientation    LABS:                        RADIOLOGY:

## 2023-09-09 NOTE — PROGRESS NOTE ADULT - ASSESSMENT
MILEY MARES is a 88y man with a medical history significant for Parkinsonism, diabetes, who presented initially with DKA, and is now in the critical care unit for recurrent shock after a long and complex hospitalization involving severe GI bleeding.     Impression    Duodenal ulcer?  #Melena ?  - DVT ppx: SCD only  - Monitor daily CBC & PRN clinical bleed  #Septic shock    #bacteremia (Hungatella spp)  #Appendicitis  #Pseudomonal wound growth  - On Cefepime & Flagyl  loading dose of  Tobramycin for resistant pseudomonas was given  MRSA nares (-) on 8/10  Appreciate ID reccs  Repeat BCx PRN fever  Follow-up culture data  - GI prophylaxis: PPI BID for acute GI bleed, and recent interventions with cautery  Feeds: diet per speech  BM: regimen PRN  Maintain 2x large bore IV access, maintain active type & screen, transfuse to maintain Hgb > 7.    Resuscitation with crystalloid fluid as needed.  Obtain stat CTA abdomen/pelvis for clinically significant bleeding.    Appreciate GI input, MRCP as outpatient    #Diabetes  - Follow up FS.  Insulin protocol as needed.  BG goal 140-180  TSH elevated, T4 is also elevated  Parkinsonism  120PY history    CODE STATUS: DNR/DNI  DISPO:

## 2023-09-10 LAB
ALBUMIN SERPL ELPH-MCNC: 1.7 G/DL — LOW (ref 3.5–5.2)
ALP SERPL-CCNC: 81 U/L — SIGNIFICANT CHANGE UP (ref 30–115)
ALT FLD-CCNC: 13 U/L — SIGNIFICANT CHANGE UP (ref 0–41)
ANION GAP SERPL CALC-SCNC: 9 MMOL/L — SIGNIFICANT CHANGE UP (ref 7–14)
AST SERPL-CCNC: 25 U/L — SIGNIFICANT CHANGE UP (ref 0–41)
BASOPHILS # BLD AUTO: 0.06 K/UL — SIGNIFICANT CHANGE UP (ref 0–0.2)
BASOPHILS NFR BLD AUTO: 0.9 % — SIGNIFICANT CHANGE UP (ref 0–1)
BILIRUB SERPL-MCNC: 0.3 MG/DL — SIGNIFICANT CHANGE UP (ref 0.2–1.2)
BUN SERPL-MCNC: 15 MG/DL — SIGNIFICANT CHANGE UP (ref 10–20)
CALCIUM SERPL-MCNC: 7 MG/DL — LOW (ref 8.4–10.4)
CHLORIDE SERPL-SCNC: 109 MMOL/L — SIGNIFICANT CHANGE UP (ref 98–110)
CO2 SERPL-SCNC: 21 MMOL/L — SIGNIFICANT CHANGE UP (ref 17–32)
CREAT SERPL-MCNC: 0.9 MG/DL — SIGNIFICANT CHANGE UP (ref 0.7–1.5)
CULTURE RESULTS: SIGNIFICANT CHANGE UP
EGFR: 83 ML/MIN/1.73M2 — SIGNIFICANT CHANGE UP
EOSINOPHIL # BLD AUTO: 0.03 K/UL — SIGNIFICANT CHANGE UP (ref 0–0.7)
EOSINOPHIL NFR BLD AUTO: 0.4 % — SIGNIFICANT CHANGE UP (ref 0–8)
GLUCOSE BLDC GLUCOMTR-MCNC: 117 MG/DL — HIGH (ref 70–99)
GLUCOSE BLDC GLUCOMTR-MCNC: 146 MG/DL — HIGH (ref 70–99)
GLUCOSE BLDC GLUCOMTR-MCNC: 161 MG/DL — HIGH (ref 70–99)
GLUCOSE BLDC GLUCOMTR-MCNC: 219 MG/DL — HIGH (ref 70–99)
GLUCOSE BLDC GLUCOMTR-MCNC: 276 MG/DL — HIGH (ref 70–99)
GLUCOSE SERPL-MCNC: 124 MG/DL — HIGH (ref 70–99)
HCT VFR BLD CALC: 34.1 % — LOW (ref 42–52)
HGB BLD-MCNC: 11.2 G/DL — LOW (ref 14–18)
IMM GRANULOCYTES NFR BLD AUTO: 2.7 % — HIGH (ref 0.1–0.3)
LYMPHOCYTES # BLD AUTO: 1.54 K/UL — SIGNIFICANT CHANGE UP (ref 1.2–3.4)
LYMPHOCYTES # BLD AUTO: 21.9 % — SIGNIFICANT CHANGE UP (ref 20.5–51.1)
MAGNESIUM SERPL-MCNC: 1.9 MG/DL — SIGNIFICANT CHANGE UP (ref 1.8–2.4)
MCHC RBC-ENTMCNC: 30.9 PG — SIGNIFICANT CHANGE UP (ref 27–31)
MCHC RBC-ENTMCNC: 32.8 G/DL — SIGNIFICANT CHANGE UP (ref 32–37)
MCV RBC AUTO: 93.9 FL — SIGNIFICANT CHANGE UP (ref 80–94)
MONOCYTES # BLD AUTO: 0.37 K/UL — SIGNIFICANT CHANGE UP (ref 0.1–0.6)
MONOCYTES NFR BLD AUTO: 5.3 % — SIGNIFICANT CHANGE UP (ref 1.7–9.3)
NEUTROPHILS # BLD AUTO: 4.85 K/UL — SIGNIFICANT CHANGE UP (ref 1.4–6.5)
NEUTROPHILS NFR BLD AUTO: 68.8 % — SIGNIFICANT CHANGE UP (ref 42.2–75.2)
NRBC # BLD: 0 /100 WBCS — SIGNIFICANT CHANGE UP (ref 0–0)
ORGANISM # SPEC MICROSCOPIC CNT: SIGNIFICANT CHANGE UP
PLATELET # BLD AUTO: 239 K/UL — SIGNIFICANT CHANGE UP (ref 130–400)
PMV BLD: 10.5 FL — HIGH (ref 7.4–10.4)
POTASSIUM SERPL-MCNC: 2.9 MMOL/L — LOW (ref 3.5–5)
POTASSIUM SERPL-SCNC: 2.9 MMOL/L — LOW (ref 3.5–5)
PROT SERPL-MCNC: 4.9 G/DL — LOW (ref 6–8)
RBC # BLD: 3.63 M/UL — LOW (ref 4.7–6.1)
RBC # FLD: 16 % — HIGH (ref 11.5–14.5)
SODIUM SERPL-SCNC: 139 MMOL/L — SIGNIFICANT CHANGE UP (ref 135–146)
SPECIMEN SOURCE: SIGNIFICANT CHANGE UP
WBC # BLD: 7.04 K/UL — SIGNIFICANT CHANGE UP (ref 4.8–10.8)
WBC # FLD AUTO: 7.04 K/UL — SIGNIFICANT CHANGE UP (ref 4.8–10.8)

## 2023-09-10 RX ADMIN — Medication 1 APPLICATION(S): at 05:26

## 2023-09-10 RX ADMIN — INSULIN GLARGINE 6 UNIT(S): 100 INJECTION, SOLUTION SUBCUTANEOUS at 21:43

## 2023-09-10 RX ADMIN — Medication 2: at 12:17

## 2023-09-10 RX ADMIN — POLYETHYLENE GLYCOL 3350 17 GRAM(S): 17 POWDER, FOR SOLUTION ORAL at 17:29

## 2023-09-10 RX ADMIN — Medication 3 MILLILITER(S): at 09:10

## 2023-09-10 RX ADMIN — Medication 300 MILLIGRAM(S): at 12:15

## 2023-09-10 RX ADMIN — Medication 100 MILLIGRAM(S): at 13:29

## 2023-09-10 RX ADMIN — Medication 100 MILLIGRAM(S): at 05:22

## 2023-09-10 RX ADMIN — Medication 3 MILLILITER(S): at 20:18

## 2023-09-10 RX ADMIN — Medication 1 APPLICATION(S): at 18:42

## 2023-09-10 RX ADMIN — Medication 3: at 17:28

## 2023-09-10 RX ADMIN — Medication 1 APPLICATION(S): at 17:29

## 2023-09-10 RX ADMIN — CEFEPIME 100 MILLIGRAM(S): 1 INJECTION, POWDER, FOR SOLUTION INTRAMUSCULAR; INTRAVENOUS at 17:28

## 2023-09-10 RX ADMIN — CHLORHEXIDINE GLUCONATE 1 APPLICATION(S): 213 SOLUTION TOPICAL at 05:25

## 2023-09-10 RX ADMIN — Medication 1 APPLICATION(S): at 12:15

## 2023-09-10 RX ADMIN — Medication 1 APPLICATION(S): at 05:24

## 2023-09-10 RX ADMIN — POLYETHYLENE GLYCOL 3350 17 GRAM(S): 17 POWDER, FOR SOLUTION ORAL at 05:23

## 2023-09-10 RX ADMIN — Medication 3 MILLILITER(S): at 14:10

## 2023-09-10 RX ADMIN — PANTOPRAZOLE SODIUM 40 MILLIGRAM(S): 20 TABLET, DELAYED RELEASE ORAL at 05:23

## 2023-09-10 RX ADMIN — CEFEPIME 100 MILLIGRAM(S): 1 INJECTION, POWDER, FOR SOLUTION INTRAMUSCULAR; INTRAVENOUS at 05:23

## 2023-09-10 RX ADMIN — Medication 100 MILLIGRAM(S): at 21:42

## 2023-09-10 RX ADMIN — PANTOPRAZOLE SODIUM 40 MILLIGRAM(S): 20 TABLET, DELAYED RELEASE ORAL at 17:29

## 2023-09-11 LAB
ALBUMIN SERPL ELPH-MCNC: 2 G/DL — LOW (ref 3.5–5.2)
ALP SERPL-CCNC: 92 U/L — SIGNIFICANT CHANGE UP (ref 30–115)
ALT FLD-CCNC: 14 U/L — SIGNIFICANT CHANGE UP (ref 0–41)
ANION GAP SERPL CALC-SCNC: 13 MMOL/L — SIGNIFICANT CHANGE UP (ref 7–14)
AST SERPL-CCNC: 24 U/L — SIGNIFICANT CHANGE UP (ref 0–41)
BASOPHILS # BLD AUTO: 0.08 K/UL — SIGNIFICANT CHANGE UP (ref 0–0.2)
BASOPHILS NFR BLD AUTO: 0.7 % — SIGNIFICANT CHANGE UP (ref 0–1)
BILIRUB SERPL-MCNC: 0.4 MG/DL — SIGNIFICANT CHANGE UP (ref 0.2–1.2)
BUN SERPL-MCNC: 15 MG/DL — SIGNIFICANT CHANGE UP (ref 10–20)
CALCIUM SERPL-MCNC: 7.8 MG/DL — LOW (ref 8.4–10.4)
CHLORIDE SERPL-SCNC: 109 MMOL/L — SIGNIFICANT CHANGE UP (ref 98–110)
CO2 SERPL-SCNC: 17 MMOL/L — SIGNIFICANT CHANGE UP (ref 17–32)
CREAT SERPL-MCNC: 1 MG/DL — SIGNIFICANT CHANGE UP (ref 0.7–1.5)
EGFR: 73 ML/MIN/1.73M2 — SIGNIFICANT CHANGE UP
EOSINOPHIL # BLD AUTO: 0.04 K/UL — SIGNIFICANT CHANGE UP (ref 0–0.7)
EOSINOPHIL NFR BLD AUTO: 0.4 % — SIGNIFICANT CHANGE UP (ref 0–8)
GLUCOSE BLDC GLUCOMTR-MCNC: 118 MG/DL — HIGH (ref 70–99)
GLUCOSE BLDC GLUCOMTR-MCNC: 144 MG/DL — HIGH (ref 70–99)
GLUCOSE BLDC GLUCOMTR-MCNC: 152 MG/DL — HIGH (ref 70–99)
GLUCOSE BLDC GLUCOMTR-MCNC: 157 MG/DL — HIGH (ref 70–99)
GLUCOSE BLDC GLUCOMTR-MCNC: 213 MG/DL — HIGH (ref 70–99)
GLUCOSE SERPL-MCNC: 133 MG/DL — HIGH (ref 70–99)
HCT VFR BLD CALC: 40.3 % — LOW (ref 42–52)
HGB BLD-MCNC: 12.8 G/DL — LOW (ref 14–18)
IMM GRANULOCYTES NFR BLD AUTO: 1.7 % — HIGH (ref 0.1–0.3)
LYMPHOCYTES # BLD AUTO: 2.79 K/UL — SIGNIFICANT CHANGE UP (ref 1.2–3.4)
LYMPHOCYTES # BLD AUTO: 25.2 % — SIGNIFICANT CHANGE UP (ref 20.5–51.1)
MAGNESIUM SERPL-MCNC: 2 MG/DL — SIGNIFICANT CHANGE UP (ref 1.8–2.4)
MCHC RBC-ENTMCNC: 30.3 PG — SIGNIFICANT CHANGE UP (ref 27–31)
MCHC RBC-ENTMCNC: 31.8 G/DL — LOW (ref 32–37)
MCV RBC AUTO: 95.5 FL — HIGH (ref 80–94)
MONOCYTES # BLD AUTO: 0.54 K/UL — SIGNIFICANT CHANGE UP (ref 0.1–0.6)
MONOCYTES NFR BLD AUTO: 4.9 % — SIGNIFICANT CHANGE UP (ref 1.7–9.3)
NEUTROPHILS # BLD AUTO: 7.44 K/UL — HIGH (ref 1.4–6.5)
NEUTROPHILS NFR BLD AUTO: 67.1 % — SIGNIFICANT CHANGE UP (ref 42.2–75.2)
NRBC # BLD: 0 /100 WBCS — SIGNIFICANT CHANGE UP (ref 0–0)
PLATELET # BLD AUTO: 247 K/UL — SIGNIFICANT CHANGE UP (ref 130–400)
PMV BLD: 11.3 FL — HIGH (ref 7.4–10.4)
POTASSIUM SERPL-MCNC: 4.8 MMOL/L — SIGNIFICANT CHANGE UP (ref 3.5–5)
POTASSIUM SERPL-SCNC: 4.8 MMOL/L — SIGNIFICANT CHANGE UP (ref 3.5–5)
PROT SERPL-MCNC: 5.7 G/DL — LOW (ref 6–8)
RBC # BLD: 4.22 M/UL — LOW (ref 4.7–6.1)
RBC # FLD: 16.2 % — HIGH (ref 11.5–14.5)
SODIUM SERPL-SCNC: 139 MMOL/L — SIGNIFICANT CHANGE UP (ref 135–146)
WBC # BLD: 11.08 K/UL — HIGH (ref 4.8–10.8)
WBC # FLD AUTO: 11.08 K/UL — HIGH (ref 4.8–10.8)

## 2023-09-11 PROCEDURE — 99233 SBSQ HOSP IP/OBS HIGH 50: CPT

## 2023-09-11 PROCEDURE — 74177 CT ABD & PELVIS W/CONTRAST: CPT | Mod: 26

## 2023-09-11 RX ORDER — POTASSIUM CHLORIDE 20 MEQ
40 PACKET (EA) ORAL ONCE
Refills: 0 | Status: COMPLETED | OUTPATIENT
Start: 2023-09-11 | End: 2023-09-11

## 2023-09-11 RX ORDER — ENOXAPARIN SODIUM 100 MG/ML
40 INJECTION SUBCUTANEOUS EVERY 24 HOURS
Refills: 0 | Status: DISCONTINUED | OUTPATIENT
Start: 2023-09-11 | End: 2023-09-18

## 2023-09-11 RX ADMIN — PANTOPRAZOLE SODIUM 40 MILLIGRAM(S): 20 TABLET, DELAYED RELEASE ORAL at 06:02

## 2023-09-11 RX ADMIN — INSULIN GLARGINE 6 UNIT(S): 100 INJECTION, SOLUTION SUBCUTANEOUS at 22:40

## 2023-09-11 RX ADMIN — Medication 1 APPLICATION(S): at 06:02

## 2023-09-11 RX ADMIN — Medication 1: at 17:50

## 2023-09-11 RX ADMIN — POLYETHYLENE GLYCOL 3350 17 GRAM(S): 17 POWDER, FOR SOLUTION ORAL at 06:02

## 2023-09-11 RX ADMIN — BUDESONIDE AND FORMOTEROL FUMARATE DIHYDRATE 2 PUFF(S): 160; 4.5 AEROSOL RESPIRATORY (INHALATION) at 21:23

## 2023-09-11 RX ADMIN — Medication 3 MILLILITER(S): at 14:32

## 2023-09-11 RX ADMIN — Medication 1 APPLICATION(S): at 14:43

## 2023-09-11 RX ADMIN — Medication 100 MILLIGRAM(S): at 06:01

## 2023-09-11 RX ADMIN — CEFEPIME 100 MILLIGRAM(S): 1 INJECTION, POWDER, FOR SOLUTION INTRAMUSCULAR; INTRAVENOUS at 06:01

## 2023-09-11 RX ADMIN — Medication 1 APPLICATION(S): at 17:50

## 2023-09-11 RX ADMIN — CEFEPIME 100 MILLIGRAM(S): 1 INJECTION, POWDER, FOR SOLUTION INTRAMUSCULAR; INTRAVENOUS at 18:00

## 2023-09-11 RX ADMIN — ENOXAPARIN SODIUM 40 MILLIGRAM(S): 100 INJECTION SUBCUTANEOUS at 22:15

## 2023-09-11 RX ADMIN — Medication 40 MILLIEQUIVALENT(S): at 07:14

## 2023-09-11 RX ADMIN — POLYETHYLENE GLYCOL 3350 17 GRAM(S): 17 POWDER, FOR SOLUTION ORAL at 17:51

## 2023-09-11 RX ADMIN — Medication 3 MILLILITER(S): at 08:57

## 2023-09-11 RX ADMIN — Medication 100 MILLIGRAM(S): at 18:00

## 2023-09-11 RX ADMIN — Medication 1 APPLICATION(S): at 06:01

## 2023-09-11 RX ADMIN — PANTOPRAZOLE SODIUM 40 MILLIGRAM(S): 20 TABLET, DELAYED RELEASE ORAL at 17:50

## 2023-09-11 RX ADMIN — Medication 100 MILLIGRAM(S): at 21:18

## 2023-09-11 RX ADMIN — Medication 1: at 12:08

## 2023-09-11 RX ADMIN — CHLORHEXIDINE GLUCONATE 1 APPLICATION(S): 213 SOLUTION TOPICAL at 06:54

## 2023-09-11 RX ADMIN — Medication 300 MILLIGRAM(S): at 12:08

## 2023-09-11 NOTE — PROGRESS NOTE ADULT - ASSESSMENT
88 yr man with a medical history significant for Parkinsonism, diabetes, who presented initially with DKA, recurrent shock after a long and complex hospitalization involving severe GI bleeding.     Impression      DKA resolved  Hungatella bacteremia likely GI translocation in the setting of appendicitis >> resolved   Septic shock due to above >> resolved  hemorraghic shock 2/2 UGIB  UGIB 2/2 PUD s/p EGD 8/14 w non bleeding ulcer  Appendicitis  Right arm infected necrotic wound, s/p debridement 9/1 w Pseudomonal CRE and pan sensitive Proteus   AAA   Diabetes  Parkinsonism  120PY history    Plans:  - on cefepime and flagyl, ID re eval for final plan   - PPI, hbg stable, start DVT ppx  - not a surgical candidate per surgery  - check US for AAA  - PT eval   - dnr/dni  - high risk pt, was downgraded from ICU, spent 55 min evaluating pt and coordinating care, reviewed all labs and images  88 yr man with a medical history significant for Parkinsonism, diabetes, who presented initially with DKA, recurrent shock after a long and complex hospitalization involving severe GI bleeding.     Impression      DKA resolved  Hungatella bacteremia likely GI translocation in the setting of appendicitis >> resolved   Septic shock due to above >> resolved  hemorraghic shock 2/2 UGIB  UGIB 2/2 PUD s/p EGD 8/14 w non bleeding ulcer  Appendicitis  Right arm infected necrotic wound, s/p debridement 9/1 w Pseudomonal CRE and pan sensitive Proteus   AAA   Diabetes  Parkinsonism      Plans:  - on cefepime and flagyl, ID re eval for final plan   - PPI, hbg stable, start DVT ppx  - not a surgical candidate per surgery  - CT abd today, abd distended with lower area tenderness   - check US for AAA  - PT eval   - dnr/dni  - high risk pt, was downgraded from ICU, spent 55 min evaluating pt and coordinating care, reviewed all labs and images

## 2023-09-11 NOTE — PROGRESS NOTE ADULT - SUBJECTIVE AND OBJECTIVE BOX
24H events:    Patient is a 88y old Male who presents with a chief complaint of DKA/HHS (11 Sep 2023 09:13)    Primary diagnosis of DKA, type 2    Today is hospital day 38d. This morning patient was seen and examined at bedside, resting comfortably in bed.    No acute or major events overnight.    PAST MEDICAL & SURGICAL HISTORY  HTN (hypertension)    Gout    BPH (benign prostatic hyperplasia)    Parkinson disease    HLD (hyperlipidemia)    Smoker within last 12 months    Diabetes mellitus    No significant past surgical history      SOCIAL HISTORY:  Social History:      ALLERGIES:  No Known Allergies    MEDICATIONS:  STANDING MEDICATIONS  albuterol/ipratropium for Nebulization 3 milliLiter(s) Nebulizer every 6 hours  allopurinol 300 milliGRAM(s) Oral daily  bacitracin   Ointment 1 Application(s) Topical two times a day  budesonide 160 MICROgram(s)/formoterol 4.5 MICROgram(s) Inhaler 2 Puff(s) Inhalation two times a day  cefepime   IVPB 2000 milliGRAM(s) IV Intermittent every 12 hours  cefepime   IVPB      chlorhexidine 2% Cloths 1 Application(s) Topical daily  collagenase Ointment 1 Application(s) Topical two times a day  collagenase Ointment 1 Application(s) Topical daily  dextrose 5%. 1000 milliLiter(s) IV Continuous <Continuous>  dextrose 5%. 1000 milliLiter(s) IV Continuous <Continuous>  dextrose 50% Injectable 25 Gram(s) IV Push once  dextrose 50% Injectable 25 Gram(s) IV Push once  dextrose 50% Injectable 12.5 Gram(s) IV Push once  enoxaparin Injectable 40 milliGRAM(s) SubCutaneous every 24 hours  glucagon  Injectable 1 milliGRAM(s) IntraMuscular once  insulin glargine Injectable (LANTUS) 6 Unit(s) SubCutaneous at bedtime  insulin lispro (ADMELOG) corrective regimen sliding scale   SubCutaneous every 6 hours  metroNIDAZOLE  IVPB      metroNIDAZOLE  IVPB 500 milliGRAM(s) IV Intermittent every 8 hours  morphine  - Injectable 2 milliGRAM(s) IV Push once  pantoprazole    Tablet 40 milliGRAM(s) Oral two times a day  polyethylene glycol 3350 17 Gram(s) Oral two times a day    PRN MEDICATIONS  acetaminophen     Tablet .. 650 milliGRAM(s) Oral every 6 hours PRN  dextrose Oral Gel 15 Gram(s) Oral once PRN    VITALS:   T(F): 96.7  HR: 111  BP: 130/80  RR: 18  SpO2: 96%    PHYSICAL EXAM:    CONSTITUTIONAL: No acute distress.  SKIN: Skin exam is warm and dry,  HEAD: Normocephalic; atraumatic.  EYES: Pupils equal round reactive to light, Extraocular movements intact  NECK: Supple; non tender. No rigidity  CARD: Regular rate and rhythm. Normal S1, S2; no murmurs, gallops, or rubs.  RESP: Lungs clear to auscultation bilaterally. No wheezes  ABD: B/L Lower abdomen tenderness; + distended;    EXT: RUE wound bandaged, No clubbing, cyanosis or edema.   NEURO: Patient is awake    LABS:                        12.8   11.08 )-----------( 247      ( 11 Sep 2023 08:30 )             40.3     09-11    139  |  109  |  15  ----------------------------<  133<H>  4.8   |  17  |  1.0    Ca    7.8<L>      11 Sep 2023 08:30  Mg     2.0     09-11    TPro  5.7<L>  /  Alb  2.0<L>  /  TBili  0.4  /  DBili  x   /  AST  24  /  ALT  14  /  AlkPhos  92  09-11      Urinalysis Basic - ( 11 Sep 2023 08:30 )    Color: x / Appearance: x / SG: x / pH: x  Gluc: 133 mg/dL / Ketone: x  / Bili: x / Urobili: x   Blood: x / Protein: x / Nitrite: x   Leuk Esterase: x / RBC: x / WBC x   Sq Epi: x / Non Sq Epi: x / Bacteria: x      RADIOLOGY:

## 2023-09-11 NOTE — PROGRESS NOTE ADULT - SUBJECTIVE AND OBJECTIVE BOX
Patient is a 88y old  Male who presents with a chief complaint of DKA/HHS (09 Sep 2023 08:42)      OVERNIGHT EVENTS: remained stable, no reported denies fever, chills, syncope, seizure, headache, cough, SOB, abd pain, N/V/D, urinary symptoms, legs swelling,    SUBJECTIVE / INTERVAL HPI: Patient seen and examined at bedside.     VITAL SIGNS:  Vital Signs Last 24 Hrs  T(C): 36.3 (11 Sep 2023 05:24), Max: 37 (10 Sep 2023 13:42)  T(F): 97.4 (11 Sep 2023 05:24), Max: 98.6 (10 Sep 2023 13:42)  HR: 97 (11 Sep 2023 05:24) (88 - 100)  BP: 101/61 (11 Sep 2023 05:24) (101/61 - 127/72)  BP(mean): --  RR: 18 (11 Sep 2023 05:24) (17 - 18)  SpO2: 96% (10 Sep 2023 13:42) (96% - 96%)    Parameters below as of 10 Sep 2023 13:42  Patient On (Oxygen Delivery Method): room air        PHYSICAL EXAM:    General: old thin male chronically look ill   HEENT: NC/AT; PERRL, clear conjunctiva  Neck: supple  Cardiovascular: +S1/S2; RRR  Respiratory: CTA b/l; no W/R/R  Gastrointestinal: soft, NT/ND; +BSx4  Extremities: WWP; 2+ peripheral pulses; no edema   Neurological: no focal deficits    MEDICATIONS:  MEDICATIONS  (STANDING):  albuterol/ipratropium for Nebulization 3 milliLiter(s) Nebulizer every 6 hours  allopurinol 300 milliGRAM(s) Oral daily  bacitracin   Ointment 1 Application(s) Topical two times a day  budesonide 160 MICROgram(s)/formoterol 4.5 MICROgram(s) Inhaler 2 Puff(s) Inhalation two times a day  cefepime   IVPB 2000 milliGRAM(s) IV Intermittent every 12 hours  cefepime   IVPB      chlorhexidine 2% Cloths 1 Application(s) Topical daily  collagenase Ointment 1 Application(s) Topical two times a day  collagenase Ointment 1 Application(s) Topical daily  dextrose 5%. 1000 milliLiter(s) (100 mL/Hr) IV Continuous <Continuous>  dextrose 5%. 1000 milliLiter(s) (50 mL/Hr) IV Continuous <Continuous>  dextrose 50% Injectable 25 Gram(s) IV Push once  dextrose 50% Injectable 25 Gram(s) IV Push once  dextrose 50% Injectable 12.5 Gram(s) IV Push once  enoxaparin Injectable 40 milliGRAM(s) SubCutaneous every 24 hours  glucagon  Injectable 1 milliGRAM(s) IntraMuscular once  insulin glargine Injectable (LANTUS) 6 Unit(s) SubCutaneous at bedtime  insulin lispro (ADMELOG) corrective regimen sliding scale   SubCutaneous every 6 hours  metroNIDAZOLE  IVPB      metroNIDAZOLE  IVPB 500 milliGRAM(s) IV Intermittent every 8 hours  morphine  - Injectable 2 milliGRAM(s) IV Push once  pantoprazole    Tablet 40 milliGRAM(s) Oral two times a day  polyethylene glycol 3350 17 Gram(s) Oral two times a day    MEDICATIONS  (PRN):  acetaminophen     Tablet .. 650 milliGRAM(s) Oral every 6 hours PRN Temp greater or equal to 38C (100.4F)  dextrose Oral Gel 15 Gram(s) Oral once PRN Blood Glucose LESS THAN 70 milliGRAM(s)/deciliter      ALLERGIES:  Allergies    No Known Allergies    Intolerances        LABS:                        12.8   11.08 )-----------( 247      ( 11 Sep 2023 08:30 )             40.3     09-10    139  |  109  |  15  ----------------------------<  124<H>  2.9<L>   |  21  |  0.9    Ca    7.0<L>      10 Sep 2023 06:46  Mg     1.9     09-10    TPro  4.9<L>  /  Alb  1.7<L>  /  TBili  0.3  /  DBili  x   /  AST  25  /  ALT  13  /  AlkPhos  81  09-10      Urinalysis Basic - ( 10 Sep 2023 06:46 )    Color: x / Appearance: x / SG: x / pH: x  Gluc: 124 mg/dL / Ketone: x  / Bili: x / Urobili: x   Blood: x / Protein: x / Nitrite: x   Leuk Esterase: x / RBC: x / WBC x   Sq Epi: x / Non Sq Epi: x / Bacteria: x      CAPILLARY BLOOD GLUCOSE      POCT Blood Glucose.: 118 mg/dL (11 Sep 2023 06:00)      RADIOLOGY & ADDITIONAL TESTS: Reviewed.     Patient is a 88y old  Male who presents with a chief complaint of DKA/HHS (09 Sep 2023 08:42)      OVERNIGHT EVENTS: remained stable, no reported denies fever, chills, syncope, seizure, headache, cough, SOB, abd pain, N/V/D, urinary symptoms, legs swelling,    SUBJECTIVE / INTERVAL HPI: Patient seen and examined at bedside.     VITAL SIGNS:  Vital Signs Last 24 Hrs  T(C): 36.3 (11 Sep 2023 05:24), Max: 37 (10 Sep 2023 13:42)  T(F): 97.4 (11 Sep 2023 05:24), Max: 98.6 (10 Sep 2023 13:42)  HR: 97 (11 Sep 2023 05:24) (88 - 100)  BP: 101/61 (11 Sep 2023 05:24) (101/61 - 127/72)  BP(mean): --  RR: 18 (11 Sep 2023 05:24) (17 - 18)  SpO2: 96% (10 Sep 2023 13:42) (96% - 96%)    Parameters below as of 10 Sep 2023 13:42  Patient On (Oxygen Delivery Method): room air        PHYSICAL EXAM:    General: old thin male chronically look ill   HEENT: NC/AT; PERRL, clear conjunctiva  Neck: supple  Cardiovascular: +S1/S2; RRR  Respiratory: CTA b/l; no W/R/R  Gastrointestinal: soft, + abd distention, mild tenderness at lower abd, +BSx4  Extremities: WWP; 2+ peripheral pulses; no edema   Neurological: no focal deficits    MEDICATIONS:  MEDICATIONS  (STANDING):  albuterol/ipratropium for Nebulization 3 milliLiter(s) Nebulizer every 6 hours  allopurinol 300 milliGRAM(s) Oral daily  bacitracin   Ointment 1 Application(s) Topical two times a day  budesonide 160 MICROgram(s)/formoterol 4.5 MICROgram(s) Inhaler 2 Puff(s) Inhalation two times a day  cefepime   IVPB 2000 milliGRAM(s) IV Intermittent every 12 hours  cefepime   IVPB      chlorhexidine 2% Cloths 1 Application(s) Topical daily  collagenase Ointment 1 Application(s) Topical two times a day  collagenase Ointment 1 Application(s) Topical daily  dextrose 5%. 1000 milliLiter(s) (100 mL/Hr) IV Continuous <Continuous>  dextrose 5%. 1000 milliLiter(s) (50 mL/Hr) IV Continuous <Continuous>  dextrose 50% Injectable 25 Gram(s) IV Push once  dextrose 50% Injectable 25 Gram(s) IV Push once  dextrose 50% Injectable 12.5 Gram(s) IV Push once  enoxaparin Injectable 40 milliGRAM(s) SubCutaneous every 24 hours  glucagon  Injectable 1 milliGRAM(s) IntraMuscular once  insulin glargine Injectable (LANTUS) 6 Unit(s) SubCutaneous at bedtime  insulin lispro (ADMELOG) corrective regimen sliding scale   SubCutaneous every 6 hours  metroNIDAZOLE  IVPB      metroNIDAZOLE  IVPB 500 milliGRAM(s) IV Intermittent every 8 hours  morphine  - Injectable 2 milliGRAM(s) IV Push once  pantoprazole    Tablet 40 milliGRAM(s) Oral two times a day  polyethylene glycol 3350 17 Gram(s) Oral two times a day    MEDICATIONS  (PRN):  acetaminophen     Tablet .. 650 milliGRAM(s) Oral every 6 hours PRN Temp greater or equal to 38C (100.4F)  dextrose Oral Gel 15 Gram(s) Oral once PRN Blood Glucose LESS THAN 70 milliGRAM(s)/deciliter      ALLERGIES:  Allergies    No Known Allergies    Intolerances        LABS:                        12.8   11.08 )-----------( 247      ( 11 Sep 2023 08:30 )             40.3     09-10    139  |  109  |  15  ----------------------------<  124<H>  2.9<L>   |  21  |  0.9    Ca    7.0<L>      10 Sep 2023 06:46  Mg     1.9     09-10    TPro  4.9<L>  /  Alb  1.7<L>  /  TBili  0.3  /  DBili  x   /  AST  25  /  ALT  13  /  AlkPhos  81  09-10      Urinalysis Basic - ( 10 Sep 2023 06:46 )    Color: x / Appearance: x / SG: x / pH: x  Gluc: 124 mg/dL / Ketone: x  / Bili: x / Urobili: x   Blood: x / Protein: x / Nitrite: x   Leuk Esterase: x / RBC: x / WBC x   Sq Epi: x / Non Sq Epi: x / Bacteria: x      CAPILLARY BLOOD GLUCOSE      POCT Blood Glucose.: 118 mg/dL (11 Sep 2023 06:00)      RADIOLOGY & ADDITIONAL TESTS: Reviewed.

## 2023-09-11 NOTE — CHART NOTE - NSCHARTNOTEFT_GEN_A_CORE
Registered Dietitian Follow-Up    Patient Profile Reviewed                           Yes [x]   No []  Nutrition History Previously Obtained        Yes [x]  No []      Pertinent Medical Information: 88 yr man with a medical history significant for Parkinsonism, diabetes, who presented initially with DKA, recurrent shock after a long and complex hospitalization involving severe GI bleeding. Appendicitis, not a surgical candidate per surgery.     Nutrition Interval History: pt resting comfortably in bed during visit this morning; requesting water. Offered pt thickened water however pt disliked. PO Intake variable.     Diet order: Diet, Soft and Bite Sized:   Consistent Carbohydrate {No Snacks}  DASH/TLC {Sodium & Cholesterol Restricted}  Mildly Thick Liquids (MILDTHICKLIQS)  Prosource Gelatein 20 Sugar Free     Qty per Day:  1  Supplement Feeding Modality:  Oral  Glucerna Shake Cans or Servings Per Day:  1       Frequency:  Daily (23 @ 18:24) [Active]    Anthropometrics:  Height (cm): 167.6 (02 Sep 2023 12:59)  Weight (kg): 62.6 (02 Sep 2023 12:59)  BMI (kg/m2): 22.3 (02 Sep 2023 12:59)  IBW: 64.5 kg (142 lbs)     Daily Weight in k.4 ()  65.8 ()  65.9 ()  65.2 ()  % Weight Change: daily weights since admission reviewed; large weight fluctuations noted; currently appears to be maintaining stable weight. Will continue to trend.     MEDICATIONS  (STANDING):  albuterol/ipratropium for Nebulization 3 milliLiter(s) Nebulizer every 6 hours  allopurinol 300 milliGRAM(s) Oral daily  bacitracin   Ointment 1 Application(s) Topical two times a day  budesonide 160 MICROgram(s)/formoterol 4.5 MICROgram(s) Inhaler 2 Puff(s) Inhalation two times a day  cefepime   IVPB      cefepime   IVPB 2000 milliGRAM(s) IV Intermittent every 12 hours  chlorhexidine 2% Cloths 1 Application(s) Topical daily  collagenase Ointment 1 Application(s) Topical daily  collagenase Ointment 1 Application(s) Topical two times a day  dextrose 5%. 1000 milliLiter(s) (50 mL/Hr) IV Continuous <Continuous>  dextrose 5%. 1000 milliLiter(s) (100 mL/Hr) IV Continuous <Continuous>  dextrose 50% Injectable 25 Gram(s) IV Push once  dextrose 50% Injectable 25 Gram(s) IV Push once  dextrose 50% Injectable 12.5 Gram(s) IV Push once  enoxaparin Injectable 40 milliGRAM(s) SubCutaneous every 24 hours  glucagon  Injectable 1 milliGRAM(s) IntraMuscular once  insulin glargine Injectable (LANTUS) 6 Unit(s) SubCutaneous at bedtime  insulin lispro (ADMELOG) corrective regimen sliding scale   SubCutaneous every 6 hours  metroNIDAZOLE  IVPB      metroNIDAZOLE  IVPB 500 milliGRAM(s) IV Intermittent every 8 hours  morphine  - Injectable 2 milliGRAM(s) IV Push once  pantoprazole    Tablet 40 milliGRAM(s) Oral two times a day  polyethylene glycol 3350 17 Gram(s) Oral two times a day    MEDICATIONS  (PRN):  acetaminophen     Tablet .. 650 milliGRAM(s) Oral every 6 hours PRN Temp greater or equal to 38C (100.4F)  dextrose Oral Gel 15 Gram(s) Oral once PRN Blood Glucose LESS THAN 70 milliGRAM(s)/deciliter    Pertinent Labs:  @ 08:30: Na 139, BUN 15, Cr 1.0, <H>, K+ 4.8, Mg 2.0, Alk Phos 92, ALT/SGPT 14, AST/SGOT 24     Finger Sticks:  POCT Blood Glucose.: 152 mg/dL ( @ 11:18)  POCT Blood Glucose.: 118 mg/dL ( @ 06:00)  POCT Blood Glucose.: 146 mg/dL (09-10 @ 23:35)  POCT Blood Glucose.: 161 mg/dL (09-10 @ 21:28)  POCT Blood Glucose.: 276 mg/dL (09-10 @ 16:54)    Physical Findings:  - Cognition: confused, disoriented   - GI function: last documented BM    - Tubes: no nutritionally pertinent lines, drains, tubes   - Oral/Mouth cavity: SLP following; noted pt s/p FEES , recs for soft and bite sized, mildly thick liquids  - Skin: Right arm infected necrotic wound, s/p debridement   - Edema: Right arm +1      Nutrition Requirements:   dosing weight 62.6 kg    Estimated Energy Needs    Continue [x]  Adjust []  Adjusted Energy Recommendations: 7840-1564 kcal/day - MSJ 1250*SF 1.2-1.3      Estimated Protein Needs    Continue [x]  Adjust []  Adjusted Protein Recommendations: 75-81 g/day - 1.2-1.3g/kg     Estimated Fluid Needs        Continue [x]  Adjust []  Adjusted Fluid Recommendations: 1565 mL/day - 25mL/kg of    [x] Previous Nutrition Diagnosis: Inadequate oral intake             [x] Ongoing/ improving          [] Resolved    Goal/Expected Outcome: Pt to consume >/=75% if estimated nutrient needs within 5-7 days     Nutrition Intervention:   - continue diet as ordered   - monitor SLP recommendations for diet advancement as able/ appropriate   - continue supplements as ordered   - assist with meals prn     Indicator/Monitoring: Diet order, PO intake, supplement acceptance, swallowing function, GI function, biochemical data, weight trends, NFPF, skin integrity    Pt is at moderate nutrition risk, RD to follow up in 5-7 days   RD to remain available: Sherrie Mosqueda spectra x [phone] or TEAMS

## 2023-09-11 NOTE — SWALLOW BEDSIDE ASSESSMENT ADULT - ORAL PHASE
Delayed oral transit time
w/ puree/Delayed oral transit time/Lingual stasis
Delayed oral transit time
Within functional limits
Delayed oral transit time
Delayed oral transit time
Within functional limits

## 2023-09-11 NOTE — PROGRESS NOTE ADULT - ASSESSMENT
88 yr man with a medical history significant for Parkinsonism, diabetes, who presented initially with DKA, recurrent shock after a long and complex hospitalization involving severe GI bleeding.     Impression      #DKA   - resolved    Hungatella bacteremia likely GI translocation in the setting of appendicitis  #Septic shock due to above   -  resolved    #hemorraghic shock 2/2 UGIB  -  2/2 PUD s/p EGD 8/14 w non bleeding ulcer    #Appendicitis  # Abdominal Tenderness  - Not a candidate for appendectomy per surgery  - F/U CT of abdomen    #Right arm infected necrotic wound, s/p debridement 9/1 w Pseudomonal CRE and pan sensitive Proteus   - Wound care - Santyl/Xeroform/Kerlix / ACE Wrap daily  - F/U wound cultures  - on cefepime and flagyl, F/U ID for final plan    #AAA   -  F/U US for triple A status    #Diabetes  - Follow up FS.  Insulin protocol as needed.  BG goal 140-180    DVT prophylsis: Lovenox  Code status: DNR/DNI  Pending: PT eval, ID plan, CT of abdomen

## 2023-09-12 LAB
ALBUMIN SERPL ELPH-MCNC: 1.7 G/DL — LOW (ref 3.5–5.2)
ALP SERPL-CCNC: 87 U/L — SIGNIFICANT CHANGE UP (ref 30–115)
ALT FLD-CCNC: 12 U/L — SIGNIFICANT CHANGE UP (ref 0–41)
ANION GAP SERPL CALC-SCNC: 11 MMOL/L — SIGNIFICANT CHANGE UP (ref 7–14)
AST SERPL-CCNC: 25 U/L — SIGNIFICANT CHANGE UP (ref 0–41)
BASOPHILS # BLD AUTO: 0.04 K/UL — SIGNIFICANT CHANGE UP (ref 0–0.2)
BASOPHILS NFR BLD AUTO: 0.5 % — SIGNIFICANT CHANGE UP (ref 0–1)
BILIRUB SERPL-MCNC: 0.3 MG/DL — SIGNIFICANT CHANGE UP (ref 0.2–1.2)
BUN SERPL-MCNC: 13 MG/DL — SIGNIFICANT CHANGE UP (ref 10–20)
CALCIUM SERPL-MCNC: 7.3 MG/DL — LOW (ref 8.4–10.5)
CHLORIDE SERPL-SCNC: 111 MMOL/L — HIGH (ref 98–110)
CO2 SERPL-SCNC: 19 MMOL/L — SIGNIFICANT CHANGE UP (ref 17–32)
CREAT SERPL-MCNC: 0.9 MG/DL — SIGNIFICANT CHANGE UP (ref 0.7–1.5)
EGFR: 83 ML/MIN/1.73M2 — SIGNIFICANT CHANGE UP
EOSINOPHIL # BLD AUTO: 0.04 K/UL — SIGNIFICANT CHANGE UP (ref 0–0.7)
EOSINOPHIL NFR BLD AUTO: 0.5 % — SIGNIFICANT CHANGE UP (ref 0–8)
GLUCOSE BLDC GLUCOMTR-MCNC: 101 MG/DL — HIGH (ref 70–99)
GLUCOSE BLDC GLUCOMTR-MCNC: 160 MG/DL — HIGH (ref 70–99)
GLUCOSE BLDC GLUCOMTR-MCNC: 163 MG/DL — HIGH (ref 70–99)
GLUCOSE BLDC GLUCOMTR-MCNC: 205 MG/DL — HIGH (ref 70–99)
GLUCOSE BLDC GLUCOMTR-MCNC: 239 MG/DL — HIGH (ref 70–99)
GLUCOSE BLDC GLUCOMTR-MCNC: 98 MG/DL — SIGNIFICANT CHANGE UP (ref 70–99)
GLUCOSE SERPL-MCNC: 86 MG/DL — SIGNIFICANT CHANGE UP (ref 70–99)
HCT VFR BLD CALC: 34.1 % — LOW (ref 42–52)
HGB BLD-MCNC: 11 G/DL — LOW (ref 14–18)
IMM GRANULOCYTES NFR BLD AUTO: 2.2 % — HIGH (ref 0.1–0.3)
LYMPHOCYTES # BLD AUTO: 1.94 K/UL — SIGNIFICANT CHANGE UP (ref 1.2–3.4)
LYMPHOCYTES # BLD AUTO: 26.4 % — SIGNIFICANT CHANGE UP (ref 20.5–51.1)
MAGNESIUM SERPL-MCNC: 2 MG/DL — SIGNIFICANT CHANGE UP (ref 1.8–2.4)
MCHC RBC-ENTMCNC: 30.7 PG — SIGNIFICANT CHANGE UP (ref 27–31)
MCHC RBC-ENTMCNC: 32.3 G/DL — SIGNIFICANT CHANGE UP (ref 32–37)
MCV RBC AUTO: 95.3 FL — HIGH (ref 80–94)
MONOCYTES # BLD AUTO: 0.51 K/UL — SIGNIFICANT CHANGE UP (ref 0.1–0.6)
MONOCYTES NFR BLD AUTO: 6.9 % — SIGNIFICANT CHANGE UP (ref 1.7–9.3)
NEUTROPHILS # BLD AUTO: 4.65 K/UL — SIGNIFICANT CHANGE UP (ref 1.4–6.5)
NEUTROPHILS NFR BLD AUTO: 63.5 % — SIGNIFICANT CHANGE UP (ref 42.2–75.2)
NRBC # BLD: 0 /100 WBCS — SIGNIFICANT CHANGE UP (ref 0–0)
PHOSPHATE SERPL-MCNC: 2.5 MG/DL — SIGNIFICANT CHANGE UP (ref 2.1–4.9)
PLATELET # BLD AUTO: 272 K/UL — SIGNIFICANT CHANGE UP (ref 130–400)
PMV BLD: 10.6 FL — HIGH (ref 7.4–10.4)
POTASSIUM SERPL-MCNC: 4 MMOL/L — SIGNIFICANT CHANGE UP (ref 3.5–5)
POTASSIUM SERPL-SCNC: 4 MMOL/L — SIGNIFICANT CHANGE UP (ref 3.5–5)
PROT SERPL-MCNC: 5 G/DL — LOW (ref 6–8)
RBC # BLD: 3.58 M/UL — LOW (ref 4.7–6.1)
RBC # FLD: 16.2 % — HIGH (ref 11.5–14.5)
SODIUM SERPL-SCNC: 141 MMOL/L — SIGNIFICANT CHANGE UP (ref 135–146)
WBC # BLD: 7.34 K/UL — SIGNIFICANT CHANGE UP (ref 4.8–10.8)
WBC # FLD AUTO: 7.34 K/UL — SIGNIFICANT CHANGE UP (ref 4.8–10.8)

## 2023-09-12 PROCEDURE — 93978 VASCULAR STUDY: CPT | Mod: 26

## 2023-09-12 PROCEDURE — 99232 SBSQ HOSP IP/OBS MODERATE 35: CPT

## 2023-09-12 PROCEDURE — 71045 X-RAY EXAM CHEST 1 VIEW: CPT | Mod: 26

## 2023-09-12 RX ADMIN — Medication 1: at 23:32

## 2023-09-12 RX ADMIN — Medication 100 MILLIGRAM(S): at 05:53

## 2023-09-12 RX ADMIN — Medication 1 APPLICATION(S): at 17:05

## 2023-09-12 RX ADMIN — Medication 2: at 17:05

## 2023-09-12 RX ADMIN — ENOXAPARIN SODIUM 40 MILLIGRAM(S): 100 INJECTION SUBCUTANEOUS at 21:27

## 2023-09-12 RX ADMIN — PANTOPRAZOLE SODIUM 40 MILLIGRAM(S): 20 TABLET, DELAYED RELEASE ORAL at 05:54

## 2023-09-12 RX ADMIN — POLYETHYLENE GLYCOL 3350 17 GRAM(S): 17 POWDER, FOR SOLUTION ORAL at 06:12

## 2023-09-12 RX ADMIN — PANTOPRAZOLE SODIUM 40 MILLIGRAM(S): 20 TABLET, DELAYED RELEASE ORAL at 17:05

## 2023-09-12 RX ADMIN — Medication 3 MILLILITER(S): at 09:41

## 2023-09-12 RX ADMIN — POLYETHYLENE GLYCOL 3350 17 GRAM(S): 17 POWDER, FOR SOLUTION ORAL at 17:05

## 2023-09-12 RX ADMIN — CHLORHEXIDINE GLUCONATE 1 APPLICATION(S): 213 SOLUTION TOPICAL at 06:10

## 2023-09-12 RX ADMIN — INSULIN GLARGINE 6 UNIT(S): 100 INJECTION, SOLUTION SUBCUTANEOUS at 21:27

## 2023-09-12 RX ADMIN — Medication 1 APPLICATION(S): at 06:10

## 2023-09-12 RX ADMIN — Medication 1 APPLICATION(S): at 17:04

## 2023-09-12 RX ADMIN — Medication 2: at 11:40

## 2023-09-12 RX ADMIN — CEFEPIME 100 MILLIGRAM(S): 1 INJECTION, POWDER, FOR SOLUTION INTRAMUSCULAR; INTRAVENOUS at 05:53

## 2023-09-12 RX ADMIN — Medication 1 APPLICATION(S): at 14:23

## 2023-09-12 RX ADMIN — Medication 300 MILLIGRAM(S): at 14:22

## 2023-09-12 RX ADMIN — Medication 3 MILLILITER(S): at 20:49

## 2023-09-12 RX ADMIN — Medication 3 MILLILITER(S): at 13:51

## 2023-09-12 NOTE — SWALLOW BEDSIDE ASSESSMENT ADULT - SLP PRECAUTIONS/LIMITATIONS: VISION
within functional limits
within functional limits
impaired/assistive device in use
within functional limits
within functional limits

## 2023-09-12 NOTE — CONSULT NOTE ADULT - PROVIDER SPECIALTY LIST ADULT
Endocrinology
Critical Care
Infectious Disease
Burn
Physiatry
Gastroenterology
Nutrition Support
Surgery
Palliative Care

## 2023-09-12 NOTE — SWALLOW BEDSIDE ASSESSMENT ADULT - NS ASR SWALLOW FINDINGS DISCUS
MD [place]/Physician/Nursing
Physician/Nursing/Patient
Physician/Patient
Physician/Nursing/Patient

## 2023-09-12 NOTE — CONSULT NOTE ADULT - CONSULT REASON
DKA
Abd pain
RUE wound
gram negative rods in cultures
DKA
deconditioning
enteral feeding
melena, anemia
89yo man admitted for septic shock and DKA, on pressors, currently DNR/DNI

## 2023-09-12 NOTE — CONSULT NOTE ADULT - SUBJECTIVE AND OBJECTIVE BOX
HPI:  87 y/o M w/ PMH of DM, HTN coming from home with complaint of decreased appetite, increased urinary output and craving sweets x 2 weeks. Daughter is caretaker, states patient behavior has changed. Within the last 2 weeks, He is less active, requesting more coca cola and sugar. Pt noted to be hypotensive upon arrival.     ED vitals:  BP 74/ 50, HR 87, Temp 97.2F, satting 95% on room air  Labs significant for WBC 14K, Na 123 (corrected 141), Cr 2.4, AG 23, BHB 5.1. VBG pH 7.19, Lactate 5.8, K 8.6, pCO2 39  EKG tachycardia, bifascicular block, RBBB  CXR unremarkable  CT A/P: Extensive coronary atherosclerosis. Dependent atelectasis at the right base. 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 Hepatic cysts. Questionable sludge within the gallbladder. Unchanged 1 cm cystic lesion in the pancreatic body      s/p calcium gluconate 1g, Lasix 40mg push x1, barcarb 50meq, started on insulin drip at 7 units/hr.   Admitted to MICU for DKA/HHS.    DKA resolved  Hungatella bacteremia likely GI translocation in the setting of appendicitis >> resolved   Septic shock due to above >> resolved  hemorraghic shock 2/2 UGIB  UGIB 2/2 PUD s/p EGD 8/14 w non bleeding ulcer  Appendicitis not surgical candidate, treated w abx   Right arm infected necrotic wound, s/p debridement 9/1 w Pseudomonal CRE and pan sensitive Proteus >> no intervention by Burn       PAST MEDICAL & SURGICAL HISTORY:  HTN (hypertension)      Gout      BPH (benign prostatic hyperplasia)      Parkinson disease      HLD (hyperlipidemia)      Smoker within last 12 months      Diabetes mellitus      No significant past surgical history          Hospital Course:    TODAY'S SUBJECTIVE & REVIEW OF SYMPTOMS:     Constitutional WNL   Cardio WNL   Resp WNL   GI WNL  Heme WNL  Endo DKA  Skin WNL  MSK Weakness   Neuro WNL  Cognitive WNL  Psych WNL      MEDICATIONS  (STANDING):  albuterol/ipratropium for Nebulization 3 milliLiter(s) Nebulizer every 6 hours  allopurinol 300 milliGRAM(s) Oral daily  bacitracin   Ointment 1 Application(s) Topical two times a day  budesonide 160 MICROgram(s)/formoterol 4.5 MICROgram(s) Inhaler 2 Puff(s) Inhalation two times a day  chlorhexidine 2% Cloths 1 Application(s) Topical daily  collagenase Ointment 1 Application(s) Topical two times a day  collagenase Ointment 1 Application(s) Topical daily  dextrose 5%. 1000 milliLiter(s) (100 mL/Hr) IV Continuous <Continuous>  dextrose 5%. 1000 milliLiter(s) (50 mL/Hr) IV Continuous <Continuous>  dextrose 50% Injectable 12.5 Gram(s) IV Push once  dextrose 50% Injectable 25 Gram(s) IV Push once  dextrose 50% Injectable 25 Gram(s) IV Push once  enoxaparin Injectable 40 milliGRAM(s) SubCutaneous every 24 hours  glucagon  Injectable 1 milliGRAM(s) IntraMuscular once  insulin glargine Injectable (LANTUS) 6 Unit(s) SubCutaneous at bedtime  insulin lispro (ADMELOG) corrective regimen sliding scale   SubCutaneous every 6 hours  morphine  - Injectable 2 milliGRAM(s) IV Push once  pantoprazole    Tablet 40 milliGRAM(s) Oral two times a day  polyethylene glycol 3350 17 Gram(s) Oral two times a day    MEDICATIONS  (PRN):  acetaminophen     Tablet .. 650 milliGRAM(s) Oral every 6 hours PRN Temp greater or equal to 38C (100.4F)  dextrose Oral Gel 15 Gram(s) Oral once PRN Blood Glucose LESS THAN 70 milliGRAM(s)/deciliter      FAMILY HISTORY:  No pertinent family history in first degree relatives        Allergies    No Known Allergies    Intolerances        SOCIAL HISTORY:    [  ] Etoh  [  ] Smoking  [  ] Substance abuse     Home Environment:  [   ] Home Alone  [  x ] Lives with Family  [   ] Home Health Aid    Dwelling:  [ x  ] Apartment  [   ] Private House  [   ] Adult Home  [   ] Skilled Nursing Facility      [   ] Short Term  [   ] Long Term  [   ] Stairs       Elevator [   ]    FUNCTIONAL STATUS PTA: (Check all that apply)  Ambulation: [    ]Independent    [x   ] Dependent     [   ] Non-Ambulatory  Assistive Device: [   ] SA Cane  [   ]  Q Cane  [ x  ] Walker  [   ]  Wheelchair  ADL : [   ] Independent  [  x  ]  Dependent       Vital Signs Last 24 Hrs  T(C): 37.6 (12 Sep 2023 16:00), Max: 37.6 (12 Sep 2023 16:00)  T(F): 99.6 (12 Sep 2023 16:00), Max: 99.6 (12 Sep 2023 16:00)  HR: 109 (12 Sep 2023 16:00) (58 - 109)  BP: 111/60 (12 Sep 2023 16:00) (98/53 - 144/66)  BP(mean): --  RR: 18 (12 Sep 2023 16:00) (18 - 18)  SpO2: --          PHYSICAL EXAM: Awake & confused   GENERAL: NAD  HEAD:  Normocephalic  CHEST/LUNG: Clear   HEART: S1S2+  ABDOMEN: Soft, Nontender  EXTREMITIES:  no calf tenderness    NERVOUS SYSTEM:  Cranial Nerves 2-12 intact [   ] Abnormal  [   ]  ROM: WFL all extremities [   ]  Abnormal [ x  ]able to move all ext - limited participation   Motor Strength: WFL all extremities  [   ]  Abnormal [ x  ]  Sensation: intact to light touch [   ] Abnormal [   ]    FUNCTIONAL STATUS:  Bed Mobility: Independent [   ]  Supervision [   ]  Needs Assistance [  x ]  N/A [   ]  Transfers: Independent [   ]  Supervision [   ]  Needs Assistance [  x ]  N/A [   ]   Ambulation: Independent [   ]  Supervision [   ]  Needs Assistance [   ]  N/A [   ]  ADL: Independent [   ] Requires Assistance [   ] N/A [   ]      LABS:                        11.0   7.34  )-----------( 272      ( 12 Sep 2023 07:52 )             34.1     09-12    141  |  111<H>  |  13  ----------------------------<  86  4.0   |  19  |  0.9    Ca    7.3<L>      12 Sep 2023 07:52  Phos  2.5     09-12  Mg     2.0     09-12    TPro  5.0<L>  /  Alb  1.7<L>  /  TBili  0.3  /  DBili  x   /  AST  25  /  ALT  12  /  AlkPhos  87  09-12      Urinalysis Basic - ( 12 Sep 2023 07:52 )    Color: x / Appearance: x / SG: x / pH: x  Gluc: 86 mg/dL / Ketone: x  / Bili: x / Urobili: x   Blood: x / Protein: x / Nitrite: x   Leuk Esterase: x / RBC: x / WBC x   Sq Epi: x / Non Sq Epi: x / Bacteria: x        RADIOLOGY & ADDITIONAL STUDIES:

## 2023-09-12 NOTE — SWALLOW BEDSIDE ASSESSMENT ADULT - SLP PERTINENT HISTORY OF CURRENT PROBLEM
89 y/o M w/ PMH of DM, HTN coming from home with complaint of decreased appetite, increased urinary output and craving sweets x 2 weeks. Daughter is caretaker, states patient behavior has changed. Within the last 2 weeks, He is less active, requesting more coca cola and sugar. Pt noted to be hypotensive upon arrival.
Pt is an 89 y/o M with a medical history significant for Parkinsonism, diabetes, who presented initially with DKA, and is now in the critical care unit for recurrent shock after a long and complex hospitalization involving severe GI bleeding. +Melena, septic shock, bacteremia, appendicitis, pseudomonal wound growth.
Pt is an 87 y/o M w/ PMHx: PD, ?dementia, DM, HTN, presenting from home w/ c/o decreased appetite, increased urinary output and craving sweets x2 weeks. Daughter is pts caretaker, states pts behavior has changed within the last 2 wks, has become less active, requesting more Coca-Cola and sugar. Pt noted to be hypotensive upon arrival. Pt is being treated for DKA/HHS, hyperkalemia, AAA. CT abdomen-> dependent atelectasis at R lung base.
Pt is an 89 y/o M w/ PMHx: PD, ?dementia, DM, HTN, presenting from home w/ c/o decreased appetite, increased urinary output and craving sweets x2 weeks. Daughter is pts caretaker, states pts behavior has changed within the last 2 wks, has become less active, requesting more Coca-Cola and sugar. Pt noted to be hypotensive upon arrival. Pt is being treated for DKA/HHS, hyperkalemia, AAA. CT abdomen-> dependent atelectasis at R lung base.
Pt is an 89 y/o M with a medical history significant for Parkinsonism, diabetes, who presented initially with DKA, and is now in the critical care unit for recurrent shock after a long and complex hospitalization involving severe GI bleeding. +Melena, septic shock, bacteremia, appendicitis, pseudomonal wound growth.
Pt is an 87 y/o M with a medical history significant for Parkinsonism, diabetes, who presented initially with DKA, and is now in the critical care unit for recurrent shock after a long and complex hospitalization involving severe GI bleeding. +Melena, septic shock, bacteremia, appendicitis, pseudomonal wound growth.
Pt is an 87 y/o M w/ PMHx: PD, ?dementia, DM, HTN, presenting from home w/ c/o decreased appetite, increased urinary output and craving sweets x2 weeks. Daughter is pts caretaker, states pts behavior has changed within the last 2 wks, has become less active, requesting more Coca-Cola and sugar. Pt noted to be hypotensive upon arrival. Pt is being treated for DKA/HHS, hyperkalemia, AAA. CT abdomen-> dependent atelectasis at R lung base.
Pt is an 87 y/o M w/ PMHx: PD, ?dementia, DM, HTN, presenting from home w/ c/o decreased appetite, increased urinary output and craving sweets x2 weeks. Daughter is pts caretaker, states pts behavior has changed within the last 2 wks, has become less active, requesting more Coca-Cola and sugar. Pt noted to be hypotensive upon arrival. Pt is being treated for DKA/HHS, hyperkalemia, AAA. CT abdomen-> dependent atelectasis at R lung base.
Pt is an 87 y/o M with a medical history significant for Parkinsonism, diabetes, who presented initially with DKA, and is now in the critical care unit for recurrent shock after a long and complex hospitalization involving severe GI bleeding. +Melena, septic shock, bacteremia, appendicitis, pseudomonal wound growth.
Pt is an 87 y/o M w/ PMHx: PD, ?dementia, DM, HTN, presenting from home w/ c/o decreased appetite, increased urinary output and craving sweets x2 weeks. Daughter is pts caretaker, states pts behavior has changed within the last 2 wks, has become less active, requesting more Coca-Cola and sugar. Pt noted to be hypotensive upon arrival. Pt is being treated for DKA/HHS, hyperkalemia, AAA. CT abdomen-> dependent atelectasis at R lung base.
Pt is an 89 y/o M w/ PMHx: PD, ?dementia, DM, HTN, presenting from home w/ c/o decreased appetite, increased urinary output and craving sweets x2 weeks. Daughter is pts caretaker, states pts behavior has changed within the last 2 wks, has become less active, requesting more Coca-Cola and sugar. Pt noted to be hypotensive upon arrival. Pt is being treated for DKA/HHS, hyperkalemia, AAA. CT abdomen-> dependent atelectasis at R lung base.
89yo Cantonese speaking male PMHx DM2, HTN here with decreased appetite found to have HHS/ DKA s/p insulin gtt. Melena s/p EGD demonstrating ulcer. Hungatella bacteremia. Appendicitis.#suspected COPD, with wheezing

## 2023-09-12 NOTE — PROGRESS NOTE ADULT - SUBJECTIVE AND OBJECTIVE BOX
MILEY MARES  88y, Male  Allergy: No Known Allergies      LOS  39d    CHIEF COMPLAINT: DKA/HHS (11 Sep 2023 13:34)      INTERVAL EVENTS/HPI  - No acute events overnight  - T(F): , Max: 97.6 (09-12-23 @ 00:05)  - Denies any worsening symptoms  - Tolerating medication  - WBC Count: 7.34 (09-12-23 @ 07:52)  WBC Count: 11.08 (09-11-23 @ 08:30)     - Creatinine: 0.9 (09-12-23 @ 07:52)  Creatinine: 1.0 (09-11-23 @ 08:30)       ROS  unable to obtain history secondary to patient's mental status and/or sedation      VITALS:  T(F): 95.3, Max: 97.6 (09-12-23 @ 00:05)  HR: 91  BP: 115/63  RR: 18Vital Signs Last 24 Hrs  T(C): 35.2 (12 Sep 2023 08:20), Max: 36.4 (12 Sep 2023 00:05)  T(F): 95.3 (12 Sep 2023 08:20), Max: 97.6 (12 Sep 2023 00:05)  HR: 91 (12 Sep 2023 08:20) (58 - 111)  BP: 115/63 (12 Sep 2023 08:20) (98/53 - 144/66)  BP(mean): --  RR: 18 (12 Sep 2023 08:20) (18 - 18)  SpO2: --        PHYSICAL EXAM:  Gen: NAD, resting in bed  HEENT: Normocephalic, atraumatic  Neck: supple, no lymphadenopathy  CV: Regular rate & regular rhythm  Lungs: decreased BS at bases, no fremitus  Abdomen: Soft, BS present  Ext: Warm, well perfused  Neuro: non focal, awake  Skin: no rash, no erythema  Lines: no phlebitis    FH: Non-contributory  Social Hx: Non-contributory    TESTS & MEASUREMENTS:                        11.0   7.34  )-----------( 272      ( 12 Sep 2023 07:52 )             34.1     09-12    141  |  111<H>  |  13  ----------------------------<  86  4.0   |  19  |  0.9    Ca    7.3<L>      12 Sep 2023 07:52  Phos  2.5     09-12  Mg     2.0     09-12    TPro  5.0<L>  /  Alb  1.7<L>  /  TBili  0.3  /  DBili  x   /  AST  25  /  ALT  12  /  AlkPhos  87  09-12      LIVER FUNCTIONS - ( 12 Sep 2023 07:52 )  Alb: 1.7 g/dL / Pro: 5.0 g/dL / ALK PHOS: 87 U/L / ALT: 12 U/L / AST: 25 U/L / GGT: x           Urinalysis Basic - ( 12 Sep 2023 07:52 )    Color: x / Appearance: x / SG: x / pH: x  Gluc: 86 mg/dL / Ketone: x  / Bili: x / Urobili: x   Blood: x / Protein: x / Nitrite: x   Leuk Esterase: x / RBC: x / WBC x   Sq Epi: x / Non Sq Epi: x / Bacteria: x        Culture - Blood (collected 09-05-23 @ 11:22)  Source: .Blood None  Final Report (09-10-23 @ 23:00):    No growth at 5 days    Culture - Blood (collected 09-05-23 @ 11:22)  Source: .Blood None  Final Report (09-10-23 @ 23:00):    No growth at 5 days    Culture - Tissue with Gram Stain (collected 09-05-23 @ 09:03)  Source: .Tissue Other, right upper extremity  Gram Stain (09-05-23 @ 19:41):    Rare polymorphonuclear leukocytes per low power field    Rare Red blood cells per low power field    Few Gram Negative Coccobacilli per oil power field  Final Report (09-10-23 @ 10:48):    Numerous Acinetobacter baumannii/nosocom group (Carbapenem Resistant)    Rare Proteus mirabilis  Organism: Acinetobacter baumannii/nosocom group (Carbapenem Resistant)  Acinetobacter baumannii/nosocom group (Carbapenem Resistant)  Proteus mirabilis (09-10-23 @ 10:48)  Organism: Proteus mirabilis (09-10-23 @ 10:48)      Method Type: UZAIR      -  Amikacin: S <=16      -  Amoxicillin/Clavulanic Acid: S <=8/4      -  Ampicillin: S <=8 These ampicillin results predict results for amoxicillin      -  Ampicillin/Sulbactam: S <=4/2      -  Aztreonam: S <=4      -  Cefazolin: S <=2      -  Cefepime: S <=2      -  Cefoxitin: S <=8      -  Ceftriaxone: S <=1      -  Ciprofloxacin: S <=0.25      -  Ertapenem: S <=0.5      -  Gentamicin: S <=2      -  Levofloxacin: S <=0.5      -  Meropenem: S <=1      -  Piperacillin/Tazobactam: S <=8      -  Tobramycin: S <=2      -  Trimethoprim/Sulfamethoxazole: S <=0.5/9.5  Organism: Acinetobacter baumannii/nosocom group (Carbapenem Resistant) (09-10-23 @ 10:48)      Method Type: KB      -  Piperacillin/Tazobactam: R      -  Minocycline: I  Organism: Acinetobacter baumannii/nosocom group (Carbapenem Resistant) (09-10-23 @ 10:48)      Method Type: UZAIR      -  Amikacin: R >32      -  Ampicillin/Sulbactam: R >16/8      -  Cefepime: R >16      -  Ceftazidime: R >16      -  Ciprofloxacin: R >2      -  Gentamicin: R >8      -  Imipenem: R >8      -  Levofloxacin: R >4      -  Meropenem: R >8      -  Tobramycin: R >8      -  Trimethoprim/Sulfamethoxazole: R >2/38      -  Polymyxin B: I 0.5    Culture - Urine (collected 09-02-23 @ 17:07)  Source: Clean Catch Clean Catch (Midstream)  Final Report (09-06-23 @ 00:03):    10,000 - 49,000 CFU/mL Pseudomonas aeruginosa (Carbapenem Resistant)  Organism: Pseudomonas aeruginosa (Carbapenem Resistant)  Pseudomonas aeruginosa (Carbapenem Resistant) (09-06-23 @ 00:03)  Organism: Pseudomonas aeruginosa (Carbapenem Resistant) (09-06-23 @ 00:03)      Method Type: CarbaR      -  Resistance Gene to Carbapenem: Nondet  Organism: Pseudomonas aeruginosa (Carbapenem Resistant) (09-06-23 @ 00:03)      Method Type: UZAIR      -  Amikacin: S <=16      -  Aztreonam: R >16      -  Cefepime: S 8      -  Ceftazidime: S 8      -  Ceftazidime/Avibactam: S 8      -  Ceftolozane/tazobactam: S <=2      -  Ciprofloxacin: I 1      -  Gentamicin: S 4      -  Imipenem: R >8      -  Levofloxacin: I 2      -  Meropenem: R >8      -  Piperacillin/Tazobactam: S 16      -  Tobramycin: S <=2    Culture - Blood (collected 09-02-23 @ 10:50)  Source: .Blood Blood  Final Report (09-07-23 @ 20:01):    No growth at 5 days    Culture - Tissue with Gram Stain (collected 09-01-23 @ 18:15)  Source: .Tissue Wound  Gram Stain (09-02-23 @ 01:46):    Moderate polymorphonuclear leukocytes seen per low power field    Numerous Gram Negative Rods seen per oil power field    Rare Yeast like cells seen per oil power field  Final Report (09-06-23 @ 15:41):    Numerous Pseudomonas aeruginosa (Carbapenem Resistant)    Numerous Proteus mirabilis    Few Dolly albicans "Susceptibilities not performed"  Organism: Pseudomonas aeruginosa (Carbapenem Resistant)  Pseudomonas aeruginosa (Carbapenem Resistant)  Proteus mirabilis (09-06-23 @ 15:41)  Organism: Proteus mirabilis (09-06-23 @ 15:41)      Method Type: UZAIR      -  Amikacin: S <=16      -  Amoxicillin/Clavulanic Acid: S <=8/4      -  Ampicillin: S <=8 These ampicillin results predict results for amoxicillin      -  Ampicillin/Sulbactam: S <=4/2      -  Aztreonam: S <=4      -  Cefazolin: S <=2      -  Cefepime: S <=2      -  Cefoxitin: S <=8      -  Ceftriaxone: S <=1      -  Ciprofloxacin: S <=0.25      -  Ertapenem: S <=0.5      -  Gentamicin: S <=2      -  Levofloxacin: S <=0.5      -  Meropenem: S <=1      -  Piperacillin/Tazobactam: S <=8      -  Tobramycin: S <=2      -  Trimethoprim/Sulfamethoxazole: S <=0.5/9.5  Organism: Pseudomonas aeruginosa (Carbapenem Resistant) (09-06-23 @ 15:41)      Method Type: CarbaR      -  Resistance Gene to Carbapenem: Nondet  Organism: Pseudomonas aeruginosa (Carbapenem Resistant) (09-06-23 @ 15:41)      Method Type: UZAIR      -  Amikacin: S <=16      -  Aztreonam: S <=4      -  Cefepime: S <=2      -  Ceftazidime: S 4      -  Ceftazidime/Avibactam: S <=4      -  Ceftolozane/tazobactam: S <=2      -  Ciprofloxacin: S <=0.25      -  Gentamicin: S 4      -  Imipenem: R >8      -  Levofloxacin: S <=0.5      -  Meropenem: I 4      -  Piperacillin/Tazobactam: S <=8      -  Tobramycin: S <=2    Culture - Blood (collected 08-30-23 @ 20:40)  Source: .Blood Blood  Final Report (09-05-23 @ 02:01):    No growth at 5 days            INFECTIOUS DISEASES TESTING  Procalcitonin, Serum: 0.13 (08-28-23 @ 07:25)  Fungitell: <31 (08-14-23 @ 04:45)  Procalcitonin, Serum: 0.34 (08-14-23 @ 04:45)  MRSA PCR Result.: Negative (08-10-23 @ 10:55)  MRSA PCR Result.: Negative (08-07-23 @ 11:30)  Procalcitonin, Serum: 0.67 (08-06-23 @ 05:05)  Procalcitonin, Serum: 0.48 (08-04-23 @ 23:05)  COVID-19 PCR: NotDetec (11-21-22 @ 18:43)      INFLAMMATORY MARKERS      RADIOLOGY & ADDITIONAL TESTS:  I have personally reviewed the last available Chest xray  CXR      CT  CT Abdomen and Pelvis w/ IV Cont:   ACC: [id number] EXAM:  CT ABDOMEN AND PELVIS IC   ORDERED BY: DENZEL GALLAGHER     PROCEDURE DATE:  09/11/2023          INTERPRETATION:  CLINICAL HISTORY / REASON FOR EXAM: Left lower quadrant   pain.    TECHNIQUE: Contiguous axial CT images were obtained from the lower chest   to the pubic symphysis with IV contrast. Oral contrast was not   administered. Reformatted images in the coronal and sagittal planes were   acquired.    CONTRAST VOLUME: Omnipaque 350. 100 cc administered. 0 cc discarded.    COMPARISON CT: CT abdomen and pelvis 9/6/2023    FINDINGS:    LOWER CHEST: Unchanged.    HEPATOBILIARY: Unchanged.    SPLEEN: Unchanged.    PANCREAS: Unchanged.    ADRENAL GLANDS: Unchanged.    KIDNEYS: Unchanged.    ABDOMINOPELVIC NODES: Unchanged.    PELVIC ORGANS: Unchanged.    PERITONEUM/MESENTERY/BOWEL: There is residual oral contrast from prior   administration which reaches the splenic flexure. No bowel obstruction.   Redemonstrated prominent appendix measuring up to 1.1 cm in caliber,   without evidence of surrounding inflammation or changes. No   pneumoperitoneum.    BONES/SOFT TISSUES: Unchanged.    VASCULAR: Redemonstrated infrarenal abdominal aortic aneurysm measuring   5.2 x 5.1 x 7.0 cm. 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 Vascular calcifications.      IMPRESSION:    Since CT abdomen and pelvis 9/6/2023:    No acute intra-abdominal pathology.    --- End of Report ---          ANASTASIA WINSTON MD; Resident Radiologist  This document hasbeen electronically signed.  SHANNA CONKLIN MD; Attending Radiologist  This document has been electronically signed. Sep 11 2023  7:50PM (09-11-23 @ 17:04)      CARDIOLOGY TESTING  12 Lead ECG:   Ventricular Rate 134 BPM    Atrial Rate 134 BPM    P-R Interval 136 ms    QRS Duration 110 ms    Q-T Interval 344 ms    QTC Calculation(Bazett) 513 ms    P Axis 43 degrees    R Axis -3 degrees    T Axis 32 degrees    Diagnosis Line Sinus tachycardia  Incomplete right bundle branch block  ST & T wave abnormality, consider anterior ischemia  Abnormal ECG    Confirmed by haydee leslie ([id number]) on 9/4/2023 9:23:08 PM (09-04-23 @ 12:14)  12 Lead ECG:   Ventricular Rate 121 BPM    Atrial Rate 121 BPM    P-R Interval 146 ms    QRS Duration 118 ms    Q-T Interval 432 ms    QTC Calculation(Bazett) 613 ms    P Axis 30 degrees    R Axis -24 degrees    T Axis 73 degrees    Diagnosis Line Sinus tachycardia  Right bundle branch block  Minimal voltage criteria for LVH, may be normal variant  Septal infarct , age undetermined  T wave abnormality, consider lateral ischemia  Abnormal ECG    Confirmed by haydee leslie ([id number]) on 8/30/2023 9:13:48 PM (08-30-23 @ 17:10)      MEDICATIONS  albuterol/ipratropium for Nebulization 3 Nebulizer every 6 hours  allopurinol 300 Oral daily  bacitracin   Ointment 1 Topical two times a day  budesonide 160 MICROgram(s)/formoterol 4.5 MICROgram(s) Inhaler 2 Inhalation two times a day  cefepime   IVPB 2000 IV Intermittent every 12 hours  cefepime   IVPB     chlorhexidine 2% Cloths 1 Topical daily  collagenase Ointment 1 Topical daily  collagenase Ointment 1 Topical two times a day  dextrose 5%. 1000 IV Continuous <Continuous>  dextrose 5%. 1000 IV Continuous <Continuous>  dextrose 50% Injectable 12.5 IV Push once  dextrose 50% Injectable 25 IV Push once  dextrose 50% Injectable 25 IV Push once  enoxaparin Injectable 40 SubCutaneous every 24 hours  glucagon  Injectable 1 IntraMuscular once  insulin glargine Injectable (LANTUS) 6 SubCutaneous at bedtime  insulin lispro (ADMELOG) corrective regimen sliding scale  SubCutaneous every 6 hours  metroNIDAZOLE  IVPB     metroNIDAZOLE  IVPB 500 IV Intermittent every 8 hours  morphine  - Injectable 2 IV Push once  pantoprazole    Tablet 40 Oral two times a day  polyethylene glycol 3350 17 Oral two times a day      WEIGHT  Weight (kg): 62.6 (09-02-23 @ 12:59)  Creatinine: 0.9 mg/dL (09-12-23 @ 07:52)      ANTIBIOTICS:  cefepime   IVPB      cefepime   IVPB 2000 milliGRAM(s) IV Intermittent every 12 hours  metroNIDAZOLE  IVPB 500 milliGRAM(s) IV Intermittent every 8 hours  metroNIDAZOLE  IVPB          All available historical records have been reviewed

## 2023-09-12 NOTE — CONSULT NOTE ADULT - ASSESSMENT
IMPRESSION: Rehab of deconditioning / DKA, sepsis,  Parkinsonism, diabetes    PRECAUTIONS: [   ] Cardiac  [   ] Respiratory  [   ] Seizures [   ] Contact Isolation  [   ] Droplet Isolation  [   ] Other    Weight Bearing Status:     RECOMMENDATION:    Out of Bed to Chair     DVT/Decubiti Prophylaxis    REHAB PLAN:     [ x   ] Bedside P/T 3-5 times a week   [  x  ]   Bedside O/T  2-3 times a week             [    ] Speech Therapy               [    ]  No Rehab Therapy Indicated   Conditioning/ROM                                    ADL  Bed Mobility                                               Conditioning/ROM  Transfers                                                     Bed Mobility  Sitting /Standing Balance                         Transfers                                        Gait Training                                               Sitting/Standing Balance  Stair Training [   ]Applicable                    Home equipment Eval                                                                        Splinting  [   ] Only      GOALS:   ADL   [    ]   Independent                    Transfers  [    ] Independent                          Ambulation  [    ] Independent     [   x  ] With device                            [  x  ]  CG                                                         [  x  ]  CG                                                                  [ x   ] CG                            [    ] Min A                                                   [    ] Min A                                                              [    ] Min  A          DISCHARGE PLAN:   [    ]  Good candidate for Intensive Rehabilitation/Hospital based                                             Will tolerate 3hrs Intensive Rehab Daily                                       [ x    ]  Short Term Rehab in Skilled Nursing Facility                                       [     ]  Home with Outpatient or  services                                         [     ]  Possible Candidate for Intensive Hospital based Rehab

## 2023-09-12 NOTE — SWALLOW BEDSIDE ASSESSMENT ADULT - ASR SWALLOW RECOMMEND DIAG
further investigate pharyngeal swallow function/VFSS/MBS
To further assess swallow function and integrity/FEES

## 2023-09-12 NOTE — PROGRESS NOTE ADULT - SUBJECTIVE AND OBJECTIVE BOX
Patient is a 88y old  Male who presents with a chief complaint of DKA/HHS (12 Sep 2023 13:29)      OVERNIGHT EVENTS: remained stable, no reported denies fever, chills, syncope, seizure, headache, cough, SOB, abd pain, N/V/D, urinary symptoms, legs swelling,   SUBJECTIVE / INTERVAL HPI: Patient seen and examined at bedside.     VITAL SIGNS:  Vital Signs Last 24 Hrs  T(C): 35.2 (12 Sep 2023 08:20), Max: 36.4 (12 Sep 2023 00:05)  T(F): 95.3 (12 Sep 2023 08:20), Max: 97.6 (12 Sep 2023 00:05)  HR: 91 (12 Sep 2023 08:20) (58 - 101)  BP: 115/63 (12 Sep 2023 08:20) (98/53 - 144/66)  BP(mean): --  RR: 18 (12 Sep 2023 08:20) (18 - 18)  SpO2: --        PHYSICAL EXAM:    General: old thin male chronically look ill   HEENT: NC/AT; PERRL, clear conjunctiva  Neck: supple  Cardiovascular: +S1/S2; RRR  Respiratory: CTA b/l; no W/R/R  Gastrointestinal: soft, + abd distention (has been stable), no tenderness , +BSx4  Extremities: WWP; 2+ peripheral pulses; no edema   Neurological: no focal deficits    MEDICATIONS:  MEDICATIONS  (STANDING):  albuterol/ipratropium for Nebulization 3 milliLiter(s) Nebulizer every 6 hours  allopurinol 300 milliGRAM(s) Oral daily  bacitracin   Ointment 1 Application(s) Topical two times a day  budesonide 160 MICROgram(s)/formoterol 4.5 MICROgram(s) Inhaler 2 Puff(s) Inhalation two times a day  chlorhexidine 2% Cloths 1 Application(s) Topical daily  collagenase Ointment 1 Application(s) Topical daily  collagenase Ointment 1 Application(s) Topical two times a day  dextrose 5%. 1000 milliLiter(s) (50 mL/Hr) IV Continuous <Continuous>  dextrose 5%. 1000 milliLiter(s) (100 mL/Hr) IV Continuous <Continuous>  dextrose 50% Injectable 25 Gram(s) IV Push once  dextrose 50% Injectable 25 Gram(s) IV Push once  dextrose 50% Injectable 12.5 Gram(s) IV Push once  enoxaparin Injectable 40 milliGRAM(s) SubCutaneous every 24 hours  glucagon  Injectable 1 milliGRAM(s) IntraMuscular once  insulin glargine Injectable (LANTUS) 6 Unit(s) SubCutaneous at bedtime  insulin lispro (ADMELOG) corrective regimen sliding scale   SubCutaneous every 6 hours  morphine  - Injectable 2 milliGRAM(s) IV Push once  pantoprazole    Tablet 40 milliGRAM(s) Oral two times a day  polyethylene glycol 3350 17 Gram(s) Oral two times a day    MEDICATIONS  (PRN):  acetaminophen     Tablet .. 650 milliGRAM(s) Oral every 6 hours PRN Temp greater or equal to 38C (100.4F)  dextrose Oral Gel 15 Gram(s) Oral once PRN Blood Glucose LESS THAN 70 milliGRAM(s)/deciliter      ALLERGIES:  Allergies    No Known Allergies    Intolerances        LABS:                        11.0   7.34  )-----------( 272      ( 12 Sep 2023 07:52 )             34.1     09-12    141  |  111<H>  |  13  ----------------------------<  86  4.0   |  19  |  0.9    Ca    7.3<L>      12 Sep 2023 07:52  Phos  2.5     09-12  Mg     2.0     09-12    TPro  5.0<L>  /  Alb  1.7<L>  /  TBili  0.3  /  DBili  x   /  AST  25  /  ALT  12  /  AlkPhos  87  09-12      Urinalysis Basic - ( 12 Sep 2023 07:52 )    Color: x / Appearance: x / SG: x / pH: x  Gluc: 86 mg/dL / Ketone: x  / Bili: x / Urobili: x   Blood: x / Protein: x / Nitrite: x   Leuk Esterase: x / RBC: x / WBC x   Sq Epi: x / Non Sq Epi: x / Bacteria: x      CAPILLARY BLOOD GLUCOSE      POCT Blood Glucose.: 205 mg/dL (12 Sep 2023 11:22)      RADIOLOGY & ADDITIONAL TESTS: Reviewed.

## 2023-09-12 NOTE — CONSULT NOTE ADULT - CONSULT REQUESTED DATE/TIME
07-Aug-2023
10-Aug-2023 14:42
09-Aug-2023 16:41
12-Sep-2023 17:58
04-Aug-2023 14:15
10-Aug-2023 16:49
26-Aug-2023 13:00
14-Aug-2023 20:20
07-Aug-2023 11:28

## 2023-09-12 NOTE — PROGRESS NOTE ADULT - ASSESSMENT
88 yr man with a medical history significant for Parkinsonism, diabetes, who presented initially with DKA, recurrent shock after a long and complex hospitalization involving severe GI bleeding.     Impression      DKA resolved  Hungatella bacteremia likely GI translocation in the setting of appendicitis >> resolved   Septic shock due to above >> resolved  hemorraghic shock 2/2 UGIB  UGIB 2/2 PUD s/p EGD 8/14 w non bleeding ulcer  Appendicitis not surgical candidate, treated w abx   Right arm infected necrotic wound, s/p debridement 9/1 w Pseudomonal CRE and pan sensitive Proteus >> no intervention by Burn   AAA   Diabetes  Parkinsonism      Plans:  - s/p long abx course, monitor off abx   - PPI, hbg stable, on DVT ppx  - not a surgical candidate for appendectomy  per surgery  - repeat CT abd stable,   - AAA [id number], he is high risk for intervention, not sure if family would go for intervention or if he is even a candidate, called daughter but got no response   - pending PT eval and dispo plan   - dnr/dni  - called daughter Antonia at the number on the chart for update but got no answer  88 yr man with a medical history significant for Parkinsonism, diabetes, who presented initially with DKA, recurrent shock after a long and complex hospitalization involving severe GI bleeding.     Impression      DKA resolved  Hungatella bacteremia likely GI translocation in the setting of appendicitis >> resolved   Septic shock due to above >> resolved  hemorraghic shock 2/2 UGIB  UGIB 2/2 PUD s/p EGD 8/14 w non bleeding ulcer  Appendicitis not surgical candidate, treated w abx   Right arm infected necrotic wound, s/p debridement 9/1 w Pseudomonal CRE and pan sensitive Proteus >> no intervention by Burn   AAA   Diabetes  Parkinsonism      Plans:  - s/p long abx course, monitor off abx   - PPI, hbg stable, on DVT ppx  - not a surgical candidate for appendectomy  per surgery  - repeat CT abd stable,   - AAA has been stable on CT scan reading 5x5x6.8, get US for further measurement, he is high risk for intervention,   - pending PT eval and dispo plan   - dnr/dni  - called daughter Antonia on 9/12 at the number on the chart for update but got no answer

## 2023-09-12 NOTE — SWALLOW BEDSIDE ASSESSMENT ADULT - SPECIFY REASON(S)
"[facility]  [facility]  [address]  [address]  [place] [address]  [phone] Fax: [phone]      Encounter  For Certification Period:6/3/17-7/3/17      Referring Physician: Esau Dooley MD  Primary Physician:      YAIR Mohan  Consulting Physicians:     MD Caroline for wound care    Wound(s):R lateral thigh  Start of Care: 6/3/17       Subjective:        HPI:        53 year old male referred to [facility] by Home Health for continued treatment R lateral thigh wound w/NPWT.  Onset:  Abscess post surgical I/D w/hospital admission 4/29/17-5/11/17. Pt seen previously at [facility] for abdominal wound, now resolved. Pt was receiving tx to left wrist and R medial ank;e during Home Care admission.  These wounds have also resolved.     Pain:     Mild wound pain w/contact at edges        Past Medical History:  Past Medical History   Diagnosis Date   • Migraine    • Gout    • Indigestion    • UC (ulcerative colitis) (CMS-HCC) 3-3-17     \"Five BM's per day, takes Lialda\"   • Difficult intubation 2-2016   • Arthritis 3-3-17     \"Spine\"   • Sepsis (CMS-HCC) 1/21/2016   • Essential hypertension 1/22/2016   • Snoring 3-3-17     Has not had a sleep study   • High cholesterol 3-3-17     Does not currently take medication for   • Congestive heart failure (CMS-HCC) 2-2016      3-3-17 \"Not a current issue\"   • ASTHMA 3-3-17     \"Hasn't had to use inhaler in 2 years\"   • Aspiration pneumonia (CMS-HCC) 3-2016   • Breath shortness 3-3-17     \"With Exercise\"   • Hypothyroid 3-3-17     Takes [place] Thyroid   • Pain 3-3-17     \"Left Flank\"   • Heart burn    • Depression 11/26/2014   • Anesthesia      \"Was difficult to intubate 2-2016\"   • Pancreatitis 2-2016     \"D/T Tradjenta was hospitialized for 15 days\"   • Elevated liver enzymes 2-2016   • Electrolyte imbalance 2-2016   • Kidney stones    • ADRIANE (acute kidney injury) (CMS-HCC) 2/24/2016   • RF (renal failure) 2-2016   • Diabetes (CMS-HCC) 3-3-17     Takes "
"Insulin   • DKA (diabetic ketoacidoses) (CMS-HCC) -     \"10 days on vent with DKA, Renal failure, CHF, elevated liver enzymes and electrolyte imbalance\"   • On mechanically assisted ventilation (CMS-HCC)      HX \"On Vent for 10 days at [facility]\".  \"DX Pancreatitis, DKA, CHF, Elevated Liver Enzymes & Electrolyte Imbalance\".       Current Medications:  Current outpatient prescriptions:   •  [] linezolid (ZYVOX) 600 MG Tab, Take 600 mg by mouth 2 times a day. 14 day course started 17 , Disp: , Rfl:   •  ondansetron (ZOFRAN ODT) 4 MG TABLET DISPERSIBLE, Take 4 mg by mouth every four hours as needed for Nausea/Vomiting., Disp: , Rfl:   •  morphine ER (MS CONTIN) 30 MG Tab CR tablet, Take 1 Tab by mouth every 12 hours., Disp: 90 Tab, Rfl: 0  •  oxycodone-acetaminophen (PERCOCET-10)  MG Tab, Take 1 Tab by mouth every 8 hours as needed for Severe Pain., Disp: 30 Tab, Rfl: 0  •  Insulin Glargine (TOUJEO SOLOSTAR) 300 UNIT/ML Solution Pen-injector, Inject 42 Units as instructed every day. Adjusts based on blood sugar., Disp: , Rfl:   •  thyroid (ARMOUR THYROID) 60 MG Tab, Take 60 mg by mouth every day., Disp: , Rfl:   •  Cholecalciferol (VITAMIN D3) 5000 UNITS Tab, Take 5,000 Units by mouth every day., Disp: , Rfl:   •  insulin lispro (HUMALOG) 100 UNIT/ML Solution, Inject 2-20 Units as instructed 3 times a day before meals. Sliding Scale - 2 units every 50 > 150, Disp: , Rfl:   •  buPROPion (WELLBUTRIN XL) 300 MG XL tablet, Take 300 mg by mouth every morning., Disp: , Rfl:   •  mesalamine (LIALDA) 1.2 GM TBEC, Take 1.2 g by mouth 2 times a day., Disp: , Rfl:     Allergies: Peanut-derived; Tradjenta; and Hydrocodone    Past Surgical History:   Past Surgical History   Procedure Laterality Date   • Carmenza by laparoscopy     • Colonoscopy with biopsy  2014     Performed by RENU ChambersD. at ENDOSCOPY [facility] ORS   • Umbilical hernia repair N/A 2016     Procedure: UMBILICAL "
dysphagia f/u
dysphagia f/u s/p FEES
dysphagia re-eval
HERNIA REPAIR;  Surgeon: Esau Dooley M.D.;  Location: SURGERY [facility];  Service:    • Other orthopedic surgery  1997 or 1998     Left Wrist ORIF   • Umbilical hernia repair  3/10/2017     Procedure: UMBILICAL HERNIA REPAIR- INCISION AND DRAINAGE OF UMBILICAL WOUND AND MESH REMOVAL;  Surgeon: Esau Dooley M.D.;  Location: SURGERY SAME DAY [facility];  Service:    • Incision and drainage general  4/7/2017     Procedure: INCISION AND DRAINAGE GENERAL;  Surgeon: Esau Dooley M.D.;  Location: SURGERY SAME DAY [facility];  Service:    • Irrigation & debridement general Right 4/29/2017     Procedure: IRRIGATION & DEBRIDEMENT GENERAL;  Surgeon: Esau Dooley M.D.;  Location: SURGERY [facility];  Service:    • Irrigation & debridement general Right 5/1/2017     Procedure: IRRIGATION & DEBRIDEMENT GENERAL-Left HAND AND right  ANKLE;  Surgeon: Esau Dooley M.D.;  Location: SURGERY [facility];  Service:        Social History:    Social History     Social History   • Marital Status: Single     Spouse Name: N/A   • Number of Children: N/A   • Years of Education: N/A     Occupational History   • Not on file.     Social History Main Topics   • Smoking status: Never Smoker    • Smokeless tobacco: Not on file   • Alcohol Use: No      Comment: occ   • Drug Use: No   • Sexual Activity: Not on file     Other Topics Concern   • Not on file     Social History Narrative    ** Merged History Encounter **         ** Merged History Encounter **                Objective:      Tests and Measures:    Fall Risk Assessment (manjula all that apply with an X):6/3/17 (-) fall risk   65 years or older     Fall within the last 2 years, uses   Ambulatory devices  Loss of protective sensation in feet,   Use of prostethic/orthotic, years    Presence of lower extremity/foot/toe amputation   xTaking medication that increases risk (per facility policy)    Interventions Recommended (if any of the above are 
AMS, hx of PD
selected):   Use of Assistive Device:________________________   Supervision with ambulation:  Caregiver   Assistance with ambulation:  Caregiver   Home safety education:  Educational material provided    Orthotic, protective, supportive devices: none     Wound Characteristics                                                    Location:  R lateral thigh   Initial Evaluation  Date:6/3/17 Encounter  6/6/17   Tissue Type and %: 100% red/pink granular 100% pink, granular   Periwound: intact intact   Drainage: Mod ss-canister changed 5/31/17 Mod ss-cannister changed today   Exposed structures none none   Wound Edges:   Attached, mild maceration Attached. Mild maceration   Odor: none none   S&S of Infection:   none none   Edema: none none   Sensation: intact intact               Measurements:  R lateral thigh Initial Evaluation  Date:6/3/17   Length (cm) 11.5   Width (cm) 1.2   Depth (cm) 0.1   Area (cm2) 13.8   Tract/undermine         Procedures:     Debridement :  Non selective debridement with gauze and cotton tipped applicator.     Cleansed with:      NS   Periwound protected with:skin prep, drape tape   Primary dressing:  piece black granufoam and button to attach track pad 2 pieces total   Secondary Dressing:drape tape, trac pad   Other: NPWT resumed at 125mmHg, continuous     Patient Education: Ptinstructed in wound care POC, dressing selection rationale and maintenances/s infection. .Reviewed NPWT, leak trouble shooting, [employer] contact number;  pt well educated on NPWT and s/s infection.    Professional Collaboration: Initial assessment sent to Dr. Patton via [facility]      Assessment:      PT Evaluation 11969  Reference:  History: 76414  Exam of body systems: 52891  Clinical presentation: 22101    Wound etiology:  surgical    Wound Progress: Granulating  Rationale for Treatment:NPWT to facilitate tissue granulation and wound healing, manage exudate, minimize risk for infection, expedite wound closure    Patient 
tolerance/compliance: Pt verbalized understanding of initial instructions and education; consented to POC    Complicating factors:DM, multiple wounds    Need for ongoing Advanced Wound Care services:Pt requires continued skilled wound care for sharp debridement prn, dressing management and application, patient education, and clinical observation       Plan:      Treatment Plan and Recommendations:  Diagnosis/ICD10:     Procedures/CPT:selective, non-selective debridement, NPWT    Frequency: 3x/week      Treatment Goals: STG 2 Weeks  LTG 4 Weeks   Granulation Tissue: 100% resolved   Decrease Necrotic Tissue to: %    Wound Phase:      Decrease Size by: 50%    Periwound:      Decrease tracts/undermining by: %    Decrease Pain:          At the time of each visit a thorough assessment of the patient is completed to assure the  appropriateness of our plan of care.  The dressings or modalities may need to be adapted   from the original plan to address any significant changes in the wound environment.          Clinician Signature:_______Za ClementePT________ __Date____6/3/17____      Physician Signature:______________________________Date:__________________      
dysphagia f/u
Dysphagia eval
Dysphagia f/u
dysphagia re-eval, requesting water
Dysphagia evaluation
swallow f/u

## 2023-09-12 NOTE — SWALLOW BEDSIDE ASSESSMENT ADULT - SWALLOW EVAL: RECOMMENDED DIET
unable to make po diet recs, cont NGT, may require long term non-oral means of nutrition and hydration if acceptance does not improve and if this is consistent w/ pt and family wishes.
soft and bite sized, mildly thick liquids
continue soft and bite sized, mildly thick liquids
Puree/mildly thick
NPO w/ non-oral means of nutrition/hydration
soft and bite sized, mildly thick liquids
Not appropriate for PO trials at this time 2/2 to unaware of feeding situation, increased lethargy, and confusion
soft and bite size with thin liquids
soft and bite sized, mildly thick liquids
puree w/ thin liquids, use NGT feeds as supplemental means if pt takes <50% at each mealtime
Clear liquids
Puree/mildly thick.

## 2023-09-12 NOTE — PROGRESS NOTE ADULT - ASSESSMENT
89 y/o M w/ PMH of DM, HTN coming from home with complaint of decreased appetite, increased urinary output and craving sweets x 2 weeks. Admitted for management of DKA. Prolonged hospital stay complicated by Hungatella bacteremia s/p meropenem and caspofungin    IMPRESSION  #Right arm infected necrotic wound, s/p debridement 9/1  - Tissue Cx (9/1) CR pseudomonas (R to imipenem, I to kenyatta, S to other abx), Proteus (pan-sensitive); also some yeast (unclear significance)    #Fever  CXR 9/2 no PNA  UA negative 9/2  COVID negative  BCx 8/30 NGTD    #Leukocytosis    #Hungatella bacteremia likely GI translocation in the setting of appendicitis  8/8 BCX NGTD  8/5 BCX NGTD  8/4 UCX NGTD  8/4 BCX + 1/2 bottles  #DKA/HHS      RECOMMENDATIONS  - received prolonged course of antibioitcs   - can stop antibiotics today and monitor     Please call or message on [employer] Teams if with any questions.  Spectra [phone]

## 2023-09-12 NOTE — PROGRESS NOTE ADULT - SUBJECTIVE AND OBJECTIVE BOX
24H events:    Patient is a 88y old Male who presents with a chief complaint of DKA/HHS (12 Sep 2023 12:13)    Primary diagnosis of DKA, type 2    Today is hospital day 39d.   No acute or major events overnight.      PAST MEDICAL & SURGICAL HISTORY  HTN (hypertension)    Gout    BPH (benign prostatic hyperplasia)    Parkinson disease    HLD (hyperlipidemia)    Smoker within last 12 months    Diabetes mellitus    No significant past surgical history      SOCIAL HISTORY:  Social History:      ALLERGIES:  No Known Allergies    MEDICATIONS:  STANDING MEDICATIONS  albuterol/ipratropium for Nebulization 3 milliLiter(s) Nebulizer every 6 hours  allopurinol 300 milliGRAM(s) Oral daily  bacitracin   Ointment 1 Application(s) Topical two times a day  budesonide 160 MICROgram(s)/formoterol 4.5 MICROgram(s) Inhaler 2 Puff(s) Inhalation two times a day  chlorhexidine 2% Cloths 1 Application(s) Topical daily  collagenase Ointment 1 Application(s) Topical two times a day  collagenase Ointment 1 Application(s) Topical daily  dextrose 5%. 1000 milliLiter(s) IV Continuous <Continuous>  dextrose 5%. 1000 milliLiter(s) IV Continuous <Continuous>  dextrose 50% Injectable 25 Gram(s) IV Push once  dextrose 50% Injectable 25 Gram(s) IV Push once  dextrose 50% Injectable 12.5 Gram(s) IV Push once  enoxaparin Injectable 40 milliGRAM(s) SubCutaneous every 24 hours  glucagon  Injectable 1 milliGRAM(s) IntraMuscular once  insulin glargine Injectable (LANTUS) 6 Unit(s) SubCutaneous at bedtime  insulin lispro (ADMELOG) corrective regimen sliding scale   SubCutaneous every 6 hours  morphine  - Injectable 2 milliGRAM(s) IV Push once  pantoprazole    Tablet 40 milliGRAM(s) Oral two times a day  polyethylene glycol 3350 17 Gram(s) Oral two times a day    PRN MEDICATIONS  acetaminophen     Tablet .. 650 milliGRAM(s) Oral every 6 hours PRN  dextrose Oral Gel 15 Gram(s) Oral once PRN    VITALS:   T(F): 95.3  HR: 91  BP: 115/63  RR: 18  SpO2: --    PHYSICAL EXAM:    GENERAL: NAD, sleeping   NECK: Supple, no JVD   LUNGS: Unlabored respirations, b/l air entry, no adventitious breath sounds   HEART: Regular rate and rhythm, normal s1s2  ABD: Soft, mild distention, TTP in lower quadrants; +BS  EXT: RUE wound bandaged, no clubbing, cyanosis or edema.       LABS:                        11.0   7.34  )-----------( 272      ( 12 Sep 2023 07:52 )             34.1     09-12    141  |  111<H>  |  13  ----------------------------<  86  4.0   |  19  |  0.9    Ca    7.3<L>      12 Sep 2023 07:52  Phos  2.5     09-12  Mg     2.0     09-12    TPro  5.0<L>  /  Alb  1.7<L>  /  TBili  0.3  /  DBili  x   /  AST  25  /  ALT  12  /  AlkPhos  87  09-12      Urinalysis Basic - ( 12 Sep 2023 07:52 )    Color: x / Appearance: x / SG: x / pH: x  Gluc: 86 mg/dL / Ketone: x  / Bili: x / Urobili: x   Blood: x / Protein: x / Nitrite: x   Leuk Esterase: x / RBC: x / WBC x   Sq Epi: x / Non Sq Epi: x / Bacteria: x        RADIOLOGY:    < from: CT Abdomen and Pelvis w/ IV Cont (09.11.23 @ 17:04) >    FINDINGS:    LOWER CHEST: Unchanged.    HEPATOBILIARY: Unchanged.    SPLEEN: Unchanged.    PANCREAS: Unchanged.    ADRENAL GLANDS: Unchanged.    KIDNEYS: Unchanged.    ABDOMINOPELVIC NODES: Unchanged.    PELVIC ORGANS: Unchanged.    PERITONEUM/MESENTERY/BOWEL: There is residual oral contrast from prior   administration which reaches the splenic flexure. No bowel obstruction.   Redemonstrated prominent appendix measuring up to 1.1 cm in caliber,   without evidence of surrounding inflammation or changes. No   pneumoperitoneum.    BONES/SOFT TISSUES: Unchanged.    VASCULAR: Redemonstrated infrarenal abdominal aortic aneurysm measuring   5.2 x 5.1 x 7.0 cm. 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 Vascular calcifications.      IMPRESSION:    Since CT abdomen and pelvis 9/6/2023:    No acute intra-abdominal pathology.

## 2023-09-12 NOTE — SWALLOW BEDSIDE ASSESSMENT ADULT - SWALLOW EVAL: FEEDING ASSISTANCE
1:1 feeds/dependent
minimal assistance required
frequent cues/help required

## 2023-09-12 NOTE — SWALLOW BEDSIDE ASSESSMENT ADULT - DATE
11-Sep-2023
09-Aug-2023
12-Sep-2023
07-Aug-2023
05-Sep-2023
17-Aug-2023
23-Aug-2023
05-Aug-2023
14-Aug-2023
07-Sep-2023
08-Sep-2023
15-Aug-2023

## 2023-09-12 NOTE — SWALLOW BEDSIDE ASSESSMENT ADULT - SWALLOW EVAL: RECOMMENDED FEEDING/EATING TECHNIQUES
oral hygiene/position upright (90 degrees)
oral hygiene
oral hygiene/position upright (90 degrees)
allow for swallow between intakes/position upright (90 degrees)/small sips/bites
maintain upright posture during/after eating for 30 mins/oral hygiene/position upright (90 degrees)
allow for swallow between intakes/position upright (90 degrees)/small sips/bites
allow for swallow between intakes/position upright (90 degrees)/small sips/bites
maintain upright posture during/after eating for 30 mins/oral hygiene/position upright (90 degrees)/small sips/bites
allow for swallow between intakes/position upright (90 degrees)/small sips/bites
maintain upright posture during/after eating for 30 mins/oral hygiene/position upright (90 degrees)/small sips/bites

## 2023-09-12 NOTE — SWALLOW BEDSIDE ASSESSMENT ADULT - ASR SWALLOW ASPIRATION MONITOR
change of breathing pattern/oral hygiene/position upright (90Y)/cough/gurgly voice/fever/pneumonia/throat clearing/upper respiratory infection
Talk to him

## 2023-09-12 NOTE — SWALLOW BEDSIDE ASSESSMENT ADULT - SWALLOW EVAL: CURRENT DIET
NPO with NGT
Puree with mildly thick liquids, NGT in place 2/2 to poor overall PO intake
no diet orders in EMR
soft and bite sized, thin liquids per MD orders
NPO with NGT
NPO/NGT
soft and bite sized, thin liquids per MD orders
soft and bite sized, thin liquids per MD orders
NGT
soft and bite sized, thin liquids per MD orders
Clear liquids
puree with mildly thick liquids

## 2023-09-12 NOTE — PROGRESS NOTE ADULT - ASSESSMENT
88 yr man with a medical history significant for Parkinsonism, diabetes, who presented initially with DKA, recurrent shock after a long and complex hospitalization involving severe GI bleeding.     #DKA - resolved   #DM II - well controlled  - s/p insulin gtt and fluids    - continue current tx: lantus 6U QHS, ISS    #suspected COPD - resolved   #B/l pleural Effusions  - patient has smoking history of 2 PPD for 60yrs, currently not smoking  - TTE 8/11: EF 63%  - pulmonary was following - outpatient follow up  - completed prednisone taper   - duonebs q6h prn  - HOB elevation to 45 degrees  - aspiration precautions; 1:1 feeds    #AAA  -stable     #right arm skin necrosis s/p levophed??  - burn consult appreciated - bedside debridement done on 9/1  - cultures growing psuedomonas (R to imipenem, I to kenyatta), proteus, yeast   - s/p cefepime 2g IV q8h and flagyl 500mg IV q8h   - ID recs appreciated -> abx DC'd on 9/12      #Decreased oral intake - improving  - s/p calorie count and nutrition eval  - patient has been tolerating po intake and NG tube was taken out 8/22  - S&S eval 8/23: advanced diet to soft bite-sized with thin liquid and patient is tolerating     #History of Hungatella bacteremia likely GI translocation in the setting of appendicitis  - bcx 8/4 +hungatella  - bcx 8/5 onwards ntd  - TTE showed no vegetations  - s/p kenyatta, flagyl, cefepime, vanco    #Transaminitis - resolved  #abd tenderness  - mixed, in setting of infection  - ct abd with hepatic cyst    #Constipation-resolved  -kub 8/30 - large stool burden   -continues on on senna/miralax/lactulose/enemas  -repeat KUB reviewed - ileus     #HTN   - stable

## 2023-09-12 NOTE — SWALLOW BEDSIDE ASSESSMENT ADULT - SLP GENERAL OBSERVATIONS
Pt received in bed asleep difficulty maintain arousal, NGT and O2 nasal cannula in place
pt received in bed awake alert w/o c/o pain. +room air
pt received in bed awake alert w/o c/o pain. +room air
Pt. received in bed awake and alert.
Pt received more awake/alert today. Increased WOB. Moaning and requesting water.
pt received in bed asleep arousable, not responding to  questions. +2L NC +B/L wrist restraints +[employer] Cantonese-English ID# [id number] assisted w/ translation.
pt received in bed awake alert w/o c/o pain. +room air
pt received in bed asleep arousable w/o c/o pain. +room air
Pt received in bed asleep, woke to verbal stim, NGT in situ, Xerostomia
Pt received more awake/alert today.
Pt received in bed awake and alert on room air, NGt in situ
pt awake generally alert no c/o pain. pt on o2 via NC

## 2023-09-13 LAB
ALBUMIN SERPL ELPH-MCNC: 1.9 G/DL — LOW (ref 3.5–5.2)
ALP SERPL-CCNC: 85 U/L — SIGNIFICANT CHANGE UP (ref 30–115)
ALT FLD-CCNC: 10 U/L — SIGNIFICANT CHANGE UP (ref 0–41)
ANION GAP SERPL CALC-SCNC: 9 MMOL/L — SIGNIFICANT CHANGE UP (ref 7–14)
AST SERPL-CCNC: 18 U/L — SIGNIFICANT CHANGE UP (ref 0–41)
BASOPHILS # BLD AUTO: 0.03 K/UL — SIGNIFICANT CHANGE UP (ref 0–0.2)
BASOPHILS NFR BLD AUTO: 0.3 % — SIGNIFICANT CHANGE UP (ref 0–1)
BILIRUB SERPL-MCNC: 0.3 MG/DL — SIGNIFICANT CHANGE UP (ref 0.2–1.2)
BUN SERPL-MCNC: 14 MG/DL — SIGNIFICANT CHANGE UP (ref 10–20)
CALCIUM SERPL-MCNC: 7.4 MG/DL — LOW (ref 8.4–10.4)
CHLORIDE SERPL-SCNC: 112 MMOL/L — HIGH (ref 98–110)
CO2 SERPL-SCNC: 22 MMOL/L — SIGNIFICANT CHANGE UP (ref 17–32)
CREAT SERPL-MCNC: 0.9 MG/DL — SIGNIFICANT CHANGE UP (ref 0.7–1.5)
EGFR: 83 ML/MIN/1.73M2 — SIGNIFICANT CHANGE UP
EOSINOPHIL # BLD AUTO: 0.01 K/UL — SIGNIFICANT CHANGE UP (ref 0–0.7)
EOSINOPHIL NFR BLD AUTO: 0.1 % — SIGNIFICANT CHANGE UP (ref 0–8)
GLUCOSE BLDC GLUCOMTR-MCNC: 119 MG/DL — HIGH (ref 70–99)
GLUCOSE BLDC GLUCOMTR-MCNC: 123 MG/DL — HIGH (ref 70–99)
GLUCOSE BLDC GLUCOMTR-MCNC: 149 MG/DL — HIGH (ref 70–99)
GLUCOSE BLDC GLUCOMTR-MCNC: 154 MG/DL — HIGH (ref 70–99)
GLUCOSE BLDC GLUCOMTR-MCNC: 64 MG/DL — LOW (ref 70–99)
GLUCOSE SERPL-MCNC: 49 MG/DL — CRITICAL LOW (ref 70–99)
HCT VFR BLD CALC: 33.4 % — LOW (ref 42–52)
HGB BLD-MCNC: 10.9 G/DL — LOW (ref 14–18)
IMM GRANULOCYTES NFR BLD AUTO: 1.1 % — HIGH (ref 0.1–0.3)
LYMPHOCYTES # BLD AUTO: 1.24 K/UL — SIGNIFICANT CHANGE UP (ref 1.2–3.4)
LYMPHOCYTES # BLD AUTO: 11.1 % — LOW (ref 20.5–51.1)
MAGNESIUM SERPL-MCNC: 2 MG/DL — SIGNIFICANT CHANGE UP (ref 1.8–2.4)
MCHC RBC-ENTMCNC: 30 PG — SIGNIFICANT CHANGE UP (ref 27–31)
MCHC RBC-ENTMCNC: 32.6 G/DL — SIGNIFICANT CHANGE UP (ref 32–37)
MCV RBC AUTO: 92 FL — SIGNIFICANT CHANGE UP (ref 80–94)
MONOCYTES # BLD AUTO: 0.55 K/UL — SIGNIFICANT CHANGE UP (ref 0.1–0.6)
MONOCYTES NFR BLD AUTO: 4.9 % — SIGNIFICANT CHANGE UP (ref 1.7–9.3)
NEUTROPHILS # BLD AUTO: 9.21 K/UL — HIGH (ref 1.4–6.5)
NEUTROPHILS NFR BLD AUTO: 82.5 % — HIGH (ref 42.2–75.2)
NRBC # BLD: 0 /100 WBCS — SIGNIFICANT CHANGE UP (ref 0–0)
PLATELET # BLD AUTO: 307 K/UL — SIGNIFICANT CHANGE UP (ref 130–400)
PMV BLD: 10.5 FL — HIGH (ref 7.4–10.4)
POTASSIUM SERPL-MCNC: 3.2 MMOL/L — LOW (ref 3.5–5)
POTASSIUM SERPL-SCNC: 3.2 MMOL/L — LOW (ref 3.5–5)
PROT SERPL-MCNC: 5.1 G/DL — LOW (ref 6–8)
RBC # BLD: 3.63 M/UL — LOW (ref 4.7–6.1)
RBC # FLD: 16.1 % — HIGH (ref 11.5–14.5)
SODIUM SERPL-SCNC: 143 MMOL/L — SIGNIFICANT CHANGE UP (ref 135–146)
WBC # BLD: 11.16 K/UL — HIGH (ref 4.8–10.8)
WBC # FLD AUTO: 11.16 K/UL — HIGH (ref 4.8–10.8)

## 2023-09-13 PROCEDURE — 74230 X-RAY XM SWLNG FUNCJ C+: CPT | Mod: 26

## 2023-09-13 PROCEDURE — 99233 SBSQ HOSP IP/OBS HIGH 50: CPT

## 2023-09-13 RX ORDER — POTASSIUM CHLORIDE 20 MEQ
20 PACKET (EA) ORAL
Refills: 0 | Status: COMPLETED | OUTPATIENT
Start: 2023-09-13 | End: 2023-09-13

## 2023-09-13 RX ORDER — POTASSIUM CHLORIDE 20 MEQ
40 PACKET (EA) ORAL
Refills: 0 | Status: DISCONTINUED | OUTPATIENT
Start: 2023-09-13 | End: 2023-09-13

## 2023-09-13 RX ADMIN — Medication 600 MILLIGRAM(S): at 08:08

## 2023-09-13 RX ADMIN — Medication 300 MILLIGRAM(S): at 11:52

## 2023-09-13 RX ADMIN — Medication 50 MILLIEQUIVALENT(S): at 11:52

## 2023-09-13 RX ADMIN — PANTOPRAZOLE SODIUM 40 MILLIGRAM(S): 20 TABLET, DELAYED RELEASE ORAL at 17:26

## 2023-09-13 RX ADMIN — Medication 1 APPLICATION(S): at 06:17

## 2023-09-13 RX ADMIN — Medication 50 MILLIEQUIVALENT(S): at 16:11

## 2023-09-13 RX ADMIN — Medication 50 MILLIEQUIVALENT(S): at 18:03

## 2023-09-13 RX ADMIN — POLYETHYLENE GLYCOL 3350 17 GRAM(S): 17 POWDER, FOR SOLUTION ORAL at 06:21

## 2023-09-13 RX ADMIN — POLYETHYLENE GLYCOL 3350 17 GRAM(S): 17 POWDER, FOR SOLUTION ORAL at 17:26

## 2023-09-13 RX ADMIN — CHLORHEXIDINE GLUCONATE 1 APPLICATION(S): 213 SOLUTION TOPICAL at 06:18

## 2023-09-13 RX ADMIN — Medication 1 APPLICATION(S): at 06:23

## 2023-09-13 RX ADMIN — Medication 600 MILLIGRAM(S): at 17:26

## 2023-09-13 RX ADMIN — Medication 3 MILLILITER(S): at 20:26

## 2023-09-13 RX ADMIN — ENOXAPARIN SODIUM 40 MILLIGRAM(S): 100 INJECTION SUBCUTANEOUS at 21:32

## 2023-09-13 RX ADMIN — Medication 1: at 17:25

## 2023-09-13 RX ADMIN — Medication 1 APPLICATION(S): at 17:26

## 2023-09-13 RX ADMIN — Medication 3 MILLILITER(S): at 14:39

## 2023-09-13 RX ADMIN — PANTOPRAZOLE SODIUM 40 MILLIGRAM(S): 20 TABLET, DELAYED RELEASE ORAL at 06:21

## 2023-09-13 RX ADMIN — Medication 1 APPLICATION(S): at 11:53

## 2023-09-13 RX ADMIN — Medication 1 APPLICATION(S): at 18:37

## 2023-09-13 RX ADMIN — Medication 3 MILLILITER(S): at 09:06

## 2023-09-13 NOTE — PROGRESS NOTE ADULT - SUBJECTIVE AND OBJECTIVE BOX
24H events:    Patient is a 88y old Male who presents with a chief complaint of DKA/HHS (12 Sep 2023 17:57)    Primary diagnosis of DKA, type 2    Today is hospital day 40d.  No acute or major events overnight.  Pt sounds congested on exam, CXR overnight w/ atelectasis.   WBC trending up, ordered repeat BCx.   Barium swallow performed, keep on soft and bite sized and mildly thick liquids.       PAST MEDICAL & SURGICAL HISTORY  HTN (hypertension)    Gout    BPH (benign prostatic hyperplasia)    Parkinson disease    HLD (hyperlipidemia)    Smoker within last 12 months    Diabetes mellitus    No significant past surgical history      SOCIAL HISTORY:  Social History:      ALLERGIES:  No Known Allergies    MEDICATIONS:  STANDING MEDICATIONS  albuterol/ipratropium for Nebulization 3 milliLiter(s) Nebulizer every 6 hours  allopurinol 300 milliGRAM(s) Oral daily  bacitracin   Ointment 1 Application(s) Topical two times a day  budesonide 160 MICROgram(s)/formoterol 4.5 MICROgram(s) Inhaler 2 Puff(s) Inhalation two times a day  chlorhexidine 2% Cloths 1 Application(s) Topical daily  collagenase Ointment 1 Application(s) Topical two times a day  collagenase Ointment 1 Application(s) Topical daily  dextrose 5%. 1000 milliLiter(s) IV Continuous <Continuous>  dextrose 5%. 1000 milliLiter(s) IV Continuous <Continuous>  dextrose 50% Injectable 12.5 Gram(s) IV Push once  dextrose 50% Injectable 25 Gram(s) IV Push once  dextrose 50% Injectable 25 Gram(s) IV Push once  enoxaparin Injectable 40 milliGRAM(s) SubCutaneous every 24 hours  glucagon  Injectable 1 milliGRAM(s) IntraMuscular once  guaiFENesin  milliGRAM(s) Oral every 12 hours  insulin lispro (ADMELOG) corrective regimen sliding scale   SubCutaneous every 6 hours  morphine  - Injectable 2 milliGRAM(s) IV Push once  pantoprazole    Tablet 40 milliGRAM(s) Oral two times a day  polyethylene glycol 3350 17 Gram(s) Oral two times a day  potassium chloride  20 mEq/100 mL IVPB 20 milliEquivalent(s) IV Intermittent every 2 hours    PRN MEDICATIONS  acetaminophen     Tablet .. 650 milliGRAM(s) Oral every 6 hours PRN  dextrose Oral Gel 15 Gram(s) Oral once PRN    VITALS:   T(F): 97.8  HR: 104  BP: 124/73  RR: 18  SpO2: 96%    PHYSICAL EXAM:    GENERAL: NAD, sleeping   NECK: Supple, no JVD   LUNGS: Unlabored respirations, b/l air entry, no adventitious breath sounds   HEART: Regular rate and rhythm, normal s1s2  ABD: Soft, mild distention, TTP in lower quadrants; +BS  EXT: No clubbing, cyanosis or edema.    LABS:                        10.9   11.16 )-----------( 307      ( 13 Sep 2023 06:01 )             33.4     09-13    143  |  112<H>  |  14  ----------------------------<  49<LL>  3.2<L>   |  22  |  0.9    Ca    7.4<L>      13 Sep 2023 06:01  Phos  2.5     09-12  Mg     2.0     09-13    TPro  5.1<L>  /  Alb  1.9<L>  /  TBili  0.3  /  DBili  x   /  AST  18  /  ALT  10  /  AlkPhos  85  09-13      Urinalysis Basic - ( 13 Sep 2023 06:01 )    Color: x / Appearance: x / SG: x / pH: x  Gluc: 49 mg/dL / Ketone: x  / Bili: x / Urobili: x   Blood: x / Protein: x / Nitrite: x   Leuk Esterase: x / RBC: x / WBC x   Sq Epi: x / Non Sq Epi: x / Bacteria: x      RADIOLOGY:

## 2023-09-13 NOTE — PROGRESS NOTE ADULT - ASSESSMENT
88 yr man with a medical history significant for Parkinsonism, diabetes, who presented initially with DKA, recurrent shock after a long and complex hospitalization involving severe GI bleeding.     #DKA - resolved   #DM II - well controlled  - s/p insulin gtt and fluids    - continue current tx: lantus 6U QHS, ISS    #suspected COPD - resolved   #B/l pleural Effusions  - patient has smoking history of 2 PPD for 60yrs, currently not smoking  - TTE 8/11: EF 63%  - pulmonary was following - outpatient follow up  - completed prednisone taper   - duonebs q6h prn  - HOB elevation to 45 degrees  - aspiration precautions; 1:1 feeds    #AAA  - stable  - outpt monitoring and f/u      #right arm skin necrosis s/p levophed??  - burn consult appreciated - bedside debridement done on 9/1  - cultures growing psuedomonas (R to imipenem, I to kenyatta), proteus, yeast   - s/p cefepime 2g IV q8h and flagyl 500mg IV q8h   - ID recs appreciated -> abx DC'd on 9/12    - WBC count trending up on 9/13   - repeat BCx pending     #Decreased oral intake - improving  - s/p calorie count and nutrition eval  - patient has been tolerating po intake and NG tube was taken out 8/22  - S&S eval 8/23: advanced diet to soft bite-sized with thin liquid and patient is tolerating   - barium swallow on 9/13 -> continue soft and bite-sized diet w/ mildly thick liquids     #History of Hungatella bacteremia likely GI translocation in the setting of appendicitis  - bcx 8/4 +hungatella  - bcx 8/5 onwards ntd  - TTE showed no vegetations  - s/p kenyatta, flagyl, cefepime, vanco    #Transaminitis - resolved  #abd tenderness  - mixed, in setting of infection  - ct abd with hepatic cyst    #Constipation-resolved  -kub 8/30 - large stool burden   -continues on on senna/miralax/lactulose/enemas  -repeat KUB reviewed - ileus     #HTN   -stable     #Misc   DVT ppx: lovenox   GI ppx: protonix   Diet: soft and bite sized w/ mildly thick liquids  Activity: IAT, seen by physiatry   Dispo: STR  Pending: BCx, placement

## 2023-09-13 NOTE — SWALLOW VFSS/MBS ASSESSMENT ADULT - SLP PERTINENT HISTORY OF CURRENT PROBLEM
Pt is an 87 y/o M with a medical history significant for Parkinsonism, diabetes, who presented initially with DKA, and is now in the critical care unit for recurrent shock after a long and complex hospitalization involving severe GI bleeding. +Melena, septic shock, bacteremia, appendicitis, pseudomonal wound growth.

## 2023-09-13 NOTE — PROGRESS NOTE ADULT - SUBJECTIVE AND OBJECTIVE BOX
NUTRITION SUPPORT TEAM  -  PROGRESS NOTE     Interval Events:  pt alert, afebrile  abd soft, ND, uncomfortable on exam of lower abdomen  pt seen after lunch - he'd eaten most everything on his tray  speech eval noted: + C5 cervical osteophyte, poor anterior bolus formation, delayed oral transit time  c/w moderate pharyngeal dysphagia - rec cont soft/ bite sized food and mildly thick liquids    VITALS:  T(F): 97.8 (09-13 @ 16:00), Max: 97.8 (09-13 @ 11:50)  HR: 104 (09-13 @ 16:00) (104 - 104)  BP: 131/78 (09-13 @ 16:00) (124/73 - 131/78)  RR: 18 (09-13 @ 16:00) (18 - 18)  SpO2: --    HEIGHT/WEIGHT/BMI:   Height (cm): 167.6 (09-02), 162.6 (11-21), 162.6 (10-09)  Weight (kg): 62.6 (09-02), 61.2 (10-09)  BMI (kg/m2): 22.3 (09-02), 23.1 (11-21), 23.1 (10-09)    I/Os: ??    STANDING MEDICATIONS:   albuterol/ipratropium for Nebulization 3 milliLiter(s) Nebulizer every 6 hours  allopurinol 300 milliGRAM(s) Oral daily  bacitracin   Ointment 1 Application(s) Topical two times a day  budesonide 160 MICROgram(s)/formoterol 4.5 MICROgram(s) Inhaler 2 Puff(s) Inhalation two times a day  chlorhexidine 2% Cloths 1 Application(s) Topical daily  collagenase Ointment 1 Application(s) Topical two times a day  collagenase Ointment 1 Application(s) Topical daily  enoxaparin Injectable 40 milliGRAM(s) SubCutaneous every 24 hours  guaiFENesin  milliGRAM(s) Oral every 12 hours  insulin lispro (ADMELOG) corrective regimen sliding scale   SubCutaneous every 6 hours  morphine  - Injectable 2 milliGRAM(s) IV Push once  pantoprazole    Tablet 40 milliGRAM(s) Oral two times a day  polyethylene glycol 3350 17 Gram(s) Oral two times a day  potassium chloride  20 mEq/100 mL IVPB 20 milliEquivalent(s) IV Intermittent every 2 hours    LABS:                         10.9   11.16 )-----------( 307      ( 13 Sep 2023 06:01 )             33.4     143  |  112<H>  |  14  ----------------------------<  49<LL>          (09-13-23 @ 06:01)  3.2<L>   |  22  |  0.9    Ca    7.4<L>          (09-13-23 @ 06:01)  Phos  2.5         (09-12-23 @ 07:52)  Mg     2.0         (09-13-23 @ 06:01)    TPro  5.1<L>  /  Alb  1.9<L>  /  TBili  0.3  /  DBili  x   /  AST  18  /  ALT  10  /  AlkPhos  85       09-13-23 @ 06:01    Blood Glucose (Past 24 hours):  154 mg/dL (09-13 @ 17:00)  123 mg/dL (09-13 @ 11:32)  119 mg/dL (09-13 @ 08:09)  64 mg/dL (09-13 @ 06:13)  163 mg/dL (09-12 @ 23:16)  160 mg/dL (09-12 @ 20:59)    DIET:   Diet, Soft and Bite Sized:   Consistent Carbohydrate No Snacks  DASH/TLC Sodium & Cholesterol Restricted  Mildly Thick Liquids (MILDTHICKLIQS)  Prosource Gelatein 20 Sugar Free     Qty per Day:  1  Supplement Feeding Modality:  Oral  Glucerna Shake Cans or Servings Per Day:  1       Frequency:  Daily (09-08-23 @ 18:24) [Active]  Diet, Clear Liquid:   Free Water Flush Instructions:  **NO RED PROSOURCE GELATEIN  Prosource Gelatein 20 Sugar Free     Qty per Day:  3 (09-05-23 @ 14:30) [Pending Verification By Attending]

## 2023-09-13 NOTE — SWALLOW VFSS/MBS ASSESSMENT ADULT - SLP GENERAL OBSERVATIONS
pt received in radiology suite awake alert w/o c/o pain. +room air +[employer] Cantonese-English ID# [id number]

## 2023-09-13 NOTE — SWALLOW VFSS/MBS ASSESSMENT ADULT - DIAGNOSTIC IMPRESSIONS
moderate pharyngeal dysphagia for thin liquids; mild pharyngeal dysphagia for mildly thick liquids, moderately thick liquids, puree, and soft solids

## 2023-09-13 NOTE — PROGRESS NOTE ADULT - ASSESSMENT
- DKA, improved - hypoglycemia earlier  - acute duodenal ulcer, melena, quiescent       - EGD findings noted   - post hemorrhagic anemia  - Septic shock requiring pressors, resolved        Hungatella bacteremia likely GI translocation in the setting of appendicitis        pseudomonas in wound c/s  - AAA stable  - h/o smoking, b/l pleural effusions - s/p treatment, pulm f/u  - dysphagia/ confusion      SUGGEST:  - on po diet - MBS and speech therapist f/u appreciated  - constipation resolved, should decrease Miralax to once a day   - add Glucerna Shake - with one sugar free prosource added to it) once a day - twice a day if meal intake poor  will f/u

## 2023-09-13 NOTE — SWALLOW VFSS/MBS ASSESSMENT ADULT - ORAL PHASE
Delayed oral transit time/Residue in oral cavity/Incomplete tongue to palate contact/Uncontrolled bolus / spillover in william-pharynx/Uncontrolled bolus / spillover in hypopharynx Residue in oral cavity/Incomplete tongue to palate contact/Uncontrolled bolus / spillover in william-pharynx/Uncontrolled bolus / spillover in hypopharynx

## 2023-09-14 LAB
ANION GAP SERPL CALC-SCNC: 9 MMOL/L — SIGNIFICANT CHANGE UP (ref 7–14)
BASOPHILS # BLD AUTO: 0.05 K/UL — SIGNIFICANT CHANGE UP (ref 0–0.2)
BASOPHILS NFR BLD AUTO: 0.7 % — SIGNIFICANT CHANGE UP (ref 0–1)
BUN SERPL-MCNC: 14 MG/DL — SIGNIFICANT CHANGE UP (ref 10–20)
CALCIUM SERPL-MCNC: 7.8 MG/DL — LOW (ref 8.4–10.5)
CHLORIDE SERPL-SCNC: 109 MMOL/L — SIGNIFICANT CHANGE UP (ref 98–110)
CO2 SERPL-SCNC: 21 MMOL/L — SIGNIFICANT CHANGE UP (ref 17–32)
CREAT SERPL-MCNC: 0.9 MG/DL — SIGNIFICANT CHANGE UP (ref 0.7–1.5)
EGFR: 83 ML/MIN/1.73M2 — SIGNIFICANT CHANGE UP
EOSINOPHIL # BLD AUTO: 0.03 K/UL — SIGNIFICANT CHANGE UP (ref 0–0.7)
EOSINOPHIL NFR BLD AUTO: 0.4 % — SIGNIFICANT CHANGE UP (ref 0–8)
GLUCOSE BLDC GLUCOMTR-MCNC: 107 MG/DL — HIGH (ref 70–99)
GLUCOSE BLDC GLUCOMTR-MCNC: 108 MG/DL — HIGH (ref 70–99)
GLUCOSE BLDC GLUCOMTR-MCNC: 140 MG/DL — HIGH (ref 70–99)
GLUCOSE BLDC GLUCOMTR-MCNC: 151 MG/DL — HIGH (ref 70–99)
GLUCOSE BLDC GLUCOMTR-MCNC: 173 MG/DL — HIGH (ref 70–99)
GLUCOSE BLDC GLUCOMTR-MCNC: 217 MG/DL — HIGH (ref 70–99)
GLUCOSE BLDC GLUCOMTR-MCNC: 235 MG/DL — HIGH (ref 70–99)
GLUCOSE SERPL-MCNC: 114 MG/DL — HIGH (ref 70–99)
HCT VFR BLD CALC: 38 % — LOW (ref 42–52)
HGB BLD-MCNC: 12.2 G/DL — LOW (ref 14–18)
IMM GRANULOCYTES NFR BLD AUTO: 1.4 % — HIGH (ref 0.1–0.3)
LYMPHOCYTES # BLD AUTO: 2.16 K/UL — SIGNIFICANT CHANGE UP (ref 1.2–3.4)
LYMPHOCYTES # BLD AUTO: 28.3 % — SIGNIFICANT CHANGE UP (ref 20.5–51.1)
MCHC RBC-ENTMCNC: 30.6 PG — SIGNIFICANT CHANGE UP (ref 27–31)
MCHC RBC-ENTMCNC: 32.1 G/DL — SIGNIFICANT CHANGE UP (ref 32–37)
MCV RBC AUTO: 95.2 FL — HIGH (ref 80–94)
MONOCYTES # BLD AUTO: 0.53 K/UL — SIGNIFICANT CHANGE UP (ref 0.1–0.6)
MONOCYTES NFR BLD AUTO: 6.9 % — SIGNIFICANT CHANGE UP (ref 1.7–9.3)
NEUTROPHILS # BLD AUTO: 4.76 K/UL — SIGNIFICANT CHANGE UP (ref 1.4–6.5)
NEUTROPHILS NFR BLD AUTO: 62.3 % — SIGNIFICANT CHANGE UP (ref 42.2–75.2)
NRBC # BLD: 0 /100 WBCS — SIGNIFICANT CHANGE UP (ref 0–0)
PLATELET # BLD AUTO: 325 K/UL — SIGNIFICANT CHANGE UP (ref 130–400)
PMV BLD: 10.4 FL — SIGNIFICANT CHANGE UP (ref 7.4–10.4)
POTASSIUM SERPL-MCNC: 3.9 MMOL/L — SIGNIFICANT CHANGE UP (ref 3.5–5)
POTASSIUM SERPL-SCNC: 3.9 MMOL/L — SIGNIFICANT CHANGE UP (ref 3.5–5)
RBC # BLD: 3.99 M/UL — LOW (ref 4.7–6.1)
RBC # FLD: 16.5 % — HIGH (ref 11.5–14.5)
SARS-COV-2 RNA SPEC QL NAA+PROBE: DETECTED
SODIUM SERPL-SCNC: 139 MMOL/L — SIGNIFICANT CHANGE UP (ref 135–146)
WBC # BLD: 7.64 K/UL — SIGNIFICANT CHANGE UP (ref 4.8–10.8)
WBC # FLD AUTO: 7.64 K/UL — SIGNIFICANT CHANGE UP (ref 4.8–10.8)

## 2023-09-14 PROCEDURE — 99232 SBSQ HOSP IP/OBS MODERATE 35: CPT

## 2023-09-14 PROCEDURE — 74018 RADEX ABDOMEN 1 VIEW: CPT | Mod: 26

## 2023-09-14 RX ORDER — POLYETHYLENE GLYCOL 3350 17 G/17G
17 POWDER, FOR SOLUTION ORAL DAILY
Refills: 0 | Status: DISCONTINUED | OUTPATIENT
Start: 2023-09-14 | End: 2023-09-18

## 2023-09-14 RX ADMIN — Medication 600 MILLIGRAM(S): at 06:23

## 2023-09-14 RX ADMIN — Medication 2: at 17:59

## 2023-09-14 RX ADMIN — Medication 600 MILLIGRAM(S): at 18:00

## 2023-09-14 RX ADMIN — PANTOPRAZOLE SODIUM 40 MILLIGRAM(S): 20 TABLET, DELAYED RELEASE ORAL at 17:59

## 2023-09-14 RX ADMIN — Medication 1 APPLICATION(S): at 18:03

## 2023-09-14 RX ADMIN — Medication 3 MILLILITER(S): at 08:34

## 2023-09-14 RX ADMIN — Medication 3 MILLILITER(S): at 14:30

## 2023-09-14 RX ADMIN — Medication 3 MILLILITER(S): at 20:15

## 2023-09-14 RX ADMIN — PANTOPRAZOLE SODIUM 40 MILLIGRAM(S): 20 TABLET, DELAYED RELEASE ORAL at 06:23

## 2023-09-14 RX ADMIN — CHLORHEXIDINE GLUCONATE 1 APPLICATION(S): 213 SOLUTION TOPICAL at 06:24

## 2023-09-14 RX ADMIN — Medication 1 APPLICATION(S): at 06:23

## 2023-09-14 RX ADMIN — Medication 1: at 00:18

## 2023-09-14 RX ADMIN — POLYETHYLENE GLYCOL 3350 17 GRAM(S): 17 POWDER, FOR SOLUTION ORAL at 12:30

## 2023-09-14 RX ADMIN — Medication 1 APPLICATION(S): at 06:00

## 2023-09-14 RX ADMIN — POLYETHYLENE GLYCOL 3350 17 GRAM(S): 17 POWDER, FOR SOLUTION ORAL at 06:24

## 2023-09-14 RX ADMIN — Medication 5 MILLIGRAM(S): at 12:30

## 2023-09-14 RX ADMIN — ENOXAPARIN SODIUM 40 MILLIGRAM(S): 100 INJECTION SUBCUTANEOUS at 22:13

## 2023-09-14 NOTE — PROGRESS NOTE ADULT - ASSESSMENT
87 y/o M w/ PMH of DM, HTN coming from home with complaint of decreased appetite, increased urinary output and craving sweets x 2 weeks. Daughter is caretaker, states patient behavior has changed. Within the last 2 weeks, He is less active, requesting more coca cola and sugar. Pt noted to be hypotensive upon arrival.     # DKA - resolved   # DM type 2  # Hypoglycemia-resolved  - s/p insulin  drip  - hold lantus  - SSC insulin coverage    # Septic shock resolved  # Hungatella bacteremia  probably sec to  appendicitis  - bcx 8/4 +hungatella  - bcx 8/5 onwards ntd  - TTE showed no vegetations  - s/p kenyatta, flagyl, cefepime, vanco  - blood Culture Results: No growth at 5 days (09.05.23 @ 11:22)    # Right arm infected necrotic wound  -  s/p debridement 9/1  - S/p cefepime , flagyl  - F/u repeat blood cultures  - Wound care - Santyl/Xeroform/Kerlix / ACE Wrap daily    # Suspected COPD   - S/p prednisone taper  - c/w duoneb, budesonide    # Decreased oral intake - improved  - barium swallow : soft and bite-sized diet with mildly thick liquids     # Duodenal ulcer  # Acute on chr anemia  - S/p P RBC  - s/p EGD 8/14 duodenal ulcer s/p epi and hot forceps thermal therapy  - c/w protonix    # Hypokalemia- resolved    # Ileus- resolved  - Xray Kidney Ureter Bladder (09.14.23 @ 10:01) >nonobstructive bowel gas pattern. Previously administered oral contrast seen throughout the colon. Stable visualized osseous structure.  - Bisacodyl 5mg P xi now  - tap water enema    # Transaminitis- resolved    # Pancreatic body cyst  - outpt MRI    # AAA - stable  - outpt  f/u     # DVT prophylaxis    # DNR/DNI    # Pending: PT eval  # Disposition: [facility] pending auth

## 2023-09-14 NOTE — PROGRESS NOTE ADULT - SUBJECTIVE AND OBJECTIVE BOX
Patient is a 88y old  Male who presents with a chief complaint of DKA/HHS (14 Sep 2023 10:26)    Patient was seen and examined.  c/o abd distension  Denies any other complaints.  All systems reviewed positive history as mentioned above.    PAST MEDICAL & SURGICAL HISTORY:  HTN (hypertension)  Gout  BPH (benign prostatic hyperplasia)  Parkinson disease  HLD (hyperlipidemia)  Smoker within last 12 months  Diabetes mellitus  No significant past surgical history    Allergies  No Known Allergies    MEDICATIONS  (STANDING):  albuterol/ipratropium for Nebulization 3 milliLiter(s) Nebulizer every 6 hours  allopurinol 300 milliGRAM(s) Oral daily  bacitracin   Ointment 1 Application(s) Topical two times a day  budesonide 160 MICROgram(s)/formoterol 4.5 MICROgram(s) Inhaler 2 Puff(s) Inhalation two times a day  chlorhexidine 2% Cloths 1 Application(s) Topical daily  collagenase Ointment 1 Application(s) Topical two times a day  collagenase Ointment 1 Application(s) Topical daily  enoxaparin Injectable 40 milliGRAM(s) SubCutaneous every 24 hours  glucagon  Injectable 1 milliGRAM(s) IntraMuscular once  guaiFENesin  milliGRAM(s) Oral every 12 hours  insulin lispro (ADMELOG) corrective regimen sliding scale   SubCutaneous every 6 hours  morphine  - Injectable 2 milliGRAM(s) IV Push once  pantoprazole    Tablet 40 milliGRAM(s) Oral two times a day  polyethylene glycol 3350 17 Gram(s) Oral daily    MEDICATIONS  (PRN):  acetaminophen     Tablet .. 650 milliGRAM(s) Oral every 6 hours PRN Temp greater or equal to 38C (100.4F)  dextrose Oral Gel 15 Gram(s) Oral once PRN Blood Glucose LESS THAN 70 milliGRAM(s)/deciliter    Vital Signs Last 24 Hrs  T(C): 35.7  T(F): 96.2  HR: 94  BP: 119/72  BP(mean): --  RR: 18  SpO2: --    O/E:  Awake, alert, not in distress.  HEENT: atraumatic, EOMI.  Chest: clear.  CVS: SIS2 +, no murmur.  P/A: distended, BS+  CNS: awake, alert  Ext: no edema feet.  All systems reviewed positive findings as above.    POCT Blood Glucose.: 140 mg/dL (14 Sep 2023 11:23)  POCT Blood Glucose.: 107 mg/dL (14 Sep 2023 07:49)  POCT Blood Glucose.: 108 mg/dL (14 Sep 2023 06:22)  POCT Blood Glucose.: 173 mg/dL (14 Sep 2023 00:15)  POCT Blood Glucose.: 149 mg/dL (13 Sep 2023 20:54)  POCT Blood Glucose.: 154 mg/dL (13 Sep 2023 17:00)                          12.2<L>  7.64  )-----------( 325      ( 14 Sep 2023 08:54 )             38.0<L>                        10.9<L>  11.16<H> )-----------( 307      ( 13 Sep 2023 06:01 )             33.4<L>    09-14    139  |  109  |  14  ----------------------------<  114<H>  3.9   |  21  |  0.9  09-13    143  |  112<H>  |  14  ----------------------------<  49<LL>  3.2<L>   |  22  |  0.9    Ca    7.8<L>      14 Sep 2023 08:54  Ca    7.4<L>      13 Sep 2023 06:01  Mg     2.0     09-13    TPro  5.1<L>  /  Alb  1.9<L>  /  TBili  0.3  /  DBili  x   /  AST  18  /  ALT  10  /  AlkPhos  85  09-13            Urinalysis Basic - ( 14 Sep 2023 08:54 )    Color: x / Appearance: x / SG: x / pH: x  Gluc: 114 mg/dL / Ketone: x  / Bili: x / Urobili: x   Blood: x / Protein: x / Nitrite: x   Leuk Esterase: x / RBC: x / WBC x   Sq Epi: x / Non Sq Epi: x / Bacteria: x

## 2023-09-14 NOTE — PROGRESS NOTE ADULT - TIME BILLING
Direct patient care. Discussed on rounds with Hospital staff
I have personally seen and examined this patient.    I have reviewed all pertinent clinical information and reviewed all relevant imaging and diagnostic studies personally.   I counseled the patient about diagnostic testing and treatment plan. All questions were answered.   I discussed recommendations with the primary team.
Total time spent to complete patient's bedside assessment, review medical chart, discuss medical plan of care with  medical team was more than 35 minutes  with >50% of time spent face to face with patient, discussion with patient and/or coordination of care.
wound care
I have personally seen and examined this patient.    I have reviewed all pertinent clinical information and reviewed all relevant imaging and diagnostic studies personally.   I counseled the patient about diagnostic testing and treatment plan. All questions were answered.   I discussed recommendations with the primary team.
I have personally seen and examined this patient.    I have reviewed all pertinent clinical information and reviewed all relevant imaging and diagnostic studies personally.   I counseled the patient about diagnostic testing and treatment plan. All questions were answered.   I discussed recommendations with the primary team.
Coordination of care

## 2023-09-14 NOTE — PROGRESS NOTE ADULT - SUBJECTIVE AND OBJECTIVE BOX
24H events:    Patient is a 88y old Male who presents with a chief complaint of DKA/HHS (13 Sep 2023 18:03)    Primary diagnosis of DKA, type 2      Day 1:  Day 2:  Day 3:     Today is hospital day 41d. This morning patient was seen and examined at bedside, resting comfortably in bed.    No acute or major events overnight.    Code Status:    Family communication:  Contact date:  Name of person contacted:  Relationship to patient:  Communication details:  What matters most:    PAST MEDICAL & SURGICAL HISTORY  HTN (hypertension)    Gout    BPH (benign prostatic hyperplasia)    Parkinson disease    HLD (hyperlipidemia)    Smoker within last 12 months    Diabetes mellitus    No significant past surgical history      SOCIAL HISTORY:  Social History:      ALLERGIES:  No Known Allergies    MEDICATIONS:  STANDING MEDICATIONS  albuterol/ipratropium for Nebulization 3 milliLiter(s) Nebulizer every 6 hours  allopurinol 300 milliGRAM(s) Oral daily  bacitracin   Ointment 1 Application(s) Topical two times a day  budesonide 160 MICROgram(s)/formoterol 4.5 MICROgram(s) Inhaler 2 Puff(s) Inhalation two times a day  chlorhexidine 2% Cloths 1 Application(s) Topical daily  collagenase Ointment 1 Application(s) Topical two times a day  collagenase Ointment 1 Application(s) Topical daily  dextrose 5%. 1000 milliLiter(s) IV Continuous <Continuous>  dextrose 5%. 1000 milliLiter(s) IV Continuous <Continuous>  dextrose 50% Injectable 12.5 Gram(s) IV Push once  dextrose 50% Injectable 25 Gram(s) IV Push once  dextrose 50% Injectable 25 Gram(s) IV Push once  enoxaparin Injectable 40 milliGRAM(s) SubCutaneous every 24 hours  glucagon  Injectable 1 milliGRAM(s) IntraMuscular once  guaiFENesin  milliGRAM(s) Oral every 12 hours  insulin lispro (ADMELOG) corrective regimen sliding scale   SubCutaneous every 6 hours  morphine  - Injectable 2 milliGRAM(s) IV Push once  pantoprazole    Tablet 40 milliGRAM(s) Oral two times a day  polyethylene glycol 3350 17 Gram(s) Oral daily    PRN MEDICATIONS  acetaminophen     Tablet .. 650 milliGRAM(s) Oral every 6 hours PRN  dextrose Oral Gel 15 Gram(s) Oral once PRN    VITALS:   T(F): 96.2  HR: 94  BP: 119/72  RR: 18  SpO2: --    PHYSICAL EXAM:  GENERAL:   (  ) NAD, lying in bed comfortably     (  ) obtunded     (  ) lethargic     (  ) somnolent    HEAD:   (  ) Atraumatic     (  ) hematoma     (  ) laceration (specify location:       )     NECK:  (  ) Supple     (  ) neck stiffness     (  ) nuchal rigidity     (  )  no JVD     (  ) JVD present ( -- cm)    HEART:  Rate -->     (  ) normal rate     (  ) bradycardic     (  ) tachycardic  Rhythm -->     (  ) regular     (  ) regularly irregular     (  ) irregularly irregular  Murmurs -->     (  ) normal s1s2     (  ) systolic murmur     (  ) diastolic murmur     (  ) continuous murmur      (  ) S3 present     (  ) S4 present    LUNGS:   (  )Unlabored respirations     (  ) tachypnea  (  ) B/L air entry     (  ) decreased breath sounds in:  (location     )    (  ) no adventitious sound     (  ) crackles     (  ) wheezing      (  ) rhonchi      (specify location:       )  (  ) chest wall tenderness (specify location:       )    ABDOMEN:   (  ) Soft     (  ) tense   |   (  ) nondistended     (  ) distended   |   (  ) +BS     (  ) hypoactive bowel sounds     (  ) hyperactive bowel sounds  (  ) nontender     (  ) RUQ tenderness     (  ) RLQ tenderness     (  ) LLQ tenderness     (  ) epigastric tenderness     (  ) diffuse tenderness  (  ) Splenomegaly      (  ) Hepatomegaly      (  ) Jaundice     (  ) ecchymosis     EXTREMITIES:  (  ) Normal     (  ) Rash     (  ) ecchymosis     (  ) varicose veins      (  ) pitting edema     (  ) non-pitting edema   (  ) ulceration     (  ) gangrene:     (location:     )    NERVOUS SYSTEM:    (  ) A&Ox3     (  ) confused     (  ) lethargic  CN II-XII:     (  ) Intact     (  ) deficits found     (Specify:     )   Upper extremities:     (  ) no sensorimotor deficits     (  ) weakness     (  ) loss of proprioception/vibration     (  ) loss of touch/temperature (specify:    )  Lower extremities:     (  ) no sensorimotor deficits     (  ) weakness     (  ) loss of proprioception/vibration     (  ) loss of touch/temperature (specify:    )    SKIN:   (  ) No rashes or lesions     (  ) maculopapular rash     (  ) pustules     (  ) vesicles     (  ) ulcer     (  ) ecchymosis     (specify location:     )    AMPAC score:    (  ) Indwelling Jones Catheter:   Date insterted:    Reason (  ) Critical illness     (  ) urinary retention    (  ) Accurate Ins/Outs Monitoring     (  ) CMO patient    (  ) Central Line:   Date inserted:  Location: (  ) Right IJ     (  ) Left IJ     (  ) Right Fem     (  ) Left Fem    (  ) SPC        (  ) pigtail       (  ) PEG tube       (  ) colostomy       (  ) jejunostomy  (  ) U-Dall    LABS:                        12.2   7.64  )-----------( 325      ( 14 Sep 2023 08:54 )             38.0     09-13    143  |  112<H>  |  14  ----------------------------<  49<LL>  3.2<L>   |  22  |  0.9    Ca    7.4<L>      13 Sep 2023 06:01  Mg     2.0     09-13    TPro  5.1<L>  /  Alb  1.9<L>  /  TBili  0.3  /  DBili  x   /  AST  18  /  ALT  10  /  AlkPhos  85  09-13      Urinalysis Basic - ( 13 Sep 2023 06:01 )    Color: x / Appearance: x / SG: x / pH: x  Gluc: 49 mg/dL / Ketone: x  / Bili: x / Urobili: x   Blood: x / Protein: x / Nitrite: x   Leuk Esterase: x / RBC: x / WBC x   Sq Epi: x / Non Sq Epi: x / Bacteria: x                RADIOLOGY:           24H events:    Patient is a 88y old Male who presents with a chief complaint of DKA/HHS (13 Sep 2023 18:03)    Primary diagnosis of DKA, type 2    Today is hospital day 41d.   No acute or major events overnight.  Pt still with distention and TTP in lower quadrant. KUB w/ large stool burden. Dulcolax and enema ordered.   Changed dressing on R forearm wound this AM.    PAST MEDICAL & SURGICAL HISTORY  HTN (hypertension)    Gout    BPH (benign prostatic hyperplasia)    Parkinson disease    HLD (hyperlipidemia)    Smoker within last 12 months    Diabetes mellitus    No significant past surgical history      SOCIAL HISTORY:  Social History:      ALLERGIES:  No Known Allergies    MEDICATIONS:  STANDING MEDICATIONS  albuterol/ipratropium for Nebulization 3 milliLiter(s) Nebulizer every 6 hours  allopurinol 300 milliGRAM(s) Oral daily  bacitracin   Ointment 1 Application(s) Topical two times a day  budesonide 160 MICROgram(s)/formoterol 4.5 MICROgram(s) Inhaler 2 Puff(s) Inhalation two times a day  chlorhexidine 2% Cloths 1 Application(s) Topical daily  collagenase Ointment 1 Application(s) Topical two times a day  collagenase Ointment 1 Application(s) Topical daily  dextrose 5%. 1000 milliLiter(s) IV Continuous <Continuous>  dextrose 5%. 1000 milliLiter(s) IV Continuous <Continuous>  dextrose 50% Injectable 12.5 Gram(s) IV Push once  dextrose 50% Injectable 25 Gram(s) IV Push once  dextrose 50% Injectable 25 Gram(s) IV Push once  enoxaparin Injectable 40 milliGRAM(s) SubCutaneous every 24 hours  glucagon  Injectable 1 milliGRAM(s) IntraMuscular once  guaiFENesin  milliGRAM(s) Oral every 12 hours  insulin lispro (ADMELOG) corrective regimen sliding scale   SubCutaneous every 6 hours  morphine  - Injectable 2 milliGRAM(s) IV Push once  pantoprazole    Tablet 40 milliGRAM(s) Oral two times a day  polyethylene glycol 3350 17 Gram(s) Oral daily    PRN MEDICATIONS  acetaminophen     Tablet .. 650 milliGRAM(s) Oral every 6 hours PRN  dextrose Oral Gel 15 Gram(s) Oral once PRN    VITALS:   T(F): 96.2  HR: 94  BP: 119/72  RR: 18  SpO2: --    PHYSICAL EXAM:    GENERAL: NAD, sleeping   NECK: Supple, no JVD   LUNGS: Unlabored respirations, b/l air entry, no adventitious breath sounds   HEART: Regular rate and rhythm, normal s1s2  ABD: Soft, mild distention, TTP in lower quadrants; +BS  EXT: No clubbing, cyanosis or edema.  SKIN: Large R forearm wound, clean base, pink granulation tissue; no necrosis, purulence or fluctuance       LABS:                        12.2   7.64  )-----------( 325      ( 14 Sep 2023 08:54 )             38.0     09-13    143  |  112<H>  |  14  ----------------------------<  49<LL>  3.2<L>   |  22  |  0.9    Ca    7.4<L>      13 Sep 2023 06:01  Mg     2.0     09-13    TPro  5.1<L>  /  Alb  1.9<L>  /  TBili  0.3  /  DBili  x   /  AST  18  /  ALT  10  /  AlkPhos  85  09-13      Urinalysis Basic - ( 13 Sep 2023 06:01 )    Color: x / Appearance: x / SG: x / pH: x  Gluc: 49 mg/dL / Ketone: x  / Bili: x / Urobili: x   Blood: x / Protein: x / Nitrite: x   Leuk Esterase: x / RBC: x / WBC x   Sq Epi: x / Non Sq Epi: x / Bacteria: x        RADIOLOGY:

## 2023-09-14 NOTE — PROGRESS NOTE ADULT - ASSESSMENT
88 yr man with a medical history significant for Parkinsonism, diabetes, who presented initially with DKA, recurrent shock after a long and complex hospitalization involving severe GI bleeding.     #DKA - resolved   #DM II - well controlled  - s/p insulin gtt and fluids    - continue current tx: lantus 6U QHS, ISS    #suspected COPD - resolved   #B/l pleural Effusions  - patient has smoking history of 2 PPD for 60yrs, currently not smoking  - TTE 8/11: EF 63%  - pulmonary was following - outpatient follow up  - completed prednisone taper   - duonebs q6h prn  - HOB elevation to 45 degrees  - aspiration precautions; 1:1 feeds    #AAA  - stable  - outpt monitoring and f/u      #right arm skin necrosis s/p levophed??  - burn consult appreciated - bedside debridement done on 9/1  - cultures growing psuedomonas (R to imipenem, I to kenyatta), proteus, yeast   - s/p cefepime 2g IV q8h and flagyl 500mg IV q8h   - ID recs appreciated -> abx DC'd on 9/12    - WBC count trending up on 9/13   - repeat BCx pending     #Decreased oral intake - improving  - s/p calorie count and nutrition eval  - patient has been tolerating po intake and NG tube was taken out 8/22  - S&S eval 8/23: advanced diet to soft bite-sized with thin liquid and patient is tolerating   - barium swallow on 9/13 -> continue soft and bite-sized diet w/ mildly thick liquids     #History of Hungatella bacteremia likely GI translocation in the setting of appendicitis  - bcx 8/4 +hungatella  - bcx 8/5 onwards ntd  - TTE showed no vegetations  - s/p kenyatta, flagyl, cefepime, vanco    #Transaminitis - resolved  #abd tenderness  - mixed, in setting of infection  - ct abd with hepatic cyst    #Constipation  -kub 8/30 - large stool burden   -continues on on senna/miralax/lactulose/enemas  -repeat KUB on 9/14 w/ large burden -> ordered dulcolax and enema   -pt has slow transit, contrast from CT on 9/11 appreciated in KUB from 9/14    #HTN   -stable     #Misc   DVT ppx: lovenox   GI ppx: protonix   Diet: soft and bite sized w/ mildly thick liquids  Activity: IAT, seen by physiatry   Dispo: STR  Pending: placement

## 2023-09-15 LAB
ALBUMIN SERPL ELPH-MCNC: 2.1 G/DL — LOW (ref 3.5–5.2)
ALP SERPL-CCNC: 86 U/L — SIGNIFICANT CHANGE UP (ref 30–115)
ALT FLD-CCNC: 10 U/L — SIGNIFICANT CHANGE UP (ref 0–41)
ANION GAP SERPL CALC-SCNC: 10 MMOL/L — SIGNIFICANT CHANGE UP (ref 7–14)
AST SERPL-CCNC: 19 U/L — SIGNIFICANT CHANGE UP (ref 0–41)
BASOPHILS # BLD AUTO: 0.04 K/UL — SIGNIFICANT CHANGE UP (ref 0–0.2)
BASOPHILS NFR BLD AUTO: 0.5 % — SIGNIFICANT CHANGE UP (ref 0–1)
BILIRUB SERPL-MCNC: 0.4 MG/DL — SIGNIFICANT CHANGE UP (ref 0.2–1.2)
BUN SERPL-MCNC: 13 MG/DL — SIGNIFICANT CHANGE UP (ref 10–20)
CALCIUM SERPL-MCNC: 7.4 MG/DL — LOW (ref 8.4–10.5)
CHLORIDE SERPL-SCNC: 108 MMOL/L — SIGNIFICANT CHANGE UP (ref 98–110)
CO2 SERPL-SCNC: 22 MMOL/L — SIGNIFICANT CHANGE UP (ref 17–32)
CREAT SERPL-MCNC: 0.8 MG/DL — SIGNIFICANT CHANGE UP (ref 0.7–1.5)
CRP SERPL-MCNC: 28.7 MG/L — HIGH
D DIMER BLD IA.RAPID-MCNC: 429 NG/ML DDU — HIGH
EGFR: 86 ML/MIN/1.73M2 — SIGNIFICANT CHANGE UP
EOSINOPHIL # BLD AUTO: 0.01 K/UL — SIGNIFICANT CHANGE UP (ref 0–0.7)
EOSINOPHIL NFR BLD AUTO: 0.1 % — SIGNIFICANT CHANGE UP (ref 0–8)
ERYTHROCYTE [SEDIMENTATION RATE] IN BLOOD: 93 MM/HR — HIGH (ref 0–10)
GLUCOSE BLDC GLUCOMTR-MCNC: 109 MG/DL — HIGH (ref 70–99)
GLUCOSE BLDC GLUCOMTR-MCNC: 120 MG/DL — HIGH (ref 70–99)
GLUCOSE BLDC GLUCOMTR-MCNC: 132 MG/DL — HIGH (ref 70–99)
GLUCOSE BLDC GLUCOMTR-MCNC: 134 MG/DL — HIGH (ref 70–99)
GLUCOSE BLDC GLUCOMTR-MCNC: 152 MG/DL — HIGH (ref 70–99)
GLUCOSE SERPL-MCNC: 132 MG/DL — HIGH (ref 70–99)
HCT VFR BLD CALC: 33.1 % — LOW (ref 42–52)
HCT VFR BLD CALC: 33.4 % — LOW (ref 42–52)
HGB BLD-MCNC: 10.5 G/DL — LOW (ref 14–18)
HGB BLD-MCNC: 10.6 G/DL — LOW (ref 14–18)
IMM GRANULOCYTES NFR BLD AUTO: 0.8 % — HIGH (ref 0.1–0.3)
LDH SERPL L TO P-CCNC: 226 U/L — SIGNIFICANT CHANGE UP (ref 50–242)
LYMPHOCYTES # BLD AUTO: 2.05 K/UL — SIGNIFICANT CHANGE UP (ref 1.2–3.4)
LYMPHOCYTES # BLD AUTO: 24 % — SIGNIFICANT CHANGE UP (ref 20.5–51.1)
MCHC RBC-ENTMCNC: 30 PG — SIGNIFICANT CHANGE UP (ref 27–31)
MCHC RBC-ENTMCNC: 30.6 PG — SIGNIFICANT CHANGE UP (ref 27–31)
MCHC RBC-ENTMCNC: 31.4 G/DL — LOW (ref 32–37)
MCHC RBC-ENTMCNC: 32 G/DL — SIGNIFICANT CHANGE UP (ref 32–37)
MCV RBC AUTO: 95.4 FL — HIGH (ref 80–94)
MCV RBC AUTO: 95.7 FL — HIGH (ref 80–94)
MONOCYTES # BLD AUTO: 0.58 K/UL — SIGNIFICANT CHANGE UP (ref 0.1–0.6)
MONOCYTES NFR BLD AUTO: 6.8 % — SIGNIFICANT CHANGE UP (ref 1.7–9.3)
NEUTROPHILS # BLD AUTO: 5.78 K/UL — SIGNIFICANT CHANGE UP (ref 1.4–6.5)
NEUTROPHILS NFR BLD AUTO: 67.8 % — SIGNIFICANT CHANGE UP (ref 42.2–75.2)
NRBC # BLD: 0 /100 WBCS — SIGNIFICANT CHANGE UP (ref 0–0)
NRBC # BLD: 0 /100 WBCS — SIGNIFICANT CHANGE UP (ref 0–0)
PLATELET # BLD AUTO: 319 K/UL — SIGNIFICANT CHANGE UP (ref 130–400)
PLATELET # BLD AUTO: 340 K/UL — SIGNIFICANT CHANGE UP (ref 130–400)
PMV BLD: 10.6 FL — HIGH (ref 7.4–10.4)
PMV BLD: 10.6 FL — HIGH (ref 7.4–10.4)
POTASSIUM SERPL-MCNC: 4.2 MMOL/L — SIGNIFICANT CHANGE UP (ref 3.5–5)
POTASSIUM SERPL-SCNC: 4.2 MMOL/L — SIGNIFICANT CHANGE UP (ref 3.5–5)
PROT SERPL-MCNC: 5.3 G/DL — LOW (ref 6–8)
RBC # BLD: 3.46 M/UL — LOW (ref 4.7–6.1)
RBC # BLD: 3.5 M/UL — LOW (ref 4.7–6.1)
RBC # FLD: 16.1 % — HIGH (ref 11.5–14.5)
RBC # FLD: 16.2 % — HIGH (ref 11.5–14.5)
SODIUM SERPL-SCNC: 140 MMOL/L — SIGNIFICANT CHANGE UP (ref 135–146)
WBC # BLD: 7.6 K/UL — SIGNIFICANT CHANGE UP (ref 4.8–10.8)
WBC # BLD: 8.53 K/UL — SIGNIFICANT CHANGE UP (ref 4.8–10.8)
WBC # FLD AUTO: 7.6 K/UL — SIGNIFICANT CHANGE UP (ref 4.8–10.8)
WBC # FLD AUTO: 8.53 K/UL — SIGNIFICANT CHANGE UP (ref 4.8–10.8)

## 2023-09-15 PROCEDURE — 93010 ELECTROCARDIOGRAM REPORT: CPT

## 2023-09-15 PROCEDURE — 71275 CT ANGIOGRAPHY CHEST: CPT | Mod: 26

## 2023-09-15 PROCEDURE — 99233 SBSQ HOSP IP/OBS HIGH 50: CPT

## 2023-09-15 PROCEDURE — 71045 X-RAY EXAM CHEST 1 VIEW: CPT | Mod: 26

## 2023-09-15 RX ORDER — SODIUM CHLORIDE 9 MG/ML
1000 INJECTION, SOLUTION INTRAVENOUS
Refills: 0 | Status: DISCONTINUED | OUTPATIENT
Start: 2023-09-15 | End: 2023-09-18

## 2023-09-15 RX ORDER — SODIUM CHLORIDE 9 MG/ML
500 INJECTION, SOLUTION INTRAVENOUS ONCE
Refills: 0 | Status: COMPLETED | OUTPATIENT
Start: 2023-09-15 | End: 2023-09-15

## 2023-09-15 RX ADMIN — SODIUM CHLORIDE 75 MILLILITER(S): 9 INJECTION, SOLUTION INTRAVENOUS at 10:04

## 2023-09-15 RX ADMIN — Medication 1: at 12:04

## 2023-09-15 RX ADMIN — Medication 1 APPLICATION(S): at 05:12

## 2023-09-15 RX ADMIN — Medication 600 MILLIGRAM(S): at 18:23

## 2023-09-15 RX ADMIN — Medication 600 MILLIGRAM(S): at 05:11

## 2023-09-15 RX ADMIN — POLYETHYLENE GLYCOL 3350 17 GRAM(S): 17 POWDER, FOR SOLUTION ORAL at 12:05

## 2023-09-15 RX ADMIN — SODIUM CHLORIDE 1000 MILLILITER(S): 9 INJECTION, SOLUTION INTRAVENOUS at 09:29

## 2023-09-15 RX ADMIN — Medication 1 APPLICATION(S): at 18:24

## 2023-09-15 RX ADMIN — PANTOPRAZOLE SODIUM 40 MILLIGRAM(S): 20 TABLET, DELAYED RELEASE ORAL at 18:24

## 2023-09-15 RX ADMIN — Medication 1 APPLICATION(S): at 05:11

## 2023-09-15 RX ADMIN — PANTOPRAZOLE SODIUM 40 MILLIGRAM(S): 20 TABLET, DELAYED RELEASE ORAL at 05:11

## 2023-09-15 RX ADMIN — ENOXAPARIN SODIUM 40 MILLIGRAM(S): 100 INJECTION SUBCUTANEOUS at 22:07

## 2023-09-15 RX ADMIN — Medication 300 MILLIGRAM(S): at 12:05

## 2023-09-15 RX ADMIN — CHLORHEXIDINE GLUCONATE 1 APPLICATION(S): 213 SOLUTION TOPICAL at 05:11

## 2023-09-15 RX ADMIN — Medication 1 APPLICATION(S): at 18:25

## 2023-09-15 NOTE — PROGRESS NOTE ADULT - SUBJECTIVE AND OBJECTIVE BOX
Patient is a 88y old  Male who presents with a chief complaint of DKA/HHS (14 Sep 2023 10:26)    Patient was seen and examined.  Pt tested covid positive  Pt tachycardic this AM, will give NS bolus x1  Pt denies any  complaints.  All systems reviewed positive history as mentioned above.    PAST MEDICAL & SURGICAL HISTORY:  HTN (hypertension)  Gout  BPH (benign prostatic hyperplasia)  Parkinson disease  HLD (hyperlipidemia)  Smoker within last 12 months  Diabetes mellitus  No significant past surgical history    Allergies  No Known Allergies    MEDICATIONS  (STANDING):  albuterol/ipratropium for Nebulization 3 milliLiter(s) Nebulizer every 6 hours  allopurinol 300 milliGRAM(s) Oral daily  bacitracin   Ointment 1 Application(s) Topical two times a day  budesonide 160 MICROgram(s)/formoterol 4.5 MICROgram(s) Inhaler 2 Puff(s) Inhalation two times a day  chlorhexidine 2% Cloths 1 Application(s) Topical daily  collagenase Ointment 1 Application(s) Topical two times a day  collagenase Ointment 1 Application(s) Topical daily  enoxaparin Injectable 40 milliGRAM(s) SubCutaneous every 24 hours  glucagon  Injectable 1 milliGRAM(s) IntraMuscular once  guaiFENesin  milliGRAM(s) Oral every 12 hours  insulin lispro (ADMELOG) corrective regimen sliding scale   SubCutaneous every 6 hours  lactated ringers. 1000 milliLiter(s) (75 mL/Hr) IV Continuous <Continuous>  morphine  - Injectable 2 milliGRAM(s) IV Push once  pantoprazole    Tablet 40 milliGRAM(s) Oral two times a day  polyethylene glycol 3350 17 Gram(s) Oral daily    MEDICATIONS  (PRN):  acetaminophen     Tablet .. 650 milliGRAM(s) Oral every 6 hours PRN Temp greater or equal to 38C (100.4F)  dextrose Oral Gel 15 Gram(s) Oral once PRN Blood Glucose LESS THAN 70 milliGRAM(s)/deciliter    T(C): 35.2 (09-15-23 @ 08:36), Max: 36.3 (09-14-23 @ 15:00)  HR: 112 (09-15-23 @ 13:00) (101 - 123)  BP: 133/83 (09-15-23 @ 13:00) (90/56 - 133/83)  RR: 18 (09-15-23 @ 13:00) (18 - 18)  SpO2: 95% (09-15-23 @ 13:00) (95% - 95%)    O/E:  Awake, alert, not in distress.  HEENT: atraumatic, EOMI.  Chest: clear.  CVS: SIS2 +, tachycardic  no murmur.  P/A: soft  BS+  CNS: awake, alert  Ext: no edema feet.  All systems reviewed positive findings as above.                          10.6<L>  8.53  )-----------( 340      ( 15 Sep 2023 12:24 )             33.1<L>                        12.2<L>  7.64  )-----------( 325      ( 14 Sep 2023 08:54 )             38.0<L>  09-14    139  |  109  |  14  ----------------------------<  114<H>  3.9   |  21  |  0.9    Ca    7.8<L>      14 Sep 2023 08:54

## 2023-09-15 NOTE — PROGRESS NOTE ADULT - SUBJECTIVE AND OBJECTIVE BOX
24H events:    Patient is a 88y old Male who presents with a chief complaint of DKA/HHS (14 Sep 2023 15:31)    Primary diagnosis of DKA, type 2    Today is hospital day 42d.   No acute or major events overnight.  Pt tested Covid positive.   Tachy overnight and this morning, possibly dehydration. Ordered fluid bolus, EKG, and CXR.       PAST MEDICAL & SURGICAL HISTORY  HTN (hypertension)    Gout    BPH (benign prostatic hyperplasia)    Parkinson disease    HLD (hyperlipidemia)    Smoker within last 12 months    Diabetes mellitus    No significant past surgical history      SOCIAL HISTORY:  Social History:      ALLERGIES:  No Known Allergies    MEDICATIONS:  STANDING MEDICATIONS  albuterol/ipratropium for Nebulization 3 milliLiter(s) Nebulizer every 6 hours  allopurinol 300 milliGRAM(s) Oral daily  bacitracin   Ointment 1 Application(s) Topical two times a day  budesonide 160 MICROgram(s)/formoterol 4.5 MICROgram(s) Inhaler 2 Puff(s) Inhalation two times a day  chlorhexidine 2% Cloths 1 Application(s) Topical daily  collagenase Ointment 1 Application(s) Topical two times a day  collagenase Ointment 1 Application(s) Topical daily  dextrose 5%. 1000 milliLiter(s) IV Continuous <Continuous>  dextrose 5%. 1000 milliLiter(s) IV Continuous <Continuous>  dextrose 50% Injectable 25 Gram(s) IV Push once  dextrose 50% Injectable 25 Gram(s) IV Push once  dextrose 50% Injectable 12.5 Gram(s) IV Push once  enoxaparin Injectable 40 milliGRAM(s) SubCutaneous every 24 hours  glucagon  Injectable 1 milliGRAM(s) IntraMuscular once  guaiFENesin  milliGRAM(s) Oral every 12 hours  insulin lispro (ADMELOG) corrective regimen sliding scale   SubCutaneous every 6 hours  lactated ringers. 1000 milliLiter(s) IV Continuous <Continuous>  morphine  - Injectable 2 milliGRAM(s) IV Push once  pantoprazole    Tablet 40 milliGRAM(s) Oral two times a day  polyethylene glycol 3350 17 Gram(s) Oral daily    PRN MEDICATIONS  acetaminophen     Tablet .. 650 milliGRAM(s) Oral every 6 hours PRN  dextrose Oral Gel 15 Gram(s) Oral once PRN    VITALS:   T(F): 95.3  HR: 101  BP: 124/80  RR: 18  SpO2: 95%    PHYSICAL EXAM:    GENERAL: NAD, lying in bed   NECK: Supple, no JVD   LUNGS: Unlabored respirations, b/l air entry, wet cough   HEART: Regular rate and rhythm, normal s1s2  ABD: Soft, mild distention, TTP in lower quadrants; +BS  EXT: No clubbing, cyanosis or edema.  SKIN: Dressing in place on R forearm     LABS:                        12.2   7.64  )-----------( 325      ( 14 Sep 2023 08:54 )             38.0     09-14    139  |  109  |  14  ----------------------------<  114<H>  3.9   |  21  |  0.9    Ca    7.8<L>      14 Sep 2023 08:54        Urinalysis Basic - ( 14 Sep 2023 08:54 )    Color: x / Appearance: x / SG: x / pH: x  Gluc: 114 mg/dL / Ketone: x  / Bili: x / Urobili: x   Blood: x / Protein: x / Nitrite: x   Leuk Esterase: x / RBC: x / WBC x   Sq Epi: x / Non Sq Epi: x / Bacteria: x        Culture - Blood (collected 13 Sep 2023 11:40)  Source: .Blood None  Preliminary Report (14 Sep 2023 23:02):    No growth at 24 hours      RADIOLOGY:    Xray Chest 1 View- PORTABLE-Urgent (09.15.23 @ 09:34)    Findings:    Support devices: None.    Cardiac/mediastinum/hilum: The aorta is ectatic    Lung parenchyma/Pleura: Right midlung atelectasis    Skeleton/soft tissues: Degenerative changes of the shoulders    Impression:    Right midlung atelectasis, without change.

## 2023-09-15 NOTE — PROGRESS NOTE ADULT - ASSESSMENT
87 y/o M w/ PMH of DM, HTN coming from home with complaint of decreased appetite, increased urinary output and craving sweets x 2 weeks. Daughter is caretaker, states patient behavior has changed. Within the last 2 weeks, He is less active, requesting more coca cola and sugar. Pt noted to be hypotensive upon arrival.     # COVID +  - COVID-19 PCR: Detected:(09.14.23 @ 16:11)  - Xray Chest 1 View- PORTABLE-Urgent (Xray Chest 1 View- PORTABLE-Urgent .) (09.15.23 @ 09:34) >Right midlung atelectasis, without change.  - CT chest r/o PE  - EKG   - inflammatory markers, procal  - oxygen sat stable on RA    # DKA - resolved   # DM type 2  # Hypoglycemia-resolved  - s/p insulin  drip  - hold lantus  - SSC insulin coverage    # Septic shock resolved  # Hungatella bacteremia  probably sec to  appendicitis  - bcx 8/4 +hungatella  - bcx 8/5 onwards ntd  - TTE showed no vegetations  - s/p kenyatta, flagyl, cefepime, vanco  - blood Culture Results: No growth at 5 days (09.05.23 @ 11:22)    # Right arm infected necrotic wound  -  s/p debridement 9/1  - S/p cefepime , flagyl  -  blood Culture Results: No growth at 24 hours (09.13.23 @ 11:40)  - Wound care - Santyl/Xeroform/Kerlix / ACE Wrap daily    # Suspected COPD   - S/p prednisone taper  - c/w duoneb, budesonide    # Decreased oral intake - improved  - barium swallow : soft and bite-sized diet with mildly thick liquids     # Duodenal ulcer  # Acute on chr anemia  - S/p P RBC  - s/p EGD 8/14 duodenal ulcer s/p epi and hot forceps thermal therapy  - c/w protonix  - monitor cbc    # Hypokalemia- resolved    # Ileus- resolved  - Xray Kidney Ureter Bladder (09.14.23 @ 10:01) >nonobstructive bowel gas pattern. Previously administered oral contrast seen throughout the colon. Stable visualized osseous structure.  - c/w miralax  -S/p  tap water enema    # Transaminitis- resolved    # Pancreatic body cyst  - outpt MRI    # AAA - stable  - outpt  f/u     # DVT prophylaxis    # DNR/DNI    # Pending:  CT chest, EKG, inflammatory markers, monitor cbc  # Disposition: [facility] when stable

## 2023-09-16 LAB
ALBUMIN SERPL ELPH-MCNC: 1.9 G/DL — LOW (ref 3.5–5.2)
ALBUMIN SERPL ELPH-MCNC: 2.1 G/DL — LOW (ref 3.5–5.2)
ALP SERPL-CCNC: 79 U/L — SIGNIFICANT CHANGE UP (ref 30–115)
ALP SERPL-CCNC: 81 U/L — SIGNIFICANT CHANGE UP (ref 30–115)
ALT FLD-CCNC: 10 U/L — SIGNIFICANT CHANGE UP (ref 0–41)
ALT FLD-CCNC: 10 U/L — SIGNIFICANT CHANGE UP (ref 0–41)
ANION GAP SERPL CALC-SCNC: 8 MMOL/L — SIGNIFICANT CHANGE UP (ref 7–14)
AST SERPL-CCNC: 17 U/L — SIGNIFICANT CHANGE UP (ref 0–41)
AST SERPL-CCNC: 19 U/L — SIGNIFICANT CHANGE UP (ref 0–41)
BILIRUB DIRECT SERPL-MCNC: 0.2 MG/DL — SIGNIFICANT CHANGE UP (ref 0–0.3)
BILIRUB INDIRECT FLD-MCNC: 0.1 MG/DL — LOW (ref 0.2–1.2)
BILIRUB SERPL-MCNC: 0.3 MG/DL — SIGNIFICANT CHANGE UP (ref 0.2–1.2)
BILIRUB SERPL-MCNC: 0.4 MG/DL — SIGNIFICANT CHANGE UP (ref 0.2–1.2)
BUN SERPL-MCNC: 12 MG/DL — SIGNIFICANT CHANGE UP (ref 10–20)
CALCIUM SERPL-MCNC: 7.3 MG/DL — LOW (ref 8.4–10.5)
CHLORIDE SERPL-SCNC: 109 MMOL/L — SIGNIFICANT CHANGE UP (ref 98–110)
CO2 SERPL-SCNC: 23 MMOL/L — SIGNIFICANT CHANGE UP (ref 17–32)
CREAT SERPL-MCNC: 0.7 MG/DL — SIGNIFICANT CHANGE UP (ref 0.7–1.5)
CREAT SERPL-MCNC: 0.7 MG/DL — SIGNIFICANT CHANGE UP (ref 0.7–1.5)
EGFR: 89 ML/MIN/1.73M2 — SIGNIFICANT CHANGE UP
EGFR: 89 ML/MIN/1.73M2 — SIGNIFICANT CHANGE UP
FERRITIN SERPL-MCNC: 1210 NG/ML — HIGH (ref 30–400)
GLUCOSE BLDC GLUCOMTR-MCNC: 104 MG/DL — HIGH (ref 70–99)
GLUCOSE BLDC GLUCOMTR-MCNC: 126 MG/DL — HIGH (ref 70–99)
GLUCOSE BLDC GLUCOMTR-MCNC: 131 MG/DL — HIGH (ref 70–99)
GLUCOSE SERPL-MCNC: 100 MG/DL — HIGH (ref 70–99)
HCT VFR BLD CALC: 31.6 % — LOW (ref 42–52)
HGB BLD-MCNC: 9.9 G/DL — LOW (ref 14–18)
MAGNESIUM SERPL-MCNC: 2 MG/DL — SIGNIFICANT CHANGE UP (ref 1.8–2.4)
MCHC RBC-ENTMCNC: 30.3 PG — SIGNIFICANT CHANGE UP (ref 27–31)
MCHC RBC-ENTMCNC: 31.3 G/DL — LOW (ref 32–37)
MCV RBC AUTO: 96.6 FL — HIGH (ref 80–94)
NRBC # BLD: 0 /100 WBCS — SIGNIFICANT CHANGE UP (ref 0–0)
PLATELET # BLD AUTO: 315 K/UL — SIGNIFICANT CHANGE UP (ref 130–400)
PMV BLD: 10.8 FL — HIGH (ref 7.4–10.4)
POTASSIUM SERPL-MCNC: 3.9 MMOL/L — SIGNIFICANT CHANGE UP (ref 3.5–5)
POTASSIUM SERPL-SCNC: 3.9 MMOL/L — SIGNIFICANT CHANGE UP (ref 3.5–5)
PROCALCITONIN SERPL-MCNC: 0.09 NG/ML — SIGNIFICANT CHANGE UP (ref 0.02–0.1)
PROT SERPL-MCNC: 4.9 G/DL — LOW (ref 6–8)
PROT SERPL-MCNC: 5 G/DL — LOW (ref 6–8)
RBC # BLD: 3.27 M/UL — LOW (ref 4.7–6.1)
RBC # FLD: 15.9 % — HIGH (ref 11.5–14.5)
SODIUM SERPL-SCNC: 140 MMOL/L — SIGNIFICANT CHANGE UP (ref 135–146)
WBC # BLD: 5.28 K/UL — SIGNIFICANT CHANGE UP (ref 4.8–10.8)
WBC # FLD AUTO: 5.28 K/UL — SIGNIFICANT CHANGE UP (ref 4.8–10.8)

## 2023-09-16 PROCEDURE — 99232 SBSQ HOSP IP/OBS MODERATE 35: CPT

## 2023-09-16 RX ORDER — SODIUM CHLORIDE 9 MG/ML
500 INJECTION, SOLUTION INTRAVENOUS ONCE
Refills: 0 | Status: COMPLETED | OUTPATIENT
Start: 2023-09-16 | End: 2023-09-16

## 2023-09-16 RX ORDER — REMDESIVIR 5 MG/ML
INJECTION INTRAVENOUS
Refills: 0 | Status: DISCONTINUED | OUTPATIENT
Start: 2023-09-16 | End: 2023-09-18

## 2023-09-16 RX ORDER — REMDESIVIR 5 MG/ML
100 INJECTION INTRAVENOUS EVERY 24 HOURS
Refills: 0 | Status: DISCONTINUED | OUTPATIENT
Start: 2023-09-17 | End: 2023-09-18

## 2023-09-16 RX ORDER — REMDESIVIR 5 MG/ML
200 INJECTION INTRAVENOUS EVERY 24 HOURS
Refills: 0 | Status: COMPLETED | OUTPATIENT
Start: 2023-09-16 | End: 2023-09-16

## 2023-09-16 RX ADMIN — SODIUM CHLORIDE 1000 MILLILITER(S): 9 INJECTION, SOLUTION INTRAVENOUS at 10:18

## 2023-09-16 RX ADMIN — REMDESIVIR 200 MILLIGRAM(S): 5 INJECTION INTRAVENOUS at 13:19

## 2023-09-16 RX ADMIN — Medication 1 APPLICATION(S): at 05:14

## 2023-09-16 RX ADMIN — Medication 1 APPLICATION(S): at 05:15

## 2023-09-16 RX ADMIN — BUDESONIDE AND FORMOTEROL FUMARATE DIHYDRATE 2 PUFF(S): 160; 4.5 AEROSOL RESPIRATORY (INHALATION) at 13:19

## 2023-09-16 RX ADMIN — Medication 600 MILLIGRAM(S): at 05:15

## 2023-09-16 RX ADMIN — Medication 600 MILLIGRAM(S): at 17:25

## 2023-09-16 RX ADMIN — Medication 300 MILLIGRAM(S): at 11:16

## 2023-09-16 RX ADMIN — PANTOPRAZOLE SODIUM 40 MILLIGRAM(S): 20 TABLET, DELAYED RELEASE ORAL at 17:25

## 2023-09-16 RX ADMIN — ENOXAPARIN SODIUM 40 MILLIGRAM(S): 100 INJECTION SUBCUTANEOUS at 21:31

## 2023-09-16 RX ADMIN — CHLORHEXIDINE GLUCONATE 1 APPLICATION(S): 213 SOLUTION TOPICAL at 05:14

## 2023-09-16 RX ADMIN — Medication 1 APPLICATION(S): at 11:16

## 2023-09-16 RX ADMIN — BUDESONIDE AND FORMOTEROL FUMARATE DIHYDRATE 2 PUFF(S): 160; 4.5 AEROSOL RESPIRATORY (INHALATION) at 06:52

## 2023-09-16 RX ADMIN — Medication 1 APPLICATION(S): at 17:25

## 2023-09-16 RX ADMIN — POLYETHYLENE GLYCOL 3350 17 GRAM(S): 17 POWDER, FOR SOLUTION ORAL at 11:17

## 2023-09-16 RX ADMIN — PANTOPRAZOLE SODIUM 40 MILLIGRAM(S): 20 TABLET, DELAYED RELEASE ORAL at 05:15

## 2023-09-16 NOTE — PROGRESS NOTE ADULT - ASSESSMENT
88 yr man with a medical history significant for Parkinsonism, diabetes, who presented initially with DKA, recurrent shock after a long and complex hospitalization involving severe GI bleeding.     #persistent tachycardia  #COVID+  #suspected COPD   - patient has smoking history of 2 PPD for 60yrs, currently not smoking  - completed prednisone taper for suspected COPD exacerbation  - pulm was following for COPD -> outpt follow up  - pt tested positive for COVID and has poor PO intake, tachy likely secondary to dehydration vs. viral   - repeat BCx NG at 48 hours    - 500cc fluid bolus on 9/15; will give another 500cc today and continue with gentle IVF at 75mL/hr  - CXR w/ atelectasis  - CTA on 9/16 to r/o PE given long hospital stay (40+ days) and COVID+ -> negative for PE, centrilobar emphysematous changes in upper lung fields   - procal and repeat ferritin pending   - ESR: 93, CRP: 28.7, LDH: 226, D-dimer: 429  - pt is on RA but given emphysematous changes on CT, long hospital stay with multiple complications, and moderately elevated inflammatory markers will start on 3-day course RDV (end 9/18)  - holding duonebs while COVID+    #DKA - resolved   #DM II - well controlled  - s/p insulin gtt and fluids    - continue current tx: lantus 6U QHS, ISS    #AAA  - stable  - outpt monitoring and f/u      #right arm skin necrosis s/p levophed  - burn consult appreciated - bedside debridement done on 9/1  - cultures growing psuedomonas (R to imipenem, I to kenyatta), proteus, yeast   - s/p cefepime 2g IV q8h and flagyl 500mg IV q8h   - ID recs appreciated -> abx DC'd on 9/12    - WBC count trending up on 9/13   - repeat BCx NG @ 48h    #Decreased oral intake - improving  - s/p calorie count and nutrition eval  - patient has been tolerating po intake and NG tube was taken out 8/22  - S&S eval 8/23: advanced diet to soft bite-sized with thin liquid and patient is tolerating   - barium swallow on 9/13 -> continue soft and bite-sized diet w/ mildly thick liquids     #History of Hungatella bacteremia likely GI translocation in the setting of appendicitis  - bcx 8/4 +hungatella  - bcx 8/5 onwards ntd  - TTE showed no vegetations  - s/p kenyatta, flagyl, cefepime, vanco    #Transaminitis - resolved  #abd tenderness  - mixed, in setting of infection  - ct abd with hepatic cyst    #Constipation  - kub 8/30 - large stool burden   - continues on on senna/miralax/lactulose/enemas  - repeat KUB on 9/14 w/ large burden -> ordered dulcolax and enema   - pt has slow transit, contrast from CT on 9/11 appreciated in KUB from 9/14  - successful bowel movement 9/15    #HTN   - stable     #Misc   DVT ppx: lovenox   GI ppx: protonix   Diet: soft and bite sized w/ mildly thick liquids  Activity: IAT, seen by physiatry   Dispo: STR (prior auth expires 9/19)  Pending: RDV completion (9/18)

## 2023-09-16 NOTE — PROGRESS NOTE ADULT - ATTENDING SUPERVISION STATEMENT
Resident
Fellow
Fellow
Resident
Fellow
Resident
Resident
Fellow
Fellow
Resident

## 2023-09-16 NOTE — PROGRESS NOTE ADULT - ATTENDING COMMENTS
as above. repeat CT scan without additional concerning findings. no appendicolith in the appendix. would treat conservatively with antibiotics.
87 y/o M w/ PMH of DM, HTN coming from home with complaint of decreased appetite, increased urinary output and craving sweets x 2 weeks. Daughter is caretaker, states patient behavior has changed. Within the last 2 weeks, He is less active, requesting more coca cola and sugar. Pt noted to be hypotensive upon arrival.     # COVID +  - COVID-19 PCR: Detected:(09.14.23 @ 16:11)  - Xray Chest 1 View- PORTABLE-Urgent (Xray Chest 1 View- PORTABLE-Urgent .) (09.15.23 @ 09:34) >Right midlung atelectasis, without change.  - CT chest 9.15.23: No PE; Stable 6.5 mm RUL nodule. Centrilobular emphysematous changes are again noted in both upper lung fields.   - EKG : sinus tachycardia  - inflammatory markers elevated: ESR 93, CRP 28.5, ferritin 779 from 8/23; repeat ferritin, f/u procal  - oxygen sat stable on RA  - Start RDV x3 days     # DKA - resolved   # DM type 2  # Hypoglycemia-resolved  - s/p insulin  drip  - hold lantus  - SSC insulin coverage    # Septic shock resolved  # Hungatella bacteremia  probably sec to  appendicitis  - bcx 8/4 +hungatella  - bcx 8/5 onwards ntd  - TTE showed no vegetations  - s/p kenyatta, flagyl, cefepime, vanco  - blood Culture have been negative (last NTD on 9/13/23)    # Right arm infected necrotic wound  -  s/p debridement 9/1  - S/p cefepime , flagyl  -  blood Culture Results: No growth at 24 hours (09.13.23 @ 11:40)  - Wound care - Santyl/Xeroform/Kerlix / ACE Wrap daily    # Suspected COPD   - S/p prednisone taper  - c/w duoneb, budesonide    # Decreased oral intake - improved  - barium swallow : soft and bite-sized diet with mildly thick liquids     # Duodenal ulcer  # Acute on chr anemia  - S/p P RBC  - s/p EGD 8/14 duodenal ulcer s/p epi and hot forceps thermal therapy  - c/w protonix  - monitor cbc    # Hypokalemia- resolved    # Ileus- resolved  - Xray Kidney Ureter Bladder (09.14.23 @ 10:01) >nonobstructive bowel gas pattern. Previously administered oral contrast seen throughout the colon. Stable visualized osseous structure.  - c/w miralax  -S/p  tap water enema    # Transaminitis- resolved    # Pancreatic body cyst  - outpt MRI    # AAA - stable  - outpt  f/u     # DVT prophylaxis    # DNR/DNI    # Pending:  RDV x3 days, clinical improvement, resolution of tachycardia  # Disposition: [facility] when stable; auth expires 9/19; likely dc early next week.
My note supersedes the resident's note in the event of a discrepancy -    Patient seen and examined this morning  lying in bed  in nad   NGT in place  restraint in place    Vital Signs Last 24 Hrs  T(C): 36.3 (21 Aug 2023 15:30), Max: 37.1 (21 Aug 2023 00:07)  T(F): 97.4 (21 Aug 2023 15:30), Max: 98.7 (21 Aug 2023 00:07)  HR: 79 (21 Aug 2023 15:30) (79 - 104)  BP: 129/60 (21 Aug 2023 15:30) (111/76 - 130/67)  BP(mean): --  RR: 18 (21 Aug 2023 15:30) (18 - 18)  SpO2: 98% (21 Aug 2023 08:00) (98% - 98%)    Parameters below as of 21 Aug 2023 08:00  Patient On (Oxygen Delivery Method): room air    88M PMHx DM2, HTN here with decreased appetite found to have HHS/ DKA s/p insulin gtt. Melena s/p EGD demonstrating ulcer. Hungatella bacteremia. Appendicitis.    Hospital day #17; my first day taking care of the patient     #Decreased oral intake  - c/w calorie count  - on pureed diet  - recall s/s to advance diet if possible  - dc NGT  - dc restraints     #suspected COPD  #B/l pleural Effusion  #history of tobacco addiction - 2 PPD for 60yrs - about 120 pack year smoking history   - continue steroids and nebs  - dose of lasix today   - outpatient pulm follow up  - repeat cxr in am - ordered by me  - smoking cessation     #Hungatella bacteremia likely GI translocation in the setting of appendicitis  - bcx 8/4 +hungatella; repeat negative   - CHERIE negative for vegetations  - no abx as per ID    #Melena  #Abdominal pain  - s/p egd 8/14 with two ulcers; one was cauterized, given epi  - CTAP 8/19: No definite correlate for acute abdominal pain. Unchanged nonspecific mild dilation of appendix, up to 0.8 cm, without definite periappendiceal inflammatory stranding.  - h/h stable  - protonix q12  - repeat cbc in am     #DM II with hyperglycemia  - insulin regimen adjusted today - added pre prandial coverage  - check fs qac and qhs  - A1c = 15.5    #HTN  - well controlled     #debility  -PT eval ordered     Progress Note Handoff  Pending Consults: PT, CM  Pending Tests: labs  Pending Results: labs  Family Discussion: Discussed labs, meds, diet, PT eval and overall plan of care with medical staff.  House staff to update pt's family today.   Disposition: Home_____/SNF______/Other_____/Unknown at this time___x__  Spent over 65 min reviewing chart, speaking with patient/family and on coordinating patient care during interdisciplinary rounds
Patient seen and examined during the GI-advanced endoscopy rounds, case discussed with the GI team, assessment and plan as above, the plan communicated to the primary team
Patient seen and examined independently. Agree with resident note with exceptions. Case discussed with housestaff, nursing and patient    # DKA - resolved   # DM type 2 with hypoglycemia  - s/p insulin  drip  - hold lantus  - SSC insulin coverage    # Septic shock resolved  # Hungatella bacteremia  probably sec to  appendicitis  - bcx 8/4 +hungatella  - bcx 8/5 onwards ntd  - TTE showed no vegetations  - s/p kenyatta, flagyl, cefepime, vanco    # Right arm infected necrotic wound  -  s/p debridement 9/1  - S/p cefepime , flagyl  - F/u repeat blood cultures  - Wound care - Santyl/Xeroform/Kerlix / ACE Wrap daily    # Suspected COPD   - S/p prednisone taper  - c/w duoneb, budesonide    # Decreased oral intake - improved  - barium swallow : soft and bite-sized diet with mildly thick liquids     # Duodenal ulcer  # Acute on chr anemia  - S/p P RBC  - s/p EGD 8/14 duodenal ulcer s/p epi and hot forceps thermal therapy  - c/w protonix    # Hypokalemia  - replete k    # Ileus- resolved    # Transaminitis- resolved    # Pancreatic body cyst  - outpt MRI    # AAA - stable  - outpt  f/u     # DVT prophylaxis    # DNR/DNI    # Pending: PT eval, monitor FS  # Disposition: [facility] pending auth      - Time spent: 55 minutes, Direct patient care. Discussed on rounds with Housestaff. Reviewed chart
Attending Statement: I have personally performed a face to face diagnostic evaluation on this patient. The patient is suffering from:  DKA/HHS, resolved  Gram (-) farhat bacteremia   Septic shock on levo   LLL PNA?  Acute normocytic anemia   HO DM  HO HTN  Abdominal aortic aneurysm  Parkinson disease  I have made amendments to the documentation where necessary. I have personally seen and examined this patient.  I have fully participated in the care of this patient.  I have reviewed all pertinent clinical information, including history, physical exam, plan and note.
IMPRESSION:    DKA/HHS, resolved  Hungatella species bacteremia   Septic shock on levophed   AMS toxic metabolic   HO DM  HO HTN  Abdominal aortic aneurysm  Parkinson disease    plan as outlined above
I edited the note
My note supersedes the resident's note in the event of a discrepancy -    Patient seen and examined this morning  lying in bed  in nad   awake and appears comfortable    Vital Signs Last 24 Hrs  T(C): 36.7 (21 Aug 2023 22:30), Max: 36.7 (21 Aug 2023 22:30)  T(F): 98.1 (21 Aug 2023 22:30), Max: 98.1 (21 Aug 2023 22:30)  HR: 91 (22 Aug 2023 08:21) (79 - 93)  BP: 139/79 (22 Aug 2023 08:21) (129/60 - 142/66)  BP(mean): --  RR: 18 (22 Aug 2023 08:21) (18 - 18)  SpO2: 95% (21 Aug 2023 22:30) (95% - 95%)    Parameters below as of 21 Aug 2023 22:30  Patient On (Oxygen Delivery Method): room air      88M PMHx DM2, HTN here with decreased appetite found to have HHS/ DKA s/p insulin gtt. Melena s/p EGD demonstrating ulcer. Hungatella bacteremia. Appendicitis.        #Decreased oral intake  - completed calorie count - eating 50-75% of meals  - on pureed diet  - recall s/s to advance diet if possible - pt seen on 8/21 but no note??  - dietary follow up appreciated - Glucerna added   - NGT and restraints remove don 8/21    #suspected COPD  #B/l pleural Effusion  #history of tobacco addiction - 2 PPD for 60yrs - about 120 pack year smoking history   - continue steroids and nebs  - dose of lasix ordered on 8/21 but not given?  - outpatient pulm follow up  - repeat cxr reviewed by me  - smoking cessation   - not requiring supplemental oxygen     #Hungatella bacteremia likely GI translocation in the setting of appendicitis  - bcx 8/4 +hungatella; repeat negative   - CHERIE negative for vegetations  - no abx as per ID    #Melena - resolved  - s/p egd 8/14 with two ulcers; one was cauterized, given epi  - CTAP 8/19: No definite correlate for acute abdominal pain. Unchanged nonspecific mild dilation of appendix, up to 0.8 cm, without definite periappendiceal inflammatory stranding.  - h/h stable  - protonix q12  - monitor cbc     #DM II better controlled  - insulin regimen adjusted on 8/21- added pre prandial coverage  - check fs qac and qhs  - A1c = 15.5    #HTN  - well controlled     #debility  -PT eval appreciated - pt ambulated 15 feet - will require SNF - [facility] to be sent as per CM. Will require auth     Progress Note Handoff  Pending Consults: CM  Pending Tests: labs  Pending Results: labs  Family Discussion: Discussed labs, meds, diet, PT eval and overall plan of care with medical staff.  House staff to update pt's family today. pt ambulated 15 feet - will require [facility] to be sent as per CM. Will require auth   Disposition: Home_____/SNF__x____/Other_____/Unknown at this time___x__  Spent over 55 min reviewing chart, speaking with patient/family and on coordinating patient care during interdisciplinary rounds .
Patient with melena. High Risk for EGd as per ICU attending Defer EGd Supportive treatment.
I edited the note
89yo Cantonese speaking male PMHx DM2, HTN here with decreased appetite found to have HHS/ DKA s/p insulin gtt. Melena s/p EGD demonstrating ulcer. Hungatella bacteremia. Appendiceal enlargement    # acute COPD exacerbation, with wheezing  # B/l pleural Effusion- improved on repeta CXR  - patient has smoking history of 2 PPD for 60yrs, already quit  - TTE 8/11: EF 63%  - pulmonary evaluated- - started po prednisone 40 qd - 40 for 5 days, 20 for 5 days  - duonebs q6h prn  - sybicort    # acute anemia due to melena  GI following- s/p egd 8/14 with two ulcers; one was cauterized, given epi  - protonix 40  bid  - h/h stable, trend    #Decreased oral intake - improving  - calorie count completed, f/u nutrition eval  - patient tolerating po intake, NG tube taken out 8/22  - S&S eval 8/23: advance diet to soft bite-sized with thin liquid  - start on glucerna shake supplement X2 daily    #Hungatella bacteremia likely GI translocation in the setting of appendicitis  - bcx 8/4 +hungatella  - bcx 8/5 onwards ntd  - TTE showed no vegetations  - CTAP 8/19: No definite correlate for acute abdominal pain. Unchanged nonspecific mild dilation of appendix, up to 0.8 cm, without definite periappendiceal inflammatory stranding.  - surgery evalauted- not surgical candidate  - s/p kenyatta, flagyl, cefepime, vanco  - monitor off Abx now per ID    #Transaminitis - resolved  #abd tenderness  - mixed, in setting of infection  - ct abd with hepatic cyst    #DM2 (diabetes mellitus, type 2) - controlled  - s/p insulin gtt for HHS/ DKA  - on lantus 8 and lispro 3  - ssi    #HTN (hypertension) - stable  monitor      DVT PPx - lovenox  GI PPx- on PPI BID    Dispo: [facility]
IMPRESSION:    DKA / HHS, resolved  Hungatella species bacteremia   Septic shock resolved   Suspected UGIB s/p EGD s/p cauterization of visible vessel  BART - resolved   AMS toxic metabolic  HO DM  HO HTN  Abdominal aortic aneurysm  Parkinson disease  Hypernatremia     Plan as outlined above
I edited the note
88M with PMH of DM and HTN here with decreased appetite, increased urinary output, found here to be in DKA/HHS, with course c/b Hungatella species bacteremia and septic shock on levophed. Also with toxic metabolic encephalopathy. Is on broad-spectrum abx with IV meropenem and IV vanco. Overall poor prognosis. Is DNR/DNI. As above, plans are for current care, will continue to follow.  ______________  Juan David Lara MD  Palliative Medicine  [facility]   of Geriatric and Palliative Medicine  [phone]
88M with PMH of DM and HTN here with decreased appetite, increased urinary output, found here to be in DKA/HHS, with course c/b Hungatella species bacteremia and septic shock on levophed. Also with toxic metabolic encephalopathy. Is on broad-spectrum abx with IV meropenem and IV vanco. Overall poor prognosis. Is DNR/DNI. As above, plans are for current care, decompensated overnight, now in shock requiring two pressors, see family goals above  ______________  Juan David Lara MD  Palliative Medicine  [facility]   of Geriatric and Palliative Medicine  [phone]
88M with PMH of DM and HTN here with decreased appetite, increased urinary output, found here to be in DKA/HHS, with course c/b Hungatella species bacteremia and septic shock on levophed. Also with toxic metabolic encephalopathy. Is on broad-spectrum abx with IV meropenem and IV vanco. Overall poor prognosis. Is DNR/DNI. As above, plans are for current care, decompensated overnight, now in shock, on on pressor. Endoscopy deferred for now given high risk. d/w daughter Antonia, will follow  ______________  Juan David Lara MD  Palliative Medicine  [facility]   of Geriatric and Palliative Medicine  [phone]
88M with PMH of DM and HTN here with decreased appetite, increased urinary output, found here to be in DKA/HHS, with course c/b Hungatella species bacteremia and septic shock on levophed. Also with toxic metabolic encephalopathy. Is on broad-spectrum abx with IV meropenem and IV vanco. Overall poor prognosis. Is DNR/DNI. As above, plans are for current care, will continue to follow. Patient is more awake, appears comfortable.   ______________  Juan David Lara MD  Palliative Medicine  [facility]   of Geriatric and Palliative Medicine  [phone]

## 2023-09-16 NOTE — PROGRESS NOTE ADULT - SUBJECTIVE AND OBJECTIVE BOX
MILEY MARES  88y, Male  Allergy: No Known Allergies      LOS  43d    CHIEF COMPLAINT: DKA/HHS (16 Sep 2023 09:00)      INTERVAL EVENTS/HPI  - No acute events overnight  - T(F): , Max: 96.8 (09-15-23 @ 16:38)  - Denies any worsening symptoms  - Tolerating medication  - WBC Count: 5.28 (09-16-23 @ 06:10)  WBC Count: 7.60 (09-15-23 @ 20:00)     - Creatinine: 0.7 (09-16-23 @ 06:10)  Creatinine: 0.8 (09-15-23 @ 12:24)       ROS  General: Denies rigors, nightsweats  HEENT: Denies headache, rhinorrhea, sore throat, eye pain  CV: Denies CP, palpitations  PULM: Denies wheezing, hemoptysis  GI: Denies hematemesis, hematochezia, melena  : Denies discharge, hematuria  MSK: Denies arthralgias, myalgias  SKIN: Denies rash, lesions  NEURO: Denies paresthesias, weakness  PSYCH: Denies depression, anxiety    VITALS:  T(F): 96.7, Max: 96.8 (09-15-23 @ 16:38)  HR: 104  BP: 125/83  RR: 18Vital Signs Last 24 Hrs  T(C): 35.9 (16 Sep 2023 00:00), Max: 36 (15 Sep 2023 16:38)  T(F): 96.7 (16 Sep 2023 00:00), Max: 96.8 (15 Sep 2023 16:38)  HR: 104 (16 Sep 2023 07:00) (104 - 112)  BP: 125/83 (16 Sep 2023 07:00) (122/64 - 133/83)  BP(mean): --  RR: 18 (16 Sep 2023 07:00) (18 - 18)  SpO2: 95% (16 Sep 2023 00:00) (95% - 95%)    Parameters below as of 15 Sep 2023 16:38  Patient On (Oxygen Delivery Method): room air        PHYSICAL EXAM:  Gen: NAD, resting in bed  HEENT: Normocephalic, atraumatic  Neck: supple, no lymphadenopathy  CV: Regular rate & regular rhythm  Lungs: decreased BS at bases, no fremitus  Abdomen: Soft, BS present  Ext: Warm, well perfused  Neuro: non focal, awake  Skin: no rash, no erythema  Lines: no phlebitis    FH: Non-contributory  Social Hx: Non-contributory    TESTS & MEASUREMENTS:                        9.9    5.28  )-----------( 315      ( 16 Sep 2023 06:10 )             31.6     09-16    140  |  109  |  12  ----------------------------<  100<H>  3.9   |  23  |  0.7    Ca    7.3<L>      16 Sep 2023 06:10  Mg     2.0     09-16    TPro  4.9<L>  /  Alb  2.1<L>  /  TBili  0.4  /  DBili  x   /  AST  17  /  ALT  10  /  AlkPhos  79  09-16      LIVER FUNCTIONS - ( 16 Sep 2023 06:10 )  Alb: 2.1 g/dL / Pro: 4.9 g/dL / ALK PHOS: 79 U/L / ALT: 10 U/L / AST: 17 U/L / GGT: x           Urinalysis Basic - ( 16 Sep 2023 06:10 )    Color: x / Appearance: x / SG: x / pH: x  Gluc: 100 mg/dL / Ketone: x  / Bili: x / Urobili: x   Blood: x / Protein: x / Nitrite: x   Leuk Esterase: x / RBC: x / WBC x   Sq Epi: x / Non Sq Epi: x / Bacteria: x        Culture - Blood (collected 09-13-23 @ 11:40)  Source: .Blood None  Preliminary Report (09-15-23 @ 23:01):    No growth at 48 Hours    Culture - Blood (collected 09-05-23 @ 11:22)  Source: .Blood None  Final Report (09-10-23 @ 23:00):    No growth at 5 days    Culture - Blood (collected 09-05-23 @ 11:22)  Source: .Blood None  Final Report (09-10-23 @ 23:00):    No growth at 5 days    Culture - Tissue with Gram Stain (collected 09-05-23 @ 09:03)  Source: .Tissue Other, right upper extremity  Gram Stain (09-05-23 @ 19:41):    Rare polymorphonuclear leukocytes per low power field    Rare Red blood cells per low power field    Few Gram Negative Coccobacilli per oil power field  Final Report (09-10-23 @ 10:48):    Numerous Acinetobacter baumannii/nosocom group (Carbapenem Resistant)    Rare Proteus mirabilis  Organism: Acinetobacter baumannii/nosocom group (Carbapenem Resistant)  Acinetobacter baumannii/nosocom group (Carbapenem Resistant)  Proteus mirabilis (09-10-23 @ 10:48)  Organism: Proteus mirabilis (09-10-23 @ 10:48)      -  Levofloxacin: S <=0.5      -  Tobramycin: S <=2      -  Aztreonam: S <=4      -  Gentamicin: S <=2      -  Cefepime: S <=2      -  Cefazolin: S <=2      -  Piperacillin/Tazobactam: S <=8      -  Ciprofloxacin: S <=0.25      -  Ceftriaxone: S <=1      -  Ampicillin: S <=8 These ampicillin results predict results for amoxicillin      Method Type: UZAIR      -  Meropenem: S <=1      -  Ampicillin/Sulbactam: S <=4/2      -  Cefoxitin: S <=8      -  Amikacin: S <=16      -  Amoxicillin/Clavulanic Acid: S <=8/4      -  Trimethoprim/Sulfamethoxazole: S <=0.5/9.5      -  Ertapenem: S <=0.5  Organism: Acinetobacter baumannii/nosocom group (Carbapenem Resistant) (09-10-23 @ 10:48)      -  Minocycline: I      -  Piperacillin/Tazobactam: R      Method Type: KB  Organism: Acinetobacter baumannii/nosocom group (Carbapenem Resistant) (09-10-23 @ 10:48)      -  Levofloxacin: R >4      -  Tobramycin: R >8      -  Gentamicin: R >8      -  Cefepime: R >16      -  Ciprofloxacin: R >2      -  Imipenem: R >8      Method Type: UZAIR      -  Meropenem: R >8      -  Ampicillin/Sulbactam: R >16/8      -  Ceftazidime: R >16      -  Amikacin: R >32      -  Polymyxin B: I 0.5      -  Trimethoprim/Sulfamethoxazole: R >2/38    Culture - Urine (collected 09-02-23 @ 17:07)  Source: Clean Catch Clean Catch (Midstream)  Final Report (09-06-23 @ 00:03):    10,000 - 49,000 CFU/mL Pseudomonas aeruginosa (Carbapenem Resistant)  Organism: Pseudomonas aeruginosa (Carbapenem Resistant)  Pseudomonas aeruginosa (Carbapenem Resistant) (09-06-23 @ 00:03)  Organism: Pseudomonas aeruginosa (Carbapenem Resistant) (09-06-23 @ 00:03)      -  Resistance Gene to Carbapenem: Nondet      Method Type: CarbaR  Organism: Pseudomonas aeruginosa (Carbapenem Resistant) (09-06-23 @ 00:03)      -  Levofloxacin: I 2      -  Tobramycin: S <=2      -  Ceftazidime/Avibactam: S 8      -  Aztreonam: R >16      -  Gentamicin: S 4      -  Cefepime: S 8      -  Piperacillin/Tazobactam: S 16      -  Ciprofloxacin: I 1      -  Imipenem: R >8      Method Type: UZAIR      -  Meropenem: R >8      -  Ceftazidime: S 8      -  Amikacin: S <=16      -  Ceftolozane/tazobactam: S <=2    Culture - Blood (collected 09-02-23 @ 10:50)  Source: .Blood Blood  Final Report (09-07-23 @ 20:01):    No growth at 5 days    Culture - Tissue with Gram Stain (collected 09-01-23 @ 18:15)  Source: .Tissue Wound  Gram Stain (09-02-23 @ 01:46):    Moderate polymorphonuclear leukocytes seen per low power field    Numerous Gram Negative Rods seen per oil power field    Rare Yeast like cells seen per oil power field  Final Report (09-06-23 @ 15:41):    Numerous Pseudomonas aeruginosa (Carbapenem Resistant)    Numerous Proteus mirabilis    Few Dolly albicans "Susceptibilities not performed"  Organism: Pseudomonas aeruginosa (Carbapenem Resistant)  Pseudomonas aeruginosa (Carbapenem Resistant)  Proteus mirabilis (09-06-23 @ 15:41)  Organism: Proteus mirabilis (09-06-23 @ 15:41)      -  Levofloxacin: S <=0.5      -  Tobramycin: S <=2      -  Aztreonam: S <=4      -  Gentamicin: S <=2      -  Cefazolin: S <=2      -  Cefepime: S <=2      -  Piperacillin/Tazobactam: S <=8      -  Ciprofloxacin: S <=0.25      -  Ceftriaxone: S <=1      -  Ampicillin: S <=8 These ampicillin results predict results for amoxicillin      Method Type: UZAIR      -  Meropenem: S <=1      -  Ampicillin/Sulbactam: S <=4/2      -  Cefoxitin: S <=8      -  Amikacin: S <=16      -  Amoxicillin/Clavulanic Acid: S <=8/4      -  Trimethoprim/Sulfamethoxazole: S <=0.5/9.5      -  Ertapenem: S <=0.5  Organism: Pseudomonas aeruginosa (Carbapenem Resistant) (09-06-23 @ 15:41)      -  Resistance Gene to Carbapenem: Nondet      Method Type: Jae  Organism: Pseudomonas aeruginosa (Carbapenem Resistant) (09-06-23 @ 15:41)      -  Levofloxacin: S <=0.5      -  Tobramycin: S <=2      -  Ceftazidime/Avibactam: S <=4      -  Aztreonam: S <=4      -  Gentamicin: S 4      -  Cefepime: S <=2      -  Piperacillin/Tazobactam: S <=8      -  Ciprofloxacin: S <=0.25      -  Imipenem: R >8      Method Type: UZAIR      -  Meropenem: I 4      -  Ceftazidime: S 4      -  Amikacin: S <=16      -  Ceftolozane/tazobactam: S <=2    Culture - Blood (collected 08-30-23 @ 20:40)  Source: .Blood Blood  Final Report (09-05-23 @ 02:01):    No growth at 5 days            INFECTIOUS DISEASES TESTING  COVID-19 PCR: Detected (09-14-23 @ 16:11)  Procalcitonin, Serum: 0.13 (08-28-23 @ 07:25)  Fungitell: <31 (08-14-23 @ 04:45)  Procalcitonin, Serum: 0.34 (08-14-23 @ 04:45)  MRSA PCR Result.: Negative (08-10-23 @ 10:55)  MRSA PCR Result.: Negative (08-07-23 @ 11:30)  Procalcitonin, Serum: 0.67 (08-06-23 @ 05:05)  Procalcitonin, Serum: 0.48 (08-04-23 @ 23:05)  COVID-19 PCR: NotDetec (11-21-22 @ 18:43)      INFLAMMATORY MARKERS  Sedimentation Rate, Erythrocyte: 93 mm/Hr (09-15-23 @ 20:00)  C-Reactive Protein, Serum: 28.7 mg/L (09-15-23 @ 20:00)      RADIOLOGY & ADDITIONAL TESTS:  I have personally reviewed the last available Chest xray  CXR  Xray Chest 1 View- PORTABLE-Urgent:   ACC: [id number] EXAM:  XR CHEST PORTABLE URGENT 1V   ORDERED BY: MESSI LEBRON     PROCEDURE DATE:  09/15/2023          INTERPRETATION:  Clinical History / Reason for exam: Cough    Comparison : Chest radiograph September 12, 2023.    Technique/Positioning: Frontal portable.    Findings:    Support devices: None.    Cardiac/mediastinum/hilum: The aorta is ectatic    Lung parenchyma/Pleura: Right midlung atelectasis    Skeleton/soft tissues: Degenerative changes of the shoulders    Impression:    Right midlung atelectasis, without change.        --- End of Report ---            CHRISTIN AYALA MD; Attending Interventional Radiologist  This document has been electronically signed. Sep 15 2023  9:48AM (09-15-23 @ 09:34)      CT  CT Angio Chest PE Protocol w/ IV Cont:   ACC: [id number] EXAM:  CT ANGIO CHEST PULM ART [facility] [place]   ORDERED BY: KARLA MARTINEZ     PROCEDURE DATE:  09/15/2023          INTERPRETATION:  CLINICAL HISTORY / REASON FOR EXAM: COVID. Tachycardia.   Pulmonary embolus evaluation. WBC 8.53   PMHx of DM,HTN, HLD, gout, BPH,   Parkinson's.    TECHNIQUE:   Contiguous axial CT images were obtained from the thoracic   inlet to the upper abdomen with intravenous contrast.  Thin sections were   reconstructed through the pulmonary vasculature. Imaging was timed for   evaluation of pulmonary embolism.  Reformatted images in the coronal and   sagittal planes were acquired, as well as 3D, Volume rendering, and MIP   reconstructed images.    Comparison: Chest CT dated 11/17/2023      FINDINGS:    TUBES/LINES: None.    PULMONARY ARTERY CIRCULATION: No evidence of central or segmental   pulmonary embolism.    GREAT VESSELS/CARDIAC STRUCTURES/PERICARDIUM: The heart size is at the   upper limits of normal. No pericardial effusion. Coronary artery and   aortic calcifications are noted. There is no evidence of aortic aneurysm   or dissection. The brachiocephalic vessels are unremarkable.    The ascending thoracic aorta, measuring 3.8 cm in diameter; the   descending thoracic aorta measures 3.5 cm; the main pulmonary artery is   dilated, measuring 3.4 cm in diameter, which may be seen with pulmonary   hypertension.    LUNG PARENCHYMA/CENTRAL AIRWAYS/PLEURA: The central tracheobronchial tree   is patent. Centrilobular emphysematous changes are again noted in both   upper lung fields. There are small bilateral pleural effusions with mild   compression atelectasis at the lung bases. Stable 6.5 mm nodule in the   right lung apex. No evidence of pneumothorax    MEDIASTINUM/HILUM: There are no suspicious mediastinal, hilar, or   axillary lymph nodes.  The visualized portion of the thyroid gland is   unremarkable.    CHEST WALL/AXILLA: Unremarkable.    UPPER ABDOMEN: The partially visualized upper abdomen shows no acute   pathology. Contrast materialis present within the colon from the   previous examination.    OSSEOUS STRUCTURES: Degenerative changes of the spine are noted.      IMPRESSION:    No evidence of pulmonary embolism.    Stable 6.5 mm RUL nodule    Centrilobular emphysematous changesare again noted in both upper lung   fields. The main pulmonary artery is dilated, measuring 3.4 cm in   diameter, which may be seen with pulmonary hypertension.      --- End of Report ---            CECILIA JACKSON MD; Attending Interventional Radiologist  This document has been electronically signed. Sep 15 2023  6:48PM (09-15-23 @ 17:46)      CARDIOLOGY TESTING  12 Lead ECG:   Ventricular Rate 134 BPM    Atrial Rate 134 BPM    P-R Interval 136 ms    QRS Duration 110 ms    Q-T Interval 344 ms    QTC Calculation(Bazett) 513 ms    P Axis 43 degrees    R Axis -3 degrees    T Axis 32 degrees    Diagnosis Line Sinus tachycardia  Incomplete right bundle branch block  ST & T wave abnormality, consider anterior ischemia  Abnormal ECG    Confirmed by haydee leslie ([id number]) on 9/4/2023 9:23:08 PM (09-04-23 @ 12:14)      MEDICATIONS  allopurinol 300 Oral daily  bacitracin   Ointment 1 Topical two times a day  budesonide 160 MICROgram(s)/formoterol 4.5 MICROgram(s) Inhaler 2 Inhalation two times a day  chlorhexidine 2% Cloths 1 Topical daily  collagenase Ointment 1 Topical two times a day  collagenase Ointment 1 Topical daily  dextrose 5%. 1000 IV Continuous <Continuous>  dextrose 5%. 1000 IV Continuous <Continuous>  dextrose 50% Injectable 25 IV Push once  dextrose 50% Injectable 25 IV Push once  dextrose 50% Injectable 12.5 IV Push once  enoxaparin Injectable 40 SubCutaneous every 24 hours  glucagon  Injectable 1 IntraMuscular once  guaiFENesin  Oral every 12 hours  insulin lispro (ADMELOG) corrective regimen sliding scale  SubCutaneous every 6 hours  lactated ringers. 1000 IV Continuous <Continuous>  morphine  - Injectable 2 IV Push once  pantoprazole    Tablet 40 Oral two times a day  polyethylene glycol 3350 17 Oral daily  remdesivir  IVPB  IV Intermittent   remdesivir  IVPB 200 IV Intermittent every 24 hours      WEIGHT  Weight (kg): 62.6 (09-02-23 @ 12:59)  Creatinine: 0.7 mg/dL (09-16-23 @ 06:10)      ANTIBIOTICS:  remdesivir  IVPB 200 milliGRAM(s) IV Intermittent every 24 hours  remdesivir  IVPB   IV Intermittent       All available historical records have been reviewed       MILEY MARES  88y, Male  Allergy: No Known Allergies      LOS  43d    CHIEF COMPLAINT: DKA/HHS (16 Sep 2023 09:00)      INTERVAL EVENTS/HPI  - No acute events overnight  - T(F): , Max: 96.8 (09-15-23 @ 16:38)  - Reconsulted for COVID positive   - currently on room air   - WBC Count: 5.28 (09-16-23 @ 06:10)  WBC Count: 7.60 (09-15-23 @ 20:00)     - Creatinine: 0.7 (09-16-23 @ 06:10)  Creatinine: 0.8 (09-15-23 @ 12:24)       ROS  General: Denies rigors, nightsweats  HEENT: Denies headache, rhinorrhea, sore throat, eye pain  CV: Denies CP, palpitations  PULM: Denies wheezing, hemoptysis  GI: Denies hematemesis, hematochezia, melena  : Denies discharge, hematuria  MSK: Denies arthralgias, myalgias  SKIN: Denies rash, lesions  NEURO: Denies paresthesias, weakness  PSYCH: Denies depression, anxiety    VITALS:  T(F): 96.7, Max: 96.8 (09-15-23 @ 16:38)  HR: 104  BP: 125/83  RR: 18Vital Signs Last 24 Hrs  T(C): 35.9 (16 Sep 2023 00:00), Max: 36 (15 Sep 2023 16:38)  T(F): 96.7 (16 Sep 2023 00:00), Max: 96.8 (15 Sep 2023 16:38)  HR: 104 (16 Sep 2023 07:00) (104 - 112)  BP: 125/83 (16 Sep 2023 07:00) (122/64 - 133/83)  BP(mean): --  RR: 18 (16 Sep 2023 07:00) (18 - 18)  SpO2: 95% (16 Sep 2023 00:00) (95% - 95%)    Parameters below as of 15 Sep 2023 16:38  Patient On (Oxygen Delivery Method): room air        PHYSICAL EXAM:  Gen: NAD, resting in bed  HEENT: Normocephalic, atraumatic  Neck: supple, no lymphadenopathy  CV: Regular rate & regular rhythm  Lungs: decreased BS at bases, no fremitus  Abdomen: Soft, BS present  Ext: Warm, well perfused  Neuro: non focal, awake  Skin: no rash, no erythema  Lines: no phlebitis    FH: Non-contributory  Social Hx: Non-contributory    TESTS & MEASUREMENTS:                        9.9    5.28  )-----------( 315      ( 16 Sep 2023 06:10 )             31.6     09-16    140  |  109  |  12  ----------------------------<  100<H>  3.9   |  23  |  0.7    Ca    7.3<L>      16 Sep 2023 06:10  Mg     2.0     09-16    TPro  4.9<L>  /  Alb  2.1<L>  /  TBili  0.4  /  DBili  x   /  AST  17  /  ALT  10  /  AlkPhos  79  09-16      LIVER FUNCTIONS - ( 16 Sep 2023 06:10 )  Alb: 2.1 g/dL / Pro: 4.9 g/dL / ALK PHOS: 79 U/L / ALT: 10 U/L / AST: 17 U/L / GGT: x           Urinalysis Basic - ( 16 Sep 2023 06:10 )    Color: x / Appearance: x / SG: x / pH: x  Gluc: 100 mg/dL / Ketone: x  / Bili: x / Urobili: x   Blood: x / Protein: x / Nitrite: x   Leuk Esterase: x / RBC: x / WBC x   Sq Epi: x / Non Sq Epi: x / Bacteria: x        Culture - Blood (collected 09-13-23 @ 11:40)  Source: .Blood None  Preliminary Report (09-15-23 @ 23:01):    No growth at 48 Hours    Culture - Blood (collected 09-05-23 @ 11:22)  Source: .Blood None  Final Report (09-10-23 @ 23:00):    No growth at 5 days    Culture - Blood (collected 09-05-23 @ 11:22)  Source: .Blood None  Final Report (09-10-23 @ 23:00):    No growth at 5 days    Culture - Tissue with Gram Stain (collected 09-05-23 @ 09:03)  Source: .Tissue Other, right upper extremity  Gram Stain (09-05-23 @ 19:41):    Rare polymorphonuclear leukocytes per low power field    Rare Red blood cells per low power field    Few Gram Negative Coccobacilli per oil power field  Final Report (09-10-23 @ 10:48):    Numerous Acinetobacter baumannii/nosocom group (Carbapenem Resistant)    Rare Proteus mirabilis  Organism: Acinetobacter baumannii/nosocom group (Carbapenem Resistant)  Acinetobacter baumannii/nosocom group (Carbapenem Resistant)  Proteus mirabilis (09-10-23 @ 10:48)  Organism: Proteus mirabilis (09-10-23 @ 10:48)      -  Levofloxacin: S <=0.5      -  Tobramycin: S <=2      -  Aztreonam: S <=4      -  Gentamicin: S <=2      -  Cefepime: S <=2      -  Cefazolin: S <=2      -  Piperacillin/Tazobactam: S <=8      -  Ciprofloxacin: S <=0.25      -  Ceftriaxone: S <=1      -  Ampicillin: S <=8 These ampicillin results predict results for amoxicillin      Method Type: UZAIR      -  Meropenem: S <=1      -  Ampicillin/Sulbactam: S <=4/2      -  Cefoxitin: S <=8      -  Amikacin: S <=16      -  Amoxicillin/Clavulanic Acid: S <=8/4      -  Trimethoprim/Sulfamethoxazole: S <=0.5/9.5      -  Ertapenem: S <=0.5  Organism: Acinetobacter baumannii/nosocom group (Carbapenem Resistant) (09-10-23 @ 10:48)      -  Minocycline: I      -  Piperacillin/Tazobactam: R      Method Type: KB  Organism: Acinetobacter baumannii/nosocom group (Carbapenem Resistant) (09-10-23 @ 10:48)      -  Levofloxacin: R >4      -  Tobramycin: R >8      -  Gentamicin: R >8      -  Cefepime: R >16      -  Ciprofloxacin: R >2      -  Imipenem: R >8      Method Type: UZAIR      -  Meropenem: R >8      -  Ampicillin/Sulbactam: R >16/8      -  Ceftazidime: R >16      -  Amikacin: R >32      -  Polymyxin B: I 0.5      -  Trimethoprim/Sulfamethoxazole: R >2/38    Culture - Urine (collected 09-02-23 @ 17:07)  Source: Clean Catch Clean Catch (Midstream)  Final Report (09-06-23 @ 00:03):    10,000 - 49,000 CFU/mL Pseudomonas aeruginosa (Carbapenem Resistant)  Organism: Pseudomonas aeruginosa (Carbapenem Resistant)  Pseudomonas aeruginosa (Carbapenem Resistant) (09-06-23 @ 00:03)  Organism: Pseudomonas aeruginosa (Carbapenem Resistant) (09-06-23 @ 00:03)      -  Resistance Gene to Carbapenem: Nondet      Method Type: CarbaR  Organism: Pseudomonas aeruginosa (Carbapenem Resistant) (09-06-23 @ 00:03)      -  Levofloxacin: I 2      -  Tobramycin: S <=2      -  Ceftazidime/Avibactam: S 8      -  Aztreonam: R >16      -  Gentamicin: S 4      -  Cefepime: S 8      -  Piperacillin/Tazobactam: S 16      -  Ciprofloxacin: I 1      -  Imipenem: R >8      Method Type: UZAIR      -  Meropenem: R >8      -  Ceftazidime: S 8      -  Amikacin: S <=16      -  Ceftolozane/tazobactam: S <=2    Culture - Blood (collected 09-02-23 @ 10:50)  Source: .Blood Blood  Final Report (09-07-23 @ 20:01):    No growth at 5 days    Culture - Tissue with Gram Stain (collected 09-01-23 @ 18:15)  Source: .Tissue Wound  Gram Stain (09-02-23 @ 01:46):    Moderate polymorphonuclear leukocytes seen per low power field    Numerous Gram Negative Rods seen per oil power field    Rare Yeast like cells seen per oil power field  Final Report (09-06-23 @ 15:41):    Numerous Pseudomonas aeruginosa (Carbapenem Resistant)    Numerous Proteus mirabilis    Few Dolly albicans "Susceptibilities not performed"  Organism: Pseudomonas aeruginosa (Carbapenem Resistant)  Pseudomonas aeruginosa (Carbapenem Resistant)  Proteus mirabilis (09-06-23 @ 15:41)  Organism: Proteus mirabilis (09-06-23 @ 15:41)      -  Levofloxacin: S <=0.5      -  Tobramycin: S <=2      -  Aztreonam: S <=4      -  Gentamicin: S <=2      -  Cefazolin: S <=2      -  Cefepime: S <=2      -  Piperacillin/Tazobactam: S <=8      -  Ciprofloxacin: S <=0.25      -  Ceftriaxone: S <=1      -  Ampicillin: S <=8 These ampicillin results predict results for amoxicillin      Method Type: UZAIR      -  Meropenem: S <=1      -  Ampicillin/Sulbactam: S <=4/2      -  Cefoxitin: S <=8      -  Amikacin: S <=16      -  Amoxicillin/Clavulanic Acid: S <=8/4      -  Trimethoprim/Sulfamethoxazole: S <=0.5/9.5      -  Ertapenem: S <=0.5  Organism: Pseudomonas aeruginosa (Carbapenem Resistant) (09-06-23 @ 15:41)      -  Resistance Gene to Carbapenem: Nondet      Method Type: CarbaR  Organism: Pseudomonas aeruginosa (Carbapenem Resistant) (09-06-23 @ 15:41)      -  Levofloxacin: S <=0.5      -  Tobramycin: S <=2      -  Ceftazidime/Avibactam: S <=4      -  Aztreonam: S <=4      -  Gentamicin: S 4      -  Cefepime: S <=2      -  Piperacillin/Tazobactam: S <=8      -  Ciprofloxacin: S <=0.25      -  Imipenem: R >8      Method Type: UZAIR      -  Meropenem: I 4      -  Ceftazidime: S 4      -  Amikacin: S <=16      -  Ceftolozane/tazobactam: S <=2    Culture - Blood (collected 08-30-23 @ 20:40)  Source: .Blood Blood  Final Report (09-05-23 @ 02:01):    No growth at 5 days            INFECTIOUS DISEASES TESTING  COVID-19 PCR: Detected (09-14-23 @ 16:11)  Procalcitonin, Serum: 0.13 (08-28-23 @ 07:25)  Fungitell: <31 (08-14-23 @ 04:45)  Procalcitonin, Serum: 0.34 (08-14-23 @ 04:45)  MRSA PCR Result.: Negative (08-10-23 @ 10:55)  MRSA PCR Result.: Negative (08-07-23 @ 11:30)  Procalcitonin, Serum: 0.67 (08-06-23 @ 05:05)  Procalcitonin, Serum: 0.48 (08-04-23 @ 23:05)  COVID-19 PCR: NotDetec (11-21-22 @ 18:43)      INFLAMMATORY MARKERS  Sedimentation Rate, Erythrocyte: 93 mm/Hr (09-15-23 @ 20:00)  C-Reactive Protein, Serum: 28.7 mg/L (09-15-23 @ 20:00)      RADIOLOGY & ADDITIONAL TESTS:  I have personally reviewed the last available Chest xray  CXR  Xray Chest 1 View- PORTABLE-Urgent:   ACC: [id number] EXAM:  XR CHEST PORTABLE URGENT 1V   ORDERED BY: MESSI LEBRON     PROCEDURE DATE:  09/15/2023          INTERPRETATION:  Clinical History / Reason for exam: Cough    Comparison : Chest radiograph September 12, 2023.    Technique/Positioning: Frontal portable.    Findings:    Support devices: None.    Cardiac/mediastinum/hilum: The aorta is ectatic    Lung parenchyma/Pleura: Right midlung atelectasis    Skeleton/soft tissues: Degenerative changes of the shoulders    Impression:    Right midlung atelectasis, without change.        --- End of Report ---            CHRISTIN AYALA MD; Attending Interventional Radiologist  This document has been electronically signed. Sep 15 2023  9:48AM (09-15-23 @ 09:34)      CT  CT Angio Chest PE Protocol w/ IV Cont:   ACC: [id number] EXAM:  CT ANGIO CHEST PULM ART [facility]   ORDERED BY: KARLA MARTINEZ     PROCEDURE DATE:  09/15/2023          INTERPRETATION:  CLINICAL HISTORY / REASON FOR EXAM: COVID. Tachycardia.   Pulmonary embolus evaluation. WBC 8.53   PMHx of DM,HTN, HLD, gout, BPH,   Parkinson's.    TECHNIQUE:   Contiguous axial CT images were obtained from the thoracic   inlet to the upper abdomen with intravenous contrast.  Thin sections were   reconstructed through the pulmonary vasculature. Imaging was timed for   evaluation of pulmonary embolism.  Reformatted images in the coronal and   sagittal planes were acquired, as well as 3D, Volume rendering, and MIP   reconstructed images.    Comparison: Chest CT dated 11/17/2023      FINDINGS:    TUBES/LINES: None.    PULMONARY ARTERY CIRCULATION: No evidence of central or segmental   pulmonary embolism.    GREAT VESSELS/CARDIAC STRUCTURES/PERICARDIUM: The heart size is at the   upper limits of normal. No pericardial effusion. Coronary artery and   aortic calcifications are noted. There is no evidence of aortic aneurysm   or dissection. The brachiocephalic vessels are unremarkable.    The ascending thoracic aorta, measuring 3.8 cm in diameter; the   descending thoracic aorta measures 3.5 cm; the main pulmonary artery is   dilated, measuring 3.4 cm in diameter, which may be seen with pulmonary   hypertension.    LUNG PARENCHYMA/CENTRAL AIRWAYS/PLEURA: The central tracheobronchial tree   is patent. Centrilobular emphysematous changes are again noted in both   upper lung fields. There are small bilateral pleural effusions with mild   compression atelectasis at the lung bases. Stable 6.5 mm nodule in the   right lung apex. No evidence of pneumothorax    MEDIASTINUM/HILUM: There are no suspicious mediastinal, hilar, or   axillary lymph nodes.  The visualized portion of the thyroid gland is   unremarkable.    CHEST WALL/AXILLA: Unremarkable.    UPPER ABDOMEN: The partially visualized upper abdomen shows no acute   pathology. Contrast materialis present within the colon from the   previous examination.    OSSEOUS STRUCTURES: Degenerative changes of the spine are noted.      IMPRESSION:    No evidence of pulmonary embolism.    Stable 6.5 mm RUL nodule    Centrilobular emphysematous changesare again noted in both upper lung   fields. The main pulmonary artery is dilated, measuring 3.4 cm in   diameter, which may be seen with pulmonary hypertension.      --- End of Report ---            CECILIA JACKSON MD; Attending Interventional Radiologist  This document has been electronically signed. Sep 15 2023  6:48PM (09-15-23 @ 17:46)      CARDIOLOGY TESTING  12 Lead ECG:   Ventricular Rate 134 BPM    Atrial Rate 134 BPM    P-R Interval 136 ms    QRS Duration 110 ms    Q-T Interval 344 ms    QTC Calculation(Bazett) 513 ms    P Axis 43 degrees    R Axis -3 degrees    T Axis 32 degrees    Diagnosis Line Sinus tachycardia  Incomplete right bundle branch block  ST & T wave abnormality, consider anterior ischemia  Abnormal ECG    Confirmed by haydee leslie ([id number]) on 9/4/2023 9:23:08 PM (09-04-23 @ 12:14)      MEDICATIONS  allopurinol 300 Oral daily  bacitracin   Ointment 1 Topical two times a day  budesonide 160 MICROgram(s)/formoterol 4.5 MICROgram(s) Inhaler 2 Inhalation two times a day  chlorhexidine 2% Cloths 1 Topical daily  collagenase Ointment 1 Topical two times a day  collagenase Ointment 1 Topical daily  dextrose 5%. 1000 IV Continuous <Continuous>  dextrose 5%. 1000 IV Continuous <Continuous>  dextrose 50% Injectable 25 IV Push once  dextrose 50% Injectable 25 IV Push once  dextrose 50% Injectable 12.5 IV Push once  enoxaparin Injectable 40 SubCutaneous every 24 hours  glucagon  Injectable 1 IntraMuscular once  guaiFENesin  Oral every 12 hours  insulin lispro (ADMELOG) corrective regimen sliding scale  SubCutaneous every 6 hours  lactated ringers. 1000 IV Continuous <Continuous>  morphine  - Injectable 2 IV Push once  pantoprazole    Tablet 40 Oral two times a day  polyethylene glycol 3350 17 Oral daily  remdesivir  IVPB  IV Intermittent   remdesivir  IVPB 200 IV Intermittent every 24 hours      WEIGHT  Weight (kg): 62.6 (09-02-23 @ 12:59)  Creatinine: 0.7 mg/dL (09-16-23 @ 06:10)      ANTIBIOTICS:  remdesivir  IVPB 200 milliGRAM(s) IV Intermittent every 24 hours  remdesivir  IVPB   IV Intermittent       All available historical records have been reviewed

## 2023-09-16 NOTE — PROGRESS NOTE ADULT - SUBJECTIVE AND OBJECTIVE BOX
24H events:    Patient is a 88y old Male who presents with a chief complaint of DKA/HHS (15 Sep 2023 13:51)    Primary diagnosis of DKA, type 2    Today is hospital day 43d.  No acute or major events overnight.  CTA negative for PE.   Will start RDV x 3 days for COVID.   Pt's prior auth for NH expires 9/19.       PAST MEDICAL & SURGICAL HISTORY  HTN (hypertension)    Gout    BPH (benign prostatic hyperplasia)    Parkinson disease    HLD (hyperlipidemia)    Smoker within last 12 months    Diabetes mellitus    No significant past surgical history      SOCIAL HISTORY:  Social History:      ALLERGIES:  No Known Allergies    MEDICATIONS:  STANDING MEDICATIONS  allopurinol 300 milliGRAM(s) Oral daily  bacitracin   Ointment 1 Application(s) Topical two times a day  budesonide 160 MICROgram(s)/formoterol 4.5 MICROgram(s) Inhaler 2 Puff(s) Inhalation two times a day  chlorhexidine 2% Cloths 1 Application(s) Topical daily  collagenase Ointment 1 Application(s) Topical two times a day  collagenase Ointment 1 Application(s) Topical daily  dextrose 5%. 1000 milliLiter(s) IV Continuous <Continuous>  dextrose 5%. 1000 milliLiter(s) IV Continuous <Continuous>  dextrose 50% Injectable 12.5 Gram(s) IV Push once  dextrose 50% Injectable 25 Gram(s) IV Push once  dextrose 50% Injectable 25 Gram(s) IV Push once  enoxaparin Injectable 40 milliGRAM(s) SubCutaneous every 24 hours  glucagon  Injectable 1 milliGRAM(s) IntraMuscular once  guaiFENesin  milliGRAM(s) Oral every 12 hours  insulin lispro (ADMELOG) corrective regimen sliding scale   SubCutaneous every 6 hours  lactated ringers Bolus 500 milliLiter(s) IV Bolus once  lactated ringers. 1000 milliLiter(s) IV Continuous <Continuous>  morphine  - Injectable 2 milliGRAM(s) IV Push once  pantoprazole    Tablet 40 milliGRAM(s) Oral two times a day  polyethylene glycol 3350 17 Gram(s) Oral daily  remdesivir  IVPB   IV Intermittent     PRN MEDICATIONS  acetaminophen     Tablet .. 650 milliGRAM(s) Oral every 6 hours PRN  dextrose Oral Gel 15 Gram(s) Oral once PRN    VITALS:   T(F): 96.7  HR: 104  BP: 125/83  RR: 18  SpO2: 95%    PHYSICAL EXAM:    GENERAL: NAD, lying in bed   NECK: Supple, no JVD   LUNGS: Unlabored respirations, b/l air entry, mildly congested w/ wet cough   HEART: Regular rate and rhythm, normal s1s2  ABD: Soft, mild distention, TTP in lower quadrants; +BS  EXT: No clubbing, cyanosis or edema.  SKIN: Dressing in place on R forearm     LABS:                        9.9    5.28  )-----------( 315      ( 16 Sep 2023 06:10 )             31.6     09-15    140  |  108  |  13  ----------------------------<  132<H>  4.2   |  22  |  0.8    Ca    7.4<L>      15 Sep 2023 12:24    TPro  5.3<L>  /  Alb  2.1<L>  /  TBili  0.4  /  DBili  x   /  AST  19  /  ALT  10  /  AlkPhos  86  09-15      Urinalysis Basic - ( 15 Sep 2023 12:24 )    Color: x / Appearance: x / SG: x / pH: x  Gluc: 132 mg/dL / Ketone: x  / Bili: x / Urobili: x   Blood: x / Protein: x / Nitrite: x   Leuk Esterase: x / RBC: x / WBC x   Sq Epi: x / Non Sq Epi: x / Bacteria: x      Sedimentation Rate, Erythrocyte: 93 mm/Hr *H* (09-15-23 @ 20:00)      Culture - Blood (collected 13 Sep 2023 11:40)  Source: .Blood None  Preliminary Report (15 Sep 2023 23:01):    No growth at 48 Hours        RADIOLOGY:    < from: CT Angio Chest PE Protocol w/ IV Cont (09.15.23 @ 17:46) >    FINDINGS:    TUBES/LINES: None.    PULMONARY ARTERY CIRCULATION: No evidence of central or segmental   pulmonary embolism.    GREAT VESSELS/CARDIAC STRUCTURES/PERICARDIUM: The heart size is at the   upper limits of normal. No pericardial effusion. Coronary artery and   aortic calcifications are noted. There is no evidence of aortic aneurysm   or dissection. The brachiocephalic vessels are unremarkable.    The ascending thoracic aorta, measuring 3.8 cm in diameter; the   descending thoracic aorta measures 3.5 cm; the main pulmonary artery is   dilated, measuring 3.4 cm in diameter, which may be seen with pulmonary   hypertension.    LUNG PARENCHYMA/CENTRAL AIRWAYS/PLEURA: The central tracheobronchial tree   is patent. Centrilobular emphysematous changes are again noted in both   upper lung fields. There are small bilateral pleural effusions with mild   compression atelectasis at the lung bases. Stable 6.5 mm nodule in the   right lung apex. No evidence of pneumothorax    MEDIASTINUM/HILUM: There are no suspicious mediastinal, hilar, or   axillary lymph nodes.  The visualized portion of the thyroid gland is   unremarkable.    CHEST WALL/AXILLA: Unremarkable.    UPPER ABDOMEN: The partially visualized upper abdomen shows no acute   pathology. Contrast materialis present within the colon from the   previous examination.    OSSEOUS STRUCTURES: Degenerative changes of the spine are noted.      IMPRESSION:    No evidence of pulmonary embolism.    Stable 6.5 mm RUL nodule    Centrilobular emphysematous changes are again noted in both upper lung   fields. The main pulmonary artery is dilated, measuring 3.4 cm in   diameter, which may be seen with pulmonary hypertension.

## 2023-09-16 NOTE — PROGRESS NOTE ADULT - ASSESSMENT
87 y/o M w/ PMH of DM, HTN coming from home with complaint of decreased appetite, increased urinary output and craving sweets x 2 weeks. Admitted for management of DKA. Prolonged hospital stay complicated by Hungatella bacteremia s/p meropenem and caspofungin    IMPRESSION  #Right arm infected necrotic wound, s/p debridement 9/1  - Tissue Cx (9/1) CR pseudomonas (R to imipenem, I to kenyatta, S to other abx), Proteus (pan-sensitive); also some yeast (unclear significance)    #Fever  CXR 9/2 no PNA  UA negative 9/2  COVID negative  BCx 8/30 NGTD    #Leukocytosis    #Hungatella bacteremia likely GI translocation in the setting of appendicitis  8/8 BCX NGTD  8/5 BCX NGTD  8/4 UCX NGTD  8/4 BCX + 1/2 bottles  #DKA/HHS      RECOMMENDATIONS  This is a preliminary incomplete pended note, all final recommendations to follow after interview and examination of the patient.    Please call or message on [employer] Teams if with any questions.  Spectra [phone]     87 y/o M w/ PMH of DM, HTN coming from home with complaint of decreased appetite, increased urinary output and craving sweets x 2 weeks. Admitted for management of DKA. Prolonged hospital stay complicated by Hungatella bacteremia s/p meropenem and caspofungin    IMPRESSION  #Right arm infected necrotic wound, s/p debridement 9/1  - Tissue Cx (9/1) CR pseudomonas (R to imipenem, I to kenyatta, S to other abx), Proteus (pan-sensitive); also some yeast (unclear significance)    #Fever  CXR 9/2 no PNA  UA negative 9/2  COVID negative  BCx 8/30 NGTD    #Leukocytosis    #Hungatella bacteremia likely GI translocation in the setting of appendicitis  8/8 BCX NGTD  8/5 BCX NGTD  8/4 UCX NGTD  8/4 BCX + 1/2 bottles  #DKA/HHS      RECOMMENDATIONS  - can give RDV to complete 3 day course  - supportive care otherwise     Please call or message on Microsoft Teams if with any questions.  Spectra [phone]

## 2023-09-17 LAB
ALBUMIN SERPL ELPH-MCNC: 1.9 G/DL — LOW (ref 3.5–5.2)
ALP SERPL-CCNC: 84 U/L — SIGNIFICANT CHANGE UP (ref 30–115)
ALT FLD-CCNC: 12 U/L — SIGNIFICANT CHANGE UP (ref 0–41)
AST SERPL-CCNC: 26 U/L — SIGNIFICANT CHANGE UP (ref 0–41)
BILIRUB DIRECT SERPL-MCNC: <0.2 MG/DL — SIGNIFICANT CHANGE UP (ref 0–0.3)
BILIRUB INDIRECT FLD-MCNC: >0.1 MG/DL — LOW (ref 0.2–1.2)
BILIRUB SERPL-MCNC: 0.3 MG/DL — SIGNIFICANT CHANGE UP (ref 0.2–1.2)
CREAT SERPL-MCNC: 0.8 MG/DL — SIGNIFICANT CHANGE UP (ref 0.7–1.5)
EGFR: 86 ML/MIN/1.73M2 — SIGNIFICANT CHANGE UP
PROT SERPL-MCNC: 5.4 G/DL — LOW (ref 6–8)

## 2023-09-17 PROCEDURE — 99232 SBSQ HOSP IP/OBS MODERATE 35: CPT

## 2023-09-17 RX ADMIN — Medication 600 MILLIGRAM(S): at 06:59

## 2023-09-17 RX ADMIN — REMDESIVIR 200 MILLIGRAM(S): 5 INJECTION INTRAVENOUS at 13:24

## 2023-09-17 RX ADMIN — PANTOPRAZOLE SODIUM 40 MILLIGRAM(S): 20 TABLET, DELAYED RELEASE ORAL at 17:35

## 2023-09-17 RX ADMIN — PANTOPRAZOLE SODIUM 40 MILLIGRAM(S): 20 TABLET, DELAYED RELEASE ORAL at 07:00

## 2023-09-17 RX ADMIN — Medication 1 APPLICATION(S): at 06:59

## 2023-09-17 RX ADMIN — CHLORHEXIDINE GLUCONATE 1 APPLICATION(S): 213 SOLUTION TOPICAL at 07:00

## 2023-09-17 RX ADMIN — Medication 1 APPLICATION(S): at 17:35

## 2023-09-17 RX ADMIN — Medication 600 MILLIGRAM(S): at 17:35

## 2023-09-17 RX ADMIN — Medication 300 MILLIGRAM(S): at 11:10

## 2023-09-17 RX ADMIN — POLYETHYLENE GLYCOL 3350 17 GRAM(S): 17 POWDER, FOR SOLUTION ORAL at 11:10

## 2023-09-17 RX ADMIN — Medication 1 APPLICATION(S): at 07:00

## 2023-09-17 RX ADMIN — BUDESONIDE AND FORMOTEROL FUMARATE DIHYDRATE 2 PUFF(S): 160; 4.5 AEROSOL RESPIRATORY (INHALATION) at 11:10

## 2023-09-17 RX ADMIN — ENOXAPARIN SODIUM 40 MILLIGRAM(S): 100 INJECTION SUBCUTANEOUS at 22:11

## 2023-09-17 NOTE — PROGRESS NOTE ADULT - ASSESSMENT
87 y/o M w/ PMH of DM, HTN coming from home with complaint of decreased appetite, increased urinary output and craving sweets x 2 weeks. Daughter is caretaker, states patient behavior has changed. Within the last 2 weeks, He is less active, requesting more coca cola and sugar. Pt noted to be hypotensive upon arrival.     # COVID +  - COVID-19 PCR: Detected:(09.14.23 @ 16:11)  - Xray Chest 1 View- PORTABLE-Urgent (Xray Chest 1 View- PORTABLE-Urgent .) (09.15.23 @ 09:34) >Right midlung atelectasis, without change.  - CT chest 9.15.23: No PE; Stable 6.5 mm RUL nodule. Centrilobular emphysematous changes are again noted in both upper lung fields.   - EKG : sinus tachycardia  - inflammatory markers elevated: ESR 93, CRP 28.5, ferritin 779 from 8/23; repeat ferritin, f/u procal  - oxygen sat stable on RA  - Started RDV x3 days (today Day 2)    #Sinus tachycardia  - ?underlying infection/covid  - Check TSH  - No evidence of dehydration    # DKA - resolved   # DM type 2  # Hypoglycemia-resolved  - s/p insulin  drip  - hold lantus  - SSC insulin coverage    # Septic shock resolved  # Hungatella bacteremia  probably sec to  appendicitis  - bcx 8/4 +hungatella  - bcx 8/5 onwards ntd  - TTE showed no vegetations  - s/p kenyatta, flagyl, cefepime, vanco  - blood Culture have been negative (last NTD on 9/13/23)    # Right arm infected necrotic wound  -  s/p debridement 9/1  - S/p cefepime , flagyl  -  blood Culture Results: No growth at 24 hours (09.13.23 @ 11:40)  - Wound care - Santyl/Xeroform/Kerlix / ACE Wrap daily    # Suspected COPD   - S/p prednisone taper  - c/w duoneb, budesonide    # Decreased oral intake - improved  - barium swallow : soft and bite-sized diet with mildly thick liquids     # Duodenal ulcer  # Acute on chr anemia  - S/p P RBC  - s/p EGD 8/14 duodenal ulcer s/p epi and hot forceps thermal therapy  - c/w protonix  - monitor cbc    # Hypokalemia- resolved    # Ileus- resolved  - Xray Kidney Ureter Bladder (09.14.23 @ 10:01) >nonobstructive bowel gas pattern. Previously administered oral contrast seen throughout the colon. Stable visualized osseous structure.  - c/w miralax  -S/p  tap water enema    # Transaminitis- resolved    # Pancreatic body cyst  - outpt MRI    # AAA - stable  - outpt  f/u     # DVT prophylaxis    # DNR/DNI    # Pending:  RDV x3 days, resolution of tachycardia  # Disposition: [facility] when stable; auth expires 9/19; likely dc early next week.

## 2023-09-17 NOTE — PROGRESS NOTE ADULT - REASON FOR ADMISSION
DKA/HHS
DKA/ HHS
DKA/HHS

## 2023-09-17 NOTE — PROGRESS NOTE ADULT - SUBJECTIVE AND OBJECTIVE BOX
*IM ATTENDING NOTE*      MILEY MARES  88y  Male      Patient is a 88y old  Male who presents with a chief complaint of DKA/HHS (16 Sep 2023 12:53)      INTERVAL HPI/OVERNIGHT EVENTS:  No new events reported    Vital Signs Last 24 Hrs  T(C): 36.2 (17 Sep 2023 07:00), Max: 37 (17 Sep 2023 00:43)  T(F): 97.1 (17 Sep 2023 07:00), Max: 98.6 (17 Sep 2023 00:43)  HR: 99 (17 Sep 2023 07:00) (99 - 113)  BP: 120/77 (17 Sep 2023 07:00) (107/59 - 120/77)  RR: 18 (17 Sep 2023 07:00) (18 - 18)      09-16-23 @ 07:01  -  09-17-23 @ 07:00  --------------------------------------------------------  IN: 500 mL / OUT: 0 mL / NET: 500 mL      GEN: No acute distress  LUNGS: Clear to auscultation bilaterally   HEART: S1/S2 present. RRR.   ABD/ GI: Soft, non-tender, non-distended. Bowel sounds present  EXT: No edema  non focal neuro exam grossly    LABS                          9.9    5.28  )-----------( 315      ( 16 Sep 2023 06:10 )             31.6     09-17    x   |  x   |  x   ----------------------------<  x   x    |  x   |  0.8    Ca    7.3<L>      16 Sep 2023 06:10  Mg     2.0     09-16    TPro  5.4<L>  /  Alb  1.9<L>  /  TBili  0.3  /  DBili  <0.2  /  AST  26  /  ALT  12  /  AlkPhos  84  09-17

## 2023-09-17 NOTE — PROGRESS NOTE ADULT - PROVIDER SPECIALTY LIST ADULT
Burn
Critical Care
Critical Care
Gastroenterology
Hospitalist
Hospitalist
Internal Medicine
MICU
Nutrition Support
Pulmonology
Critical Care
Hospitalist
Infectious Disease
Internal Medicine
MICU
MICU
Nutrition Support
Palliative Care
Surgery
CCU
Critical Care
Gastroenterology
Hospitalist
Infectious Disease
Internal Medicine
MICU
MICU
Nutrition Support
Nutrition Support
Palliative Care
Burn
Critical Care
Critical Care
Gastroenterology
Hospitalist
Infectious Disease
Internal Medicine
MICU
Nutrition Support
Palliative Care
Surgery
Critical Care
Critical Care
Infectious Disease
Palliative Care
Infectious Disease
Burn
Internal Medicine
Palliative Care
Internal Medicine
Palliative Care
Internal Medicine

## 2023-09-18 ENCOUNTER — TRANSCRIPTION ENCOUNTER (OUTPATIENT)
Age: 88
End: 2023-09-18

## 2023-09-18 VITALS
SYSTOLIC BLOOD PRESSURE: 98 MMHG | TEMPERATURE: 97 F | RESPIRATION RATE: 18 BRPM | HEART RATE: 79 BPM | DIASTOLIC BLOOD PRESSURE: 54 MMHG

## 2023-09-18 LAB
ALBUMIN SERPL ELPH-MCNC: 2.5 G/DL — LOW (ref 3.5–5.2)
ALP SERPL-CCNC: 88 U/L — SIGNIFICANT CHANGE UP (ref 30–115)
ALT FLD-CCNC: 12 U/L — SIGNIFICANT CHANGE UP (ref 0–41)
ANION GAP SERPL CALC-SCNC: 11 MMOL/L — SIGNIFICANT CHANGE UP (ref 7–14)
AST SERPL-CCNC: 18 U/L — SIGNIFICANT CHANGE UP (ref 0–41)
BILIRUB DIRECT SERPL-MCNC: 0.2 MG/DL — SIGNIFICANT CHANGE UP (ref 0–0.3)
BILIRUB INDIRECT FLD-MCNC: 0.2 MG/DL — SIGNIFICANT CHANGE UP (ref 0.2–1.2)
BILIRUB SERPL-MCNC: 0.4 MG/DL — SIGNIFICANT CHANGE UP (ref 0.2–1.2)
BUN SERPL-MCNC: 17 MG/DL — SIGNIFICANT CHANGE UP (ref 10–20)
CALCIUM SERPL-MCNC: 7.5 MG/DL — LOW (ref 8.4–10.5)
CHLORIDE SERPL-SCNC: 109 MMOL/L — SIGNIFICANT CHANGE UP (ref 98–110)
CO2 SERPL-SCNC: 22 MMOL/L — SIGNIFICANT CHANGE UP (ref 17–32)
CREAT SERPL-MCNC: 0.8 MG/DL — SIGNIFICANT CHANGE UP (ref 0.7–1.5)
CULTURE RESULTS: SIGNIFICANT CHANGE UP
EGFR: 86 ML/MIN/1.73M2 — SIGNIFICANT CHANGE UP
GLUCOSE BLDC GLUCOMTR-MCNC: 117 MG/DL — HIGH (ref 70–99)
GLUCOSE BLDC GLUCOMTR-MCNC: 89 MG/DL — SIGNIFICANT CHANGE UP (ref 70–99)
GLUCOSE SERPL-MCNC: 139 MG/DL — HIGH (ref 70–99)
HCT VFR BLD CALC: 35.2 % — LOW (ref 42–52)
HGB BLD-MCNC: 11.3 G/DL — LOW (ref 14–18)
MAGNESIUM SERPL-MCNC: 2.1 MG/DL — SIGNIFICANT CHANGE UP (ref 1.8–2.4)
MCHC RBC-ENTMCNC: 30.1 PG — SIGNIFICANT CHANGE UP (ref 27–31)
MCHC RBC-ENTMCNC: 32.1 G/DL — SIGNIFICANT CHANGE UP (ref 32–37)
MCV RBC AUTO: 93.6 FL — SIGNIFICANT CHANGE UP (ref 80–94)
NRBC # BLD: 0 /100 WBCS — SIGNIFICANT CHANGE UP (ref 0–0)
PLATELET # BLD AUTO: 390 K/UL — SIGNIFICANT CHANGE UP (ref 130–400)
PMV BLD: 11.1 FL — HIGH (ref 7.4–10.4)
POTASSIUM SERPL-MCNC: 3.8 MMOL/L — SIGNIFICANT CHANGE UP (ref 3.5–5)
POTASSIUM SERPL-SCNC: 3.8 MMOL/L — SIGNIFICANT CHANGE UP (ref 3.5–5)
PROT SERPL-MCNC: 5.8 G/DL — LOW (ref 6–8)
RBC # BLD: 3.76 M/UL — LOW (ref 4.7–6.1)
RBC # FLD: 15.8 % — HIGH (ref 11.5–14.5)
SODIUM SERPL-SCNC: 142 MMOL/L — SIGNIFICANT CHANGE UP (ref 135–146)
SPECIMEN SOURCE: SIGNIFICANT CHANGE UP
WBC # BLD: 6.29 K/UL — SIGNIFICANT CHANGE UP (ref 4.8–10.8)
WBC # FLD AUTO: 6.29 K/UL — SIGNIFICANT CHANGE UP (ref 4.8–10.8)

## 2023-09-18 PROCEDURE — 99239 HOSP IP/OBS DSCHRG MGMT >30: CPT

## 2023-09-18 RX ORDER — TAMSULOSIN HYDROCHLORIDE 0.4 MG/1
1 CAPSULE ORAL
Qty: 0 | Refills: 0 | DISCHARGE

## 2023-09-18 RX ORDER — AMLODIPINE BESYLATE 2.5 MG/1
1 TABLET ORAL
Qty: 0 | Refills: 0 | DISCHARGE

## 2023-09-18 RX ORDER — OLMESARTAN MEDOXOMIL 5 MG/1
1 TABLET, FILM COATED ORAL
Qty: 0 | Refills: 0 | DISCHARGE

## 2023-09-18 RX ORDER — PANTOPRAZOLE SODIUM 20 MG/1
1 TABLET, DELAYED RELEASE ORAL
Qty: 0 | Refills: 0 | DISCHARGE
Start: 2023-09-18

## 2023-09-18 RX ORDER — ACETAMINOPHEN 500 MG
1 TABLET ORAL
Qty: 0 | Refills: 0 | DISCHARGE
Start: 2023-09-18

## 2023-09-18 RX ORDER — POLYETHYLENE GLYCOL 3350 17 G/17G
17 POWDER, FOR SOLUTION ORAL
Qty: 0 | Refills: 0 | DISCHARGE
Start: 2023-09-18

## 2023-09-18 RX ADMIN — Medication 1 APPLICATION(S): at 06:05

## 2023-09-18 RX ADMIN — PANTOPRAZOLE SODIUM 40 MILLIGRAM(S): 20 TABLET, DELAYED RELEASE ORAL at 05:50

## 2023-09-18 RX ADMIN — Medication 600 MILLIGRAM(S): at 18:18

## 2023-09-18 RX ADMIN — REMDESIVIR 200 MILLIGRAM(S): 5 INJECTION INTRAVENOUS at 12:04

## 2023-09-18 RX ADMIN — Medication 1 APPLICATION(S): at 18:19

## 2023-09-18 RX ADMIN — BUDESONIDE AND FORMOTEROL FUMARATE DIHYDRATE 2 PUFF(S): 160; 4.5 AEROSOL RESPIRATORY (INHALATION) at 07:55

## 2023-09-18 RX ADMIN — Medication 1: at 06:31

## 2023-09-18 RX ADMIN — CHLORHEXIDINE GLUCONATE 1 APPLICATION(S): 213 SOLUTION TOPICAL at 06:06

## 2023-09-18 RX ADMIN — Medication 600 MILLIGRAM(S): at 05:50

## 2023-09-18 RX ADMIN — Medication 300 MILLIGRAM(S): at 12:04

## 2023-09-18 RX ADMIN — Medication 1 APPLICATION(S): at 12:09

## 2023-09-18 RX ADMIN — PANTOPRAZOLE SODIUM 40 MILLIGRAM(S): 20 TABLET, DELAYED RELEASE ORAL at 18:17

## 2023-09-18 RX ADMIN — Medication 1 APPLICATION(S): at 05:50

## 2023-09-18 RX ADMIN — POLYETHYLENE GLYCOL 3350 17 GRAM(S): 17 POWDER, FOR SOLUTION ORAL at 12:09

## 2023-09-18 NOTE — DISCHARGE NOTE NURSING/CASE MANAGEMENT/SOCIAL WORK - PATIENT PORTAL LINK FT
You can access the FollowMyHealth Patient Portal offered by [facility] by registering at the following website: [email or web address]. By joining [facility] FollowMyHealth portal, you will also be able to view your health information using other applications (apps) compatible with our system.

## 2023-09-25 DIAGNOSIS — R91.1 SOLITARY PULMONARY NODULE: ICD-10-CM

## 2023-09-25 DIAGNOSIS — G20 PARKINSON'S DISEASE: ICD-10-CM

## 2023-09-25 DIAGNOSIS — I71.23 ANEURYSM OF THE DESCENDING THORACIC AORTA, WITHOUT RUPTURE: ICD-10-CM

## 2023-09-25 DIAGNOSIS — B96.4 PROTEUS (MIRABILIS) (MORGANII) AS THE CAUSE OF DISEASES CLASSIFIED ELSEWHERE: ICD-10-CM

## 2023-09-25 DIAGNOSIS — Z79.84 LONG TERM (CURRENT) USE OF ORAL HYPOGLYCEMIC DRUGS: ICD-10-CM

## 2023-09-25 DIAGNOSIS — E78.5 HYPERLIPIDEMIA, UNSPECIFIED: ICD-10-CM

## 2023-09-25 DIAGNOSIS — T80.818A EXTRAVASATION OF OTHER VESICANT AGENT, INITIAL ENCOUNTER: ICD-10-CM

## 2023-09-25 DIAGNOSIS — B96.5 PSEUDOMONAS (AERUGINOSA) (MALLEI) (PSEUDOMALLEI) AS THE CAUSE OF DISEASES CLASSIFIED ELSEWHERE: ICD-10-CM

## 2023-09-25 DIAGNOSIS — K86.2 CYST OF PANCREAS: ICD-10-CM
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
## 2023-09-25 DIAGNOSIS — S51.831A PUNCTURE WOUND WITHOUT FOREIGN BODY OF RIGHT FOREARM, INITIAL ENCOUNTER: ICD-10-CM

## 2023-09-25 DIAGNOSIS — Z66 DO NOT RESUSCITATE: ICD-10-CM

## 2023-09-25 DIAGNOSIS — K59.00 CONSTIPATION, UNSPECIFIED: ICD-10-CM

## 2023-09-25 DIAGNOSIS — E11.10 TYPE 2 DIABETES MELLITUS WITH KETOACIDOSIS WITHOUT COMA: ICD-10-CM

## 2023-09-25 DIAGNOSIS — E11.649 TYPE 2 DIABETES MELLITUS WITH HYPOGLYCEMIA WITHOUT COMA: ICD-10-CM

## 2023-09-25 DIAGNOSIS — K35.80 UNSPECIFIED ACUTE APPENDICITIS: ICD-10-CM

## 2023-09-25 DIAGNOSIS — E87.0 HYPEROSMOLALITY AND HYPERNATREMIA: ICD-10-CM

## 2023-09-25 DIAGNOSIS — G92.8 OTHER TOXIC ENCEPHALOPATHY: ICD-10-CM

## 2023-09-25 DIAGNOSIS — I72.2 ANEURYSM OF RENAL ARTERY: ICD-10-CM

## 2023-09-25 DIAGNOSIS — B37.2 CANDIDIASIS OF SKIN AND NAIL: ICD-10-CM

## 2023-09-25 DIAGNOSIS — K76.89 OTHER SPECIFIED DISEASES OF LIVER: ICD-10-CM
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
## 2023-09-25 DIAGNOSIS — R62.7 ADULT FAILURE TO THRIVE: ICD-10-CM

## 2023-09-25 DIAGNOSIS — K52.1 TOXIC GASTROENTERITIS AND COLITIS: ICD-10-CM
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
## 2023-09-25 DIAGNOSIS — E87.1 HYPO-OSMOLALITY AND HYPONATREMIA: ICD-10-CM

## 2023-09-25 DIAGNOSIS — I45.2 BIFASCICULAR BLOCK: ICD-10-CM

## 2023-09-25 DIAGNOSIS — T80.89XA OTHER COMPLICATIONS FOLLOWING INFUSION, TRANSFUSION AND THERAPEUTIC INJECTION, INITIAL ENCOUNTER: ICD-10-CM

## 2023-09-25 DIAGNOSIS — N40.0 BENIGN PROSTATIC HYPERPLASIA WITHOUT LOWER URINARY TRACT SYMPTOMS: ICD-10-CM

## 2023-09-25 DIAGNOSIS — J90 PLEURAL EFFUSION, NOT ELSEWHERE CLASSIFIED: ICD-10-CM
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
## 2023-09-25 DIAGNOSIS — A41.59 OTHER GRAM-NEGATIVE SEPSIS: ICD-10-CM

## 2023-09-25 DIAGNOSIS — I25.10 ATHEROSCLEROTIC HEART DISEASE OF NATIVE CORONARY ARTERY WITHOUT ANGINA PECTORIS: ICD-10-CM

## 2023-09-25 DIAGNOSIS — R57.8 OTHER SHOCK: ICD-10-CM

## 2023-09-25 DIAGNOSIS — R65.21 SEVERE SEPSIS WITH SEPTIC SHOCK: ICD-10-CM
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
## 2023-09-25 DIAGNOSIS — K56.7 ILEUS, UNSPECIFIED: ICD-10-CM

## 2023-09-25 DIAGNOSIS — K26.0 ACUTE DUODENAL ULCER WITH HEMORRHAGE: ICD-10-CM

## 2023-09-25 DIAGNOSIS — E11.52 TYPE 2 DIABETES MELLITUS WITH DIABETIC PERIPHERAL ANGIOPATHY WITH GANGRENE: ICD-10-CM

## 2023-09-25 DIAGNOSIS — E86.0 DEHYDRATION: ICD-10-CM

## 2023-09-25 DIAGNOSIS — Z91.148 PATIENT'S OTHER NONCOMPLIANCE WITH MEDICATION REGIMEN FOR OTHER REASON: ICD-10-CM

## 2023-09-25 DIAGNOSIS — J98.11 ATELECTASIS: ICD-10-CM

## 2023-09-25 DIAGNOSIS — U07.1 COVID-19: ICD-10-CM

## 2023-09-25 DIAGNOSIS — Z86.73 PERSONAL HISTORY OF TRANSIENT ISCHEMIC ATTACK (TIA), AND CEREBRAL INFARCTION WITHOUT RESIDUAL DEFICITS: ICD-10-CM

## 2023-09-25 DIAGNOSIS — J43.9 EMPHYSEMA, UNSPECIFIED: ICD-10-CM

## 2023-09-25 DIAGNOSIS — E87.6 HYPOKALEMIA: ICD-10-CM

## 2023-09-25 DIAGNOSIS — N17.9 ACUTE KIDNEY FAILURE, UNSPECIFIED: ICD-10-CM

## 2023-09-25 DIAGNOSIS — D62 ACUTE POSTHEMORRHAGIC ANEMIA: ICD-10-CM

## 2023-09-25 DIAGNOSIS — E87.5 HYPERKALEMIA: ICD-10-CM

## 2023-09-25 DIAGNOSIS — T47.4X5A ADVERSE EFFECT OF OTHER LAXATIVES, INITIAL ENCOUNTER: ICD-10-CM

## 2023-09-25 DIAGNOSIS — I10 ESSENTIAL (PRIMARY) HYPERTENSION: ICD-10-CM

## 2023-09-25 DIAGNOSIS — K29.60 OTHER GASTRITIS WITHOUT BLEEDING: ICD-10-CM

## 2023-09-25 DIAGNOSIS — G93.41 METABOLIC ENCEPHALOPATHY: ICD-10-CM

## 2023-09-25 DIAGNOSIS — R74.01 ELEVATION OF LEVELS OF LIVER TRANSAMINASE LEVELS: ICD-10-CM

## 2023-09-25 DIAGNOSIS — Z87.891 PERSONAL HISTORY OF NICOTINE DEPENDENCE: ICD-10-CM

## 2023-09-25 DIAGNOSIS — R13.10 DYSPHAGIA, UNSPECIFIED: ICD-10-CM

## 2023-09-25 DIAGNOSIS — M10.9 GOUT, UNSPECIFIED: ICD-10-CM

## 2023-09-27 ENCOUNTER — NON-APPOINTMENT (OUTPATIENT)
Age: 88
End: 2023-09-27

## 2023-10-10 ENCOUNTER — APPOINTMENT (OUTPATIENT)
Dept: BURN CARE | Facility: CLINIC | Age: 88
End: 2023-10-10
Payer: MEDICARE

## 2023-10-10 ENCOUNTER — OUTPATIENT (OUTPATIENT)
Dept: OUTPATIENT SERVICES | Facility: HOSPITAL | Age: 88
LOS: 1 days | End: 2023-10-10
Payer: MEDICARE

## 2023-10-10 VITALS — SYSTOLIC BLOOD PRESSURE: 98 MMHG | TEMPERATURE: 96.8 F | HEART RATE: 70 BPM | DIASTOLIC BLOOD PRESSURE: 62 MMHG

## 2023-10-10 DIAGNOSIS — Z00.8 ENCOUNTER FOR OTHER GENERAL EXAMINATION: ICD-10-CM

## 2023-10-10 PROCEDURE — 99212 OFFICE O/P EST SF 10 MIN: CPT

## 2023-10-11 DIAGNOSIS — L98.8 OTHER SPECIFIED DISORDERS OF THE SKIN AND SUBCUTANEOUS TISSUE: ICD-10-CM

## 2023-10-11 DIAGNOSIS — Z98.890 OTHER SPECIFIED POSTPROCEDURAL STATES: ICD-10-CM

## 2023-11-07 ENCOUNTER — APPOINTMENT (OUTPATIENT)
Dept: BURN CARE | Facility: CLINIC | Age: 88
End: 2023-11-07

## 2023-11-16 NOTE — ED ADULT NURSE REASSESSMENT NOTE - NS ED NURSE REASSESS COMMENT FT1
Pt reassessed A/O times 4 Vs stable on cardiac monitor denies chest pain denies SOB no N/V ,no neuro deficits ,pt  speech clear able to stand  with 1 person assist ,placed  OOB -chair with 1 person assist . able to swallow well ,pt is seen evaluate by ED attending clear to go home with family members with  privet transport , instruction for F/UP  care is given to the  son and verbalize  understanding of instruction given NAD note ready to go home . Visual Acuity (Snellen - Linear)         Right Left    Dist cc 20/40 20/60    Dist ph cc NI NI      Correction: Glasses          Tonometry       Tonometry (Goldmann Applanation, 9:18 AM)         Right Left    Pressure 14 18                  Assessment/Plan   Last dilated:  11/16/23  last HVF: 1/12/23    1.  Primary Open-Angle Glaucoma OU:  /520 IOPs are acceptable given early stage of disease     Plan:  cont xalatan OU QHS            cont brimonidine 0.2% OU BID            f/u 4 months     2.  Cataract OD / Pseudophakia (PCIOL) OS:  visually and functionally significant especially with reading and writing.  Pt qualifies for cataract surgery by glare testing.  Discussed limited final visual potential due to the ERM seen on macular OCT.   Pt is now having more difficulty with paper-newspaper fine print, and no longer able to do the NYT crossword puzzles and more difficulty with reading overall.  Discussed options 1) CPM, 2) cataract extraction with intraocular lens (IOL) +/- MIGS.  Risks, benefits, and alternatives reviewed.  Risks including, but not limited to, death, loss of vision, increased or decreased eye pressure, pain, retinal detachment, suprachoroidal hemorrage, swelling in the cornea or retina/choroid, infection, double vision, need for further surgery, etc. As a result of cataract extraction, it is believed that the patient will experience improved vision.  Discussed IOL options 1) stnd, 2) toric, 3) multifocal/accomm. Pt with 1.5 D of astigmatism.  I do not feel that accommodating IOLs work well and multifocals can degrade contrast sensitivity.  However, given epiretinal membrane (ERM), would not likely realize the benefit of the toric intraocular lens (IOL) and the astigmatism is against-the-rule astigmatism (ATR) where my incision is.  Pt chooses standard IOL.      Plan:  pt wishes to proceed with cataract extraction with intraocular lens (IOL) + goniotomy (RIGHT EYE) OD stnd IOL    3.   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

## 2023-12-12 NOTE — CHART NOTE - NSCHARTNOTESELECT_GEN_ALL_CORE
Patient would like to know your recommendations on RSV vaccine and if he should take it. Please advise.//ddw   Palliative Care- Social Work/Event Note

## 2024-04-29 NOTE — PHARMACOTHERAPY INTERVENTION NOTE - NSPHARMCOMMASP
ASP - Therapy recommended/ Alternative therapy
ASP - De-escalation
ASP - Lab/ test recommended
ASP - Renal dose adjustment
ASP - De-escalation
Available

## 2024-08-03 NOTE — ED PROVIDER NOTE - IV ALTEPLASE ADMIN OUTSIDE HIDDEN
History  Chief Complaint   Patient presents with    Vomiting     Pt was seen at urgent care in [place] for same last Thursday. Had labs dx with cyclical vomiting      30 yr  male daily marijuana user-  states over last week with daily vomitus and mild upper abd pain -- no fevers- no hematemesis- no change in bowels- no  gu or other comps       History provided by:  Patient and spouse   used: No    Vomiting  Severity:  Moderate  Duration:  1 week  Associated symptoms: abdominal pain    Associated symptoms: no chills, no diarrhea and no fever        Prior to Admission Medications   Prescriptions Last Dose Informant Patient Reported? Taking?   ALPRAZolam (XANAX) 0.25 mg tablet   No No   Sig: Take 1 tablet (0.25 mg total) by mouth daily as needed for anxiety   MAGNESIUM GLYCINATE PO   Yes No   Sig: Take by mouth   sertraline (ZOLOFT) 100 mg tablet   No No   Sig: Take 1 tablet (100 mg total) by mouth daily      Facility-Administered Medications: None       Past Medical History:   Diagnosis Date    Anxiety     GERD (gastroesophageal reflux disease)        Past Surgical History:   Procedure Laterality Date    FINGER SURGERY Right     ring finger       History reviewed. No pertinent family history.  I have reviewed and agree with the history as documented.    E-Cigarette/Vaping    E-Cigarette Use Never User      E-Cigarette/Vaping Substances    Nicotine No     THC No     CBD No      Social History     Tobacco Use    Smoking status: Every Day     Types: Cigarettes    Smokeless tobacco: Never   Vaping Use    Vaping status: Never Used   Substance Use Topics    Alcohol use: Yes     Comment: 1 per week    Drug use: Yes     Types: Marijuana       Review of Systems   Constitutional:  Positive for appetite change. Negative for activity change, chills, diaphoresis, fatigue, fever and unexpected weight change.   HENT: Negative.     Eyes: Negative.    Respiratory: Negative.     Cardiovascular: Negative.     Gastrointestinal:  Positive for abdominal pain, nausea and vomiting. Negative for abdominal distention, anal bleeding, blood in stool, constipation, diarrhea and rectal pain.   Endocrine: Negative.    Genitourinary: Negative.    Musculoskeletal: Negative.    Skin: Negative.    Allergic/Immunologic: Negative.    Neurological: Negative.    Hematological: Negative.    Psychiatric/Behavioral: Negative.         Physical Exam  Physical Exam  Vitals and nursing note reviewed.   Constitutional:       General: He is not in acute distress.     Appearance: Normal appearance. He is not ill-appearing, toxic-appearing or diaphoretic.      Comments: Avss- htsnsive- all of which resolved in er - pulse ox 98 % on ra- interpretation is normal- no intervention    HENT:      Head: Normocephalic and atraumatic.      Nose: Nose normal.      Mouth/Throat:      Mouth: Mucous membranes are moist.   Eyes:      General: No scleral icterus.        Right eye: No discharge.         Left eye: No discharge.      Extraocular Movements: Extraocular movements intact.      Conjunctiva/sclera: Conjunctivae normal.      Pupils: Pupils are equal, round, and reactive to light.      Comments: Mm pink   Neck:      Vascular: No carotid bruit.      Comments: No pmt c/t/l/s spine   Cardiovascular:      Rate and Rhythm: Normal rate and regular rhythm.      Pulses: Normal pulses.      Heart sounds: Normal heart sounds. No murmur heard.     No friction rub. No gallop.   Pulmonary:      Effort: Pulmonary effort is normal. No respiratory distress.      Breath sounds: Normal breath sounds. No stridor. No wheezing, rhonchi or rales.   Chest:      Chest wall: No tenderness.   Abdominal:      General: Bowel sounds are normal. There is no distension.      Palpations: Abdomen is soft. There is no mass.      Tenderness: There is no abdominal tenderness. There is no right CVA tenderness, left CVA tenderness, guarding or rebound.      Hernia: No hernia is present.       Comments: Soft nt/nd- no hsm - no cva tenderness- no ascites- no peritoneal signs    Musculoskeletal:         General: No swelling, tenderness, deformity or signs of injury. Normal range of motion.      Cervical back: Normal range of motion and neck supple. No rigidity or tenderness.      Right lower leg: No edema.      Left lower leg: No edema.      Comments: Equal bilateral radial/dp pulses- no ble edema/calf tenderness/asym/ erythema   Lymphadenopathy:      Cervical: No cervical adenopathy.   Skin:     General: Skin is warm.      Capillary Refill: Capillary refill takes less than 2 seconds.      Coloration: Skin is not jaundiced or pale.      Findings: No bruising, erythema, lesion or rash.   Neurological:      General: No focal deficit present.      Mental Status: He is alert and oriented to person, place, and time. Mental status is at baseline.      Cranial Nerves: No cranial nerve deficit.      Sensory: No sensory deficit.      Motor: No weakness.      Coordination: Coordination normal.      Gait: Gait normal.      Comments: Normal non focal neur o exam    Psychiatric:         Mood and Affect: Mood normal.         Behavior: Behavior normal.         Thought Content: Thought content normal.         Judgment: Judgment normal.         Vital Signs  ED Triage Vitals [07/30/24 0901]   Temperature Pulse Respirations Blood Pressure SpO2   98 °F (36.7 °C) 76 16 (!) 155/101 98 %      Temp Source Heart Rate Source Patient Position - Orthostatic VS BP Location FiO2 (%)   Oral -- Sitting -- --      Pain Score       No Pain           Vitals:    07/30/24 0901 07/30/24 1005 07/30/24 1030   BP: (!) 155/101 138/73 131/78   Pulse: 76     Patient Position - Orthostatic VS: Sitting           Visual Acuity      ED Medications  Medications   lactated ringers infusion 1,000 mL (0 mL Intravenous Stopped 7/30/24 1057)   droperidol (INAPSINE) injection 0.625 mg (0.625 mg Intravenous Given 7/30/24 0958)       Diagnostic Studies  Results  Reviewed       Procedure Component Value Units Date/Time    Comprehensive metabolic panel [[id number]]  (Abnormal) Collected: 07/30/24 0959    Lab Status: Final result Specimen: Blood from Arm, Left Updated: 07/30/24 1103     Sodium 130 mmol/L      Potassium 3.8 mmol/L      Chloride 90 mmol/L      CO2 24 mmol/L      ANION GAP 16 mmol/L      BUN 22 mg/dL      Creatinine 0.94 mg/dL      Glucose 89 mg/dL      Calcium 10.3 mg/dL      AST 19 U/L      ALT 13 U/L      Alkaline Phosphatase 33 U/L      Total Protein 8.1 g/dL      Albumin 4.9 g/dL      Total Bilirubin 2.11 mg/dL      eGFR 109 ml/min/1.73sq m     Narrative:      National Kidney Disease Foundation guidelines for Chronic Kidney Disease (CKD):     Stage 1 with normal or high GFR (GFR > 90 mL/min/1.73 square meters)    Stage 2 Mild CKD (GFR = 60-89 mL/min/1.73 square meters)    Stage 3A Moderate CKD (GFR = 45-59 mL/min/1.73 square meters)    Stage 3B Moderate CKD (GFR = 30-44 mL/min/1.73 square meters)    Stage 4 Severe CKD (GFR = 15-29 mL/min/1.73 square meters)    Stage 5 End Stage CKD (GFR <15 mL/min/1.73 square meters)  Note: GFR calculation is accurate only with a steady state creatinine    Lipase [[id number]]  (Normal) Collected: 07/30/24 0959    Lab Status: Final result Specimen: Blood from Arm, Left Updated: 07/30/24 1103     Lipase 35 u/L     Blood gas, venous [[id number]]  (Abnormal) Collected: 07/30/24 1019    Lab Status: Final result Specimen: Blood from Arm, Left Updated: 07/30/24 1027     pH, Bob 7.490     pCO2, Bob 31.4 mm Hg      pO2, Bob 36.6 mm Hg      HCO3, Bob 23.4 mmol/L      Base Excess, Bob 1.1 mmol/L      O2 Content, Bob 16.7 ml/dL      O2 HGB, VENOUS 72.2 %     CBC and differential [[id number]]  (Abnormal) Collected: 07/30/24 0959    Lab Status: Final result Specimen: Blood from Arm, Left Updated: 07/30/24 1020     WBC 9.46 Thousand/uL      RBC 5.78 Million/uL      Hemoglobin 17.2 g/dL      Hematocrit 48.7 %      MCV 84 fL      MCH 29.8 pg       MCHC 35.3 g/dL      RDW 12.3 %      MPV 9.4 fL      Platelets 313 Thousands/uL      nRBC 0 /100 WBCs      Segmented % 73 %      Immature Grans % 0 %      Lymphocytes % 18 %      Monocytes % 9 %      Eosinophils Relative 0 %      Basophils Relative 0 %      Absolute Neutrophils 6.91 Thousands/µL      Absolute Immature Grans 0.03 Thousand/uL      Absolute Lymphocytes 1.69 Thousands/µL      Absolute Monocytes 0.81 Thousand/µL      Eosinophils Absolute 0.00 Thousand/µL      Basophils Absolute 0.02 Thousands/µL                    No orders to display              Procedures  Procedures         ED Course  ED Course as of 08/03/24 1538   Tue Jul 30, 2024   1118 Er md note- labs reviewed- likely hypochloremic metabolic alkalosis from vomitus    1119 Er md note- lavs reviewed and compared to previous 5/23-- by er md   1129 Er md note- pt feels much improved  prior to er d/c-= tolerating liquids - with no symptoms will d/c--                                  SBIRT 22yo+      Flowsheet Row Most Recent Value   Initial Alcohol Screen: US AUDIT-C     1. How often do you have a drink containing alcohol? 2 Filed at: 07/30/2024 1005   2. How many drinks containing alcohol do you have on a typical day you are drinking?  1 Filed at: 07/30/2024 1005   3a. Male UNDER 65: How often do you have five or more drinks on one occasion? 1 Filed at: 07/30/2024 1005   3b. FEMALE Any Age, or MALE 65+: How often do you have 4 or more drinks on one occassion? 0 Filed at: 07/30/2024 1005   Audit-C Score 4 Filed at: 07/30/2024 1005   YAZAN: How many times in the past year have you...    Used an illegal drug or used a prescription medication for non-medical reasons? 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 Decision-making details documented in ED Course.     Details: All reviewed   Discussion of management or test interpretation with external provider(s): Moderate amount  of er md thought complexity and workup    Risk  Prescription drug management.                 Disposition  Final diagnoses:   Nausea and vomiting, unspecified vomiting type     Time reflects when diagnosis was documented in both MDM as applicable and the Disposition within this note       Time User Action Codes Description Comment    7/30/2024 11:30 AM Yoseph Stephens Add [R11.2] Nausea and vomiting, unspecified vomiting type           ED Disposition       ED Disposition   Discharge    Condition   Stable    Date/Time   Tue Jul 30, 2024 1130    Comment   Joselo Velasquez discharge to home/self care.                   Follow-up Information    None         Discharge Medication List as of 7/30/2024 11:33 AM        START taking these medications    Details   ondansetron (Zofran) 4 mg tablet Take 2 tablets (8 mg total) by mouth every 8 (eight) hours as needed for nausea or vomiting for up to 15 days Please ignore previous- zofran odt 2 tablets dissolve in the mouth  every 8 hrs as needed for nausea/ vomtitng, Starting Tue 7/30/2024, Until We d 8/14/2024 at 2359, Normal           CONTINUE these medications which have NOT CHANGED    Details   ALPRAZolam (XANAX) 0.25 mg tablet Take 1 tablet (0.25 mg total) by mouth daily as needed for anxiety, Starting Mon 6/3/2024, Normal      MAGNESIUM GLYCINATE PO Take by mouth, Historical Med      sertraline (ZOLOFT) 100 mg tablet Take 1 tablet (100 mg total) by mouth daily, Starting Thu 1/18/2024, Normal             No discharge procedures on file.    PDMP Review         Value Time User    PDMP Reviewed  Yes 6/3/2024 11:50 AM  Audrey Morfin,             ED Provider  Electronically Signed by             Yoseph Stephens MD  08/03/24 5850     show

## 2024-11-15 NOTE — PROGRESS NOTE ADULT - CRITICAL CARE SERVICES
31
35
35
Conjuntivae and eyelids appear normal, Sclerae : White without injection
31
35
31
31
35
35
31
31
35
31
34
31
35

## 2025-02-17 NOTE — CONSULT NOTE ADULT - ASSESSMENT
ILENE MONCADA    Patient Age: 19 year old   Refill request by: Phone.  Caller informed to check with the pharmacy later for their refill.  If problems arise, we will contact patient.    Refill to be: ePrescribed to:  [facility]    Medication requested to be refilled:   Methylphenidate HCl ER 54 MG TABLET SR 24 HR 30     sertraline (ZOLOFT) 50 MG tablet 180     cloNIDine (CATAPRES) 0.1 MG tablet 180       Patient's next appointment is scheduled for   Return in about 3 months (around 2/5/2025).    Patient's last appointment with this Provider was 11/5/24               WEIGHT AND HEIGHT:   Wt Readings from Last 1 Encounters:   11/17/23 64.9 kg (143 lb) (51%, Z= 0.03)*     * Growth percentiles are based on CDC (Boys, 2-20 Years) data.     Ht Readings from Last 1 Encounters:   08/07/23 5' 10\" (1.778 m) (65%, Z= 0.40)*     * Growth percentiles are based on CDC (Boys, 2-20 Years) data.     BMI Readings from Last 1 Encounters:   08/07/23 21.38 kg/m² (55%, Z= 0.12)*     * Growth percentiles are based on CDC (Boys, 2-20 Years) data.       ALLERGIES:  Patient has no known allergies.  Current Outpatient Medications   Medication Sig Dispense Refill    Methylphenidate HCl ER 54 MG TABLET SR 24 HR Take 1 tablet by mouth daily. 30 tablet 0    sertraline (ZOLOFT) 50 MG tablet Take 2 tablets by mouth daily. 180 tablet 0    cloNIDine (CATAPRES) 0.1 MG tablet TAKE 1 TO 2 TABLETS BY MOUTH EVERY NIGHT AS NEEDED FOR SLEEP OR ANXIETY 180 tablet 0    Methylphenidate HCl ER 54 MG TABLET SR 24 HR Take 1 tablet by mouth daily. 30 tablet 0     No current facility-administered medications for this visit.         PCP: Jocelyn Reveles DO         INS: Payor: [employer] [place] / Plan: PPO [id number] / Product Type: PPO MISC   PATIENT ADDRESS:  [id number] [facility] [id number]   89 y/o M w/ PMH of DM, HTN coming from home with complaint of decreased appetite, increased urinary output and craving sweets x 2 weeks. Found to be in DKA with glucose >1100 and BHB 5.1. Completed IVF and insulin drip with resolution of AG from 23 -> 9. Endocrine was consulted for insulin management. A1c 15.5. Pt was on insulin drip but overnight status worsened with GI bleed in this pt with hungatella bacteremia i/s/o appendicitis. Pt hyperglycemic today after stopping/decreasing insulin drip.    #DKA  #T2DM with hyperglycemia  - A1c 15.5%  - Initially glucose > 1100, BHB 5.1  - s/p insulin drip and IVF for DKA; AG 23 -> 9  - Complicated by Hungatella bacteremia i/s/o appendicitis and GI bleed  - Decompensated overnight, insulin drip decreased/stopped as pt NPO but became hyperglycemic    Recs  - Continue with insulin drip during critical illness to avoid hyperglycemia due to expected cortisol response  - Titrate insulin drip to maintain -180  - Can switch to subq insulin when pt able to tolerate PO intake

## 2025-06-26 NOTE — CONSULT NOTE ADULT - TIME BILLING
Review of imaging and chart; obtaining history; examination of pt; discussion and coordination of care. Alert-The patient is alert, awake and responds to voice. The patient is oriented to time, place, and person. The triage nurse is able to obtain subjective information.

## 2025-08-08 NOTE — ASSESSMENT
----- Message from Nurse Sara DUNLAP sent at 8/8/2025  3:27 PM EDT -----  Regarding: Rescheduling Appointment  Hello, team! The patient's daughter reached out to me asking if her appointment with Dr. Ortiz on 8/12/25 could be rescheduled. If you could please reach out to her at your earliest convenience to reschedule, that would be appreciated. Thank you!      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 He is in agreement to proceed on 12/13/22. He will need Cardiology clearance and I am sending him to Dr. Layton.\par \par Copy of CT scans provided to patient and advised follow up with PMD regarding incidental findings.